# Patient Record
Sex: MALE | Race: BLACK OR AFRICAN AMERICAN | NOT HISPANIC OR LATINO | Employment: FULL TIME | URBAN - METROPOLITAN AREA
[De-identification: names, ages, dates, MRNs, and addresses within clinical notes are randomized per-mention and may not be internally consistent; named-entity substitution may affect disease eponyms.]

---

## 2017-01-06 DIAGNOSIS — B18.1 CHRONIC VIRAL HEPATITIS B WITHOUT DELTA AGENT AND WITHOUT COMA (H): Primary | ICD-10-CM

## 2017-01-12 ENCOUNTER — CARE COORDINATION (OUTPATIENT)
Dept: ONCOLOGY | Facility: CLINIC | Age: 52
End: 2017-01-12

## 2017-01-12 NOTE — PROGRESS NOTES
The patient called and requested that his care is transferred to a oncologst that is able to see him on Thursdays, his day off work.  The scheduling staff scheduled the patient to see Dr. Whittaker on 2/9/17.  The care team was notified.

## 2017-01-17 ENCOUNTER — TELEPHONE (OUTPATIENT)
Dept: NURSING | Facility: CLINIC | Age: 52
End: 2017-01-17

## 2017-01-17 ENCOUNTER — OFFICE VISIT (OUTPATIENT)
Dept: INTERNAL MEDICINE | Facility: CLINIC | Age: 52
End: 2017-01-17

## 2017-01-17 VITALS
WEIGHT: 145.6 LBS | SYSTOLIC BLOOD PRESSURE: 150 MMHG | HEART RATE: 71 BPM | RESPIRATION RATE: 16 BRPM | BODY MASS INDEX: 23.51 KG/M2 | DIASTOLIC BLOOD PRESSURE: 87 MMHG | TEMPERATURE: 97.8 F

## 2017-01-17 DIAGNOSIS — C22.0 HCC (HEPATOCELLULAR CARCINOMA) (H): ICD-10-CM

## 2017-01-17 DIAGNOSIS — R10.11 ABDOMINAL PAIN, RIGHT UPPER QUADRANT: Primary | ICD-10-CM

## 2017-01-17 LAB
ALBUMIN SERPL-MCNC: 3.1 G/DL (ref 3.4–5)
ALBUMIN UR-MCNC: NEGATIVE MG/DL
ALP SERPL-CCNC: 90 U/L (ref 40–150)
ALT SERPL W P-5'-P-CCNC: 24 U/L (ref 0–70)
ANION GAP SERPL CALCULATED.3IONS-SCNC: 7 MMOL/L (ref 3–14)
APPEARANCE UR: CLEAR
AST SERPL W P-5'-P-CCNC: 23 U/L (ref 0–45)
BASOPHILS # BLD AUTO: 0.1 10E9/L (ref 0–0.2)
BASOPHILS NFR BLD AUTO: 1.1 %
BILIRUB SERPL-MCNC: 0.5 MG/DL (ref 0.2–1.3)
BILIRUB UR QL STRIP: NEGATIVE
BUN SERPL-MCNC: 12 MG/DL (ref 7–30)
CALCIUM SERPL-MCNC: 8.6 MG/DL (ref 8.5–10.1)
CHLORIDE SERPL-SCNC: 106 MMOL/L (ref 94–109)
CO2 SERPL-SCNC: 25 MMOL/L (ref 20–32)
COLOR UR AUTO: YELLOW
CREAT SERPL-MCNC: 0.6 MG/DL (ref 0.66–1.25)
DIFFERENTIAL METHOD BLD: ABNORMAL
EOSINOPHIL # BLD AUTO: 0.4 10E9/L (ref 0–0.7)
EOSINOPHIL NFR BLD AUTO: 6.9 %
ERYTHROCYTE [DISTWIDTH] IN BLOOD BY AUTOMATED COUNT: 13.1 % (ref 10–15)
GFR SERPL CREATININE-BSD FRML MDRD: ABNORMAL ML/MIN/1.7M2
GLUCOSE SERPL-MCNC: 81 MG/DL (ref 70–99)
GLUCOSE UR STRIP-MCNC: NEGATIVE MG/DL
HCT VFR BLD AUTO: 39.1 % (ref 40–53)
HGB BLD-MCNC: 13.3 G/DL (ref 13.3–17.7)
HGB UR QL STRIP: NEGATIVE
IMM GRANULOCYTES # BLD: 0 10E9/L (ref 0–0.4)
IMM GRANULOCYTES NFR BLD: 0.4 %
KETONES UR STRIP-MCNC: NEGATIVE MG/DL
LEUKOCYTE ESTERASE UR QL STRIP: NEGATIVE
LIPASE SERPL-CCNC: 150 U/L (ref 73–393)
LYMPHOCYTES # BLD AUTO: 0.7 10E9/L (ref 0.8–5.3)
LYMPHOCYTES NFR BLD AUTO: 12.7 %
MCH RBC QN AUTO: 30.4 PG (ref 26.5–33)
MCHC RBC AUTO-ENTMCNC: 34 G/DL (ref 31.5–36.5)
MCV RBC AUTO: 90 FL (ref 78–100)
MONOCYTES # BLD AUTO: 0.6 10E9/L (ref 0–1.3)
MONOCYTES NFR BLD AUTO: 10.2 %
MUCOUS THREADS #/AREA URNS LPF: PRESENT /LPF
NEUTROPHILS # BLD AUTO: 3.9 10E9/L (ref 1.6–8.3)
NEUTROPHILS NFR BLD AUTO: 68.7 %
NITRATE UR QL: NEGATIVE
NRBC # BLD AUTO: 0 10*3/UL
NRBC BLD AUTO-RTO: 0 /100
PH UR STRIP: 5 PH (ref 5–7)
PLATELET # BLD AUTO: 253 10E9/L (ref 150–450)
POTASSIUM SERPL-SCNC: 4 MMOL/L (ref 3.4–5.3)
PROT SERPL-MCNC: 8.2 G/DL (ref 6.8–8.8)
RBC # BLD AUTO: 4.37 10E12/L (ref 4.4–5.9)
RBC #/AREA URNS AUTO: 1 /HPF (ref 0–2)
SODIUM SERPL-SCNC: 138 MMOL/L (ref 133–144)
SP GR UR STRIP: 1.02 (ref 1–1.03)
SQUAMOUS #/AREA URNS AUTO: <1 /HPF (ref 0–1)
URN SPEC COLLECT METH UR: ABNORMAL
UROBILINOGEN UR STRIP-MCNC: 0 MG/DL (ref 0–2)
WBC # BLD AUTO: 5.7 10E9/L (ref 4–11)
WBC #/AREA URNS AUTO: 3 /HPF (ref 0–2)

## 2017-01-17 RX ORDER — NAPROXEN 500 MG/1
500 TABLET ORAL 2 TIMES DAILY PRN
Qty: 30 TABLET | Refills: 1 | Status: SHIPPED | OUTPATIENT
Start: 2017-01-17 | End: 2017-05-12

## 2017-01-17 RX ORDER — IBUPROFEN 600 MG/1
TABLET, FILM COATED ORAL EVERY 6 HOURS PRN
COMMUNITY
End: 2017-02-02

## 2017-01-17 ASSESSMENT — PAIN SCALES - GENERAL: PAINLEVEL: EXTREME PAIN (8)

## 2017-01-17 NOTE — Clinical Note
2017        Preeti Pascual  [address]   [facility] [address]  [phone] (home)     :     1965          To Whom it May Concern:    Mr. Preeti Pascual missed work on 2017 and 2017  due to illness or injury.  He as seen in my clinic on  and he should be excused from work on the above days.  Please call if you have any questions or concerns.      Please contact me for questions or concerns.    Sincerely,      Js Irving MD    [facility]  [facility]  [address] [facility]  4th Floor  [facility] [address] [phone]

## 2017-01-17 NOTE — TELEPHONE ENCOUNTER
"Call Type: Triage Call    Presenting Problem: Preeti is having \"severe pain in abdomen.\"  Preeti is not able to sleep.  [facility] Triage/Abdominal  Pain/disposition is to See ED Immediately and Preeti agreed.  Triage Note:  Guideline Title: Abdominal or Pelvic Pain  Recommended Disposition: See ED Immediately  Original Inclination: Wanted to speak with a nurse  Override Disposition:  Intended Action: Go to Hospital / ED  Physician Contacted: No  Unbearable abdominal/pelvic pain ?  YES  Pain described as deep, boring, or  tearing ? NO  Swallowed alkali or button battery ? NO  Swallowed sharp object (pin, needle, piece of glass) ? NO  Following a therapeutic OR elective  ? NO  New or worsening signs and symptoms that may indicate shock ? NO  Pregnant AND injury to abdomen ? NO  Pregnant AND abdominal pain/cramping OR back pain ? NO  Pregnant, less than 20 weeks gestation AND vaginal bleeding ? NO  Pregnant, more than 20 weeks gestation AND vaginal bleeding ? NO  Pregnant, gestation less than 20 weeks AND signs of labor ? NO  Pregnant, 20-37 weeks gestation AND signs of labor ? NO  Pregnant, gestation more than 37 weeks AND signs of labor ? NO  Pregnant AND heartburn ? NO  Following ingestion of toxic or caustic substance ? NO  Over 50 years of age AND new onset (first episode) unbearable back or abdominal  pain ? NO  Known abdominal aneurysm (swollen or ballooning aorta) AND sudden onset of  unbearable abdominal pain ? NO  Food or foreign body feels stuck in esophagus. ? NO  GI bleeding, more than streaks or scant amount of blood or passing black tarry  stool(s) ? NO  Chest discomfort associated with shortness of breath, sweating, odd heartbeats or  different heart rate, nausea, vomiting, lightheadedness, or fainting lasting 5 or  more minutes now or within the last hour ? NO  Chest pain spreading to the shoulders, neck, jaw, in one or both arms, stomach or  back lasting 5 or more minutes now or within the last " hour. Pain is NOT  associated with taking a deep breath or a productive cough, movement, or touch to  a localized area. ? NO  Pressure, fullness, squeezing sensation or pain anywhere in the chest lasting 5 or  more minutes now or within the last hour. Pain is NOT associated with taking a  deep breath or a productive cough, movement, or touch to a localized area on the  chest. ? NO  Swallowed an object longer than 2 inches (5 cm) or wider than 3/4 inch (2 cm) wide  ? NO  Generalized or localized pain that becomes severe with movement, standing up  straight or coughing ? NO  Injury to abdomen or pelvis ? NO  New or worsening breathing problems that have not been evaluated OR without a  treatment plan ? NO  Recent childbirth or miscarriage ? NO  Physician Instructions:  Care Advice: Allow the patient to be in a position of comfort.  Another adult should drive.  Pain medication or laxatives should not be taken until symptoms are  evaluated.  Do not eat or drink anything until evaluated by provider.  Call  if signs and symptoms of shock develop (such as unable to  stand due to faintness, dizziness, or lightheadedness  new onset of confusion  slow to respond or difficult to awaken  skin is pale, gray, cool, or moist to touch  severe weakness  loss of consciousness).  IMMEDIATE ACTION  Write down provider's name. List or place the following in a bag for  transport with the patient: current prescription and/or nonprescription  medications  alternative treatments, therapies and medications  and street drugs.

## 2017-01-17 NOTE — MR AVS SNAPSHOT
After Visit Summary   1/17/2017    Preeti Pascual    MRN: [id number]           Patient Information     Date Of Birth          1965        Visit Information        Provider Department      1/17/2017 10:45 AM Js Irving MD [facility]        Today's Diagnoses     Abdominal pain, right upper quadrant    -  1       Care Instructions    Primary Care Center Medication Refill Request Information:  * Please contact your pharmacy regarding ANY request for medication refills.  ** [facility] Prescription Fax = [phone]  * Please allow 3 business days for routine medication refills.  * Please allow 5 business days for controlled substance medication refills.     Primary Care Center Test Result notification information:  *You will be notified with in 7-10 days of your appointment day regarding the results of your test.  If you are on MyChart you will be notified as soon as the provider has reviewed the results and signed off on them.          Follow-ups after your visit        Your next 10 appointments already scheduled     Feb 02, 2017  9:30 AM   (Arrive by 9:15 AM)   New General Liver with Trevor Trujillo MD   [facility] Hepatology ([facility])    [address] [place] [address]  3rd [facility] [id number] [phone]            Feb 09, 2017  4:00 PM   (Arrive by 3:45 PM)   Return Visit with Neymar Whittaker MD   [facility] ([facility])    [address] [place] [id number] [phone]            Feb 17, 2017  7:00 AM   (Arrive by 6:45 AM)   MR ABDOMEN W/O & W CONTRAST with [address] [facility] MRI ([facility])    [address] [place] [id number] [phone]           Take your medicines as usual, unless your doctor tells you not to. Bring a list of your current medicines to your exam (including vitamins, minerals and over-the-counter  drugs). Also bring the results of similar scans you may have had.    The day before your exam, drink extra fluids at least six 8-ounce glasses (unless your doctor tells you to restrict your fluids).   Have a blood test (creatinine test) within 30 days of your exam. Go to your clinic or Diagnostic Imaging Department for this test.   Do not eat or drink for 6 hours prior to exam.  The MRI machine uses a strong magnet. Please wear clothes without metal (snaps, zippers). A sweatsuit works well, or we may give you a hospital gown.  Please remove any body piercings and hair extensions before you arrive. You will also remove watches, jewelry, hairpins, wallets, dentures, partial dental plates and hearing aids. You may wear contact lenses, and you may be able to wear your rings. We have a safe place to keep your personal items, but it is safer to leave them at home.   **IMPORTANT** THE INSTRUCTIONS BELOW ARE ONLY FOR THOSE PATIENTS WHO HAVE BEEN TOLD THEY WILL RECEIVE SEDATION OR GENERAL ANESTHESIA DURING THEIR MRI PROCEDURE:  IF YOU WILL RECEIVE SEDATION (take medicine to help you relax during your exam):   You must get the medicine from your doctor before you arrive. Bring the medicine to the exam. Do not take it at home.   Arrive one hour early. Bring someone who can take you home after the test. Your medicine will make you sleepy. After the exam, you may not drive, take a bus or take a taxi by yourself.   No eating 8 hours before your exam. You may have clear liquids up until 4 hours before your exam. (Clear liquids include water, clear tea, black coffee and fruit juice without pulp.)  IF YOU WILL RECEIVE ANESTHESIA (be asleep for your exam):   Arrive 1 1/2 hours early. Bring someone who can take you home after the test. You may not drive, take a bus or take a taxi by yourself.   No eating 8 hours before your exam. You may have clear liquids up until 4 hours before your exam. (Clear liquids include water, clear tea,  black coffee and fruit juice without pulp.)  If you have any questions, please contact your Imaging Department exam site.            Feb 17, 2017  8:00 AM   LAB with  LAB   [facility] ([facility])    [address] [place] [id number] [phone]           Patient must bring picture ID.  Patient should be prepared to give a urine specimen  Please do not eat 10-12 hours before your appointment if you are coming in fasting for labs on lipids, cholesterol, or glucose (sugar).  Pregnant women should follow their Care Team instructions. Water with medications is okay. Do not drink coffee or other fluids.   If you have concerns about taking  your medications, please ask at office or if scheduling via [employer], send a message by clicking on Secure Messaging, Message Your Care Team.            Feb 17, 2017  8:30 AM   (Arrive by 8:15 AM)   Return Visit with Yenni Larsen MD   [facility] ([facility])    [address] [place] [address] [phone]              Who to contact     Please call your clinic at [phone] to:    Ask questions about your health    Make or cancel appointments    Discuss your medicines    Learn about your test results    Speak to your doctor   If you have compliments or concerns about an experience at your clinic, or if you wish to file a complaint, please contact [facility] Patient Relations at [phone] or email us at [email or web address]         Additional Information About Your Visit        [employer] Information     [employer] gives you secure access to your electronic health record. If you see a primary care provider, you can also send messages to your care team and make appointments. If you have questions, please call your primary care clinic.  If you do not have a primary care provider, please call [phone] and they will  assist you.      [employer] is an electronic gateway that provides easy, online access to your medical records. With [employer], you can request a clinic appointment, read your test results, renew a prescription or communicate with your care team.     To access your existing account, please contact your [facility] or call [phone] for assistance.        Care EveryWhere ID     This is your Care EveryWhere ID. This could be used by other organizations to access your [facility] medical records  [id number]        Your Vitals Were     Pulse Temperature Respirations             71 97.8  F (36.6  C) 16          Blood Pressure from Last 3 Encounters:   01/17/17 150/87   12/30/16 143/81   12/01/16 125/85    Weight from Last 3 Encounters:   01/17/17 66.044 kg (145 lb 9.6 oz)   12/30/16 66.724 kg (147 lb 1.6 oz)   12/01/16 67.541 kg (148 lb 14.4 oz)              We Performed the Following     CBC with platelets differential     Comprehensive metabolic panel     Lipase     Routine UA with micro reflex to culture     US Abdomen Complete w Doppler Complete          Today's Medication Changes          These changes are accurate as of: 1/17/17 11:04 AM.  If you have any questions, ask your nurse or doctor.               Start taking these medicines.        Dose/Directions    naproxen 500 MG tablet   Commonly known as:  NAPROSYN   Used for:  Abdominal pain, right upper quadrant   Started by:  Js Irving MD        Dose:  500 mg   Take 1 tablet (500 mg) by mouth 2 times daily as needed for moderate pain   Quantity:  30 tablet   Refills:  1         Stop taking these medicines if you haven't already. Please contact your care team if you have questions.     methylPREDNISolone 4 MG tablet   Commonly known as:  MEDROL DOSEPAK   Stopped by:  Js Irving MD           ondansetron 4 MG tablet   Commonly known as:  ZOFRAN   Stopped by:  Js Irving MD           oxyCODONE 5 MG IR tablet   Commonly known  as:  ROXICODONE   Stopped by:  Js Irving MD           pantoprazole 40 MG EC tablet   Commonly known as:  PROTONIX   Stopped by:  Js Irving MD                Where to get your medicines      These medications were sent to [id number] - [place], [place] - [address] AT [facility] [address]  [address], [place] [address]     Phone:  [phone]    - naproxen 500 MG tablet             Primary Care Provider Office Phone # Fax #    Sudhir Layne [phone]       [address]  [facility] [address]        Thank you!     Thank you for choosing [facility]  for your care. Our goal is always to provide you with excellent care. Hearing back from our patients is one way we can continue to improve our services. Please take a few minutes to complete the written survey that you may receive in the mail after your visit with us. Thank you!             Your Updated Medication List - Protect others around you: Learn how to safely use, store and throw away your medicines at www.disposemymeds.org.          This list is accurate as of: 1/17/17 11:04 AM.  Always use your most recent med list.                   Brand Name Dispense Instructions for use    BARACLUDE 0.5 MG tablet   Generic drug:  entecavir      Take 0.5 mg by mouth daily       ibuprofen 600 MG tablet    ADVIL/MOTRIN     Take by mouth every 6 hours as needed for moderate pain       naproxen 500 MG tablet    NAPROSYN    30 tablet    Take 1 tablet (500 mg) by mouth 2 times daily as needed for moderate pain

## 2017-01-17 NOTE — PROGRESS NOTES
Health Maintenance Due   Topic Date Due     TETANUS IMMUNIZATION (SYSTEM ASSIGNED)  12/25/1981     LIPID SCREEN Q5 YR MALE (SYSTEM ASSIGNED)  12/25/1998     COLON CANCER SCREEN (SYSTEM ASSIGNED)  12/25/2013     INFLUENZA VACCINE (SYSTEM ASSIGNED)  09/01/2016       Patient declined to discuss HM.

## 2017-01-17 NOTE — PROGRESS NOTES
[facility]     Resident Clinic Note        Visit Date: January 17, 2017    Preeti Pascual  MRN: [id number]  YOB: 1965    Chief complaint: right upper quadrant abdominal pain.      HPI:  Preeti Pascual is a 51 year old male with past medical history significant for hepatitis B, Hepatocellular carcinoma, who is s/p radioembolization on 01/25/2016 to the right hepatic lobe, followed by a second procedure on 11/23/2016, followed by GI and intervention radiology.  Now presents for evaluation of right upper abdominal pain - onset 24 hours ago.  Worsening with movement or taking a deep breath.  No associated shortness of breath, chest pain, fever, chills, nausea, vomiting, change in bowel habits.  Tolerating diet well.  He otherwise was feeling well in his usual state of health on Sunday.  Recalls straining himself to lift heavy boxes on Sunday.  Did not notice symptoms right away but subsequently had onset when he woke up yesterday.  Increased pain with lying on the right side.      Has been taking Ibuprofen with minimal relief in his symptoms.        ROS:  10 point ROS was reviewed and negative other than stated in HPI    Past medical history reviewed.      No Known Allergies      Current Outpatient Prescriptions   Medication     ibuprofen (ADVIL/MOTRIN) 600 MG tablet     naproxen (NAPROSYN) 500 MG tablet     entecavir (BARACLUDE) 0.5 MG tablet     No current facility-administered medications for this visit.     Facility-Administered Medications Ordered in Other Visits   Medication     gadoxetate disodium (EOVIST) injection 1,296 mL       Vitals:  /87 mmHg  Pulse 71  Temp(Src) 97.8  F (36.6  C)  Resp 16  Wt 66.044 kg (145 lb 9.6 oz)    Filed Vitals:    01/17/17 1017   Weight: 66.044 kg (145 lb 9.6 oz)       Physical Exam:  General: NAD, mild painful discomfort with movement.    HEENT: Oral mucosa moist and non-erythematous, PERRLA, EOM intact  CV: RRR, normal S1S2, no m/c/r  Resp: Clear to  auscultation bilaterally, no wheezes or crackles  Abd: Soft, Mild right upper quadrant tenderness to palpation.  No rebound or guarding. Negative hoffmann's signs.    Extremities: WWP, no pedal edema  Neuro: AAOx3, no lateralizing symptoms or focal neurologic deficits  Psych: Normal mood and affect.      MRI and prior US reviewed on EPIC      Assessment:  Preeti Pascual is a 51 year old male with past medical history significant for hepatitis B, Hepatocellular carcinoma, who is s/p radioembolization on 01/25/2016 to the right hepatic lobe, followed by a second procedure on 11/23/2016, followed by GI and intervention radiology.  Now presents for evaluation of right upper abdominal pain - onset 24 hours ago.    Plan:    Preeti was seen today for abdominal pain.    Diagnoses and all orders for this visit:    Abdominal pain, right upper quadrant - Given history of HCC and known lesions.  DDx includes tumor pain, gall bladder pathology, pancreatitis, vs. Portal vein thrombus. Will further evaluation with right upper quadrant US with dopplers today.  Lab work as below for further eval.  Currently hemodynamically stable.  Afebrile. Non tachycardic.  Counseled on pain relief and instructed that I will call him with the results and instructions for further management.    -     Comprehensive metabolic panel  -     CBC with platelets differential  -     Lipase  -     US Abdomen Complete w Doppler Complete  -     Routine UA with micro reflex to culture  -     naproxen (NAPROSYN) 500 MG tablet; Take 1 tablet (500 mg) by mouth 2 times daily as needed for moderate pain      Follow-up:  PRN or if symptoms worsen including fever, chills, worsening pain - instructed to seek emergency medical care.  The patient verbalized understanding of above work up and management.      Patient seen and discussed with Dr. Villegas.      Js Irving MD,   Internal Medicine PGY-2  [phone]

## 2017-01-17 NOTE — NURSING NOTE
Chief Complaint   Patient presents with     Abdominal Pain     right sided abdominal pain.  Patient unable to drive or sleep     OTTO OLSEN at 10:16 AM on 1/17/2017.

## 2017-01-17 NOTE — PATIENT INSTRUCTIONS
Primary Care Center Medication Refill Request Information:  * Please contact your pharmacy regarding ANY request for medication refills.  ** [facility] Prescription Fax = [phone]  * Please allow 3 business days for routine medication refills.  * Please allow 5 business days for controlled substance medication refills.     Primary Care Center Test Result notification information:  *You will be notified with in 7-10 days of your appointment day regarding the results of your test.  If you are on MyChart you will be notified as soon as the provider has reviewed the results and signed off on them.

## 2017-01-17 NOTE — PROGRESS NOTES
Pt was seen and examined with Dr. Irving.  I agree with his documentation as noted above.    My additional comments: None    Samaria Villegas MD

## 2017-01-31 ENCOUNTER — TELEPHONE (OUTPATIENT)
Dept: GASTROENTEROLOGY | Facility: CLINIC | Age: 52
End: 2017-01-31

## 2017-01-31 NOTE — TELEPHONE ENCOUNTER
Left message for pt reminding them of upcoming appointment.  Instructed pt to bring updated medications list.  Instructed pt to arrive an hour and a half to two hours prior to appt time for labs.  Reymundo Rae, CMA

## 2017-02-02 ENCOUNTER — OFFICE VISIT (OUTPATIENT)
Dept: GASTROENTEROLOGY | Facility: CLINIC | Age: 52
End: 2017-02-02
Attending: INTERNAL MEDICINE
Payer: COMMERCIAL

## 2017-02-02 VITALS
TEMPERATURE: 97.9 F | WEIGHT: 147 LBS | DIASTOLIC BLOOD PRESSURE: 86 MMHG | BODY MASS INDEX: 23.63 KG/M2 | HEIGHT: 66 IN | HEART RATE: 71 BPM | SYSTOLIC BLOOD PRESSURE: 138 MMHG | OXYGEN SATURATION: 99 %

## 2017-02-02 DIAGNOSIS — B18.1 CHRONIC VIRAL HEPATITIS B WITHOUT DELTA AGENT AND WITHOUT COMA (H): Primary | ICD-10-CM

## 2017-02-02 PROCEDURE — 99212 OFFICE O/P EST SF 10 MIN: CPT | Mod: ZF

## 2017-02-02 ASSESSMENT — PAIN SCALES - GENERAL: PAINLEVEL: MILD PAIN (3)

## 2017-02-02 NOTE — PROGRESS NOTES
I had the pleasure of seeing Preeti Pascual for followup in the Liver Clinic at the [facility] on 02/02/2017.  Mr. Pascual returns for followup of chronic hepatitis B complicated by hepatocellular carcinoma.        He generally sees Dr. Negrete but only has Thursdays off; and thus, he was put in with me today.  He has been known to have 2 hepatocellular carcinomas in the liver that been treated twice now by radioembolization.  The largest of which was about 9 cm in span, and it has decreased in size with the radioembolization treatment.  His last treatment was in November, and his alpha-fetoprotein decreased to 10.      He at this point in time denies any abdominal pain.  He was seen for some abdominal and back pain about 2 weeks ago.  He was seen by his primary physician and had an ultrasound that did not show any major changes, and he was put on ibuprofen which has managed his pain well.  He takes it on a p.r.n. basis.  He denies any itching, skin rash or fatigue.  He denies any increased abdominal girth or lower extremity edema.  He has not had any gastrointestinal bleeding or any overt signs of encephalopathy.  He denies any fevers or chills, cough or shortness of breath.  He denies any nausea, vomiting, diarrhea or constipation.  His appetite has been good, and his weight has been stable.     Current Outpatient Prescriptions   Medication     naproxen (NAPROSYN) 500 MG tablet     entecavir (BARACLUDE) 0.5 MG tablet     No current facility-administered medications for this visit.     Facility-Administered Medications Ordered in Other Visits   Medication     gadoxetate disodium (EOVIST) injection 1,296 mL     B/P: 138/86, T: 97.9, P: 71, R: Data Unavailable    HEENT exam shows no scleral icterus and no temporal muscle wasting.  His chest is clear.  His abdominal exam shows no increase in girth.  No masses or tenderness to palpation are present.  His liver is 11-12 cm in span, palpable 1-2  fingers below the right costal margin.  No spleen tip is palpable, and extremity exam shows no edema.  Skin exam shows no stigmata of chronic liver disease.     His most recent laboratory tests show his white count is 5.7, hemoglobin 13.3, platelets are 253,000.  Sodium 138, potassium 4.0, BUN is 12, creatinine 0.6.  AST is 23, ALT is 24, alkaline phosphatase is 90, albumin is 3.1 with total protein of 8.2.  Total bilirubin is 0.5.  As I mentioned, his last alpha-fetoprotein was 10.4 at the end of December.      My impression is that Mr. Pascual has chronic hepatitis B complicated by hepatocellular carcinoma.  Based on his AFP level, he appears to have an excellent response to radioembolization.  Unfortunately, there is evidence of portal vein invasion; and thus, he is not a candidate for liver transplantation.  We will keep him on his entecavir.  He is scheduled to follow up imaging in the middle of February, which we will then review at our liver tumor conference and get back to him about our plans from there.  Otherwise, all other complications are well addressed.      Thank you very much for allowing me to participate in the care of this patient.  If you have any questions regarding my recommendations, please do not hesitate to contact me.       Trevor Trujillo MD    Professor of Medicine  [employer]    Executive Medical Director, Solid Organ Transplant Program  [facility]

## 2017-02-02 NOTE — NURSING NOTE
Chief Complaint   Patient presents with     Consult     HCC   Pt roomed, vitals, meds, and allergies reviewed with pt. Pt ready for provider.  Reymundo Rae, CMA

## 2017-02-02 NOTE — MR AVS SNAPSHOT
After Visit Summary   2/2/2017    Preeti Pascual    MRN: [id number]           Patient Information     Date Of Birth          1965        Visit Information        Provider Department      2/2/2017 9:30 AM Trevor Trujillo MD [facility] Hepatology         Follow-ups after your visit        Follow-up notes from your care team     Return in about 3 months (around 5/2/2017).      Your next 10 appointments already scheduled     Feb 17, 2017  7:00 AM   (Arrive by 6:45 AM)   MR ABDOMEN W/O & W CONTRAST with [address] [facility] [place] [facility] ([facility] [place] [facility])    [address] [address] [phone]           Take your medicines as usual, unless your doctor tells you not to. Bring a list of your current medicines to your exam (including vitamins, minerals and over-the-counter drugs). Also bring the results of similar scans you may have had.    The day before your exam, drink extra fluids at least six 8-ounce glasses (unless your doctor tells you to restrict your fluids).   Have a blood test (creatinine test) within 30 days of your exam. Go to your clinic or Diagnostic Imaging Department for this test.   Do not eat or drink for 6 hours prior to exam.  The MRI machine uses a strong magnet. Please wear clothes without metal (snaps, zippers). A sweatsuit works well, or we may give you a hospital gown.  Please remove any body piercings and hair extensions before you arrive. You will also remove watches, jewelry, hairpins, wallets, dentures, partial dental plates and hearing aids. You may wear contact lenses, and you may be able to wear your rings. We have a safe place to keep your personal items, but it is safer to leave them at home.   **IMPORTANT** THE INSTRUCTIONS BELOW ARE ONLY FOR THOSE PATIENTS WHO HAVE BEEN TOLD THEY WILL RECEIVE SEDATION OR GENERAL ANESTHESIA DURING THEIR MRI PROCEDURE:  IF YOU WILL RECEIVE SEDATION (take medicine to help you relax  during your exam):   You must get the medicine from your doctor before you arrive. Bring the medicine to the exam. Do not take it at home.   Arrive one hour early. Bring someone who can take you home after the test. Your medicine will make you sleepy. After the exam, you may not drive, take a bus or take a taxi by yourself.   No eating 8 hours before your exam. You may have clear liquids up until 4 hours before your exam. (Clear liquids include water, clear tea, black coffee and fruit juice without pulp.)  IF YOU WILL RECEIVE ANESTHESIA (be asleep for your exam):   Arrive 1 1/2 hours early. Bring someone who can take you home after the test. You may not drive, take a bus or take a taxi by yourself.   No eating 8 hours before your exam. You may have clear liquids up until 4 hours before your exam. (Clear liquids include water, clear tea, black coffee and fruit juice without pulp.)  If you have any questions, please contact your Imaging Department exam site.            Feb 17, 2017  8:00 AM   LAB with  LAB   [facility])    [address] [place] [address] [phone]           Patient must bring picture ID.  Patient should be prepared to give a urine specimen  Please do not eat 10-12 hours before your appointment if you are coming in fasting for labs on lipids, cholesterol, or glucose (sugar).  Pregnant women should follow their Care Team instructions. Water with medications is okay. Do not drink coffee or other fluids.   If you have concerns about taking  your medications, please ask at office or if scheduling via Inofile, send a message by clicking on Secure Messaging, Message Your Care Team.            Feb 17, 2017  8:30 AM   (Arrive by 8:15 AM)   Return Visit with Yenni Larsen MD   [facility] ([facility])    [address] [place] [address] [phone]            Feb  23, 2017 12:00 PM   (Arrive by 11:45 AM)   Return Visit with Neymar Whittaker MD   [facility] ([facility])    [address]  2nd Floor  [facility] [address] [phone]            May 04, 2017  8:30 AM   Lab with  LAB   [facility] ([facility])    [address]  1st Floor  [facility] [address] [phone]            May 04, 2017  9:30 AM   (Arrive by 9:15 AM)   Return General Liver with Trevor Trujillo MD   [facility] ([facility])    [address]  3rd Floor  [facility] [address] [phone]              Who to contact     If you have questions or need follow up information about today's clinic visit or your schedule please contact [facility] HEPATOLOGY directly at [phone].  Normal or non-critical lab and imaging results will be communicated to you by Razorsighthart, letter or phone within 4 business days after the clinic has received the results. If you do not hear from us within 7 days, please contact the clinic through Vintt or phone. If you have a critical or abnormal lab result, we will notify you by phone as soon as possible.  Submit refill requests through JobHive or call your pharmacy and they will forward the refill request to us. Please allow 3 business days for your refill to be completed.          Additional Information About Your Visit        Razorsighthart Information     JobHive gives you secure access to your electronic health record. If you see a primary care provider, you can also send messages to your care team and make appointments. If you have questions, please call your primary care clinic.  If you do not have a primary care provider, please call [phone] and they will assist you.        Care EveryWhere ID     This is your Care EveryWhere ID. This could be used by other organizations to access your [facility] medical records  [id number]        Your Vitals  "Were     Pulse Temperature Height BMI (Body Mass Index) Pulse Oximetry       71 97.9  F (36.6  C) (Oral) 1.676 m (5' 6\") 23.74 kg/m2 99%        Blood Pressure from Last 3 Encounters:   02/02/17 138/86   01/17/17 150/87   12/30/16 143/81    Weight from Last 3 Encounters:   02/02/17 66.679 kg (147 lb)   01/17/17 66.044 kg (145 lb 9.6 oz)   12/30/16 66.724 kg (147 lb 1.6 oz)              Today, you had the following     No orders found for display       Primary Care Provider Office Phone # Fax #    Sudhir Layne [phone]       [address]  [facility] [address]        Thank you!     Thank you for choosing [facility] HEPATOLOG[facility]  for your care. Our goal is always to provide you with excellent care. Hearing back from our patients is one way we can continue to improve our services. Please take a few minutes to complete the written survey that you may receive in the mail after your visit with us. Thank you!             Your Updated Medication List - Protect others around you: Learn how to safely use, store and throw away your medicines at www.disposemymeds.org.          This list is accurate as of: 2/2/17 10:03 AM.  Always use your most recent med list.                   Brand Name Dispense Instructions for use    BARACLUDE 0.5 MG tablet   Generic drug:  entecavir      Take 0.5 mg by mouth daily       naproxen 500 MG tablet    NAPROSYN    30 tablet    Take 1 tablet (500 mg) by mouth 2 times daily as needed for moderate pain         "

## 2017-02-02 NOTE — Clinical Note
2/2/2017       RE: Preeti Pascual  [address]   [place] [address]     Dear Colleague,    Thank you for referring your patient, Preeti Pascual, to the [facility] HEPATOLOGY at [facility]. Please see a copy of my visit note below.    I had the pleasure of seeing Preeti Pascual for followup in the Liver Clinic at the [facility] on 02/02/2017.  Mr. Pascual returns for followup of chronic hepatitis B complicated by hepatocellular carcinoma.        He generally sees Dr. Negrete but only has Thursdays off; and thus, he was put in with me today.  He has been known to have 2 hepatocellular carcinomas in the liver that been treated twice now by radioembolization.  The largest of which was about 9 cm in span, and it has decreased in size with the radioembolization treatment.  His last treatment was in November, and his alpha-fetoprotein decreased to 10.      He at this point in time denies any abdominal pain.  He was seen for some abdominal and back pain about 2 weeks ago.  He was seen by his primary physician and had an ultrasound that did not show any major changes, and he was put on ibuprofen which has managed his pain well.  He takes it on a p.r.n. basis.  He denies any itching, skin rash or fatigue.  He denies any increased abdominal girth or lower extremity edema.  He has not had any gastrointestinal bleeding or any overt signs of encephalopathy.  He denies any fevers or chills, cough or shortness of breath.  He denies any nausea, vomiting, diarrhea or constipation.  His appetite has been good, and his weight has been stable.     Current Outpatient Prescriptions   Medication     naproxen (NAPROSYN) 500 MG tablet     entecavir (BARACLUDE) 0.5 MG tablet     No current facility-administered medications for this visit.     Facility-Administered Medications Ordered in Other Visits   Medication     gadoxetate disodium (EOVIST) injection 1,296 mL     B/P:  138/86, T: 97.9, P: 71, R: Data Unavailable    HEENT exam shows no scleral icterus and no temporal muscle wasting.  His chest is clear.  His abdominal exam shows no increase in girth.  No masses or tenderness to palpation are present.  His liver is 11-12 cm in span, palpable 1-2 fingers below the right costal margin.  No spleen tip is palpable, and extremity exam shows no edema.  Skin exam shows no stigmata of chronic liver disease.     His most recent laboratory tests show his white count is 5.7, hemoglobin 13.3, platelets are 253,000.  Sodium 138, potassium 4.0, BUN is 12, creatinine 0.6.  AST is 23, ALT is 24, alkaline phosphatase is 90, albumin is 3.1 with total protein of 8.2.  Total bilirubin is 0.5.  As I mentioned, his last alpha-fetoprotein was 10.4 at the end of December.      My impression is that Mr. Pascual has chronic hepatitis B complicated by hepatocellular carcinoma.  Based on his AFP level, he appears to have an excellent response to radioembolization.  Unfortunately, there is evidence of portal vein invasion; and thus, he is not a candidate for liver transplantation.  We will keep him on his entecavir.  He is scheduled to follow up imaging in the middle of February, which we will then review at our liver tumor conference and get back to him about our plans from there.  Otherwise, all other complications are well addressed.      Thank you very much for allowing me to participate in the care of this patient.  If you have any questions regarding my recommendations, please do not hesitate to contact me.       Trevor Trujillo MD    Professor of Medicine  [employer]    Executive Medical Director, Solid Organ Transplant Program  [facility]     Again, thank you for allowing me to participate in the care of your patient.      Sincerely,    Trevor Trujillo MD

## 2017-02-02 NOTE — Clinical Note
2/2/2017       RE: Preeti Pascual  [address]   [facility] [facility] [address]       I had the pleasure of seeing Preeti Pascual for followup in the Liver Clinic at the [facility] on 02/02/2017.  Mr. Pascual returns for followup of chronic hepatitis B complicated by hepatocellular carcinoma.        He generally sees Dr. Negrete but only has Thursdays off; and thus, he was put in with me today.  He has been known to have 2 hepatocellular carcinomas in the liver that been treated twice now by radioembolization.  The largest of which was about 9 cm in span, and it has decreased in size with the radioembolization treatment.  His last treatment was in November, and his alpha-fetoprotein decreased to 10.      He at this point in time denies any abdominal pain.  He was seen for some abdominal and back pain about 2 weeks ago.  He was seen by his primary physician and had an ultrasound that did not show any major changes, and he was put on ibuprofen which has managed his pain well.  He takes it on a p.r.n. basis.  He denies any itching, skin rash or fatigue.  He denies any increased abdominal girth or lower extremity edema.  He has not had any gastrointestinal bleeding or any overt signs of encephalopathy.  He denies any fevers or chills, cough or shortness of breath.  He denies any nausea, vomiting, diarrhea or constipation.  His appetite has been good, and his weight has been stable.     Current Outpatient Prescriptions   Medication     naproxen (NAPROSYN) 500 MG tablet     entecavir (BARACLUDE) 0.5 MG tablet     No current facility-administered medications for this visit.     Facility-Administered Medications Ordered in Other Visits   Medication     gadoxetate disodium (EOVIST) injection 1,296 mL     B/P: 138/86, T: 97.9, P: 71, R: Data Unavailable    HEENT exam shows no scleral icterus and no temporal muscle wasting.  His chest is clear.  His abdominal exam shows no increase in girth.  No masses  or tenderness to palpation are present.  His liver is 11-12 cm in span, palpable 1-2 fingers below the right costal margin.  No spleen tip is palpable, and extremity exam shows no edema.  Skin exam shows no stigmata of chronic liver disease.     His most recent laboratory tests show his white count is 5.7, hemoglobin 13.3, platelets are 253,000.  Sodium 138, potassium 4.0, BUN is 12, creatinine 0.6.  AST is 23, ALT is 24, alkaline phosphatase is 90, albumin is 3.1 with total protein of 8.2.  Total bilirubin is 0.5.  As I mentioned, his last alpha-fetoprotein was 10.4 at the end of December.      My impression is that Mr. Pascual has chronic hepatitis B complicated by hepatocellular carcinoma.  Based on his AFP level, he appears to have an excellent response to radioembolization.  Unfortunately, there is evidence of portal vein invasion; and thus, he is not a candidate for liver transplantation.  We will keep him on his entecavir.  He is scheduled to follow up imaging in the middle of February, which we will then review at our liver tumor conference and get back to him about our plans from there.  Otherwise, all other complications are well addressed.      Thank you very much for allowing me to participate in the care of this patient.  If you have any questions regarding my recommendations, please do not hesitate to contact me.       Trevor Trujillo MD    Professor of Medicine  [employer]    Executive Medical Director, Solid Organ Transplant Program  [facility]

## 2017-02-02 NOTE — Clinical Note
2/2/2017       RE: Preeti Pascual  [address]   [place] [address]     Dear Colleague,    Thank you for referring your patient, Preeti Pascual, to the [facility] HEPATOLOGY at [facility]. Please see a copy of my visit note below.    I had the pleasure of seeing Preeti Pascual for followup in the Liver Clinic at the [facility] on 02/02/2017.  Mr. Pascual returns for followup of chronic hepatitis B complicated by hepatocellular carcinoma.        He generally sees Dr. Negrete but only has Thursdays off; and thus, he was put in with me today.  He has been known to have 2 hepatocellular carcinomas in the liver that been treated twice now by radioembolization.  The largest of which was about 9 cm in span, and it has decreased in size with the radioembolization treatment.  His last treatment was in November, and his alpha-fetoprotein decreased to 10.      He at this point in time denies any abdominal pain.  He was seen for some abdominal and back pain about 2 weeks ago.  He was seen by his primary physician and had an ultrasound that did not show any major changes, and he was put on ibuprofen which has managed his pain well.  He takes it on a p.r.n. basis.  He denies any itching, skin rash or fatigue.  He denies any increased abdominal girth or lower extremity edema.  He has not had any gastrointestinal bleeding or any overt signs of encephalopathy.  He denies any fevers or chills, cough or shortness of breath.  He denies any nausea, vomiting, diarrhea or constipation.  His appetite has been good, and his weight has been stable.     Current Outpatient Prescriptions   Medication     naproxen (NAPROSYN) 500 MG tablet     entecavir (BARACLUDE) 0.5 MG tablet     No current facility-administered medications for this visit.     Facility-Administered Medications Ordered in Other Visits   Medication     gadoxetate disodium (EOVIST) injection 1,296 mL     B/P:  138/86, T: 97.9, P: 71, R: Data Unavailable    HEENT exam shows no scleral icterus and no temporal muscle wasting.  His chest is clear.  His abdominal exam shows no increase in girth.  No masses or tenderness to palpation are present.  His liver is 11-12 cm in span, palpable 1-2 fingers below the right costal margin.  No spleen tip is palpable, and extremity exam shows no edema.  Skin exam shows no stigmata of chronic liver disease.     His most recent laboratory tests show his white count is 5.7, hemoglobin 13.3, platelets are 253,000.  Sodium 138, potassium 4.0, BUN is 12, creatinine 0.6.  AST is 23, ALT is 24, alkaline phosphatase is 90, albumin is 3.1 with total protein of 8.2.  Total bilirubin is 0.5.  As I mentioned, his last alpha-fetoprotein was 10.4 at the end of December.      My impression is that Mr. Pascual has chronic hepatitis B complicated by hepatocellular carcinoma.  Based on his AFP level, he appears to have an excellent response to radioembolization.  Unfortunately, there is evidence of portal vein invasion; and thus, he is not a candidate for liver transplantation.  We will keep him on his entecavir.  He is scheduled to follow up imaging in the middle of February, which we will then review at our liver tumor conference and get back to him about our plans from there.  Otherwise, all other complications are well addressed.      Thank you very much for allowing me to participate in the care of this patient.  If you have any questions regarding my recommendations, please do not hesitate to contact me.       Trevor Trujillo MD    Professor of Medicine  [employer]    Executive Medical Director, Solid Organ Transplant Program  [facility]     Again, thank you for allowing me to participate in the care of your patient.      Sincerely,    Trevor Trujillo MD

## 2017-02-17 ENCOUNTER — OFFICE VISIT (OUTPATIENT)
Dept: RADIOLOGY | Facility: CLINIC | Age: 52
End: 2017-02-17
Attending: RADIOLOGY
Payer: COMMERCIAL

## 2017-02-17 VITALS
TEMPERATURE: 99 F | DIASTOLIC BLOOD PRESSURE: 84 MMHG | OXYGEN SATURATION: 97 % | HEIGHT: 66 IN | RESPIRATION RATE: 18 BRPM | SYSTOLIC BLOOD PRESSURE: 128 MMHG | BODY MASS INDEX: 23.96 KG/M2 | WEIGHT: 149.1 LBS | HEART RATE: 61 BPM

## 2017-02-17 DIAGNOSIS — C22.0 HCC (HEPATOCELLULAR CARCINOMA) (H): ICD-10-CM

## 2017-02-17 DIAGNOSIS — B18.1 CHRONIC VIRAL HEPATITIS B WITHOUT DELTA AGENT AND WITHOUT COMA (H): ICD-10-CM

## 2017-02-17 DIAGNOSIS — C22.0 HCC (HEPATOCELLULAR CARCINOMA) (H): Primary | ICD-10-CM

## 2017-02-17 LAB
AFP SERPL-MCNC: 6 UG/L (ref 0–8)
ALBUMIN SERPL-MCNC: 3.1 G/DL (ref 3.4–5)
ALP SERPL-CCNC: 105 U/L (ref 40–150)
ALT SERPL W P-5'-P-CCNC: 24 U/L (ref 0–70)
ANION GAP SERPL CALCULATED.3IONS-SCNC: 7 MMOL/L (ref 3–14)
AST SERPL W P-5'-P-CCNC: 22 U/L (ref 0–45)
BILIRUB DIRECT SERPL-MCNC: <0.1 MG/DL (ref 0–0.2)
BILIRUB SERPL-MCNC: 0.2 MG/DL (ref 0.2–1.3)
BUN SERPL-MCNC: 16 MG/DL (ref 7–30)
CALCIUM SERPL-MCNC: 8.5 MG/DL (ref 8.5–10.1)
CHLORIDE SERPL-SCNC: 110 MMOL/L (ref 94–109)
CO2 SERPL-SCNC: 26 MMOL/L (ref 20–32)
CREAT SERPL-MCNC: 0.81 MG/DL (ref 0.66–1.25)
ERYTHROCYTE [DISTWIDTH] IN BLOOD BY AUTOMATED COUNT: 13.2 % (ref 10–15)
GFR SERPL CREATININE-BSD FRML MDRD: ABNORMAL ML/MIN/1.7M2
GLUCOSE SERPL-MCNC: 95 MG/DL (ref 70–99)
HCT VFR BLD AUTO: 38.9 % (ref 40–53)
HGB BLD-MCNC: 13.2 G/DL (ref 13.3–17.7)
INR PPP: 1.14 (ref 0.86–1.14)
MCH RBC QN AUTO: 29.5 PG (ref 26.5–33)
MCHC RBC AUTO-ENTMCNC: 33.9 G/DL (ref 31.5–36.5)
MCV RBC AUTO: 87 FL (ref 78–100)
PLATELET # BLD AUTO: 210 10E9/L (ref 150–450)
POTASSIUM SERPL-SCNC: 4.4 MMOL/L (ref 3.4–5.3)
PROT SERPL-MCNC: 7.8 G/DL (ref 6.8–8.8)
RBC # BLD AUTO: 4.48 10E12/L (ref 4.4–5.9)
SODIUM SERPL-SCNC: 143 MMOL/L (ref 133–144)
WBC # BLD AUTO: 5.1 10E9/L (ref 4–11)

## 2017-02-17 PROCEDURE — 36415 COLL VENOUS BLD VENIPUNCTURE: CPT | Performed by: RADIOLOGY

## 2017-02-17 PROCEDURE — 85027 COMPLETE CBC AUTOMATED: CPT | Performed by: RADIOLOGY

## 2017-02-17 PROCEDURE — 82105 ALPHA-FETOPROTEIN SERUM: CPT | Performed by: RADIOLOGY

## 2017-02-17 PROCEDURE — 80048 BASIC METABOLIC PNL TOTAL CA: CPT | Performed by: RADIOLOGY

## 2017-02-17 PROCEDURE — 99212 OFFICE O/P EST SF 10 MIN: CPT | Mod: ZF

## 2017-02-17 PROCEDURE — 80076 HEPATIC FUNCTION PANEL: CPT | Performed by: RADIOLOGY

## 2017-02-17 PROCEDURE — 85610 PROTHROMBIN TIME: CPT | Performed by: RADIOLOGY

## 2017-02-17 PROCEDURE — 87517 HEPATITIS B DNA QUANT: CPT | Performed by: RADIOLOGY

## 2017-02-17 NOTE — NURSING NOTE
"Preeti Pascual is a 51 year old male who presents for:  Chief Complaint   Patient presents with     Oncology Clinic Visit     FU appt        Initial Vitals:  /84 (BP Location: Left arm, Patient Position: Chair, Cuff Size: Adult Regular)  Pulse 61  Temp 99  F (37.2  C) (Oral)  Resp 18  Ht 1.676 m (5' 6\")  Wt 67.6 kg (149 lb 1.6 oz)  SpO2 97%  BMI 24.07 kg/m2 Estimated body mass index is 24.07 kg/(m^2) as calculated from the following:    Height as of this encounter: 1.676 m (5' 6\").    Weight as of this encounter: 67.6 kg (149 lb 1.6 oz).. Body surface area is 1.77 meters squared. BP completed using cuff size: regular  Data Unavailable No LMP for male patient. Allergies and medications reviewed.     Medications: Medication refills not needed today.  Pharmacy name entered into Heidi Shaulis: [id number] - [place], [place] - [address] [facility] [address] AT [facility] [address]    Comments:     6 minutes for nursing intake (face to face time)   Stefanie Hagen CMA        "

## 2017-02-17 NOTE — MR AVS SNAPSHOT
After Visit Summary   2/17/2017    Preeti Pascual    MRN: [id number]           Patient Information     Date Of Birth          1965        Visit Information        Provider Department      2/17/2017 8:30 AM Yenni Larsen MD [facility]        Today's Diagnoses     HCC (hepatocellular carcinoma) (H)    -  1      Care Instructions    It was a pleasure to see you today.     Our plan is that we will have you return in 3 mos time with another MRI/Lab and appt with Dr Mead.     Please call us with any other questions.     Kori Young RN, BSN  Interventional Radiology Nurse Coordinator   Phone: [phone]          Follow-ups after your visit        Your next 10 appointments already scheduled     Feb 23, 2017 12:00 PM CST   (Arrive by 11:45 AM)   Return Visit with Neymar Whittaker MD   [facility] ([facility])    [address] [place] [address]  2nd [facility] [id number] [phone]            May 04, 2017  8:30 AM CDT   Lab with  LAB   [facility] ([facility])    [address] [place] [id number] [phone]            May 04, 2017  9:30 AM CDT   (Arrive by 9:15 AM)   Return General Liver with Trevor Trujillo MD   [facility] ([facility])    [address] [place] [id number] [phone]            May 12, 2017  7:45 AM CDT   (Arrive by 7:30 AM)   MR ABDOMEN W/O & W CONTRAST with [id number]   [facility] MRI ([facility])    [address] [place] [id number] [phone]           Take your medicines as usual, unless your doctor tells you not to. Bring a list of your current medicines to your exam (including vitamins, minerals and over-the-counter drugs). Also bring the results of similar scans you may have had.    The day before your exam, drink extra fluids at  least six 8-ounce glasses (unless your doctor tells you to restrict your fluids).   Have a blood test (creatinine test) within 30 days of your exam. Go to your clinic or Diagnostic Imaging Department for this test.   Do not eat or drink for 6 hours prior to exam.  The MRI machine uses a strong magnet. Please wear clothes without metal (snaps, zippers). A sweatsuit works well, or we may give you a hospital gown.  Please remove any body piercings and hair extensions before you arrive. You will also remove watches, jewelry, hairpins, wallets, dentures, partial dental plates and hearing aids. You may wear contact lenses, and you may be able to wear your rings. We have a safe place to keep your personal items, but it is safer to leave them at home.   **IMPORTANT** THE INSTRUCTIONS BELOW ARE ONLY FOR THOSE PATIENTS WHO HAVE BEEN TOLD THEY WILL RECEIVE SEDATION OR GENERAL ANESTHESIA DURING THEIR MRI PROCEDURE:  IF YOU WILL RECEIVE SEDATION (take medicine to help you relax during your exam):   You must get the medicine from your doctor before you arrive. Bring the medicine to the exam. Do not take it at home.   Arrive one hour early. Bring someone who can take you home after the test. Your medicine will make you sleepy. After the exam, you may not drive, take a bus or take a taxi by yourself.   No eating 8 hours before your exam. You may have clear liquids up until 4 hours before your exam. (Clear liquids include water, clear tea, black coffee and fruit juice without pulp.)  IF YOU WILL RECEIVE ANESTHESIA (be asleep for your exam):   Arrive 1 1/2 hours early. Bring someone who can take you home after the test. You may not drive, take a bus or take a taxi by yourself.   No eating 8 hours before your exam. You may have clear liquids up until 4 hours before your exam. (Clear liquids include water, clear tea, black coffee and fruit juice without pulp.)  If you have any questions, please contact your Imaging Department exam  site.            May 12, 2017  9:00 AM CDT   [facility] Draw with  [facility] DRAW   [facility] Draw ([facility])    [address] [id number] [phone]            May 12, 2017  9:30 AM CDT   (Arrive by 9:15 AM)   Return Visit with Yenni Larsen MD   [facility] ([facility])    [address] [place] [id number] [phone]              Future tests that were ordered for you today     Open Future Orders        Priority Expected Expires Ordered    MR Abdomen w/o & w Contrast Routine  2/17/2018 2/17/2017    CBC with platelets Routine  2/17/2018 2/17/2017    Basic metabolic panel Routine  2/17/2018 2/17/2017    INR Routine  2/17/2018 2/17/2017    Hepatic panel Routine  2/19/2018 2/17/2017    AFP tumor marker Routine  2/18/2018 2/17/2017            Who to contact     If you have questions or need follow up information about today's clinic visit or your schedule please contact [facility] directly at [phone].  Normal or non-critical lab and imaging results will be communicated to you by MyChart, letter or phone within 4 business days after the clinic has received the results. If you do not hear from us within 7 days, please contact the clinic through [employer] or phone. If you have a critical or abnormal lab result, we will notify you by phone as soon as possible.  Submit refill requests through [employer]ent[employer] or call your pharmacy and they will forward the refill request to us. Please allow 3 business days for your refill to be completed.          Additional Information About Your Visit        NewsBreakharMedaxion Information     App[employer]entive gives you secure access to your electronic health record. If you see a primary care provider, you can also send messages to your care team and make appointments. If you have questions, please call your primary care clinic.  If you do not  "have a primary care provider, please call [phone] and they will assist you.        Care EveryWhere ID     This is your Care EveryWhere ID. This could be used by other organizations to access your [place] medical records  [id number]        Your Vitals Were     Pulse Temperature Respirations Height Pulse Oximetry BMI (Body Mass Index)    61 99  F (37.2  C) (Oral) 18 1.676 m (5' 6\") 97% 24.07 kg/m2       Blood Pressure from Last 3 Encounters:   02/17/17 128/84   02/02/17 138/86   01/17/17 150/87    Weight from Last 3 Encounters:   02/17/17 67.6 kg (149 lb 1.6 oz)   02/02/17 66.7 kg (147 lb)   01/17/17 66 kg (145 lb 9.6 oz)               Primary Care Provider Office Phone # Fax #    Sudhir Layne [phone]       [facility] [address] [facility] [address]  [facility] [address]        Thank you!     Thank you for choosing [facility]  for your care. Our goal is always to provide you with excellent care. Hearing back from our patients is one way we can continue to improve our services. Please take a few minutes to complete the written survey that you may receive in the mail after your visit with us. Thank you!             Your Updated Medication List - Protect others around you: Learn how to safely use, store and throw away your medicines at www.disposemymeds.org.          This list is accurate as of: 2/17/17  9:13 AM.  Always use your most recent med list.                   Brand Name Dispense Instructions for use    BARACLUDE 0.5 MG tablet   Generic drug:  entecavir      Take 0.5 mg by mouth daily       naproxen 500 MG tablet    NAPROSYN    30 tablet    Take 1 tablet (500 mg) by mouth 2 times daily as needed for moderate pain         "

## 2017-02-17 NOTE — LETTER
"2/17/2017       RE: Preeti Pascual  [address]   [facility] [address]     Dear Colleague,    Thank you for referring your patient, Preeti Pascual, to the [facility]. Please see a copy of my visit note below.        Interventional Radiology Clinic Visit    Date of this visit: 2/17/2017    Preeti Pascual returns for follow up post Theraspheres radioembolization of hepatocellular carcinoma.    Primary Physician: Sudhir Layne        History Of Present Illness & ROS:    Preeti Pascual is a 51 year old male with chronic hepatitis B and hepatocellular carcinoma, and underwent Theraspheres radioembolization on 1/25/2016 to the right hepatic lobe for a large hepatocellular carcinoma mass centered on segment 8 and a smaller lesion in segment 7.   Serial MRIs showed positive response to treatment, but it became evident that there was residual tumor in the treatment bed. Preeti was reluctant to undergo further treatment despite my recommendation, and we therefore elected to monitor with serial MRIs. However there was ultimately some evidence of growth of the residual enhancing tumor in segment 8 and he agreed to undergo repeat Theraspheres radioembolization which we did on 11/23/2016.   He presents today for 3 month follow up after this second treatment. He says he feels well and has no specific complaints. He denies fever, abdominal pain, nausea, vomiting or weight loss. He says his energy levels are good and he feels \"normal\". He is putting on weight.    Past Medical/Surgical History:    Past Medical History   Diagnosis Date     Hepatitis B      Hepatocellular carcinoma (H)      Past Surgical History   Procedure Laterality Date     Y-90 delivery         Current Medications:    Current Outpatient Prescriptions   Medication Sig Dispense Refill     naproxen (NAPROSYN) 500 MG tablet Take 1 tablet (500 mg) by mouth 2 times daily as needed for moderate pain 30 tablet 1     entecavir (BARACLUDE) 0.5 MG " "tablet Take 0.5 mg by mouth daily         Allergies:    Review of patient's allergies indicates no known allergies.    Family History:    Family History   Problem Relation Age of Onset     Other Cancer No family hx of        Social History:    Social History     Social History     Marital status:      Spouse name: N/A     Number of children: N/A     Years of education: N/A     Social History Main Topics     Smoking status: Never Smoker     Smokeless tobacco: None     Alcohol use No     Drug use: No     Sexual activity: Not Asked     Other Topics Concern     Parent/Sibling W/ Cabg, Mi Or Angioplasty Before 65f 55m? No     Social History Narrative    Immigrated from [place] around 2010.  In 2013, he returned to [place], and then returned to the  1 month later.  He is now back in [facility].  Works at [employer].  He is , no children.         Physical Exam:    /84 (BP Location: Left arm, Patient Position: Chair, Cuff Size: Adult Regular)  Pulse 61  Temp 99  F (37.2  C) (Oral)  Resp 18  Ht 1.676 m (5' 6\")  Wt 67.6 kg (149 lb 1.6 oz)  SpO2 97%  BMI 24.07 kg/m2     GENERAL APPEARANCE: healthy, alert and no distress  PSYCHIATRIC: mentation appears normal and affect normal.  EYES: No jaundice.  SKIN: No jaundice.     Laboratory Studies:    Lab Results   Component Value Date    HGB 13.2 02/17/2017     Lab Results   Component Value Date     02/17/2017     Lab Results   Component Value Date    WBC 5.1 02/17/2017       Lab Results   Component Value Date    INR 1.14 02/17/2017    INR 1.0 11/16/2016       Lab Results   Component Value Date    PROTTOTAL 7.8 02/17/2017      Lab Results   Component Value Date    ALBUMIN 3.1 02/17/2017     Lab Results   Component Value Date    BILITOTAL 0.2 02/17/2017     No results found for: BILICONJ   Lab Results   Component Value Date    ALKPHOS 105 02/17/2017     Lab Results   Component Value Date    AST 22 02/17/2017     Lab Results   Component Value Date "    ALT 24 02/17/2017       Lab Results   Component Value Date    CR 0.81 02/17/2017     No components found for: GFRE    Alpha Fetoprotein   Date Value Ref Range Status   02/17/2017 6.0 0 - 8 ug/L Final     Comment:     Assay Method:  Chemiluminescence using Siemens Centaur XP       Imaging:     I reviewed the MRI from today (2/17/17). It shows no enhancement of the tumor mass in segment 8 and no enhancement of the portion of the tumor invading the adjacent anterior division of the portal vein. The overall remnant mass size is mildly decreased. There are mild post treatment changes in the immediate surrounding parenchyma.There are no new liver lesions.    ASSESSMENT/PLAN:      Preeti Pascual is a 51 year old male with chronic hepatitis B who is status post second Theraspheres radioembolization to a large segment 8 HCC mass with portal vein invasion and has made a full recovery. Imaging shows no definite residual tumor. His AFP is now normal at 6.0, previously elevated at 189.3.    I showed him the serial MRIs and we discussed the findings. We also discussed his lab results. I would like to see him in another 3 months at the time of his next MRI, which will be 6 months post treatment. He agreed with this plan.    He asked about another letter to his employer to restrict his duties, and we spent some time discussing this. I explained that since he has made a full recovery from his HCC treatment, I can no longer write such a letter. If there is some other reason he can't work at full capacity, he should discuss it with his primary doctor or the hepatology team. We will notify the hepatology team of his request. He agreed with this plan.      A total of 20 minutes was spent in care for the patient.      It was a pleasure seeing the patient.     Signed,    Yenni Birch M.D.  Department of Interventional Radiology  [facility]    CC  Patient Care Team:  Sudhir Layne MD as PCP - General (Student in organized  health care education/training program)  Misa Negrete MD as MD (Gastroenterology)  Isabel Messina MD as MD (Hematology & Oncology)  Letitia Patel MD as MD (Internal Medicine)  Kristel Ledesma MD as MD (Internal Medicine)  BRANDON OHPE MD

## 2017-02-17 NOTE — PROGRESS NOTES
"    Interventional Radiology Clinic Visit    Date of this visit: 2/17/2017    Preeti Pascual returns for follow up post Theraspheres radioembolization of hepatocellular carcinoma.    Primary Physician: Sudhir Layne        History Of Present Illness & ROS:    Preeti Pascual is a 51 year old male with chronic hepatitis B and hepatocellular carcinoma, and underwent Theraspheres radioembolization on 1/25/2016 to the right hepatic lobe for a large hepatocellular carcinoma mass centered on segment 8 and a smaller lesion in segment 7.   Serial MRIs showed positive response to treatment, but it became evident that there was residual tumor in the treatment bed. Preeti was reluctant to undergo further treatment despite my recommendation, and we therefore elected to monitor with serial MRIs. However there was ultimately some evidence of growth of the residual enhancing tumor in segment 8 and he agreed to undergo repeat Theraspheres radioembolization which we did on 11/23/2016.   He presents today for 3 month follow up after this second treatment. He says he feels well and has no specific complaints. He denies fever, abdominal pain, nausea, vomiting or weight loss. He says his energy levels are good and he feels \"normal\". He is putting on weight.    Past Medical/Surgical History:    Past Medical History   Diagnosis Date     Hepatitis B      Hepatocellular carcinoma (H)      Past Surgical History   Procedure Laterality Date     Y-90 delivery         Current Medications:    Current Outpatient Prescriptions   Medication Sig Dispense Refill     naproxen (NAPROSYN) 500 MG tablet Take 1 tablet (500 mg) by mouth 2 times daily as needed for moderate pain 30 tablet 1     entecavir (BARACLUDE) 0.5 MG tablet Take 0.5 mg by mouth daily         Allergies:    Review of patient's allergies indicates no known allergies.    Family History:    Family History   Problem Relation Age of Onset     Other Cancer No family hx of        Social " "History:    Social History     Social History     Marital status:      Spouse name: N/A     Number of children: N/A     Years of education: N/A     Social History Main Topics     Smoking status: Never Smoker     Smokeless tobacco: None     Alcohol use No     Drug use: No     Sexual activity: Not Asked     Other Topics Concern     Parent/Sibling W/ Cabg, Mi Or Angioplasty Before 65f 55m? No     Social History Narrative    Immigrated from [place] around 2010.  In 2013, he returned to [place], and then returned to the  1 month later.  He is now back in [place] permanently.  Works at [employer].  He is , no children.         Physical Exam:    /84 (BP Location: Left arm, Patient Position: Chair, Cuff Size: Adult Regular)  Pulse 61  Temp 99  F (37.2  C) (Oral)  Resp 18  Ht 1.676 m (5' 6\")  Wt 67.6 kg (149 lb 1.6 oz)  SpO2 97%  BMI 24.07 kg/m2     GENERAL APPEARANCE: healthy, alert and no distress  PSYCHIATRIC: mentation appears normal and affect normal.  EYES: No jaundice.  SKIN: No jaundice.     Laboratory Studies:    Lab Results   Component Value Date    HGB 13.2 02/17/2017     Lab Results   Component Value Date     02/17/2017     Lab Results   Component Value Date    WBC 5.1 02/17/2017       Lab Results   Component Value Date    INR 1.14 02/17/2017    INR 1.0 11/16/2016       Lab Results   Component Value Date    PROTTOTAL 7.8 02/17/2017      Lab Results   Component Value Date    ALBUMIN 3.1 02/17/2017     Lab Results   Component Value Date    BILITOTAL 0.2 02/17/2017     No results found for: BILICONJ   Lab Results   Component Value Date    ALKPHOS 105 02/17/2017     Lab Results   Component Value Date    AST 22 02/17/2017     Lab Results   Component Value Date    ALT 24 02/17/2017       Lab Results   Component Value Date    CR 0.81 02/17/2017     No components found for: GFRE    Alpha Fetoprotein   Date Value Ref Range Status   02/17/2017 6.0 0 - 8 ug/L Final     Comment:     Assay " Method:  Chemiluminescence using Siemens Centaur XP       Imaging:     I reviewed the MRI from today (2/17/17). It shows no enhancement of the tumor mass in segment 8 and no enhancement of the portion of the tumor invading the adjacent anterior division of the portal vein. The overall remnant mass size is mildly decreased. There are mild post treatment changes in the immediate surrounding parenchyma.There are no new liver lesions.    ASSESSMENT/PLAN:      Preeti Pascual is a 51 year old male with chronic hepatitis B who is status post second Theraspheres radioembolization to a large segment 8 HCC mass with portal vein invasion and has made a full recovery. Imaging shows no definite residual tumor. His AFP is now normal at 6.0, previously elevated at 189.3.    I showed him the serial MRIs and we discussed the findings. We also discussed his lab results. I would like to see him in another 3 months at the time of his next MRI, which will be 6 months post treatment. He agreed with this plan.    He asked about another letter to his employer to restrict his duties, and we spent some time discussing this. I explained that since he has made a full recovery from his HCC treatment, I can no longer write such a letter. If there is some other reason he can't work at full capacity, he should discuss it with his primary doctor or the hepatology team. We will notify the hepatology team of his request. He agreed with this plan.      A total of 20 minutes was spent in care for the patient.      It was a pleasure seeing the patient.     Yenni Long M.D.  Department of Interventional Radiology  [facility]      CC  Patient Care Team:  Sudhir Layne MD as PCP - General (Student in organized health care education/training program)  Misa Negrete MD as MD (Gastroenterology)  Isabel Messina MD as MD (Hematology & Oncology)  Yenni Larsen MD as MD (Vascular and Interventional  Radiology)  Letitia Patel MD as MD (Internal Medicine)  Kristel Ledesma MD as MD (Internal Medicine)  BRANDON HOPE MD

## 2017-02-17 NOTE — PATIENT INSTRUCTIONS
It was a pleasure to see you today.     Our plan is that we will have you return in 3 mos time with another MRI/Lab and appt with Dr Mead.     Please call us with any other questions.     Kori Young RN, BSN  Interventional Radiology Nurse Coordinator   Phone: [phone]

## 2017-02-18 LAB
HBV DNA SERPL NAA+PROBE-ACNC: NORMAL [IU]/ML
HBV DNA SERPL NAA+PROBE-LOG IU: NORMAL {LOG_IU}/ML

## 2017-03-16 ENCOUNTER — ONCOLOGY VISIT (OUTPATIENT)
Dept: ONCOLOGY | Facility: CLINIC | Age: 52
End: 2017-03-16
Attending: INTERNAL MEDICINE
Payer: COMMERCIAL

## 2017-03-16 VITALS
SYSTOLIC BLOOD PRESSURE: 119 MMHG | DIASTOLIC BLOOD PRESSURE: 71 MMHG | WEIGHT: 151.9 LBS | RESPIRATION RATE: 18 BRPM | HEART RATE: 65 BPM | OXYGEN SATURATION: 97 % | TEMPERATURE: 97 F | HEIGHT: 66 IN | BODY MASS INDEX: 24.41 KG/M2

## 2017-03-16 DIAGNOSIS — C22.0 HCC (HEPATOCELLULAR CARCINOMA) (H): Primary | ICD-10-CM

## 2017-03-16 PROCEDURE — 99212 OFFICE O/P EST SF 10 MIN: CPT

## 2017-03-16 PROCEDURE — 99215 OFFICE O/P EST HI 40 MIN: CPT | Mod: ZP | Performed by: INTERNAL MEDICINE

## 2017-03-16 ASSESSMENT — PAIN SCALES - GENERAL: PAINLEVEL: NO PAIN (0)

## 2017-03-16 NOTE — NURSING NOTE
"Preeti Pascual is a 51 year old male who presents for:  Chief Complaint   Patient presents with     Oncology Clinic Visit     HCC (hepatocellular carcinoma)        Initial Vitals:  /71 (BP Location: Left arm, Patient Position: Chair, Cuff Size: Adult Regular)  Pulse 65  Temp 97  F (36.1  C) (Oral)  Resp 18  Ht 1.676 m (5' 5.98\")  Wt 68.9 kg (151 lb 14.4 oz)  SpO2 97%  BMI 24.53 kg/m2 Estimated body mass index is 24.53 kg/(m^2) as calculated from the following:    Height as of this encounter: 1.676 m (5' 5.98\").    Weight as of this encounter: 68.9 kg (151 lb 14.4 oz).. Body surface area is 1.79 meters squared. BP completed using cuff size: regular  No Pain (0) No LMP for male patient. Allergies and medications reviewed.     Medications: Medication refills not needed today.  Pharmacy name entered into [facility]: [id number] - [place], [place] - [address] AT [facility] [address]    Comments:     7 minutes for nursing intake (face to face time)   Alyce Tomlin MA        "

## 2017-03-16 NOTE — PROGRESS NOTES
Oncology Initial visit:  Date on this visit: 3/16/2017    Preeti Pascual  is referred by Dr.Mohamed Manuel Negrete for an oncology consultation. He requires evaluation for  HCC    Primary Physician: Sudhir Layne     History Of Present Illness:    Preeti Pascual is a 51-year-old gentleman from [facility] who was being followed by Dr. Messina for hepatocellular carcinoma.  He was initially diagnosed in 09/2015.  Before that, he was found to have chronic hepatitis B and he was started on entecavir.  Then an MRI done in 09/2015 showed a    9.1 x 7.6-cm lesion involving segments 8 and 4A.  This was OPTN class 5X.  There was also a LI-RADS class M lesion in hepatic segment 7 measuring up to 2 cm.  He then underwent a TACE procedure on 01/25/2016 to the lesion in segment 8 and the smaller lesion in segment 7.  Post-procedure there was evidence of partial treatment response, but with serial scans there was evidence of further growth of the residual tumor in segment 8.  His AFP also remained elevated.  He was offered a repeat procedure with radioembolization, but initially he was delaying it but eventually got the repeat procedure on 11/23/2016.  After that, he had a significant decline in AFB with normalization of the AFP, and his most recent MRI which was done on 02/17/2017 showed only post-treatment changes and no evidence of residual cancer.  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  He was seeing Dr. Messina, but he is transferring his care to me because he wants to see a provider on Thursdays, as with his current job it is difficult for him to come to the clinic on Mondays when Dr. Messina sees patients here at the [facility].  He tells me he has been doing really well.  He feels actually normal.  He denies any pain.  He denies any new lumps, bumps or swellings.  His energy is good.  He has been able to  "eat and drink well and actually he has gained a bit of weight.  He denies any new skin problems, neurological problems or interval infections.  He has no bowel problems, urinary problems or breathing problems.          ROS:  A comprehensive ROS was otherwise neg      Past Medical/Surgical History:  Past Medical History   Diagnosis Date     Hepatitis B      Hepatocellular carcinoma (H)    Perianal abscess s/p drainage in fall of 2016    Past Surgical History   Procedure Laterality Date     Y-90 delivery       Cancer History:   As above    Allergies:  Allergies as of 03/16/2017     (No Known Allergies)     Current Medications:  Current Outpatient Prescriptions   Medication Sig Dispense Refill     naproxen (NAPROSYN) 500 MG tablet Take 1 tablet (500 mg) by mouth 2 times daily as needed for moderate pain 30 tablet 1     entecavir (BARACLUDE) 0.5 MG tablet Take 0.5 mg by mouth daily        Family History:  Family History   Problem Relation Age of Onset     Other Cancer No family hx of      Social History:  Social History     Social History     Marital status:      Spouse name: N/A     Number of children: N/A     Years of education: N/A     Occupational History     Not on file.     Social History Main Topics     Smoking status: Never Smoker     Smokeless tobacco: Not on file     Alcohol use No     Drug use: No     Sexual activity: Not on file     Other Topics Concern     Parent/Sibling W/ Cabg, Mi Or Angioplasty Before 65f 55m? No     Social History Narrative    [place]  In 2013, he returned to [place], and then returned to the  1 month later.  He is now back in [place] permanently.  Works at [employer].  He is , no children.     He does not smoke.  He does not drink.  He lives with his wife.       Physical Exam:  /71 (BP Location: Left arm, Patient Position: Chair, Cuff Size: Adult Regular)  Pulse 65  Temp 97  F (36.1  C) (Oral)  Resp 18  Ht 1.676 m (5' 5.98\")  Wt 68.9 " kg (151 lb 14.4 oz)  SpO2 97%  BMI 24.53 kg/m2  CONSTITUTIONAL: no acute distress  EYES: PERRLA, no palor or icterus.   ENT/MOUTH: no mouth lesions. Oropharynx normal  CVS: s1s2 no m r g .   RESPIRATORY: clear to auscultation b/l  GI: soft non tender no hepatosplenomegaly  NEURO: AAOX3  Grossly non focal neuro exam  INTEGUMENT: no obvious rashes  LYMPHATIC: no palpable cervical, supraclavicular, axillary or inguinal LAD  MUSCULOSKELETAL: Unremarkable. No bony tenderness.   EXTREMITIES: no edema  PSYCH: Mentation, mood and affect are normal. Decision making capacity is intact    Laboratory/Imaging Studies  Results for orders placed or performed in visit on 02/17/17   CBC with platelets   Result Value Ref Range    WBC 5.1 4.0 - 11.0 10e9/L    RBC Count 4.48 4.4 - 5.9 10e12/L    Hemoglobin 13.2 (L) 13.3 - 17.7 g/dL    Hematocrit 38.9 (L) 40.0 - 53.0 %    MCV 87 78 - 100 fl    MCH 29.5 26.5 - 33.0 pg    MCHC 33.9 31.5 - 36.5 g/dL    RDW 13.2 10.0 - 15.0 %    Platelet Count 210 150 - 450 10e9/L   Basic metabolic panel   Result Value Ref Range    Sodium 143 133 - 144 mmol/L    Potassium 4.4 3.4 - 5.3 mmol/L    Chloride 110 (H) 94 - 109 mmol/L    Carbon Dioxide 26 20 - 32 mmol/L    Anion Gap 7 3 - 14 mmol/L    Glucose 95 70 - 99 mg/dL    Urea Nitrogen 16 7 - 30 mg/dL    Creatinine 0.81 0.66 - 1.25 mg/dL    GFR Estimate >90  Non  GFR Calc   >60 mL/min/1.7m2    GFR Estimate If Black >90   GFR Calc   >60 mL/min/1.7m2    Calcium 8.5 8.5 - 10.1 mg/dL   INR   Result Value Ref Range    INR 1.14 0.86 - 1.14   Hepatic panel   Result Value Ref Range    Bilirubin Direct <0.1 0.0 - 0.2 mg/dL    Bilirubin Total 0.2 0.2 - 1.3 mg/dL    Albumin 3.1 (L) 3.4 - 5.0 g/dL    Protein Total 7.8 6.8 - 8.8 g/dL    Alkaline Phosphatase 105 40 - 150 U/L    ALT 24 0 - 70 U/L    AST 22 0 - 45 U/L   AFP tumor marker   Result Value Ref Range    Alpha Fetoprotein 6.0 0 - 8 ug/L   Hep B Virus DNA Quant Real Time PCR    Result Value Ref Range    HEP B Virus DNA Quant  HBVND [IU]/mL     HBV DNA Not Detected   The ARTURO AmpliPrep/ARTURO TaqMan HBV Test is an FDA-approved in vitro nucleic   acid amplification test for the quantitation of HBV DNA in human plasma (EDTA   plasma) or serum using the ARTURO AmpliPrep Instrument for automated viral   nucleic acid extraction and the ARTURO TaqMan Analyzer or ARTURO TaqMan for the   automated Real Time PCR amplification and detection of viral nucleic acid   target.   Titer results are reported in International Units/mL (IU/mL) using the 1st WHO   International standard for HBV for Nucleic Acid Amplification based assays.      HEP B Virus DNA Quant Log Not Calculated <1.3 [Log_IU]/mL     Previous imaging also reviewed and mentioned in HPI  MRI 2/17/17  IMPRESSION:   1. Cirrhosis.  2. Post Y90 treatment changes in segments 5 and 8 with nonenhancement  of the majority of the largest treated lesion superiorly. A few small  nodular areas of enhancement with this lesion are unchanged. Decreased  expansion of the anterior division of the right portal vein by bland  tumor thrombus.  3. Stable post treatment changes of the lesion in hepatic segment 7  without evidence of residual or recurrent tumor.  4. No significant change in size of a 5 mm lesion in hepatic segment  4B which appears to demonstrate subtle late arterial enhancement with  associated increased signal on T2 and diffusion-weighted images and  hypoenhancement on hepatobiliary phase images. LI-RADS 4.  5. Stable enlarged tiffanie hepatis and upper abdominal lymph nodes,  likely reactive    ASSESSMENT/PLAN:  Today we discussed about his hepatocellular carcinoma in detail.  He did present with a very large tumor in hepatic segment 8/4A and also a smaller one in segment 7.  He initially underwent a TACE procedure in 01/2016 which showed partial response, but there was evidence of residual tumor and his AFP also remained elevated.  He eventually  had a repeat procedure done with Y-90 radioembolization in 11/2016 and had a great response after that with a repeat MRI showing only post-treatment changes with no residual tumor, and his AFP has also returned to normal.  He does have evidence of portal vein invasion so he is not a transplant candidate.  Previously as per discussion with Dr. Messina, he was never interested in transplant.  We discussed that typically liver cancer is cured if we can do surgery and transplant.  In his case, most likely his liver cancer is not curable, but it is very treatable and controllable with liver-directed therapies as he has received up until now.  I would recommend going forward that we repeat his imaging and blood work every 3 months and have very close followup on him.  Fortunately, right now he is completely asymptomatic.  I encouraged him to keep eating healthy and exercise regularly and keep himself active to help preserve his general well-being.       He does have a history of chronic hepatitis B and he follows with Dr. Trujillo.  He continues to be on entecavir.  His most recent hepatitis B virus RNA on 02/17/2017 was not detectable.       All of his questions were answered to his satisfaction.  I would like to see him back around the end of May.  He has repeat imaging scheduled for the middle of May.  All of his questions were answered to his satisfaction.  He is agreeable and comfortable with this plan.     Neymar Whittaker          Answers for HPI/ROS submitted by the patient on 3/16/2017   General Symptoms: No  Skin Symptoms: No  HENT Symptoms: No  EYE SYMPTOMS: No  HEART SYMPTOMS: No  LUNG SYMPTOMS: No  INTESTINAL SYMPTOMS: No  URINARY SYMPTOMS: No  REPRODUCTIVE SYMPTOMS: No  SKELETAL SYMPTOMS: No  BLOOD SYMPTOMS: No  NERVOUS SYSTEM SYMPTOMS: No  MENTAL HEALTH SYMPTOMS: No

## 2017-03-16 NOTE — MR AVS SNAPSHOT
After Visit Summary   3/16/2017    Preeti Pascual    MRN: [id number]           Patient Information     Date Of Birth          1965        Visit Information        Provider Department      3/16/2017 12:00 PM Neymar Whittaker MD [facility]        Care Instructions    See me back around end of May            Follow-ups after your visit        Your next 10 appointments already scheduled     May 04, 2017  8:30 AM CDT   Lab with  LAB   [facility] ([facility])    [address] [place] [id number]   [phone]            May 04, 2017  9:30 AM CDT   (Arrive by 9:15 AM)   Return General Liver with Trevor Trujillo MD   [facility]at[facility] ([facility])    [address] [place] [id number] [phone]            May 12, 2017  7:45 AM CDT   (Arrive by 7:30 AM)   MR ABDOMEN W/O & W CONTRAST with [id number]   [facility] MRI ([facility])    [address] [place] [id number] [phone]           Take your medicines as usual, unless your doctor tells you not to. Bring a list of your current medicines to your exam (including vitamins, minerals and over-the-counter drugs). Also bring the results of similar scans you may have had.    The day before your exam, drink extra fluids at least six 8-ounce glasses (unless your doctor tells you to restrict your fluids).   Have a blood test (creatinine test) within 30 days of your exam. Go to your clinic or Diagnostic Imaging Department for this test.   Do not eat or drink for 6 hours prior to exam.  The MRI machine uses a strong magnet. Please wear clothes without metal (snaps, zippers). A sweatsuit works well, or we may give you a hospital gown.  Please remove any body piercings and hair extensions before you arrive. You will also remove watches, jewelry, hairpins, wallets, dentures, partial  dental plates and hearing aids. You may wear contact lenses, and you may be able to wear your rings. We have a safe place to keep your personal items, but it is safer to leave them at home.   **IMPORTANT** THE INSTRUCTIONS BELOW ARE ONLY FOR THOSE PATIENTS WHO HAVE BEEN TOLD THEY WILL RECEIVE SEDATION OR GENERAL ANESTHESIA DURING THEIR MRI PROCEDURE:  IF YOU WILL RECEIVE SEDATION (take medicine to help you relax during your exam):   You must get the medicine from your doctor before you arrive. Bring the medicine to the exam. Do not take it at home.   Arrive one hour early. Bring someone who can take you home after the test. Your medicine will make you sleepy. After the exam, you may not drive, take a bus or take a taxi by yourself.   No eating 8 hours before your exam. You may have clear liquids up until 4 hours before your exam. (Clear liquids include water, clear tea, black coffee and fruit juice without pulp.)  IF YOU WILL RECEIVE ANESTHESIA (be asleep for your exam):   Arrive 1 1/2 hours early. Bring someone who can take you home after the test. You may not drive, take a bus or take a taxi by yourself.   No eating 8 hours before your exam. You may have clear liquids up until 4 hours before your exam. (Clear liquids include water, clear tea, black coffee and fruit juice without pulp.)  If you have any questions, please contact your Imaging Department exam site.            May 12, 2017  9:00 AM CDT   [facility] [employer] Lab Draw with  [employer] LAB DRAW   [facility] Lab Draw ([facility])    [address] [place] [id number]   [phone]            May 12, 2017  9:30 AM CDT   (Arrive by 9:15 AM)   Return Visit with Yenni Larsen MD   [facility] ([facility])    [address] [address]   [phone]            May 25, 2017 12:00 PM CDT   (Arrive by 11:45 AM)   Return Visit with Neymar LAL  "MD Panfilo   [facility] ([facility])    [address] [facility]  2nd Floor  [facility] [address] [phone]              Who to contact     If you have questions or need follow up information about today's clinic visit or your schedule please contact [facility] directly at [phone].  Normal or non-critical lab and imaging results will be communicated to you by MyChart, letter or phone within 4 business days after the clinic has received the results. If you do not hear from us within 7 days, please contact the clinic through [employer] or phone. If you have a critical or abnormal lab result, we will notify you by phone as soon as possible.  Submit refill requests through [employer] or call your pharmacy and they will forward the refill request to us. Please allow 3 business days for your refill to be completed.          Additional Information About Your Visit        [employer] Information     [employer] gives you secure access to your electronic health record. If you see a primary care provider, you can also send messages to your care team and make appointments. If you have questions, please call your primary care clinic.  If you do not have a primary care provider, please call [phone] and they will assist you.        Care EveryWhere ID     This is your Care EveryWhere ID. This could be used by other organizations to access your [facility] medical records  [id number]        Your Vitals Were     Pulse Temperature Respirations Height Pulse Oximetry BMI (Body Mass Index)    65 97  F (36.1  C) (Oral) 18 1.676 m (5' 5.98\") 97% 24.53 kg/m2       Blood Pressure from Last 3 Encounters:   03/16/17 119/71   02/17/17 128/84   02/02/17 138/86    Weight from Last 3 Encounters:   03/16/17 68.9 kg (151 lb 14.4 oz)   02/17/17 67.6 kg (149 lb 1.6 oz)   02/02/17 66.7 kg (147 lb)              Today, you had the following     No orders found for display       Primary Care " Provider Office Phone # Fax #    Sudhir Layne [phone]       [facility] [address]  [facility] [address]        Thank you!     Thank you for choosing [facility]  for your care. Our goal is always to provide you with excellent care. Hearing back from our patients is one way we can continue to improve our services. Please take a few minutes to complete the written survey that you may receive in the mail after your visit with us. Thank you!             Your Updated Medication List - Protect others around you: Learn how to safely use, store and throw away your medicines at www.disposemymeds.org.          This list is accurate as of: 3/16/17 12:14 PM.  Always use your most recent med list.                   Brand Name Dispense Instructions for use    BARACLUDE 0.5 MG tablet   Generic drug:  entecavir      Take 0.5 mg by mouth daily       naproxen 500 MG tablet    NAPROSYN    30 tablet    Take 1 tablet (500 mg) by mouth 2 times daily as needed for moderate pain

## 2017-03-16 NOTE — LETTER
3/16/2017       RE: Preeti Pascual  [address]   [facility] [address]     Dear Colleague,    Thank you for referring your patient, Preeti Pascual, to the [facility]. Please see a copy of my visit note below.    Oncology Initial visit:  Date on this visit: 3/16/2017    Preeti Pascual  is referred by Dr.Mohamed Manuel Negrete for an oncology consultation. He requires evaluation for  HCC    Primary Physician: Sudhir Layne     History Of Present Illness:    Preeti Pascual is a 51-year-old gentleman from [facility] who was being followed by Dr. Messina for hepatocellular carcinoma.  He was initially diagnosed in 09/2015.  Before that, he was found to have chronic hepatitis B and he was started on entecavir.  Then an MRI done in 09/2015 showed a    9.1 x 7.6-cm lesion involving segments 8 and 4A.  This was OPTN class 5X.  There was also a LI-RADS class M lesion in hepatic segment 7 measuring up to 2 cm.  He then underwent a TACE procedure on 01/25/2016 to the lesion in segment 8 and the smaller lesion in segment 7.  Post-procedure there was evidence of partial treatment response, but with serial scans there was evidence of further growth of the residual tumor in segment 8.  His AFP also remained elevated.  He was offered a repeat procedure with radioembolization, but initially he was delaying it but eventually got the repeat procedure on 11/23/2016.  After that, he had a significant decline in AFB with normalization of the AFP, and his most recent MRI which was done on 02/17/2017 showed only post-treatment changes and no evidence of residual cancer.  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  He was seeing Dr. Messina, but he is transferring his care to me because he wants to see a provider on Thursdays, as with his current job it is difficult for him to come to the clinic on Mondays when  Dr. Messina sees patients here at the [facility].  He tells me he has been doing really well.  He feels actually normal.  He denies any pain.  He denies any new lumps, bumps or swellings.  His energy is good.  He has been able to eat and drink well and actually he has gained a bit of weight.  He denies any new skin problems, neurological problems or interval infections.  He has no bowel problems, urinary problems or breathing problems.          ROS:  A comprehensive ROS was otherwise neg      Past Medical/Surgical History:  Past Medical History   Diagnosis Date     Hepatitis B      Hepatocellular carcinoma (H)    Perianal abscess s/p drainage in fall of 2016    Past Surgical History   Procedure Laterality Date     Y-90 delivery       Cancer History:   As above    Allergies:  Allergies as of 03/16/2017     (No Known Allergies)     Current Medications:  Current Outpatient Prescriptions   Medication Sig Dispense Refill     naproxen (NAPROSYN) 500 MG tablet Take 1 tablet (500 mg) by mouth 2 times daily as needed for moderate pain 30 tablet 1     entecavir (BARACLUDE) 0.5 MG tablet Take 0.5 mg by mouth daily        Family History:  Family History   Problem Relation Age of Onset     Other Cancer No family hx of      Social History:  Social History     Social History     Marital status:      Spouse name: N/A     Number of children: N/A     Years of education: N/A     Occupational History     Not on file.     Social History Main Topics     Smoking status: Never Smoker     Smokeless tobacco: Not on file     Alcohol use No     Drug use: No     Sexual activity: Not on file     Other Topics Concern     Parent/Sibling W/ Cabg, Mi Or Angioplasty Before 65f 55m? No     Social History Narrative    Immigrated from [facility] around 2010.  In 2013, he returned to [facility], and then returned to the  1 month later.  He is now back in [place] permanently.  Works at [employer].  He is , no children.     He does  "not smoke.  He does not drink.  He lives with his wife.       Physical Exam:  /71 (BP Location: Left arm, Patient Position: Chair, Cuff Size: Adult Regular)  Pulse 65  Temp 97  F (36.1  C) (Oral)  Resp 18  Ht 1.676 m (5' 5.98\")  Wt 68.9 kg (151 lb 14.4 oz)  SpO2 97%  BMI 24.53 kg/m2  CONSTITUTIONAL: no acute distress  EYES: PERRLA, no palor or icterus.   ENT/MOUTH: no mouth lesions. Oropharynx normal  CVS: s1s2 no m r g .   RESPIRATORY: clear to auscultation b/l  GI: soft non tender no hepatosplenomegaly  NEURO: AAOX3  Grossly non focal neuro exam  INTEGUMENT: no obvious rashes  LYMPHATIC: no palpable cervical, supraclavicular, axillary or inguinal LAD  MUSCULOSKELETAL: Unremarkable. No bony tenderness.   EXTREMITIES: no edema  PSYCH: Mentation, mood and affect are normal. Decision making capacity is intact    Laboratory/Imaging Studies  Results for orders placed or performed in visit on 02/17/17   CBC with platelets   Result Value Ref Range    WBC 5.1 4.0 - 11.0 10e9/L    RBC Count 4.48 4.4 - 5.9 10e12/L    Hemoglobin 13.2 (L) 13.3 - 17.7 g/dL    Hematocrit 38.9 (L) 40.0 - 53.0 %    MCV 87 78 - 100 fl    MCH 29.5 26.5 - 33.0 pg    MCHC 33.9 31.5 - 36.5 g/dL    RDW 13.2 10.0 - 15.0 %    Platelet Count 210 150 - 450 10e9/L   Basic metabolic panel   Result Value Ref Range    Sodium 143 133 - 144 mmol/L    Potassium 4.4 3.4 - 5.3 mmol/L    Chloride 110 (H) 94 - 109 mmol/L    Carbon Dioxide 26 20 - 32 mmol/L    Anion Gap 7 3 - 14 mmol/L    Glucose 95 70 - 99 mg/dL    Urea Nitrogen 16 7 - 30 mg/dL    Creatinine 0.81 0.66 - 1.25 mg/dL    GFR Estimate >90  Non  GFR Calc   >60 mL/min/1.7m2    GFR Estimate If Black >90   GFR Calc   >60 mL/min/1.7m2    Calcium 8.5 8.5 - 10.1 mg/dL   INR   Result Value Ref Range    INR 1.14 0.86 - 1.14   Hepatic panel   Result Value Ref Range    Bilirubin Direct <0.1 0.0 - 0.2 mg/dL    Bilirubin Total 0.2 0.2 - 1.3 mg/dL    Albumin 3.1 (L) 3.4 - 5.0 g/dL "    Protein Total 7.8 6.8 - 8.8 g/dL    Alkaline Phosphatase 105 40 - 150 U/L    ALT 24 0 - 70 U/L    AST 22 0 - 45 U/L   AFP tumor marker   Result Value Ref Range    Alpha Fetoprotein 6.0 0 - 8 ug/L   Hep B Virus DNA Quant Real Time PCR   Result Value Ref Range    HEP B Virus DNA Quant  HBVND [IU]/mL     HBV DNA Not Detected   The ARTURO AmpliPrep/ARTURO TaqMan HBV Test is an FDA-approved in vitro nucleic   acid amplification test for the quantitation of HBV DNA in human plasma (EDTA   plasma) or serum using the ARTURO AmpliPrep Instrument for automated viral   nucleic acid extraction and the ARTURO TaqMan Analyzer or ARTURO TaqMan for the   automated Real Time PCR amplification and detection of viral nucleic acid   target.   Titer results are reported in International Units/mL (IU/mL) using the 1st WHO   International standard for HBV for Nucleic Acid Amplification based assays.      HEP B Virus DNA Quant Log Not Calculated <1.3 [Log_IU]/mL     Previous imaging also reviewed and mentioned in HPI  MRI 2/17/17  IMPRESSION:   1. Cirrhosis.  2. Post Y90 treatment changes in segments 5 and 8 with nonenhancement  of the majority of the largest treated lesion superiorly. A few small  nodular areas of enhancement with this lesion are unchanged. Decreased  expansion of the anterior division of the right portal vein by bland  tumor thrombus.  3. Stable post treatment changes of the lesion in hepatic segment 7  without evidence of residual or recurrent tumor.  4. No significant change in size of a 5 mm lesion in hepatic segment  4B which appears to demonstrate subtle late arterial enhancement with  associated increased signal on T2 and diffusion-weighted images and  hypoenhancement on hepatobiliary phase images. LI-RADS 4.  5. Stable enlarged tiffanie hepatis and upper abdominal lymph nodes,  likely reactive    ASSESSMENT/PLAN:  Today we discussed about his hepatocellular carcinoma in detail.  He did present with a very large tumor  in hepatic segment 8/4A and also a smaller one in segment 7.  He initially underwent a TACE procedure in 01/2016 which showed partial response, but there was evidence of residual tumor and his AFP also remained elevated.  He eventually had a repeat procedure done with Y-90 radioembolization in 11/2016 and had a great response after that with a repeat MRI showing only post-treatment changes with no residual tumor, and his AFP has also returned to normal.  He does have evidence of portal vein invasion so he is not a transplant candidate.  Previously as per discussion with Dr. Messina, he was never interested in transplant.  We discussed that typically liver cancer is cured if we can do surgery and transplant.  In his case, most likely his liver cancer is not curable, but it is very treatable and controllable with liver-directed therapies as he has received up until now.  I would recommend going forward that we repeat his imaging and blood work every 3 months and have very close followup on him.  Fortunately, right now he is completely asymptomatic.  I encouraged him to keep eating healthy and exercise regularly and keep himself active to help preserve his general well-being.       He does have a history of chronic hepatitis B and he follows with Dr. Trujillo.  He continues to be on entecavir.  His most recent hepatitis B virus RNA on 02/17/2017 was not detectable.       All of his questions were answered to his satisfaction.  I would like to see him back around the end of May.  He has repeat imaging scheduled for the middle of May.  All of his questions were answered to his satisfaction.  He is agreeable and comfortable with this plan.     Again, thank you for allowing me to participate in the care of your patient.      Sincerely,    Neymar Whittaker MD

## 2017-03-22 DIAGNOSIS — B18.1 CHRONIC VIRAL HEPATITIS B WITHOUT DELTA AGENT AND WITHOUT COMA (H): ICD-10-CM

## 2017-03-22 DIAGNOSIS — C22.0 HCC (HEPATOCELLULAR CARCINOMA) (H): Primary | ICD-10-CM

## 2017-05-04 ENCOUNTER — OFFICE VISIT (OUTPATIENT)
Dept: GASTROENTEROLOGY | Facility: CLINIC | Age: 52
End: 2017-05-04
Attending: INTERNAL MEDICINE
Payer: COMMERCIAL

## 2017-05-04 VITALS
SYSTOLIC BLOOD PRESSURE: 131 MMHG | HEART RATE: 67 BPM | DIASTOLIC BLOOD PRESSURE: 68 MMHG | TEMPERATURE: 99.1 F | BODY MASS INDEX: 24.85 KG/M2 | HEIGHT: 66 IN | WEIGHT: 154.6 LBS | OXYGEN SATURATION: 98 %

## 2017-05-04 DIAGNOSIS — C22.0 HCC (HEPATOCELLULAR CARCINOMA) (H): ICD-10-CM

## 2017-05-04 DIAGNOSIS — B18.1 CHRONIC VIRAL HEPATITIS B WITHOUT DELTA AGENT AND WITHOUT COMA (H): Primary | ICD-10-CM

## 2017-05-04 DIAGNOSIS — B18.1 CHRONIC VIRAL HEPATITIS B WITHOUT DELTA AGENT AND WITHOUT COMA (H): ICD-10-CM

## 2017-05-04 LAB
AFP SERPL-MCNC: 7.3 UG/L (ref 0–8)
ALBUMIN SERPL-MCNC: 3.3 G/DL (ref 3.4–5)
ALP SERPL-CCNC: 119 U/L (ref 40–150)
ALT SERPL W P-5'-P-CCNC: 36 U/L (ref 0–70)
ANION GAP SERPL CALCULATED.3IONS-SCNC: 8 MMOL/L (ref 3–14)
AST SERPL W P-5'-P-CCNC: 37 U/L (ref 0–45)
BILIRUB DIRECT SERPL-MCNC: <0.1 MG/DL (ref 0–0.2)
BILIRUB SERPL-MCNC: 0.5 MG/DL (ref 0.2–1.3)
BUN SERPL-MCNC: 14 MG/DL (ref 7–30)
CALCIUM SERPL-MCNC: 8.4 MG/DL (ref 8.5–10.1)
CHLORIDE SERPL-SCNC: 106 MMOL/L (ref 94–109)
CO2 SERPL-SCNC: 25 MMOL/L (ref 20–32)
CREAT SERPL-MCNC: 0.69 MG/DL (ref 0.66–1.25)
ERYTHROCYTE [DISTWIDTH] IN BLOOD BY AUTOMATED COUNT: 13.8 % (ref 10–15)
GFR SERPL CREATININE-BSD FRML MDRD: ABNORMAL ML/MIN/1.7M2
GLUCOSE SERPL-MCNC: 99 MG/DL (ref 70–99)
HCT VFR BLD AUTO: 40.8 % (ref 40–53)
HGB BLD-MCNC: 13.8 G/DL (ref 13.3–17.7)
INR PPP: 1.19 (ref 0.86–1.14)
MCH RBC QN AUTO: 30.1 PG (ref 26.5–33)
MCHC RBC AUTO-ENTMCNC: 33.8 G/DL (ref 31.5–36.5)
MCV RBC AUTO: 89 FL (ref 78–100)
PLATELET # BLD AUTO: 169 10E9/L (ref 150–450)
POTASSIUM SERPL-SCNC: 4.4 MMOL/L (ref 3.4–5.3)
PROT SERPL-MCNC: 7.8 G/DL (ref 6.8–8.8)
RBC # BLD AUTO: 4.59 10E12/L (ref 4.4–5.9)
SODIUM SERPL-SCNC: 138 MMOL/L (ref 133–144)
WBC # BLD AUTO: 4.1 10E9/L (ref 4–11)

## 2017-05-04 PROCEDURE — 99212 OFFICE O/P EST SF 10 MIN: CPT | Mod: ZF

## 2017-05-04 PROCEDURE — 36415 COLL VENOUS BLD VENIPUNCTURE: CPT | Performed by: RADIOLOGY

## 2017-05-04 ASSESSMENT — PAIN SCALES - GENERAL: PAINLEVEL: NO PAIN (0)

## 2017-05-04 NOTE — NURSING NOTE
Chief Complaint   Patient presents with     RECHECK     HCC/Hep B   Pt roomed, vitals, meds, and allergies reviewed with pt. Pt ready for provider.  Reymundo Rae, CMA

## 2017-05-04 NOTE — PROGRESS NOTES
HISTORY OF PRESENT ILLNESS:  I had the pleasure of seeing Preeti Pascual for followup in the Liver Clinic at the [facility] on 05/4/2017.  Mr. 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  He has been treated with radio-embolization with an excellent response based on alpha-fetoprotein.  Unfortunately, he has evidence of tumor thrombus, which on the most recent MRI, appears to be nonenhancing.      He denies any abdominal pain, itching or skin rash or fatigue.  He denies any increased abdominal girth or lower extremity edema.  He has not had any gastrointestinal bleeding or any overt signs of encephalopathy.  He denies any fevers or chills, cough or shortness of breath.  He denies any nausea, vomiting, diarrhea or constipation.  His appetite has been good, and his weight has remained relatively stable.  There have been no other new events since he was last seen.       Current Outpatient Prescriptions   Medication     entecavir (BARACLUDE) 0.5 MG tablet     naproxen (NAPROSYN) 500 MG tablet     No current facility-administered medications for this visit.      B/P: 131/68, T: 99.1[coffee[, P: 67, R: Data Unavailable    HEENT exam shows no scleral icterus and no temporal muscle wasting.  Chest is clear.  Abdominal exam shows no increase in girth.  No masses or tenderness to palpation are present.  His liver is 10 cm in span without left lobe enlargement.  No spleen tip is palpable.  Extremity exam shows no edema.  Skin exam shows no stigmata of chronic liver disease.  Neurologic exam shows no asterixis.       Recent Results (from the past 168 hour(s))   CBC with platelets    Collection Time: 05/04/17  8:44 AM   Result Value Ref Range    WBC 4.1 4.0 - 11.0 10e9/L    RBC Count 4.59 4.4 - 5.9 10e12/L    Hemoglobin 13.8 13.3 - 17.7 g/dL    Hematocrit 40.8 40.0 - 53.0 %    MCV 89 78 - 100 fl    MCH 30.1 26.5 - 33.0 pg    MCHC 33.8  31.5 - 36.5 g/dL    RDW 13.8 10.0 - 15.0 %    Platelet Count 169 150 - 450 10e9/L   Basic metabolic panel    Collection Time: 05/04/17  8:44 AM   Result Value Ref Range    Sodium 138 133 - 144 mmol/L    Potassium 4.4 3.4 - 5.3 mmol/L    Chloride 106 94 - 109 mmol/L    Carbon Dioxide 25 20 - 32 mmol/L    Anion Gap 8 3 - 14 mmol/L    Glucose 99 70 - 99 mg/dL    Urea Nitrogen 14 7 - 30 mg/dL    Creatinine 0.69 0.66 - 1.25 mg/dL    GFR Estimate >90  Non  GFR Calc   >60 mL/min/1.7m2    GFR Estimate If Black >90   GFR Calc   >60 mL/min/1.7m2    Calcium 8.4 (L) 8.5 - 10.1 mg/dL   INR    Collection Time: 05/04/17  8:44 AM   Result Value Ref Range    INR 1.19 (H) 0.86 - 1.14   Hepatic panel    Collection Time: 05/04/17  8:44 AM   Result Value Ref Range    Bilirubin Direct <0.1 0.0 - 0.2 mg/dL    Bilirubin Total 0.5 0.2 - 1.3 mg/dL    Albumin 3.3 (L) 3.4 - 5.0 g/dL    Protein Total 7.8 6.8 - 8.8 g/dL    Alkaline Phosphatase 119 40 - 150 U/L    ALT 36 0 - 70 U/L    AST 37 0 - 45 U/L      My impression is that Mr. Pascual is doing very well status post radio-embolization of a large hepatocellular carcinoma with portal vein invasion.  His last alphafetoprotein was down to 6, which is excellent.  It is pending from today.  His liver tests are normal at this point in time.  He is tolerating the entecavir quite well although it is quite costly for him.  I, otherwise, will not be making any change to his medical regimen.  I will see him back in the clinic again in 6 months.  He will continue to get MRIs every 3 months.  Will check his alphafetoprotein in 3 months as well.      Thank you very much for allowing me to participate in the care of this patient.  If you have any questions regarding my recommendations, please do not hesitate to contact me.       Trevor Trujillo MD      Professor of Medicine  [facility] [employer]      Executive Medical Director, Solid Organ Transplant  Program  [facility]

## 2017-05-04 NOTE — LETTER
5/4/2017      RE: Preeti Pascual  [address]   [facility] [address]       HISTORY OF PRESENT ILLNESS:  I had the pleasure of seeing Preeti Pascual for followup in the Liver Clinic at the [facility] on 05/4/2017.  Mr. 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  He has been treated with radio-embolization with an excellent response based on alpha-fetoprotein.  Unfortunately, he has evidence of tumor thrombus, which on the most recent MRI, appears to be nonenhancing.      He denies any abdominal pain, itching or skin rash or fatigue.  He denies any increased abdominal girth or lower extremity edema.  He has not had any gastrointestinal bleeding or any overt signs of encephalopathy.  He denies any fevers or chills, cough or shortness of breath.  He denies any nausea, vomiting, diarrhea or constipation.  His appetite has been good, and his weight has remained relatively stable.  There have been no other new events since he was last seen.       Current Outpatient Prescriptions   Medication     entecavir (BARACLUDE) 0.5 MG tablet     naproxen (NAPROSYN) 500 MG tablet     No current facility-administered medications for this visit.      B/P: 131/68, T: 99.1[coffee[, P: 67, R: Data Unavailable    HEENT exam shows no scleral icterus and no temporal muscle wasting.  Chest is clear.  Abdominal exam shows no increase in girth.  No masses or tenderness to palpation are present.  His liver is 10 cm in span without left lobe enlargement.  No spleen tip is palpable.  Extremity exam shows no edema.  Skin exam shows no stigmata of chronic liver disease.  Neurologic exam shows no asterixis.       Recent Results (from the past 168 hour(s))   CBC with platelets    Collection Time: 05/04/17  8:44 AM   Result Value Ref Range    WBC 4.1 4.0 - 11.0 10e9/L    RBC Count 4.59 4.4 - 5.9 10e12/L    Hemoglobin 13.8 13.3 - 17.7 g/dL     Hematocrit 40.8 40.0 - 53.0 %    MCV 89 78 - 100 fl    MCH 30.1 26.5 - 33.0 pg    MCHC 33.8 31.5 - 36.5 g/dL    RDW 13.8 10.0 - 15.0 %    Platelet Count 169 150 - 450 10e9/L   Basic metabolic panel    Collection Time: 05/04/17  8:44 AM   Result Value Ref Range    Sodium 138 133 - 144 mmol/L    Potassium 4.4 3.4 - 5.3 mmol/L    Chloride 106 94 - 109 mmol/L    Carbon Dioxide 25 20 - 32 mmol/L    Anion Gap 8 3 - 14 mmol/L    Glucose 99 70 - 99 mg/dL    Urea Nitrogen 14 7 - 30 mg/dL    Creatinine 0.69 0.66 - 1.25 mg/dL    GFR Estimate >90  Non  GFR Calc   >60 mL/min/1.7m2    GFR Estimate If Black >90   GFR Calc   >60 mL/min/1.7m2    Calcium 8.4 (L) 8.5 - 10.1 mg/dL   INR    Collection Time: 05/04/17  8:44 AM   Result Value Ref Range    INR 1.19 (H) 0.86 - 1.14   Hepatic panel    Collection Time: 05/04/17  8:44 AM   Result Value Ref Range    Bilirubin Direct <0.1 0.0 - 0.2 mg/dL    Bilirubin Total 0.5 0.2 - 1.3 mg/dL    Albumin 3.3 (L) 3.4 - 5.0 g/dL    Protein Total 7.8 6.8 - 8.8 g/dL    Alkaline Phosphatase 119 40 - 150 U/L    ALT 36 0 - 70 U/L    AST 37 0 - 45 U/L      My impression is that Mr. Pascual is doing very well status post radio-embolization of a large hepatocellular carcinoma with portal vein invasion.  His last alphafetoprotein was down to 6, which is excellent.  It is pending from today.  His liver tests are normal at this point in time.  He is tolerating the entecavir quite well although it is quite costly for him.  I, otherwise, will not be making any change to his medical regimen.  I will see him back in the clinic again in 6 months.  He will continue to get MRIs every 3 months.  Will check his alphafetoprotein in 3 months as well.      Thank you very much for allowing me to participate in the care of this patient.  If you have any questions regarding my recommendations, please do not hesitate to contact me.       Trevor Trujillo MD      Professor of Medicine  [facility]  [employer]      Executive Medical Director, Solid Organ Transplant Program  [facility]

## 2017-05-04 NOTE — MR AVS SNAPSHOT
After Visit Summary   5/4/2017    Preeti Pascual    MRN: [id number]           Patient Information     Date Of Birth          1965        Visit Information        Provider Department      5/4/2017 9:30 AM Trevor Trujillo MD [facility] Hepatology        Today's Diagnoses     Chronic viral hepatitis B without delta agent and without coma (H)    -  1       Follow-ups after your visit        Follow-up notes from your care team     Return in about 6 months (around 11/4/2017).      Your next 10 appointments already scheduled     May 12, 2017  7:45 AM CDT   (Arrive by 7:30 AM)   MR ABDOMEN W/O & W CONTRAST with [id number]   [facility] MRI ([facility])    [address] [address] [phone]           Take your medicines as usual, unless your doctor tells you not to. Bring a list of your current medicines to your exam (including vitamins, minerals and over-the-counter drugs). Also bring the results of similar scans you may have had.    The day before your exam, drink extra fluids at least six 8-ounce glasses (unless your doctor tells you to restrict your fluids).   Have a blood test (creatinine test) within 30 days of your exam. Go to your clinic or Diagnostic Imaging Department for this test.   Do not eat or drink for 6 hours prior to exam.  The MRI machine uses a strong magnet. Please wear clothes without metal (snaps, zippers). A sweatsuit works well, or we may give you a hospital gown.  Please remove any body piercings and hair extensions before you arrive. You will also remove watches, jewelry, hairpins, wallets, dentures, partial dental plates and hearing aids. You may wear contact lenses, and you may be able to wear your rings. We have a safe place to keep your personal items, but it is safer to leave them at home.   **IMPORTANT** THE INSTRUCTIONS BELOW ARE ONLY FOR THOSE PATIENTS WHO HAVE BEEN TOLD THEY WILL RECEIVE SEDATION OR  GENERAL ANESTHESIA DURING THEIR MRI PROCEDURE:  IF YOU WILL RECEIVE SEDATION (take medicine to help you relax during your exam):   You must get the medicine from your doctor before you arrive. Bring the medicine to the exam. Do not take it at home.   Arrive one hour early. Bring someone who can take you home after the test. Your medicine will make you sleepy. After the exam, you may not drive, take a bus or take a taxi by yourself.   No eating 8 hours before your exam. You may have clear liquids up until 4 hours before your exam. (Clear liquids include water, clear tea, black coffee and fruit juice without pulp.)  IF YOU WILL RECEIVE ANESTHESIA (be asleep for your exam):   Arrive 1 1/2 hours early. Bring someone who can take you home after the test. You may not drive, take a bus or take a taxi by yourself.   No eating 8 hours before your exam. You may have clear liquids up until 4 hours before your exam. (Clear liquids include water, clear tea, black coffee and fruit juice without pulp.)  If you have any questions, please contact your Imaging Department exam site.            May 12, 2017  9:00 AM CDT   [employer] Lab Draw with  [employer] LAB DRAW   [facility] Lab Draw ([facility])    [address] [place] [address]   [phone]            May 12, 2017  9:30 AM CDT   (Arrive by 9:15 AM)   Return Visit with Yenni Larsen MD   [facility] ([facility])    [address] [place] [address]   [phone]            May 25, 2017 12:00 PM CDT   (Arrive by 11:45 AM)   Return Visit with Neymar Whittaker MD   [facility] ([facility])    [address] [place] [address]   [phone]            Nov 02, 2017  8:00 AM CDT   Lab with  LAB   [facility])    [address] [place] [address]  Floor  [facility] [address] [phone]            Nov 02, 2017  9:00 AM CDT   (Arrive by 8:45 AM)   Return General Liver with Trevor Trujillo MD   [facility] Hepatology ([facility])    [address] [facility]  3rd [facility] [address]   [phone]              Future tests that were ordered for you today     Open Standing Orders        Priority Remaining Interval Expires Ordered    Hepatic panel [LAB20] Routine 2/2 Every 6 Months 6/3/2018 5/4/2017    Basic metabolic panel [LAB15] Routine 2/2 Every 6 Months 6/3/2018 5/4/2017    Hep B Virus DNA Quant Real Time PCR [XTW1801] Routine 2/2 Every 6 Months 6/3/2018 5/4/2017    AFP tumor marker [MYX711] Routine 2/2 Every 6 Months 6/3/2018 5/4/2017    CBC with platelets [RPU774] Routine 2/2 Every 6 Months 6/3/2018 5/4/2017    INR [JOE6204] Routine 2/2 Every 6 Months 6/3/2018 5/4/2017            Who to contact     If you have questions or need follow up information about today's clinic visit or your schedule please contact [facility] HEPATOLOGY directly at [phone].  Normal or non-critical lab and imaging results will be communicated to you by Medicalishart, letter or phone within 4 business days after the clinic has received the results. If you do not hear from us within 7 days, please contact the clinic through Medicalishart or phone. If you have a critical or abnormal lab result, we will notify you by phone as soon as possible.  Submit refill requests through Inkive or call your pharmacy and they will forward the refill request to us. Please allow 3 business days for your refill to be completed.          Additional Information About Your Visit        Inkive Information     Inkive gives you secure access to your electronic health record. If you see a primary care provider, you can also send messages to your care team and make appointments. If you have questions, please call your primary care clinic.  If you do not have a primary care  "provider, please call [phone] and they will assist you.        Care EveryWhere ID     This is your Care EveryWhere ID. This could be used by other organizations to access your [place] medical records  [id number]        Your Vitals Were     Pulse Temperature Height Pulse Oximetry BMI (Body Mass Index)       67 99.1  F (37.3  C) 1.676 m (5' 6\") 98% 24.95 kg/m2        Blood Pressure from Last 3 Encounters:   05/04/17 131/68   03/16/17 119/71   02/17/17 128/84    Weight from Last 3 Encounters:   05/04/17 70.1 kg (154 lb 9.6 oz)   03/16/17 68.9 kg (151 lb 14.4 oz)   02/17/17 67.6 kg (149 lb 1.6 oz)              We Performed the Following     Schedule follow up appointments        Primary Care Provider Office Phone # Fax #    Sudhir Layne [phone]       [address] [address]        Thank you!     Thank you for choosing [facility] HEPATOLOGY  for your care. Our goal is always to provide you with excellent care. Hearing back from our patients is one way we can continue to improve our services. Please take a few minutes to complete the written survey that you may receive in the mail after your visit with us. Thank you!             Your Updated Medication List - Protect others around you: Learn how to safely use, store and throw away your medicines at www.disposemymeds.org.          This list is accurate as of: 5/4/17  9:37 AM.  Always use your most recent med list.                   Brand Name Dispense Instructions for use    BARACLUDE 0.5 MG tablet   Generic drug:  entecavir      Take 0.5 mg by mouth daily       naproxen 500 MG tablet    NAPROSYN    30 tablet    Take 1 tablet (500 mg) by mouth 2 times daily as needed for moderate pain         "

## 2017-05-12 ENCOUNTER — OFFICE VISIT (OUTPATIENT)
Dept: RADIOLOGY | Facility: CLINIC | Age: 52
End: 2017-05-12
Attending: RADIOLOGY
Payer: COMMERCIAL

## 2017-05-12 VITALS
TEMPERATURE: 97.8 F | BODY MASS INDEX: 24.77 KG/M2 | DIASTOLIC BLOOD PRESSURE: 93 MMHG | HEIGHT: 66 IN | SYSTOLIC BLOOD PRESSURE: 157 MMHG | RESPIRATION RATE: 16 BRPM | WEIGHT: 154.1 LBS | HEART RATE: 60 BPM | OXYGEN SATURATION: 97 %

## 2017-05-12 DIAGNOSIS — C22.0 HCC (HEPATOCELLULAR CARCINOMA) (H): Primary | ICD-10-CM

## 2017-05-12 PROCEDURE — 99212 OFFICE O/P EST SF 10 MIN: CPT | Mod: ZF

## 2017-05-12 ASSESSMENT — PAIN SCALES - GENERAL: PAINLEVEL: NO PAIN (0)

## 2017-05-12 NOTE — NURSING NOTE
"Oncology Rooming Note    May 12, 2017 9:41 AM   Preeti Pascual is a 50 year old male who presents for: Oncology Clinic Visit    Initial Vitals: BP (!) 157/93 (BP Location: Left arm, Patient Position: Chair, Cuff Size: Adult Regular)  Pulse 60  Temp 97.8  F (36.6  C) (Oral)  Resp 16  Ht 1.676 m (5' 6\")  Wt 69.9 kg (154 lb 1.6 oz)  SpO2 97%  BMI 24.87 kg/m2 Estimated body mass index is 24.87 kg/(m^2) as calculated from the following:    Height as of this encounter: 1.676 m (5' 6\").    Weight as of this encounter: 69.9 kg (154 lb 1.6 oz). Body surface area is 1.8 meters squared.  No Pain (0) Comment: Data Unavailable   No LMP for male patient.  Allergies reviewed: Yes  Medications reviewed: Yes    Medications: Medication refills not needed today.  Pharmacy name entered into Zentila: [id number] - [place], [place] - [address] AT [facility]    Clinical concerns:  6 minutes for nursing intake (face to face time)     Marie Ibarra CMA                              "

## 2017-05-12 NOTE — PROGRESS NOTES
Interventional Radiology Clinic Visit    Date of this visit: 5/12/2017    Preeti Pascual returns for follow up post radioembolization on 11/23/16.     Primary Physician: Sudhir Layne        History Of Present Illness & ROS:    Preeti Pascual is a 51 year old male with chronic hepatitis B and hepatocellular carcinoma, and underwent Theraspheres radioembolization on 1/25/2016 to the right hepatic lobe for a large hepatocellular carcinoma mass centered on segment 8 and a smaller lesion in segment 7.   Serial MRIs showed positive response to treatment, but it became evident that there was residual tumor in the treatment bed. Preeti was initially reluctant to undergo further treatment despite my recommendation, and we therefore elected to monitor with serial MRIs. However there was ultimately some evidence of growth of the residual enhancing tumor in segment 8 and he agreed to undergo repeat Theraspheres radioembolization which we did on 11/23/2016.   He is now 6 months post second treatment and says he is doing well with no specific complaints. He is gaining weight and has good energy levels. He has no abdominal pain.      Past Medical/Surgical History:    Past Medical History:   Diagnosis Date     Hepatitis B      Hepatocellular carcinoma (H)      Past Surgical History:   Procedure Laterality Date     Y-90 Delivery         Current Medications:    Current Outpatient Prescriptions   Medication Sig Dispense Refill     entecavir (BARACLUDE) 0.5 MG tablet Take 0.5 mg by mouth daily         Allergies:    Review of patient's allergies indicates no known allergies.    Family History:    Family History   Problem Relation Age of Onset     Other Cancer No family hx of        Social History:    Social History     Social History     Marital status:      Spouse name: N/A     Number of children: N/A     Years of education: N/A     Social History Main Topics     Smoking status: Never Smoker     Smokeless tobacco: None      "Alcohol use No     Drug use: No     Sexual activity: Not Asked     Other Topics Concern     Parent/Sibling W/ Cabg, Mi Or Angioplasty Before 65f 55m? No     Social History Narrative    Immigrated from [place] around 2010.  In 2013, he returned to [place], and then returned to the [place] 1 [place] later.  He is now back in [place] permanently.  Works at [employer].  He is , no children.         Physical Exam:    BP (!) 157/93 (BP Location: Left arm, Patient Position: Chair, Cuff Size: Adult Regular)  Pulse 60  Temp 97.8  F (36.6  C) (Oral)  Resp 16  Ht 1.676 m (5' 6\")  Wt 69.9 kg (154 lb 1.6 oz)  SpO2 97%  BMI 24.87 kg/m2     GENERAL APPEARANCE: healthy, alert and no distress  PSYCHIATRIC: mentation appears normal and affect normal.  EYES: No jaundice.  SKIN: No jaundice.       Laboratory Studies:    Lab Results   Component Value Date    HGB 13.8 05/04/2017     Lab Results   Component Value Date     05/04/2017     Lab Results   Component Value Date    WBC 4.1 05/04/2017       Lab Results   Component Value Date    INR 1.19 05/04/2017       Lab Results   Component Value Date    PROTTOTAL 7.8 05/04/2017      Lab Results   Component Value Date    ALBUMIN 3.3 05/04/2017     Lab Results   Component Value Date    BILITOTAL 0.5 05/04/2017     No results found for: BILICONJ   Lab Results   Component Value Date    ALKPHOS 119 05/04/2017     Lab Results   Component Value Date    AST 37 05/04/2017     Lab Results   Component Value Date    ALT 36 05/04/2017       Lab Results   Component Value Date    CR 0.69 05/04/2017     Lab Results   Component Value Date    BUN 14 05/04/2017       Alpha Fetoprotein   Date Value Ref Range Status   05/04/2017 7.3 0 - 8 ug/L Final     Comment:     Assay Method:  Chemiluminescence using Siemens Centaur XP       Imaging:     I reviewed the MRI from this morning. 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 There are no new lesions in the liver.    ASSESSMENT/PLAN:      Preeti Pascual is a 51 year old male with a large hepatocellular carcinoma who is status post two Theraspheres radioembolization treatments and made a good recovery. Follow up imaging shows no evidence of residual tumor. His AFP returned to normal a few months ago and remains normal today. I showed Preeti the imaging and labs and discussed the findings.     I explained that I do not need to see Preeti again unless he develops evidence of tumor recurrence or a new lesion that may be amenable to further treatment such as ablation or embolization, at which time he will be referred back to me for further discussion. His ongoing follow up will be with hepatology and oncology. He agreed with this plan.    A total of 15 minutes was spent in care for the patient.    It was a pleasure seeing the patient.     Yenni Long M.D.  Department of Interventional Radiology  [facility]      CC  Patient Care Team:  Sudhir Layne MD as PCP - General (Student in organized health care education/training program)  Misa Negrete MD as MD (Gastroenterology)  Isabel Messina MD as MD (Hematology & Oncology)  Yenni Larsen MD as MD (Vascular and Interventional Radiology)  Letitia Patel MD as MD (Internal Medicine)  BRANDON HOPE MD

## 2017-05-12 NOTE — MR AVS SNAPSHOT
After Visit Summary   5/12/2017    Preeti Pascual    MRN: [id number]           Patient Information     Date Of Birth          1965        Visit Information        Provider Department      5/12/2017 9:30 AM Yenni Larsen MD [facility]        Today's Diagnoses     HCC (hepatocellular carcinoma) (H)    -  1       Follow-ups after your visit        Your next 10 appointments already scheduled     May 25, 2017 12:00 PM CDT   (Arrive by 11:45 AM)   Return Visit with Neymar Whittaker MD   [facility] ([facility])    [address] [place] [address]  2nd Floor  [facility] [id number] [phone]            Nov 02, 2017  8:00 AM CDT   Lab with  LAB   [facility])    [address] [place] [address]  1st Floor  [facility] [address] [phone]            Nov 02, 2017  9:00 AM CDT   (Arrive by 8:45 AM)   Return General Liver with Trevor Trujillo MD   [facility] Hepatology ([facility])    [address] [place] [address]  3rd Floor  [facility] [id number] [phone]              Who to contact     If you have questions or need follow up information about today's clinic visit or your schedule please contact [facility] directly at [phone].  Normal or non-critical lab and imaging results will be communicated to you by MyChart, letter or phone within 4 business days after the clinic has received the results. If you do not hear from us within 7 days, please contact the clinic through MyChart or phone. If you have a critical or abnormal lab result, we will notify you by phone as soon as possible.  Submit refill requests through Bunndle or call your pharmacy and they will forward the refill request to us. Please allow 3 business days for your refill to be completed.          Additional Information About Your Visit        Command Informationhart Information     Bunndle gives you secure access to your  "electronic health record. If you see a primary care provider, you can also send messages to your care team and make appointments. If you have questions, please call your primary care clinic.  If you do not have a primary care provider, please call [phone] and they will assist you.        Care EveryWhere ID     This is your Care EveryWhere ID. This could be used by other organizations to access your [place] medical records  [id number]        Your Vitals Were     Pulse Temperature Respirations Height Pulse Oximetry BMI (Body Mass Index)    60 97.8  F (36.6  C) (Oral) 16 1.676 m (5' 6\") 97% 24.87 kg/m2       Blood Pressure from Last 3 Encounters:   05/12/17 (!) 157/93   05/04/17 131/68   03/16/17 119/71    Weight from Last 3 Encounters:   05/12/17 69.9 kg (154 lb 1.6 oz)   05/04/17 70.1 kg (154 lb 9.6 oz)   03/16/17 68.9 kg (151 lb 14.4 oz)              Today, you had the following     No orders found for display         Today's Medication Changes          These changes are accurate as of: 5/12/17 11:12 AM.  If you have any questions, ask your nurse or doctor.               Stop taking these medicines if you haven't already. Please contact your care team if you have questions.     naproxen 500 MG tablet   Commonly known as:  NAPROSYN   Stopped by:  Yenni Larsen MD                    Primary Care Provider Office Phone # Fax #    Sudhir Layne [phone]       [address] [address]        Thank you!     Thank you for choosing [facility] CANCER CLINIC  for your care. Our goal is always to provide you with excellent care. Hearing back from our patients is one way we can continue to improve our services. Please take a few minutes to complete the written survey that you may receive in the mail after your visit with us. Thank you!             Your Updated Medication List - Protect others around you: Learn how to safely use, store and throw away your " medicines at www.disposemymeds.org.          This list is accurate as of: 5/12/17 11:12 AM.  Always use your most recent med list.                   Brand Name Dispense Instructions for use    BARACLUDE 0.5 MG tablet   Generic drug:  entecavir      Take 0.5 mg by mouth daily

## 2017-05-12 NOTE — LETTER
5/12/2017       RE: Preeti Pascual  [address]   [place] [address]     Dear Colleague,    Thank you for referring your patient, Preeti Pascual, to the [facility] CANCER [facility]IC. Please see a copy of my visit note below.        Interventional Radiology Clinic Visit    Date of this visit: 5/12/2017    Preeti Pascual returns for follow up post radioembolization on 11/23/16.     Primary Physician: Sudhir Layne        History Of Present Illness & ROS:    Preeti Pascual is a 51 year old male with chronic hepatitis B and hepatocellular carcinoma, and underwent Theraspheres radioembolization on 1/25/2016 to the right hepatic lobe for a large hepatocellular carcinoma mass centered on segment 8 and a smaller lesion in segment 7.   Serial MRIs showed positive response to treatment, but it became evident that there was residual tumor in the treatment bed. Preeti was initially reluctant to undergo further treatment despite my recommendation, and we therefore elected to monitor with serial MRIs. However there was ultimately some evidence of growth of the residual enhancing tumor in segment 8 and he agreed to undergo repeat Theraspheres radioembolization which we did on 11/23/2016.   He is now 6 months post second treatment and says he is doing well with no specific complaints. He is gaining weight and has good energy levels. He has no abdominal pain.      Past Medical/Surgical History:    Past Medical History:   Diagnosis Date     Hepatitis B      Hepatocellular carcinoma (H)      Past Surgical History:   Procedure Laterality Date     Y-90 Delivery         Current Medications:    Current Outpatient Prescriptions   Medication Sig Dispense Refill     entecavir (BARACLUDE) 0.5 MG tablet Take 0.5 mg by mouth daily         Allergies:    Review of patient's allergies indicates no known allergies.    Family History:    Family History   Problem Relation Age of Onset     Other Cancer No family hx of        Social  "History:    Social History     Social History     Marital status:      Spouse name: N/A     Number of children: N/A     Years of education: N/A     Social History Main Topics     Smoking status: Never Smoker     Smokeless tobacco: None     Alcohol use No     Drug use: No     Sexual activity: Not Asked     Other Topics Concern     Parent/Sibling W/ Cabg, Mi Or Angioplasty Before 65f 55m? No     Social History Narrative    Immigrated from [facility] around 2010.  In 2013, he returned to [facility], and then returned to the  1 month later.  He is now back in [place] permanently.  Works at [employer].  He is , no children.         Physical Exam:    BP (!) 157/93 (BP Location: Left arm, Patient Position: Chair, Cuff Size: Adult Regular)  Pulse 60  Temp 97.8  F (36.6  C) (Oral)  Resp 16  Ht 1.676 m (5' 6\")  Wt 69.9 kg (154 lb 1.6 oz)  SpO2 97%  BMI 24.87 kg/m2     GENERAL APPEARANCE: healthy, alert and no distress  PSYCHIATRIC: mentation appears normal and affect normal.  EYES: No jaundice.  SKIN: No jaundice.       Laboratory Studies:    Lab Results   Component Value Date    HGB 13.8 05/04/2017     Lab Results   Component Value Date     05/04/2017     Lab Results   Component Value Date    WBC 4.1 05/04/2017       Lab Results   Component Value Date    INR 1.19 05/04/2017       Lab Results   Component Value Date    PROTTOTAL 7.8 05/04/2017      Lab Results   Component Value Date    ALBUMIN 3.3 05/04/2017     Lab Results   Component Value Date    BILITOTAL 0.5 05/04/2017     No results found for: BILICONJ   Lab Results   Component Value Date    ALKPHOS 119 05/04/2017     Lab Results   Component Value Date    AST 37 05/04/2017     Lab Results   Component Value Date    ALT 36 05/04/2017       Lab Results   Component Value Date    CR 0.69 05/04/2017     Lab Results   Component Value Date    BUN 14 05/04/2017       Alpha Fetoprotein   Date Value Ref Range Status   05/04/2017 7.3 0 - 8 ug/L Final     " Comment:     Assay Method:  Chemiluminescence using Siemens Centaur XP       Imaging:     I reviewed the MRI from this morning. 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 There are no new lesions in the liver.    ASSESSMENT/PLAN:      Preeti Pascual is a 51 year old male with a large hepatocellular carcinoma who is status post two Theraspheres radioembolization treatments and made a good recovery. Follow up imaging shows no evidence of residual tumor. His AFP returned to normal a few months ago and remains normal today. I showed Preeti the imaging and labs and discussed the findings.     I explained that I do not need to see Preeti again unless he develops evidence of tumor recurrence or a new lesion that may be amenable to further treatment such as ablation or embolization, at which time he will be referred back to me for further discussion. His ongoing follow up will be with hepatology and oncology. He agreed with this plan.    A total of 15 minutes was spent in care for the patient.    It was a pleasure seeing the patient.     Signed,    Yenni Birch M.D.  Department of Interventional Radiology  [facility]    CC  Patient Care Team:  Sudhir Layne MD as PCP - General (Student in organized health care education/training program)  Misa Negrete MD as MD (Gastroenterology)  Isabel Messina MD as MD (Hematology & Oncology)  Letitia Patel MD as MD (Internal Medicine)  BRANDON HOPE MD

## 2017-05-15 ENCOUNTER — TELEPHONE (OUTPATIENT)
Dept: SPIRITUAL SERVICES | Facility: CLINIC | Age: 52
End: 2017-05-15

## 2017-05-15 ENCOUNTER — TELEPHONE (OUTPATIENT)
Dept: ONCOLOGY | Facility: CLINIC | Age: 52
End: 2017-05-15

## 2017-05-15 NOTE — TELEPHONE ENCOUNTER
SPIRITUAL HEALTH SERVICES  Telephone Encounter Progress Note    Received referral via oncology distress screen. Reviewed documentation. Left voicemail. I will follow-up with Preeti with another phone call attempt in a few days.    Jp Borden MDiv, [facility]  Staff   Pager [phone]

## 2017-11-01 ENCOUNTER — TELEPHONE (OUTPATIENT)
Dept: GASTROENTEROLOGY | Facility: CLINIC | Age: 52
End: 2017-11-01

## 2017-11-02 ENCOUNTER — OFFICE VISIT (OUTPATIENT)
Dept: GASTROENTEROLOGY | Facility: CLINIC | Age: 52
End: 2017-11-02
Attending: INTERNAL MEDICINE
Payer: COMMERCIAL

## 2017-11-02 VITALS
SYSTOLIC BLOOD PRESSURE: 127 MMHG | OXYGEN SATURATION: 97 % | WEIGHT: 160 LBS | TEMPERATURE: 97.7 F | BODY MASS INDEX: 25.71 KG/M2 | DIASTOLIC BLOOD PRESSURE: 80 MMHG | HEART RATE: 51 BPM | HEIGHT: 66 IN

## 2017-11-02 DIAGNOSIS — C22.0 HCC (HEPATOCELLULAR CARCINOMA) (H): ICD-10-CM

## 2017-11-02 DIAGNOSIS — B18.1 CHRONIC VIRAL HEPATITIS B WITHOUT DELTA AGENT AND WITHOUT COMA (H): Primary | ICD-10-CM

## 2017-11-02 DIAGNOSIS — B18.1 CHRONIC VIRAL HEPATITIS B WITHOUT DELTA AGENT AND WITHOUT COMA (H): ICD-10-CM

## 2017-11-02 LAB
AFP SERPL-MCNC: 10 UG/L (ref 0–8)
ALBUMIN SERPL-MCNC: 3.3 G/DL (ref 3.4–5)
ALP SERPL-CCNC: 96 U/L (ref 40–150)
ALT SERPL W P-5'-P-CCNC: 28 U/L (ref 0–70)
ANION GAP SERPL CALCULATED.3IONS-SCNC: 7 MMOL/L (ref 3–14)
AST SERPL W P-5'-P-CCNC: 27 U/L (ref 0–45)
BILIRUB DIRECT SERPL-MCNC: 0.1 MG/DL (ref 0–0.2)
BILIRUB SERPL-MCNC: 0.6 MG/DL (ref 0.2–1.3)
BUN SERPL-MCNC: 13 MG/DL (ref 7–30)
CALCIUM SERPL-MCNC: 8.3 MG/DL (ref 8.5–10.1)
CHLORIDE SERPL-SCNC: 106 MMOL/L (ref 94–109)
CO2 SERPL-SCNC: 24 MMOL/L (ref 20–32)
CREAT SERPL-MCNC: 0.65 MG/DL (ref 0.66–1.25)
ERYTHROCYTE [DISTWIDTH] IN BLOOD BY AUTOMATED COUNT: 12.8 % (ref 10–15)
GFR SERPL CREATININE-BSD FRML MDRD: >90 ML/MIN/1.7M2
GLUCOSE SERPL-MCNC: 88 MG/DL (ref 70–99)
HCT VFR BLD AUTO: 40.1 % (ref 40–53)
HGB BLD-MCNC: 14 G/DL (ref 13.3–17.7)
INR PPP: 1.21 (ref 0.86–1.14)
MCH RBC QN AUTO: 30.6 PG (ref 26.5–33)
MCHC RBC AUTO-ENTMCNC: 34.9 G/DL (ref 31.5–36.5)
MCV RBC AUTO: 88 FL (ref 78–100)
PLATELET # BLD AUTO: 177 10E9/L (ref 150–450)
POTASSIUM SERPL-SCNC: 4.1 MMOL/L (ref 3.4–5.3)
PROT SERPL-MCNC: 7.7 G/DL (ref 6.8–8.8)
RBC # BLD AUTO: 4.57 10E12/L (ref 4.4–5.9)
SODIUM SERPL-SCNC: 137 MMOL/L (ref 133–144)
WBC # BLD AUTO: 4.7 10E9/L (ref 4–11)

## 2017-11-02 PROCEDURE — 87517 HEPATITIS B DNA QUANT: CPT | Performed by: INTERNAL MEDICINE

## 2017-11-02 PROCEDURE — 80048 BASIC METABOLIC PNL TOTAL CA: CPT | Performed by: INTERNAL MEDICINE

## 2017-11-02 PROCEDURE — 80076 HEPATIC FUNCTION PANEL: CPT | Performed by: INTERNAL MEDICINE

## 2017-11-02 PROCEDURE — 85610 PROTHROMBIN TIME: CPT | Performed by: INTERNAL MEDICINE

## 2017-11-02 PROCEDURE — 99212 OFFICE O/P EST SF 10 MIN: CPT | Mod: ZF

## 2017-11-02 PROCEDURE — 36415 COLL VENOUS BLD VENIPUNCTURE: CPT | Performed by: INTERNAL MEDICINE

## 2017-11-02 PROCEDURE — 82105 ALPHA-FETOPROTEIN SERUM: CPT | Performed by: INTERNAL MEDICINE

## 2017-11-02 PROCEDURE — 85027 COMPLETE CBC AUTOMATED: CPT | Performed by: INTERNAL MEDICINE

## 2017-11-02 ASSESSMENT — PAIN SCALES - GENERAL: PAINLEVEL: NO PAIN (0)

## 2017-11-02 NOTE — NURSING NOTE
"Chief Complaint   Patient presents with     RECHECK     Follow up Chronic viral hepatitis B without delta agent and without coma.       Initial /80  Pulse 51  Temp 97.7  F (36.5  C) (Oral)  Ht 1.676 m (5' 6\")  Wt 72.6 kg (160 lb)  SpO2 97%  BMI 25.82 kg/m2 Estimated body mass index is 25.82 kg/(m^2) as calculated from the following:    Height as of this encounter: 1.676 m (5' 6\").    Weight as of this encounter: 72.6 kg (160 lb).  Medication Reconciliation: complete   Aleksandra Gonzalez.,CMA    "

## 2017-11-02 NOTE — PROGRESS NOTES
I had the pleasure of seeing Preeti Pascual for followup in the Liver Clinic at the [facility] on 11/o2/2017.  Mr. Pascual returns for followup of chronic hepatitis B, complicated by hepatocellular carcinoma., unfortunately, he demonstrates involvement of the portal vein.  He is status post radioembolization with a good response with normalization of his alpha-fetoprotein levels.      He denies any abdominal pain, itching or skin rash or fatigue.  He denies any increased abdominal girth or lower extremity edema.  He denies any fevers or chills, cough or shortness of breath.  He denies any nausea, vomiting, diarrhea or constipation.  His appetite has been good, and his weight has been stable.  There have been no new events since he was last seen.        He has asked me about if he does need to continue the entecavir.  It evidently is quite expensive for him at this time.     Current Outpatient Prescriptions   Medication     entecavir (BARACLUDE) 0.5 MG tablet     No current facility-administered medications for this visit.      B/P: 127/80, T: 97.7, P: 51, R: Data Unavailable    HEENT exam shows no scleral icterus and no temporal muscle wasting.  His chest is clear.  His abdominal exam shows no increase in girth.  No masses or tenderness to palpation are present.  His liver is 10 cm in span without left lobe enlargement.  No spleen tip is palpable, and extremity exam shows no edema.  Skin exam shows no stigmata of chronic liver disease.  Neurologic exam shows no asterixis.     Recent Results (from the past 168 hour(s))   Hepatic panel [LAB20]    Collection Time: 11/02/17  7:58 AM   Result Value Ref Range    Bilirubin Direct 0.1 0.0 - 0.2 mg/dL    Bilirubin Total 0.6 0.2 - 1.3 mg/dL    Albumin 3.3 (L) 3.4 - 5.0 g/dL    Protein Total 7.7 6.8 - 8.8 g/dL    Alkaline Phosphatase 96 40 - 150 U/L    ALT 28 0 - 70 U/L    AST 27 0 - 45 U/L   Basic metabolic panel [LAB15]    Collection Time: 11/02/17   7:58 AM   Result Value Ref Range    Sodium 137 133 - 144 mmol/L    Potassium 4.1 3.4 - 5.3 mmol/L    Chloride 106 94 - 109 mmol/L    Carbon Dioxide 24 20 - 32 mmol/L    Anion Gap 7 3 - 14 mmol/L    Glucose 88 70 - 99 mg/dL    Urea Nitrogen 13 7 - 30 mg/dL    Creatinine 0.65 (L) 0.66 - 1.25 mg/dL    GFR Estimate >90 >60 mL/min/1.7m2    GFR Estimate If Black >90 >60 mL/min/1.7m2    Calcium 8.3 (L) 8.5 - 10.1 mg/dL   AFP tumor marker [JAS803]    Collection Time: 11/02/17  7:58 AM   Result Value Ref Range    Alpha Fetoprotein 10.0 (H) 0 - 8 ug/L   CBC with platelets [[id number]]    Collection Time: 11/02/17  7:58 AM   Result Value Ref Range    WBC 4.7 4.0 - 11.0 10e9/L    RBC Count 4.57 4.4 - 5.9 10e12/L    Hemoglobin 14.0 13.3 - 17.7 g/dL    Hematocrit 40.1 40.0 - 53.0 %    MCV 88 78 - 100 fl    MCH 30.6 26.5 - 33.0 pg    MCHC 34.9 31.5 - 36.5 g/dL    RDW 12.8 10.0 - 15.0 %    Platelet Count 177 150 - 450 10e9/L   INR [[id number]]    Collection Time: 11/02/17  7:58 AM   Result Value Ref Range    INR 1.21 (H) 0.86 - 1.14      My impression is that Mr. Pascual has chronic hepatitis B complicated by hepatocellular carcinoma.  He was to have had an MRI before today's visit, but somehow that did not happen.  I did order that and will follow up with him  once that occurs.  His alpha-fetoprotein has crept up a bit.  It is still only 10, but it has gone from 6.3 to 7.6 to 10 now.  His other liver tests are all completely normal.  I have given him permission to try stopping the entecavir.  I will see him back in 6 months, and we will see what his liver tests are and the HBV DNA.  I, otherwise, will not be making any change to his medical regimen.  I did encourage him to get a CT scan 1 week before he sees me in addition to the one he is going to get more recently here.        Thank you very much for allowing me to participate in the care of this patient.  If you have any questions regarding my recommendations, please do not hesitate to  contact me.       Trevor Trujillo MD      Professor of Medicine  [facility] [employer] [facility] [employer]      Executive Medical Director, Solid Organ Transplant Program  [facility]

## 2017-11-02 NOTE — MR AVS SNAPSHOT
After Visit Summary   11/2/2017    Preeti Pascual    MRN: [id number]           Patient Information     Date Of Birth          1965        Visit Information        Provider Department      11/2/2017 9:00 AM Trevor Trujillo MD [facility] Hepatology        Today's Diagnoses     Chronic viral hepatitis B without delta agent and without coma (H)    -  1    HCC (hepatocellular carcinoma) (H)           Follow-ups after your visit        Your next 10 appointments already scheduled     May 17, 2018  8:00 AM CDT   Lab with  LAB   [facility] ([facility])    [address] [place] [id number] [phone]            May 17, 2018  8:30 AM CDT   (Arrive by 8:15 AM)   MR ABDOMEN W/O CONTRAST with [address] MRI ([facility])    [address] [place] [address] [id number] [phone]           Take your medicines as usual, unless your doctor tells you not to. Bring a list of your current medicines to your exam (including vitamins, minerals and over-the-counter drugs). Also bring the results of similar scans you may have had.  Please remove any body piercings and hair extensions before you arrive.  Follow your doctor s orders. If you do not, we may have to postpone your exam.  For liver, gallbladder, or pancreas exams: No food or drink for 6 hours before the exam. Otherwise, no food or drink restrictions.  The MRI machine uses a strong magnet. Please wear clothes without metal (snaps, zippers). A sweatsuit works well, or we may give you a hospital gown.   **IMPORTANT** THE INSTRUCTIONS BELOW ARE ONLY FOR THOSE PATIENTS WHO HAVE BEEN TOLD THEY WILL RECEIVE SEDATION OR GENERAL ANESTHESIA DURING THEIR MRI PROCEDURE:  IF YOU WILL RECEIVE SEDATION (take medicine to help you relax during your exam):   You must get the medicine from your doctor before you arrive. Bring the medicine to the exam. Do not take it  at home.   Arrive one hour early. Bring someone who can take you home after the test. Your medicine will make you sleepy. After the exam, you may not drive, take a bus or take a taxi by yourself.   No eating 8 hours before your exam. You may have clear liquids up until 4 hours before your exam. (Clear liquids include water, clear tea, black coffee and fruit juice without pulp.)  IF YOU WILL RECEIVE ANESTHESIA (be asleep for your exam):   Arrive 1 1/2 hours early. Bring someone who can take you home after the test. You may not drive, take a bus or take a taxi by yourself.   No eating 8 hours before your exam. You may have clear liquids up until 4 hours before your exam. (Clear liquids include water, clear tea, black coffee and fruit juice without pulp.)   You will spend four to five hours in the recovery room.  Please call the Imaging Department at your exam site with any questions.            May 17, 2018  9:45 AM CDT   (Arrive by 9:30 AM)   Return General Liver with Trevor Trujillo MD   [facility] Hepatology ([facility])    [address] [place] [id number] [phone]              Future tests that were ordered for you today     Open Standing Orders        Priority Remaining Interval Expires Ordered    Hepatic panel [LAB20] Routine 2/2 Every 6 Months 11/2/2018 11/2/2017    Basic metabolic panel [LAB15] Routine 2/2 Every 6 Months 11/2/2018 11/2/2017    Hep B Virus DNA Quant Real Time PCR [PLV7503] Routine 2/2 Every 6 Months 11/2/2018 11/2/2017    AFP tumor marker [FKV256] Routine 2/2 Every 6 Months 11/2/2018 11/2/2017    CBC with platelets [BRP082] Routine 2/2 Every 6 Months 11/2/2018 11/2/2017    INR [[id number]] Routine 2/2 Every 6 Months 11/2/2018 11/2/2017          Open Future Orders        Priority Expected Expires Ordered    MRI Abdomen w/o contrast Routine 5/2/2018 6/2/2018 11/2/2017    MRI Abdomen w/o contrast Routine  12/2/2017 11/2/2017            Who to  "contact     If you have questions or need follow up information about today's clinic visit or your schedule please contact [facility] HEPATOLOG[facility] directly at [phone].  Normal or non-critical lab and imaging results will be communicated to you by MyChart, letter or phone within 4 business days after the clinic has received the results. If you do not hear from us within 7 days, please contact the clinic through Ringpayhart or phone. If you have a critical or abnormal lab result, we will notify you by phone as soon as possible.  Submit refill requests through Zerista or call your pharmacy and they will forward the refill request to us. Please allow 3 business days for your refill to be completed.          Additional Information About Your Visit        RingpayharKimeltu Information     Zerista gives you secure access to your electronic health record. If you see a primary care provider, you can also send messages to your care team and make appointments. If you have questions, please call your primary care clinic.  If you do not have a primary care provider, please call [phone] and they will assist you.        Care EveryWhere ID     This is your Care EveryWhere ID. This could be used by other organizations to access your [facility] medical records  [id number]        Your Vitals Were     Pulse Temperature Height Pulse Oximetry BMI (Body Mass Index)       51 97.7  F (36.5  C) (Oral) 1.676 m (5' 6\") 97% 25.82 kg/m2        Blood Pressure from Last 3 Encounters:   11/02/17 127/80   05/12/17 (!) 157/93   05/04/17 131/68    Weight from Last 3 Encounters:   11/02/17 72.6 kg (160 lb)   05/12/17 69.9 kg (154 lb 1.6 oz)   05/04/17 70.1 kg (154 lb 9.6 oz)              We Performed the Following     Schedule follow up appointments        Primary Care Provider Office Phone # Fax #    Sudhir Layne [phone]       [address]  [facility] [address]        Equal Access to Services     NEFTALY MANJARREZ AH: " Hadii aad ku hadjauno Soshanelleali, waphilippda luqadaha, qaybta kaaljazmín colon, meño anthonyleonardo herrera. So [facility] [phone].    ATENCIÓN: Si habla español, tiene a jean disposición servicios gratuitos de asistencia lingüística. Llame al [phone].    We comply with applicable federal civil rights laws and [place] laws. We do not discriminate on the basis of race, color, national origin, age, disability, sex, sexual orientation, or gender identity.            Thank you!     Thank you for choosing [facility] HEPATOLOG[facility]  for your care. Our goal is always to provide you with excellent care. Hearing back from our patients is one way we can continue to improve our services. Please take a few minutes to complete the written survey that you may receive in the mail after your visit with us. Thank you!             Your Updated Medication List - Protect others around you: Learn how to safely use, store and throw away your medicines at www.disposemymeds.org.          This list is accurate as of: 11/2/17  9:14 AM.  Always use your most recent med list.                   Brand Name Dispense Instructions for use Diagnosis    BARACLUDE 0.5 MG tablet   Generic drug:  entecavir      Take 0.5 mg by mouth daily

## 2017-11-02 NOTE — LETTER
11/2/2017      RE: Preeti Pascual  [address]   [place] [address]       I had the pleasure of seeing Preeti Pascual for followup in the Liver Clinic at the [facility] on 11/o2/2017.  Mr. Pascual returns for followup of chronic hepatitis B, complicated by hepatocellular carcinoma., unfortunately, he demonstrates involvement of the portal vein.  He is status post radioembolization with a good response with normalization of his alpha-fetoprotein levels.      He denies any abdominal pain, itching or skin rash or fatigue.  He denies any increased abdominal girth or lower extremity edema.  He denies any fevers or chills, cough or shortness of breath.  He denies any nausea, vomiting, diarrhea or constipation.  His appetite has been good, and his weight has been stable.  There have been no new events since he was last seen.        He has asked me about if he does need to continue the entecavir.  It evidently is quite expensive for him at this time.     Current Outpatient Prescriptions   Medication     entecavir (BARACLUDE) 0.5 MG tablet     No current facility-administered medications for this visit.      B/P: 127/80, T: 97.7, P: 51, R: Data Unavailable    HEENT exam shows no scleral icterus and no temporal muscle wasting.  His chest is clear.  His abdominal exam shows no increase in girth.  No masses or tenderness to palpation are present.  His liver is 10 cm in span without left lobe enlargement.  No spleen tip is palpable, and extremity exam shows no edema.  Skin exam shows no stigmata of chronic liver disease.  Neurologic exam shows no asterixis.     Recent Results (from the past 168 hour(s))   Hepatic panel [LAB20]    Collection Time: 11/02/17  7:58 AM   Result Value Ref Range    Bilirubin Direct 0.1 0.0 - 0.2 mg/dL    Bilirubin Total 0.6 0.2 - 1.3 mg/dL    Albumin 3.3 (L) 3.4 - 5.0 g/dL    Protein Total 7.7 6.8 - 8.8 g/dL    Alkaline Phosphatase 96 40 - 150 U/L    ALT 28 0 - 70  U/L    AST 27 0 - 45 U/L   Basic metabolic panel [LAB15]    Collection Time: 11/02/17  7:58 AM   Result Value Ref Range    Sodium 137 133 - 144 mmol/L    Potassium 4.1 3.4 - 5.3 mmol/L    Chloride 106 94 - 109 mmol/L    Carbon Dioxide 24 20 - 32 mmol/L    Anion Gap 7 3 - 14 mmol/L    Glucose 88 70 - 99 mg/dL    Urea Nitrogen 13 7 - 30 mg/dL    Creatinine 0.65 (L) 0.66 - 1.25 mg/dL    GFR Estimate >90 >60 mL/min/1.7m2    GFR Estimate If Black >90 >60 mL/min/1.7m2    Calcium 8.3 (L) 8.5 - 10.1 mg/dL   AFP tumor marker [FBY509]    Collection Time: 11/02/17  7:58 AM   Result Value Ref Range    Alpha Fetoprotein 10.0 (H) 0 - 8 ug/L   CBC with platelets [[id number]]    Collection Time: 11/02/17  7:58 AM   Result Value Ref Range    WBC 4.7 4.0 - 11.0 10e9/L    RBC Count 4.57 4.4 - 5.9 10e12/L    Hemoglobin 14.0 13.3 - 17.7 g/dL    Hematocrit 40.1 40.0 - 53.0 %    MCV 88 78 - 100 fl    MCH 30.6 26.5 - 33.0 pg    MCHC 34.9 31.5 - 36.5 g/dL    RDW 12.8 10.0 - 15.0 %    Platelet Count 177 150 - 450 10e9/L   INR [[id number]]    Collection Time: 11/02/17  7:58 AM   Result Value Ref Range    INR 1.21 (H) 0.86 - 1.14      My impression is that Mr. Pascual has chronic hepatitis B complicated by hepatocellular carcinoma.  He was to have had an MRI before today's visit, but somehow that did not happen.  I did order that and will follow up with him  once that occurs.  His alpha-fetoprotein has crept up a bit.  It is still only 10, but it has gone from 6.3 to 7.6 to 10 now.  His other liver tests are all completely normal.  I have given him permission to try stopping the entecavir.  I will see him back in 6 months, and we will see what his liver tests are and the HBV DNA.  I, otherwise, will not be making any change to his medical regimen.  I did encourage him to get a CT scan 1 week before he sees me in addition to the one he is going to get more recently here.        Thank you very much for allowing me to participate in the care of this  patient.  If you have any questions regarding my recommendations, please do not hesitate to contact me.       Trevor Trujillo MD      Professor of Medicine  [employer]      Executive Medical Director, Solid Organ Transplant Program  [facility]

## 2017-11-04 LAB
HBV DNA SERPL NAA+PROBE-ACNC: NORMAL [IU]/ML
HBV DNA SERPL NAA+PROBE-LOG IU: NORMAL {LOG_IU}/ML

## 2018-05-17 ENCOUNTER — RADIANT APPOINTMENT (OUTPATIENT)
Dept: MRI IMAGING | Facility: CLINIC | Age: 53
End: 2018-05-17
Attending: INTERNAL MEDICINE
Payer: COMMERCIAL

## 2018-05-17 ENCOUNTER — OFFICE VISIT (OUTPATIENT)
Dept: GASTROENTEROLOGY | Facility: CLINIC | Age: 53
End: 2018-05-17
Attending: INTERNAL MEDICINE

## 2018-05-17 ENCOUNTER — TELEPHONE (OUTPATIENT)
Dept: GASTROENTEROLOGY | Facility: CLINIC | Age: 53
End: 2018-05-17

## 2018-05-17 VITALS
BODY MASS INDEX: 26.39 KG/M2 | DIASTOLIC BLOOD PRESSURE: 87 MMHG | WEIGHT: 164.2 LBS | OXYGEN SATURATION: 97 % | TEMPERATURE: 97.7 F | SYSTOLIC BLOOD PRESSURE: 145 MMHG | HEART RATE: 66 BPM | HEIGHT: 66 IN

## 2018-05-17 DIAGNOSIS — B18.1 CHRONIC VIRAL HEPATITIS B WITHOUT DELTA AGENT AND WITHOUT COMA (H): Primary | ICD-10-CM

## 2018-05-17 DIAGNOSIS — B18.1 CHRONIC VIRAL HEPATITIS B WITHOUT DELTA AGENT AND WITHOUT COMA (H): ICD-10-CM

## 2018-05-17 DIAGNOSIS — C22.0 HCC (HEPATOCELLULAR CARCINOMA) (H): ICD-10-CM

## 2018-05-17 LAB
AFP SERPL-MCNC: 14.1 UG/L (ref 0–8)
ALBUMIN SERPL-MCNC: 3.3 G/DL (ref 3.4–5)
ALP SERPL-CCNC: 104 U/L (ref 40–150)
ALT SERPL W P-5'-P-CCNC: 29 U/L (ref 0–70)
ANION GAP SERPL CALCULATED.3IONS-SCNC: 5 MMOL/L (ref 3–14)
AST SERPL W P-5'-P-CCNC: 24 U/L (ref 0–45)
BILIRUB DIRECT SERPL-MCNC: 0.1 MG/DL (ref 0–0.2)
BILIRUB SERPL-MCNC: 0.4 MG/DL (ref 0.2–1.3)
BUN SERPL-MCNC: 12 MG/DL (ref 7–30)
CALCIUM SERPL-MCNC: 8.7 MG/DL (ref 8.5–10.1)
CHLORIDE SERPL-SCNC: 106 MMOL/L (ref 94–109)
CO2 SERPL-SCNC: 25 MMOL/L (ref 20–32)
CREAT SERPL-MCNC: 0.85 MG/DL (ref 0.66–1.25)
ERYTHROCYTE [DISTWIDTH] IN BLOOD BY AUTOMATED COUNT: 13.2 % (ref 10–15)
GFR SERPL CREATININE-BSD FRML MDRD: >90 ML/MIN/1.7M2
GLUCOSE SERPL-MCNC: 96 MG/DL (ref 70–99)
HCT VFR BLD AUTO: 41.6 % (ref 40–53)
HGB BLD-MCNC: 13.6 G/DL (ref 13.3–17.7)
INR PPP: 1.11 (ref 0.86–1.14)
MCH RBC QN AUTO: 28 PG (ref 26.5–33)
MCHC RBC AUTO-ENTMCNC: 32.7 G/DL (ref 31.5–36.5)
MCV RBC AUTO: 86 FL (ref 78–100)
PLATELET # BLD AUTO: 193 10E9/L (ref 150–450)
POTASSIUM SERPL-SCNC: 4.2 MMOL/L (ref 3.4–5.3)
PROT SERPL-MCNC: 7.6 G/DL (ref 6.8–8.8)
RBC # BLD AUTO: 4.86 10E12/L (ref 4.4–5.9)
SODIUM SERPL-SCNC: 135 MMOL/L (ref 133–144)
WBC # BLD AUTO: 5.1 10E9/L (ref 4–11)

## 2018-05-17 PROCEDURE — 82105 ALPHA-FETOPROTEIN SERUM: CPT | Performed by: INTERNAL MEDICINE

## 2018-05-17 PROCEDURE — 36415 COLL VENOUS BLD VENIPUNCTURE: CPT | Performed by: INTERNAL MEDICINE

## 2018-05-17 PROCEDURE — 85610 PROTHROMBIN TIME: CPT | Performed by: INTERNAL MEDICINE

## 2018-05-17 PROCEDURE — 85027 COMPLETE CBC AUTOMATED: CPT | Performed by: INTERNAL MEDICINE

## 2018-05-17 PROCEDURE — G0463 HOSPITAL OUTPT CLINIC VISIT: HCPCS | Mod: ZF

## 2018-05-17 PROCEDURE — 80076 HEPATIC FUNCTION PANEL: CPT | Performed by: INTERNAL MEDICINE

## 2018-05-17 PROCEDURE — 87517 HEPATITIS B DNA QUANT: CPT | Performed by: INTERNAL MEDICINE

## 2018-05-17 PROCEDURE — 80048 BASIC METABOLIC PNL TOTAL CA: CPT | Performed by: INTERNAL MEDICINE

## 2018-05-17 ASSESSMENT — PAIN SCALES - GENERAL: PAINLEVEL: NO PAIN (0)

## 2018-05-17 NOTE — LETTER
5/17/2018      RE: Preeti Pascual  [address]   [place] [address]       HISTORY OF PRESENT ILLNESS:  I had the pleasure of seeing Preeti Pascual for followup in Liver Clinic at the [facility] on 05/17/2018.  Mr. Pascual returns for followup of chronic hepatitis B complicated by hepatocellular carcinoma.  He is status post liver-directed therapy 1-1/2 years ago and appears to have had a very good response.      He feels well.  He denies any abdominal pain, itching or skin rash or fatigue.  He denies any increased abdominal girth or lower extremity edema.  He denies any fevers or chills, cough or shortness of breath.  He denies any nausea, vomiting, diarrhea or constipation.  His appetite has been good, and his weight is up about 10 pounds for the past year.      When I saw him last, he really wanted to come off entecavir, which we have done.  He has not noted a change in his symptoms with doing so.     No current outpatient prescriptions on file.     No current facility-administered medications for this visit.      B/P: 145/87, T: 97.7, P: 66, R: Data Unavailable    In general he appears quite healthy.  HEENT exam shows no scleral icterus and no temporal muscle wasting.  His chest is clear.  His abdominal exam shows no increase in girth.  No masses or tenderness to palpation are present.  His liver is 10 cm in span without left lobe enlargement.  No spleen tip is palpable, and extremity exam shows no edema.  Skin exam shows no stigmata of chronic liver disease.  Neurologic exam shows no asterixis.     Recent Results (from the past 168 hour(s))   Hepatic panel [LAB20]    Collection Time: 05/17/18  7:36 AM   Result Value Ref Range    Bilirubin Direct 0.1 0.0 - 0.2 mg/dL    Bilirubin Total 0.4 0.2 - 1.3 mg/dL    Albumin 3.3 (L) 3.4 - 5.0 g/dL    Protein Total 7.6 6.8 - 8.8 g/dL    Alkaline Phosphatase 104 40 - 150 U/L    ALT 29 0 - 70 U/L    AST 24 0 - 45 U/L   Basic metabolic panel  [LAB15]    Collection Time: 05/17/18  7:36 AM   Result Value Ref Range    Sodium 135 133 - 144 mmol/L    Potassium 4.2 3.4 - 5.3 mmol/L    Chloride 106 94 - 109 mmol/L    Carbon Dioxide 25 20 - 32 mmol/L    Anion Gap 5 3 - 14 mmol/L    Glucose 96 70 - 99 mg/dL    Urea Nitrogen 12 7 - 30 mg/dL    Creatinine 0.85 0.66 - 1.25 mg/dL    GFR Estimate >90 >60 mL/min/1.7m2    GFR Estimate If Black >90 >60 mL/min/1.7m2    Calcium 8.7 8.5 - 10.1 mg/dL   AFP tumor marker [KPK711]    Collection Time: 05/17/18  7:36 AM   Result Value Ref Range    Alpha Fetoprotein 14.1 (H) 0 - 8 ug/L   CBC with platelets [DGB372]    Collection Time: 05/17/18  7:36 AM   Result Value Ref Range    WBC 5.1 4.0 - 11.0 10e9/L    RBC Count 4.86 4.4 - 5.9 10e12/L    Hemoglobin 13.6 13.3 - 17.7 g/dL    Hematocrit 41.6 40.0 - 53.0 %    MCV 86 78 - 100 fl    MCH 28.0 26.5 - 33.0 pg    MCHC 32.7 31.5 - 36.5 g/dL    RDW 13.2 10.0 - 15.0 %    Platelet Count 193 150 - 450 10e9/L   INR [[id number]]    Collection Time: 05/17/18  7:36 AM   Result Value Ref Range    INR 1.11 0.86 - 1.14      MRI from today is pending.      IMPRESSION:  Mr. Pascual has hepatocellular carcinoma complicating chronic hepatitis B.  I did again discuss with him whether he wanted to be considered a transplant candidate.  He is quite adamant that he does not want to go forward with a liver transplantation under any circumstances.  I will have his MRI reviewed at our Tumor Conference tomorrow.  I looked at it and did not see any areas of abnormal enhancement, but will get an official opinion at that time.  He said he would be a candidate for any additional liver-directed therapy if that was necessary.      Thank you very much for allowing me to participate in the care of this patient.  If you have any questions regarding my recommendations, please do not hesitate to contact me.       Trevor Trujillo MD      Professor of Medicine  [employer]      Executive Medical  Director, Solid Organ Transplant Program  [facility] [place]

## 2018-05-17 NOTE — LETTER
5/17/2018       RE: Preeti Pascual  [address]   [facility] [address]     Dear Colleague,    Thank you for referring your patient, Preeti Pascual, to the [facility] HEPATOLOGY at [facility]. Please see a copy of my visit note below.    HISTORY OF PRESENT ILLNESS:  I had the pleasure of seeing Preeti Pascual for followup in Liver Clinic at the [facility] on 05/17/2018.  Mr. Pascual returns for followup of chronic hepatitis B complicated by hepatocellular carcinoma.  He is status post liver-directed therapy 1-1/2 years ago and appears to have had a very good response.      He feels well.  He denies any abdominal pain, itching or skin rash or fatigue.  He denies any increased abdominal girth or lower extremity edema.  He denies any fevers or chills, cough or shortness of breath.  He denies any nausea, vomiting, diarrhea or constipation.  His appetite has been good, and his weight is up about 10 pounds for the past year.      When I saw him last, he really wanted to come off entecavir, which we have done.  He has not noted a change in his symptoms with doing so.     No current outpatient prescriptions on file.     No current facility-administered medications for this visit.      B/P: 145/87, T: 97.7, P: 66, R: Data Unavailable    In general he appears quite healthy.  HEENT exam shows no scleral icterus and no temporal muscle wasting.  His chest is clear.  His abdominal exam shows no increase in girth.  No masses or tenderness to palpation are present.  His liver is 10 cm in span without left lobe enlargement.  No spleen tip is palpable, and extremity exam shows no edema.  Skin exam shows no stigmata of chronic liver disease.  Neurologic exam shows no asterixis.     Recent Results (from the past 168 hour(s))   Hepatic panel [LAB20]    Collection Time: 05/17/18  7:36 AM   Result Value Ref Range    Bilirubin Direct 0.1 0.0 - 0.2 mg/dL    Bilirubin Total  0.4 0.2 - 1.3 mg/dL    Albumin 3.3 (L) 3.4 - 5.0 g/dL    Protein Total 7.6 6.8 - 8.8 g/dL    Alkaline Phosphatase 104 40 - 150 U/L    ALT 29 0 - 70 U/L    AST 24 0 - 45 U/L   Basic metabolic panel [LAB15]    Collection Time: 05/17/18  7:36 AM   Result Value Ref Range    Sodium 135 133 - 144 mmol/L    Potassium 4.2 3.4 - 5.3 mmol/L    Chloride 106 94 - 109 mmol/L    Carbon Dioxide 25 20 - 32 mmol/L    Anion Gap 5 3 - 14 mmol/L    Glucose 96 70 - 99 mg/dL    Urea Nitrogen 12 7 - 30 mg/dL    Creatinine 0.85 0.66 - 1.25 mg/dL    GFR Estimate >90 >60 mL/min/1.7m2    GFR Estimate If Black >90 >60 mL/min/1.7m2    Calcium 8.7 8.5 - 10.1 mg/dL   AFP tumor marker [OLD141]    Collection Time: 05/17/18  7:36 AM   Result Value Ref Range    Alpha Fetoprotein 14.1 (H) 0 - 8 ug/L   CBC with platelets [NHF412]    Collection Time: 05/17/18  7:36 AM   Result Value Ref Range    WBC 5.1 4.0 - 11.0 10e9/L    RBC Count 4.86 4.4 - 5.9 10e12/L    Hemoglobin 13.6 13.3 - 17.7 g/dL    Hematocrit 41.6 40.0 - 53.0 %    MCV 86 78 - 100 fl    MCH 28.0 26.5 - 33.0 pg    MCHC 32.7 31.5 - 36.5 g/dL    RDW 13.2 10.0 - 15.0 %    Platelet Count 193 150 - 450 10e9/L   INR [ZNX3598]    Collection Time: 05/17/18  7:36 AM   Result Value Ref Range    INR 1.11 0.86 - 1.14      MRI from today is pending.      IMPRESSION:  Mr. Pascual has hepatocellular carcinoma complicating chronic hepatitis B.  I did again discuss with him whether he wanted to be considered a transplant candidate.  He is quite adamant that he does not want to go forward with a liver transplantation under any circumstances.  I will have his MRI reviewed at our Tumor Conference tomorrow.  I looked at it and did not see any areas of abnormal enhancement, but will get an official opinion at that time.  He said he would be a candidate for any additional liver-directed therapy if that was necessary.      Thank you very much for allowing me to participate in the care of this patient.  If you have any  questions regarding my recommendations, please do not hesitate to contact me.       Trevor Trujillo MD      Professor of Medicine  [employer] [facility] [employer] [facility] [employer]      Executive Medical Director, Solid Organ Transplant Program  [facility]

## 2018-05-17 NOTE — MR AVS SNAPSHOT
After Visit Summary   5/17/2018    Preeti Pascual    MRN: [id number]           Patient Information     Date Of Birth          1965        Visit Information        Provider Department      5/17/2018 9:45 AM Trevor Trujillo MD [facility] Hepatology        Today's Diagnoses     Chronic viral hepatitis B without delta agent and without coma (H)    -  1    HCC (hepatocellular carcinoma) (H)           Follow-ups after your visit        Follow-up notes from your care team     Return in about 6 months (around 11/17/2018).      Your next 10 appointments already scheduled     Nov 15, 2018  8:00 AM CST   Lab with  LAB   [facility] Lab ([facility])    [address] [place] [id number] [phone]            Nov 15, 2018  8:30 AM CST   MR ABDOMEN W/O & W CONTRAST with [address] MRI ([facility])    [address] [place] [id number] [phone]           Take your medicines as usual, unless your doctor tells you not to. Bring a list of your current medicines to your exam (including vitamins, minerals and over-the-counter drugs). Also bring the results of similar scans you may have had.    You may or may not receive IV contrast for this exam pending the discretion of the Radiologist.   Do not eat or drink for 6 hours prior to exam.  The MRI machine uses a strong magnet. Please wear clothes without metal (snaps, zippers). A sweatsuit works well, or we may give you a hospital gown.  Please remove any body piercings and hair extensions before you arrive. You will also remove watches, jewelry, hairpins, wallets, dentures, partial dental plates and hearing aids. You may wear contact lenses, and you may be able to wear your rings. We have a safe place to keep your personal items, but it is safer to leave them at home.  **IMPORTANT** THE INSTRUCTIONS BELOW ARE ONLY FOR THOSE PATIENTS WHO HAVE BEEN  PRESCRIBED SEDATION OR GENERAL ANESTHESIA DURING THEIR MRI PROCEDURE:  IF YOUR DOCTOR PRESCRIBED ORAL SEDATION (take medicine to help you relax during your exam):   You must get the medicine from your doctor (oral medication) before you arrive. Bring the medicine to the exam. Do not take it at home. You ll be told when to take it upon arriving for your exam.   Arrive one hour early. Bring someone who can take you home after the test. Your medicine will make you sleepy. After the exam, you may not drive, take a bus or take a taxi by yourself.  IF YOUR DOCTOR PRESCRIBED IV SEDATION:   Arrive one hour early. Bring someone who can take you home after the test. Your medicine will make you sleepy. After the exam, you may not drive, take a bus or take a taxi by yourself.   No eating 6 hours before your exam. You may have clear liquids up until 4 hours before your exam. (Clear liquids include water, clear tea, black coffee and fruit juice without pulp.)  IF YOUR DOCTOR PRESCRIBED ANESTHESIA (be asleep for your exam):   Arrive 1 1/2 hours early. Bring someone who can take you home after the test. You may not drive, take a bus or take a taxi by yourself.   No eating 8 hours before your exam. You may have clear liquids up until 4 hours before your exam. (Clear liquids include water, clear tea, black coffee and fruit juice without pulp.)   You will spend four to five hours in the recovery room.  If you have any questions, please contact your Imaging Department exam site.            Nov 15, 2018  9:30 AM CST   (Arrive by 9:15 AM)   Return General Liver with Trevor Trujillo MD   [facility] Hepatology ([facility] [place])    [address] [place] [address]  [address] [place] [id number] [phone]              Future tests that were ordered for you today     Open Standing Orders        Priority Remaining Interval Expires Ordered    Hepatic panel [LAB20] Routine 2/2 Every 6 Months 5/17/2019 5/17/2018    Basic metabolic  "panel [LAB15] Routine 2/2 Every 6 Months 5/17/2019 5/17/2018    Hep B Virus DNA Quant Real Time PCR [ECU8849] Routine 2/2 Every 6 Months 5/17/2019 5/17/2018    AFP tumor marker [XDP374] Routine 2/2 Every 6 Months 5/17/2019 5/17/2018    CBC with platelets [WTL253] Routine 2/2 Every 6 Months 5/17/2019 5/17/2018    INR [[id number]] Routine 2/2 Every 6 Months 5/17/2019 5/17/2018          Open Future Orders        Priority Expected Expires Ordered    MRI Abdomen w & w/o contrast* Routine 11/17/2018 12/16/2018 5/17/2018            Who to contact     If you have questions or need follow up information about today's clinic visit or your schedule please contact [facility] HEPATOLOGY directly at [phone].  Normal or non-critical lab and imaging results will be communicated to you by Recommindhart, letter or phone within 4 business days after the clinic has received the results. If you do not hear from us within 7 days, please contact the clinic through [employer] or phone. If you have a critical or abnormal lab result, we will notify you by phone as soon as possible.  Submit refill requests through Mazoom or call your pharmacy and they will forward the refill request to us. Please allow 3 business days for your refill to be completed.          Additional Information About Your Visit        RecommindharAche[employer] Information     Mazoom gives you secure access to your electronic health record. If you see a primary care provider, you can also send messages to your care team and make appointments. If you have questions, please call your primary care clinic.  If you do not have a primary care provider, please call [phone] and they will assist you.        Care EveryWhere ID     This is your Care EveryWhere ID. This could be used by other organizations to access your [facility] medical records  [id number]        Your Vitals Were     Pulse Temperature Height Pulse Oximetry BMI (Body Mass Index)       66 97.7  F (36.5  C) (Oral) 1.676 m (5' 6\") 97% " 26.5 kg/m2        Blood Pressure from Last 3 Encounters:   05/17/18 145/87   11/02/17 127/80   05/12/17 (!) 157/93    Weight from Last 3 Encounters:   05/17/18 74.5 kg (164 lb 3.2 oz)   11/02/17 72.6 kg (160 lb)   05/12/17 69.9 kg (154 lb 1.6 oz)              We Performed the Following     Schedule follow up appointments        Primary Care Provider Office Phone # Fax #    Sudhir Layne [phone]       [address]  [facility] [address]        Equal Access to Services     NEFTALY MANJARREZ : Hadii tonie xiong hadjauno Soallyson, waaxda luqadaha, qaybta kaalmada adeaustynyada, meño herrera. So [facility] [phone].    ATENCIÓN: Si habla español, tiene a jean disposición servicios gratuitos de asistencia lingüística. [facility] [phone].    We comply with applicable federal civil rights laws and [place] laws. We do not discriminate on the basis of race, color, national origin, age, disability, sex, sexual orientation, or gender identity.            Thank you!     Thank you for choosing [facility] HEPATOLOGY  for your care. Our goal is always to provide you with excellent care. Hearing back from our patients is one way we can continue to improve our services. Please take a few minutes to complete the written survey that you may receive in the mail after your visit with us. Thank you!             Your Updated Medication List - Protect others around you: Learn how to safely use, store and throw away your medicines at www.disposemymeds.org.          This list is accurate as of 5/17/18 10:07 AM.  Always use your most recent med list.                   Brand Name Dispense Instructions for use Diagnosis    BARACLUDE 0.5 MG tablet   Generic drug:  entecavir      Take 0.5 mg by mouth daily

## 2018-05-17 NOTE — PROGRESS NOTES
HISTORY OF PRESENT ILLNESS:  I had the pleasure of seeing Preeti Pascual for followup in Liver Clinic at the [facility] on 05/17/2018.  Mr. Pascual returns for followup of chronic hepatitis B complicated by hepatocellular carcinoma.  He is status post liver-directed therapy 1-1/2 years ago and appears to have had a very good response.      He feels well.  He denies any abdominal pain, itching or skin rash or fatigue.  He denies any increased abdominal girth or lower extremity edema.  He denies any fevers or chills, cough or shortness of breath.  He denies any nausea, vomiting, diarrhea or constipation.  His appetite has been good, and his weight is up about 10 pounds for the past year.      When I saw him last, he really wanted to come off entecavir, which we have done.  He has not noted a change in his symptoms with doing so.     No current outpatient prescriptions on file.     No current facility-administered medications for this visit.      B/P: 145/87, T: 97.7, P: 66, R: Data Unavailable    In general he appears quite healthy.  HEENT exam shows no scleral icterus and no temporal muscle wasting.  His chest is clear.  His abdominal exam shows no increase in girth.  No masses or tenderness to palpation are present.  His liver is 10 cm in span without left lobe enlargement.  No spleen tip is palpable, and extremity exam shows no edema.  Skin exam shows no stigmata of chronic liver disease.  Neurologic exam shows no asterixis.     Recent Results (from the past 168 hour(s))   Hepatic panel [LAB20]    Collection Time: 05/17/18  7:36 AM   Result Value Ref Range    Bilirubin Direct 0.1 0.0 - 0.2 mg/dL    Bilirubin Total 0.4 0.2 - 1.3 mg/dL    Albumin 3.3 (L) 3.4 - 5.0 g/dL    Protein Total 7.6 6.8 - 8.8 g/dL    Alkaline Phosphatase 104 40 - 150 U/L    ALT 29 0 - 70 U/L    AST 24 0 - 45 U/L   Basic metabolic panel [LAB15]    Collection Time: 05/17/18  7:36 AM   Result Value Ref Range    Sodium 135  133 - 144 mmol/L    Potassium 4.2 3.4 - 5.3 mmol/L    Chloride 106 94 - 109 mmol/L    Carbon Dioxide 25 20 - 32 mmol/L    Anion Gap 5 3 - 14 mmol/L    Glucose 96 70 - 99 mg/dL    Urea Nitrogen 12 7 - 30 mg/dL    Creatinine 0.85 0.66 - 1.25 mg/dL    GFR Estimate >90 >60 mL/min/1.7m2    GFR Estimate If Black >90 >60 mL/min/1.7m2    Calcium 8.7 8.5 - 10.1 mg/dL   AFP tumor marker [HGE928]    Collection Time: 05/17/18  7:36 AM   Result Value Ref Range    Alpha Fetoprotein 14.1 (H) 0 - 8 ug/L   CBC with platelets [GFQ770]    Collection Time: 05/17/18  7:36 AM   Result Value Ref Range    WBC 5.1 4.0 - 11.0 10e9/L    RBC Count 4.86 4.4 - 5.9 10e12/L    Hemoglobin 13.6 13.3 - 17.7 g/dL    Hematocrit 41.6 40.0 - 53.0 %    MCV 86 78 - 100 fl    MCH 28.0 26.5 - 33.0 pg    MCHC 32.7 31.5 - 36.5 g/dL    RDW 13.2 10.0 - 15.0 %    Platelet Count 193 150 - 450 10e9/L   INR [[id number]]    Collection Time: 05/17/18  7:36 AM   Result Value Ref Range    INR 1.11 0.86 - 1.14      MRI from today is pending.      IMPRESSION:  Mr. Pascual has hepatocellular carcinoma complicating chronic hepatitis B.  I did again discuss with him whether he wanted to be considered a transplant candidate.  He is quite adamant that he does not want to go forward with a liver transplantation under any circumstances.  I will have his MRI reviewed at our Tumor Conference tomorrow.  I looked at it and did not see any areas of abnormal enhancement, but will get an official opinion at that time.  He said he would be a candidate for any additional liver-directed therapy if that was necessary.      Thank you very much for allowing me to participate in the care of this patient.  If you have any questions regarding my recommendations, please do not hesitate to contact me.       Trevor Trujillo MD      Professor of Medicine  [employer]      Executive Medical Director, Solid Organ Transplant Program  [facility]

## 2018-05-17 NOTE — DISCHARGE INSTRUCTIONS
MRI Contrast Discharge Instructions    The IV contrast you received today will pass out of your body in your  urine. This will happen in the next 24 hours. You will not feel this process.  Your urine will not change color.    Drink at least 4 extra glasses of water or juice today (unless your doctor  has restricted your fluids). This reduces the stress on your kidneys.  You may take your regular medicines.    If you are on dialysis: It is best to have dialysis today.    If you have a reaction: Most reactions happen right away. If you have  any new symptoms after leaving the hospital (such as hives or swelling),  call your hospital at the correct number below. Or call your family doctor.  If you have breathing distress or wheezing, call 911.    Special instructions: ***    I have read and understand the above information.    Signature:______________________________________ Date:___________    Staff:__________________________________________ Date:___________     Time:__________    [facility] Departments:    ___[place]: [phone]  ___[facility]: [phone]  ___[place]: [phone] ___[facility]: [phone]  ___[facility]: [phone]  ___[facility]: [phone]  ___[phone]  ___[facility]: [phone]  ___[phone]

## 2018-05-17 NOTE — NURSING NOTE
Chief Complaint   Patient presents with     RECHECK     Hepatitis B   Pt roomed, vitals, meds, and allergies reviewed with pt. Pt ready for provider.  Reymundo Rae, CMA

## 2018-05-17 NOTE — TELEPHONE ENCOUNTER
----- Message from Trevor Trujillo MD sent at 5/17/2018 10:49 AM CDT -----  Regarding: RE: Medication  Yes  ----- Message -----     From: Albina Riley LPN     Sent: 5/17/2018  10:12 AM       To: Trevor Trujillo MD  Subject: FW: Medication                                   Ok to remove entecavir from med list?  ----- Message -----     From: Isabel Brown     Sent: 5/17/2018  10:09 AM       To: Albina Riley LPN  Subject: Medication                                       Hi Albina,     This patient saw Dr. Trujillo today 5/17 and stated that this medication (entecavir (BARACLUDE) ) is still in his chart but he is no longer taking it and Dr. Trujillo is in agreement.     Would you be able to update his chart please?     Thank you!   Isabel

## 2018-05-18 LAB
HBV DNA SERPL NAA+PROBE-ACNC: NORMAL [IU]/ML
HBV DNA SERPL NAA+PROBE-LOG IU: NORMAL {LOG_IU}/ML

## 2018-06-05 DIAGNOSIS — C22.0 HCC (HEPATOCELLULAR CARCINOMA) (H): Primary | ICD-10-CM

## 2018-06-07 ENCOUNTER — TELEPHONE (OUTPATIENT)
Dept: GASTROENTEROLOGY | Facility: CLINIC | Age: 53
End: 2018-06-07

## 2018-06-07 NOTE — TELEPHONE ENCOUNTER
[facility] Call Center    Phone Message    May a detailed message be left on voicemail: yes    Reason for Call: Other: Patient states he is wanting a call back this morning from Dr. Trujillo's nurse regarding patient's referral back to see Dr. Larsen in IR. Patient stated Dr. Trujillo referred patient back to see Dr. Larsen after patient's MRI in May. Patient stated he received a call from Dr. Larsen's nurse, Kori Young, asking why patient is being referred back and was asked to check with Dr. Trujillo. Patient would like Dr. Trujillo's nurse to contact Kori Young to discuss patient's referral and would like a call back as soon as possible this morning when that has taken place.     Action Taken: Message routed to:  [facility] Hepatology

## 2018-06-07 NOTE — TELEPHONE ENCOUNTER
Per Dr. Trujillo he will call patient directly to discuss MRI findings and need for IR appointment.

## 2018-06-11 ENCOUNTER — TELEPHONE (OUTPATIENT)
Dept: GASTROENTEROLOGY | Facility: CLINIC | Age: 53
End: 2018-06-11

## 2018-06-11 DIAGNOSIS — C22.0 HCC (HEPATOCELLULAR CARCINOMA) (H): Primary | ICD-10-CM

## 2018-06-11 NOTE — TELEPHONE ENCOUNTER
----- Message from Trevor Trujillo MD sent at 6/11/2018 10:29 AM CDT -----  Regarding: RE: IR appointment  Done.  Will go to Oncology.  No IR.    ----- Message -----     From: Albina Riley LPN     Sent: 6/6/2018  10:23 AM       To: Trevor Trujillo MD  Subject: IR appointment                                   Can you call patient to explaine new findings?  ----- Message -----     From: Kori Young RN     Sent: 6/6/2018  10:10 AM       To: SHARON Sal, I got a msg from this pt confused as to why he needs to see IR again.     Can you have someone reach out to him to further explain?    Thanks, Kori

## 2018-06-15 ENCOUNTER — TELEPHONE (OUTPATIENT)
Dept: ONCOLOGY | Facility: CLINIC | Age: 53
End: 2018-06-15

## 2018-06-15 NOTE — TELEPHONE ENCOUNTER
Patient requesting an appointment with Dr. Kyle. called patient to let him know we were working on an appointment for him, but that we did not have a set date or time yet. Patient states that he can make any time with Dr. Kyle work.    Antoinette Aguayo, MSN, RN, OCN  Patient Care Supervisor  [facility]

## 2018-06-22 ENCOUNTER — ONCOLOGY VISIT (OUTPATIENT)
Dept: ONCOLOGY | Facility: CLINIC | Age: 53
End: 2018-06-22

## 2018-06-22 ENCOUNTER — ONCOLOGY VISIT (OUTPATIENT)
Dept: ONCOLOGY | Facility: CLINIC | Age: 53
End: 2018-06-22
Attending: INTERNAL MEDICINE
Payer: COMMERCIAL

## 2018-06-22 ENCOUNTER — CARE COORDINATION (OUTPATIENT)
Dept: ONCOLOGY | Facility: CLINIC | Age: 53
End: 2018-06-22

## 2018-06-22 ENCOUNTER — NURSE TRIAGE (OUTPATIENT)
Dept: NURSING | Facility: CLINIC | Age: 53
End: 2018-06-22

## 2018-06-22 VITALS
SYSTOLIC BLOOD PRESSURE: 142 MMHG | BODY MASS INDEX: 26.71 KG/M2 | TEMPERATURE: 97.9 F | WEIGHT: 166.2 LBS | OXYGEN SATURATION: 96 % | DIASTOLIC BLOOD PRESSURE: 84 MMHG | RESPIRATION RATE: 16 BRPM | HEIGHT: 66 IN | HEART RATE: 67 BPM

## 2018-06-22 DIAGNOSIS — K74.69 OTHER CIRRHOSIS OF LIVER (H): ICD-10-CM

## 2018-06-22 DIAGNOSIS — C22.0 HCC (HEPATOCELLULAR CARCINOMA) (H): Primary | ICD-10-CM

## 2018-06-22 DIAGNOSIS — B18.1 CHRONIC VIRAL HEPATITIS B WITHOUT DELTA AGENT AND WITHOUT COMA (H): ICD-10-CM

## 2018-06-22 PROCEDURE — G0463 HOSPITAL OUTPT CLINIC VISIT: HCPCS | Mod: ZF

## 2018-06-22 PROCEDURE — 99215 OFFICE O/P EST HI 40 MIN: CPT | Mod: ZP | Performed by: INTERNAL MEDICINE

## 2018-06-22 RX ORDER — SORAFENIB 200 MG/1
400 TABLET, FILM COATED ORAL 2 TIMES DAILY
Qty: 120 TABLET | Refills: 0 | Status: SHIPPED | OUTPATIENT
Start: 2018-06-22 | End: 2019-01-03

## 2018-06-22 RX ORDER — PROCHLORPERAZINE MALEATE 10 MG
10 TABLET ORAL EVERY 6 HOURS PRN
Qty: 30 TABLET | Refills: 2 | Status: SHIPPED | OUTPATIENT
Start: 2018-06-22 | End: 2018-10-22

## 2018-06-22 ASSESSMENT — PAIN SCALES - GENERAL: PAINLEVEL: MILD PAIN (2)

## 2018-06-22 NOTE — PROGRESS NOTES
Preeti Pascual is a patient new to me previously seen by several of my colleagues. I've been asked to see him in consultation by Dr. Luis Miguel Trujillo for newly metastatic hepatocellular carcinoma.    Mr. Pascual is 52 years old and was recognized several years ago to have hepatitis B, with well compensated cirrhosis and liver confined hepatocellular carcinoma. He had treatment of his hepatitis B which has been cleared. He had 2 episodes of radio embolization in January and November 2016 with an excellent response. He declined to pursue liver transplant. On recent surveillance imaging a few weeks ago he was noted to have the growth of tumor at the periphery of his previously treated lesion plus a development of an adrenal metastasis. At present he tells me he has no new symptoms other than some anxiety around these new findings. He has never had significant symptoms from his liver disease, and denies problems with edema, bleeding, encephalopathy, or ascites. He rarely has headaches which have not changed as of late. The remainder of his 10 point complete review of systems is otherwise unremarkable.     Past medical history:  #1. Hepatitis B. He has been off his entectavir for more than a year with no recurrence of his hepatitis B.  #2. Well compensated cirrhosis.    Social history: He currently lives with his wife. They have no children. He is working full time at 2 different jobs doing clerical work. He is an immigrant from [place].    On physical exam he appears quite well. His vital signs as noted in the chart are unremarkable.  He has no scleral icterus and no visible lesions in the oropharynx.  He has no adenopathy palpable in the neck or supra pamela spaces.  His lungs are clear to auscultation without dullness to percussion.  His heart rate and rhythm are regular without audible murmur or gallop. His jugular venous pressure appears normal.  Abdomen is soft and nontender without palpable mass, organomegaly or ascites.  He  is no peripheral edema and no tenderness in his calves or thighs. He has no cyanosis or clubbing of his digits.  His speech is fluent. His cranial nerves are grossly intact. His gait and station are unremarkable.    I reviewed with him his MR scan from several weeks ago. He has two actively growing lesions of HCC on the periphery of his previously treated lesion and he has about 2-1/2 cm metastasis in his right adrenal gland. His alpha-fetoprotein is minimally elevated at about 14. The rest of his lab work including electrolytes renal function liver enzymes and blood counts are all normal except for a marginally depressed albumin.    Assessment/plan: Newly metastatic hepatocellular carcinoma in a patient with compensated liver disease and a normal performance status. We had a long talk about his current disease status. I think the imaging findings are clear-cut enough we don't need a biopsy to confirm his disease status. I would like further imaging with CT scan to confirm no other sites of metastatic disease. I reviewed with him in this setting that there is no curative intent therapy available but that we did have good treatments that could help control his disease and buy him additional time. I explained that standard treatment at this point would be systemic therapy with sorafenib which can provide a modest survival benefit and occasionally dramatic responses. We discussed the toxicities which are usually mild but may require some dose adjustments and for rare patients the drug is not tolerable. We touched briefly on the availability of several other systemic therapies that could be used if the sorafenib is not effective. I recommended to him enrollment in a clinical trial of sorafenib plus or minus Pexavec and reviewed with him some of the details of the trial. He however is not interested in pursuing any clinical trials.    At the end of our long discussion and after the patient had all his questions answered  he expressed a good understanding of his disease status, the palliative nature of treatment and the associated risks, and expressed a desire to get started as soon as possible on treatment. He'll be getting a fresh CT scan within the next week and repeat baseline laboratory work. He'll be meeting with staff for further chemotherapy teaching. He should start at half doses, 200 b.i.d. for the first 2 weeks and will see him at that point and if he is tolerating above up to the full dose of 400 b.i.d. We'll plan on her first response assessment at 2 months.

## 2018-06-22 NOTE — MR AVS SNAPSHOT
After Visit Summary   6/22/2018    Preeti Pascual    MRN: [id number]           Patient Information     Date Of Birth          1965        Visit Information        Provider Department      6/22/2018 10:11 AM Jeanine Rodriguez, [facility]        Today's Diagnoses     HCC (hepatocellular carcinoma) (H)    -  1       Follow-ups after your visit        Your next 10 appointments already scheduled     Jun 28, 2018  8:15 AM CDT   [facility] Lab Draw with [facility] DRAW   [facility] Lab Draw ([facility])    [address]  [address]  [facility] [id number] [phone]            Jun 28, 2018  9:00 AM CDT   CT CHEST ABDOMEN PELVIS W/O & W CONTRAST with UCCT1   [facility] CT ([facility])    [address]  1st Floor  [facility] [id number] [phone]           Please bring any scans or X-rays taken at other hospitals, if similar tests were done. Also bring a list of your medicines, including vitamins, minerals and over-the-counter drugs. It is safest to leave personal items at home.  Be sure to tell your doctor:   If you have any allergies.   If there s any chance you are pregnant.   If you are breastfeeding.  How to prepare:   Do not eat or drink for 2 hours before your exam. If you need to take medicine, you may take it with small sips of water. (We may ask you to take liquid medicine as well.)   Please wear loose clothing, such as a sweat suit or jogging clothes. Avoid snaps, zippers and other metal. We may ask you to undress and put on a hospital gown.  Please arrive 30 minutes early for your CT. Once in the department you might be asked to drink water 15-20 minutes prior to your exam.  If indicated you may be asked to drink an oral contrast in advance of your CT.  If this is the case, the imaging team will let you know or be in contact with you prior to your appointment  Patients over 70  or patients with diabetes or kidney problems:   If you haven t had a blood test (creatinine test) within the last 30 days, the Cardiologist/Radiologist may require you to get this test prior to your exam.  If you have diabetes:   Continue to take your metformin medication on the day of your exam  If you have any questions, please call the Imaging Department where you will have your exam.            Jul 12, 2018  8:40 AM CDT   (Arrive by 8:25 AM)   Return Visit with MITCH Whiting CNP   [facility] ([facility])    [address]  [address]  [facility] [address]   [phone]            Aug 16, 2018  8:00 AM CDT   [facility] Lab Draw with [facility] LAB DRAW   [facility] Lab Draw ([facility])    [address] [place] [address]  [address]  [facility] [address]   [phone]            Aug 16, 2018  9:00 AM CDT   CT CHEST ABDOMEN PELVIS W/O & W CONTRAST with UCCT2   [facility] CT ([facility])    [address]  1st Floor  [facility] [address] [phone]           Please bring any scans or X-rays taken at other hospitals, if similar tests were done. Also bring a list of your medicines, including vitamins, minerals and over-the-counter drugs. It is safest to leave personal items at home.  Be sure to tell your doctor:   If you have any allergies.   If there s any chance you are pregnant.   If you are breastfeeding.  How to prepare:   Do not eat or drink for 2 hours before your exam. If you need to take medicine, you may take it with small sips of water. (We may ask you to take liquid medicine as well.)   Please wear loose clothing, such as a sweat suit or jogging clothes. Avoid snaps, zippers and other metal. We may ask you to undress and put on a hospital gown.  Please arrive 30 minutes early for your CT. Once in the department you might be asked to drink water 15-20 minutes prior to  your exam.  If indicated you may be asked to drink an oral contrast in advance of your CT.  If this is the case, the imaging team will let you know or be in contact with you prior to your appointment  Patients over 70 or patients with diabetes or kidney problems:   If you haven t had a blood test (creatinine test) within the last 30 days, the Cardiologist/Radiologist may require you to get this test prior to your exam.  If you have diabetes:   Continue to take your metformin medication on the day of your exam  If you have any questions, please call the Imaging Department where you will have your exam.            Aug 21, 2018  8:00 AM CDT   (Arrive by 7:45 AM)   Return Visit with Agustin Kyle MD   [facility] ([facility])    [address] [place] [address]  [address]  [facility] [address]   [phone]            Nov 15, 2018  8:00 AM CST   Lab with [facility] Lab ([facility])    [address] [place] [address]  1st Floor  [facility] [address]   [phone]            Nov 15, 2018  8:30 AM CST   MR ABDOMEN W/O & W CONTRAST with [address] MRI ([facility])    [address]  1st Floor  [facility] [address] [phone]           Take your medicines as usual, unless your doctor tells you not to. Bring a list of your current medicines to your exam (including vitamins, minerals and over-the-counter drugs). Also bring the results of similar scans you may have had.    You may or may not receive IV contrast for this exam pending the discretion of the Radiologist.   Do not eat or drink for 6 hours prior to exam.  The MRI machine uses a strong magnet. Please wear clothes without metal (snaps, zippers). A sweatsuit works well, or we may give you a hospital gown.  Please remove any body piercings and hair extensions before you arrive. You will also remove watches, jewelry, hairpins, wallets,  dentures, partial dental plates and hearing aids. You may wear contact lenses, and you may be able to wear your rings. We have a safe place to keep your personal items, but it is safer to leave them at home.  **IMPORTANT** THE INSTRUCTIONS BELOW ARE ONLY FOR THOSE PATIENTS WHO HAVE BEEN PRESCRIBED SEDATION OR GENERAL ANESTHESIA DURING THEIR MRI PROCEDURE:  IF YOUR DOCTOR PRESCRIBED ORAL SEDATION (take medicine to help you relax during your exam):   You must get the medicine from your doctor (oral medication) before you arrive. Bring the medicine to the exam. Do not take it at home. You ll be told when to take it upon arriving for your exam.   Arrive one hour early. Bring someone who can take you home after the test. Your medicine will make you sleepy. After the exam, you may not drive, take a bus or take a taxi by yourself.  IF YOUR DOCTOR PRESCRIBED IV SEDATION:   Arrive one hour early. Bring someone who can take you home after the test. Your medicine will make you sleepy. After the exam, you may not drive, take a bus or take a taxi by yourself.   No eating 6 hours before your exam. You may have clear liquids up until 4 hours before your exam. (Clear liquids include water, clear tea, black coffee and fruit juice without pulp.)  IF YOUR DOCTOR PRESCRIBED ANESTHESIA (be asleep for your exam):   Arrive 1 1/2 hours early. Bring someone who can take you home after the test. You may not drive, take a bus or take a taxi by yourself.   No eating 8 hours before your exam. You may have clear liquids up until 4 hours before your exam. (Clear liquids include water, clear tea, black coffee and fruit juice without pulp.)   You will spend four to five hours in the recovery room.  If you have any questions, please contact your Imaging Department exam site.            Nov 15, 2018  9:30 AM CST   (Arrive by 9:15 AM)   Return General Liver with MD WILLY Argueta [facility] Hepatology ([facility])    [address]  [facility]  [address] [place] [address] [phone]              Future tests that were ordered for you today     Open Future Orders        Priority Expected Expires Ordered    CT Chest Abdomen Pelvis w/o & w Contrast Routine 8/21/2018 9/22/2018 6/22/2018    Comprehensive metabolic panel Routine 8/21/2018 9/22/2018 6/22/2018    CBC with platelets differential Routine 8/21/2018 9/22/2018 6/22/2018    AFP tumor marker Routine 8/21/2018 9/22/2018 6/22/2018    INR Routine 8/21/2018 9/22/2018 6/22/2018    CT Chest Abdomen Pelvis w/o & w Contrast Routine 6/23/2018 7/6/2018 6/22/2018    Comprehensive metabolic panel Routine 6/23/2018 7/6/2018 6/22/2018    CBC with platelets differential Routine 6/23/2018 7/6/2018 6/22/2018    AFP tumor marker Routine 6/23/2018 7/6/2018 6/22/2018    INR Routine 6/23/2018 7/6/2018 6/22/2018            Who to contact     If you have questions or need follow up information about today's clinic visit or your schedule please contact [facility] directly at [phone].  Normal or non-critical lab and imaging results will be communicated to you by [employer], letter or phone within 4 business days after the clinic has received the results. If you do not hear from us within 7 days, please contact the clinic through [employer] or phone. If you have a critical or abnormal lab result, we will notify you by phone as soon as possible.  Submit refill requests through [facility] or call your pharmacy and they will forward the refill request to us. Please allow 3 business days for your refill to be completed.          Additional Information About Your Visit        [facility] Information     [facility] gives you secure access to your electronic health record. If you see a primary care provider, you can also send messages to your care team and make appointments. If you have questions, please call your primary care clinic.  If you do not have a primary care provider, please call [phone]  and they will assist you.        Care EveryWhere ID     This is your Care EveryWhere ID. This could be used by other organizations to access your [facility] medical records  [id number]         Blood Pressure from Last 3 Encounters:   06/22/18 142/84   05/17/18 145/87   11/02/17 127/80    Weight from Last 3 Encounters:   06/22/18 75.4 kg (166 lb 3.2 oz)   05/17/18 74.5 kg (164 lb 3.2 oz)   11/02/17 72.6 kg (160 lb)              Today, you had the following     No orders found for display         Today's Medication Changes          These changes are accurate as of 6/22/18 10:49 AM.  If you have any questions, ask your nurse or doctor.               Start taking these medicines.        Dose/Directions    prochlorperazine 10 MG tablet   Commonly known as:  COMPAZINE   Used for:  HCC (hepatocellular carcinoma) (H)   Started by:  Jeanine Rodriguez RPH        Dose:  10 mg   Take 1 tablet (10 mg) by mouth every 6 hours as needed (Nausea/Vomiting)   Quantity:  30 tablet   Refills:  2       SORAfenib 200 MG tablet CHEMO   Commonly known as:  nexAVAR   Used for:  HCC (hepatocellular carcinoma) (H)   Started by:  Jeanine Rodriguez RPH        Dose:  400 mg   Take 2 tablets (400 mg) by mouth 2 times daily Take only 1 tab twice a day for first two weeks. Don't increase to full dose until instructed   Quantity:  120 tablet   Refills:  0            Where to get your medicines      These medications were sent to [facility] [address] [address]    Hours:  TRANSPLANT PHONE NUMBER [phone] Phone:  [phone]     prochlorperazine 10 MG tablet         Call your pharmacy to confirm that your medication is ready for pickup. It may take up to 24 hours for them to receive the prescription. If the prescription is not ready within 3 business days, please contact your clinic or your provider.     We will let you know when these medications are  ready. If you don't hear back within 3 business days, please contact us.     SORAfenib 200 MG tablet CHEMO                Primary Care Provider Office Phone # Fax #    Sudhir Layne [phone]       [address]  [facility] [address]        Equal Access to Services     NEFTALY MANJARREZ : Hadii aad ku hadasho Soomaali, waaxda luqadaha, qaybta kaalmada adeegyada, waxay anthonyin hayjohnnyn costa tourejuanarosa herrera. So [facility] [phone].    ATENCIÓN: Si habla español, tiene a jean disposición servicios gratuitos de asistencia lingüística. Emily al [phone].    We comply with applicable federal civil rights laws and [place] laws. We do not discriminate on the basis of race, color, national origin, age, disability, sex, sexual orientation, or gender identity.            Thank you!     Thank you for choosing [facility]  for your care. Our goal is always to provide you with excellent care. Hearing back from our patients is one way we can continue to improve our services. Please take a few minutes to complete the written survey that you may receive in the mail after your visit with us. Thank you!             Your Updated Medication List - Protect others around you: Learn how to safely use, store and throw away your medicines at www.disposemymeds.org.          This list is accurate as of 6/22/18 10:49 AM.  Always use your most recent med list.                   Brand Name Dispense Instructions for use Diagnosis    prochlorperazine 10 MG tablet    COMPAZINE    30 tablet    Take 1 tablet (10 mg) by mouth every 6 hours as needed (Nausea/Vomiting)    HCC (hepatocellular carcinoma) (H)       SORAfenib 200 MG tablet CHEMO    nexAVAR    120 tablet    Take 2 tablets (400 mg) by mouth 2 times daily Take only 1 tab twice a day for first two weeks. Don't increase to full dose until instructed    HCC (hepatocellular carcinoma) (H)

## 2018-06-22 NOTE — PROGRESS NOTES
"Oral Chemotherapy Monitoring Program    Primary Oncologist: Dr Agustin Kyle  Primary Oncology Clinic: [facility]  Cancer Diagnosis: Liver cancer    Drug: Nexavar 200mg BID x ~2 weeks, then if tolerated, increase to 400mg ([id number]) BID  Start Date: as soon as available  Dose is appropriate for patients: Hepatic Function   Expected duration of therapy: Until disease progression or unacceptable toxicity    Drug Interaction Assessment: There were no significant drug interactions identified upon review of medication list.      Lab Monitoring Plan  C1D1+   CMP, Phos, CBC, ECG/Mg in high risk C2D1+ Call, BP, CMP, Phos, CBC C3D1+ Call, BP, CMP, Phos, CBC C4D1+ Call, BP, CMP, Phos, CBC C5D1+ Call, BP, CMP, Phos, CBC C6D1+ Call, BP, CMP, Phos, CBC   C1D8+ Call, BP C2D8+ Call, BP C3D8+  C4D8+  C5D8+  C6D8+    C1D15+ Call, BP C2D15+ Call, BP C3D15+  C4D15+  C5D15+  C6D15+    C1D22+ Call, BP C2D22+  C3D22+  C4D22+  C5D22+  C6D22+      Subjective/Objective:  Preeti Pascual is a 52 year old male seen in clinic for an initial visit for oral chemotherapy education.      ORAL CHEMOTHERAPY 6/22/2018   Drug Name Nexavar (Sorafenib)   Current Dosage 200mg   Current Schedule BID   Cycle Details Continuous     Vitals:  BP:   BP Readings from Last 1 Encounters:   06/22/18 142/84     Wt Readings from Last 1 Encounters:   06/22/18 75.4 kg (166 lb 3.2 oz)     Estimated body surface area is 1.87 meters squared as calculated from the following:    Height as of an earlier encounter on 6/22/18: 1.676 m (5' 5.98\").    Weight as of an earlier encounter on 6/22/18: 75.4 kg (166 lb 3.2 oz).      LFTs done 5/17/18: WNL    Assessment:  Patient is appropriate to start therapy.    Plan:  Basic chemotherapy teaching was reviewed with the patient including indication, start date of therapy, dose, administration, adverse effects, missed doses, food and drug interactions, monitoring, side effect management, office contact information, and safe " handling. Written materials were provided and all questions answered.    Follow-Up:  Will call patient about 1 week after starting.    Jeanine Rodriguez, GinD, BCOP, BCPS  Clinical Pharmacy Specialist/  Oncology Medication Therapy Management Pharmacist  [facility]  Pager [phone]  Phone [phone]

## 2018-06-22 NOTE — PATIENT INSTRUCTIONS
Preventive Care:    Colorectal Cancer Screening: During our visit today, we discussed that it is recommended you receive colorectal cancer screening. Please call or make an appointment with your primary care provider to discuss this. You may also call the [facility] scheduling line ([phone]) to set up a colonoscopy appointment.

## 2018-06-22 NOTE — LETTER
6/22/2018      RE: Preeti Pascual  [address]  [facility] [address]       Preeti Pascual is a patient new to me previously seen by several of my colleagues. I've been asked to see him in consultation by Dr. Luis Miguel Trujillo for newly metastatic hepatocellular carcinoma.    Mr. Pascual is 52 years old and was recognized several years ago to have hepatitis B, with well compensated cirrhosis and liver confined hepatocellular carcinoma. He had treatment of his hepatitis B which has been cleared. He had 2 episodes of radio embolization in January and November 2016 with an excellent response. He declined to pursue liver transplant. On recent surveillance imaging a few weeks ago he was noted to have the growth of tumor at the periphery of his previously treated lesion plus a development of an adrenal metastasis. At present he tells me he has no new symptoms other than some anxiety around these new findings. He has never had significant symptoms from his liver disease, and denies problems with edema, bleeding, encephalopathy, or ascites. He rarely has headaches which have not changed as of late. The remainder of his 10 point complete review of systems is otherwise unremarkable.     Past medical history:  #1. Hepatitis B. He has been off his entectavir for more than a year with no recurrence of his hepatitis B.  #2. Well compensated cirrhosis.    Social history: He currently lives with his wife. They have no children. He is working full time at 2 different jobs doing clerical work. He is an immigrant from [place].    On physical exam he appears quite well. His vital signs as noted in the chart are unremarkable.  He has no scleral icterus and no visible lesions in the oropharynx.  He has no adenopathy palpable in the neck or supra pamela spaces.  His lungs are clear to auscultation without dullness to percussion.  His heart rate and rhythm are regular without audible murmur or gallop. His jugular venous pressure appears  normal.  Abdomen is soft and nontender without palpable mass, organomegaly or ascites.  He is no peripheral edema and no tenderness in his calves or thighs. He has no cyanosis or clubbing of his digits.  His speech is fluent. His cranial nerves are grossly intact. His gait and station are unremarkable.    I reviewed with him his MR scan from several weeks ago. He has two actively growing lesions of HCC on the periphery of his previously treated lesion and he has about 2-1/2 cm metastasis in his right adrenal gland. His alpha-fetoprotein is minimally elevated at about 14. The rest of his lab work including electrolytes renal function liver enzymes and blood counts are all normal except for a marginally depressed albumin.    Assessment/plan: Newly metastatic hepatocellular carcinoma in a patient with compensated liver disease and a normal performance status. We had a long talk about his current disease status. I think the imaging findings are clear-cut enough we don't need a biopsy to confirm his disease status. I would like further imaging with CT scan to confirm no other sites of metastatic disease. I reviewed with him in this setting that there is no curative intent therapy available but that we did have good treatments that could help control his disease and buy him additional time. I explained that standard treatment at this point would be systemic therapy with sorafenib which can provide a modest survival benefit and occasionally dramatic responses. We discussed the toxicities which are usually mild but may require some dose adjustments and for rare patients the drug is not tolerable. We touched briefly on the availability of several other systemic therapies that could be used if the sorafenib is not effective. I recommended to him enrollment in a clinical trial of sorafenib plus or minus Pexavec and reviewed with him some of the details of the trial. He however is not interested in pursuing any clinical  trials.    At the end of our long discussion and after the patient had all his questions answered he expressed a good understanding of his disease status, the palliative nature of treatment and the associated risks, and expressed a desire to get started as soon as possible on treatment. He'll be getting a fresh CT scan within the next week and repeat baseline laboratory work. He'll be meeting with staff for further chemotherapy teaching. He should start at half doses, 200 b.i.d. for the first 2 weeks and will see him at that point and if he is tolerating above up to the full dose of 400 b.i.d. We'll plan on her first response assessment at 2 months.    Agustin Kyle MD

## 2018-06-22 NOTE — NURSING NOTE
"Oncology Rooming Note    June 22, 2018 8:32 AM   Preeti Pascual is a 52 year old male who presents for:    Chief Complaint   Patient presents with     Oncology Clinic Visit     F/U HCC     Initial Vitals: /84  Pulse 67  Temp 97.9  F (36.6  C) (Oral)  Resp 16  Ht 1.676 m (5' 5.98\")  Wt 75.4 kg (166 lb 3.2 oz)  SpO2 96%  BMI 26.84 kg/m2 Estimated body mass index is 26.84 kg/(m^2) as calculated from the following:    Height as of this encounter: 1.676 m (5' 5.98\").    Weight as of this encounter: 75.4 kg (166 lb 3.2 oz). Body surface area is 1.87 meters squared.  Mild Pain (2) Comment: Head   No LMP for male patient.  Allergies reviewed: Yes  Medications reviewed: Yes    Medications: Medication refills not needed today.  Pharm[facility] name entered into [facility]: [id number] [place], [place] - [address] AT [facility] & [facility]    Clinical concerns: Yes, patient complains of a headache, and a cough in the morning. Dr Kyle was notified.    10 minutes for nursing intake (face to face time)     OVIDIO OAKLEY LPN            "

## 2018-06-22 NOTE — PROGRESS NOTES
Met with patient to discuss the resources available at the [facility] [place]. Provided phone numbers for triage and after hours care. Provided him with RNCC and MDs business cards. Provided patient with Authorization to Discuss paperwork and encouraged him to complete it. Patient is set up MyChart. RNCC reminded him that MyChart is available to assist in managing appts and asking future questions of health care team but is NOT to be used for symptom management. Reminded him that ALL symptom management should be done through nurse triage line.

## 2018-06-22 NOTE — Clinical Note
6/22/2018       RE: Preeti Pascual  [address]  [place] [address]     Dear Colleague,    Thank you for referring your patient, Preeti Pascual, to the [facility]. Please see a copy of my visit note below.    HCC new adrenal met  Refused PEXAVEC  Start sorafenib    Preeti Pascual is a patient new to me previously seen by several of my colleagues. I been asked to see him in consultation by Dr. Luis Miguel Trujillo for newly metastatic hepatocellular carcinoma.    Mr. Pascual is 52 years old and was recognized several years ago to have hepatitis B, with well compensated cirrhosis and liver confined hepatocellular carcinoma. He had treatment of his hepatitis B which has been cleared. He had 2 episodes of radio embolization in January and November 2016 with an excellent response. He declined to pursue liver transplant. On recent surveillance imaging a few weeks ago he was noted to have the growth of tumor at the periphery of his previously treated lesion plus a development of a adrenal metastasis. At present he tells me he has no new symptoms other than some anxiety around these new findings. He has never had significant symptoms from his liver disease, and denies problems with edema, bleeding, encephalopathy, or ascites. He rarely has headaches which have not changed as of late. The remainder of his 10 point complete review of systems is otherwise unremarkable.     Past medical history:  #1. Hepatitis B. He has been off his entectavir for more than a year with no recurrence of his hepatitis B.  #2. Well compensated cirrhosis.    Social history: He currently lives with his wife. They have no children. He is working full time at 2 different jobs doing clerical work. He is an immigrant from [place].    On physical exam he appears quite well. His vital signs as noted in the chart are unremarkable.  He has no scleral icterus and no visible lesions in the oropharynx.  He has no adenopathy palpable in the  neck or supra pamela spaces.  His lungs are clear to auscultation without dullness to percussion.  His heart rate and rhythm are regular without audible murmur or gallop. His jugular venous pressure appears normal.  Abdomen is soft and nontender without palpable mass, organomegaly or ascites.  He is no peripheral edema and no tenderness in his calves or thighs. He has no cyanosis or clubbing of his digits.  His speech is fluent. His cranial nerves are grossly intact. His gait and station are unremarkable.    I reviewed with him his MR scan from several weeks ago he has to actively growing lesions of HCC on the periphery of his previously treated lesion and he has about 2-1/2 cm metastasis in his right adrenal gland. His alpha-fetoprotein is minimally elevated at about 14. The rest of his lab work including electrolytes renal function liver enzymes and blood counts are all normal except for a marginally depressed albumin.    Assessment/plan: Newly metastatic hepatocellular carcinoma in a patient with compensated liver disease and a normal performance status. We had a long talk about his current disease status. I think the imaging findings are clear-cut enough we don't need a biopsy to confirm his disease status. I would like further imaging with CT scan to confirm no other sites of metastatic disease. I reviewed with him in this setting that there is no curative intent therapy available but that we did have good treatments could help control his disease and biopsy him additional time. I explained that standard treatment at this point would be systemic therapy with sorafenib which can provide a modest survival benefit and occasionally dramatic responses. We discussed the toxicities which are usually mild but may require some dose adjustments and for rare patients the drug is not tolerable. We touched briefly on the availability of several other systemic therapies that could be used if the sorafenib is not effective. I  recommended to him enrollment in a clinical trial of sorafenab plus or minus Pexavec and reviewed with him some of the details of the trial. He however is not interested in pursuing any clinical trials.    At the end of our long discussion and after the patient had all his questions answered he expressed a good understanding of his disease status, the palliative nature of treatment and the associated risks, and expressed a desire to get started as soon as possible on treatment. He'll be getting a fresh CT scan within the next week and repeat baseline laboratory work. He'll be meeting with staff for further chemotherapy teaching. He should start at half doses, 200 b.i.d. for the first 2 weeks and will see him at that point and if he is tolerating above up to the full dose of 400 b.i.d. We'll plan on her first response assessment at 2 months.    Again, thank you for allowing me to participate in the care of your patient.      Sincerely,    Agustin Kyle MD

## 2018-06-22 NOTE — MR AVS SNAPSHOT
After Visit Summary   6/22/2018    Preeti Pascual    MRN: [id number]           Patient Information     Date Of Birth          1965        Visit Information        Provider Department      6/22/2018 9:00 AM Agustin Kyle MD [facility]        Today's Diagnoses     HCC (hepatocellular carcinoma) (H)    -  1    Chronic viral hepatitis B without delta agent and without coma (H)        Other cirrhosis of liver (H)          Care Instructions    Preventive Care:    Colorectal Cancer Screening: During our visit today, we discussed that it is recommended you receive colorectal cancer screening. Please call or make an appointment with your primary care provider to discuss this. You may also call the [facility] scheduling line ([phone]) to set up a colonoscopy appointment.            Follow-ups after your visit        Follow-up notes from your care team     Return in about 2 months (around 8/21/2018) for MD visit with CT and labs.      Your next 10 appointments already scheduled     Jun 28, 2018  8:15 AM CDT   [facility] Lab Draw with  [facility] LAB DRAW   [facility] Draw ([facility])    [address] [place] [address]  [address]  [facility] [id number] [phone]            Jun 28, 2018  9:00 AM CDT   CT CHEST ABDOMEN PELVIS W/O & W CONTRAST with UCCT1   [facility] ([facility])    [address] [place] [address]  1st Floor  [facility] [id number] [phone]           Please bring any scans or X-rays taken at other hospitals, if similar tests were done. Also bring a list of your medicines, including vitamins, minerals and over-the-counter drugs. It is safest to leave personal items at home.  Be sure to tell your doctor:   If you have any allergies.   If there s any chance you are pregnant.   If you are breastfeeding.  How to prepare:   Do not eat or drink for 2 hours before your exam. If you need to take medicine,  you may take it with small sips of water. (We may ask you to take liquid medicine as well.)   Please wear loose clothing, such as a sweat suit or jogging clothes. Avoid snaps, zippers and other metal. We may ask you to undress and put on a hospital gown.  Please arrive 30 minutes early for your CT. Once in the department you might be asked to drink water 15-20 minutes prior to your exam.  If indicated you may be asked to drink an oral contrast in advance of your CT.  If this is the case, the imaging team will let you know or be in contact with you prior to your appointment  Patients over 70 or patients with diabetes or kidney problems:   If you haven t had a blood test (creatinine test) within the last 30 days, the Cardiologist/Radiologist may require you to get this test prior to your exam.  If you have diabetes:   Continue to take your metformin medication on the day of your exam  If you have any questions, please call the Imaging Department where you will have your exam.            Jul 05, 2018 12:30 PM CDT   (Arrive by 12:15 PM)   Return Visit with Neymar Whittaker MD   [facility] ([facility])    [address] [place] [address]  [address]  [facility] [address]   [phone]            Jul 12, 2018  8:40 AM CDT   (Arrive by 8:25 AM)   Return Visit with MITCH Whiting CNP   [facility] ([facility])    [address] [place] [address]  [address]  [facility] [address]   [phone]            Aug 16, 2018  8:00 AM CDT   [facility] Lab Draw with  [facility] LAB DRAW   [facility] Draw ([facility])    [address] [place] [address]  [address]  [facility] [address]   [phone]            Aug 21, 2018  8:00 AM CDT   (Arrive by 7:45 AM)   Return Visit with Agustin Kyle MD   [facility] ([facility])    [address] [place] [address]  [address]  [facility] [address]    [phone]            Nov 15, 2018  8:00 AM CST   Lab with  LAB   [facility] ([facility])    [address] [address] [phone]            Nov 15, 2018  8:30 AM CST   MR ABDOMEN W/O & W CONTRAST with UCMR1   [facility] MRI ([facility])    [address] [id number] [phone]           Take your medicines as usual, unless your doctor tells you not to. Bring a list of your current medicines to your exam (including vitamins, minerals and over-the-counter drugs). Also bring the results of similar scans you may have had.    You may or may not receive IV contrast for this exam pending the discretion of the Radiologist.   Do not eat or drink for 6 hours prior to exam.  The MRI machine uses a strong magnet. Please wear clothes without metal (snaps, zippers). A sweatsuit works well, or we may give you a hospital gown.  Please remove any body piercings and hair extensions before you arrive. You will also remove watches, jewelry, hairpins, wallets, dentures, partial dental plates and hearing aids. You may wear contact lenses, and you may be able to wear your rings. We have a safe place to keep your personal items, but it is safer to leave them at home.  **IMPORTANT** THE INSTRUCTIONS BELOW ARE ONLY FOR THOSE PATIENTS WHO HAVE BEEN PRESCRIBED SEDATION OR GENERAL ANESTHESIA DURING THEIR MRI PROCEDURE:  IF YOUR DOCTOR PRESCRIBED ORAL SEDATION (take medicine to help you relax during your exam):   You must get the medicine from your doctor (oral medication) before you arrive. Bring the medicine to the exam. Do not take it at home. You ll be told when to take it upon arriving for your exam.   Arrive one hour early. Bring someone who can take you home after the test. Your medicine will make you sleepy. After the exam, you may not drive, take a bus or take a taxi by yourself.  IF YOUR  DOCTOR PRESCRIBED IV SEDATION:   Arrive one hour early. Bring someone who can take you home after the test. Your medicine will make you sleepy. After the exam, you may not drive, take a bus or take a taxi by yourself.   No eating 6 hours before your exam. You may have clear liquids up until 4 hours before your exam. (Clear liquids include water, clear tea, black coffee and fruit juice without pulp.)  IF YOUR DOCTOR PRESCRIBED ANESTHESIA (be asleep for your exam):   Arrive 1 1/2 hours early. Bring someone who can take you home after the test. You may not drive, take a bus or take a taxi by yourself.   No eating 8 hours before your exam. You may have clear liquids up until 4 hours before your exam. (Clear liquids include water, clear tea, black coffee and fruit juice without pulp.)   You will spend four to five hours in the recovery room.  If you have any questions, please contact your Imaging Department exam site.            Nov 15, 2018  9:30 AM CST   (Arrive by 9:15 AM)   Return General Liver with Trevor Trujillo MD   [facility] ([facility] [place])    [address] [place] [address]  [address]  [facility] [address] [phone]              Future tests that were ordered for you today     Open Future Orders        Priority Expected Expires Ordered    CT Chest Abdomen Pelvis w/o & w Contrast Routine 8/21/2018 9/22/2018 6/22/2018    Comprehensive metabolic panel Routine 8/21/2018 9/22/2018 6/22/2018    CBC with platelets differential Routine 8/21/2018 9/22/2018 6/22/2018    AFP tumor marker Routine 8/21/2018 9/22/2018 6/22/2018    INR Routine 8/21/2018 9/22/2018 6/22/2018    CT Chest Abdomen Pelvis w/o & w Contrast Routine 6/23/2018 7/6/2018 6/22/2018    Comprehensive metabolic panel Routine 6/23/2018 7/6/2018 6/22/2018    CBC with platelets differential Routine 6/23/2018 7/6/2018 6/22/2018    AFP tumor marker Routine 6/23/2018 7/6/2018 6/22/2018    INR Routine 6/23/2018 7/6/2018 6/22/2018        "     Who to contact     If you have questions or need follow up information about today's clinic visit or your schedule please contact [facility] directly at [phone].  Normal or non-critical lab and imaging results will be communicated to you by MyChart, letter or phone within 4 business days after the clinic has received the results. If you do not hear from us within 7 days, please contact the clinic through [facility] or phone. If you have a critical or abnormal lab result, we will notify you by phone as soon as possible.  Submit refill requests through [facility] or call your pharmacy and they will forward the refill request to us. Please allow 3 business days for your refill to be completed.          Additional Information About Your Visit        [facility] Information     [facility] gives you secure access to your electronic health record. If you see a primary care provider, you can also send messages to your care team and make appointments. If you have questions, please call your primary care clinic.  If you do not have a primary care provider, please call [phone] and they will assist you.        Care EveryWhere ID     This is your Care EveryWhere ID. This could be used by other organizations to access your [facility] medical records  [id number]        Your Vitals Were     Pulse Temperature Respirations Height Pulse Oximetry BMI (Body Mass Index)    67 97.9  F (36.6  C) (Oral) 16 1.676 m (5' 5.98\") 96% 26.84 kg/m2       Blood Pressure from Last 3 Encounters:   06/22/18 142/84   05/17/18 145/87   11/02/17 127/80    Weight from Last 3 Encounters:   06/22/18 75.4 kg (166 lb 3.2 oz)   05/17/18 74.5 kg (164 lb 3.2 oz)   11/02/17 72.6 kg (160 lb)                 Today's Medication Changes          These changes are accurate as of 6/22/18 10:29 AM.  If you have any questions, ask your nurse or doctor.               Start taking these medicines.        Dose/Directions    prochlorperazine 10 MG tablet "   Commonly known as:  COMPAZINE   Used for:  HCC (hepatocellular carcinoma) (H)   Started by:  Jeanine Rodriguez RPNAZANIN        Dose:  10 mg   Take 1 tablet (10 mg) by mouth every 6 hours as needed (Nausea/Vomiting)   Quantity:  30 tablet   Refills:  2       SORAfenib 200 MG tablet CHEMO   Commonly known as:  nexAVAR   Used for:  HCC (hepatocellular carcinoma) (H)   Started by:  Jeanine Rodriguez RPNAZANIN        Dose:  400 mg   Take 2 tablets (400 mg) by mouth 2 times daily Take only 1 tab twice a day for first two weeks. Don't increase to full dose until instructed   Quantity:  120 tablet   Refills:  0            Where to get your medicines      These medications were sent to [address]  [address] [place] [address] [address] [place] [address]    Hours:  TRANSPLANT PHONE NUMBER [phone] Phone:  [phone]     prochlorperazine 10 MG tablet         Call your pharmacy to confirm that your medication is ready for pickup. It may take up to 24 hours for them to receive the prescription. If the prescription is not ready within 3 business days, please contact your clinic or your provider.     We will let you know when these medications are ready. If you don't hear back within 3 business days, please contact us.     SORAfenib 200 MG tablet CHEMO                Primary Care Provider Office Phone # Fax #    Sudhir Layne [phone]       [address]  [facility] [address]        Equal Access to Services     NEFTALY MANJARREZ AH: Kennedy finko Soshanelleali, waaxda luqadaha, qaybta kaalmada adeegyada, meño herrera. So [facility] [phone].    ATENCIÓN: Si habla español, tiene a jean disposición servicios gratuitos de asistencia lingüística. Llame al [phone].    We comply with applicable federal civil rights laws and [place] laws. We do not discriminate on the basis of race, color, national origin,  age, disability, sex, sexual orientation, or gender identity.            Thank you!     Thank you for choosing [facility]  for your care. Our goal is always to provide you with excellent care. Hearing back from our patients is one way we can continue to improve our services. Please take a few minutes to complete the written survey that you may receive in the mail after your visit with us. Thank you!             Your Updated Medication List - Protect others around you: Learn how to safely use, store and throw away your medicines at www.disposemymeds.org.          This list is accurate as of 6/22/18 10:29 AM.  Always use your most recent med list.                   Brand Name Dispense Instructions for use Diagnosis    prochlorperazine 10 MG tablet    COMPAZINE    30 tablet    Take 1 tablet (10 mg) by mouth every 6 hours as needed (Nausea/Vomiting)    HCC (hepatocellular carcinoma) (H)       SORAfenib 200 MG tablet CHEMO    nexAVAR    120 tablet    Take 2 tablets (400 mg) by mouth 2 times daily Take only 1 tab twice a day for first two weeks. Don't increase to full dose until instructed    HCC (hepatocellular carcinoma) (H)

## 2018-06-22 NOTE — LETTER
"6/22/2018       RE: Preeti Pascual  [address]  [facility] [address]     Dear Colleague,    Thank you for referring your patient, Preeti Pascual, to the [facility]. Please see a copy of my visit note below.    Oral Chemotherapy Monitoring Program    Primary Oncologist: Dr Agustin Kyle  Primary Oncology Clinic: [facility]  Cancer Diagnosis: Liver cancer    Drug: Nexavar 200mg BID x ~2 weeks, then if tolerated, increase to 400mg (7q225pk) BID  Start Date: as soon as available  Dose is appropriate for patients: Hepatic Function   Expected duration of therapy: Until disease progression or unacceptable toxicity    Drug Interaction Assessment: There were no significant drug interactions identified upon review of medication list.      Lab Monitoring Plan  C1D1+   CMP, Phos, CBC, ECG/Mg in high risk C2D1+ Call, BP, CMP, Phos, CBC C3D1+ Call, BP, CMP, Phos, CBC C4D1+ Call, BP, CMP, Phos, CBC C5D1+ Call, BP, CMP, Phos, CBC C6D1+ Call, BP, CMP, Phos, CBC   C1D8+ Call, BP C2D8+ Call, BP C3D8+  C4D8+  C5D8+  C6D8+    C1D15+ Call, BP C2D15+ Call, BP C3D15+  C4D15+  C5D15+  C6D15+    C1D22+ Call, BP C2D22+  C3D22+  C4D22+  C5D22+  C6D22+      Subjective/Objective:  Preeti Pascual is a 52 year old male seen in clinic for an initial visit for oral chemotherapy education.      ORAL CHEMOTHERAPY 6/22/2018   Drug Name Nexavar (Sorafenib)   Current Dosage 200mg   Current Schedule BID   Cycle Details Continuous     Vitals:  BP:   BP Readings from Last 1 Encounters:   06/22/18 142/84     Wt Readings from Last 1 Encounters:   06/22/18 75.4 kg (166 lb 3.2 oz)     Estimated body surface area is 1.87 meters squared as calculated from the following:    Height as of an earlier encounter on 6/22/18: 1.676 m (5' 5.98\").    Weight as of an earlier encounter on 6/22/18: 75.4 kg (166 lb 3.2 oz).      LFTs done 5/17/18: WNL    Assessment:  Patient is appropriate to start therapy.    Plan:  Basic chemotherapy " teaching was reviewed with the patient including indication, start date of therapy, dose, administration, adverse effects, missed doses, food and drug interactions, monitoring, side effect management, office contact information, and safe handling. Written materials were provided and all questions answered.    Follow-Up:  Will call patient about 1 week after starting.    Jeanine Rodriguez, GinD, BCOP, BCPS  Clinical Pharmacy Specialist/  Oncology Medication Therapy Management Pharmacist  [facility]  Pager [phone]  Phone [phone]

## 2018-06-23 NOTE — TELEPHONE ENCOUNTER
"Got Rx for Nexavar today from Dr Kyle. Pt states it says on Rx bottle to \"wear gloves when handling pills\".  He asks if this is really necessary and what kind of gloves should he wear? . It is a capsule. He would just be putting it in his mouth and swallowing it. Advised pt ask the pharmacist about this. Advised pt call pharmacist within 24 hrs. Pt voiced understanding and agreement. Christine Morejon RN/FNA      Reason for Disposition    Caller has medication question about med not prescribed by PCP and triager unable to answer question (e.g., compatibility with other med, storage)    Additional Information    Negative: Drug overdose and nurse unable to answer question    Negative: Caller requesting information not related to medicine    Negative: Caller requesting a prescription for Strep throat and has a positive culture result    Negative: Rash while taking a medication or within 3 days of stopping it    Negative: Immunization reaction suspected    Negative: [1] Asthma and [2] having symptoms of asthma (cough, wheezing, etc)    Negative: MORE THAN A DOUBLE DOSE of a prescription or over-the-counter (OTC) drug    Negative: [1] DOUBLE DOSE (an extra dose or lesser amount) of over-the-counter (OTC) drug AND [2] any symptoms (e.g., dizziness, nausea, pain, sleepiness)    Negative: [1] DOUBLE DOSE (an extra dose or lesser amount) of prescription drug AND [2] any symptoms (e.g., dizziness, nausea, pain, sleepiness)    Negative: Took another person's prescription drug    Negative: [1] DOUBLE DOSE (an extra dose or lesser amount) of prescription drug AND [2] NO symptoms (Exception: a double dose of antibiotics)    Negative: Diabetes drug error or overdose (e.g., insulin or extra dose)    Negative: [1] Request for URGENT new prescription or refill of \"essential\" medication (i.e., likelihood of harm to patient if not taken) AND [2] triager unable to fill per unit policy    Negative: [1] Prescription not at pharmacy AND [2] " was prescribed today by PCP    Negative: Pharmacy calling with prescription questions and triager unable to answer question    Negative: Caller has URGENT medication question about med that PCP prescribed and triager unable to answer question    Negative: Caller has NON-URGENT medication question about med that PCP prescribed and triager unable to answer question    Negative: Caller requesting a NON-URGENT new prescription or refill and triager unable to refill per unit policy    Protocols used: MEDICATION QUESTION CALL-ADULT-

## 2018-06-28 ENCOUNTER — RADIANT APPOINTMENT (OUTPATIENT)
Dept: CT IMAGING | Facility: CLINIC | Age: 53
End: 2018-06-28
Attending: INTERNAL MEDICINE
Payer: COMMERCIAL

## 2018-06-28 DIAGNOSIS — C22.0 HCC (HEPATOCELLULAR CARCINOMA) (H): ICD-10-CM

## 2018-06-28 DIAGNOSIS — B18.1 CHRONIC VIRAL HEPATITIS B WITHOUT DELTA AGENT AND WITHOUT COMA (H): ICD-10-CM

## 2018-06-28 LAB
ALBUMIN SERPL-MCNC: 3.5 G/DL (ref 3.4–5)
ALP SERPL-CCNC: 121 U/L (ref 40–150)
ALT SERPL W P-5'-P-CCNC: 33 U/L (ref 0–70)
ANION GAP SERPL CALCULATED.3IONS-SCNC: 7 MMOL/L (ref 3–14)
AST SERPL W P-5'-P-CCNC: 34 U/L (ref 0–45)
BASOPHILS # BLD AUTO: 0.1 10E9/L (ref 0–0.2)
BASOPHILS NFR BLD AUTO: 1.8 %
BILIRUB SERPL-MCNC: 0.6 MG/DL (ref 0.2–1.3)
BUN SERPL-MCNC: 11 MG/DL (ref 7–30)
CALCIUM SERPL-MCNC: 8.6 MG/DL (ref 8.5–10.1)
CHLORIDE SERPL-SCNC: 106 MMOL/L (ref 94–109)
CO2 SERPL-SCNC: 24 MMOL/L (ref 20–32)
CREAT SERPL-MCNC: 0.7 MG/DL (ref 0.66–1.25)
DIFFERENTIAL METHOD BLD: ABNORMAL
EOSINOPHIL # BLD AUTO: 1 10E9/L (ref 0–0.7)
EOSINOPHIL NFR BLD AUTO: 22.8 %
ERYTHROCYTE [DISTWIDTH] IN BLOOD BY AUTOMATED COUNT: 14.1 % (ref 10–15)
GFR SERPL CREATININE-BSD FRML MDRD: >90 ML/MIN/1.7M2
GLUCOSE SERPL-MCNC: 95 MG/DL (ref 70–99)
HCT VFR BLD AUTO: 41.8 % (ref 40–53)
HGB BLD-MCNC: 14.3 G/DL (ref 13.3–17.7)
IMM GRANULOCYTES # BLD: 0 10E9/L (ref 0–0.4)
IMM GRANULOCYTES NFR BLD: 0 %
LYMPHOCYTES # BLD AUTO: 1.1 10E9/L (ref 0.8–5.3)
LYMPHOCYTES NFR BLD AUTO: 25 %
MCH RBC QN AUTO: 27.9 PG (ref 26.5–33)
MCHC RBC AUTO-ENTMCNC: 34.2 G/DL (ref 31.5–36.5)
MCV RBC AUTO: 82 FL (ref 78–100)
MONOCYTES # BLD AUTO: 0.5 10E9/L (ref 0–1.3)
MONOCYTES NFR BLD AUTO: 11.4 %
NEUTROPHILS # BLD AUTO: 1.8 10E9/L (ref 1.6–8.3)
NEUTROPHILS NFR BLD AUTO: 39 %
NRBC # BLD AUTO: 0 10*3/UL
NRBC BLD AUTO-RTO: 0 /100
PLATELET # BLD AUTO: 164 10E9/L (ref 150–450)
POTASSIUM SERPL-SCNC: 4.2 MMOL/L (ref 3.4–5.3)
PROT SERPL-MCNC: 8.1 G/DL (ref 6.8–8.8)
RBC # BLD AUTO: 5.12 10E12/L (ref 4.4–5.9)
SODIUM SERPL-SCNC: 138 MMOL/L (ref 133–144)
WBC # BLD AUTO: 4.6 10E9/L (ref 4–11)

## 2018-06-28 PROCEDURE — 85025 COMPLETE CBC W/AUTO DIFF WBC: CPT | Performed by: INTERNAL MEDICINE

## 2018-06-28 PROCEDURE — 80053 COMPREHEN METABOLIC PANEL: CPT | Performed by: INTERNAL MEDICINE

## 2018-06-28 RX ORDER — IOPAMIDOL 755 MG/ML
101 INJECTION, SOLUTION INTRAVASCULAR ONCE
Status: COMPLETED | OUTPATIENT
Start: 2018-06-28 | End: 2018-06-28

## 2018-06-28 RX ADMIN — IOPAMIDOL 101 ML: 755 INJECTION, SOLUTION INTRAVASCULAR at 08:54

## 2018-06-28 NOTE — NURSING NOTE
Chief Complaint   Patient presents with     Blood Draw     IV placed for imaging     Labs drawn off IV, see flow sheet.  MARCUS GARCIA, CMA

## 2018-06-28 NOTE — DISCHARGE INSTRUCTIONS

## 2018-06-29 ENCOUNTER — TELEPHONE (OUTPATIENT)
Dept: ONCOLOGY | Facility: CLINIC | Age: 53
End: 2018-06-29

## 2018-06-29 NOTE — ORAL ONC MGMT
Oral Chemotherapy Monitoring Program     Patient currently on sorafenib.   Called and left message for first attempt of 1 week follow up call.    Asked patient to return call to [phone].     Follow-Up:  7/2/18 - 2nd attempt 1 week fu call      Tisha Styles, PharmD, MPH, BCOP  Hematology/Oncology Clinical Pharmacist  [facility]

## 2018-07-02 ENCOUNTER — TELEPHONE (OUTPATIENT)
Dept: ONCOLOGY | Facility: CLINIC | Age: 53
End: 2018-07-02

## 2018-07-02 NOTE — TELEPHONE ENCOUNTER
Oral Chemotherapy Monitoring Program    Placed call to patient in follow up of Nexavar therapy.    Left message to please call back in follow up of therapy. No patient or drug names were mentioned.    Saravanan Jarvis  Pharmacy Intern  Oral Chemotherapy Monitoring Program  [facility]  [phone]

## 2018-07-06 ENCOUNTER — TELEPHONE (OUTPATIENT)
Dept: ONCOLOGY | Facility: CLINIC | Age: 53
End: 2018-07-06

## 2018-07-06 NOTE — ORAL ONC MGMT
Oral Chemotherapy Monitoring Program    Primary Oncologist: Dr. Kyle  Primary Oncology Clinic: [facility]  Cancer Diagnosis: Liver Cancer    Therapy History:  06/ mg Nexavar BID   07/08-Begin taking 400 mg Nexavar BID      Drug Interaction Assessment: No new drug interactions at this time     Lab Monitoring Plan  Monitoring plan:   CMP, Phos, CBC, ECG/Mg in high risk C2D1+ Call, BP, CMP, Phos, CBC C3D1+ Call, BP, CMP, Phos, CBC C4D1+ Call, BP, CMP, Phos, CBC C5D1+ Call, BP, CMP, Phos, CBC C6D1+ Call, BP, CMP, Phos, CBC   Call, BP C2D8+ Call, BP C3D8+  C4D8+  C5D8+  C6D8+    Call, BP C2D15+ Call, BP C3D15+  C4D15+  C5D15+  C6D15+    Call, BP C2D22+  C3D22+  C4D22+  C5D22+  C6D22+      Subjective/Objective:  Preeti Pascual is a 52 year old male contacted by phone for a follow-up visit for oral chemotherapy.  Preeti seems to be tolerating his current dose of Nexavar 200 mg BID. On Sunday 07/08/18 he will increase his dose to 400 mg BID. He has not experienced any side effects from his medication such as rash, diarrhea, nausea, or vomiting. We discussed what to do if he experienced any side effects when he increases his dose. He knows to call the triage nurse or oral chemotherapy management program.     ORAL CHEMOTHERAPY 6/22/2018 7/6/2018   Drug Name Nexavar (Sorafenib) Nexavar (Sorafenib)   Current Dosage 200mg 200mg   Current Schedule BID BID   Cycle Details Continuous Continuous   Start Date of Last Cycle - 6/24/2018   Doses missed in last 2 weeks - 0   Adherence Assessment - Adherent   Adverse Effects - No AE identified during assessment   Any new drug interactions? - No   Is the dose as ordered appropriate for the patient? - Yes     Vitals:  BP:   BP Readings from Last 1 Encounters:   06/22/18 142/84     Wt Readings from Last 1 Encounters:   06/22/18 75.4 kg (166 lb 3.2 oz)     Estimated body surface area is 1.87 meters squared as calculated from the following:    Height as of 6/22/18: 1.676 m  "(5' 5.98\").    Weight as of 6/22/18: 75.4 kg (166 lb 3.2 oz).    Labs:   _  Result Component Current Result Ref Range   Sodium 138 (6/28/2018) 133 - 144 mmol/L     _  Result Component Current Result Ref Range   Potassium 4.2 (6/28/2018) 3.4 - 5.3 mmol/L     _  Result Component Current Result Ref Range   Calcium 8.6 (6/28/2018) 8.5 - 10.1 mg/dL     _  Result Component Current Result Ref Range   Albumin 3.5 (6/28/2018) 3.4 - 5.0 g/dL     _  Result Component Current Result Ref Range   Urea Nitrogen 11 (6/28/2018) 7 - 30 mg/dL     _  Result Component Current Result Ref Range   Creatinine 0.70 (6/28/2018) 0.66 - 1.25 mg/dL       _  Result Component Current Result Ref Range   AST 34 (6/28/2018) 0 - 45 U/L     _  Result Component Current Result Ref Range   ALT 33 (6/28/2018) 0 - 70 U/L     _  Result Component Current Result Ref Range   Bilirubin Total 0.6 (6/28/2018) 0.2 - 1.3 mg/dL       _  Result Component Current Result Ref Range   WBC 4.6 (6/28/2018) 4.0 - 11.0 10e9/L     _  Result Component Current Result Ref Range   Hemoglobin 14.3 (6/28/2018) 13.3 - 17.7 g/dL     _  Result Component Current Result Ref Range   Platelet Count 164 (6/28/2018) 150 - 450 10e9/L     _  Result Component Current Result Ref Range   Absolute Neutrophil 1.8 (6/28/2018) 1.6 - 8.3 10e9/L       Assessment:  Preeti is doing well on his Nexavar therapy at this time with no side effects.     Plan:  Increase to 400 mg Nexavar BID on Sunday 07/08/18 as planned and call with any questions or concerns.     Follow-Up:  Appointment with Nallely 07/12/18    Refill Due:  07/09/18, Patient will run out of current script on 07/16/18    Enedina Beck  Student Pharmacist  Oral Chemotherapy Monitoring Program  [facility]  [phone]        "

## 2018-07-08 ENCOUNTER — NURSE TRIAGE (OUTPATIENT)
Dept: NURSING | Facility: CLINIC | Age: 53
End: 2018-07-08

## 2018-07-08 ENCOUNTER — TELEPHONE (OUTPATIENT)
Dept: ONCOLOGY | Facility: CLINIC | Age: 53
End: 2018-07-08

## 2018-07-08 NOTE — TELEPHONE ENCOUNTER
"  Reason for Disposition    Caller has URGENT medication question about med that PCP prescribed and triager unable to answer question    Additional Information    Negative: Drug overdose and nurse unable to answer question    Negative: Caller requesting information not related to medicine    Negative: Caller requesting a prescription for Strep throat and has a positive culture result    Negative: Rash while taking a medication or within 3 days of stopping it    Negative: Immunization reaction suspected    Negative: [1] Asthma and [2] having symptoms of asthma (cough, wheezing, etc)    Negative: MORE THAN A DOUBLE DOSE of a prescription or over-the-counter (OTC) drug    Negative: [1] DOUBLE DOSE (an extra dose or lesser amount) of over-the-counter (OTC) drug AND [2] any symptoms (e.g., dizziness, nausea, pain, sleepiness)    Negative: [1] DOUBLE DOSE (an extra dose or lesser amount) of prescription drug AND [2] any symptoms (e.g., dizziness, nausea, pain, sleepiness)    Negative: Took another person's prescription drug    Negative: [1] DOUBLE DOSE (an extra dose or lesser amount) of prescription drug AND [2] NO symptoms (Exception: a double dose of antibiotics)    Negative: Diabetes drug error or overdose (e.g., insulin or extra dose)    Negative: [1] Request for URGENT new prescription or refill of \"essential\" medication (i.e., likelihood of harm to patient if not taken) AND [2] triager unable to fill per unit policy    Negative: [1] Prescription not at pharmacy AND [2] was prescribed today by PCP    Negative: Pharmacy calling with prescription questions and triager unable to answer question    Protocols used: MEDICATION QUESTION CALL-ADULT-  Caller states he is scheduled to take 2 chemo pills this am, but states when he takes the medication it causes his stomach to be painful. Caller states he has had 2 chemo treatments that cause pain in abdomen, and thinks taking two pills will cause even greater pain. Triage " guidelines recommend to call provider. Triager called out to answering service, left message for on call provider Dr. Piña to call patient at [phone]. Caller advised to call back if no return call within 20 minutes.

## 2018-07-08 NOTE — TELEPHONE ENCOUNTER
Hem/Onc fellow was paged, because patient developed abdominal pain, while taking Sorafenib and was curious, should he continue taking it.  He denies any diarrhea, constipation, melena, fever, nausea, vomiting, unusual bruising or bleeding, fatigue.  He c/o only of abdominal pain, started few days ago.   Advised to continue taking sorafenib, and follow up with Dr. Kyle on Monday.   Will email Dr. Kyle as well.

## 2018-07-12 ENCOUNTER — ONCOLOGY VISIT (OUTPATIENT)
Dept: ONCOLOGY | Facility: CLINIC | Age: 53
End: 2018-07-12
Attending: NURSE PRACTITIONER
Payer: COMMERCIAL

## 2018-07-12 VITALS
HEART RATE: 69 BPM | HEIGHT: 66 IN | BODY MASS INDEX: 26.41 KG/M2 | OXYGEN SATURATION: 96 % | WEIGHT: 164.3 LBS | DIASTOLIC BLOOD PRESSURE: 86 MMHG | SYSTOLIC BLOOD PRESSURE: 141 MMHG | TEMPERATURE: 98.1 F | RESPIRATION RATE: 18 BRPM

## 2018-07-12 DIAGNOSIS — C22.0 HCC (HEPATOCELLULAR CARCINOMA) (H): Primary | ICD-10-CM

## 2018-07-12 LAB
ALBUMIN SERPL-MCNC: 3.4 G/DL (ref 3.4–5)
ALP SERPL-CCNC: 116 U/L (ref 40–150)
ALT SERPL W P-5'-P-CCNC: 43 U/L (ref 0–70)
ANION GAP SERPL CALCULATED.3IONS-SCNC: 5 MMOL/L (ref 3–14)
AST SERPL W P-5'-P-CCNC: 41 U/L (ref 0–45)
BASOPHILS # BLD AUTO: 0.1 10E9/L (ref 0–0.2)
BASOPHILS NFR BLD AUTO: 1.6 %
BILIRUB SERPL-MCNC: 0.9 MG/DL (ref 0.2–1.3)
BUN SERPL-MCNC: 7 MG/DL (ref 7–30)
CALCIUM SERPL-MCNC: 8.4 MG/DL (ref 8.5–10.1)
CHLORIDE SERPL-SCNC: 104 MMOL/L (ref 94–109)
CO2 SERPL-SCNC: 25 MMOL/L (ref 20–32)
CREAT SERPL-MCNC: 0.64 MG/DL (ref 0.66–1.25)
DIFFERENTIAL METHOD BLD: ABNORMAL
EOSINOPHIL # BLD AUTO: 0.8 10E9/L (ref 0–0.7)
EOSINOPHIL NFR BLD AUTO: 16.4 %
ERYTHROCYTE [DISTWIDTH] IN BLOOD BY AUTOMATED COUNT: 14.4 % (ref 10–15)
GFR SERPL CREATININE-BSD FRML MDRD: >90 ML/MIN/1.7M2
GLUCOSE SERPL-MCNC: 94 MG/DL (ref 70–99)
HCT VFR BLD AUTO: 40.4 % (ref 40–53)
HGB BLD-MCNC: 14.1 G/DL (ref 13.3–17.7)
IMM GRANULOCYTES # BLD: 0 10E9/L (ref 0–0.4)
IMM GRANULOCYTES NFR BLD: 0.2 %
LYMPHOCYTES # BLD AUTO: 1.1 10E9/L (ref 0.8–5.3)
LYMPHOCYTES NFR BLD AUTO: 20.7 %
MCH RBC QN AUTO: 28.5 PG (ref 26.5–33)
MCHC RBC AUTO-ENTMCNC: 34.9 G/DL (ref 31.5–36.5)
MCV RBC AUTO: 82 FL (ref 78–100)
MONOCYTES # BLD AUTO: 0.4 10E9/L (ref 0–1.3)
MONOCYTES NFR BLD AUTO: 8 %
NEUTROPHILS # BLD AUTO: 2.7 10E9/L (ref 1.6–8.3)
NEUTROPHILS NFR BLD AUTO: 53.1 %
NRBC # BLD AUTO: 0 10*3/UL
NRBC BLD AUTO-RTO: 0 /100
PLATELET # BLD AUTO: 181 10E9/L (ref 150–450)
POTASSIUM SERPL-SCNC: 4.2 MMOL/L (ref 3.4–5.3)
PROT SERPL-MCNC: 8.4 G/DL (ref 6.8–8.8)
RBC # BLD AUTO: 4.94 10E12/L (ref 4.4–5.9)
SODIUM SERPL-SCNC: 133 MMOL/L (ref 133–144)
WBC # BLD AUTO: 5.1 10E9/L (ref 4–11)

## 2018-07-12 PROCEDURE — 99214 OFFICE O/P EST MOD 30 MIN: CPT | Mod: ZP | Performed by: NURSE PRACTITIONER

## 2018-07-12 PROCEDURE — 36415 COLL VENOUS BLD VENIPUNCTURE: CPT | Mod: ZF

## 2018-07-12 PROCEDURE — 85025 COMPLETE CBC W/AUTO DIFF WBC: CPT | Performed by: NURSE PRACTITIONER

## 2018-07-12 PROCEDURE — G0463 HOSPITAL OUTPT CLINIC VISIT: HCPCS | Mod: ZF

## 2018-07-12 PROCEDURE — 80053 COMPREHEN METABOLIC PANEL: CPT | Performed by: NURSE PRACTITIONER

## 2018-07-12 ASSESSMENT — PAIN SCALES - GENERAL: PAINLEVEL: NO PAIN (0)

## 2018-07-12 NOTE — LETTER
7/12/2018       RE: Preeti Pascual  [address]  [facility] [address]     Dear Colleague,    Thank you for referring your patient, Preeti Pascual, to the [facility]. Please see a copy of my visit note below.    Reason for Visit: follow-up visit    Oncology HPI:     Newly metastatic hepatocellular carcinoma in a patient with compensated liver disease and a normal performance status.    Mr. Pascual is 52 years old and was recognized several years ago to have hepatitis B, with well compensated cirrhosis and liver confined hepatocellular carcinoma. He had treatment of his hepatitis B which has been cleared. He had 2 episodes of radio embolization in January and November 2016 with an excellent response. He declined to pursue liver transplant. On recent surveillance imaging a few weeks ago he was noted to have the growth of tumor at the periphery of his previously treated lesion plus a development of an adrenal metastasis. At present he has no new symptoms.. He has never had significant symptoms from his liver disease: no problems with edema, bleeding, encephalopathy, or ascites. MR 5/17/18 showing two actively growing lesions of HCC on the periphery of his previously treated lesion and he has about 2-1/2 cm metastasis in his right adrenal gland. His alpha-fetoprotein is minimally elevated at about 14.  Patient is currently on standard treatment of Sorafenib (200 mb BID for 2 weeks, than increase to 400 mg BID). Dr. Kyle discussed with patient it can provide a modest survival benefit and occasionally dramatic responses.     Past medical history:  #1. Hepatitis B. He has been off his entectavir for more than a year with no recurrence of his hepatitis B.  #2. Well compensated cirrhosis.    Interval history:     Patient here today unaccompanied. He reported feeling general well. He has had minimal side effects from the Sorafenib. He is currently taking 400 mg BID (increased dose on 7/8). He  "reports he did have some mild  burning sensation in esophageal area (reflux?) for the first few days on the medication which then resolved. This occurred again with the dose increase and once again resolved with no management. He denies any other symptoms.   No bleeding, no rashes, no skin changes, no diarrhea and nausea.   No edema, bleeding, encephalopathy or ascites.     ROS:    Patient denies any of the following except if noted above: fevers, chills, difficulty with energy, vision or hearing changes, headaches,chest pain, dyspnea, abdominal pain, vomiting, urinary concerns, neuropathy,  issues with sleep or mood. ROS otherwise negative on a 12-system review.        Current Outpatient Prescriptions   Medication Sig Dispense Refill     prochlorperazine (COMPAZINE) 10 MG tablet Take 1 tablet (10 mg) by mouth every 6 hours as needed (Nausea/Vomiting) 30 tablet 2     SORAfenib (NEXAVAR) 200 MG tablet CHEMO Take 2 tablets (400 mg) by mouth 2 times daily Take only 1 tab twice a day for first two weeks. Don't increase to full dose until instructed 120 tablet 0          Allergies   Allergen Reactions     Isovue [Iopamidol] Other (See Comments)     6/28/18 Pt had sneezing and very slight scratchy throat after CT exam. No intervention required. [facility].         Exam:/86 (BP Location: Left arm, Patient Position: Sitting, Cuff Size: Adult Regular)  Pulse 69  Temp 98.1  F (36.7  C) (Tympanic)  Resp 18  Ht 1.676 m (5' 5.98\")  Wt 74.5 kg (164 lb 4.8 oz)  SpO2 96%  BMI 26.53 kg/m2 There were no vitals taken for this visit.  Wt Readings from Last 4 Encounters:   06/22/18 75.4 kg (166 lb 3.2 oz)   05/17/18 74.5 kg (164 lb 3.2 oz)   11/02/17 72.6 kg (160 lb)   05/12/17 69.9 kg (154 lb 1.6 oz)     General:  in no acute distress, alert and oriented x3.   HEENT: Normocephalic, atraumatic.  PERRLA, oropharynx clear with no mucositis or thrush.   Lymph Nodes: No palpable pre/post-auricular, cervical, axillary, or " inguinal lymphadenopathy appreciated.   CV: RRR, normal S1 S2.  No murmurs, gallops, or rubs.   Lungs: Clear to auscultation bilaterally.  No dullness to percussion.   Abdomen:  Soft and non tender. No palpable masses.    Extremities: no clubbing, cyanosis, or edema.       Labs:   Results for TOD RICKS (MRN [id number]) as of 7/12/2018 10:53   Ref. Range 7/12/2018 09:09   Sodium Latest Ref Range: 133 - 144 mmol/L 133   Potassium Latest Ref Range: 3.4 - 5.3 mmol/L 4.2   Chloride Latest Ref Range: 94 - 109 mmol/L 104   Carbon Dioxide Latest Ref Range: 20 - 32 mmol/L 25   Urea Nitrogen Latest Ref Range: 7 - 30 mg/dL 7   Creatinine Latest Ref Range: 0.66 - 1.25 mg/dL 0.64 (L)   GFR Estimate Latest Ref Range: >60 mL/min/1.7m2 >90   GFR Estimate If Black Latest Ref Range: >60 mL/min/1.7m2 >90   Calcium Latest Ref Range: 8.5 - 10.1 mg/dL 8.4 (L)   Anion Gap Latest Ref Range: 3 - 14 mmol/L 5   Albumin Latest Ref Range: 3.4 - 5.0 g/dL 3.4   Protein Total Latest Ref Range: 6.8 - 8.8 g/dL 8.4   Bilirubin Total Latest Ref Range: 0.2 - 1.3 mg/dL 0.9   Alkaline Phosphatase Latest Ref Range: 40 - 150 U/L 116   ALT Latest Ref Range: 0 - 70 U/L 43   AST Latest Ref Range: 0 - 45 U/L 41   Glucose Latest Ref Range: 70 - 99 mg/dL 94   WBC Latest Ref Range: 4.0 - 11.0 10e9/L 5.1   Hemoglobin Latest Ref Range: 13.3 - 17.7 g/dL 14.1   Hematocrit Latest Ref Range: 40.0 - 53.0 % 40.4   Platelet Count Latest Ref Range: 150 - 450 10e9/L 181   RBC Count Latest Ref Range: 4.4 - 5.9 10e12/L 4.94   MCV Latest Ref Range: 78 - 100 fl 82   MCH Latest Ref Range: 26.5 - 33.0 pg 28.5   MCHC Latest Ref Range: 31.5 - 36.5 g/dL 34.9   RDW Latest Ref Range: 10.0 - 15.0 % 14.4   Diff Method Unknown Automated Method   % Neutrophils Latest Units: % 53.1   % Lymphocytes Latest Units: % 20.7   % Monocytes Latest Units: % 8.0   % Eosinophils Latest Units: % 16.4   % Basophils Latest Units: % 1.6   % Immature Granulocytes Latest Units: % 0.2   Nucleated RBCs  Latest Ref Range: 0 /100 0   Absolute Neutrophil Latest Ref Range: 1.6 - 8.3 10e9/L 2.7   Absolute Lymphocytes Latest Ref Range: 0.8 - 5.3 10e9/L 1.1   Absolute Monocytes Latest Ref Range: 0.0 - 1.3 10e9/L 0.4   Absolute Eosinophils Latest Ref Range: 0.0 - 0.7 10e9/L 0.8 (H)   Absolute Basophils Latest Ref Range: 0.0 - 0.2 10e9/L 0.1   Abs Immature Granulocytes Latest Ref Range: 0 - 0.4 10e9/L 0.0   Absolute Nucleated RBC Unknown 0.0       Imaging:  EXAMINATION: CT CHEST ABDOMEN W/O & W THOR PEL W CONTRAST,  6/28/2018 9:12 AM     TECHNIQUE:  Helical CT images from the thoracic inlet through the  symphysis pubis were obtained  with contrast. Additional images at  multiple time points using a protocol designed to detect and evaluate  hepatocellular carcinoma were also obtained     CONTRAST DOSE: Isovue 370 101cc     COMPARISON: MRI 5/17/2018, 5/12/2017, 2/7/2017, 12/13/2016, CT  8/23/2016, 1/25/2016, angiogram 11/23/2016     HISTORY: ; HCC (hepatocellular carcinoma) (H); Chronic viral hepatitis  B without delta agent and without coma (H)     FINDINGS:     Chest:   Heart size is within normal limits. Severe calcifications of the LAD  coronary artery. No pericardial effusion. The pulmonary artery and  thoracic aorta are nondilated.      Calcified mediastinal lymph nodes. Unchanged 1.1 cm right perihilar  lymph node.     Central tracheobronchial tree is patent. Calcified granuloma in the  right base at the hemidiaphragm level. Unchanged left upper lobe tiny  1-2 mm nodule (image 10/38).     No pleural effusion or pneumothorax.     Abdomen and pelvis:     Liver:      Comparison was made to the MRI 5/17/2018. There is left lobe  hypertrophy and widening of the fissures without surface nodularity  suggestive of early cirrhotic change.     Lesion 1: Y-90 posttreatment changes in the hepatic segment 5/8  measuring approximately 6.1 x 3.9 cm (image 7/47). Unchanged  enhancement pattern in the treatment bed. LR-TR  nonviable     Lesion 2: No significant change in the 2.4 x 1.4 arterially enhancing  lesion in the hepatic segment 5/8 along the medial aspect of the above  treated lesion (image 7/52). Associated washout and pseudocapsule on  the delayed images. OPTN class 5b     Lesion 3: Less well-visualized previously described 1.6 cm hepatic  segment 5/8 lesion on the inferior aspect of the treatment bed. This  does not currently have clear arterial enhancement (though clearly  dilated on previous exam). There is continued washout of similar size  to comparison exam. OPTN class Va based on recent MRI.     Lesion 4: Treatment changes involving the hepatic segment 7. No  suspicious enhancement. LR-TR nonviable     No new suspicious hepatic lesion. Chronic bland thrombus of the  anterior right portal vein. Replaced right hepatic artery originates  from the SMA.     Remainder of abdomen and pelvis:   Splenules adjacent to the spleen. The spleen is within within normal  limits. Unchanged 2.7 cm right adrenal nodule. Seconds adrenal nodule  seen more medially measures up to 1.5 cm on series 9 image 291, also  unchanged. Unremarkable pancreas. Tiny renal cysts. No hydronephrosis  or renal calculus. Unremarkable urinary bladder.     Normal appendix. No bowel obstruction or abnormal bowel wall  thickening. Large prostate measures up to 5.1 cm.     No ascites. No pneumatosis, portal venous gas or free air. No  significant change in the mildly enlarged tiffanie hepatic lymph node  measuring up to 1.1 cm (image 9/299).     Bones and soft tissues: No suspicious lesions.         IMPRESSION:    1. Revisualization of hepatocellular carcinoma status post radio  embolization with 2 foci of viable intrahepatic malignancy, not  significantly changed from previous MRI and better characterized on  prior exam.  2. Unchanged metastatic right adrenal lesions.  3. No additional foci of metastatic disease identified in the chest,  abdomen, or pelvis. Tiny  unchanged pulmonary nodules.  4. Based on this exam only, the patient is outside Prasad criteria,  because of adrenal metastasis.      Impression/plan:   Metastatic hepatocellular carcinoma:  Newly metastatic hepatocellular carcinoma in a patient with compensated liver disease and a normal performance status. MR 5/17/18 showing two actively growing lesions of HCC on the periphery of his previously treated lesion and he has about 2-1/2 cm metastasis in his right adrenal gland. His alpha-fetoprotein is minimally elevated at about 14.  Patient is currently on standard treatment of Sorafenib (200 mb BID for 2 weeks, than if tolerated increase to 400 mg BID) . Currently taking 400 mg BID and tolerating well. Denies any adverse side effects.    Plan is for first response assessment at 2 months.  Follow-up with Dr. Kyle 8/21 with imaging 8/16 and labs.     Reflux?  Patient to report if any further episodes. Will continue to monitor.     Hypertension:   /86. Will continue to monitor.     Well compensated cirrhosis/hep B: will continue to monitor LFTs. Within normal limits today.     Patient seen in coordination with Nallely POOLE. Please see attestation.     Mariluz Lewis VIRGILIO    The patient was seen in conjunction with Mariluz Lewis CNP.  I have reviewed the note and agree with the above findings and plan.TW      Again, thank you for allowing me to participate in the care of your patient.      Sincerely,    MITCH Jacobs CNP

## 2018-07-12 NOTE — MR AVS SNAPSHOT
After Visit Summary   7/12/2018    Preeti Pascual    MRN: [id number]           Patient Information     Date Of Birth          1965        Visit Information        Provider Department      7/12/2018 8:40 AM Nallely Smiley APRN CNP [facility]        Today's Diagnoses     HCC (hepatocellular carcinoma) (H)    -  1       Follow-ups after your visit        Your next 10 appointments already scheduled     Aug 16, 2018  8:00 AM CDT   [facility] Draw with [facility] DRAW   [facility] Lab Draw ([facility])    [address]  [address]  [facility] [id number] [phone]            Aug 16, 2018  9:00 AM CDT   CT CHEST ABDOMEN PELVIS W/O & W CONTRAST with UC[facility]   [facility] CT ([facility])    [address]  1st [address]  [facility] [id number] [phone]           Please bring any scans or X-rays taken at other hospitals, if similar tests were done. Also bring a list of your medicines, including vitamins, minerals and over-the-counter drugs. It is safest to leave personal items at home.  Be sure to tell your doctor:   If you have any allergies.   If there s any chance you are pregnant.   If you are breastfeeding.  How to prepare:   Do not eat or drink for 2 hours before your exam. If you need to take medicine, you may take it with small sips of water. (We may ask you to take liquid medicine as well.)   Please wear loose clothing, such as a sweat suit or jogging clothes. Avoid snaps, zippers and other metal. We may ask you to undress and put on a hospital gown.  Please arrive 30 minutes early for your CT. Once in the department you might be asked to drink water 15-20 minutes prior to your exam.  If indicated you may be asked to drink an oral contrast in advance of your CT.  If this is the case, the imaging team will let you know or be in contact with you prior to your appointment  Patients over  70 or patients with diabetes or kidney problems:   If you haven t had a blood test (creatinine test) within the last 30 days, the Cardiologist/Radiologist may require you to get this test prior to your exam.  If you have diabetes:   Continue to take your metformin medication on the day of your exam  If you have any questions, please call the Imaging Department where you will have your exam.            Aug 21, 2018  8:00 AM CDT   (Arrive by 7:45 AM)   Return Visit with Agustin Kyle MD   [facility] ([facility])    [address] [place] [address]  [address]  [facility] [address]   [phone]            Nov 15, 2018  8:00 AM CST   [facility] ([facility])    [address] [place] [address]   [phone]            Nov 15, 2018  8:30 AM CST   MR ABDOMEN W/O & W CONTRAST with [address] MRI ([facility])    [address] [place] [address]  [address] [facility] [id number] [phone]           Take your medicines as usual, unless your doctor tells you not to. Bring a list of your current medicines to your exam (including vitamins, minerals and over-the-counter drugs). Also bring the results of similar scans you may have had.    You may or may not receive IV contrast for this exam pending the discretion of the Radiologist.   Do not eat or drink for 6 hours prior to exam.  The MRI machine uses a strong magnet. Please wear clothes without metal (snaps, zippers). A sweatsuit works well, or we may give you a hospital gown.  Please remove any body piercings and hair extensions before you arrive. You will also remove watches, jewelry, hairpins, wallets, dentures, partial dental plates and hearing aids. You may wear contact lenses, and you may be able to wear your rings. We have a safe place to keep your personal items, but it is safer to leave them at home.   **IMPORTANT** THE INSTRUCTIONS BELOW ARE ONLY FOR THOSE PATIENTS WHO HAVE BEEN PRESCRIBED SEDATION OR GENERAL ANESTHESIA DURING THEIR MRI PROCEDURE:  IF YOUR DOCTOR PRESCRIBED ORAL SEDATION (take medicine to help you relax during your exam):   You must get the medicine from your doctor (oral medication) before you arrive. Bring the medicine to the exam. Do not take it at home. You ll be told when to take it upon arriving for your exam.   Arrive one hour early. Bring someone who can take you home after the test. Your medicine will make you sleepy. After the exam, you may not drive, take a bus or take a taxi by yourself.  IF YOUR DOCTOR PRESCRIBED IV SEDATION:   Arrive one hour early. Bring someone who can take you home after the test. Your medicine will make you sleepy. After the exam, you may not drive, take a bus or take a taxi by yourself.   No eating 6 hours before your exam. You may have clear liquids up until 4 hours before your exam. (Clear liquids include water, clear tea, black coffee and fruit juice without pulp.)  IF YOUR DOCTOR PRESCRIBED ANESTHESIA (be asleep for your exam):   Arrive 1 1/2 hours early. Bring someone who can take you home after the test. You may not drive, take a bus or take a taxi by yourself.   No eating 8 hours before your exam. You may have clear liquids up until 4 hours before your exam. (Clear liquids include water, clear tea, black coffee and fruit juice without pulp.)   You will spend four to five hours in the recovery room.  If you have any questions, please contact your Imaging Department exam site.            Nov 15, 2018  9:30 AM CST   (Arrive by 9:15 AM)   Return General Liver with Trevor Trujillo MD   [facility] Hepatology ([facility])    [address]  [address]  [facility] [address] [phone]              Who to contact     If you have questions or need follow up information about today's clinic visit or your schedule please contact [facility] directly at [phone].  Normal or non-critical lab and imaging results will be communicated to you by MyChart, letter or phone within 4 business days after the clinic has received the results. If you do not hear from us within 7 days, please contact the clinic through MyChart or phone. If you have a critical or abnormal lab result, we will notify you by phone as soon as possible.  Submit refill requests through [employer] or call your pharmacy and they will forward the refill request to us. Please allow 3 business days for your refill to be completed.          Additional Information About Your Visit        [email or web address] Information     [employer] gives you secure access to your electronic health record. If you see a primary care provider, you can also send messages to your care team and make appointments. If you have questions, please call your primary care clinic.  If you do not have a primary care provider, please call [phone] and they will assist you.        Care EveryWhere ID     This is your Care EveryWhere ID. This could be used by other organizations to access your [facility] medical records  [id number]        Your Vitals Were     Pulse Temperature Respirations Height Pulse Oximetry BMI (Body Mass Index)    69 98.1  F (36.7  C) (Tympanic) 18 1.676 m (5' 5.98\") 96% 26.53 kg/m2       Blood Pressure from Last 3 Encounters:   07/12/18 141/86   06/22/18 142/84   05/17/18 145/87    Weight from Last 3 Encounters:   07/12/18 74.5 kg (164 lb 4.8 oz)   06/22/18 75.4 kg (166 lb 3.2 oz)   05/17/18 74.5 kg (164 lb 3.2 oz)              We Performed the Following     *CBC with platelets differential     Comprehensive metabolic panel        Primary Care Provider Office Phone # Fax #    Sudhir Layne [phone]       [employer] [address]  [facility] [address]        Equal Access to Services     NEFTALY MANJARREZ AH: yelitza Chandra qaybta " meño castanoaustyn herrera. So [facility] [phone].    ATENCIÓN: Si lurdes samson, tiene a jean disposición servicios gratuitos de asistencia lingüística. Emily al [phone].    We comply with applicable federal civil rights laws and [place] laws. We do not discriminate on the basis of race, color, national origin, age, disability, sex, sexual orientation, or gender identity.            Thank you!     Thank you for choosing [facility]  for your care. Our goal is always to provide you with excellent care. Hearing back from our patients is one way we can continue to improve our services. Please take a few minutes to complete the written survey that you may receive in the mail after your visit with us. Thank you!             Your Updated Medication List - Protect others around you: Learn how to safely use, store and throw away your medicines at www.disposemymeds.org.          This list is accurate as of 7/12/18 11:59 PM.  Always use your most recent med list.                   Brand Name Dispense Instructions for use Diagnosis    prochlorperazine 10 MG tablet    COMPAZINE    30 tablet    Take 1 tablet (10 mg) by mouth every 6 hours as needed (Nausea/Vomiting)    HCC (hepatocellular carcinoma) (H)       SORAfenib 200 MG tablet CHEMO    nexAVAR    120 tablet    Take 2 tablets (400 mg) by mouth 2 times daily Take only 1 tab twice a day for first two weeks. Don't increase to full dose until instructed    HCC (hepatocellular carcinoma) (H)

## 2018-07-12 NOTE — NURSING NOTE
"Oncology Rooming Note    July 12, 2018 8:08 AM   Preeti Pascual is a 52 year old male who presents for:    Chief Complaint   Patient presents with     Oncology Clinic Visit     Return visit related to HCC     Initial Vitals: /86 (BP Location: Left arm, Patient Position: Sitting, Cuff Size: Adult Regular)  Pulse 69  Temp 98.1  F (36.7  C) (Tympanic)  Resp 18  Ht 1.676 m (5' 5.98\")  Wt 74.5 kg (164 lb 4.8 oz)  SpO2 96%  BMI 26.53 kg/m2 Estimated body mass index is 26.53 kg/(m^2) as calculated from the following:    Height as of this encounter: 1.676 m (5' 5.98\").    Weight as of this encounter: 74.5 kg (164 lb 4.8 oz). Body surface area is 1.86 meters squared.  No Pain (0) Comment: Data Unavailable   No LMP for male patient.  Allergies reviewed: Yes  Medications reviewed: Yes    Medications: Medication refills not needed today.  Pharmacy name entered into [employer]: [id number] - [place], [place] - [address] AT [facility]    Clinical concerns: No new concerns. Provider was notified.    10 minutes for nursing intake (face to face time)     Barbara Prieto LPN            "

## 2018-07-12 NOTE — PROGRESS NOTES
Reason for Visit: follow-up visit    Oncology HPI:     Newly metastatic hepatocellular carcinoma in a patient with compensated liver disease and a normal performance status.    Mr. Pascual is 52 years old and was recognized several years ago to have hepatitis B, with well compensated cirrhosis and liver confined hepatocellular carcinoma. He had treatment of his hepatitis B which has been cleared. He had 2 episodes of radio embolization in January and November 2016 with an excellent response. He declined to pursue liver transplant. On recent surveillance imaging a few weeks ago he was noted to have the growth of tumor at the periphery of his previously treated lesion plus a development of an adrenal metastasis. At present he has no new symptoms.. He has never had significant symptoms from his liver disease: no problems with edema, bleeding, encephalopathy, or ascites. MR 5/17/18 showing two actively growing lesions of HCC on the periphery of his previously treated lesion and he has about 2-1/2 cm metastasis in his right adrenal gland. His alpha-fetoprotein is minimally elevated at about 14.  Patient is currently on standard treatment of Sorafenib (200 mb BID for 2 weeks, than increase to 400 mg BID). Dr. Kyle discussed with patient it can provide a modest survival benefit and occasionally dramatic responses.     Past medical history:  #1. Hepatitis B. He has been off his entectavir for more than a year with no recurrence of his hepatitis B.  #2. Well compensated cirrhosis.    Interval history:     Patient here today unaccompanied. He reported feeling general well. He has had minimal side effects from the Sorafenib. He is currently taking 400 mg BID (increased dose on 7/8). He reports he did have some mild  burning sensation in esophageal area (reflux?) for the first few days on the medication which then resolved. This occurred again with the dose increase and once again resolved with no management. He denies any other  "symptoms.   No bleeding, no rashes, no skin changes, no diarrhea and nausea.   No edema, bleeding, encephalopathy or ascites.     ROS:    Patient denies any of the following except if noted above: fevers, chills, difficulty with energy, vision or hearing changes, headaches,chest pain, dyspnea, abdominal pain, vomiting, urinary concerns, neuropathy,  issues with sleep or mood. ROS otherwise negative on a 12-system review.        Current Outpatient Prescriptions   Medication Sig Dispense Refill     prochlorperazine (COMPAZINE) 10 MG tablet Take 1 tablet (10 mg) by mouth every 6 hours as needed (Nausea/Vomiting) 30 tablet 2     SORAfenib (NEXAVAR) 200 MG tablet CHEMO Take 2 tablets (400 mg) by mouth 2 times daily Take only 1 tab twice a day for first two weeks. Don't increase to full dose until instructed 120 tablet 0          Allergies   Allergen Reactions     Isovue [Iopamidol] Other (See Comments)     6/28/18 Pt had sneezing and very slight scratchy throat after CT exam. No intervention required. [facility].         Exam:/86 (BP Location: Left arm, Patient Position: Sitting, Cuff Size: Adult Regular)  Pulse 69  Temp 98.1  F (36.7  C) (Tympanic)  Resp 18  Ht 1.676 m (5' 5.98\")  Wt 74.5 kg (164 lb 4.8 oz)  SpO2 96%  BMI 26.53 kg/m2 There were no vitals taken for this visit.  Wt Readings from Last 4 Encounters:   06/22/18 75.4 kg (166 lb 3.2 oz)   05/17/18 74.5 kg (164 lb 3.2 oz)   11/02/17 72.6 kg (160 lb)   05/12/17 69.9 kg (154 lb 1.6 oz)     General:  in no acute distress, alert and oriented x3.   HEENT: Normocephalic, atraumatic.  PERRLA, oropharynx clear with no mucositis or thrush.   Lymph Nodes: No palpable pre/post-auricular, cervical, axillary, or inguinal lymphadenopathy appreciated.   CV: RRR, normal S1 S2.  No murmurs, gallops, or rubs.   Lungs: Clear to auscultation bilaterally.  No dullness to percussion.   Abdomen:  Soft and non tender. No palpable masses.    Extremities: no clubbing, " cyanosis, or edema.       Labs:   Results for TOD RICKS (MRN [id number]) as of 7/12/2018 10:53   Ref. Range 7/12/2018 09:09   Sodium Latest Ref Range: 133 - 144 mmol/L 133   Potassium Latest Ref Range: 3.4 - 5.3 mmol/L 4.2   Chloride Latest Ref Range: 94 - 109 mmol/L 104   Carbon Dioxide Latest Ref Range: 20 - 32 mmol/L 25   Urea Nitrogen Latest Ref Range: 7 - 30 mg/dL 7   Creatinine Latest Ref Range: 0.66 - 1.25 mg/dL 0.64 (L)   GFR Estimate Latest Ref Range: >60 mL/min/1.7m2 >90   GFR Estimate If Black Latest Ref Range: >60 mL/min/1.7m2 >90   Calcium Latest Ref Range: 8.5 - 10.1 mg/dL 8.4 (L)   Anion Gap Latest Ref Range: 3 - 14 mmol/L 5   Albumin Latest Ref Range: 3.4 - 5.0 g/dL 3.4   Protein Total Latest Ref Range: 6.8 - 8.8 g/dL 8.4   Bilirubin Total Latest Ref Range: 0.2 - 1.3 mg/dL 0.9   Alkaline Phosphatase Latest Ref Range: 40 - 150 U/L 116   ALT Latest Ref Range: 0 - 70 U/L 43   AST Latest Ref Range: 0 - 45 U/L 41   Glucose Latest Ref Range: 70 - 99 mg/dL 94   WBC Latest Ref Range: 4.0 - 11.0 10e9/L 5.1   Hemoglobin Latest Ref Range: 13.3 - 17.7 g/dL 14.1   Hematocrit Latest Ref Range: 40.0 - 53.0 % 40.4   Platelet Count Latest Ref Range: 150 - 450 10e9/L 181   RBC Count Latest Ref Range: 4.4 - 5.9 10e12/L 4.94   MCV Latest Ref Range: 78 - 100 fl 82   MCH Latest Ref Range: 26.5 - 33.0 pg 28.5   MCHC Latest Ref Range: 31.5 - 36.5 g/dL 34.9   RDW Latest Ref Range: 10.0 - 15.0 % 14.4   Diff Method Unknown Automated Method   % Neutrophils Latest Units: % 53.1   % Lymphocytes Latest Units: % 20.7   % Monocytes Latest Units: % 8.0   % Eosinophils Latest Units: % 16.4   % Basophils Latest Units: % 1.6   % Immature Granulocytes Latest Units: % 0.2   Nucleated RBCs Latest Ref Range: 0 /100 0   Absolute Neutrophil Latest Ref Range: 1.6 - 8.3 10e9/L 2.7   Absolute Lymphocytes Latest Ref Range: 0.8 - 5.3 10e9/L 1.1   Absolute Monocytes Latest Ref Range: 0.0 - 1.3 10e9/L 0.4   Absolute Eosinophils Latest Ref Range:  0.0 - 0.7 10e9/L 0.8 (H)   Absolute Basophils Latest Ref Range: 0.0 - 0.2 10e9/L 0.1   Abs Immature Granulocytes Latest Ref Range: 0 - 0.4 10e9/L 0.0   Absolute Nucleated RBC Unknown 0.0       Imaging:  EXAMINATION: CT CHEST ABDOMEN W/O & W THOR PEL W CONTRAST,  6/28/2018 9:12 AM     TECHNIQUE:  Helical CT images from the thoracic inlet through the  symphysis pubis were obtained  with contrast. Additional images at  multiple time points using a protocol designed to detect and evaluate  hepatocellular carcinoma were also obtained     CONTRAST DOSE: Isovue 370 101cc     COMPARISON: MRI 5/17/2018, 5/12/2017, 2/7/2017, 12/13/2016, CT  8/23/2016, 1/25/2016, angiogram 11/23/2016     HISTORY: ; HCC (hepatocellular carcinoma) (H); Chronic viral hepatitis  B without delta agent and without coma (H)     FINDINGS:     Chest:   Heart size is within normal limits. Severe calcifications of the LAD  coronary artery. No pericardial effusion. The pulmonary artery and  thoracic aorta are nondilated.      Calcified mediastinal lymph nodes. Unchanged 1.1 cm right perihilar  lymph node.     Central tracheobronchial tree is patent. Calcified granuloma in the  right base at the hemidiaphragm level. Unchanged left upper lobe tiny  1-2 mm nodule (image 10/38).     No pleural effusion or pneumothorax.     Abdomen and pelvis:     Liver:      Comparison was made to the MRI 5/17/2018. There is left lobe  hypertrophy and widening of the fissures without surface nodularity  suggestive of early cirrhotic change.     Lesion 1: Y-90 posttreatment changes in the hepatic segment 5/8  measuring approximately 6.1 x 3.9 cm (image 7/47). Unchanged  enhancement pattern in the treatment bed. LR-TR nonviable     Lesion 2: No significant change in the 2.4 x 1.4 arterially enhancing  lesion in the hepatic segment 5/8 along the medial aspect of the above  treated lesion (image 7/52). Associated washout and pseudocapsule on  the delayed images. OPTN class  5b     Lesion 3: Less well-visualized previously described 1.6 cm hepatic  segment 5/8 lesion on the inferior aspect of the treatment bed. This  does not currently have clear arterial enhancement (though clearly  dilated on previous exam). There is continued washout of similar size  to comparison exam. OPTN class Va based on recent MRI.     Lesion 4: Treatment changes involving the hepatic segment 7. No  suspicious enhancement. LR-TR nonviable     No new suspicious hepatic lesion. Chronic bland thrombus of the  anterior right portal vein. Replaced right hepatic artery originates  from the SMA.     Remainder of abdomen and pelvis:   Splenules adjacent to the spleen. The spleen is within within normal  limits. Unchanged 2.7 cm right adrenal nodule. Seconds adrenal nodule  seen more medially measures up to 1.5 cm on series 9 image 291, also  unchanged. Unremarkable pancreas. Tiny renal cysts. No hydronephrosis  or renal calculus. Unremarkable urinary bladder.     Normal appendix. No bowel obstruction or abnormal bowel wall  thickening. Large prostate measures up to 5.1 cm.     No ascites. No pneumatosis, portal venous gas or free air. No  significant change in the mildly enlarged tiffanie hepatic lymph node  measuring up to 1.1 cm (image 9/299).     Bones and soft tissues: No suspicious lesions.         IMPRESSION:    1. Revisualization of hepatocellular carcinoma status post radio  embolization with 2 foci of viable intrahepatic malignancy, not  significantly changed from previous MRI and better characterized on  prior exam.  2. Unchanged metastatic right adrenal lesions.  3. No additional foci of metastatic disease identified in the chest,  abdomen, or pelvis. Tiny unchanged pulmonary nodules.  4. Based on this exam only, the patient is outside Prasad criteria,  because of adrenal metastasis.      Impression/plan:   Metastatic hepatocellular carcinoma:  Newly metastatic hepatocellular carcinoma in a patient with  compensated liver disease and a normal performance status. MR 5/17/18 showing two actively growing lesions of HCC on the periphery of his previously treated lesion and he has about 2-1/2 cm metastasis in his right adrenal gland. His alpha-fetoprotein is minimally elevated at about 14.  Patient is currently on standard treatment of Sorafenib (200 mb BID for 2 weeks, than if tolerated increase to 400 mg BID) . Currently taking 400 mg BID and tolerating well. Denies any adverse side effects.    Plan is for first response assessment at 2 months.  Follow-up with Dr. Kyle 8/21 with imaging 8/16 and labs.     Reflux?  Patient to report if any further episodes. Will continue to monitor.     Hypertension:   /86. Will continue to monitor.     Well compensated cirrhosis/hep B: will continue to monitor LFTs. Within normal limits today.     Patient seen in coordination with Nallely Smiley VIRGILIO. Please see attestation.     Mariluz Lewis VIRGILIO    The patient was seen in conjunction with Mariluz Lewis CNP.  I have reviewed the note and agree with the above findings and plan.TW

## 2018-07-17 ENCOUNTER — TELEPHONE (OUTPATIENT)
Dept: ONCOLOGY | Facility: CLINIC | Age: 53
End: 2018-07-17

## 2018-07-17 NOTE — ORAL ONC MGMT
Oral Chemotherapy Monitoring Program     Placed call to patient in follow up of Nexavar therapy.     Left message to please call back in follow-up of therapy. No patient or drug names were mentioned.     Leo Lopez PharmD  [facility]  [phone]  July 17, 2018

## 2018-07-17 NOTE — TELEPHONE ENCOUNTER
Oral Chemotherapy Monitoring Program    The Oral Chemo team received a voicemail from Preeti requesting a call back to discuss his Nexavar therapy. Returned call but no answer.  Left message requesting call back at [phone] to follow-up on therapy.    No patient or medication names were mentioned in this message.  Daya Johnson  Pharmacy Intern   [facility]  Oral Chemotherapy Monitoring Program  [phone]

## 2018-07-18 ENCOUNTER — TELEPHONE (OUTPATIENT)
Dept: ONCOLOGY | Facility: CLINIC | Age: 53
End: 2018-07-18

## 2018-07-18 NOTE — ORAL ONC MGMT
Oral Chemotherapy Monitoring Program    Placed call to patient in follow up of Nexavar therapy.    Left message to please call back in follow up of therapy. No patient or drug names were mentioned.    Unique Curry  Pharmacy Intern  [facility]  [phone]

## 2018-07-23 ENCOUNTER — TELEPHONE (OUTPATIENT)
Dept: ONCOLOGY | Facility: CLINIC | Age: 53
End: 2018-07-23

## 2018-07-23 NOTE — TELEPHONE ENCOUNTER
Oral Chemotherapy Monitoring Program    Placed call to patient in follow up of Nexavar therapy with regard to assessment for pain, blistering, diarrhea, and mouth sores per Nallely Smiley.    Left message to please call back in follow up of therapy. No patient or drug names were mentioned.    Christiano Quintanilla  Pharmacy Intern  Oral Chemotherapy Monitoring Program  [facility]  [phone]

## 2018-07-25 ENCOUNTER — TELEPHONE (OUTPATIENT)
Dept: ONCOLOGY | Facility: CLINIC | Age: 53
End: 2018-07-25

## 2018-07-25 DIAGNOSIS — C22.0 HCC (HEPATOCELLULAR CARCINOMA) (H): Primary | ICD-10-CM

## 2018-07-25 RX ORDER — SORAFENIB 200 MG/1
400 TABLET, FILM COATED ORAL 2 TIMES DAILY
Qty: 120 TABLET | Refills: 0 | Status: SHIPPED | OUTPATIENT
Start: 2018-07-25 | End: 2019-01-03

## 2018-07-31 ENCOUNTER — TELEPHONE (OUTPATIENT)
Dept: ONCOLOGY | Facility: CLINIC | Age: 53
End: 2018-07-31

## 2018-07-31 NOTE — TELEPHONE ENCOUNTER
Telephone exchange    I called patient on 7/30 to set up delivery via  (at pharmacy's expense) to patient's home on 7/31 between 8:00- 12:00. Patient was told that someone must be home to receive package. Patient asked that  call home upon arrival to alert wife to answer the door and receive package.  arrived at patient's home, knocked on door and made phone call but no one answered. At some later time, wife realized she missed phone call and called Preeti to tell about event. Preeti called  and was very angry and yelled at  to a point where he finally ended call.  notified [facility] pharmacy about event and said he had never experienced such anger over a delivery. Package was returned to pharmacy for . I called Preeti and left voicemail explaining that package had arrived to his home but could not be delivered because no one answered door. Preeti then called me and was extremely angry about event saying that  had never arrived with package and was lying about what had occurred. He yelled at writer for about 5 minutes or more and I tried to remain neutral saying that package is now available for  at pharmacy and that it could not be resent due to $16 cost covered by pharmacy as a extra service for patients. Patient ended call.

## 2018-08-16 ENCOUNTER — RADIANT APPOINTMENT (OUTPATIENT)
Dept: CT IMAGING | Facility: CLINIC | Age: 53
End: 2018-08-16
Attending: INTERNAL MEDICINE
Payer: COMMERCIAL

## 2018-08-16 DIAGNOSIS — B18.1 CHRONIC VIRAL HEPATITIS B WITHOUT DELTA AGENT AND WITHOUT COMA (H): ICD-10-CM

## 2018-08-16 DIAGNOSIS — C22.0 HCC (HEPATOCELLULAR CARCINOMA) (H): ICD-10-CM

## 2018-08-16 LAB
AFP SERPL-MCNC: 20.7 UG/L (ref 0–8)
ALBUMIN SERPL-MCNC: 3.5 G/DL (ref 3.4–5)
ALP SERPL-CCNC: 93 U/L (ref 40–150)
ALT SERPL W P-5'-P-CCNC: 46 U/L (ref 0–70)
ANION GAP SERPL CALCULATED.3IONS-SCNC: 6 MMOL/L (ref 3–14)
AST SERPL W P-5'-P-CCNC: 41 U/L (ref 0–45)
BASOPHILS # BLD AUTO: 0.1 10E9/L (ref 0–0.2)
BASOPHILS NFR BLD AUTO: 1.9 %
BILIRUB SERPL-MCNC: 0.7 MG/DL (ref 0.2–1.3)
BUN SERPL-MCNC: 8 MG/DL (ref 7–30)
CALCIUM SERPL-MCNC: 8.2 MG/DL (ref 8.5–10.1)
CHLORIDE SERPL-SCNC: 106 MMOL/L (ref 94–109)
CO2 SERPL-SCNC: 25 MMOL/L (ref 20–32)
CREAT SERPL-MCNC: 0.64 MG/DL (ref 0.66–1.25)
DIFFERENTIAL METHOD BLD: ABNORMAL
EOSINOPHIL # BLD AUTO: 0.8 10E9/L (ref 0–0.7)
EOSINOPHIL NFR BLD AUTO: 19.6 %
ERYTHROCYTE [DISTWIDTH] IN BLOOD BY AUTOMATED COUNT: 14.8 % (ref 10–15)
GFR SERPL CREATININE-BSD FRML MDRD: >90 ML/MIN/1.7M2
GLUCOSE SERPL-MCNC: 94 MG/DL (ref 70–99)
HCT VFR BLD AUTO: 42.6 % (ref 40–53)
HGB BLD-MCNC: 14.4 G/DL (ref 13.3–17.7)
IMM GRANULOCYTES # BLD: 0 10E9/L (ref 0–0.4)
IMM GRANULOCYTES NFR BLD: 0.2 %
INR PPP: 1.04 (ref 0.86–1.14)
LYMPHOCYTES # BLD AUTO: 1.1 10E9/L (ref 0.8–5.3)
LYMPHOCYTES NFR BLD AUTO: 25.6 %
MCH RBC QN AUTO: 28.3 PG (ref 26.5–33)
MCHC RBC AUTO-ENTMCNC: 33.8 G/DL (ref 31.5–36.5)
MCV RBC AUTO: 84 FL (ref 78–100)
MONOCYTES # BLD AUTO: 0.4 10E9/L (ref 0–1.3)
MONOCYTES NFR BLD AUTO: 9.7 %
NEUTROPHILS # BLD AUTO: 1.8 10E9/L (ref 1.6–8.3)
NEUTROPHILS NFR BLD AUTO: 43 %
NRBC # BLD AUTO: 0 10*3/UL
NRBC BLD AUTO-RTO: 0 /100
PLATELET # BLD AUTO: 171 10E9/L (ref 150–450)
POTASSIUM SERPL-SCNC: 4.1 MMOL/L (ref 3.4–5.3)
PROT SERPL-MCNC: 8.2 G/DL (ref 6.8–8.8)
RBC # BLD AUTO: 5.09 10E12/L (ref 4.4–5.9)
SODIUM SERPL-SCNC: 138 MMOL/L (ref 133–144)
WBC # BLD AUTO: 4.1 10E9/L (ref 4–11)

## 2018-08-16 PROCEDURE — 85610 PROTHROMBIN TIME: CPT | Performed by: INTERNAL MEDICINE

## 2018-08-16 PROCEDURE — 80053 COMPREHEN METABOLIC PANEL: CPT | Performed by: INTERNAL MEDICINE

## 2018-08-16 PROCEDURE — 85025 COMPLETE CBC W/AUTO DIFF WBC: CPT | Performed by: INTERNAL MEDICINE

## 2018-08-16 PROCEDURE — 82105 ALPHA-FETOPROTEIN SERUM: CPT | Performed by: INTERNAL MEDICINE

## 2018-08-16 RX ORDER — IOPAMIDOL 755 MG/ML
100 INJECTION, SOLUTION INTRAVASCULAR ONCE
Status: COMPLETED | OUTPATIENT
Start: 2018-08-16 | End: 2018-08-16

## 2018-08-16 RX ADMIN — IOPAMIDOL 100 ML: 755 INJECTION, SOLUTION INTRAVASCULAR at 09:17

## 2018-08-16 NOTE — DISCHARGE INSTRUCTIONS

## 2018-08-16 NOTE — NURSING NOTE
Chief Complaint   Patient presents with     Blood Draw     Labs drawn via /PIV placed by RN. VS taken.     Isabel Lopez RN

## 2018-08-21 ENCOUNTER — TELEPHONE (OUTPATIENT)
Dept: ONCOLOGY | Facility: CLINIC | Age: 53
End: 2018-08-21

## 2018-08-21 ENCOUNTER — ONCOLOGY VISIT (OUTPATIENT)
Dept: ONCOLOGY | Facility: CLINIC | Age: 53
End: 2018-08-21
Attending: INTERNAL MEDICINE
Payer: COMMERCIAL

## 2018-08-21 VITALS
DIASTOLIC BLOOD PRESSURE: 83 MMHG | OXYGEN SATURATION: 97 % | TEMPERATURE: 97 F | BODY MASS INDEX: 26.69 KG/M2 | HEIGHT: 66 IN | HEART RATE: 69 BPM | WEIGHT: 166.1 LBS | SYSTOLIC BLOOD PRESSURE: 140 MMHG | RESPIRATION RATE: 18 BRPM

## 2018-08-21 DIAGNOSIS — K74.69 OTHER CIRRHOSIS OF LIVER (H): ICD-10-CM

## 2018-08-21 DIAGNOSIS — C22.0 HCC (HEPATOCELLULAR CARCINOMA) (H): Primary | ICD-10-CM

## 2018-08-21 PROCEDURE — G0463 HOSPITAL OUTPT CLINIC VISIT: HCPCS

## 2018-08-21 PROCEDURE — 99214 OFFICE O/P EST MOD 30 MIN: CPT | Mod: ZP | Performed by: INTERNAL MEDICINE

## 2018-08-21 RX ORDER — SORAFENIB 200 MG/1
TABLET, FILM COATED ORAL
Qty: 90 TABLET | Refills: 0 | Status: SHIPPED | OUTPATIENT
Start: 2018-08-21 | End: 2018-10-22

## 2018-08-21 ASSESSMENT — PAIN SCALES - GENERAL: PAINLEVEL: MODERATE PAIN (5)

## 2018-08-21 NOTE — MR AVS SNAPSHOT
After Visit Summary   8/21/2018    Preeti Pascual    MRN: [id number]           Patient Information     Date Of Birth          1965        Visit Information        Provider Department      8/21/2018 8:00 AM Agustin Kyle MD [facility]        Today's Diagnoses     HCC (hepatocellular carcinoma) (H)    -  1    Other cirrhosis of liver (H)           Follow-ups after your visit        Follow-up notes from your care team     Return in about 2 months (around 10/21/2018) for MD visit with CT and labs.      Your next 10 appointments already scheduled     Sep 13, 2018  8:00 AM CDT   LAB with  LAB   [facility] ([facility])    [address] [id number] [phone]           Please do not eat 10-12 hours before your appointment if you are coming in fasting for labs on lipids, cholesterol, or glucose (sugar). This does not apply to pregnant women. Water, hot tea and black coffee (with nothing added) are okay. Do not drink other fluids, diet soda or chew gum.            Oct 18, 2018  8:15 AM CDT   LAB with  LAB   [facility] ([facility])    [address] [id number] [phone]           Please do not eat 10-12 hours before your appointment if you are coming in fasting for labs on lipids, cholesterol, or glucose (sugar). This does not apply to pregnant women. Water, hot tea and black coffee (with nothing added) are okay. Do not drink other fluids, diet soda or chew gum.            Oct 18, 2018  8:40 AM CDT   CT CHEST ABDOMEN PELVIS W/O & W CONTRAST with UCCT2   [facility] CT ([facility])    [address] [id number] [phone]           Please bring any scans or X-rays taken at other hospitals, if similar tests were done. Also bring a list of your medicines, including vitamins,  minerals and over-the-counter drugs. It is safest to leave personal items at home.  Be sure to tell your doctor:   If you have any allergies.   If there s any chance you are pregnant.   If you are breastfeeding.  How to prepare:   Do not eat or drink for 2 hours before your exam. If you need to take medicine, you may take it with small sips of water. (We may ask you to take liquid medicine as well.)   Please wear loose clothing, such as a sweat suit or jogging clothes. Avoid snaps, zippers and other metal. We may ask you to undress and put on a hospital gown.  Please arrive 30 minutes early for your CT. Once in the department you might be asked to drink water 15-20 minutes prior to your exam.  If indicated you may be asked to drink an oral contrast in advance of your CT.  If this is the case, the imaging team will let you know or be in contact with you prior to your appointment  Patients over 70 or patients with diabetes or kidney problems:   If you haven t had a blood test (creatinine test) within the last 30 days, the Cardiologist/Radiologist may require you to get this test prior to your exam.  If you have diabetes:   Continue to take your metformin medication on the day of your exam  If you have any questions, please call the Imaging Department where you will have your exam.            Oct 22, 2018  8:15 AM CDT   (Arrive by 8:00 AM)   Return Visit with Agustin Kyle MD   [facility] ([facility])    [address] [place] [address]  [address]  [facility] [address]   [phone]            Nov 15, 2018  8:00 AM CST   Lab with  LAB   [facility] ([facility])    [address] [place] [address]   [phone]            Nov 15, 2018  8:30 AM CST   MR ABDOMEN W/O & W CONTRAST with [address] [facility] [place] [facility] ([facility])    [address] [place] [id number]    [phone]           Take your medicines as usual, unless your doctor tells you not to. Bring a list of your current medicines to your exam (including vitamins, minerals and over-the-counter drugs). Also bring the results of similar scans you may have had.    You may or may not receive IV contrast for this exam pending the discretion of the Radiologist.   Do not eat or drink for 6 hours prior to exam.  The MRI machine uses a strong magnet. Please wear clothes without metal (snaps, zippers). A sweatsuit works well, or we may give you a hospital gown.  Please remove any body piercings and hair extensions before you arrive. You will also remove watches, jewelry, hairpins, wallets, dentures, partial dental plates and hearing aids. You may wear contact lenses, and you may be able to wear your rings. We have a safe place to keep your personal items, but it is safer to leave them at home.  **IMPORTANT** THE INSTRUCTIONS BELOW ARE ONLY FOR THOSE PATIENTS WHO HAVE BEEN PRESCRIBED SEDATION OR GENERAL ANESTHESIA DURING THEIR MRI PROCEDURE:  IF YOUR DOCTOR PRESCRIBED ORAL SEDATION (take medicine to help you relax during your exam):   You must get the medicine from your doctor (oral medication) before you arrive. Bring the medicine to the exam. Do not take it at home. You ll be told when to take it upon arriving for your exam.   Arrive one hour early. Bring someone who can take you home after the test. Your medicine will make you sleepy. After the exam, you may not drive, take a bus or take a taxi by yourself.  IF YOUR DOCTOR PRESCRIBED IV SEDATION:   Arrive one hour early. Bring someone who can take you home after the test. Your medicine will make you sleepy. After the exam, you may not drive, take a bus or take a taxi by yourself.   No eating 6 hours before your exam. You may have clear liquids up until 4 hours before your exam. (Clear liquids include water, clear tea, black coffee and fruit juice without pulp.)  IF YOUR  DOCTOR PRESCRIBED ANESTHESIA (be asleep for your exam):   Arrive 1 1/2 hours early. Bring someone who can take you home after the test. You may not drive, take a bus or take a taxi by yourself.   No eating 8 hours before your exam. You may have clear liquids up until 4 hours before your exam. (Clear liquids include water, clear tea, black coffee and fruit juice without pulp.)   You will spend four to five hours in the recovery room.  If you have any questions, please contact your Imaging Department exam site.            Nov 15, 2018  9:30 AM CST   (Arrive by 9:15 AM)   Return General Liver with Trevor Trujillo MD   [facility] ([facility])    [address] [place] [address]  [address]  [facility] [address] [phone]              Future tests that were ordered for you today     Open Future Orders        Priority Expected Expires Ordered    CT Chest Abdomen Pelvis w/o & w Contrast Routine 10/22/2018 11/21/2018 8/21/2018    Comprehensive metabolic panel Routine 10/22/2018 11/21/2018 8/21/2018    CBC with platelets differential Routine 10/22/2018 11/21/2018 8/21/2018    AFP tumor marker Routine 10/22/2018 11/21/2018 8/21/2018    INR Routine 10/22/2018 11/21/2018 8/21/2018            Who to contact     If you have questions or need follow up information about today's clinic visit or your schedule please contact [facility] directly at [phone].  Normal or non-critical lab and imaging results will be communicated to you by MyChart, letter or phone within 4 business days after the clinic has received the results. If you do not hear from us within 7 days, please contact the clinic through MyChart or phone. If you have a critical or abnormal lab result, we will notify you by phone as soon as possible.  Submit refill requests through [facility] or call your pharmacy and they will forward the refill request to us. Please allow 3 business days for your refill to be completed.     "      Additional Information About Your Visit        MyChart Information     [facility] gives you secure access to your electronic health record. If you see a primary care provider, you can also send messages to your care team and make appointments. If you have questions, please call your primary care clinic.  If you do not have a primary care provider, please call [phone] and they will assist you.        Care EveryWhere ID     This is your Care EveryWhere ID. This could be used by other organizations to access your [facility] medical records  [id number]        Your Vitals Were     Pulse Temperature Respirations Height Pulse Oximetry BMI (Body Mass Index)    69 97  F (36.1  C) (Oral) 18 1.676 m (5' 5.98\") 97% 26.82 kg/m2       Blood Pressure from Last 3 Encounters:   08/21/18 140/83   07/12/18 141/86   06/22/18 142/84    Weight from Last 3 Encounters:   08/21/18 75.3 kg (166 lb 1.6 oz)   07/12/18 74.5 kg (164 lb 4.8 oz)   06/22/18 75.4 kg (166 lb 3.2 oz)               Primary Care Provider Office Phone # Fax #    Sudhir Layne [phone]       [facility] [address] [facility] [address]  [place] [address]        Equal Access to Services     NEFTALY MANJARREZ AH: Hadii tonie ku hadasho Soomaali, waaxda luqadaha, qaybta kaalmada adeegyada, waxay idiin hayaan costa herrera. So [facility] [phone].    ATENCIÓN: Si habla español, tiene a jean disposición servicios gratuitos de asistencia lingüística. Llame al [phone].    We comply with applicable federal civil rights laws and [place] laws. We do not discriminate on the basis of race, color, national origin, age, disability, sex, sexual orientation, or gender identity.            Thank you!     Thank you for choosing [facility]  for your care. Our goal is always to provide you with excellent care. Hearing back from our patients is one way we can continue to improve our services. Please take a few minutes to complete the written " survey that you may receive in the mail after your visit with us. Thank you!             Your Updated Medication List - Protect others around you: Learn how to safely use, store and throw away your medicines at www.disposemymeds.org.          This list is accurate as of 8/21/18  8:55 AM.  Always use your most recent med list.                   Brand Name Dispense Instructions for use Diagnosis    prochlorperazine 10 MG tablet    COMPAZINE    30 tablet    Take 1 tablet (10 mg) by mouth every 6 hours as needed (Nausea/Vomiting)    HCC (hepatocellular carcinoma) (H)       SORAfenib 200 MG tablet CHEMO    nexAVAR    120 tablet    Take 2 tablets (400 mg) by mouth 2 times daily Take on an empty stomach 1 hour before or 2 hours after a meal.    HCC (hepatocellular carcinoma) (H)

## 2018-08-21 NOTE — LETTER
8/21/2018      RE: Preeti Pascual  [address]  [facility] [address]       Preeti Pascual is here today in follow-up of metastatic hepatocellular carcinoma.    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  He is here today for his first response assessment after starting sorafenib.  He was started on 200 mg twice per day which she tolerated quite well, and then went up to 400 twice per day within a few days had fairly severe palmar toxicity with deep fissuring in his fingers.  He went back down to just 200 twice per day and his toxicity has for the most part resolved.  He has not had problems with mouth sores, diarrhea or significant fatigue.  He has some vague upper abdominal discomfort sometimes after he takes his pills, and that seems to resolve if he has something to eat.  He has not had problems with edema or ascites.  He has been able to carry on with his desired activities without much limitation.  The remainder of a 10 point review of systems is otherwise negative.    On physical exam Mr. Pascual appears well.  His vital signs are unremarkable.  He has no scleral icterus and no visible lesion in the oropharynx.  No adenopathy is palpable in his neck or supraclavicular spaces.  His lungs are clear to auscultation without dullness to percussion.  His heart rate and rhythm are regular with a grade 1 out of 6 systolic ejection murmur.  His jugular venous pressure appears normal.  His abdomen is soft and nontender without palpable mass organomegaly or ascites.  He has no peripheral edema no cyanosis or clubbing of his digits.  He has some minimal resolving dry desquamation at the fissures of his fingers.  His speech is fluent, his cranial nerves are grossly intact and he has an unremarkable gait and station.    I reviewed with him his CT  scan which shows his liver lesions and adrenal metastasis to be stable.  His alpha-fetoprotein is only mildly elevated at 20.  He has normal electrolytes, renal function, liver enzymes and blood counts.    Assessment/plan: Metastatic hepatocellular carcinoma in the setting of well compensated cirrhosis.  He appears to have stable disease on his current dose.  I think we can go up a little bit on his dose and he will start taking 2 pills in the morning and 1 pill in the afternoon and will see how he tolerates that. We will plan on another response assessment in about 2 months.      Agustin Kyle MD

## 2018-08-21 NOTE — PROGRESS NOTES
Preeti Pascual is here today in follow-up of metastatic hepatocellular carcinoma.    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  He is here today for his first response assessment after starting sorafenib.  He was started on 200 mg twice per day which she tolerated quite well, and then went up to 400 twice per day within a few days had fairly severe palmar toxicity with deep fissuring in his fingers.  He went back down to just 200 twice per day and his toxicity has for the most part resolved.  He has not had problems with mouth sores, diarrhea or significant fatigue.  He has some vague upper abdominal discomfort sometimes after he takes his pills, and that seems to resolve if he has something to eat.  He has not had problems with edema or ascites.  He has been able to carry on with his desired activities without much limitation.  The remainder of a 10 point review of systems is otherwise negative.    On physical exam Mr. Pascual appears well.  His vital signs are unremarkable.  He has no scleral icterus and no visible lesion in the oropharynx.  No adenopathy is palpable in his neck or supraclavicular spaces.  His lungs are clear to auscultation without dullness to percussion.  His heart rate and rhythm are regular with a grade 1 out of 6 systolic ejection murmur.  His jugular venous pressure appears normal.  His abdomen is soft and nontender without palpable mass organomegaly or ascites.  He has no peripheral edema no cyanosis or clubbing of his digits.  He has some minimal resolving dry desquamation at the fissures of his fingers.  His speech is fluent, his cranial nerves are grossly intact and he has an unremarkable gait and station.    I reviewed with him his CT scan which shows his liver lesions and adrenal metastasis to be stable.  His  alpha-fetoprotein is only mildly elevated at 20.  He has normal electrolytes, renal function, liver enzymes and blood counts.    Assessment/plan: Metastatic hepatocellular carcinoma in the setting of well compensated cirrhosis.  He appears to have stable disease on his current dose.  I think we can go up a little bit on his dose and he will start taking 2 pills in the morning and 1 pill in the afternoon and will see how he tolerates that. We will plan on another response assessment in about 2 months.

## 2018-08-21 NOTE — ORAL ONC MGMT
Oral Chemotherapy Monitoring Program     Primary Oncologist: Dr. Kyle  Primary Oncology Clinic: [facility]  Cancer Diagnosis: Liver Cancer     Therapy History:  06/ mg Nexavar BID   07/08-Begin taking 400 mg Nexavar BID  8/21: patient has been taking 200mg BID due to adverse effects. Plan to start 400mg qAM and 200mg qPM starting today.        Drug Interaction Assessment: No new drug interactions at this time      Lab Monitoring Plan               Monitoring plan:   CMP, Phos, CBC, ECG/Mg in high risk C2D1+ Call, BP, CMP, Phos, CBC C3D1+ Call, BP, CMP, Phos, CBC C4D1+ Call, BP, CMP, Phos, CBC C5D1+ Call, BP, CMP, Phos, CBC C6D1+ Call, BP, CMP, Phos, CBC   Call, BP C2D8+ Call, BP C3D8+   C4D8+   C5D8+   C6D8+     Call, BP C2D15+ Call, BP C3D15+   C4D15+   C5D15+   C6D15+     Call, BP C2D22+   C3D22+   C4D22+   C5D22+   C6D22+        Subjective/Objective:  Preeti Pascual is a 52 year old male contacted by phone for a follow-up visit for oral chemotherapy. He developed hand foot syndrome with deep fissuring of his hands after increasing the dose to 400mg BID as prescribed. He subsequently decreased the dose to 200 mg BID on his own and has been on this since. Upon review of his clinic note from Dr. Kyle and confirmed with the patient, he is to begin nexavar 400mg qAM and 200mg qPM.     ORAL CHEMOTHERAPY 6/22/2018 7/6/2018   Drug Name Nexavar (Sorafenib) Nexavar (Sorafenib)   Current Dosage 200mg 200mg   Current Schedule BID BID   Cycle Details Continuous Continuous   Start Date of Last Cycle - 6/24/2018   Doses missed in last 2 weeks - 0   Adherence Assessment - Adherent   Adverse Effects - No AE identified during assessment   Any new drug interactions? - No   Is the dose as ordered appropriate for the patient? - Yes       Vitals:  BP:   BP Readings from Last 1 Encounters:   08/21/18 140/83     Wt Readings from Last 1 Encounters:   08/21/18 75.3 kg (166 lb 1.6 oz)     Estimated body surface area is  "1.87 meters squared as calculated from the following:    Height as of an earlier encounter on 8/21/18: 1.676 m (5' 5.98\").    Weight as of an earlier encounter on 8/21/18: 75.3 kg (166 lb 1.6 oz).    Labs:  _  Result Component Current Result Ref Range   Sodium 138 (8/16/2018) 133 - 144 mmol/L     _  Result Component Current Result Ref Range   Potassium 4.1 (8/16/2018) 3.4 - 5.3 mmol/L     _  Result Component Current Result Ref Range   Calcium 8.2 (L) (8/16/2018) 8.5 - 10.1 mg/dL     No results found for Mag within last 30 days.     No results found for Phos within last 30 days.     _  Result Component Current Result Ref Range   Albumin 3.5 (8/16/2018) 3.4 - 5.0 g/dL     _  Result Component Current Result Ref Range   Urea Nitrogen 8 (8/16/2018) 7 - 30 mg/dL     _  Result Component Current Result Ref Range   Creatinine 0.64 (L) (8/16/2018) 0.66 - 1.25 mg/dL       _  Result Component Current Result Ref Range   AST 41 (8/16/2018) 0 - 45 U/L     _  Result Component Current Result Ref Range   ALT 46 (8/16/2018) 0 - 70 U/L     _  Result Component Current Result Ref Range   Bilirubin Total 0.7 (8/16/2018) 0.2 - 1.3 mg/dL       _  Result Component Current Result Ref Range   WBC 4.1 (8/16/2018) 4.0 - 11.0 10e9/L     _  Result Component Current Result Ref Range   Hemoglobin 14.4 (8/16/2018) 13.3 - 17.7 g/dL     _  Result Component Current Result Ref Range   Platelet Count 171 (8/16/2018) 150 - 450 10e9/L     _  Result Component Current Result Ref Range   Absolute Neutrophil 1.8 (8/16/2018) 1.6 - 8.3 10e9/L       Assessment:  Patient taking the medication at a different dose than prescribed due to hand foot syndrome at the higher dose of nexavar 400 mg BID.     Plan:  He will begin taking 400mg qAM and 200 mg qPM starting today.     Follow-Up:  9/13/18: labs  10/18/18: labs + scan  10/22/18: appt with Dr. Kyle     Refill Due:  9/21/18: release to [facility]    Austen Rao, PharmD, MS  Hematology/Oncology Clinical " Pharmacist  [facility]  [facility]

## 2018-08-21 NOTE — NURSING NOTE
"Oncology Rooming Note    August 21, 2018 7:56 AM   Preeti Pascual is a 52 year old male who presents for:    Chief Complaint   Patient presents with     Oncology Clinic Visit     Patient with Hepatocellular Carcinoma here for provider visit      Initial Vitals: /83 (BP Location: Right arm, Patient Position: Chair, Cuff Size: Adult Regular)  Pulse 69  Temp 97  F (36.1  C) (Oral)  Resp 18  Ht 1.676 m (5' 5.98\")  Wt 75.3 kg (166 lb 1.6 oz)  SpO2 97%  BMI 26.82 kg/m2 Estimated body mass index is 26.82 kg/(m^2) as calculated from the following:    Height as of this encounter: 1.676 m (5' 5.98\").    Weight as of this encounter: 75.3 kg (166 lb 1.6 oz). Body surface area is 1.87 meters squared.  Moderate Pain (5) Comment: abdominal pain    No LMP for male patient.  Allergies reviewed: Yes  Medications reviewed: Yes    Medications: Medication refills not needed today.  Pharmacy name entered into [facility]: [id number] - [place], [place] - [address] [place] [address] [address] AT [facility] [address]    Clinical concerns:     5 minutes for nursing intake (face to face time)     Antonette Adler CMA              "

## 2018-09-04 ENCOUNTER — TELEPHONE (OUTPATIENT)
Dept: ONCOLOGY | Facility: CLINIC | Age: 53
End: 2018-09-04

## 2018-09-04 NOTE — TELEPHONE ENCOUNTER
Pt states he took nexavar 400 mg  in AM and 200 mg in PM for one week starting 8/21 but he developed pain under his feet with walking along with some swelling. On 8/28 he began taking 200 mg in AM and 200 mg in PM and he has had improvement in his symptoms.  He states he tried to call clinic last week but had some sort of a problem getting though.  Will update care team.

## 2018-09-04 NOTE — ORAL ONC MGMT
Oral Chemotherapy Monitoring Program    Placed call to patient in follow up of Nexavar therapy.    Left message to please call back in follow up of therapy. No patient or drug names were mentioned.    Unique Curry  Pharmacy Intern  [facility]  [phone]

## 2018-09-05 ENCOUNTER — TELEPHONE (OUTPATIENT)
Dept: ONCOLOGY | Facility: CLINIC | Age: 53
End: 2018-09-05

## 2018-09-05 NOTE — ORAL ONC MGMT
Oral Chemotherapy Monitoring Program    Placed call to patient in follow up of Nexavar therapy. Preeti reports that he has been taking a reduced dose of 200 mg in the AM and 200 mg in the PM since 8/28. He had tried taking 400 mg in the AM and 200 mg in the PM as instructed, but had to discontinue the higher dose after several days due to intolerable foot swelling. He reports that he needs to be on his feet and walk for work. Preeti expressed that the swelling has subsided and has been tolerable since taking the reduced dose. He said that he spoke to a nurse (Stephanie Woodard) yesterday about his dose changes and symptoms, who then forwarded this information in an inbasket to Dr. Kyle on 9/4. Patient was informed that he will be contacted when we hear back from Dr. Kyle. Preeti was given the Oral Chemo Team and Nurse Triage phone numbers again to call with questions or concerns.    Unique Curry  Pharmacy Intern  [facility]  [phone]

## 2018-09-13 DIAGNOSIS — C22.0 HCC (HEPATOCELLULAR CARCINOMA) (H): ICD-10-CM

## 2018-09-13 DIAGNOSIS — K74.69 OTHER CIRRHOSIS OF LIVER (H): ICD-10-CM

## 2018-09-13 LAB
AFP SERPL-MCNC: 18.5 UG/L (ref 0–8)
ALBUMIN SERPL-MCNC: 3.4 G/DL (ref 3.4–5)
ALP SERPL-CCNC: 91 U/L (ref 40–150)
ALT SERPL W P-5'-P-CCNC: 46 U/L (ref 0–70)
ANION GAP SERPL CALCULATED.3IONS-SCNC: 6 MMOL/L (ref 3–14)
AST SERPL W P-5'-P-CCNC: 40 U/L (ref 0–45)
BASOPHILS # BLD AUTO: 0.1 10E9/L (ref 0–0.2)
BASOPHILS NFR BLD AUTO: 1.7 %
BILIRUB SERPL-MCNC: 0.5 MG/DL (ref 0.2–1.3)
BUN SERPL-MCNC: 12 MG/DL (ref 7–30)
CALCIUM SERPL-MCNC: 8.4 MG/DL (ref 8.5–10.1)
CHLORIDE SERPL-SCNC: 108 MMOL/L (ref 94–109)
CO2 SERPL-SCNC: 24 MMOL/L (ref 20–32)
CREAT SERPL-MCNC: 0.69 MG/DL (ref 0.66–1.25)
DIFFERENTIAL METHOD BLD: ABNORMAL
EOSINOPHIL # BLD AUTO: 0.8 10E9/L (ref 0–0.7)
EOSINOPHIL NFR BLD AUTO: 18.3 %
ERYTHROCYTE [DISTWIDTH] IN BLOOD BY AUTOMATED COUNT: 15.1 % (ref 10–15)
GFR SERPL CREATININE-BSD FRML MDRD: >90 ML/MIN/1.7M2
GLUCOSE SERPL-MCNC: 86 MG/DL (ref 70–99)
HCT VFR BLD AUTO: 42.3 % (ref 40–53)
HGB BLD-MCNC: 14.6 G/DL (ref 13.3–17.7)
IMM GRANULOCYTES # BLD: 0 10E9/L (ref 0–0.4)
IMM GRANULOCYTES NFR BLD: 0.2 %
INR PPP: 1.1 (ref 0.86–1.14)
LYMPHOCYTES # BLD AUTO: 1.2 10E9/L (ref 0.8–5.3)
LYMPHOCYTES NFR BLD AUTO: 25.4 %
MCH RBC QN AUTO: 29.8 PG (ref 26.5–33)
MCHC RBC AUTO-ENTMCNC: 34.5 G/DL (ref 31.5–36.5)
MCV RBC AUTO: 86 FL (ref 78–100)
MONOCYTES # BLD AUTO: 0.5 10E9/L (ref 0–1.3)
MONOCYTES NFR BLD AUTO: 10.7 %
NEUTROPHILS # BLD AUTO: 2 10E9/L (ref 1.6–8.3)
NEUTROPHILS NFR BLD AUTO: 43.7 %
NRBC # BLD AUTO: 0 10*3/UL
NRBC BLD AUTO-RTO: 0 /100
PLATELET # BLD AUTO: 154 10E9/L (ref 150–450)
POTASSIUM SERPL-SCNC: 4 MMOL/L (ref 3.4–5.3)
PROT SERPL-MCNC: 8.1 G/DL (ref 6.8–8.8)
RBC # BLD AUTO: 4.9 10E12/L (ref 4.4–5.9)
SODIUM SERPL-SCNC: 138 MMOL/L (ref 133–144)
WBC # BLD AUTO: 4.6 10E9/L (ref 4–11)

## 2018-10-01 ENCOUNTER — TELEPHONE (OUTPATIENT)
Dept: ONCOLOGY | Facility: CLINIC | Age: 53
End: 2018-10-01

## 2018-10-01 NOTE — TELEPHONE ENCOUNTER
Oral Chemotherapy Monitoring Program (Left Voicemail)      Primary Oncologist: Dr. Kyle  Primary Oncology Clinic: [facility]  Cancer Diagnosis: Liver Cancer     Attempted to contact patient today for follow up regarding oral chemotherapy, Nexavar, for normal assessment. No answer. Left voicemail for patient to please call me back at [phone] when able. No patient or medication names were mentioned while leaving this message.    Ce Howell   Pharmacy Intern  [facility]   [phone]

## 2018-10-09 NOTE — PROGRESS NOTES
Oral Chemotherapy Monitoring Program     Placed call to patient in follow up of Nexavar therapy.     Left message to please call back in follow up of therapy. No patient or drug names were mentioned.    Bharat Quinteros, PharmD, BCOP  October 9, 2018

## 2018-10-18 ENCOUNTER — TELEPHONE (OUTPATIENT)
Dept: PHARMACY | Facility: CLINIC | Age: 53
End: 2018-10-18

## 2018-10-18 ENCOUNTER — RADIANT APPOINTMENT (OUTPATIENT)
Dept: CT IMAGING | Facility: CLINIC | Age: 53
End: 2018-10-18
Attending: INTERNAL MEDICINE
Payer: COMMERCIAL

## 2018-10-18 DIAGNOSIS — C22.0 HCC (HEPATOCELLULAR CARCINOMA) (H): ICD-10-CM

## 2018-10-18 DIAGNOSIS — K74.69 OTHER CIRRHOSIS OF LIVER (H): ICD-10-CM

## 2018-10-18 DIAGNOSIS — C22.0 HCC (HEPATOCELLULAR CARCINOMA) (H): Primary | ICD-10-CM

## 2018-10-18 LAB
AFP SERPL-MCNC: 21.7 UG/L (ref 0–8)
ALBUMIN SERPL-MCNC: 3.5 G/DL (ref 3.4–5)
ALP SERPL-CCNC: 82 U/L (ref 40–150)
ALT SERPL W P-5'-P-CCNC: 41 U/L (ref 0–70)
ANION GAP SERPL CALCULATED.3IONS-SCNC: 6 MMOL/L (ref 3–14)
AST SERPL W P-5'-P-CCNC: 38 U/L (ref 0–45)
BASOPHILS # BLD AUTO: 0.1 10E9/L (ref 0–0.2)
BASOPHILS NFR BLD AUTO: 1 %
BILIRUB SERPL-MCNC: 0.8 MG/DL (ref 0.2–1.3)
BUN SERPL-MCNC: 7 MG/DL (ref 7–30)
CALCIUM SERPL-MCNC: 8.5 MG/DL (ref 8.5–10.1)
CHLORIDE SERPL-SCNC: 105 MMOL/L (ref 94–109)
CO2 SERPL-SCNC: 24 MMOL/L (ref 20–32)
CREAT SERPL-MCNC: 0.62 MG/DL (ref 0.66–1.25)
DIFFERENTIAL METHOD BLD: NORMAL
EOSINOPHIL # BLD AUTO: 0.6 10E9/L (ref 0–0.7)
EOSINOPHIL NFR BLD AUTO: 10.9 %
ERYTHROCYTE [DISTWIDTH] IN BLOOD BY AUTOMATED COUNT: 14.3 % (ref 10–15)
GFR SERPL CREATININE-BSD FRML MDRD: >90 ML/MIN/1.7M2
GLUCOSE SERPL-MCNC: 93 MG/DL (ref 70–99)
HCT VFR BLD AUTO: 41.8 % (ref 40–53)
HGB BLD-MCNC: 14.4 G/DL (ref 13.3–17.7)
IMM GRANULOCYTES # BLD: 0 10E9/L (ref 0–0.4)
IMM GRANULOCYTES NFR BLD: 0.2 %
INR PPP: 1.12 (ref 0.86–1.14)
LYMPHOCYTES # BLD AUTO: 1 10E9/L (ref 0.8–5.3)
LYMPHOCYTES NFR BLD AUTO: 19.7 %
MCH RBC QN AUTO: 29.6 PG (ref 26.5–33)
MCHC RBC AUTO-ENTMCNC: 34.4 G/DL (ref 31.5–36.5)
MCV RBC AUTO: 86 FL (ref 78–100)
MONOCYTES # BLD AUTO: 0.5 10E9/L (ref 0–1.3)
MONOCYTES NFR BLD AUTO: 10.2 %
NEUTROPHILS # BLD AUTO: 3 10E9/L (ref 1.6–8.3)
NEUTROPHILS NFR BLD AUTO: 58 %
NRBC # BLD AUTO: 0 10*3/UL
NRBC BLD AUTO-RTO: 0 /100
PLATELET # BLD AUTO: 169 10E9/L (ref 150–450)
POTASSIUM SERPL-SCNC: 3.9 MMOL/L (ref 3.4–5.3)
PROT SERPL-MCNC: 8.3 G/DL (ref 6.8–8.8)
RBC # BLD AUTO: 4.86 10E12/L (ref 4.4–5.9)
SODIUM SERPL-SCNC: 135 MMOL/L (ref 133–144)
WBC # BLD AUTO: 5.2 10E9/L (ref 4–11)

## 2018-10-18 RX ORDER — IOPAMIDOL 755 MG/ML
101 INJECTION, SOLUTION INTRAVASCULAR ONCE
Status: COMPLETED | OUTPATIENT
Start: 2018-10-18 | End: 2018-10-18

## 2018-10-18 RX ADMIN — IOPAMIDOL 101 ML: 755 INJECTION, SOLUTION INTRAVASCULAR at 09:10

## 2018-10-18 NOTE — TELEPHONE ENCOUNTER
Patient called to discuss oral chemotherapy medication- Nexavar.  Did state he is currently taking only 1 tablet PO BID since he was not able to tolerate 2 tablets PO qam and 1 tablet PO qpm secondary to pain (says he tried for 1 week this cycle, but then had to decrease the dose).    He also says he has been having intermittent diarrhea, though self limiting (1 loose stool a day if any). I did recommend he could have some loperamide on hand just incase it does not stop on it's own and send him a mychart with the name of the medications since he could not write it down at the moment.    He was thankful for the advice and said he will bring it up with Dr. Kyle at his appointment on Monday.    Maximo Mcqueen, PharmD, [facility]  Clinical Oncology Pharmacist  Oral Chemotherapy Team  October 18, 2018

## 2018-10-18 NOTE — DISCHARGE INSTRUCTIONS

## 2018-10-22 ENCOUNTER — ONCOLOGY VISIT (OUTPATIENT)
Dept: ONCOLOGY | Facility: CLINIC | Age: 53
End: 2018-10-22
Attending: INTERNAL MEDICINE
Payer: COMMERCIAL

## 2018-10-22 VITALS
DIASTOLIC BLOOD PRESSURE: 89 MMHG | BODY MASS INDEX: 26.79 KG/M2 | SYSTOLIC BLOOD PRESSURE: 149 MMHG | WEIGHT: 166.7 LBS | HEIGHT: 66 IN | HEART RATE: 69 BPM | TEMPERATURE: 97.2 F | OXYGEN SATURATION: 95 % | RESPIRATION RATE: 18 BRPM

## 2018-10-22 DIAGNOSIS — C22.0 HCC (HEPATOCELLULAR CARCINOMA) (H): Primary | ICD-10-CM

## 2018-10-22 DIAGNOSIS — K74.69 OTHER CIRRHOSIS OF LIVER (H): ICD-10-CM

## 2018-10-22 PROCEDURE — 25000128 H RX IP 250 OP 636: Mod: ZF | Performed by: INTERNAL MEDICINE

## 2018-10-22 PROCEDURE — 96372 THER/PROPH/DIAG INJ SC/IM: CPT | Mod: ZF

## 2018-10-22 PROCEDURE — 90686 IIV4 VACC NO PRSV 0.5 ML IM: CPT | Mod: ZF | Performed by: INTERNAL MEDICINE

## 2018-10-22 PROCEDURE — G0463 HOSPITAL OUTPT CLINIC VISIT: HCPCS | Mod: ZF,25

## 2018-10-22 PROCEDURE — G0008 ADMIN INFLUENZA VIRUS VAC: HCPCS

## 2018-10-22 PROCEDURE — 99214 OFFICE O/P EST MOD 30 MIN: CPT | Mod: ZP | Performed by: INTERNAL MEDICINE

## 2018-10-22 RX ORDER — SODIUM CHLORIDE 9 MG/ML
1000 INJECTION, SOLUTION INTRAVENOUS CONTINUOUS PRN
Status: CANCELLED
Start: 2018-11-01

## 2018-10-22 RX ORDER — MEPERIDINE HYDROCHLORIDE 25 MG/ML
25 INJECTION INTRAMUSCULAR; INTRAVENOUS; SUBCUTANEOUS EVERY 30 MIN PRN
Status: CANCELLED | OUTPATIENT
Start: 2018-11-01

## 2018-10-22 RX ORDER — DIPHENHYDRAMINE HYDROCHLORIDE 50 MG/ML
50 INJECTION INTRAMUSCULAR; INTRAVENOUS
Status: CANCELLED
Start: 2018-11-01

## 2018-10-22 RX ORDER — ALBUTEROL SULFATE 90 UG/1
1-2 AEROSOL, METERED RESPIRATORY (INHALATION)
Status: CANCELLED
Start: 2018-11-01

## 2018-10-22 RX ORDER — ALBUTEROL SULFATE 0.83 MG/ML
2.5 SOLUTION RESPIRATORY (INHALATION)
Status: CANCELLED | OUTPATIENT
Start: 2018-11-01

## 2018-10-22 RX ORDER — METHYLPREDNISOLONE SODIUM SUCCINATE 125 MG/2ML
125 INJECTION, POWDER, LYOPHILIZED, FOR SOLUTION INTRAMUSCULAR; INTRAVENOUS
Status: CANCELLED
Start: 2018-11-01

## 2018-10-22 RX ORDER — LORAZEPAM 2 MG/ML
0.5 INJECTION INTRAMUSCULAR EVERY 4 HOURS PRN
Status: CANCELLED
Start: 2018-11-01

## 2018-10-22 RX ORDER — EPINEPHRINE 1 MG/ML
0.3 INJECTION, SOLUTION INTRAMUSCULAR; SUBCUTANEOUS EVERY 5 MIN PRN
Status: CANCELLED | OUTPATIENT
Start: 2018-11-01

## 2018-10-22 RX ORDER — EPINEPHRINE 0.3 MG/.3ML
0.3 INJECTION SUBCUTANEOUS EVERY 5 MIN PRN
Status: CANCELLED | OUTPATIENT
Start: 2018-11-01

## 2018-10-22 RX ADMIN — INFLUENZA A VIRUS A/MICHIGAN/45/2015 X-275 (H1N1) ANTIGEN (FORMALDEHYDE INACTIVATED), INFLUENZA A VIRUS A/SINGAPORE/INFIMH-16-0019/2016 IVR-186 (H3N2) ANTIGEN (FORMALDEHYDE INACTIVATED), INFLUENZA B VIRUS B/PHUKET/3073/2013 ANTIGEN (FORMALDEHYDE INACTIVATED), AND INFLUENZA B VIRUS B/MARYLAND/15/2016 BX-69A ANTIGEN (FORMALDEHYDE INACTIVATED) 0.5 ML: 15; 15; 15; 15 INJECTION, SUSPENSION INTRAMUSCULAR at 08:49

## 2018-10-22 ASSESSMENT — PAIN SCALES - GENERAL: PAINLEVEL: MILD PAIN (3)

## 2018-10-22 NOTE — LETTER
10/22/2018      RE: Preeti Pascual  [address]  [facility] [facility] [address]       Reason for visit: Follow-up of metastatic HCC to monitor tumor progression and therapy response.    Subjective: Mr. Pascual is a 52-year old man who presents to clinic today unaccompanied for routine f/u of his metastatic HCC. Overall, he is feeling well with good energy levels and appetite. He denies any pain or new symptoms besides hair thinning and very occasional diarrhea. Previously, he tried to increase his sorafenib dose to 3 pills a day but could not tolerate the dosage and went down to 2 pills a day, one in the morning and one in the evening.     ROS: Denies fever/chills, weight change, neurological symptoms like headache, confusion, or dizziness, and excessive bleeding or bruising. He does note a bruise from an injection site from a week ago that is improving. The remainder of a 10-point complete review of systems is otherwise negative.     Objective: Hypertensive but overall looks stated age with good energy level and responsiveness.    Skin: No bruising apart from injection site, no rashes  Lymph: No cervical or supraclavicular adenopathy  Lungs: clear to auscultation bilaterally  Heart: RRR, no murmurs or gallops  Musculoskeletal: no edema    Labs: Mildly elevated AFP from 1 month ago, electrolytes within normal limits. Blood count and INR unremarkable.    Imaging: Liver mass well controlled. Right adrenal metastasis increased in size ~10%.    Assessment/Plan: Mr. Pascual is on a subtherapeutic dose of sorafenib with progression of his metastatic HCC, but still has a performance status of ECOG1. He is unable to tolerate a higher dose. He is advised to stop this treatment and begin monthly checkpoint inhibitor therapy next week. Risks of autoimmunity explained and all of the patient's questions were answered. We discussed symptoms he needs to bring to our attention. He expressed understanding of the risks/rationale and  wished to proceed.   He will see an NP in a month to monitor new treatment tolerance and f/u with Dr. Kyle in 3 months to monitor disease progression and treatment response.    Scribe Disclosure:   Lance Jackson is serving as a scribe; to document services personally performed by Agustin Kyle- -based on data collection and the provider's statements to me.     Provider Disclosure:  I agree with above History, Review of Systems, Physical exam and Plan.  I have reviewed the content of the documentation and have edited it as needed. I have personally performed the services documented here and the documentation accurately represents those services and the decisions I have made.  Total visit time by me was approximately 45 minutes, most spent on the above discussion      Agustin Kyle MD

## 2018-10-22 NOTE — MR AVS SNAPSHOT
After Visit Summary   10/22/2018    Preeti Pascual    MRN: [id number]           Patient Information     Date Of Birth          1965        Visit Information        Provider Department      10/22/2018 8:15 AM Agustin Kyle MD [facility]        Today's Diagnoses     HCC (hepatocellular carcinoma) (H)    -  1    Other cirrhosis of liver (H)          Care Instructions          October 2018 Sunday Monday Tuesday Wednesday Thursday Friday Saturday        1     2     3     4     5     6       7     8     9     10     11     12     13       14     15     16     17     18     LAB    8:15 AM   (15 min.)    LAB   [facility] Lab     CT CHEST ABDOMEN PELVIS WWO    8:40 AM   (20 min.)   CT2   [facility] CT 19     20       21     22     UMP RETURN    8:00 AM   (30 min.)   Agustin Kyle MD   [facility] 23     24     25     26     27       28     29     30     31                               November 2018 Sunday Monday Tuesday Wednesday Thursday Friday Saturday                       1     [facility] LAB DRAW    8:00 AM   (15 min.)    MASON[facility] LAB DRAW   [facility] Lab Draw     UMP ONC INFUSION 60    8:30 AM   (60 min.)   UC ONCOLOGY INFUSION   [facility] 2     3       4     5     6     7     8     9     10       11     12     13     14     15     LAB WITH HB CLINIC    8:00 AM   (15 min.)    LAB   [facility] Lab     MR ABDOMEN WWO    8:30 AM   (45 min.)   UCMR1   [facility] MRI     UMP RETURN GENERAL LIVER    9:15 AM   (15 min.)   Trevor Trujillo MD   [facility] Hepatology 16     17       18     19     20     21     22     23     24       25     26     27     28     29     [facility] LAB DRAW    8:15 AM   (15 min.)    MASONIC LAB DRAW   [facility] Lab Draw     UMP RETURN    8:25 AM   (50 min.)   Nallely Smiley APRN CNP   [facility]     UMP ONC INFUSION 60    9:30 AM    (60 min.)   UC ONCOLOGY INFUSION   [facility] 30 December 2018 Sunday Monday Tuesday Wednesday Thursday Friday Saturday                                 1       2     3     4     5     6     7     8       9     10     11     12     13     14     15       16     17     18     19     20     21     22       23     24     25  Happy Birthday!     26     27     [facility] LAB DRAW    8:00 AM   (15 min.)   [facility] LAB DRAW   [facility] Mason[facility] Lab Draw     UMP ONC INFUSION 60    8:30 AM   (60 min.)   UC ONCOLOGY INFUSION   [facility] 28     29       30     31                                               Follow-ups after your visit        Follow-up notes from your care team     Return in about 3 months (around 1/22/2019) for MD visit with CT and labs.      Your next 10 appointments already scheduled     Nov 01, 2018  8:00 AM CDT   [facility] Lab Draw with [facility] LAB DRAW   [facility] ([facility])    [address] [place] [address]  [address] [place] [id number]   [phone]            Nov 01, 2018  8:30 AM CDT   Infusion 60 with UC ONCOLOGY INFUSION, UC 27 ATC   [facility] ([facility])    [address] [place] [address]  [address] [place] [address]   [phone]            Nov 15, 2018  8:00 AM CST   Lab with UC LAB   [facility] Lab [facility])    [address] [place] [address] Floor  [facility] [address]   [phone]            Nov 15, 2018  8:30 AM CST   MR ABDOMEN W/O & W CONTRAST with MR1   [facility] Imaging [place] MRI ([facility])    [address] [place] [id number]   [phone]           How do I prepare for my exam? (Food and drink instructions) Do not eat or drink for 6 hours prior to exam. **If you will be receiving sedation or general anesthesia, please see special notes below.**  How do I  prepare for my exam? (Other instructions) Take your medicines as usual, unless your doctor tells you not to. You may or may not receive IV contrast for this exam pending the discretion of the Radiologist.  **If you will be receiving sedation or general anesthesia, please see special notes below.**  What should I wear: The MRI machine uses a strong magnet. Please wear clothes without metal (snaps, zippers). A sweatsuit works well, or we may give you a hospital gown. Please remove any body piercings and hair extensions before you arrive. You will also remove watches, jewelry, hairpins, wallets, dentures, partial dental plates and hearing aids. You may wear contact lenses, and you may be able to wear your rings. We have a safe place to keep your personal items, but it is safer to leave them at home.  How long does the exam take: Most tests take 30 to 60 minutes.  HOWEVER, IF YOUR DOCTOR PRESCRIBES ANESTHESIA please plan on spending four to five hours in the recovery room.  What should I bring: Bring a list of your current medicines to your exam (including vitamins, minerals and over-the-counter drugs). Also bring the results of similar scans you may have had.  Do I need a : **If you will be receiving sedation or general anesthesia, please see special notes below.**  What should I do after the exam: No Restrictions, You may resume normal activities.  What is this test: MRI (magnetic resonance imaging) uses a strong magnet and radio waves to look inside the body. An MRA (magnetic resonance angiogram) does the same thing, but it lets us look at your blood vessels. A computer turns the radio waves into pictures showing cross sections of the body, much like slices of bread. This helps us see any problems more clearly. You may receive fluid (called  contrast ) before or during your scan. The fluid helps us see the pictures better. We give the fluid through an IV (small needle in your arm).  Who should I call with  questions: If you have any questions, please contact your Imaging Department exam site. Directions, parking instructions, and other information is available on our website, [email or web address].            Nov 15, 2018  9:30 AM CST   (Arrive by 9:15 AM)   Return General Liver with Trevor Trujillo MD   [facility] Hepatology ([facility])    [address]  [address]  [facility] [address]   [phone]            Nov 29, 2018  8:15 AM CST   M[facility] Lab Draw with UC MASONIC LAB DRAW   [facility] Lab Draw ([facility])    [address]  [address]  [facility] [id number] [phone]            Nov 29, 2018  8:40 AM CST   (Arrive by 8:25 AM)   Return Visit with MITCH Whiting CNP   [facility] Cancer [facility] ([facility])    [address]  [address]  [facility] [id number] [phone]            Nov 29, 2018  9:30 AM CST   Infusion 60 with  ONCOLOGY INFUSION, UC 21 ATC   [facility] ([facility])    [address]  [address]  [facility] [id number] [phone]              Future tests that were ordered for you today     Open Future Orders        Priority Expected Expires Ordered    CT Chest Abdomen Pelvis w/o & w Contrast Routine 1/21/2019 2/22/2019 10/22/2018    Comprehensive metabolic panel Routine 1/21/2019 2/22/2019 10/22/2018    CBC with platelets differential Routine 1/21/2019 2/22/2019 10/22/2018    AFP tumor marker Routine 1/21/2019 2/22/2019 10/22/2018    INR Routine 1/21/2019 2/22/2019 10/22/2018            Who to contact     If you have questions or need follow up information about today's clinic visit or your schedule please contact [facility] directly at [phone].  Normal or non-critical lab and imaging results will be communicated to you by MyChart, letter or phone within 4 business days after the clinic has  "received the results. If you do not hear from us within 7 days, please contact the clinic through [employer] or phone. If you have a critical or abnormal lab result, we will notify you by phone as soon as possible.  Submit refill requests through [employer] or call your pharmacy and they will forward the refill request to us. Please allow 3 business days for your refill to be completed.          Additional Information About Your Visit        [employer]ovo Information     [employer] gives you secure access to your electronic health record. If you see a primary care provider, you can also send messages to your care team and make appointments. If you have questions, please call your primary care clinic.  If you do not have a primary care provider, please call [phone] and they will assist you.        Care EveryWhere ID     This is your Care EveryWhere ID. This could be used by other organizations to access your [place] medical records  [id number]        Your Vitals Were     Pulse Temperature Respirations Height Pulse Oximetry BMI (Body Mass Index)    69 97.2  F (36.2  C) (Tympanic) 18 1.676 m (5' 5.98\") 95% 26.92 kg/m2       Blood Pressure from Last 3 Encounters:   10/22/18 149/89   08/21/18 140/83   07/12/18 141/86    Weight from Last 3 Encounters:   10/22/18 75.6 kg (166 lb 11.2 oz)   08/21/18 75.3 kg (166 lb 1.6 oz)   07/12/18 74.5 kg (164 lb 4.8 oz)                 Today's Medication Changes          These changes are accurate as of 10/22/18  9:23 AM.  If you have any questions, ask your nurse or doctor.               Stop taking these medicines if you haven't already. Please contact your care team if you have questions.     prochlorperazine 10 MG tablet   Commonly known as:  COMPAZINE   Stopped by:  Agustin Kyle MD           SORAfenib 200 MG tablet CHEMO   Commonly known as:  nexAVAR   Stopped by:  Agustin Kyle MD                    Primary Care Provider Office Phone # Fax #    Sudhir Layne MD " [phone]       [address]  [facility] [address]        Equal Access to Services     NEFTALY MANJARREZ : Kennedy Mays, yelitza yun, annalex lawrencemada ogmorenabryce, meño anthonyin hayaaburak foleyaustyn tejaljuan mehdi herrera. So [facility] [phone].    ATENCIÓN: Si habla español, tiene a jean disposición servicios gratuitos de asistencia lingüística. Llame al [phone].    We comply with applicable federal civil rights laws and [place] laws. We do not discriminate on the basis of race, color, national origin, age, disability, sex, sexual orientation, or gender identity.            Thank you!     Thank you for choosing [facility]  for your care. Our goal is always to provide you with excellent care. Hearing back from our patients is one way we can continue to improve our services. Please take a few minutes to complete the written survey that you may receive in the mail after your visit with us. Thank you!             Your Updated Medication List - Protect others around you: Learn how to safely use, store and throw away your medicines at www.disposemymeds.org.      Notice  As of 10/22/2018  9:23 AM    You have not been prescribed any medications.

## 2018-10-22 NOTE — PROGRESS NOTES
Reason for visit: Follow-up of metastatic HCC to monitor tumor progression and therapy response.    Subjective: Mr. Pascual is a 52-year old man who presents to clinic today unaccompanied for routine f/u of his metastatic HCC. Overall, he is feeling well with good energy levels and appetite. He denies any pain or new symptoms besides hair thinning and very occasional diarrhea. Previously, he tried to increase his sorafenib dose to 3 pills a day but could not tolerate the dosage and went down to 2 pills a day, one in the morning and one in the evening.     ROS: Denies fever/chills, weight change, neurological symptoms like headache, confusion, or dizziness, and excessive bleeding or bruising. He does note a bruise from an injection site from a week ago that is improving. The remainder of a 10-point complete review of systems is otherwise negative.     Objective: Hypertensive but overall looks stated age with good energy level and responsiveness.    Skin: No bruising apart from injection site, no rashes  Lymph: No cervical or supraclavicular adenopathy  Lungs: clear to auscultation bilaterally  Heart: RRR, no murmurs or gallops  Musculoskeletal: no edema    Labs: Mildly elevated AFP from 1 month ago, electrolytes within normal limits. Blood count and INR unremarkable.    Imaging: Liver mass well controlled. Right adrenal metastasis increased in size ~10%.    Assessment/Plan: Mr. Pascual is on a subtherapeutic dose of sorafenib with progression of his metastatic HCC, but still has a performance status of ECOG1. He is unable to tolerate a higher dose. He is advised to stop this treatment and begin monthly checkpoint inhibitor therapy next week. Risks of autoimmunity explained and all of the patient's questions were answered. We discussed symptoms he needs to bring to our attention. He expressed understanding of the risks/rationale and wished to proceed.   He will see an NP in a month to monitor new treatment tolerance and f/u  with Dr. Kyle in 3 months to monitor disease progression and treatment response.    Scribe Disclosure:   Lance Jackson is serving as a scribe; to document services personally performed by Agustin Kyle- -based on data collection and the provider's statements to me.     Provider Disclosure:  I agree with above History, Review of Systems, Physical exam and Plan.  I have reviewed the content of the documentation and have edited it as needed. I have personally performed the services documented here and the documentation accurately represents those services and the decisions I have made.  Total visit time by me was approximately 45 minutes, most spent on the above discussion

## 2018-10-22 NOTE — NURSING NOTE
"Oncology Rooming Note    October 22, 2018 8:06 AM   Preeti Pascual is a 52 year old male who presents for:    Chief Complaint   Patient presents with     Oncology Clinic Visit     Return visit related to HCC     Initial Vitals: /89 (BP Location: Left arm, Patient Position: Sitting)  Pulse 69  Temp 97.2  F (36.2  C) (Tympanic)  Resp 18  Ht 1.676 m (5' 5.98\")  Wt 75.6 kg (166 lb 11.2 oz)  SpO2 95%  BMI 26.92 kg/m2 Estimated body mass index is 26.92 kg/(m^2) as calculated from the following:    Height as of this encounter: 1.676 m (5' 5.98\").    Weight as of this encounter: 75.6 kg (166 lb 11.2 oz). Body surface area is 1.88 meters squared.  Mild Pain (3) Comment: teeth   No LMP for male patient.  Allergies reviewed: Yes  Medications reviewed: Yes    Medications: Medication refills not needed today.  Pharm[facility] into [id number] [place], [place] - [address] AT [facility]    Clinical concerns: No new concerns. Provide was notified.    10 minutes for nursing intake (face to face time)     Barbara Prieto LPN            "

## 2018-10-22 NOTE — PATIENT INSTRUCTIONS
October 2018 Sunday Monday Tuesday Wednesday Thursday Friday Saturday        1     2     3     4     5     6       7     8     9     10     11     12     13       14     15     16     17     18     LAB    8:15 AM   (15 min.)    LAB   [facility] Lab     CT CHEST ABDOMEN PELVIS WWO    8:40 AM   (20 min.)   UCCT2   [facility] Imaging Center CT 19     20       21     22     UMP RETURN    8:00 AM   (30 min.)   Agustin Kyle MD   [facility] 23     24     25     26     27       28     29     30     31 November 2018 Sunday Monday Tuesday Wednesday Thursday Friday Saturday                       1     [facility] LAB DRAW    8:00 AM   (15 min.)    [facility] LAB DRAW   [facility] Lab Draw     UMP ONC INFUSION 60    8:30 AM   (60 min.)    ONCOLOGY INFUSION   [facility] 2     3       4     5     6     7     8     9     10       11     12     13     14     15     LAB WITH HB CLINIC    8:00 AM   (15 min.)    LAB   [facility] Lab     MR ABDOMEN WWO    8:30 AM   (45 min.)   UCMR1   [facility] Imaging Center MRI     UMP RETURN GENERAL LIVER    9:15 AM   (15 min.)   Trevor Trujillo MD   [facility] Hepatology 16     17       18     19     20     21     22     23     24       25     26     27     28     29     [facility] LAB DRAW    8:15 AM   (15 min.)    [facility] LAB DRAW   [facility]     UMP RETURN    8:25 AM   (50 min.)   Nallely Smiley APRN CNP   [facility]     UMP ONC INFUSION 60    9:30 AM   (60 min.)   UC ONCOLOGY INFUSION   [facility] 30 December 2018 Sunday Monday Tuesday Wednesday Thursday Friday Saturday                                 1       2     3     4     5     6     7     8       9     10     11     12     13     14     15       16     17     18     19     20     21     22       23     24     25  Happy Birthday!     26     27     UMP [facility] LAB  DRAW    8:00 AM   (15 min.)   [facility] LAB DRAW   [facility] Lab Draw     [facility] ONC INFUSION 60    8:30 AM   (60 min.)    ONCOLOGY INFUSION   [facility] 28     29       30     31

## 2018-10-22 NOTE — NURSING NOTE
"Injectable Influenza Immunization Documentation    1.  Has the patient received the information for the injectable influenza vaccine? YES     2. Is the patient 6 months of age or older? YES     3. Does the patient have any of the following contraindications?         Severe allergy to eggs? No     Severe allergic reaction to previous influenza vaccines? No   Severe allergy to latex? No       History of Guillain-La Coste syndrome? No     Currently have a temperature greater than 100.4F? No        4.  Severely egg allergic patients should have flu vaccine eligibility assessed by an MD, RN, or pharmacist, and those who received flu vaccine should be observed for 15 min by an MD, RN, Pharmacist, Medical Technician, or member of clinic staff.\": YES    5. Latex-allergic patients should be given latex-free influenza vaccine Yes. Please reference the Vaccine latex table to determine if your clinic s product is latex-containing.       Vaccination given by Cordelia New     Influenza vaccine 0.5 ml given pt left deltoid, pt tolerated well.    Cordelia New MA             "

## 2018-11-01 ENCOUNTER — INFUSION THERAPY VISIT (OUTPATIENT)
Dept: ONCOLOGY | Facility: CLINIC | Age: 53
End: 2018-11-01
Attending: INTERNAL MEDICINE
Payer: COMMERCIAL

## 2018-11-01 ENCOUNTER — APPOINTMENT (OUTPATIENT)
Dept: LAB | Facility: CLINIC | Age: 53
End: 2018-11-01
Attending: INTERNAL MEDICINE
Payer: COMMERCIAL

## 2018-11-01 VITALS
WEIGHT: 165.7 LBS | BODY MASS INDEX: 26.76 KG/M2 | RESPIRATION RATE: 16 BRPM | TEMPERATURE: 97.6 F | SYSTOLIC BLOOD PRESSURE: 143 MMHG | OXYGEN SATURATION: 98 % | HEART RATE: 70 BPM | DIASTOLIC BLOOD PRESSURE: 88 MMHG

## 2018-11-01 DIAGNOSIS — C22.0 HCC (HEPATOCELLULAR CARCINOMA) (H): Primary | ICD-10-CM

## 2018-11-01 LAB
ALBUMIN SERPL-MCNC: 3.3 G/DL (ref 3.4–5)
ALP SERPL-CCNC: 82 U/L (ref 40–150)
ALT SERPL W P-5'-P-CCNC: 38 U/L (ref 0–70)
ANION GAP SERPL CALCULATED.3IONS-SCNC: 8 MMOL/L (ref 3–14)
AST SERPL W P-5'-P-CCNC: 33 U/L (ref 0–45)
BILIRUB SERPL-MCNC: 0.4 MG/DL (ref 0.2–1.3)
BUN SERPL-MCNC: 8 MG/DL (ref 7–30)
CALCIUM SERPL-MCNC: 8.3 MG/DL (ref 8.5–10.1)
CHLORIDE SERPL-SCNC: 108 MMOL/L (ref 94–109)
CO2 SERPL-SCNC: 24 MMOL/L (ref 20–32)
CREAT SERPL-MCNC: 0.69 MG/DL (ref 0.66–1.25)
GFR SERPL CREATININE-BSD FRML MDRD: >90 ML/MIN/1.7M2
GLUCOSE SERPL-MCNC: 102 MG/DL (ref 70–99)
POTASSIUM SERPL-SCNC: 3.8 MMOL/L (ref 3.4–5.3)
PROT SERPL-MCNC: 7.8 G/DL (ref 6.8–8.8)
SODIUM SERPL-SCNC: 140 MMOL/L (ref 133–144)
TSH SERPL DL<=0.005 MIU/L-ACNC: 1.28 MU/L (ref 0.4–4)

## 2018-11-01 PROCEDURE — 80053 COMPREHEN METABOLIC PANEL: CPT | Performed by: INTERNAL MEDICINE

## 2018-11-01 PROCEDURE — 84443 ASSAY THYROID STIM HORMONE: CPT | Performed by: INTERNAL MEDICINE

## 2018-11-01 PROCEDURE — 25000128 H RX IP 250 OP 636: Mod: ZF | Performed by: INTERNAL MEDICINE

## 2018-11-01 PROCEDURE — 96413 CHEMO IV INFUSION 1 HR: CPT

## 2018-11-01 RX ORDER — PROCHLORPERAZINE MALEATE 10 MG
10 TABLET ORAL EVERY 6 HOURS PRN
Qty: 30 TABLET | Refills: 2 | Status: SHIPPED | OUTPATIENT
Start: 2018-11-01 | End: 2019-02-28

## 2018-11-01 RX ORDER — LORAZEPAM 0.5 MG/1
0.5 TABLET ORAL EVERY 4 HOURS PRN
Qty: 30 TABLET | Refills: 2 | Status: CANCELLED | OUTPATIENT
Start: 2018-11-01

## 2018-11-01 RX ORDER — PROCHLORPERAZINE MALEATE 10 MG
10 TABLET ORAL EVERY 6 HOURS PRN
Qty: 30 TABLET | Refills: 2 | Status: CANCELLED | OUTPATIENT
Start: 2018-11-01

## 2018-11-01 RX ORDER — LORAZEPAM 0.5 MG/1
0.5 TABLET ORAL EVERY 4 HOURS PRN
Qty: 30 TABLET | Refills: 2 | Status: SHIPPED | OUTPATIENT
Start: 2018-11-01 | End: 2019-02-28

## 2018-11-01 RX ADMIN — SODIUM CHLORIDE 250 ML: 9 INJECTION, SOLUTION INTRAVENOUS at 09:18

## 2018-11-01 RX ADMIN — SODIUM CHLORIDE 480 MG: 9 INJECTION, SOLUTION INTRAVENOUS at 09:50

## 2018-11-01 ASSESSMENT — PAIN SCALES - GENERAL: PAINLEVEL: NO PAIN (0)

## 2018-11-01 NOTE — PROGRESS NOTES
Infusion Nursing Note:  Preeti Pascual presents today for Cycle 1 Day 1 of Nivolumab.    Patient seen by provider today: No   present during visit today: Not Applicable.    Note: Patient new to oncology infusion room and is receiving Nivolumab for the first time. Pt oriented to infusion room and call light. New patient teaching done previously. Pt received new pt folder prior to coming to infusion. Writer reinforced chemotherapy teaching/side effects and schedule. Patient instructed to call triage with questions/concerns or if he/she has chills and/or temperature greater than or equal to 100.5. Triage number: [phone]; After hours, weekends, or holidays, call [phone] and ask for oncology doctor on call. Patient reports feeling well today, but nervous to start treatment. Denies any chills, fevers, shortness of breath, or cough. No other issues or complaints.     Intravenous Access:  Peripheral IV placed.    Treatment Conditions:  Lab Results   Component Value Date     11/01/2018                   Lab Results   Component Value Date    POTASSIUM 3.8 11/01/2018           No results found for: MAG         Lab Results   Component Value Date    CR 0.69 11/01/2018                   Lab Results   Component Value Date    STACEY 8.3 11/01/2018                Lab Results   Component Value Date    BILITOTAL 0.4 11/01/2018           Lab Results   Component Value Date    ALBUMIN 3.3 11/01/2018                    Lab Results   Component Value Date    ALT 38 11/01/2018           Lab Results   Component Value Date    AST 33 11/01/2018       Results reviewed, labs MET treatment parameters, ok to proceed with treatment.      Post Infusion Assessment:  Patient tolerated infusion without incident.  Blood return noted pre and post infusion.  Site patent and intact, free from redness, edema or discomfort.  No evidence of extravasations.  Access discontinued per protocol.    Discharge Plan:   Prescription refills given  for Ativan and Compazine.  Discharge instructions reviewed with: Patient.  Patient and/or family verbalized understanding of discharge instructions and all questions answered.  Copy of AVS reviewed with patient and/or family.  Patient will return 11/29/18 for next appointment.  Patient discharged in stable condition accompanied by: self.  Departure Mode: Ambulatory.    Jeanine Moura RN

## 2018-11-01 NOTE — MR AVS SNAPSHOT
After Visit Summary   11/1/2018    Preeti Pascual    MRN: [id number]           Patient Information     Date Of Birth          1965        Visit Information        Provider Department      11/1/2018 8:30 AM  27 ATC;  ONCOLOGY INFUSION [facility]        Today's Diagnoses     HCC (hepatocellular carcinoma) (H)    -  1      Care Instructions    Contact Numbers  [facility]: [phone]    After Hours:  [phone]  Triage: [phone]    Please call the [facility] Triage line if you experience a temperature greater than or equal to 100.5, shaking chills, have uncontrolled nausea, vomiting and/or diarrhea, dizziness, shortness of breath, chest pain, bleeding, unexplained bruising, or if you have any other new/concerning symptoms, questions or concerns.     If it is after hours, weekends, or holidays, please call the main hospital  at  [phone] and ask to speak to the Oncology doctor on call.     If you are having any concerning symptoms or wish to speak to a provider before your next infusion visit, please call your care coordinator or triage to notify them so we can adequately serve you.     If you need a refill on a narcotic prescription or other medication, please call triage before your infusion appointment.           November 2018 Sunday Monday Tuesday Wednesday Thursday Friday Saturday                       1     [facility]   (15 min.)   [facility]   [facility] Lab Draw     [facility] ONC INFUSION 60    8:30 AM   (60 min.)    ONCOLOGY INFUSION   [facility] 2     3       4     5     6     7     8     9     10       11     12     13     14     15     LAB WITH HB CLINIC    8:00 AM   (15 min.)    LAB   [facility] Lab     MR ABDOMEN WWO    8:30 AM   (45 min.)   [address] [facility] MRI     P RETURN GENERAL LIVER    9:15 AM   (15 min.)   Trevor Trujillo MD   [facility] Hepatology 16     17        18     19     20     21     22     23     24       25     26     27     28     29     [facility] LAB DRAW    8:15 AM   (15 min.)    [facility] LAB DRAW   [facility] Lab Draw     [facility] RETURN    8:25 AM   (50 min.)   Nallely Smiley APRN CNP   [facility]     [facility] ONC INFUSION 60    9:30 AM   (60 min.)    ONCOLOGY INFUSION   [facility] 30 December 2018 Sunday Monday Tuesday Wednesday Thursday Friday Saturday                                 1       2     3     4     5     6     7     8       9     10     11     12     13     14     15       16     17     18     19     20     21     22       23     24     25  Happy Birthday!     26     27     [facility] LAB DRAW    8:00 AM   (15 min.)    [facility] LAB DRAW   [facility] Lab Draw     P ONC INFUSION 60    8:30 AM   (60 min.)    ONCOLOGY INFUSION   [facility] 28     29       30     31                                           Lab Results:  Recent Results (from the past 12 hour(s))   Comprehensive metabolic panel    Collection Time: 11/01/18  8:21 AM   Result Value Ref Range    Sodium 140 133 - 144 mmol/L    Potassium 3.8 3.4 - 5.3 mmol/L    Chloride 108 94 - 109 mmol/L    Carbon Dioxide 24 20 - 32 mmol/L    Anion Gap 8 3 - 14 mmol/L    Glucose 102 (H) 70 - 99 mg/dL    Urea Nitrogen 8 7 - 30 mg/dL    Creatinine 0.69 0.66 - 1.25 mg/dL    GFR Estimate >90 >60 mL/min/1.7m2    GFR Estimate If Black >90 >60 mL/min/1.7m2    Calcium 8.3 (L) 8.5 - 10.1 mg/dL    Bilirubin Total 0.4 0.2 - 1.3 mg/dL    Albumin 3.3 (L) 3.4 - 5.0 g/dL    Protein Total 7.8 6.8 - 8.8 g/dL    Alkaline Phosphatase 82 40 - 150 U/L    ALT 38 0 - 70 U/L    AST 33 0 - 45 U/L   TSH with free T4 reflex    Collection Time: 11/01/18  8:21 AM   Result Value Ref Range    TSH 1.28 0.40 - 4.00 mU/L               Follow-ups after your visit        Your next 10 appointments already scheduled     Nov 15, 2018  8:00 AM CST    Lab with [facility] ([facility])    [address] [place] [address]   [phone]            Nov 15, 2018  8:30 AM CST   MR ABDOMEN W/O & W CONTRAST with [address] [facility] MRI ([facility])    [address] [place] [address]   [phone]           How do I prepare for my exam? (Food and drink instructions) Do not eat or drink for 6 hours prior to exam. **If you will be receiving sedation or general anesthesia, please see special notes below.**  How do I prepare for my exam? (Other instructions) Take your medicines as usual, unless your doctor tells you not to. You may or may not receive IV contrast for this exam pending the discretion of the Radiologist.  **If you will be receiving sedation or general anesthesia, please see special notes below.**  What should I wear: The MRI machine uses a strong magnet. Please wear clothes without metal (snaps, zippers). A sweatsuit works well, or we may give you a hospital gown. Please remove any body piercings and hair extensions before you arrive. You will also remove watches, jewelry, hairpins, wallets, dentures, partial dental plates and hearing aids. You may wear contact lenses, and you may be able to wear your rings. We have a safe place to keep your personal items, but it is safer to leave them at home.  How long does the exam take: Most tests take 30 to 60 minutes.  HOWEVER, IF YOUR DOCTOR PRESCRIBES ANESTHESIA please plan on spending four to five hours in the recovery room.  What should I bring: Bring a list of your current medicines to your exam (including vitamins, minerals and over-the-counter drugs). Also bring the results of similar scans you may have had.  Do I need a : **If you will be receiving sedation or general anesthesia, please see special notes below.**  What should I do after the exam: No Restrictions, You may resume  normal activities.  What is this test: MRI (magnetic resonance imaging) uses a strong magnet and radio waves to look inside the body. An MRA (magnetic resonance angiogram) does the same thing, but it lets us look at your blood vessels. A computer turns the radio waves into pictures showing cross sections of the body, much like slices of bread. This helps us see any problems more clearly. You may receive fluid (called  contrast ) before or during your scan. The fluid helps us see the pictures better. We give the fluid through an IV (small needle in your arm).  Who should I call with questions: If you have any questions, please contact your Imaging Department exam site. Directions, parking instructions, and other information is available on our website, [email or web address].            Nov 15, 2018  9:30 AM CST   (Arrive by 9:15 AM)   Return General Liver with Trevor Trujillo MD   [facility] Hepatology ([facility])    [address] [place] [address]  [address]  [facility] [address]   [phone]            Nov 29, 2018  8:15 AM CST   [facility] Lab Draw with  [facility] LAB DRAW   [facility] Lab Draw ([facility])    [address] [place] [address]  [address]  [facility] [address]   [phone]            Nov 29, 2018  8:40 AM CST   (Arrive by 8:25 AM)   Return Visit with [facility])    [address] [place] [address]  [address]  [facility] [address]   [phone]            Nov 29, 2018  9:30 AM CST   Infusion 60 with UC ONCOLOGY INFUSION, UC 21 ATC   [facility] ([facility])    [address] [place] [address]  [address]  [facility] [address]   [phone]            Dec 27, 2018  8:00 AM CST   [facility] Draw with [facility] DRAW   [facility] Lab Draw ([facility])    [address] [place] [address]  [address]  [facility] [address]    [phone]            Dec 27, 2018  8:30 AM CST   Infusion 60 with UC ONCOLOGY INFUSION, UC 16 ATC   [facility] ([facility])    [address] [facility]  [address]  [facility] [address] [phone]              Who to contact     If you have questions or need follow up information about today's clinic visit or your schedule please contact [facility] CANCER [facility] directly at [phone].  Normal or non-critical lab and imaging results will be communicated to you by [employer], letter or phone within 4 business days after the clinic has received the results. If you do not hear from us within 7 days, please contact the clinic through [employer] or phone. If you have a critical or abnormal lab result, we will notify you by phone as soon as possible.  Submit refill requests through [employer] or call your pharmacy and they will forward the refill request to us. Please allow 3 business days for your refill to be completed.          Additional Information About Your Visit        [employer] Information     [employer] gives you secure access to your electronic health record. If you see a primary care provider, you can also send messages to your care team and make appointments. If you have questions, please call your primary care clinic.  If you do not have a primary care provider, please call [phone] and they will assist you.        Care EveryWhere ID     This is your Care EveryWhere ID. This could be used by other organizations to access your [place] medical records  [id number]        Your Vitals Were     Pulse Temperature Respirations Pulse Oximetry BMI (Body Mass Index)       70 97.6  F (36.4  C) (Oral) 16 98% 26.76 kg/m2        Blood Pressure from Last 3 Encounters:   11/01/18 143/88   10/22/18 149/89   08/21/18 140/83    Weight from Last 3 Encounters:   11/01/18 75.2 kg (165 lb 11.2 oz)   10/22/18 75.6 kg (166 lb 11.2 oz)   08/21/18 75.3 kg (166 lb 1.6 oz)               We Performed the Following     Comprehensive metabolic panel     TSH with free T4 reflex          Today's Medication Changes          These changes are accurate as of 11/1/18  9:05 AM.  If you have any questions, ask your nurse or doctor.               Start taking these medicines.        Dose/Directions    LORazepam 0.5 MG tablet   Commonly known as:  ATIVAN   Used for:  HCC (hepatocellular carcinoma) (H)        Dose:  0.5 mg   Take 1 tablet (0.5 mg) by mouth every 4 hours as needed (Anxiety, Nausea/Vomiting or Sleep)   Quantity:  30 tablet   Refills:  2       prochlorperazine 10 MG tablet   Commonly known as:  COMPAZINE   Used for:  HCC (hepatocellular carcinoma) (H)        Dose:  10 mg   Take 1 tablet (10 mg) by mouth every 6 hours as needed (Nausea/Vomiting)   Quantity:  30 tablet   Refills:  2            Where to get your medicines      These medications were sent to [place], [place] - [address] [facility] [address]  [address] [place] [address] [address], [facility] [address]    Hours:  TRANSPLANT PHONE NUMBER [phone] Phone:  [phone]     prochlorperazine 10 MG tablet         Some of these will need a paper prescription and others can be bought over the counter.  Ask your nurse if you have questions.     Bring a paper prescription for each of these medications     LORazepam 0.5 MG tablet                Primary Care Provider Office Phone # Fax #    Sudhir Layne [phone]       [address]  [facility] [address]        Equal Access to Services     NEFTALY MANJARREZ AH: Kennedy Mays, waaxda luqadaha, qaybta kaalmada adeaustynyada, meño herrera. So [facility] [phone].    ATENCIÓN: Si habla español, tiene a jean disposición servicios gratuitos de asistencia lingüística. Llame al [phone].    We comply with applicable federal civil rights laws and [place] laws. We do not discriminate on the basis of  race, color, national origin, age, disability, sex, sexual orientation, or gender identity.            Thank you!     Thank you for choosing [facility]  for your care. Our goal is always to provide you with excellent care. Hearing back from our patients is one way we can continue to improve our services. Please take a few minutes to complete the written survey that you may receive in the mail after your visit with us. Thank you!             Your Updated Medication List - Protect others around you: Learn how to safely use, store and throw away your medicines at www.disposemymeds.org.          This list is accurate as of 11/1/18  9:05 AM.  Always use your most recent med list.                   Brand Name Dispense Instructions for use Diagnosis    LORazepam 0.5 MG tablet    ATIVAN    30 tablet    Take 1 tablet (0.5 mg) by mouth every 4 hours as needed (Anxiety, Nausea/Vomiting or Sleep)    HCC (hepatocellular carcinoma) (H)       prochlorperazine 10 MG tablet    COMPAZINE    30 tablet    Take 1 tablet (10 mg) by mouth every 6 hours as needed (Nausea/Vomiting)    HCC (hepatocellular carcinoma) (H)

## 2018-11-01 NOTE — NURSING NOTE
Chief Complaint   Patient presents with     Blood Draw     Labs drawn from PIV placed by RN. Line saline locked. Vs taken and pt checked in for appt. CBC JIC drawn.     Labs drawn from PIV placed by RN. Line flushed with saline. Vitals taken. Pt checked in for appointment(s). CBC JIC drawn.    Traci Suh RN

## 2018-11-01 NOTE — PATIENT INSTRUCTIONS
Contact Numbers  [facility]: [phone]    After Hours:  [phone]  Triage: [phone]    Please call the [facility] Triage line if you experience a temperature greater than or equal to 100.5, shaking chills, have uncontrolled nausea, vomiting and/or diarrhea, dizziness, shortness of breath, chest pain, bleeding, unexplained bruising, or if you have any other new/concerning symptoms, questions or concerns.     If it is after hours, weekends, or holidays, please call the main hospital  at  [phone] and ask to speak to the Oncology doctor on call.     If you are having any concerning symptoms or wish to speak to a provider before your next infusion visit, please call your care coordinator or triage to notify them so we can adequately serve you.     If you need a refill on a narcotic prescription or other medication, please call triage before your infusion appointment.           November 2018 Sunday Monday Tuesday Wednesday Thursday Friday Saturday 1     [facility]    8[facility]00 AM   (15 min.)   [facility] LAB DRAW   [facility] Lab Draw     [facility] ONC INFUSION 60    8:30 AM   (60 min.)    ONCOLOGY INFUSION   [facility] 2     3       4     5     6     7     8     9     10       11     12     13     14     15     LAB WITH  CLINIC    8:00 AM   (15 min.)    LAB   [facility] Lab     MR ABDOMEN WWO    8:30 AM   (45 min.)   [address] [facility] MRI     [facility] RETURN GENERAL LIVER    9:15 AM   (15 min.)   Trevor Trujillo MD   [facility] Hepatology 16     17       18     19     20     21     22     23     24       25     26     27     28     29     [facility] LAB DRAW    8:15 AM   (15 min.)   [facility]   [facility] Lab Draw     [facility] RETURN    8:25 AM   (50 min.)   Nallely Smiley APRN CNP   [facility]     [facility] ONC INFUSION 60    9:30 AM   (60 min.)    ONCOLOGY INFUSION   [facility]  30 December 2018 Sunday Monday Tuesday Wednesday Thursday Friday Saturday                                 1       2     3     4     5     6     7     8       9     10     11     12     13     14     15       16     17     18     19     20     21     22       23     24     25  Happy Birthday!     26     27     [facility] LAB DRAW    8:00 AM   (15 min.)    [facility] LAB DRAW   [facility] Lab Draw     [facility] ONC INFUSION 60    8:30 AM   (60 min.)    ONCOLOGY INFUSION   [facility] 28     29       30     31                                           Lab Results:  Recent Results (from the past 12 hour(s))   Comprehensive metabolic panel    Collection Time: 11/01/18  8:21 AM   Result Value Ref Range    Sodium 140 133 - 144 mmol/L    Potassium 3.8 3.4 - 5.3 mmol/L    Chloride 108 94 - 109 mmol/L    Carbon Dioxide 24 20 - 32 mmol/L    Anion Gap 8 3 - 14 mmol/L    Glucose 102 (H) 70 - 99 mg/dL    Urea Nitrogen 8 7 - 30 mg/dL    Creatinine 0.69 0.66 - 1.25 mg/dL    GFR Estimate >90 >60 mL/min/1.7m2    GFR Estimate If Black >90 >60 mL/min/1.7m2    Calcium 8.3 (L) 8.5 - 10.1 mg/dL    Bilirubin Total 0.4 0.2 - 1.3 mg/dL    Albumin 3.3 (L) 3.4 - 5.0 g/dL    Protein Total 7.8 6.8 - 8.8 g/dL    Alkaline Phosphatase 82 40 - 150 U/L    ALT 38 0 - 70 U/L    AST 33 0 - 45 U/L   TSH with free T4 reflex    Collection Time: 11/01/18  8:21 AM   Result Value Ref Range    TSH 1.28 0.40 - 4.00 mU/L

## 2018-11-13 ENCOUNTER — PATIENT OUTREACH (OUTPATIENT)
Dept: CARE COORDINATION | Facility: CLINIC | Age: 53
End: 2018-11-13

## 2018-11-15 ENCOUNTER — RADIANT APPOINTMENT (OUTPATIENT)
Dept: MRI IMAGING | Facility: CLINIC | Age: 53
End: 2018-11-15
Attending: INTERNAL MEDICINE
Payer: COMMERCIAL

## 2018-11-15 ENCOUNTER — OFFICE VISIT (OUTPATIENT)
Dept: GASTROENTEROLOGY | Facility: CLINIC | Age: 53
End: 2018-11-15
Attending: INTERNAL MEDICINE
Payer: COMMERCIAL

## 2018-11-15 VITALS
DIASTOLIC BLOOD PRESSURE: 83 MMHG | WEIGHT: 168.6 LBS | OXYGEN SATURATION: 98 % | TEMPERATURE: 97.8 F | SYSTOLIC BLOOD PRESSURE: 130 MMHG | BODY MASS INDEX: 27.23 KG/M2 | HEART RATE: 72 BPM

## 2018-11-15 DIAGNOSIS — K74.60 CIRRHOSIS OF LIVER WITHOUT ASCITES, UNSPECIFIED HEPATIC CIRRHOSIS TYPE (H): Primary | ICD-10-CM

## 2018-11-15 DIAGNOSIS — B18.1 CHRONIC VIRAL HEPATITIS B WITHOUT DELTA AGENT AND WITHOUT COMA (H): ICD-10-CM

## 2018-11-15 DIAGNOSIS — C22.0 HCC (HEPATOCELLULAR CARCINOMA) (H): ICD-10-CM

## 2018-11-15 LAB
AFP SERPL-MCNC: 17.1 UG/L (ref 0–8)
ALBUMIN SERPL-MCNC: 3.2 G/DL (ref 3.4–5)
ALP SERPL-CCNC: 71 U/L (ref 40–150)
ALT SERPL W P-5'-P-CCNC: 34 U/L (ref 0–70)
ANION GAP SERPL CALCULATED.3IONS-SCNC: 6 MMOL/L (ref 3–14)
AST SERPL W P-5'-P-CCNC: 31 U/L (ref 0–45)
BILIRUB DIRECT SERPL-MCNC: 0.1 MG/DL (ref 0–0.2)
BILIRUB SERPL-MCNC: 0.4 MG/DL (ref 0.2–1.3)
BUN SERPL-MCNC: 10 MG/DL (ref 7–30)
CALCIUM SERPL-MCNC: 8.3 MG/DL (ref 8.5–10.1)
CHLORIDE SERPL-SCNC: 108 MMOL/L (ref 94–109)
CO2 SERPL-SCNC: 25 MMOL/L (ref 20–32)
CREAT SERPL-MCNC: 0.78 MG/DL (ref 0.66–1.25)
ERYTHROCYTE [DISTWIDTH] IN BLOOD BY AUTOMATED COUNT: 13.6 % (ref 10–15)
GFR SERPL CREATININE-BSD FRML MDRD: >90 ML/MIN/1.7M2
GLUCOSE SERPL-MCNC: 97 MG/DL (ref 70–99)
HCT VFR BLD AUTO: 39.3 % (ref 40–53)
HGB BLD-MCNC: 13 G/DL (ref 13.3–17.7)
INR PPP: 1.13 (ref 0.86–1.14)
MCH RBC QN AUTO: 30 PG (ref 26.5–33)
MCHC RBC AUTO-ENTMCNC: 33.1 G/DL (ref 31.5–36.5)
MCV RBC AUTO: 91 FL (ref 78–100)
PLATELET # BLD AUTO: 176 10E9/L (ref 150–450)
POTASSIUM SERPL-SCNC: 3.8 MMOL/L (ref 3.4–5.3)
PROT SERPL-MCNC: 7.5 G/DL (ref 6.8–8.8)
RBC # BLD AUTO: 4.33 10E12/L (ref 4.4–5.9)
SODIUM SERPL-SCNC: 139 MMOL/L (ref 133–144)
WBC # BLD AUTO: 4.8 10E9/L (ref 4–11)

## 2018-11-15 PROCEDURE — 80048 BASIC METABOLIC PNL TOTAL CA: CPT | Performed by: INTERNAL MEDICINE

## 2018-11-15 PROCEDURE — 85610 PROTHROMBIN TIME: CPT | Performed by: INTERNAL MEDICINE

## 2018-11-15 PROCEDURE — 85027 COMPLETE CBC AUTOMATED: CPT | Performed by: INTERNAL MEDICINE

## 2018-11-15 PROCEDURE — 80076 HEPATIC FUNCTION PANEL: CPT | Performed by: INTERNAL MEDICINE

## 2018-11-15 PROCEDURE — 82105 ALPHA-FETOPROTEIN SERUM: CPT | Performed by: INTERNAL MEDICINE

## 2018-11-15 PROCEDURE — G0463 HOSPITAL OUTPT CLINIC VISIT: HCPCS | Mod: ZF

## 2018-11-15 PROCEDURE — 87517 HEPATITIS B DNA QUANT: CPT | Performed by: INTERNAL MEDICINE

## 2018-11-15 RX ORDER — GADOBUTROL 604.72 MG/ML
7.5 INJECTION INTRAVENOUS ONCE
Status: COMPLETED | OUTPATIENT
Start: 2018-11-15 | End: 2018-11-15

## 2018-11-15 RX ADMIN — GADOBUTROL 7.5 ML: 604.72 INJECTION INTRAVENOUS at 08:32

## 2018-11-15 ASSESSMENT — PAIN SCALES - GENERAL: PAINLEVEL: NO PAIN (0)

## 2018-11-15 NOTE — LETTER
11/15/2018      RE: Preeti Pascual  [address]  [facility] [address]       I had the pleasure of seeing Preeti Pascual for followup in the Liver Clinic at the [facility] on 11/15/2018.  Mr. 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  He has been treated with liver-directed therapy; but unfortunately, he developed metastatic disease and was started on sorafenib which he was intolerant of due to hand-foot syndrome and now has been switched to nivolumab.  He had his first dose which he tolerated well.        Otherwise, he will feel really quite well.  He denies any abdominal pain, itching, skin rash or fatigue.  He denies any increased abdominal girth or lower extremity edema.  He denies any fevers or chills, cough or shortness of breath.  He denies any nausea or vomiting, diarrhea or constipation.  His appetite has been good, and his weight has been stable.  There, otherwise, have been no other new events since he was last seen.     Current Outpatient Prescriptions   Medication     LORazepam (ATIVAN) 0.5 MG tablet     prochlorperazine (COMPAZINE) 10 MG tablet     [DISCONTINUED] SORAfenib (NEXAVAR) 200 MG tablet CHEMO     No current facility-administered medications for this visit.      B/P: 130/83, T: 97.8, P: 72, R: Data Unavailable    In general, he looks quite well.  HEENT exam shows no scleral icterus or temporal muscle wasting.  His chest is clear.  His abdominal exam shows no increase in girth.  No masses or tenderness to palpation are present.  His liver is 10 cm in span without left lobe enlargement.  No spleen tip is palpable, and extremity exam shows no edema.  Skin exam shows no stigmata of chronic liver disease.  Neurologic exam shows no asterixis.     Recent Results (from the past 168 hour(s))   Hepatic panel [LAB20]    Collection Time: 11/15/18  8:41 AM   Result Value Ref Range    Bilirubin  Direct 0.1 0.0 - 0.2 mg/dL    Bilirubin Total 0.4 0.2 - 1.3 mg/dL    Albumin 3.2 (L) 3.4 - 5.0 g/dL    Protein Total 7.5 6.8 - 8.8 g/dL    Alkaline Phosphatase 71 40 - 150 U/L    ALT 34 0 - 70 U/L    AST 31 0 - 45 U/L   Basic metabolic panel [LAB15]    Collection Time: 11/15/18  8:41 AM   Result Value Ref Range    Sodium 139 133 - 144 mmol/L    Potassium 3.8 3.4 - 5.3 mmol/L    Chloride 108 94 - 109 mmol/L    Carbon Dioxide 25 20 - 32 mmol/L    Anion Gap 6 3 - 14 mmol/L    Glucose 97 70 - 99 mg/dL    Urea Nitrogen 10 7 - 30 mg/dL    Creatinine 0.78 0.66 - 1.25 mg/dL    GFR Estimate >90 >60 mL/min/1.7m2    GFR Estimate If Black >90 >60 mL/min/1.7m2    Calcium 8.3 (L) 8.5 - 10.1 mg/dL   INR [[id number]]    Collection Time: 11/15/18  8:41 AM   Result Value Ref Range    INR 1.13 0.86 - 1.14      I did review his MRI which seems as though it showed 1 large lesion between segments 5 and 8.  It is difficult to say whether it shows washout at this point in time.  He has a  couple of other LI-RADS 5 lesions.  I will have the imaging reviewed out our Friday tumor conference.      My impression is that Mr. Pascual has cirrhosis caused by chronic B and complicated by hepatocellular carcinoma.  At this point in time, he is on the nivolumab, and I will be interested to see how well he responds in 3-6 months.  He tolerated the first dose well.  His blood tests are unchanged.  He is going to be seeing Dr. Kyle in 3 months, and I will see him back in 6 months.  He did get a flu shot already this year.      Thank you very much for allowing me to participate in the care of the patient.  If you have any questions regarding my recommendations, please do not hesitate to contact me.       Trevor Trujillo MD      Professor of Medicine  [employer]      Executive Medical Director, Solid Organ Transplant Program  [employer]

## 2018-11-15 NOTE — NURSING NOTE
Chief Complaint   Patient presents with     RECHECK      6 month Follow Up Cirrhosis     /83 (BP Location: Left arm)  Pulse 72  Temp 97.8  F (36.6  C) (Oral)  Wt 76.5 kg (168 lb 9.6 oz)  SpO2 98%  BMI 27.23 kg/m2   Daya Marie

## 2018-11-15 NOTE — MR AVS SNAPSHOT
After Visit Summary   11/15/2018    Preeti Pascual    MRN: [id number]           Patient Information     Date Of Birth          1965        Visit Information        Provider Department      11/15/2018 9:30 AM Trevor Trujillo MD [facility] Hepatology        Today's Diagnoses     Cirrhosis of liver without ascites, unspecified hepatic cirrhosis type (H)    -  1       Follow-ups after your visit        Follow-up notes from your care team     Return in about 6 months (around 5/15/2019).      Your next 10 appointments already scheduled     Nov 29, 2018  8:15 AM CST   [facility] Lab Draw with [facility] LAB DRAW   [facility] Lab Draw ([facility])    [address] [place] [address]  [address]  [facility] [address]   [phone]            Nov 29, 2018  8:40 AM CST   (Arrive by 8:25 AM)   Return Visit with MITCH Whiting CNP   [facility] ([facility])    [address] [place] [address]  [address]  [facility] [address]   [phone]            Nov 29, 2018  9:30 AM CST   Infusion 60 with UC ONCOLOGY INFUSION, UC 21 ATC   [facility] ([facility])    [address] [place] [address]  [address]  [facility] [address]   [phone]            Dec 27, 2018  8:00 AM CST   [facility] Lab Draw with [facility]ON[facility] LAB DRAW   [facility] Lab Draw ([facility])    [address] [place] [address]  [address]  [facility] [address]   [phone]            Dec 27, 2018  8:30 AM CST   Infusion 60 with UC ONCOLOGY INFUSION, UC 16 ATC   [facility] ([facility])    [address] [place] [address]  [address]  [facility] [address]   [phone]            Jan 17, 2019  8:15 AM CST   LAB with UC LAB   [facility])    [address] [place] [address]  1st Floor  [facility] [address]   [phone]           Please do not eat 10-12 hours before  your appointment if you are coming in fasting for labs on lipids, cholesterol, or glucose (sugar). This does not apply to pregnant women. Water, hot tea and black coffee (with nothing added) are okay. Do not drink other fluids, diet soda or chew gum.            Jan 17, 2019  8:40 AM CST   (Arrive by 8:10 AM)   CT CHEST ABDOMEN PELVIS W/O & W CONTRAST with UCCT1   [facility] CT ([facility])    [address] [facility]  1st Floor  [facility] [address] [phone]           How do I prepare for my exam? (Food and drink instructions) To prepare: Do not eat or drink for 2 hours before your exam. If you need to take medicine, you may take it with small sips of water. (We may ask you to take liquid medicine as well.)  How do I prepare for my exam? (Other instructions) Please arrive 30 minutes early for your CT.  Once in the department you might be asked to drink water 15-20 minutes prior to your exam.  If indicated you may be asked to drink an oral contrast in advance of your CT.  If this is the case, the imaging team will let you know or be in contact with you prior to your appointment  Patients over 70 or patients with diabetes or kidney problems: If you haven t had a blood test (creatinine test) within the last 30 days, the Cardiologist/Radiologist may require you to get this test prior to your exam.  If you have diabetes:  Continue to take your metformin medication on the day of your exam  What should I wear: Please wear loose clothing, such as a sweat suit or jogging clothes. Avoid snaps, zippers and other metal. We may ask you to undress and put on a hospital gown.  How long does the exam take: Most scans take less than 20 minutes.  What should I bring: Please bring any scans or X-rays taken at other hospitals, if similar tests were done. Also bring a list of your medicines, including vitamins, minerals and over-the-counter drugs. It is safest to leave personal items at home.   Do I need a : No  is needed.  What do I need to tell my doctor? Be sure to tell your doctor: * If you have any allergies. * If there s any chance you are pregnant. * If you are breastfeeding.  What should I do after the exam: No restrictions, You may resume normal activities.  What is this test: A CT (computed tomography) scan is a series of pictures that allows us to look inside your body. The scanner creates images of the body in cross sections, much like slices of bread. This helps us see any problems more clearly. You may receive contrast (X-ray dye) before or during your scan. You will be asked to drink the contrast.  Who should I call with questions: If you have any questions, please call the Imaging Department where you will have your exam. Directions, parking instructions, and other information is available on our website, "[employer]/imaging.            Jan 21, 2019  8:15 AM CST   (Arrive by 8:00 AM)   Return Visit with Agustin Kyle MD   [facility] ([facility])    [address] [place], [place] [address] [phone]              Future tests that were ordered for you today     Open Future Orders        Priority Expected Expires Ordered    Hepatic Panel [LAB20] Routine 5/14/2019 11/15/2019 11/15/2018    Basic metabolic panel [LAB15] Routine 5/14/2019 11/15/2019 11/15/2018    CBC with platelets [OUM541] Routine 5/14/2019 11/15/2019 11/15/2018    INR [OSG2996] Routine 5/14/2019 11/15/2019 11/15/2018    AFP tumor marker [EVQ275] Routine 5/14/2019 11/15/2019 11/15/2018            Who to contact     If you have questions or need follow up information about today's clinic visit or your schedule please contact [facility] HEPATOLOGY directly at [phone].  Normal or non-critical lab and imaging results will be communicated to you by MyChart, letter or phone within 4 business days after the clinic has received the results. If  you do not hear from us within 7 days, please contact the clinic through [employer] or phone. If you have a critical or abnormal lab result, we will notify you by phone as soon as possible.  Submit refill requests through [employer] or call your pharmacy and they will forward the refill request to us. Please allow 3 business days for your refill to be completed.          Additional Information About Your Visit        K[facility] Information     [employer] gives you secure access to your electronic health record. If you see a primary care provider, you can also send messages to your care team and make appointments. If you have questions, please call your primary care clinic.  If you do not have a primary care provider, please call [phone] and they will assist you.        Care EveryWhere ID     This is your Care EveryWhere ID. This could be used by other organizations to access your [place] medical records  [id number]        Your Vitals Were     Pulse Temperature Pulse Oximetry BMI (Body Mass Index)          72 97.8  F (36.6  C) (Oral) 98% 27.23 kg/m2         Blood Pressure from Last 3 Encounters:   11/15/18 130/83   11/01/18 143/88   10/22/18 149/89    Weight from Last 3 Encounters:   11/15/18 76.5 kg (168 lb 9.6 oz)   11/01/18 75.2 kg (165 lb 11.2 oz)   10/22/18 75.6 kg (166 lb 11.2 oz)              We Performed the Following     Schedule follow up appointments        Primary Care Provider    None Specified       No primary provider on file.        Equal Access to Services     [facility]Y [facility] : Hadii tonie Mays, waaxda luhiteshadaha, qaybta kaalmada darlene, meño herrera. So [facility] [phone].    ATENCIÓN: Si habla español, tiene a jean disposición servicios gratuitos de asistencia lingüística. Llame al [phone].    We comply with applicable federal civil rights laws and [place] laws. We do not discriminate on the basis of race, color, national origin, age, disability, sex, sexual  orientation, or gender identity.            Thank you!     Thank you for choosing [facility] HEPATOLOGY  for your care. Our goal is always to provide you with excellent care. Hearing back from our patients is one way we can continue to improve our services. Please take a few minutes to complete the written survey that you may receive in the mail after your visit with us. Thank you!             Your Updated Medication List - Protect others around you: Learn how to safely use, store and throw away your medicines at www.disposemymeds.org.          This list is accurate as of 11/15/18  9:51 AM.  Always use your most recent med list.                   Brand Name Dispense Instructions for use Diagnosis    LORazepam 0.5 MG tablet    ATIVAN    30 tablet    Take 1 tablet (0.5 mg) by mouth every 4 hours as needed (Anxiety, Nausea/Vomiting or Sleep)    HCC (hepatocellular carcinoma) (H)       prochlorperazine 10 MG tablet    COMPAZINE    30 tablet    Take 1 tablet (10 mg) by mouth every 6 hours as needed (Nausea/Vomiting)    HCC (hepatocellular carcinoma) (H)

## 2018-11-15 NOTE — PROGRESS NOTES
I had the pleasure of seeing Preeti Pascual for followup in the Liver Clinic at the [facility] on 11/15/2018.  Mr. 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  He has been treated with liver-directed therapy; but unfortunately, he developed metastatic disease and was started on sorafenib which he was intolerant of due to hand-foot syndrome and now has been switched to nivolumab.  He had his first dose which he tolerated well.        Otherwise, he will feel really quite well.  He denies any abdominal pain, itching, skin rash or fatigue.  He denies any increased abdominal girth or lower extremity edema.  He denies any fevers or chills, cough or shortness of breath.  He denies any nausea or vomiting, diarrhea or constipation.  His appetite has been good, and his weight has been stable.  There, otherwise, have been no other new events since he was last seen.     Current Outpatient Prescriptions   Medication     LORazepam (ATIVAN) 0.5 MG tablet     prochlorperazine (COMPAZINE) 10 MG tablet     [DISCONTINUED] SORAfenib (NEXAVAR) 200 MG tablet CHEMO     No current facility-administered medications for this visit.      B/P: 130/83, T: 97.8, P: 72, R: Data Unavailable    In general, he looks quite well.  HEENT exam shows no scleral icterus or temporal muscle wasting.  His chest is clear.  His abdominal exam shows no increase in girth.  No masses or tenderness to palpation are present.  His liver is 10 cm in span without left lobe enlargement.  No spleen tip is palpable, and extremity exam shows no edema.  Skin exam shows no stigmata of chronic liver disease.  Neurologic exam shows no asterixis.     Recent Results (from the past 168 hour(s))   Hepatic panel [LAB20]    Collection Time: 11/15/18  8:41 AM   Result Value Ref Range    Bilirubin Direct 0.1 0.0 - 0.2 mg/dL    Bilirubin Total 0.4 0.2 - 1.3 mg/dL    Albumin 3.2 (L) 3.4 -  5.0 g/dL    Protein Total 7.5 6.8 - 8.8 g/dL    Alkaline Phosphatase 71 40 - 150 U/L    ALT 34 0 - 70 U/L    AST 31 0 - 45 U/L   Basic metabolic panel [LAB15]    Collection Time: 11/15/18  8:41 AM   Result Value Ref Range    Sodium 139 133 - 144 mmol/L    Potassium 3.8 3.4 - 5.3 mmol/L    Chloride 108 94 - 109 mmol/L    Carbon Dioxide 25 20 - 32 mmol/L    Anion Gap 6 3 - 14 mmol/L    Glucose 97 70 - 99 mg/dL    Urea Nitrogen 10 7 - 30 mg/dL    Creatinine 0.78 0.66 - 1.25 mg/dL    GFR Estimate >90 >60 mL/min/1.7m2    GFR Estimate If Black >90 >60 mL/min/1.7m2    Calcium 8.3 (L) 8.5 - 10.1 mg/dL   INR [URU9896]    Collection Time: 11/15/18  8:41 AM   Result Value Ref Range    INR 1.13 0.86 - 1.14      I did review his MRI which seems as though it showed 1 large lesion between segments 5 and 8.  It is difficult to say whether it shows washout at this point in time.  He has a  couple of other LI-RADS 5 lesions.  I will have the imaging reviewed out our Friday tumor conference.      My impression is that Mr. Pascual has cirrhosis caused by chronic B and complicated by hepatocellular carcinoma.  At this point in time, he is on the nivolumab, and I will be interested to see how well he responds in 3-6 months.  He tolerated the first dose well.  His blood tests are unchanged.  He is going to be seeing Dr. Kyle in 3 months, and I will see him back in 6 months.  He did get a flu shot already this year.      Thank you very much for allowing me to participate in the care of the patient.  If you have any questions regarding my recommendations, please do not hesitate to contact me.       Trevor Trujillo MD      Professor of Medicine  [employer]      Executive Medical Director, Solid Organ Transplant Program  [facility]

## 2018-11-15 NOTE — DISCHARGE INSTRUCTIONS
MRI Contrast Discharge Instructions    The IV contrast you received today will pass out of your body in your  urine. This will happen in the next 24 hours. You will not feel this process.  Your urine will not change color.    Drink at least 4 extra glasses of water or juice today (unless your doctor  has restricted your fluids). This reduces the stress on your kidneys.  You may take your regular medicines.    If you are on dialysis: It is best to have dialysis today.    If you have a reaction: Most reactions happen right away. If you have  any new symptoms after leaving the hospital (such as hives or swelling),  call your hospital at the correct number below. Or call your family doctor.  If you have breathing distress or wheezing, call 911.    Special instructions: ***    I have read and understand the above information.    Signature:______________________________________ Date:___________    Staff:__________________________________________ Date:___________     Time:__________    [facility] Departments:    ___[place]: [phone]  ___[facility]: [phone]  ___[place]: [phone] ___[facility]: [phone]  ___[facility]: [phone]  ___[facility]: [phone]  ___[phone]  ___[facility]: [phone]  ___[phone]

## 2018-11-16 LAB
HBV DNA SERPL NAA+PROBE-ACNC: 30 [IU]/ML
HBV DNA SERPL NAA+PROBE-LOG IU: 1.5 {LOG_IU}/ML

## 2018-11-24 ENCOUNTER — TELEPHONE (OUTPATIENT)
Dept: OTHER | Facility: CLINIC | Age: 53
End: 2018-11-24

## 2018-11-24 ENCOUNTER — NURSE TRIAGE (OUTPATIENT)
Dept: NURSING | Facility: CLINIC | Age: 53
End: 2018-11-24

## 2018-11-24 NOTE — TELEPHONE ENCOUNTER
Patient called to report body aches, sore throat, cough and congestion. No fever, sob, chest pain, able to do daily activities, tolerated diet and stay well hydrated.  I advised this could be flu like symptoms from immunotherapy or possibly a viral syndrome. Advised to take tylenol/ibuprofen/sudafed etc OTC for symptomatic control.   Advised to come to ED for fever, dyspnea, inability to eat/drink, or any other concerning symptoms.

## 2018-11-24 NOTE — TELEPHONE ENCOUNTER
"Patient calling reporting symptoms started on Wednesday afternoon 11/21/18 with congestion, body aches, cough. Reporting \"mucus cough.\" Temp 97 Oral during triage. \"Burning in chest\" with cough. Denies wheezing or shortness of breath.   Patient stating he attempted to go into Urgent Care near his home today and it was closed. Patient is requesting to speak with on call MD regarding possible prescription.  Stating he has not taken anything over the counter.   Paged on call through [facility] at 850 a.m. To call patient at Phone. [phone]. Advised patient if no return call with in 20 minutes to call back.     Caller verbalized understanding. Denies further questions.    Placed follow up call to patient. Patient stating MD did return his call and advised to try Tylenol or Ibuprofen and be seen if symptoms worsened.     Sendy Mcfadden RN  [facility]        Additional Information    Negative: Severe difficulty breathing (e.g., struggling for each breath, speaks in single words)    Negative: Sounds like a life-threatening emergency to the triager    Negative: Runny nose is caused by pollen or other allergies    Negative: Cough is main symptom    Negative: Severe sore throat    Negative: Fever > 104 F (40 C)    Negative: [1] Difficulty breathing AND [2] not severe AND [3] not from stuffy nose (e.g., not relieved by cleaning out the nose)    Negative: Patient sounds very sick or weak to the triager    Negative: [1] Fever > 101 F (38.3 C) AND [2] age > 60    Negative: [1] Fever > 101 F (38.3 C) AND [2] bedridden (e.g., nursing home patient, CVA, chronic illness, recovering from surgery)    Negative: [1] Fever > 100.5 F (38.1 C) AND [2] diabetes mellitus or weak immune system (e.g., HIV positive, cancer chemo, splenectomy, organ transplant, chronic steroids)    Negative: Fever present > 3 days (72 hours)    Negative: [1] Fever returns after gone for over 24 hours AND [2] symptoms worse or " not improved    Negative: [1] Sinus pain (not just congestion) AND [2] fever    Negative: Earache    Negative: [1] SEVERE sore throat AND [2] present > 24 hours    Negative: [1] Sinus congestion (pressure, fullness) AND [2] present > 10 days    Negative: [1] Nasal discharge AND [2] present > 10 days    Negative: [1] Using nasal washes and pain medicine > 24 hours AND [2] sinus pain (lower forehead, cheekbone, or eye) persists    Negative: Sores with yellow scabs around the nasal opening    Cold with no complications (all triage questions negative)    Protocols used: COMMON COLD-ADULT-

## 2018-11-29 ENCOUNTER — INFUSION THERAPY VISIT (OUTPATIENT)
Dept: ONCOLOGY | Facility: CLINIC | Age: 53
End: 2018-11-29
Attending: NURSE PRACTITIONER
Payer: COMMERCIAL

## 2018-11-29 ENCOUNTER — APPOINTMENT (OUTPATIENT)
Dept: LAB | Facility: CLINIC | Age: 53
End: 2018-11-29
Attending: NURSE PRACTITIONER
Payer: COMMERCIAL

## 2018-11-29 VITALS
HEART RATE: 67 BPM | WEIGHT: 169.19 LBS | DIASTOLIC BLOOD PRESSURE: 74 MMHG | RESPIRATION RATE: 16 BRPM | OXYGEN SATURATION: 97 % | BODY MASS INDEX: 27.19 KG/M2 | SYSTOLIC BLOOD PRESSURE: 123 MMHG | HEIGHT: 66 IN | TEMPERATURE: 97.6 F

## 2018-11-29 DIAGNOSIS — R05.9 COUGH: Primary | ICD-10-CM

## 2018-11-29 DIAGNOSIS — C22.0 HCC (HEPATOCELLULAR CARCINOMA) (H): ICD-10-CM

## 2018-11-29 DIAGNOSIS — J06.9 VIRAL UPPER RESPIRATORY ILLNESS: ICD-10-CM

## 2018-11-29 DIAGNOSIS — C22.0 HCC (HEPATOCELLULAR CARCINOMA) (H): Primary | ICD-10-CM

## 2018-11-29 LAB
ALBUMIN SERPL-MCNC: 3.2 G/DL (ref 3.4–5)
ALP SERPL-CCNC: 73 U/L (ref 40–150)
ALT SERPL W P-5'-P-CCNC: 30 U/L (ref 0–70)
ANION GAP SERPL CALCULATED.3IONS-SCNC: 7 MMOL/L (ref 3–14)
AST SERPL W P-5'-P-CCNC: 28 U/L (ref 0–45)
BILIRUB SERPL-MCNC: 0.4 MG/DL (ref 0.2–1.3)
BUN SERPL-MCNC: 11 MG/DL (ref 7–30)
CALCIUM SERPL-MCNC: 8.2 MG/DL (ref 8.5–10.1)
CHLORIDE SERPL-SCNC: 108 MMOL/L (ref 94–109)
CO2 SERPL-SCNC: 24 MMOL/L (ref 20–32)
CREAT SERPL-MCNC: 0.66 MG/DL (ref 0.66–1.25)
GFR SERPL CREATININE-BSD FRML MDRD: >90 ML/MIN/1.7M2
GLUCOSE SERPL-MCNC: 97 MG/DL (ref 70–99)
POTASSIUM SERPL-SCNC: 3.8 MMOL/L (ref 3.4–5.3)
PROT SERPL-MCNC: 7.7 G/DL (ref 6.8–8.8)
SODIUM SERPL-SCNC: 138 MMOL/L (ref 133–144)
TSH SERPL DL<=0.005 MIU/L-ACNC: 0.94 MU/L (ref 0.4–4)

## 2018-11-29 PROCEDURE — 84443 ASSAY THYROID STIM HORMONE: CPT | Performed by: NURSE PRACTITIONER

## 2018-11-29 PROCEDURE — 99214 OFFICE O/P EST MOD 30 MIN: CPT | Mod: ZP | Performed by: NURSE PRACTITIONER

## 2018-11-29 PROCEDURE — 96413 CHEMO IV INFUSION 1 HR: CPT

## 2018-11-29 PROCEDURE — G0463 HOSPITAL OUTPT CLINIC VISIT: HCPCS | Mod: ZF

## 2018-11-29 PROCEDURE — 80053 COMPREHEN METABOLIC PANEL: CPT | Performed by: NURSE PRACTITIONER

## 2018-11-29 PROCEDURE — 25000128 H RX IP 250 OP 636: Mod: ZF | Performed by: NURSE PRACTITIONER

## 2018-11-29 RX ORDER — METHYLPREDNISOLONE SODIUM SUCCINATE 125 MG/2ML
125 INJECTION, POWDER, LYOPHILIZED, FOR SOLUTION INTRAMUSCULAR; INTRAVENOUS
Status: CANCELLED
Start: 2018-11-29

## 2018-11-29 RX ORDER — EPINEPHRINE 0.3 MG/.3ML
0.3 INJECTION SUBCUTANEOUS EVERY 5 MIN PRN
Status: CANCELLED | OUTPATIENT
Start: 2018-11-29

## 2018-11-29 RX ORDER — CODEINE PHOSPHATE AND GUAIFENESIN 10; 100 MG/5ML; MG/5ML
1-2 SOLUTION ORAL EVERY 4 HOURS PRN
Qty: 120 ML | Refills: 0 | Status: SHIPPED | OUTPATIENT
Start: 2018-11-29 | End: 2019-01-21

## 2018-11-29 RX ORDER — EPINEPHRINE 1 MG/ML
0.3 INJECTION, SOLUTION INTRAMUSCULAR; SUBCUTANEOUS EVERY 5 MIN PRN
Status: CANCELLED | OUTPATIENT
Start: 2018-11-29

## 2018-11-29 RX ORDER — MEPERIDINE HYDROCHLORIDE 25 MG/ML
25 INJECTION INTRAMUSCULAR; INTRAVENOUS; SUBCUTANEOUS EVERY 30 MIN PRN
Status: CANCELLED | OUTPATIENT
Start: 2018-11-29

## 2018-11-29 RX ORDER — BENZONATATE 200 MG/1
CAPSULE ORAL
Refills: 0 | COMMUNITY
Start: 2018-11-25 | End: 2019-01-03

## 2018-11-29 RX ORDER — ALBUTEROL SULFATE 0.83 MG/ML
2.5 SOLUTION RESPIRATORY (INHALATION)
Status: CANCELLED | OUTPATIENT
Start: 2018-11-29

## 2018-11-29 RX ORDER — ALBUTEROL SULFATE 90 UG/1
AEROSOL, METERED RESPIRATORY (INHALATION)
Refills: 0 | COMMUNITY
Start: 2018-11-25 | End: 2022-01-01

## 2018-11-29 RX ORDER — SODIUM CHLORIDE 9 MG/ML
1000 INJECTION, SOLUTION INTRAVENOUS CONTINUOUS PRN
Status: CANCELLED
Start: 2018-11-29

## 2018-11-29 RX ORDER — LORAZEPAM 2 MG/ML
0.5 INJECTION INTRAMUSCULAR EVERY 4 HOURS PRN
Status: CANCELLED
Start: 2018-11-29

## 2018-11-29 RX ORDER — DIPHENHYDRAMINE HYDROCHLORIDE 50 MG/ML
50 INJECTION INTRAMUSCULAR; INTRAVENOUS
Status: CANCELLED
Start: 2018-11-29

## 2018-11-29 RX ORDER — ALBUTEROL SULFATE 90 UG/1
1-2 AEROSOL, METERED RESPIRATORY (INHALATION)
Status: CANCELLED
Start: 2018-11-29

## 2018-11-29 RX ADMIN — SODIUM CHLORIDE 250 ML: 9 INJECTION, SOLUTION INTRAVENOUS at 09:19

## 2018-11-29 RX ADMIN — SODIUM CHLORIDE 480 MG: 9 INJECTION, SOLUTION INTRAVENOUS at 09:46

## 2018-11-29 ASSESSMENT — PAIN SCALES - GENERAL: PAINLEVEL: NO PAIN (0)

## 2018-11-29 NOTE — NURSING NOTE
"Oncology Rooming Note    November 29, 2018 8:24 AM   Preeti Pascual is a 52 year old male who presents for:    Chief Complaint   Patient presents with     Blood Draw      labs drawn; PIV placed, vitals taken, checked in for provider appt.     Oncology Clinic Visit     HCC , Labs , Tx     Initial Vitals: /74  Pulse 67  Temp 97.6  F (36.4  C) (Oral)  Resp 16  Ht 1.676 m (5' 6\")  Wt 76.7 kg (169 lb 3 oz)  SpO2 97%  BMI 27.31 kg/m2 Estimated body mass index is 27.31 kg/(m^2) as calculated from the following:    Height as of this encounter: 1.676 m (5' 6\").    Weight as of this encounter: 76.7 kg (169 lb 3 oz). Body surface area is 1.89 meters squared.  No Pain (0) Comment: Data Unavailable   No LMP for male patient.  Allergies reviewed: Yes  Medications reviewed: Yes    Medications: Medication refills not needed today.  Pharmacy name entered into Zoom[facility]: [id number] - [place], [place] - [address] [place] [address] RD NE AT [facility]    Clinical concerns: Coughing  Smiley  was notified.    5 minutes for nursing intake (face to face time)     Crys Jones MA              "

## 2018-11-29 NOTE — MR AVS SNAPSHOT
After Visit Summary   11/29/2018    Preeti Pascual    MRN: [id number]           Patient Information     Date Of Birth          1965        Visit Information        Provider Department      11/29/2018 8:40 AM Nallely Smiley APRN CNP M [facility]        Today's Diagnoses     Cough    -  1    HCC (hepatocellular carcinoma) (H)        Viral upper respiratory illness           Follow-ups after your visit        Your next 10 appointments already scheduled     Nov 29, 2018  9:30 AM CST   Infusion 60 with UC ONCOLOGY INFUSION, UC 21 ATC   [facility] ([facility])    [address] [place] [address]  [address]  [facility] [address]   [phone]            Dec 27, 2018  7:30 AM CST   [facility] Draw with  [facility] DRAW   [facility] Lab Draw ([facility])    [address] [place] [address]  [address]  [facility] [address]   [phone]            Dec 27, 2018  7:50 AM CST   (Arrive by 7:35 AM)   Return Visit with MITCH Whiting CNP [facility] ([facility])    [address] [place] [address]  [address]  [facility] [address]   [phone]            Dec 27, 2018  8:30 AM CST   Infusion 60 with UC ONCOLOGY INFUSION, UC 16 ATC   [facility] ([facility])    [address] [place] [address]  [address]  [facility] [address]   [phone]            Jan 17, 2019  8:15 AM CST   LAB with  LAB   [facility])    [address] [place] [address]  1st Floor  [facility] [address]   [phone]           Please do not eat 10-12 hours before your appointment if you are coming in fasting for labs on lipids, cholesterol, or glucose (sugar). This does not apply to pregnant women. Water, hot tea and black coffee (with nothing added) are okay. Do not drink other fluids, diet soda or chew gum.            Jan 17, 2019  8:40 AM  CST   (Arrive by 8:10 AM)   CT CHEST ABDOMEN PELVIS W/O & W CONTRAST with UCCT1   [facility] ([facility])    [address]  1st Floor  [facility] [address] [phone]           How do I prepare for my exam? (Food and drink instructions) To prepare: Do not eat or drink for 2 hours before your exam. If you need to take medicine, you may take it with small sips of water. (We may ask you to take liquid medicine as well.)  How do I prepare for my exam? (Other instructions) Please arrive 30 minutes early for your CT.  Once in the department you might be asked to drink water 15-20 minutes prior to your exam.  If indicated you may be asked to drink an oral contrast in advance of your CT.  If this is the case, the imaging team will let you know or be in contact with you prior to your appointment  Patients over 70 or patients with diabetes or kidney problems: If you haven t had a blood test (creatinine test) within the last 30 days, the Cardiologist/Radiologist may require you to get this test prior to your exam.  If you have diabetes:  Continue to take your metformin medication on the day of your exam  What should I wear: Please wear loose clothing, such as a sweat suit or jogging clothes. Avoid snaps, zippers and other metal. We may ask you to undress and put on a hospital gown.  How long does the exam take: Most scans take less than 20 minutes.  What should I bring: Please bring any scans or X-rays taken at other hospitals, if similar tests were done. Also bring a list of your medicines, including vitamins, minerals and over-the-counter drugs. It is safest to leave personal items at home.  Do I need a : No  is needed.  What do I need to tell my doctor? Be sure to tell your doctor: * If you have any allergies. * If there s any chance you are pregnant. * If you are breastfeeding.  What should I do after the exam: No restrictions, You may resume normal  activities.  What is this test: A CT (computed tomography) scan is a series of pictures that allows us to look inside your body. The scanner creates images of the body in cross sections, much like slices of bread. This helps us see any problems more clearly. You may receive contrast (X-ray dye) before or during your scan. You will be asked to drink the contrast.  Who should I call with questions: If you have any questions, please call the Imaging Department where you will have your exam. Directions, parking instructions, and other information is available on our website, [email or web address].            Jan 21, 2019  8:15 AM CST   (Arrive by 8:00 AM)   Return Visit with Agustin Kyle MD   [facility] ([facility])    [address]  [address]  [facility] [address] [phone]            May 02, 2019  8:00 AM CDT   Lab with SURESH LAB   [facility] ([facility])    [address]  1st Floor  [facility] [address] [phone]              Who to contact     If you have questions or need follow up information about today's clinic visit or your schedule please contact [facility] directly at [phone].  Normal or non-critical lab and imaging results will be communicated to you by MyChart, letter or phone within 4 business days after the clinic has received the results. If you do not hear from us within 7 days, please contact the clinic through [employer] or phone. If you have a critical or abnormal lab result, we will notify you by phone as soon as possible.  Submit refill requests through [employer] or call your pharmacy and they will forward the refill request to us. Please allow 3 business days for your refill to be completed.          Additional Information About Your Visit        [employer] Information     [employer] gives you secure access to your electronic health record. If you see a primary care  "provider, you can also send messages to your care team and make appointments. If you have questions, please call your primary care clinic.  If you do not have a primary care provider, please call [phone] and they will assist you.        Care EveryWhere ID     This is your Care EveryWhere ID. This could be used by other organizations to access your [place] medical records  [id number]        Your Vitals Were     Pulse Temperature Respirations Height Pulse Oximetry BMI (Body Mass Index)    67 97.6  F (36.4  C) (Oral) 16 1.676 m (5' 6\") 97% 27.31 kg/m2       Blood Pressure from Last 3 Encounters:   11/29/18 123/74   11/15/18 130/83   11/01/18 143/88    Weight from Last 3 Encounters:   11/29/18 76.7 kg (169 lb 3 oz)   11/15/18 76.5 kg (168 lb 9.6 oz)   11/01/18 75.2 kg (165 lb 11.2 oz)              Today, you had the following     No orders found for display         Today's Medication Changes          These changes are accurate as of 11/29/18  9:10 AM.  If you have any questions, ask your nurse or doctor.               Start taking these medicines.        Dose/Directions    guaiFENesin-codeine 100-10 MG/5ML solution   Commonly known as:  ROBITUSSIN AC   Used for:  Cough, HCC (hepatocellular carcinoma) (H), Viral upper respiratory illness   Started by:  Nallely Smiley APRN CNP        Dose:  1-2 tsp.   Take 5-10 mLs by mouth every 4 hours as needed for cough   Quantity:  120 mL   Refills:  0            Where to get your medicines      Some of these will need a paper prescription and others can be bought over the counter.  Ask your nurse if you have questions.     Bring a paper prescription for each of these medications     guaiFENesin-codeine 100-10 MG/5ML solution                Primary Care Provider Fax #    Physician No Ref-Primary [phone]       No address on file        Equal Access to Services     NEFTALY MANJARREZ AH: yelitza Chandra, meño blcak " bj foleyaustyn tejaljuan laadoreburak ah. [facility] [phone].    ATENCIÓN: Si milviala chapin, tiene a jean disposición servicios gratuitos de asistencia lingüística. Emily perdomo [phone].    We comply with applicable federal civil rights laws and [place] laws. We do not discriminate on the basis of race, color, national origin, age, disability, sex, sexual orientation, or gender identity.            Thank you!     Thank you for choosing [facility]  for your care. Our goal is always to provide you with excellent care. Hearing back from our patients is one way we can continue to improve our services. Please take a few minutes to complete the written survey that you may receive in the mail after your visit with us. Thank you!             Your Updated Medication List - Protect others around you: Learn how to safely use, store and throw away your medicines at www.disposemymeds.org.          This list is accurate as of 11/29/18  9:10 AM.  Always use your most recent med list.                   Brand Name Dispense Instructions for use Diagnosis    benzonatate 200 MG capsule    TESSALON     TAKE 1 CAPSULE (200 MG TOTAL) BY MOUTH 3 (THREE) TIMES A DAY AS NEEDED FOR COUGH FOR UP TO 7 DAYS.    Cough, HCC (hepatocellular carcinoma) (H), Viral upper respiratory illness       guaiFENesin-codeine 100-10 MG/5ML solution    ROBITUSSIN AC    120 mL    Take 5-10 mLs by mouth every 4 hours as needed for cough    Cough, HCC (hepatocellular carcinoma) (H), Viral upper respiratory illness       LORazepam 0.5 MG tablet    ATIVAN    30 tablet    Take 1 tablet (0.5 mg) by mouth every 4 hours as needed (Anxiety, Nausea/Vomiting or Sleep)    HCC (hepatocellular carcinoma) (H)       prochlorperazine 10 MG tablet    COMPAZINE    30 tablet    Take 1 tablet (10 mg) by mouth every 6 hours as needed (Nausea/Vomiting)    HCC (hepatocellular carcinoma) (H)       VENTOLIN  (90 Base) MCG/ACT inhaler   Generic drug:  albuterol      1-2  INHALATIONS EVERY 4-6 HOURS AS NEEDED FOR WHEEZING. DISPENSE SPACER AS NEEDED.    Cough, HCC (hepatocellular carcinoma) (H), Viral upper respiratory illness

## 2018-11-29 NOTE — NURSING NOTE
Chief Complaint   Patient presents with     Blood Draw      labs drawn; PIV placed, vitals taken, checked in for provider appt.     Sakina Holley CMA ([facility])

## 2018-11-29 NOTE — LETTER
11/29/2018       RE: Preeti Pascual  [address]  [facility] [address]     Dear Colleague,    Thank you for referring your patient, Preeti Pascual, to the [facility]. Please see a copy of my visit note below.    Reason for Visit: seen in f/u of metastatic HCC    Oncology HPI: Preeti Pascual is a 52 year old man with a pmh of hep. B, cirrhosis. He was previously treated with radioembolization in 2016 with an excellent response. He declined liver transplantation. 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 He was initiated Sorafenib, but tolerated it poorly and was dosed at 200 mg bid. He was found to have progression and initiated on Nivolumab 480 mg monthly on 11/1/18.     Interval history: Preeti felt the infusions went well. He started to feel better than when he was on sorafenib. Hand/foot tenderness has resolved. Loose stools have stopped and his bowels are more formed. Developed a URI this past weekend. No fevers/chills. Had a sore throat and chest congestion. Was seen in a retail clinic and prescribed Tessalon. He has been using that without improvement in the cough. Sore throat is getting better. Cough is not bothersome during the day,but worsens at night. No chest pain, shortness of breath or wheezing. No myalgias/arthralgias. No new rashes, bleeding, bruising. Urination wnl.    Current Outpatient Prescriptions   Medication Sig Dispense Refill     benzonatate (TESSALON) 200 MG capsule TAKE 1 CAPSULE (200 MG TOTAL) BY MOUTH 3 (THREE) TIMES A DAY AS NEEDED FOR COUGH FOR UP TO 7 DAYS.  0     LORazepam (ATIVAN) 0.5 MG tablet Take 1 tablet (0.5 mg) by mouth every 4 hours as needed (Anxiety, Nausea/Vomiting or Sleep) (Patient not taking: Reported on 11/1/2018) 30 tablet 2     prochlorperazine (COMPAZINE) 10 MG tablet Take 1 tablet (10 mg) by mouth every 6 hours as needed (Nausea/Vomiting) (Patient not taking: Reported on 11/1/2018) 30 tablet 2     VENTOLIN HFA  "108 (90 Base) MCG/ACT inhaler 1-2 INHALATIONS EVERY 4-6 HOURS AS NEEDED FOR WHEEZING. DISPENSE SPACER AS NEEDED.  0     [DISCONTINUED] SORAfenib (NEXAVAR) 200 MG tablet CHEMO Take 400mg (2 pills) in the morning and 200mg (1 pill) in the evening. Take on an empty stomach 1 hour before or 2 hours after a meal. 90 tablet 0        No Known Allergies      Exam: alert, appears in NAD. Blood pressure 123/74, pulse 67, temperature 97.6  F (36.4  C), temperature source Oral, resp. rate 16, height 1.676 m (5' 6\"), weight 76.7 kg (169 lb 3 oz), SpO2 97 %.  Wt Readings from Last 4 Encounters:   11/29/18 76.7 kg (169 lb 3 oz)   11/15/18 76.5 kg (168 lb 9.6 oz)   11/01/18 75.2 kg (165 lb 11.2 oz)   10/22/18 75.6 kg (166 lb 11.2 oz)     Oropharynx is moist, no focal lesion. No icterus. Neck supple and without adenopathy. Lungs:CTA. Heart:RRR, no murmur or rub. Abdomen: soft, nontender, BS active. No masses or organomegaly. Extremities: warm, no edema. Speech is clear. Cn wnl.    Labs: Results for TOD RICKS (MRN [id number]) as of 11/29/2018 09:09   Ref. Range 11/29/2018 08:10   Sodium Latest Ref Range: 133 - 144 mmol/L 138   Potassium Latest Ref Range: 3.4 - 5.3 mmol/L 3.8   Chloride Latest Ref Range: 94 - 109 mmol/L 108   Carbon Dioxide Latest Ref Range: 20 - 32 mmol/L 24   Urea Nitrogen Latest Ref Range: 7 - 30 mg/dL 11   Creatinine Latest Ref Range: 0.66 - 1.25 mg/dL 0.66   GFR Estimate Latest Ref Range: >60 mL/min/1.7m2 >90   GFR Estimate If Black Latest Ref Range: >60 mL/min/1.7m2 >90   Calcium Latest Ref Range: 8.5 - 10.1 mg/dL 8.2 (L)   Anion Gap Latest Ref Range: 3 - 14 mmol/L 7   Albumin Latest Ref Range: 3.4 - 5.0 g/dL 3.2 (L)   Protein Total Latest Ref Range: 6.8 - 8.8 g/dL 7.7   Bilirubin Total Latest Ref Range: 0.2 - 1.3 mg/dL 0.4   Alkaline Phosphatase Latest Ref Range: 40 - 150 U/L 73   ALT Latest Ref Range: 0 - 70 U/L 30   AST Latest Ref Range: 0 - 45 U/L 28   TSH Latest Ref Range: 0.40 - 4.00 mU/L 0.94 "   Glucose Latest Ref Range: 70 - 99 mg/dL 97       Imaging: n/a    Impression/plan:   1. Metastatic HCC  -progressed on sorafenib and now on nivolumab monthly. Tolerated the treatment well. Prior hand/foot toxicities and diarrhea from sorafenib have resolved. He has been feeling better outside of the URI that he developed this weekend  -ok to proceed with nivolumab today. Will see him prior to the next infusion in 1 month. Restaging scheduled in January.    2. Cough/congestion consistent with viral URI  -low suspicion this relates to immunotoxicity at this time given presentation with sore throat and family/friends with similar symptoms  -reviewed s/s of pneumonitis. Call if short of breath, worsening cough, wheezing  -for now, Ok to continue symptomatic management. No improvement with tessalon, so will try robitussin AC instead    Sincerely,    MITCH Jacobs CNP

## 2018-11-29 NOTE — PATIENT INSTRUCTIONS
Contact Numbers  [facility]: [phone]    After Hours:  [phone]  Triage: [phone]    Please call the [facility] Triage line if you experience a temperature greater than or equal to 100.5, shaking chills, have uncontrolled nausea, vomiting and/or diarrhea, dizziness, shortness of breath, chest pain, bleeding, unexplained bruising, or if you have any other new/concerning symptoms, questions or concerns.     If it is after hours, weekends, or holidays, please call the main hospital  at  [phone] and ask to speak to the Oncology doctor on call.     If you are having any concerning symptoms or wish to speak to a provider before your next infusion visit, please call your care coordinator or triage to notify them so we can adequately serve you.     If you need a refill on a narcotic prescription or other medication, please call triage before your infusion appointment.           November 2018 Sunday Monday Tuesday Wednesday Thursday Friday Saturday 1     [facility]    8:00 AM   (15 min.)   [facility] LAB DRAW   [facility] Lab Draw     [facility] ONC INFUSION 60    8:30 AM   (60 min.)    ONCOLOGY INFUSION   [facility] 2     3       4     5     6     7     8     9     10       11     12     13     14     15     LAB WITH  CLINIC    8:00 AM   (15 min.)    LAB   [facility]     MR ABDOMEN WWO    8:30 AM   (45 min.)   [address] [facility] MRI     [facility] RETURN GENERAL LIVER    9:15 AM   (15 min.)   Trevor Trujillo MD   [facility] Hepatology 16     17       18     19     20     21     22     23     24       25     26     27     28     29     [facility] LAB DRAW    8:15 AM   (15 min.)   [facility] DRAW   [facility] Lab Draw     [facility] RETURN    8:25 AM   (50 min.)   Nallely Smiley APRN CNP   [facility]     [facility] ONC INFUSION 60    9:30 AM   (60 min.)    ONCOLOGY INFUSION   [facility]  30 December 2018 Sunday Monday Tuesday Wednesday Thursday Friday Saturday                                 1       2     3     4     5     6     7     8       9     10     11     12     13     14     15       16     17     18     19     20     21     22       23     24     25  Happy Birthday!     26     27     [facility] LAB DRAW    7:30 AM   (15 min.)    [facility]   [facility] Lab Draw     [facility] RETURN    7:35 AM   (50 min.)   Nallely Smiley, MITCH CNP   [facility] ONC INFUSION 60    8:30 AM   (60 min.)    ONCOLOGY INFUSION   [facility] 28 29       30     31                                           Lab Results:  Recent Results (from the past 12 hour(s))   Comprehensive metabolic panel    Collection Time: 11/29/18  8:10 AM   Result Value Ref Range    Sodium 138 133 - 144 mmol/L    Potassium 3.8 3.4 - 5.3 mmol/L    Chloride 108 94 - 109 mmol/L    Carbon Dioxide 24 20 - 32 mmol/L    Anion Gap 7 3 - 14 mmol/L    Glucose 97 70 - 99 mg/dL    Urea Nitrogen 11 7 - 30 mg/dL    Creatinine 0.66 0.66 - 1.25 mg/dL    GFR Estimate >90 >60 mL/min/1.7m2    GFR Estimate If Black >90 >60 mL/min/1.7m2    Calcium 8.2 (L) 8.5 - 10.1 mg/dL    Bilirubin Total 0.4 0.2 - 1.3 mg/dL    Albumin 3.2 (L) 3.4 - 5.0 g/dL    Protein Total 7.7 6.8 - 8.8 g/dL    Alkaline Phosphatase 73 40 - 150 U/L    ALT 30 0 - 70 U/L    AST 28 0 - 45 U/L   TSH with free T4 reflex    Collection Time: 11/29/18  8:10 AM   Result Value Ref Range    TSH 0.94 0.40 - 4.00 mU/L

## 2018-11-29 NOTE — PROGRESS NOTES
Reason for Visit: seen in f/u of metastatic HCC    Oncology HPI: Preeti Pascual is a 52 year old man with a pmh of hep. B, cirrhosis. He was previously treated with radioembolization in 2016 with an excellent response. He declined liver transplantation. 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 He was initiated Sorafenib, but tolerated it poorly and was dosed at 200 mg bid. He was found to have progression and initiated on Nivolumab 480 mg monthly on 11/1/18.     Interval history: Preeti felt the infusions went well. He started to feel better than when he was on sorafenib. Hand/foot tenderness has resolved. Loose stools have stopped and his bowels are more formed. Developed a URI this past weekend. No fevers/chills. Had a sore throat and chest congestion. Was seen in a retail clinic and prescribed Tessalon. He has been using that without improvement in the cough. Sore throat is getting better. Cough is not bothersome during the day,but worsens at night. No chest pain, shortness of breath or wheezing. No myalgias/arthralgias. No new rashes, bleeding, bruising. Urination wnl.    Current Outpatient Prescriptions   Medication Sig Dispense Refill     benzonatate (TESSALON) 200 MG capsule TAKE 1 CAPSULE (200 MG TOTAL) BY MOUTH 3 (THREE) TIMES A DAY AS NEEDED FOR COUGH FOR UP TO 7 DAYS.  0     LORazepam (ATIVAN) 0.5 MG tablet Take 1 tablet (0.5 mg) by mouth every 4 hours as needed (Anxiety, Nausea/Vomiting or Sleep) (Patient not taking: Reported on 11/1/2018) 30 tablet 2     prochlorperazine (COMPAZINE) 10 MG tablet Take 1 tablet (10 mg) by mouth every 6 hours as needed (Nausea/Vomiting) (Patient not taking: Reported on 11/1/2018) 30 tablet 2     VENTOLIN  (90 Base) MCG/ACT inhaler 1-2 INHALATIONS EVERY 4-6 HOURS AS NEEDED FOR WHEEZING. DISPENSE SPACER AS NEEDED.  0     [DISCONTINUED] SORAfenib (NEXAVAR) 200 MG tablet CHEMO Take 400mg (2 pills) in the morning and 200mg (1 pill) in the  "evening. Take on an empty stomach 1 hour before or 2 hours after a meal. 90 tablet 0        No Known Allergies      Exam: alert, appears in NAD. Blood pressure 123/74, pulse 67, temperature 97.6  F (36.4  C), temperature source Oral, resp. rate 16, height 1.676 m (5' 6\"), weight 76.7 kg (169 lb 3 oz), SpO2 97 %.  Wt Readings from Last 4 Encounters:   11/29/18 76.7 kg (169 lb 3 oz)   11/15/18 76.5 kg (168 lb 9.6 oz)   11/01/18 75.2 kg (165 lb 11.2 oz)   10/22/18 75.6 kg (166 lb 11.2 oz)     Oropharynx is moist, no focal lesion. No icterus. Neck supple and without adenopathy. Lungs:CTA. Heart:RRR, no murmur or rub. Abdomen: soft, nontender, BS active. No masses or organomegaly. Extremities: warm, no edema. Speech is clear. Cn wnl.    Labs: Results for TOD RICKS (MRN [id number]) as of 11/29/2018 09:09   Ref. Range 11/29/2018 08:10   Sodium Latest Ref Range: 133 - 144 mmol/L 138   Potassium Latest Ref Range: 3.4 - 5.3 mmol/L 3.8   Chloride Latest Ref Range: 94 - 109 mmol/L 108   Carbon Dioxide Latest Ref Range: 20 - 32 mmol/L 24   Urea Nitrogen Latest Ref Range: 7 - 30 mg/dL 11   Creatinine Latest Ref Range: 0.66 - 1.25 mg/dL 0.66   GFR Estimate Latest Ref Range: >60 mL/min/1.7m2 >90   GFR Estimate If Black Latest Ref Range: >60 mL/min/1.7m2 >90   Calcium Latest Ref Range: 8.5 - 10.1 mg/dL 8.2 (L)   Anion Gap Latest Ref Range: 3 - 14 mmol/L 7   Albumin Latest Ref Range: 3.4 - 5.0 g/dL 3.2 (L)   Protein Total Latest Ref Range: 6.8 - 8.8 g/dL 7.7   Bilirubin Total Latest Ref Range: 0.2 - 1.3 mg/dL 0.4   Alkaline Phosphatase Latest Ref Range: 40 - 150 U/L 73   ALT Latest Ref Range: 0 - 70 U/L 30   AST Latest Ref Range: 0 - 45 U/L 28   TSH Latest Ref Range: 0.40 - 4.00 mU/L 0.94   Glucose Latest Ref Range: 70 - 99 mg/dL 97       Imaging: n/a    Impression/plan:   1. Metastatic HCC  -progressed on sorafenib and now on nivolumab monthly. Tolerated the treatment well. Prior hand/foot toxicities and diarrhea from sorafenib " have resolved. He has been feeling better outside of the URI that he developed this weekend  -ok to proceed with nivolumab today. Will see him prior to the next infusion in 1 month. Restaging scheduled in January.    2. Cough/congestion consistent with viral URI  -low suspicion this relates to immunotoxicity at this time given presentation with sore throat and family/friends with similar symptoms  -reviewed s/s of pneumonitis. Call if short of breath, worsening cough, wheezing  -for now, Ok to continue symptomatic management. No improvement with tessalon, so will try robitussin AC instead

## 2018-11-29 NOTE — PROGRESS NOTES
Infusion Nursing Note:  Preeti Pascual presents today for C2D1 Opdivo.    Patient seen by provider today: Yes: Nallely CARNEY   present during visit today: Not Applicable.    Note: Patient feels well. No complaints made. Otherwise well.    Intravenous Access:  Peripheral IV placed.    Treatment Conditions:  Lab Results   Component Value Date    HGB 13.0 11/15/2018     Lab Results   Component Value Date    WBC 4.8 11/15/2018      Lab Results   Component Value Date    ANEU 3.0 10/18/2018     Lab Results   Component Value Date     11/15/2018      Lab Results   Component Value Date     11/29/2018                   Lab Results   Component Value Date    POTASSIUM 3.8 11/29/2018           No results found for: MAG         Lab Results   Component Value Date    CR 0.66 11/29/2018                   Lab Results   Component Value Date    STACEY 8.2 11/29/2018                Lab Results   Component Value Date    BILITOTAL 0.4 11/29/2018           Lab Results   Component Value Date    ALBUMIN 3.2 11/29/2018                    Lab Results   Component Value Date    ALT 30 11/29/2018           Lab Results   Component Value Date    AST 28 11/29/2018       Results reviewed, labs MET treatment parameters, ok to proceed with treatment.      Post Infusion Assessment:  Patient tolerated infusion without incident.  Blood return noted pre and post infusion.  Site patent and intact, free from redness, edema or discomfort.  No evidence of extravasations.  Access discontinued per protocol.    Discharge Plan:   Prescription refills given for Robitussin.  Discharge instructions reviewed with: Patient.  Patient and/or family verbalized understanding of discharge instructions and all questions answered.  Copy of AVS reviewed with patient and/or family.  Patient will return 12/27/18 for next appointment.  Patient discharged in stable condition accompanied by: self.  Departure Mode: Ambulatory.    LIDIA JUNE  RN

## 2018-11-29 NOTE — MR AVS SNAPSHOT
After Visit Summary   11/29/2018    Preeti Pascual    MRN: [id number]           Patient Information     Date Of Birth          1965        Visit Information        Provider Department      11/29/2018 9:30 AM  21 ATC;  ONCOLOGY INFUSION [facility]        Today's Diagnoses     HCC (hepatocellular carcinoma) (H)    -  1      Care Instructions    Contact Numbers  [facility]: [phone]    After Hours:  [phone]  Triage: [phone]    Please call the [facility] Triage line if you experience a temperature greater than or equal to 100.5, shaking chills, have uncontrolled nausea, vomiting and/or diarrhea, dizziness, shortness of breath, chest pain, bleeding, unexplained bruising, or if you have any other new/concerning symptoms, questions or concerns.     If it is after hours, weekends, or holidays, please call the main hospital  at  [phone] and ask to speak to the Oncology doctor on call.     If you are having any concerning symptoms or wish to speak to a provider before your next infusion visit, please call your care coordinator or triage to notify them so we can adequately serve you.     If you need a refill on a narcotic prescription or other medication, please call triage before your infusion appointment.           November 2018 Sunday Monday Tuesday Wednesday Thursday Friday Saturday                       1     [facility]    8:00 AM   (15 min.)   [facility] LAB DRAW   [facility] Lab Draw     [facility] ONC INFUSION 60    8:30 AM   (60 min.)    ONCOLOGY INFUSION   [facility] 2     3       4     5     6     7     8     9     10       11     12     13     14     15     LAB WITH HB CLINIC    8:00 AM   (15 min.)    LAB   [facility] Lab     MR ABDOMEN WWO    8:30 AM   (45 min.)   [facility] MRI     P RETURN GENERAL LIVER    9:15 AM   (15 min.)   Trevor Trujillo MD   [facility] Hepatology 16     17        18     19     20     21     22     23     24       25     26     27     28     29     [facility] LAB DRAW    8:15 AM   (15 min.)    [facility] LAB DRAW   [facility] Lab Draw     UMP RETURN    8:25 AM   (50 min.)   Nallely Smiley APRN CNP   [facility]     UMP ONC INFUSION 60    9:30 AM   (60 min.)   UC ONCOLOGY INFUSION   [facility] 30 December 2018 Sunday Monday Tuesday Wednesday Thursday Friday Saturday                                 1       2     3     4     5     6     7     8       9     10     11     12     13     14     15       16     17     18     19     20     21     22       23     24     25  Happy Birthday!     26     27     [facility] LAB DRAW    7:30 AM   (15 min.)    M[facility] LAB DRAW   [facility] Lab Draw     UMP RETURN    7:35 AM   (50 min.)   Nallely Smiley APRN CNP   [facility]     [facility] ONC INFUSION 60    8:30 AM   (60 min.)    ONCOLOGY INFUSION   [facility] 28 29 30 31                                           Lab Results:  Recent Results (from the past 12 hour(s))   Comprehensive metabolic panel    Collection Time: 11/29/18  8:10 AM   Result Value Ref Range    Sodium 138 133 - 144 mmol/L    Potassium 3.8 3.4 - 5.3 mmol/L    Chloride 108 94 - 109 mmol/L    Carbon Dioxide 24 20 - 32 mmol/L    Anion Gap 7 3 - 14 mmol/L    Glucose 97 70 - 99 mg/dL    Urea Nitrogen 11 7 - 30 mg/dL    Creatinine 0.66 0.66 - 1.25 mg/dL    GFR Estimate >90 >60 mL/min/1.7m2    GFR Estimate If Black >90 >60 mL/min/1.7m2    Calcium 8.2 (L) 8.5 - 10.1 mg/dL    Bilirubin Total 0.4 0.2 - 1.3 mg/dL    Albumin 3.2 (L) 3.4 - 5.0 g/dL    Protein Total 7.7 6.8 - 8.8 g/dL    Alkaline Phosphatase 73 40 - 150 U/L    ALT 30 0 - 70 U/L    AST 28 0 - 45 U/L   TSH with free T4 reflex    Collection Time: 11/29/18  8:10 AM   Result Value Ref Range    TSH 0.94 0.40 - 4.00 mU/L               Follow-ups  after your visit        Your next 10 appointments already scheduled     Dec 27, 2018  7:30 AM CST   [facility] Lab Draw with UC [facility] LAB DRAW   [facility] Lab Draw ([facility])    [address]  [address]  [facility] [address]   [phone]            Dec 27, 2018  7:50 AM CST   (Arrive by 7:35 AM)   Return Visit with MITCH Whiting CNP   [facility] Cancer [facility] ([facility])    [address] [place] [address]  [address]  [facility] [address]   [phone]            Dec 27, 2018  8:30 AM CST   Infusion 60 with  ONCOLOGY INFUSION, UC 16 ATC   [facility] Cancer [facility] ([facility])    [address] [place] [address]  [address]  [facility] [address]   [phone]            Jan 17, 2019  8:15 AM CST   LAB with  LAB   [facility] Lab ([facility])    [address] [place] [address]  1st Floor  [facility] [id number] [phone]           Please do not eat 10-12 hours before your appointment if you are coming in fasting for labs on lipids, cholesterol, or glucose (sugar). This does not apply to pregnant women. Water, hot tea and black coffee (with nothing added) are okay. Do not drink other fluids, diet soda or chew gum.            Jan 17, 2019  8:40 AM CST   (Arrive by 8:10 AM)   CT CHEST ABDOMEN PELVIS W/O & W CONTRAST with UCCT1   [facility] CT ([facility])    [address] [place] [address]  1st Floor  [facility] [id number] [phone]           How do I prepare for my exam? (Food and drink instructions) To prepare: Do not eat or drink for 2 hours before your exam. If you need to take medicine, you may take it with small sips of water. (We may ask you to take liquid medicine as well.)  How do I prepare for my exam? (Other instructions) Please arrive 30 minutes early for your CT.  Once in the department you might be asked to drink water 15-20 minutes prior to your  exam.  If indicated you may be asked to drink an oral contrast in advance of your CT.  If this is the case, the imaging team will let you know or be in contact with you prior to your appointment  Patients over 70 or patients with diabetes or kidney problems: If you haven t had a blood test (creatinine test) within the last 30 days, the Cardiologist/Radiologist may require you to get this test prior to your exam.  If you have diabetes:  Continue to take your metformin medication on the day of your exam  What should I wear: Please wear loose clothing, such as a sweat suit or jogging clothes. Avoid snaps, zippers and other metal. We may ask you to undress and put on a hospital gown.  How long does the exam take: Most scans take less than 20 minutes.  What should I bring: Please bring any scans or X-rays taken at other hospitals, if similar tests were done. Also bring a list of your medicines, including vitamins, minerals and over-the-counter drugs. It is safest to leave personal items at home.  Do I need a : No  is needed.  What do I need to tell my doctor? Be sure to tell your doctor: * If you have any allergies. * If there s any chance you are pregnant. * If you are breastfeeding.  What should I do after the exam: No restrictions, You may resume normal activities.  What is this test: A CT (computed tomography) scan is a series of pictures that allows us to look inside your body. The scanner creates images of the body in cross sections, much like slices of bread. This helps us see any problems more clearly. You may receive contrast (X-ray dye) before or during your scan. You will be asked to drink the contrast.  Who should I call with questions: If you have any questions, please call the Imaging Department where you will have your exam. Directions, parking instructions, and other information is available on our website, [email or web address].            Jan 21, 2019  8:15 AM CST   (Arrive by 8:00 AM)    Return Visit with Agustin Kyle MD   [facility] ([facility])    [address]  [address]  [facility] [id number] [phone]            May 02, 2019  8:00 AM CDT   Lab with UC LAB   [facility] Lab ([facility])    [address]  1st Floor  [facility] [id number] [phone]            May 02, 2019  9:00 AM CDT   (Arrive by 8:45 AM)   Return General Liver with Trevor Trujillo MD   [facility] Hepatology ([facility])    [address]  [address]  [facility] [id number] [phone]              Who to contact     If you have questions or need follow up information about today's clinic visit or your schedule please contact [facility] directly at [phone].  Normal or non-critical lab and imaging results will be communicated to you by MyChart, letter or phone within 4 business days after the clinic has received the results. If you do not hear from us within 7 days, please contact the clinic through [employer] or phone. If you have a critical or abnormal lab result, we will notify you by phone as soon as possible.  Submit refill requests through [facility] or call your pharmacy and they will forward the refill request to us. Please allow 3 business days for your refill to be completed.          Additional Information About Your Visit        [employer] Information     [facility] gives you secure access to your electronic health record. If you see a primary care provider, you can also send messages to your care team and make appointments. If you have questions, please call your primary care clinic.  If you do not have a primary care provider, please call [phone] and they will assist you.        Care EveryWhere ID     This is your Care EveryWhere ID. This could be used by other organizations to access your [place] medical records  [id number]         Blood Pressure from Last  3 Encounters:   11/29/18 123/74   11/15/18 130/83   11/01/18 143/88    Weight from Last 3 Encounters:   11/29/18 76.7 kg (169 lb 3 oz)   11/15/18 76.5 kg (168 lb 9.6 oz)   11/01/18 75.2 kg (165 lb 11.2 oz)              We Performed the Following     Comprehensive metabolic panel     TSH with free T4 reflex          Today's Medication Changes          These changes are accurate as of 11/29/18  9:40 AM.  If you have any questions, ask your nurse or doctor.               Start taking these medicines.        Dose/Directions    guaiFENesin-codeine 100-10 MG/5ML solution   Commonly known as:  ROBITUSSIN AC   Used for:  Cough, HCC (hepatocellular carcinoma) (H), Viral upper respiratory illness   Started by:  Nallely Smiley APRN CNP        Dose:  1-2 tsp.   Take 5-10 mLs by mouth every 4 hours as needed for cough   Quantity:  120 mL   Refills:  0            Where to get your medicines      Some of these will need a paper prescription and others can be bought over the counter.  Ask your nurse if you have questions.     Bring a paper prescription for each of these medications     guaiFENesin-codeine 100-10 MG/5ML solution                Primary Care Provider Fax #    Physician No Ref-Primary [phone]       No address on file        Equal Access to Services     NEFTALY MANJARREZ : Kennedy finko Soallyson, waaxda luqadaha, qaybta kaalmada adeegyada, meño herrera. So [facility] [phone].    ATENCIÓN: Si habla español, tiene a jean disposición servicios gratuitos de asistencia lingüística. Llame al [phone].    We comply with applicable federal civil rights laws and [place] laws. We do not discriminate on the basis of race, color, national origin, age, disability, sex, sexual orientation, or gender identity.            Thank you!     Thank you for choosing [facility]  for your care. Our goal is always to provide you with excellent care. Hearing back from our patients is  one way we can continue to improve our services. Please take a few minutes to complete the written survey that you may receive in the mail after your visit with us. Thank you!             Your Updated Medication List - Protect others around you: Learn how to safely use, store and throw away your medicines at www.disposemymeds.org.          This list is accurate as of 11/29/18  9:40 AM.  Always use your most recent med list.                   Brand Name Dispense Instructions for use Diagnosis    benzonatate 200 MG capsule    TESSALON     TAKE 1 CAPSULE (200 MG TOTAL) BY MOUTH 3 (THREE) TIMES A DAY AS NEEDED FOR COUGH FOR UP TO 7 DAYS.    Cough, HCC (hepatocellular carcinoma) (H), Viral upper respiratory illness       guaiFENesin-codeine 100-10 MG/5ML solution    ROBITUSSIN AC    120 mL    Take 5-10 mLs by mouth every 4 hours as needed for cough    Cough, HCC (hepatocellular carcinoma) (H), Viral upper respiratory illness       LORazepam 0.5 MG tablet    ATIVAN    30 tablet    Take 1 tablet (0.5 mg) by mouth every 4 hours as needed (Anxiety, Nausea/Vomiting or Sleep)    HCC (hepatocellular carcinoma) (H)       prochlorperazine 10 MG tablet    COMPAZINE    30 tablet    Take 1 tablet (10 mg) by mouth every 6 hours as needed (Nausea/Vomiting)    HCC (hepatocellular carcinoma) (H)       VENTOLIN  (90 Base) MCG/ACT inhaler   Generic drug:  albuterol      1-2 INHALATIONS EVERY 4-6 HOURS AS NEEDED FOR WHEEZING. DISPENSE SPACER AS NEEDED.    Cough, HCC (hepatocellular carcinoma) (H), Viral upper respiratory illness

## 2018-12-21 NOTE — PROGRESS NOTES
ORAL CHEMOTHERAPY DISCONTINUATION       Primary Oncologist:  Dr. Kyle  Primary Oncology Clinic: [facility]   Cancer Diagnosis:  Liver Cancer   Therapy History:  He was initiated Sorafenib, but tolerated it poorly and was dosed at 200 mg bid. He was found to have progression and initiated on Nivolumab 480 mg monthly on 11/1/18.  Therapy Ended On:  10/30/2018  Reason For Discontinuation: disease progression    Additional Notes:  Thank you for the opportunity to be a part in the care of this patient's oral chemotherapy. The oncology pharmacy will no longer be following this patient for oral chemotherapy. If there are any questions or the plan changes, feel free to contact us.    Ce Howell   Pharmacy Intern  [facility]   [phone]

## 2019-01-03 ENCOUNTER — ONCOLOGY VISIT (OUTPATIENT)
Dept: ONCOLOGY | Facility: CLINIC | Age: 54
End: 2019-01-03
Attending: NURSE PRACTITIONER
Payer: COMMERCIAL

## 2019-01-03 ENCOUNTER — ANCILLARY PROCEDURE (OUTPATIENT)
Dept: GENERAL RADIOLOGY | Facility: CLINIC | Age: 54
End: 2019-01-03
Payer: COMMERCIAL

## 2019-01-03 ENCOUNTER — APPOINTMENT (OUTPATIENT)
Dept: LAB | Facility: CLINIC | Age: 54
End: 2019-01-03
Attending: NURSE PRACTITIONER
Payer: COMMERCIAL

## 2019-01-03 VITALS
BODY MASS INDEX: 27.21 KG/M2 | WEIGHT: 168.6 LBS | OXYGEN SATURATION: 100 % | HEART RATE: 81 BPM | SYSTOLIC BLOOD PRESSURE: 129 MMHG | DIASTOLIC BLOOD PRESSURE: 79 MMHG | TEMPERATURE: 98.3 F | RESPIRATION RATE: 18 BRPM

## 2019-01-03 DIAGNOSIS — C22.0 HCC (HEPATOCELLULAR CARCINOMA) (H): ICD-10-CM

## 2019-01-03 DIAGNOSIS — R09.82 POST-NASAL DRAINAGE: ICD-10-CM

## 2019-01-03 DIAGNOSIS — C22.0 HCC (HEPATOCELLULAR CARCINOMA) (H): Primary | ICD-10-CM

## 2019-01-03 DIAGNOSIS — R05.9 COUGH: ICD-10-CM

## 2019-01-03 DIAGNOSIS — R05.9 COUGH: Primary | ICD-10-CM

## 2019-01-03 LAB
ALBUMIN SERPL-MCNC: 3.2 G/DL (ref 3.4–5)
ALP SERPL-CCNC: 86 U/L (ref 40–150)
ALT SERPL W P-5'-P-CCNC: 30 U/L (ref 0–70)
ANION GAP SERPL CALCULATED.3IONS-SCNC: 8 MMOL/L (ref 3–14)
AST SERPL W P-5'-P-CCNC: 25 U/L (ref 0–45)
BILIRUB SERPL-MCNC: 0.4 MG/DL (ref 0.2–1.3)
BUN SERPL-MCNC: 11 MG/DL (ref 7–30)
CALCIUM SERPL-MCNC: 7.9 MG/DL (ref 8.5–10.1)
CHLORIDE SERPL-SCNC: 108 MMOL/L (ref 94–109)
CO2 SERPL-SCNC: 23 MMOL/L (ref 20–32)
CREAT SERPL-MCNC: 0.66 MG/DL (ref 0.66–1.25)
GFR SERPL CREATININE-BSD FRML MDRD: >90 ML/MIN/{1.73_M2}
GLUCOSE SERPL-MCNC: 100 MG/DL (ref 70–99)
POTASSIUM SERPL-SCNC: 3.6 MMOL/L (ref 3.4–5.3)
PROT SERPL-MCNC: 7.5 G/DL (ref 6.8–8.8)
SODIUM SERPL-SCNC: 139 MMOL/L (ref 133–144)
TSH SERPL DL<=0.005 MIU/L-ACNC: 1.25 MU/L (ref 0.4–4)

## 2019-01-03 PROCEDURE — 84443 ASSAY THYROID STIM HORMONE: CPT | Performed by: NURSE PRACTITIONER

## 2019-01-03 PROCEDURE — 80053 COMPREHEN METABOLIC PANEL: CPT | Performed by: NURSE PRACTITIONER

## 2019-01-03 PROCEDURE — 99214 OFFICE O/P EST MOD 30 MIN: CPT | Mod: ZP | Performed by: NURSE PRACTITIONER

## 2019-01-03 PROCEDURE — 25000128 H RX IP 250 OP 636: Mod: ZF | Performed by: NURSE PRACTITIONER

## 2019-01-03 PROCEDURE — G0463 HOSPITAL OUTPT CLINIC VISIT: HCPCS | Mod: ZF

## 2019-01-03 PROCEDURE — 96413 CHEMO IV INFUSION 1 HR: CPT

## 2019-01-03 RX ORDER — LORAZEPAM 2 MG/ML
0.5 INJECTION INTRAMUSCULAR EVERY 4 HOURS PRN
Status: CANCELLED
Start: 2019-01-03

## 2019-01-03 RX ORDER — EPINEPHRINE 0.3 MG/.3ML
0.3 INJECTION SUBCUTANEOUS EVERY 5 MIN PRN
Status: CANCELLED | OUTPATIENT
Start: 2019-01-03

## 2019-01-03 RX ORDER — ALBUTEROL SULFATE 90 UG/1
1-2 AEROSOL, METERED RESPIRATORY (INHALATION)
Status: CANCELLED
Start: 2019-01-03

## 2019-01-03 RX ORDER — DIPHENHYDRAMINE HYDROCHLORIDE 50 MG/ML
50 INJECTION INTRAMUSCULAR; INTRAVENOUS
Status: CANCELLED
Start: 2019-01-03

## 2019-01-03 RX ORDER — SODIUM CHLORIDE 9 MG/ML
1000 INJECTION, SOLUTION INTRAVENOUS CONTINUOUS PRN
Status: CANCELLED
Start: 2019-01-03

## 2019-01-03 RX ORDER — MEPERIDINE HYDROCHLORIDE 25 MG/ML
25 INJECTION INTRAMUSCULAR; INTRAVENOUS; SUBCUTANEOUS EVERY 30 MIN PRN
Status: CANCELLED | OUTPATIENT
Start: 2019-01-03

## 2019-01-03 RX ORDER — FLUTICASONE PROPIONATE 50 MCG
2 SPRAY, SUSPENSION (ML) NASAL DAILY
Qty: 1 BOTTLE | Refills: 1 | Status: SHIPPED | OUTPATIENT
Start: 2019-01-03 | End: 2019-10-24

## 2019-01-03 RX ORDER — EPINEPHRINE 1 MG/ML
0.3 INJECTION, SOLUTION INTRAMUSCULAR; SUBCUTANEOUS EVERY 5 MIN PRN
Status: CANCELLED | OUTPATIENT
Start: 2019-01-03

## 2019-01-03 RX ORDER — ALBUTEROL SULFATE 0.83 MG/ML
2.5 SOLUTION RESPIRATORY (INHALATION)
Status: CANCELLED | OUTPATIENT
Start: 2019-01-03

## 2019-01-03 RX ORDER — METHYLPREDNISOLONE SODIUM SUCCINATE 125 MG/2ML
125 INJECTION, POWDER, LYOPHILIZED, FOR SOLUTION INTRAMUSCULAR; INTRAVENOUS
Status: CANCELLED
Start: 2019-01-03

## 2019-01-03 RX ADMIN — SODIUM CHLORIDE 480 MG: 9 INJECTION, SOLUTION INTRAVENOUS at 09:08

## 2019-01-03 ASSESSMENT — PAIN SCALES - GENERAL: PAINLEVEL: EXTREME PAIN (8)

## 2019-01-03 NOTE — PROGRESS NOTES
Reason for Visit: seen in f/u of metastatic HCC    Oncology HPI: Preeti Pascual is a 53 year old man with a PMH of hep. B and cirrhosis. He was previously treated with radioembolization in 2016 with an excellent response. He declined a liver transplantation. 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 He was initiated Sorafenib, but tolerated it poorly and was dosed at 200 mg bid. He was found to have progression and initiated on Nivolumab 480 mg monthly on 11/1/18. He missed his infusion last week and is rescheduled today.    Interval history: Preeti continues to have a non-productive cough, hacking. Wakes at night due to coughing. Has post-nasal drainage and a sore throat. No fevers/sweats. Again went to a retail clinic and was given a prescription for tessalon without much improvement. Notes some wheezing when laying on his left side. No shortness of breath or chest pain. Cough worsens when he drinks cold liquids. No reflux, vomiting. No rash. Bowel/bladder function wnl.     Current Outpatient Medications   Medication Sig Dispense Refill     benzonatate (TESSALON) 200 MG capsule TAKE 1 CAPSULE (200 MG TOTAL) BY MOUTH 3 (THREE) TIMES A DAY AS NEEDED FOR COUGH FOR UP TO 7 DAYS.  0     guaiFENesin-codeine (ROBITUSSIN AC) 100-10 MG/5ML solution Take 5-10 mLs by mouth every 4 hours as needed for cough (Patient not taking: Reported on 1/3/2019) 120 mL 0     LORazepam (ATIVAN) 0.5 MG tablet Take 1 tablet (0.5 mg) by mouth every 4 hours as needed (Anxiety, Nausea/Vomiting or Sleep) (Patient not taking: Reported on 11/1/2018) 30 tablet 2     prochlorperazine (COMPAZINE) 10 MG tablet Take 1 tablet (10 mg) by mouth every 6 hours as needed (Nausea/Vomiting) (Patient not taking: Reported on 11/1/2018) 30 tablet 2     VENTOLIN  (90 Base) MCG/ACT inhaler 1-2 INHALATIONS EVERY 4-6 HOURS AS NEEDED FOR WHEEZING. DISPENSE SPACER AS NEEDED.  0        No Known Allergies      Exam: alert, appears well.   Blood pressure 129/79, pulse 81, temperature 98.3  F (36.8  C), temperature source Oral, resp. rate 18, weight 76.5 kg (168 lb 9.6 oz), SpO2 100 %.  Wt Readings from Last 4 Encounters:   11/29/18 76.7 kg (169 lb 3 oz)   11/15/18 76.5 kg (168 lb 9.6 oz)   11/01/18 75.2 kg (165 lb 11.2 oz)   10/22/18 75.6 kg (166 lb 11.2 oz)     Oropharynx is moist and without lesion. No icterus or conjunctival injection. Neck supple and without adenopathy. Lungs:crackles in the right base. Cough noted.  Heart: RRR, no murmur or rub. Abdomen: soft, nontender, BS active, no masses or organomegaly.  Extremities: warm, no edema. Speech is clear. CN wnl. Gait/station wnl.       Labs: Results for TOD RICKS (MRN [id number]) as of 1/3/2019 08:11   Ref. Range 1/3/2019 07:20   Sodium Latest Ref Range: 133 - 144 mmol/L 139   Potassium Latest Ref Range: 3.4 - 5.3 mmol/L 3.6   Chloride Latest Ref Range: 94 - 109 mmol/L 108   Carbon Dioxide Latest Ref Range: 20 - 32 mmol/L 23   Urea Nitrogen Latest Ref Range: 7 - 30 mg/dL 11   Creatinine Latest Ref Range: 0.66 - 1.25 mg/dL 0.66   GFR Estimate Latest Ref Range: >60 mL/min/1.73_m2 >90   GFR Estimate If Black Latest Ref Range: >60 mL/min/1.73_m2 >90   Calcium Latest Ref Range: 8.5 - 10.1 mg/dL 7.9 (L)   Anion Gap Latest Ref Range: 3 - 14 mmol/L 8   Albumin Latest Ref Range: 3.4 - 5.0 g/dL 3.2 (L)   Protein Total Latest Ref Range: 6.8 - 8.8 g/dL 7.5   Bilirubin Total Latest Ref Range: 0.2 - 1.3 mg/dL 0.4   Alkaline Phosphatase Latest Ref Range: 40 - 150 U/L 86   ALT Latest Ref Range: 0 - 70 U/L 30   AST Latest Ref Range: 0 - 45 U/L 25   TSH Latest Ref Range: 0.40 - 4.00 mU/L 1.25   Glucose Latest Ref Range: 70 - 99 mg/dL 100 (H)       Imaging: n/a    Impression/plan:   1. Metastatic HCC  -progressed on sorafenib, now on nivolumab monthly.   -tolerating OK. No adverse toxicities  -OK to proceed with treatment today. Will return 1/21/19 for restaging and will see Dr. Kyle at that time.  -due for  next cycle of Nivolumab in 4 weeks from today,will schedule     2.  Cough--persistent x 6 weeks. Seems in part r/t post-nasal drainage, but does have some crackles. Not having shortness of breath or worsening cough, which I might expect more with immunotherapy related pneumonitis  -checked a Chest xray today. No evidence of pneumonia/pneumonitis  -will add flonase to address post-nasal drainage with hope that this will improve the cough.

## 2019-01-03 NOTE — NURSING NOTE
Chief Complaint   Patient presents with     Blood Draw     Labs drawn via /PIV placed by RN in lab. VS taken.     Isabel Lopez RN

## 2019-01-03 NOTE — PATIENT INSTRUCTIONS
Contact Numbers    [facility] [place], [place] Main Line: [phone]  [facility] [place] [place] Triage and after hours / weekends / holidays:  [phone]      Please call the triage or after hours line if you experience a temperature greater than or equal to 100.5, shaking chills, have uncontrolled nausea, vomiting and/or diarrhea, dizziness, shortness of breath, chest pain, bleeding, unexplained bruising, or if you have any other new/concerning symptoms, questions or concerns.      If you are having any concerning symptoms or wish to speak to a provider before your next infusion visit, please call your care coordinator or triage to notify them so we can adequately serve you.     If you need a refill on a narcotic prescription or other medication, please call before your infusion appointment.         January 2019 Sunday Monday Tuesday Wednesday Thursday Friday Saturday             1     2     3    [facility] DRAW   7:15 AM   (15 min.)    [facility] D[facility]   [facility] Lab Draw    [facility] RETURN   7:35 AM   (50 min.)   Nallely Smiley, APRN CNP   [facility]    XR CHEST 2 VIEWS   8:15 AM   (15 min.)   XR1   [facility] Xray    [facility] ONC INFUSION 60   9:00 AM   (60 min.)   [facility] INFUSION   [facility] 4     5       6     7     8     9     10     11     12       13     14     15     16     17    CT CHEST ABDOMEN PELVIS WWO   8:10 AM   (20 min.)   [address] [place] [address] CT    LAB   8:15 AM   (15 min.)    LAB   [facility] Lab 18     19       20     21    [facility] RETURN   8:00 AM   (30 min.)   Agustin Kyle MD   [facility] 22     23     24     25     26       27     28     29     30     31 February 2019 Sunday Monday Tuesday Wednesday Thursday Friday Saturday                            1     2       3     4     5     6     7     8     9       10     11     12     13     14     15     16       17     18     19     20     21      22     23       24     25     26     27     28                               Lab Results:  Recent Results (from the past 12 hour(s))   Comprehensive metabolic panel    Collection Time: 01/03/19  7:20 AM   Result Value Ref Range    Sodium 139 133 - 144 mmol/L    Potassium 3.6 3.4 - 5.3 mmol/L    Chloride 108 94 - 109 mmol/L    Carbon Dioxide 23 20 - 32 mmol/L    Anion Gap 8 3 - 14 mmol/L    Glucose 100 (H) 70 - 99 mg/dL    Urea Nitrogen 11 7 - 30 mg/dL    Creatinine 0.66 0.66 - 1.25 mg/dL    GFR Estimate >90 >60 mL/min/[1.73_m2]    GFR Estimate If Black >90 >60 mL/min/[1.73_m2]    Calcium 7.9 (L) 8.5 - 10.1 mg/dL    Bilirubin Total 0.4 0.2 - 1.3 mg/dL    Albumin 3.2 (L) 3.4 - 5.0 g/dL    Protein Total 7.5 6.8 - 8.8 g/dL    Alkaline Phosphatase 86 40 - 150 U/L    ALT 30 0 - 70 U/L    AST 25 0 - 45 U/L   TSH with free T4 reflex    Collection Time: 01/03/19  7:20 AM   Result Value Ref Range    TSH 1.25 0.40 - 4.00 mU/L

## 2019-01-03 NOTE — PROGRESS NOTES
Infusion Nursing Note:  Preeti Pascual presents today for Cycle 3 Day 1 Nivolumab.    Patient seen by provider today: Yes: ANGELIKA Sarah.   present during visit today: Not Applicable.    Note:   NAGELIKA Sarah notified writer that she is ordering a chest  this morning. Provider notified writer after the imaging was reviewed, patient is OK to proceed with treatment today per ANGELIKA Sarah. Provider entering a prescription order for Flonase for the patient. Writer discussed with patient who verbalized understanding.     Patient reports no pain at this time. He states that his chest hurts only when he is coughing and in cold temperatures. Denies any need for pain intervention at this time.     Intravenous Access:  Peripheral IV placed.    Treatment Conditions:  Lab Results   Component Value Date    HGB 13.0 11/15/2018     Lab Results   Component Value Date    WBC 4.8 11/15/2018      Lab Results   Component Value Date    ANEU 3.0 10/18/2018     Lab Results   Component Value Date     11/15/2018      Lab Results   Component Value Date     01/03/2019                   Lab Results   Component Value Date    POTASSIUM 3.6 01/03/2019           No results found for: MAG         Lab Results   Component Value Date    CR 0.66 01/03/2019                   Lab Results   Component Value Date    STACEY 7.9 01/03/2019                Lab Results   Component Value Date    BILITOTAL 0.4 01/03/2019           Lab Results   Component Value Date    ALBUMIN 3.2 01/03/2019                    Lab Results   Component Value Date    ALT 30 01/03/2019           Lab Results   Component Value Date    AST 25 01/03/2019       Results reviewed, labs MET treatment parameters, ok to proceed with treatment.      Post Infusion Assessment:  Patient tolerated infusion without incident.  Blood return noted pre and post infusion.  Site patent and intact, free from redness, edema or discomfort.  No evidence of extravasations.  Access  discontinued per protocol.    Discharge Plan:   Prescription refills given for Flonase.  Discharge instructions reviewed with: Patient.  Patient and/or family verbalized understanding of discharge instructions and all questions answered.  Copy of AVS reviewed with patient and/or family.  Patient will return 1/17/19 for next lab and imaging appointment and 1/21/19 for next provider appointment. [employer] message sent to scheduling pool to schedule patient's next infusion appointment.   Patient discharged in stable condition accompanied by: self.  Departure Mode: Ambulatory.    Kendra Rodrigues RN

## 2019-01-03 NOTE — NURSING NOTE
"Oncology Rooming Note    January 3, 2019 7:38 AM   Preeti Pascual is a 53 year old male who presents for:    Chief Complaint   Patient presents with     Blood Draw     Labs drawn via /PIV placed by RN in lab. VS taken.     Oncology Clinic Visit     [facility] RETURN- HCC     Initial Vitals: /79 (BP Location: Right arm, Patient Position: Sitting, Cuff Size: Adult Regular)   Pulse 81   Temp 98.3  F (36.8  C) (Oral)   Resp 18   Wt 76.5 kg (168 lb 9.6 oz)   SpO2 100%   BMI 27.21 kg/m   Estimated body mass index is 27.21 kg/m  as calculated from the following:    Height as of 11/29/18: 1.676 m (5' 6\").    Weight as of this encounter: 76.5 kg (168 lb 9.6 oz). Body surface area is 1.89 meters squared.  Extreme Pain (8) Comment: Data Unavailable   No LMP for male patient.  Allergies reviewed: Yes  Medications reviewed: Yes    Medications: Medication refills not needed today.  Pharmacy name entered into [facility]: [id number] - [place], [place] - [address] AT [facility] [address]    Clinical concerns: Concerned about coughing a lot. Is taking a cough medicine from [employer] but doesn't remember what the name is. Baljit was notified.    10 minutes for nursing intake (face to face time)     Efrain Calderon LPN            "

## 2019-01-03 NOTE — LETTER
1/3/2019       RE: Preeti Pascual  [address]  [facility] [address]     Dear Colleague,    Thank you for referring your patient, Preeti Pascual, to the [facility]. Please see a copy of my visit note below.    Reason for Visit: seen in f/u of metastatic HCC    Oncology HPI: Preeti Pascual is a 53 year old man with a PMH of hep. B and cirrhosis. He was previously treated with radioembolization in 2016 with an excellent response. He declined a liver transplantation. 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 He was initiated Sorafenib, but tolerated it poorly and was dosed at 200 mg bid. He was found to have progression and initiated on Nivolumab 480 mg monthly on 11/1/18. He missed his infusion last week and is rescheduled today.    Interval history: Preeti continues to have a non-productive cough, hacking. Wakes at night due to coughing. Has post-nasal drainage and a sore throat. No fevers/sweats. Again went to a retail clinic and was given a prescription for tessalon without much improvement. Notes some wheezing when laying on his left side. No shortness of breath or chest pain. Cough worsens when he drinks cold liquids. No reflux, vomiting. No rash. Bowel/bladder function wnl.     Current Outpatient Medications   Medication Sig Dispense Refill     benzonatate (TESSALON) 200 MG capsule TAKE 1 CAPSULE (200 MG TOTAL) BY MOUTH 3 (THREE) TIMES A DAY AS NEEDED FOR COUGH FOR UP TO 7 DAYS.  0     guaiFENesin-codeine (ROBITUSSIN AC) 100-10 MG/5ML solution Take 5-10 mLs by mouth every 4 hours as needed for cough (Patient not taking: Reported on 1/3/2019) 120 mL 0     LORazepam (ATIVAN) 0.5 MG tablet Take 1 tablet (0.5 mg) by mouth every 4 hours as needed (Anxiety, Nausea/Vomiting or Sleep) (Patient not taking: Reported on 11/1/2018) 30 tablet 2     prochlorperazine (COMPAZINE) 10 MG tablet Take 1 tablet (10 mg) by mouth every 6 hours as needed (Nausea/Vomiting)  (Patient not taking: Reported on 11/1/2018) 30 tablet 2     VENTOLIN  (90 Base) MCG/ACT inhaler 1-2 INHALATIONS EVERY 4-6 HOURS AS NEEDED FOR WHEEZING. DISPENSE SPACER AS NEEDED.  0        No Known Allergies      Exam: alert, appears well.  Blood pressure 129/79, pulse 81, temperature 98.3  F (36.8  C), temperature source Oral, resp. rate 18, weight 76.5 kg (168 lb 9.6 oz), SpO2 100 %.  Wt Readings from Last 4 Encounters:   11/29/18 76.7 kg (169 lb 3 oz)   11/15/18 76.5 kg (168 lb 9.6 oz)   11/01/18 75.2 kg (165 lb 11.2 oz)   10/22/18 75.6 kg (166 lb 11.2 oz)     Oropharynx is moist and without lesion. No icterus or conjunctival injection. Neck supple and without adenopathy. Lungs:crackles in the right base. Cough noted.  Heart: RRR, no murmur or rub. Abdomen: soft, nontender, BS active, no masses or organomegaly.  Extremities: warm, no edema. Speech is clear. CN wnl. Gait/station wnl.       Labs: Results for TOD RICKS (MRN [id number]) as of 1/3/2019 08:11   Ref. Range 1/3/2019 07:20   Sodium Latest Ref Range: 133 - 144 mmol/L 139   Potassium Latest Ref Range: 3.4 - 5.3 mmol/L 3.6   Chloride Latest Ref Range: 94 - 109 mmol/L 108   Carbon Dioxide Latest Ref Range: 20 - 32 mmol/L 23   Urea Nitrogen Latest Ref Range: 7 - 30 mg/dL 11   Creatinine Latest Ref Range: 0.66 - 1.25 mg/dL 0.66   GFR Estimate Latest Ref Range: >60 mL/min/1.73_m2 >90   GFR Estimate If Black Latest Ref Range: >60 mL/min/1.73_m2 >90   Calcium Latest Ref Range: 8.5 - 10.1 mg/dL 7.9 (L)   Anion Gap Latest Ref Range: 3 - 14 mmol/L 8   Albumin Latest Ref Range: 3.4 - 5.0 g/dL 3.2 (L)   Protein Total Latest Ref Range: 6.8 - 8.8 g/dL 7.5   Bilirubin Total Latest Ref Range: 0.2 - 1.3 mg/dL 0.4   Alkaline Phosphatase Latest Ref Range: 40 - 150 U/L 86   ALT Latest Ref Range: 0 - 70 U/L 30   AST Latest Ref Range: 0 - 45 U/L 25   TSH Latest Ref Range: 0.40 - 4.00 mU/L 1.25   Glucose Latest Ref Range: 70 - 99 mg/dL 100 (H)       Imaging:  n/a    Impression/plan:   1. Metastatic HCC  -progressed on sorafenib, now on nivolumab monthly.   -tolerating OK. No adverse toxicities  -OK to proceed with treatment today. Will return 1/21/19 for restaging and will see Dr. Kyle at that time.  -due for next cycle of Nivolumab in 4 weeks from today,will schedule     2.  Cough--persistent x 6 weeks. Seems in part r/t post-nasal drainage, but does have some crackles. Not having shortness of breath or worsening cough, which I might expect more with immunotherapy related pneumonitis  -checked a Chest xray today. No evidence of pneumonia/pneumonitis  -will add flonase to address post-nasal drainage with hope that this will improve the cough.       Again, thank you for allowing me to participate in the care of your patient.      Sincerely,    MITCH Jacobs CNP

## 2019-01-17 ENCOUNTER — ANCILLARY PROCEDURE (OUTPATIENT)
Dept: CT IMAGING | Facility: CLINIC | Age: 54
End: 2019-01-17
Attending: INTERNAL MEDICINE
Payer: COMMERCIAL

## 2019-01-17 DIAGNOSIS — K74.69 OTHER CIRRHOSIS OF LIVER (H): ICD-10-CM

## 2019-01-17 DIAGNOSIS — C22.0 HCC (HEPATOCELLULAR CARCINOMA) (H): ICD-10-CM

## 2019-01-17 LAB
AFP SERPL-MCNC: 15.2 UG/L (ref 0–8)
ALBUMIN SERPL-MCNC: 3.2 G/DL (ref 3.4–5)
ALP SERPL-CCNC: 77 U/L (ref 40–150)
ALT SERPL W P-5'-P-CCNC: 26 U/L (ref 0–70)
ANION GAP SERPL CALCULATED.3IONS-SCNC: 6 MMOL/L (ref 3–14)
AST SERPL W P-5'-P-CCNC: 29 U/L (ref 0–45)
BASOPHILS # BLD AUTO: 0.1 10E9/L (ref 0–0.2)
BASOPHILS NFR BLD AUTO: 1.8 %
BILIRUB SERPL-MCNC: 0.6 MG/DL (ref 0.2–1.3)
BUN SERPL-MCNC: 10 MG/DL (ref 7–30)
CALCIUM SERPL-MCNC: 8.2 MG/DL (ref 8.5–10.1)
CHLORIDE SERPL-SCNC: 106 MMOL/L (ref 94–109)
CO2 SERPL-SCNC: 24 MMOL/L (ref 20–32)
CREAT SERPL-MCNC: 0.66 MG/DL (ref 0.66–1.25)
DIFFERENTIAL METHOD BLD: ABNORMAL
EOSINOPHIL # BLD AUTO: 0.9 10E9/L (ref 0–0.7)
EOSINOPHIL NFR BLD AUTO: 23.4 %
ERYTHROCYTE [DISTWIDTH] IN BLOOD BY AUTOMATED COUNT: 12.4 % (ref 10–15)
GFR SERPL CREATININE-BSD FRML MDRD: >90 ML/MIN/{1.73_M2}
GLUCOSE SERPL-MCNC: 100 MG/DL (ref 70–99)
HCT VFR BLD AUTO: 41.1 % (ref 40–53)
HGB BLD-MCNC: 13.2 G/DL (ref 13.3–17.7)
IMM GRANULOCYTES # BLD: 0 10E9/L (ref 0–0.4)
IMM GRANULOCYTES NFR BLD: 0 %
INR PPP: 1.19 (ref 0.86–1.14)
LYMPHOCYTES # BLD AUTO: 1 10E9/L (ref 0.8–5.3)
LYMPHOCYTES NFR BLD AUTO: 26.3 %
MCH RBC QN AUTO: 27.8 PG (ref 26.5–33)
MCHC RBC AUTO-ENTMCNC: 32.1 G/DL (ref 31.5–36.5)
MCV RBC AUTO: 87 FL (ref 78–100)
MONOCYTES # BLD AUTO: 0.4 10E9/L (ref 0–1.3)
MONOCYTES NFR BLD AUTO: 9.7 %
NEUTROPHILS # BLD AUTO: 1.5 10E9/L (ref 1.6–8.3)
NEUTROPHILS NFR BLD AUTO: 38.8 %
NRBC # BLD AUTO: 0 10*3/UL
NRBC BLD AUTO-RTO: 0 /100
PLATELET # BLD AUTO: 176 10E9/L (ref 150–450)
POTASSIUM SERPL-SCNC: 3.8 MMOL/L (ref 3.4–5.3)
PROT SERPL-MCNC: 7.5 G/DL (ref 6.8–8.8)
RBC # BLD AUTO: 4.74 10E12/L (ref 4.4–5.9)
SODIUM SERPL-SCNC: 136 MMOL/L (ref 133–144)
WBC # BLD AUTO: 3.8 10E9/L (ref 4–11)

## 2019-01-17 RX ORDER — IOPAMIDOL 755 MG/ML
103 INJECTION, SOLUTION INTRAVASCULAR ONCE
Status: COMPLETED | OUTPATIENT
Start: 2019-01-17 | End: 2019-01-17

## 2019-01-17 RX ADMIN — IOPAMIDOL 103 ML: 755 INJECTION, SOLUTION INTRAVASCULAR at 08:46

## 2019-01-17 NOTE — DISCHARGE INSTRUCTIONS

## 2019-01-21 ENCOUNTER — ONCOLOGY VISIT (OUTPATIENT)
Dept: ONCOLOGY | Facility: CLINIC | Age: 54
End: 2019-01-21
Attending: INTERNAL MEDICINE
Payer: COMMERCIAL

## 2019-01-21 VITALS
HEIGHT: 66 IN | RESPIRATION RATE: 16 BRPM | WEIGHT: 165 LBS | BODY MASS INDEX: 26.52 KG/M2 | HEART RATE: 75 BPM | DIASTOLIC BLOOD PRESSURE: 77 MMHG | SYSTOLIC BLOOD PRESSURE: 127 MMHG | TEMPERATURE: 97.8 F | OXYGEN SATURATION: 97 %

## 2019-01-21 DIAGNOSIS — C22.0 HCC (HEPATOCELLULAR CARCINOMA) (H): Primary | ICD-10-CM

## 2019-01-21 DIAGNOSIS — K74.69 OTHER CIRRHOSIS OF LIVER (H): ICD-10-CM

## 2019-01-21 DIAGNOSIS — R05.9 COUGH: ICD-10-CM

## 2019-01-21 DIAGNOSIS — J06.9 VIRAL UPPER RESPIRATORY ILLNESS: ICD-10-CM

## 2019-01-21 DIAGNOSIS — B18.1 CHRONIC VIRAL HEPATITIS B WITHOUT DELTA AGENT AND WITHOUT COMA (H): ICD-10-CM

## 2019-01-21 PROCEDURE — G0463 HOSPITAL OUTPT CLINIC VISIT: HCPCS | Mod: ZF

## 2019-01-21 PROCEDURE — 99215 OFFICE O/P EST HI 40 MIN: CPT | Mod: ZP | Performed by: INTERNAL MEDICINE

## 2019-01-21 RX ORDER — CODEINE PHOSPHATE AND GUAIFENESIN 10; 100 MG/5ML; MG/5ML
1-2 SOLUTION ORAL EVERY 4 HOURS PRN
Qty: 120 ML | Refills: 0 | Status: SHIPPED | OUTPATIENT
Start: 2019-01-21 | End: 2019-02-28

## 2019-01-21 ASSESSMENT — PAIN SCALES - GENERAL: PAINLEVEL: NO PAIN (0)

## 2019-01-21 ASSESSMENT — MIFFLIN-ST. JEOR: SCORE: 1526.19

## 2019-01-21 NOTE — LETTER
1/21/2019      RE: Preeti Pascual  [address]  [facility] [address]       Preeti Pascual is here today in follow-up of hepatocellular carcinoma.    He has metastatic disease in the setting of well compensated cirrhosis related to hepatitis B.  He is previously progressed on sorafenib and is now had 3 months of treatment with nivolumab and is here for his first response assessment.  He reports that overall he is doing well except for a chronic cough.  This started around the time he started the nivolumab and was quite severe initially it actually seems to be getting progressively better.  He has a little bit of nonpurulent sputum production associated with this.  Seems associated with when he is laying down.  He has had no associated fevers chills or sweats.  He has not had any problems with dyspnea.  He reports no problems with mouth sores or diarrhea.  He has had no skin rashes.  He has no new sites of pain.  The remainder of a 10 point review of systems is otherwise negative.    On physical exam Mr. Pascual appears well.  His vital signs are unremarkable.  He has no icterus and no visible lesion in the oropharynx.  He has no adenopathy palpable in the neck or supra Geremias spaces.  His lungs are clear to auscultation without dullness to percussion.  His heart rate and rhythm are regular without murmur gallop.  His jugular venous pressure appears normal.  His abdomen is soft and nontender.  He has no demonstrable ascites.  He has no peripheral edema and no tenderness in his calves or thighs.  His speech is fluent and his cranial nerves are grossly intact.    I reviewed with him his CT scan and lab work.  He has normal electrolytes and renal function.  His albumin is marginally depressed at 3.2.  His bilirubin and liver enzymes are normal.  His blood counts are normal.His alpha-fetoprotein is stable at 15.  On his CT scan all of his sites of disease including the right adrenal gland.  Are modestly improved.   There are no new sites of disease apparent.    Assessment/plan: Metastatic hepatocellular carcinoma in the setting of well compensated liver disease and a near normal performance status.  He appears to be having a good response to the nivolumab.  His cough seems to be improving I do not believe is related to the nivolumab.  He certainly has no evidence of pneumonitis on his CT scan.  We will plan on continuing on with a nivolumab and reevaluate his disease status again in another 3 months.    Agustin Kyle MD

## 2019-01-21 NOTE — PROGRESS NOTES
Preeti Pascual is here today in follow-up of hepatocellular carcinoma.    He has metastatic disease in the setting of well compensated cirrhosis related to hepatitis B.  He is previously progressed on sorafenib and is now had 3 months of treatment with nivolumab and is here for his first response assessment.  He reports that overall he is doing well except for a chronic cough.  This started around the time he started the nivolumab and was quite severe initially it actually seems to be getting progressively better.  He has a little bit of nonpurulent sputum production associated with this.  Seems associated with when he is laying down.  He has had no associated fevers chills or sweats.  He has not had any problems with dyspnea.  He reports no problems with mouth sores or diarrhea.  He has had no skin rashes.  He has no new sites of pain.  The remainder of a 10 point review of systems is otherwise negative.    On physical exam Mr. Pascual appears well.  His vital signs are unremarkable.  He has no icterus and no visible lesion in the oropharynx.  He has no adenopathy palpable in the neck or supra Geremias spaces.  His lungs are clear to auscultation without dullness to percussion.  His heart rate and rhythm are regular without murmur gallop.  His jugular venous pressure appears normal.  His abdomen is soft and nontender.  He has no demonstrable ascites.  He has no peripheral edema and no tenderness in his calves or thighs.  His speech is fluent and his cranial nerves are grossly intact.    I reviewed with him his CT scan and lab work.  He has normal electrolytes and renal function.  His albumin is marginally depressed at 3.2.  His bilirubin and liver enzymes are normal.  His blood counts are normal.His alpha-fetoprotein is stable at 15.  On his CT scan all of his sites of disease including the right adrenal gland.  Are modestly improved.  There are no new sites of disease apparent.    Assessment/plan: Metastatic  hepatocellular carcinoma in the setting of well compensated liver disease and a near normal performance status.  He appears to be having a good response to the nivolumab.  His cough seems to be improving I do not believe is related to the nivolumab.  He certainly has no evidence of pneumonitis on his CT scan.  We will plan on continuing on with a nivolumab and reevaluate his disease status again in another 3 months.

## 2019-01-21 NOTE — NURSING NOTE
"Oncology Rooming Note    January 21, 2019 8:13 AM   Preeti Pascual is a 53 year old male who presents for:    Chief Complaint   Patient presents with     Oncology Clinic Visit     HCC     Initial Vitals: /77   Pulse 75   Temp 97.8  F (36.6  C) (Oral)   Resp 16   Ht 1.676 m (5' 6\")   Wt 74.8 kg (165 lb)   SpO2 97%   BMI 26.63 kg/m   Estimated body mass index is 26.63 kg/m  as calculated from the following:    Height as of this encounter: 1.676 m (5' 6\").    Weight as of this encounter: 74.8 kg (165 lb). Body surface area is 1.87 meters squared.  No Pain (0) Comment: Cough   No LMP for male patient.  Allergies reviewed: Yes  Medications reviewed: Yes    Medications: Medication refills not needed today.  Pharmacy name entered into [facility]: [id number] - [place], [place] - [address] AT [facility] & [facility]    Clinical concerns: No New Concerns    5 minutes for nursing intake (face to face time)     VITO Welch      "

## 2019-01-31 ENCOUNTER — APPOINTMENT (OUTPATIENT)
Dept: LAB | Facility: CLINIC | Age: 54
End: 2019-01-31
Attending: INTERNAL MEDICINE
Payer: COMMERCIAL

## 2019-01-31 ENCOUNTER — INFUSION THERAPY VISIT (OUTPATIENT)
Dept: ONCOLOGY | Facility: CLINIC | Age: 54
End: 2019-01-31
Attending: INTERNAL MEDICINE
Payer: COMMERCIAL

## 2019-01-31 VITALS
RESPIRATION RATE: 18 BRPM | TEMPERATURE: 97.6 F | OXYGEN SATURATION: 98 % | HEART RATE: 67 BPM | WEIGHT: 166.5 LBS | BODY MASS INDEX: 26.87 KG/M2 | DIASTOLIC BLOOD PRESSURE: 84 MMHG | SYSTOLIC BLOOD PRESSURE: 151 MMHG

## 2019-01-31 DIAGNOSIS — C22.0 HCC (HEPATOCELLULAR CARCINOMA) (H): Primary | ICD-10-CM

## 2019-01-31 LAB
ALBUMIN SERPL-MCNC: 3.4 G/DL (ref 3.4–5)
ALP SERPL-CCNC: 88 U/L (ref 40–150)
ALT SERPL W P-5'-P-CCNC: 29 U/L (ref 0–70)
ANION GAP SERPL CALCULATED.3IONS-SCNC: 8 MMOL/L (ref 3–14)
AST SERPL W P-5'-P-CCNC: 34 U/L (ref 0–45)
BILIRUB SERPL-MCNC: 0.4 MG/DL (ref 0.2–1.3)
BUN SERPL-MCNC: 12 MG/DL (ref 7–30)
CALCIUM SERPL-MCNC: 8.1 MG/DL (ref 8.5–10.1)
CHLORIDE SERPL-SCNC: 107 MMOL/L (ref 94–109)
CO2 SERPL-SCNC: 24 MMOL/L (ref 20–32)
CREAT SERPL-MCNC: 0.71 MG/DL (ref 0.66–1.25)
GFR SERPL CREATININE-BSD FRML MDRD: >90 ML/MIN/{1.73_M2}
GLUCOSE SERPL-MCNC: 94 MG/DL (ref 70–99)
POTASSIUM SERPL-SCNC: 3.9 MMOL/L (ref 3.4–5.3)
PROT SERPL-MCNC: 7.8 G/DL (ref 6.8–8.8)
SODIUM SERPL-SCNC: 139 MMOL/L (ref 133–144)
TSH SERPL DL<=0.005 MIU/L-ACNC: 1.44 MU/L (ref 0.4–4)

## 2019-01-31 PROCEDURE — 25000128 H RX IP 250 OP 636: Mod: ZF | Performed by: INTERNAL MEDICINE

## 2019-01-31 PROCEDURE — 80053 COMPREHEN METABOLIC PANEL: CPT | Performed by: INTERNAL MEDICINE

## 2019-01-31 PROCEDURE — 96413 CHEMO IV INFUSION 1 HR: CPT

## 2019-01-31 PROCEDURE — 84443 ASSAY THYROID STIM HORMONE: CPT | Performed by: INTERNAL MEDICINE

## 2019-01-31 RX ORDER — METHYLPREDNISOLONE SODIUM SUCCINATE 125 MG/2ML
125 INJECTION, POWDER, LYOPHILIZED, FOR SOLUTION INTRAMUSCULAR; INTRAVENOUS
Status: CANCELLED
Start: 2019-01-31

## 2019-01-31 RX ORDER — EPINEPHRINE 1 MG/ML
0.3 INJECTION, SOLUTION INTRAMUSCULAR; SUBCUTANEOUS EVERY 5 MIN PRN
Status: CANCELLED | OUTPATIENT
Start: 2019-01-31

## 2019-01-31 RX ORDER — LORAZEPAM 2 MG/ML
0.5 INJECTION INTRAMUSCULAR EVERY 4 HOURS PRN
Status: CANCELLED
Start: 2019-01-31

## 2019-01-31 RX ORDER — SODIUM CHLORIDE 9 MG/ML
1000 INJECTION, SOLUTION INTRAVENOUS CONTINUOUS PRN
Status: CANCELLED
Start: 2019-01-31

## 2019-01-31 RX ORDER — DIPHENHYDRAMINE HYDROCHLORIDE 50 MG/ML
50 INJECTION INTRAMUSCULAR; INTRAVENOUS
Status: CANCELLED
Start: 2019-01-31

## 2019-01-31 RX ORDER — EPINEPHRINE 0.3 MG/.3ML
0.3 INJECTION SUBCUTANEOUS EVERY 5 MIN PRN
Status: CANCELLED | OUTPATIENT
Start: 2019-01-31

## 2019-01-31 RX ORDER — ALBUTEROL SULFATE 90 UG/1
1-2 AEROSOL, METERED RESPIRATORY (INHALATION)
Status: CANCELLED
Start: 2019-01-31

## 2019-01-31 RX ORDER — ALBUTEROL SULFATE 0.83 MG/ML
2.5 SOLUTION RESPIRATORY (INHALATION)
Status: CANCELLED | OUTPATIENT
Start: 2019-01-31

## 2019-01-31 RX ORDER — MEPERIDINE HYDROCHLORIDE 25 MG/ML
25 INJECTION INTRAMUSCULAR; INTRAVENOUS; SUBCUTANEOUS EVERY 30 MIN PRN
Status: CANCELLED | OUTPATIENT
Start: 2019-01-31

## 2019-01-31 RX ADMIN — SODIUM CHLORIDE 480 MG: 9 INJECTION, SOLUTION INTRAVENOUS at 09:57

## 2019-01-31 ASSESSMENT — PAIN SCALES - GENERAL: PAINLEVEL: NO PAIN (0)

## 2019-01-31 NOTE — PROGRESS NOTES
Infusion Nursing Note:  Preeti Pascual presents today for cycle 4, day 1 nivolumab.    Patient seen by provider today: No   present during visit today: Not Applicable.    Note: Patient reports ongoing cough at night when he lays down to sleep. It is unchanged from previous visits. He otherwise denies any new complaints.    Intravenous Access:  Peripheral IV placed in lab    Treatment Conditions:  Lab Results   Component Value Date     01/31/2019                   Lab Results   Component Value Date    POTASSIUM 3.9 01/31/2019           No results found for: MAG         Lab Results   Component Value Date    CR 0.71 01/31/2019                   Lab Results   Component Value Date    STACEY 8.1 01/31/2019                Lab Results   Component Value Date    BILITOTAL 0.4 01/31/2019           Lab Results   Component Value Date    ALBUMIN 3.4 01/31/2019                    Lab Results   Component Value Date    ALT 29 01/31/2019           Lab Results   Component Value Date    AST 34 01/31/2019       Results reviewed, labs MET treatment parameters, ok to proceed with treatment.      Post Infusion Assessment:  Patient tolerated infusion without incident.  Blood return noted pre and post infusion.  Site patent and intact, free from redness, edema or discomfort.  No evidence of extravasations.  Access discontinued per protocol.    Discharge Plan:   Patient declined prescription refills.  Discharge instructions reviewed with: Patient.  Patient and/or family verbalized understanding of discharge instructions and all questions answered.  AVS to patient via [employer].  Patient will return 2/28 for next appointment.   Patient discharged in stable condition accompanied by: self.  Departure Mode: Ambulatory.    Franca Haas RN

## 2019-02-28 ENCOUNTER — INFUSION THERAPY VISIT (OUTPATIENT)
Dept: ONCOLOGY | Facility: CLINIC | Age: 54
End: 2019-02-28
Attending: INTERNAL MEDICINE
Payer: COMMERCIAL

## 2019-02-28 VITALS
SYSTOLIC BLOOD PRESSURE: 124 MMHG | OXYGEN SATURATION: 97 % | HEART RATE: 68 BPM | BODY MASS INDEX: 26.6 KG/M2 | WEIGHT: 164.8 LBS | TEMPERATURE: 97.4 F | RESPIRATION RATE: 16 BRPM | DIASTOLIC BLOOD PRESSURE: 76 MMHG

## 2019-02-28 DIAGNOSIS — R05.9 COUGH: ICD-10-CM

## 2019-02-28 DIAGNOSIS — J06.9 VIRAL UPPER RESPIRATORY ILLNESS: ICD-10-CM

## 2019-02-28 DIAGNOSIS — C22.0 HCC (HEPATOCELLULAR CARCINOMA) (H): Primary | ICD-10-CM

## 2019-02-28 LAB
ALBUMIN SERPL-MCNC: 3.4 G/DL (ref 3.4–5)
ALP SERPL-CCNC: 85 U/L (ref 40–150)
ALT SERPL W P-5'-P-CCNC: 28 U/L (ref 0–70)
ANION GAP SERPL CALCULATED.3IONS-SCNC: 8 MMOL/L (ref 3–14)
AST SERPL W P-5'-P-CCNC: 29 U/L (ref 0–45)
BILIRUB SERPL-MCNC: 0.6 MG/DL (ref 0.2–1.3)
BUN SERPL-MCNC: 12 MG/DL (ref 7–30)
CALCIUM SERPL-MCNC: 8 MG/DL (ref 8.5–10.1)
CHLORIDE SERPL-SCNC: 109 MMOL/L (ref 94–109)
CO2 SERPL-SCNC: 22 MMOL/L (ref 20–32)
CREAT SERPL-MCNC: 0.63 MG/DL (ref 0.66–1.25)
GFR SERPL CREATININE-BSD FRML MDRD: >90 ML/MIN/{1.73_M2}
GLUCOSE SERPL-MCNC: 97 MG/DL (ref 70–99)
POTASSIUM SERPL-SCNC: 3.7 MMOL/L (ref 3.4–5.3)
PROT SERPL-MCNC: 7.5 G/DL (ref 6.8–8.8)
SODIUM SERPL-SCNC: 139 MMOL/L (ref 133–144)
TSH SERPL DL<=0.005 MIU/L-ACNC: 0.53 MU/L (ref 0.4–4)

## 2019-02-28 PROCEDURE — 96413 CHEMO IV INFUSION 1 HR: CPT

## 2019-02-28 PROCEDURE — 25000128 H RX IP 250 OP 636: Mod: ZF | Performed by: INTERNAL MEDICINE

## 2019-02-28 PROCEDURE — 84443 ASSAY THYROID STIM HORMONE: CPT | Performed by: INTERNAL MEDICINE

## 2019-02-28 PROCEDURE — 25800030 ZZH RX IP 258 OP 636: Mod: ZF | Performed by: INTERNAL MEDICINE

## 2019-02-28 PROCEDURE — 80053 COMPREHEN METABOLIC PANEL: CPT | Performed by: INTERNAL MEDICINE

## 2019-02-28 RX ORDER — CODEINE PHOSPHATE AND GUAIFENESIN 10; 100 MG/5ML; MG/5ML
1-2 SOLUTION ORAL EVERY 4 HOURS PRN
Qty: 120 ML | Refills: 0 | Status: SHIPPED | OUTPATIENT
Start: 2019-02-28 | End: 2019-07-22

## 2019-02-28 RX ORDER — EPINEPHRINE 1 MG/ML
0.3 INJECTION, SOLUTION INTRAMUSCULAR; SUBCUTANEOUS EVERY 5 MIN PRN
Status: CANCELLED | OUTPATIENT
Start: 2019-02-28

## 2019-02-28 RX ORDER — MEPERIDINE HYDROCHLORIDE 25 MG/ML
25 INJECTION INTRAMUSCULAR; INTRAVENOUS; SUBCUTANEOUS EVERY 30 MIN PRN
Status: CANCELLED | OUTPATIENT
Start: 2019-02-28

## 2019-02-28 RX ORDER — SODIUM CHLORIDE 9 MG/ML
1000 INJECTION, SOLUTION INTRAVENOUS CONTINUOUS PRN
Status: CANCELLED
Start: 2019-02-28

## 2019-02-28 RX ORDER — DIPHENHYDRAMINE HYDROCHLORIDE 50 MG/ML
50 INJECTION INTRAMUSCULAR; INTRAVENOUS
Status: CANCELLED
Start: 2019-02-28

## 2019-02-28 RX ORDER — ALBUTEROL SULFATE 0.83 MG/ML
2.5 SOLUTION RESPIRATORY (INHALATION)
Status: CANCELLED | OUTPATIENT
Start: 2019-02-28

## 2019-02-28 RX ORDER — LORAZEPAM 2 MG/ML
0.5 INJECTION INTRAMUSCULAR EVERY 4 HOURS PRN
Status: CANCELLED
Start: 2019-02-28

## 2019-02-28 RX ORDER — METHYLPREDNISOLONE SODIUM SUCCINATE 125 MG/2ML
125 INJECTION, POWDER, LYOPHILIZED, FOR SOLUTION INTRAMUSCULAR; INTRAVENOUS
Status: CANCELLED
Start: 2019-02-28

## 2019-02-28 RX ORDER — EPINEPHRINE 0.3 MG/.3ML
0.3 INJECTION SUBCUTANEOUS EVERY 5 MIN PRN
Status: CANCELLED | OUTPATIENT
Start: 2019-02-28

## 2019-02-28 RX ORDER — ALBUTEROL SULFATE 90 UG/1
1-2 AEROSOL, METERED RESPIRATORY (INHALATION)
Status: CANCELLED
Start: 2019-02-28

## 2019-02-28 RX ADMIN — SODIUM CHLORIDE 480 MG: 9 INJECTION, SOLUTION INTRAVENOUS at 10:01

## 2019-02-28 ASSESSMENT — PAIN SCALES - GENERAL: PAINLEVEL: MODERATE PAIN (4)

## 2019-02-28 NOTE — NURSING NOTE
Chief Complaint   Patient presents with     Blood Draw     labs drawn via PIV start by rn. vs taken      Labs drawn via PIV start by rn in lab. Flushed with saline. Vitals taken. Pt checked in for next appointment.   Tiffanie Reyes RN

## 2019-02-28 NOTE — PROGRESS NOTES
Infusion Nursing Note:  Preeti Pascual presents today for C5 Nivolumab.    Patient seen by provider today: No    Treatment Conditions:  Lab Results   Component Value Date     02/28/2019                   Lab Results   Component Value Date    POTASSIUM 3.7 02/28/2019           No results found for: MAG         Lab Results   Component Value Date    CR 0.63 02/28/2019                   Lab Results   Component Value Date    STACEY 8.0 02/28/2019                Lab Results   Component Value Date    BILITOTAL 0.6 02/28/2019           Lab Results   Component Value Date    ALBUMIN 3.4 02/28/2019                    Lab Results   Component Value Date    ALT 28 02/28/2019           Lab Results   Component Value Date    AST 29 02/28/2019       Results reviewed, labs MET treatment parameters, ok to proceed with treatment.    Intravenous Access:  Peripheral IV placed in lab.  Access dc'd at time of discharge.      Note:   Results reviewed, copy given to patient.  Proceed with treatment.    Copy of AVS given to patient. + Blood return from PIV pre and post infusion.  Tolerated infusion without incident. Robitussin AC Prescription filled today.   D/C in care of self.  IB sent to scheduling for labs / infusion on 3/28, not scheduled prior to discharge.      Agustina Rivera, RN

## 2019-03-27 RX ORDER — SODIUM CHLORIDE 9 MG/ML
1000 INJECTION, SOLUTION INTRAVENOUS CONTINUOUS PRN
Status: CANCELLED
Start: 2019-03-28

## 2019-03-27 RX ORDER — DIPHENHYDRAMINE HYDROCHLORIDE 50 MG/ML
50 INJECTION INTRAMUSCULAR; INTRAVENOUS
Status: CANCELLED
Start: 2019-03-28

## 2019-03-27 RX ORDER — EPINEPHRINE 0.3 MG/.3ML
0.3 INJECTION SUBCUTANEOUS EVERY 5 MIN PRN
Status: CANCELLED | OUTPATIENT
Start: 2019-03-28

## 2019-03-27 RX ORDER — EPINEPHRINE 1 MG/ML
0.3 INJECTION, SOLUTION INTRAMUSCULAR; SUBCUTANEOUS EVERY 5 MIN PRN
Status: CANCELLED | OUTPATIENT
Start: 2019-03-28

## 2019-03-27 RX ORDER — METHYLPREDNISOLONE SODIUM SUCCINATE 125 MG/2ML
125 INJECTION, POWDER, LYOPHILIZED, FOR SOLUTION INTRAMUSCULAR; INTRAVENOUS
Status: CANCELLED
Start: 2019-03-28

## 2019-03-27 RX ORDER — MEPERIDINE HYDROCHLORIDE 25 MG/ML
25 INJECTION INTRAMUSCULAR; INTRAVENOUS; SUBCUTANEOUS EVERY 30 MIN PRN
Status: CANCELLED | OUTPATIENT
Start: 2019-03-28

## 2019-03-27 RX ORDER — LORAZEPAM 2 MG/ML
0.5 INJECTION INTRAMUSCULAR EVERY 4 HOURS PRN
Status: CANCELLED
Start: 2019-03-28

## 2019-03-27 RX ORDER — ALBUTEROL SULFATE 90 UG/1
1-2 AEROSOL, METERED RESPIRATORY (INHALATION)
Status: CANCELLED
Start: 2019-03-28

## 2019-03-27 RX ORDER — ALBUTEROL SULFATE 0.83 MG/ML
2.5 SOLUTION RESPIRATORY (INHALATION)
Status: CANCELLED | OUTPATIENT
Start: 2019-03-28

## 2019-03-28 ENCOUNTER — INFUSION THERAPY VISIT (OUTPATIENT)
Dept: ONCOLOGY | Facility: CLINIC | Age: 54
End: 2019-03-28
Attending: INTERNAL MEDICINE
Payer: COMMERCIAL

## 2019-03-28 VITALS
RESPIRATION RATE: 16 BRPM | WEIGHT: 161.6 LBS | DIASTOLIC BLOOD PRESSURE: 81 MMHG | SYSTOLIC BLOOD PRESSURE: 131 MMHG | OXYGEN SATURATION: 97 % | BODY MASS INDEX: 26.08 KG/M2 | HEART RATE: 62 BPM | TEMPERATURE: 97.7 F

## 2019-03-28 DIAGNOSIS — C22.0 HCC (HEPATOCELLULAR CARCINOMA) (H): Primary | ICD-10-CM

## 2019-03-28 LAB
ALBUMIN SERPL-MCNC: 3.4 G/DL (ref 3.4–5)
ALP SERPL-CCNC: 99 U/L (ref 40–150)
ALT SERPL W P-5'-P-CCNC: 27 U/L (ref 0–70)
ANION GAP SERPL CALCULATED.3IONS-SCNC: 7 MMOL/L (ref 3–14)
AST SERPL W P-5'-P-CCNC: 30 U/L (ref 0–45)
BILIRUB SERPL-MCNC: 0.4 MG/DL (ref 0.2–1.3)
BUN SERPL-MCNC: 9 MG/DL (ref 7–30)
CALCIUM SERPL-MCNC: 8.3 MG/DL (ref 8.5–10.1)
CHLORIDE SERPL-SCNC: 109 MMOL/L (ref 94–109)
CO2 SERPL-SCNC: 21 MMOL/L (ref 20–32)
CREAT SERPL-MCNC: 0.62 MG/DL (ref 0.66–1.25)
GFR SERPL CREATININE-BSD FRML MDRD: >90 ML/MIN/{1.73_M2}
GLUCOSE SERPL-MCNC: 101 MG/DL (ref 70–99)
POTASSIUM SERPL-SCNC: 3.9 MMOL/L (ref 3.4–5.3)
PROT SERPL-MCNC: 7.4 G/DL (ref 6.8–8.8)
SODIUM SERPL-SCNC: 137 MMOL/L (ref 133–144)
T4 FREE SERPL-MCNC: 1.19 NG/DL (ref 0.76–1.46)
TSH SERPL DL<=0.005 MIU/L-ACNC: 0.36 MU/L (ref 0.4–4)

## 2019-03-28 PROCEDURE — 84439 ASSAY OF FREE THYROXINE: CPT | Performed by: INTERNAL MEDICINE

## 2019-03-28 PROCEDURE — 25800030 ZZH RX IP 258 OP 636: Mod: ZF | Performed by: INTERNAL MEDICINE

## 2019-03-28 PROCEDURE — 25000128 H RX IP 250 OP 636: Mod: ZF | Performed by: INTERNAL MEDICINE

## 2019-03-28 PROCEDURE — 84443 ASSAY THYROID STIM HORMONE: CPT | Performed by: INTERNAL MEDICINE

## 2019-03-28 PROCEDURE — 96413 CHEMO IV INFUSION 1 HR: CPT

## 2019-03-28 PROCEDURE — 80053 COMPREHEN METABOLIC PANEL: CPT | Performed by: INTERNAL MEDICINE

## 2019-03-28 PROCEDURE — 40000556 ZZH STATISTIC PERIPHERAL IV START W US GUIDANCE: Mod: ZF

## 2019-03-28 RX ADMIN — SODIUM CHLORIDE 480 MG: 9 INJECTION, SOLUTION INTRAVENOUS at 09:54

## 2019-03-28 NOTE — PROGRESS NOTES
Infusion Nursing Note:  Preeti Pascual presents today for C6 Nivolumab.    Patient seen by provider today: No    Treatment Conditions:  Lab Results   Component Value Date     03/28/2019                   Lab Results   Component Value Date    POTASSIUM 3.9 03/28/2019           No results found for: MAG         Lab Results   Component Value Date    CR 0.62 03/28/2019                   Lab Results   Component Value Date    STACEY 8.3 03/28/2019                Lab Results   Component Value Date    BILITOTAL 0.4 03/28/2019           Lab Results   Component Value Date    ALBUMIN 3.4 03/28/2019                    Lab Results   Component Value Date    ALT 27 03/28/2019           Lab Results   Component Value Date    AST 30 03/28/2019         Intravenous Access:  Peripheral IV placed in lab.  Access dc'd at time of discharge.      Note:   Results reviewed, copy given to patient.  Proceed with treatment.    Copy of AVS given to patient. + Blood return from PIV pre and post infusion.  Tolerated infusion without incident. No Prescriptions filled today.   D/C in care of self.  Pt will return 4/22 for CT/[facility] appt.       Agustina Rivera RN

## 2019-03-28 NOTE — NURSING NOTE
Chief Complaint   Patient presents with     Blood Draw     Labs drawn via PIV by RN in lab. VS taken.      Labs drawn via peripheral IV. Vital signs taken. Checked into next appointment.     Laura Alonzo RN

## 2019-03-28 NOTE — PATIENT INSTRUCTIONS
Contact Numbers  [facility]: [phone]    After Hours:  [phone]  Triage: [phone]    Please call the [facility] Triage line if you experience a temperature greater than or equal to 100.5, shaking chills, have uncontrolled nausea, vomiting and/or diarrhea, dizziness, shortness of breath, chest pain, bleeding, unexplained bruising, or if you have any other new/concerning symptoms, questions or concerns.     If it is after hours, weekends, or holidays, please call the main hospital  at  [phone] and ask to speak to the Oncology doctor on call.     If you are having any concerning symptoms or wish to speak to a provider before your next infusion visit, please call your care coordinator or triage to notify them so we can adequately serve you.     If you need a refill on a narcotic prescription or other medication, please call triage before your infusion appointment.

## 2019-04-22 ENCOUNTER — ONCOLOGY VISIT (OUTPATIENT)
Dept: ONCOLOGY | Facility: CLINIC | Age: 54
End: 2019-04-22
Attending: INTERNAL MEDICINE
Payer: COMMERCIAL

## 2019-04-22 ENCOUNTER — ANCILLARY PROCEDURE (OUTPATIENT)
Dept: CT IMAGING | Facility: CLINIC | Age: 54
End: 2019-04-22
Payer: COMMERCIAL

## 2019-04-22 VITALS
DIASTOLIC BLOOD PRESSURE: 79 MMHG | TEMPERATURE: 98.4 F | BODY MASS INDEX: 25.55 KG/M2 | SYSTOLIC BLOOD PRESSURE: 134 MMHG | HEART RATE: 64 BPM | HEIGHT: 66 IN | OXYGEN SATURATION: 99 % | WEIGHT: 159 LBS | RESPIRATION RATE: 18 BRPM

## 2019-04-22 DIAGNOSIS — C22.0 HCC (HEPATOCELLULAR CARCINOMA) (H): ICD-10-CM

## 2019-04-22 DIAGNOSIS — K74.69 OTHER CIRRHOSIS OF LIVER (H): ICD-10-CM

## 2019-04-22 DIAGNOSIS — B18.1 CHRONIC VIRAL HEPATITIS B WITHOUT DELTA AGENT AND WITHOUT COMA (H): ICD-10-CM

## 2019-04-22 DIAGNOSIS — C22.0 HCC (HEPATOCELLULAR CARCINOMA) (H): Primary | ICD-10-CM

## 2019-04-22 LAB
AFP SERPL-MCNC: 13.7 UG/L (ref 0–8)
ALBUMIN SERPL-MCNC: 3.4 G/DL (ref 3.4–5)
ALP SERPL-CCNC: 104 U/L (ref 40–150)
ALT SERPL W P-5'-P-CCNC: 26 U/L (ref 0–70)
ANION GAP SERPL CALCULATED.3IONS-SCNC: 7 MMOL/L (ref 3–14)
AST SERPL W P-5'-P-CCNC: 27 U/L (ref 0–45)
BASOPHILS # BLD AUTO: 0.1 10E9/L (ref 0–0.2)
BASOPHILS NFR BLD AUTO: 1.8 %
BILIRUB SERPL-MCNC: 0.4 MG/DL (ref 0.2–1.3)
BUN SERPL-MCNC: 10 MG/DL (ref 7–30)
CALCIUM SERPL-MCNC: 8.5 MG/DL (ref 8.5–10.1)
CHLORIDE SERPL-SCNC: 110 MMOL/L (ref 94–109)
CO2 SERPL-SCNC: 24 MMOL/L (ref 20–32)
CREAT SERPL-MCNC: 0.68 MG/DL (ref 0.66–1.25)
DIFFERENTIAL METHOD BLD: ABNORMAL
EOSINOPHIL # BLD AUTO: 1.3 10E9/L (ref 0–0.7)
EOSINOPHIL NFR BLD AUTO: 25.8 %
ERYTHROCYTE [DISTWIDTH] IN BLOOD BY AUTOMATED COUNT: 13.4 % (ref 10–15)
GFR SERPL CREATININE-BSD FRML MDRD: >90 ML/MIN/{1.73_M2}
GLUCOSE SERPL-MCNC: 100 MG/DL (ref 70–99)
HCT VFR BLD AUTO: 41.6 % (ref 40–53)
HGB BLD-MCNC: 13.7 G/DL (ref 13.3–17.7)
IMM GRANULOCYTES # BLD: 0 10E9/L (ref 0–0.4)
IMM GRANULOCYTES NFR BLD: 0.2 %
INR PPP: 1.13 (ref 0.86–1.14)
LYMPHOCYTES # BLD AUTO: 1.3 10E9/L (ref 0.8–5.3)
LYMPHOCYTES NFR BLD AUTO: 26.2 %
MCH RBC QN AUTO: 28.5 PG (ref 26.5–33)
MCHC RBC AUTO-ENTMCNC: 32.9 G/DL (ref 31.5–36.5)
MCV RBC AUTO: 87 FL (ref 78–100)
MONOCYTES # BLD AUTO: 0.5 10E9/L (ref 0–1.3)
MONOCYTES NFR BLD AUTO: 9.6 %
NEUTROPHILS # BLD AUTO: 1.8 10E9/L (ref 1.6–8.3)
NEUTROPHILS NFR BLD AUTO: 36.4 %
NRBC # BLD AUTO: 0 10*3/UL
NRBC BLD AUTO-RTO: 0 /100
PLATELET # BLD AUTO: 194 10E9/L (ref 150–450)
POTASSIUM SERPL-SCNC: 3.8 MMOL/L (ref 3.4–5.3)
PROT SERPL-MCNC: 7.7 G/DL (ref 6.8–8.8)
RBC # BLD AUTO: 4.81 10E12/L (ref 4.4–5.9)
SODIUM SERPL-SCNC: 141 MMOL/L (ref 133–144)
WBC # BLD AUTO: 4.9 10E9/L (ref 4–11)

## 2019-04-22 PROCEDURE — 99214 OFFICE O/P EST MOD 30 MIN: CPT | Mod: ZP | Performed by: INTERNAL MEDICINE

## 2019-04-22 PROCEDURE — 82105 ALPHA-FETOPROTEIN SERUM: CPT | Performed by: INTERNAL MEDICINE

## 2019-04-22 PROCEDURE — 85610 PROTHROMBIN TIME: CPT | Performed by: INTERNAL MEDICINE

## 2019-04-22 PROCEDURE — 36415 COLL VENOUS BLD VENIPUNCTURE: CPT | Performed by: INTERNAL MEDICINE

## 2019-04-22 PROCEDURE — 80053 COMPREHEN METABOLIC PANEL: CPT | Performed by: INTERNAL MEDICINE

## 2019-04-22 PROCEDURE — 85025 COMPLETE CBC W/AUTO DIFF WBC: CPT | Performed by: INTERNAL MEDICINE

## 2019-04-22 PROCEDURE — G0463 HOSPITAL OUTPT CLINIC VISIT: HCPCS | Mod: ZF

## 2019-04-22 PROCEDURE — 40000556 ZZH STATISTIC PERIPHERAL IV START W US GUIDANCE

## 2019-04-22 RX ORDER — LORAZEPAM 2 MG/ML
0.5 INJECTION INTRAMUSCULAR EVERY 4 HOURS PRN
Status: CANCELLED
Start: 2019-05-02

## 2019-04-22 RX ORDER — EPINEPHRINE 0.3 MG/.3ML
0.3 INJECTION SUBCUTANEOUS EVERY 5 MIN PRN
Status: CANCELLED | OUTPATIENT
Start: 2019-05-02

## 2019-04-22 RX ORDER — DIPHENHYDRAMINE HYDROCHLORIDE 50 MG/ML
50 INJECTION INTRAMUSCULAR; INTRAVENOUS
Status: CANCELLED
Start: 2019-05-02

## 2019-04-22 RX ORDER — METHYLPREDNISOLONE SODIUM SUCCINATE 125 MG/2ML
125 INJECTION, POWDER, LYOPHILIZED, FOR SOLUTION INTRAMUSCULAR; INTRAVENOUS
Status: CANCELLED
Start: 2019-05-02

## 2019-04-22 RX ORDER — MEPERIDINE HYDROCHLORIDE 25 MG/ML
25 INJECTION INTRAMUSCULAR; INTRAVENOUS; SUBCUTANEOUS EVERY 30 MIN PRN
Status: CANCELLED | OUTPATIENT
Start: 2019-05-02

## 2019-04-22 RX ORDER — ALBUTEROL SULFATE 0.83 MG/ML
2.5 SOLUTION RESPIRATORY (INHALATION)
Status: CANCELLED | OUTPATIENT
Start: 2019-05-02

## 2019-04-22 RX ORDER — IOPAMIDOL 755 MG/ML
99 INJECTION, SOLUTION INTRAVASCULAR ONCE
Status: COMPLETED | OUTPATIENT
Start: 2019-04-22 | End: 2019-04-22

## 2019-04-22 RX ORDER — SODIUM CHLORIDE 9 MG/ML
1000 INJECTION, SOLUTION INTRAVENOUS CONTINUOUS PRN
Status: CANCELLED
Start: 2019-05-02

## 2019-04-22 RX ORDER — ALBUTEROL SULFATE 90 UG/1
1-2 AEROSOL, METERED RESPIRATORY (INHALATION)
Status: CANCELLED
Start: 2019-05-02

## 2019-04-22 RX ORDER — EPINEPHRINE 1 MG/ML
0.3 INJECTION, SOLUTION INTRAMUSCULAR; SUBCUTANEOUS EVERY 5 MIN PRN
Status: CANCELLED | OUTPATIENT
Start: 2019-05-02

## 2019-04-22 RX ADMIN — IOPAMIDOL 99 ML: 755 INJECTION, SOLUTION INTRAVASCULAR at 07:50

## 2019-04-22 ASSESSMENT — MIFFLIN-ST. JEOR: SCORE: 1498.72

## 2019-04-22 ASSESSMENT — PAIN SCALES - GENERAL: PAINLEVEL: NO PAIN (0)

## 2019-04-22 NOTE — DISCHARGE INSTRUCTIONS

## 2019-04-22 NOTE — LETTER
4/22/2019      RE: Preeti Pascual  [address]  [facility] [address]       Preeti Pascual is here today in follow-up of metastatic hepatocellular carcinoma.    He has well compensated liver disease related to hepatitis B and has had prior Y 90 therapy of his liver primary before developing metastasis to his adrenal glands.  He has now had about 6 months of nivolumab and is here for planned response assessment.  His only complaint has been of a minor cough which seems to be getting better now that the weather is moderated.  He's had no problems with dyspnea.  He has no problems with his appetite.  He has no problems with pain.  He has had no bleeding, edema and otherwise has a  Negative 10 point review of systems.    On physical exam Mr. Pascual appears quite well and his vital signs are unremarkable.  He has no scleral icterus and no visible lesion in the oropharynx.  He has no adenopathy palpable in neck or supraclavicular spaces.  His lungs are clear to auscultation without dullness to percussion.  His heart rate and rhythm are regular and his jugular venous pressure appears normal.  His abdomen is soft and nontender with palpable left lobe of his liver within the epigastrium.  He has no demonstrable ascites.  He has no peripheral edema and no tenderness in his calves or thighs.  Allowing for the language barrier his speech seems fluent and his cranial nerves are grossly intact.    I reviewed with him his lab work and CT scan. His alpha-fetoprotein is only mildly elevated.  His CT scan shows continued improvement in the size of his adrenal metastasis and no changes within his previously treated liver lesions.  He has normal electrolytes, renal function and liver enzymes.  His blood counts are unremarkable.    Assessment/plan: Metastatic hepatocellular carcinoma with an ongoing response to nivolumab with no evidence of significant toxicity.  We will continue on with the same dose and schedule and reevaluate  his disease status after another 2 months.    Agustin Kyle MD

## 2019-04-22 NOTE — PROGRESS NOTES
Preeti Pascual is here today in follow-up of metastatic hepatocellular carcinoma.    He has well compensated liver disease related to hepatitis B and has had prior Y 90 therapy of his liver primary before developing metastasis to his adrenal glands.  He has now had about 6 months of nivolumab and is here for planned response assessment.  His only complaint has been of a minor cough which seems to be getting better now that the weather is moderated.  He's had no problems with dyspnea.  He has no problems with his appetite.  He has no problems with pain.  He has had no bleeding, edema and otherwise has a  Negative 10 point review of systems.    On physical exam Mr. Pascual appears quite well and his vital signs are unremarkable.  He has no scleral icterus and no visible lesion in the oropharynx.  He has no adenopathy palpable in neck or supraclavicular spaces.  His lungs are clear to auscultation without dullness to percussion.  His heart rate and rhythm are regular and his jugular venous pressure appears normal.  His abdomen is soft and nontender with palpable left lobe of his liver within the epigastrium.  He has no demonstrable ascites.  He has no peripheral edema and no tenderness in his calves or thighs.  Allowing for the language barrier his speech seems fluent and his cranial nerves are grossly intact.    I reviewed with him his lab work and CT scan. His alpha-fetoprotein is only mildly elevated.  His CT scan shows continued improvement in the size of his adrenal metastasis and no changes within his previously treated liver lesions.  He has normal electrolytes, renal function and liver enzymes.  His blood counts are unremarkable.    Assessment/plan: Metastatic hepatocellular carcinoma with an ongoing response to nivolumab with no evidence of significant toxicity.  We will continue on with the same dose and schedule and reevaluate his disease status after another 2 months.

## 2019-04-22 NOTE — NURSING NOTE
"Oncology Rooming Note    April 22, 2019 10:48 AM   Preeti Pascual is a 53 year old male who presents for:    Chief Complaint   Patient presents with     Oncology Clinic Visit     Return visit related to HCC     Initial Vitals: /79 (BP Location: Left arm, Patient Position: Sitting, Cuff Size: Adult Regular)   Pulse 64   Temp 98.4  F (36.9  C) (Oral)   Resp 18   Ht 1.676 m (5' 5.98\")   Wt 72.1 kg (159 lb)   SpO2 99%   BMI 25.68 kg/m   Estimated body mass index is 25.68 kg/m  as calculated from the following:    Height as of this encounter: 1.676 m (5' 5.98\").    Weight as of this encounter: 72.1 kg (159 lb). Body surface area is 1.83 meters squared.  No Pain (0) Comment: Data Unavailable   No LMP for male patient.  Allergies reviewed: Yes  Medications reviewed: Yes    Medications: Medication refills not needed today.  Pharmacy name entered into [employer]: [id number] - [place], [place] - [address] AT [facility] [address]    Clinical concerns: No new concerns. Provider was notified.      Barbara Prieto LPN            "

## 2019-05-01 ENCOUNTER — TELEPHONE (OUTPATIENT)
Dept: GASTROENTEROLOGY | Facility: CLINIC | Age: 54
End: 2019-05-01

## 2019-05-01 NOTE — TELEPHONE ENCOUNTER
Left message for pt reminding them of upcoming appointment.  Instructed pt to bring updated medications list.  Sandy Kruger, CMA

## 2019-05-02 ENCOUNTER — OFFICE VISIT (OUTPATIENT)
Dept: GASTROENTEROLOGY | Facility: CLINIC | Age: 54
End: 2019-05-02
Attending: INTERNAL MEDICINE
Payer: COMMERCIAL

## 2019-05-02 ENCOUNTER — INFUSION THERAPY VISIT (OUTPATIENT)
Dept: ONCOLOGY | Facility: CLINIC | Age: 54
End: 2019-05-02
Attending: INTERNAL MEDICINE
Payer: COMMERCIAL

## 2019-05-02 VITALS
HEART RATE: 68 BPM | BODY MASS INDEX: 25.84 KG/M2 | WEIGHT: 160.8 LBS | DIASTOLIC BLOOD PRESSURE: 83 MMHG | HEIGHT: 66 IN | OXYGEN SATURATION: 97 % | SYSTOLIC BLOOD PRESSURE: 131 MMHG | TEMPERATURE: 97 F

## 2019-05-02 DIAGNOSIS — B18.1 CHRONIC VIRAL HEPATITIS B WITHOUT DELTA AGENT AND WITHOUT COMA (H): Primary | ICD-10-CM

## 2019-05-02 DIAGNOSIS — B18.1 CHRONIC VIRAL HEPATITIS B WITHOUT DELTA AGENT AND WITHOUT COMA (H): ICD-10-CM

## 2019-05-02 DIAGNOSIS — C22.0 HCC (HEPATOCELLULAR CARCINOMA) (H): Primary | ICD-10-CM

## 2019-05-02 LAB
AFP SERPL-MCNC: 12.7 UG/L (ref 0–8)
ALBUMIN SERPL-MCNC: 3.3 G/DL (ref 3.4–5)
ALP SERPL-CCNC: 102 U/L (ref 40–150)
ALT SERPL W P-5'-P-CCNC: 26 U/L (ref 0–70)
ANION GAP SERPL CALCULATED.3IONS-SCNC: 8 MMOL/L (ref 3–14)
AST SERPL W P-5'-P-CCNC: 27 U/L (ref 0–45)
BILIRUB DIRECT SERPL-MCNC: 0.1 MG/DL (ref 0–0.2)
BILIRUB SERPL-MCNC: 0.5 MG/DL (ref 0.2–1.3)
BUN SERPL-MCNC: 15 MG/DL (ref 7–30)
CALCIUM SERPL-MCNC: 8.6 MG/DL (ref 8.5–10.1)
CHLORIDE SERPL-SCNC: 107 MMOL/L (ref 94–109)
CO2 SERPL-SCNC: 23 MMOL/L (ref 20–32)
CREAT SERPL-MCNC: 0.61 MG/DL (ref 0.66–1.25)
ERYTHROCYTE [DISTWIDTH] IN BLOOD BY AUTOMATED COUNT: 13.6 % (ref 10–15)
GFR SERPL CREATININE-BSD FRML MDRD: >90 ML/MIN/{1.73_M2}
GLUCOSE SERPL-MCNC: 100 MG/DL (ref 70–99)
HCT VFR BLD AUTO: 39.3 % (ref 40–53)
HGB BLD-MCNC: 13.6 G/DL (ref 13.3–17.7)
INR PPP: 1.16 (ref 0.86–1.14)
MCH RBC QN AUTO: 29.2 PG (ref 26.5–33)
MCHC RBC AUTO-ENTMCNC: 34.6 G/DL (ref 31.5–36.5)
MCV RBC AUTO: 84 FL (ref 78–100)
PLATELET # BLD AUTO: 179 10E9/L (ref 150–450)
POTASSIUM SERPL-SCNC: 3.7 MMOL/L (ref 3.4–5.3)
PROT SERPL-MCNC: 7.6 G/DL (ref 6.8–8.8)
RBC # BLD AUTO: 4.66 10E12/L (ref 4.4–5.9)
SODIUM SERPL-SCNC: 137 MMOL/L (ref 133–144)
WBC # BLD AUTO: 4.7 10E9/L (ref 4–11)

## 2019-05-02 PROCEDURE — G0463 HOSPITAL OUTPT CLINIC VISIT: HCPCS | Mod: ZF

## 2019-05-02 PROCEDURE — 85027 COMPLETE CBC AUTOMATED: CPT | Performed by: INTERNAL MEDICINE

## 2019-05-02 PROCEDURE — 85610 PROTHROMBIN TIME: CPT | Performed by: INTERNAL MEDICINE

## 2019-05-02 PROCEDURE — 40000556 ZZH STATISTIC PERIPHERAL IV START W US GUIDANCE

## 2019-05-02 PROCEDURE — 36415 COLL VENOUS BLD VENIPUNCTURE: CPT | Performed by: INTERNAL MEDICINE

## 2019-05-02 PROCEDURE — 80048 BASIC METABOLIC PNL TOTAL CA: CPT | Performed by: INTERNAL MEDICINE

## 2019-05-02 PROCEDURE — 25800030 ZZH RX IP 258 OP 636: Mod: ZF | Performed by: INTERNAL MEDICINE

## 2019-05-02 PROCEDURE — 25000128 H RX IP 250 OP 636: Mod: ZF | Performed by: INTERNAL MEDICINE

## 2019-05-02 PROCEDURE — 87517 HEPATITIS B DNA QUANT: CPT | Performed by: INTERNAL MEDICINE

## 2019-05-02 PROCEDURE — 96413 CHEMO IV INFUSION 1 HR: CPT

## 2019-05-02 PROCEDURE — 82105 ALPHA-FETOPROTEIN SERUM: CPT | Performed by: INTERNAL MEDICINE

## 2019-05-02 PROCEDURE — 80076 HEPATIC FUNCTION PANEL: CPT | Performed by: INTERNAL MEDICINE

## 2019-05-02 PROCEDURE — 80053 COMPREHEN METABOLIC PANEL: CPT | Performed by: INTERNAL MEDICINE

## 2019-05-02 RX ADMIN — SODIUM CHLORIDE 480 MG: 9 INJECTION, SOLUTION INTRAVENOUS at 10:47

## 2019-05-02 ASSESSMENT — PAIN SCALES - GENERAL: PAINLEVEL: NO PAIN (0)

## 2019-05-02 ASSESSMENT — MIFFLIN-ST. JEOR: SCORE: 1506.81

## 2019-05-02 NOTE — PROGRESS NOTES
Infusion Nursing Note:  Preeti Pascual presents today for Cycle 2, day 1 Nivolumab.    Patient seen by provider today: No    Note: Patient presents to clinic today feeling well with no questions.  Pt states he continues to have cough and occasional wheezing that was discussed with his hepatologist this morning.  Pt has not been taking inhaler as prescribed, but states he will start.  Pt did not request or require any intervention for pain today.    Intravenous Access:  Peripheral IV placed.    Treatment Conditions:  Lab Results   Component Value Date    HGB 13.6 05/02/2019     Lab Results   Component Value Date    WBC 4.7 05/02/2019      Lab Results   Component Value Date    ANEU 1.8 04/22/2019     Lab Results   Component Value Date     05/02/2019      Lab Results   Component Value Date     05/02/2019                   Lab Results   Component Value Date    POTASSIUM 3.7 05/02/2019           No results found for: MAG         Lab Results   Component Value Date    CR 0.61 05/02/2019                   Lab Results   Component Value Date    STACEY 8.6 05/02/2019                Lab Results   Component Value Date    BILITOTAL 0.5 05/02/2019           Lab Results   Component Value Date    ALBUMIN 3.3 05/02/2019                    Lab Results   Component Value Date    ALT 26 05/02/2019           Lab Results   Component Value Date    AST 27 05/02/2019     Results reviewed, labs MET treatment parameters, ok to proceed with treatment.    Post Infusion Assessment:  Patient tolerated infusion without incident.  Blood return noted pre and post infusion.  Site patent and intact, free from redness, edema or discomfort.  No evidence of extravasations.  Access discontinued per protocol.    Discharge Plan:   Patient declined prescription refills.  Discharge instructions reviewed with: Patient.  Patient and/or family verbalized understanding of discharge instructions and all questions answered.  Copy of AVS reviewed with patient  and/or family.  Patient will return 5/30/2019 for next appointment.  Patient discharged in stable condition accompanied by: self.  Departure Mode: Ambulatory.    Isabel Womack RN

## 2019-05-02 NOTE — PROGRESS NOTES
"HISTORY OF PRESENT ILLNESS:  I had the pleasure of seeing Preeti Pascual for followup in the Liver Clinic at the [facility] on 05/02/2019.  Mr. Pascual returns for followup of hepatocellular carcinoma complicating chronic hepatitis B.  He is on nivolumab therapy and seems to be getting a good response.  Indeed he was first diagnosed with HCC now 3 years ago.      He feels well.  He denies any abdominal pain, itching or skin rash or fatigue.  He denies any increased abdominal girth or lower extremity edema.  He denies any fevers or chills, cough or shortness of breath.  He denies any nausea or vomiting, diarrhea or constipation.  His appetite has been good, and his weight has been stable.  There have been no other new events since he was last seen.      He has now been on nivolumab therapy for 6 months.     Current Outpatient Medications   Medication     fluticasone (FLONASE) 50 MCG/ACT nasal spray     guaiFENesin-codeine (ROBITUSSIN AC) 100-10 MG/5ML solution     VENTOLIN  (90 Base) MCG/ACT inhaler     No current facility-administered medications for this visit.      /83   Pulse 68   Temp 97  F (36.1  C) (Oral)   Ht 1.676 m (5' 5.98\")   Wt 72.9 kg (160 lb 12.8 oz)   SpO2 97%   BMI 25.97 kg/m      HEENT exam shows no scleral icterus or temporal muscle wasting.  His chest is clear.  His abdominal exam shows no increase in girth.  No masses or tenderness to palpation are present.  His liver is 10 cm in span without left lobe enlargement.  No spleen tip is palpable.  Extremity exam shows no edema.  Skin exam shows no stigmata of chronic liver disease.  Neurologic exam shows no asterixis.     Recent Results (from the past 168 hour(s))   INR [PEG5107]    Collection Time: 05/02/19  8:23 AM   Result Value Ref Range    INR 1.16 (H) 0.86 - 1.14   CBC with platelets [[id number]]    Collection Time: 05/02/19  8:23 AM   Result Value Ref Range    WBC 4.7 4.0 - 11.0 10e9/L    RBC Count 4.66 " 4.4 - 5.9 10e12/L    Hemoglobin 13.6 13.3 - 17.7 g/dL    Hematocrit 39.3 (L) 40.0 - 53.0 %    MCV 84 78 - 100 fl    MCH 29.2 26.5 - 33.0 pg    MCHC 34.6 31.5 - 36.5 g/dL    RDW 13.6 10.0 - 15.0 %    Platelet Count 179 150 - 450 10e9/L   Basic metabolic panel [LAB15]    Collection Time: 05/02/19  8:23 AM   Result Value Ref Range    Sodium 137 133 - 144 mmol/L    Potassium 3.7 3.4 - 5.3 mmol/L    Chloride 107 94 - 109 mmol/L    Carbon Dioxide 23 20 - 32 mmol/L    Anion Gap 8 3 - 14 mmol/L    Glucose 100 (H) 70 - 99 mg/dL    Urea Nitrogen 15 7 - 30 mg/dL    Creatinine 0.61 (L) 0.66 - 1.25 mg/dL    GFR Estimate >90 >60 mL/min/[1.73_m2]    GFR Estimate If Black >90 >60 mL/min/[1.73_m2]    Calcium 8.6 8.5 - 10.1 mg/dL   Hepatic panel [LAB20]    Collection Time: 05/02/19  8:23 AM   Result Value Ref Range    Bilirubin Direct 0.1 0.0 - 0.2 mg/dL    Bilirubin Total 0.5 0.2 - 1.3 mg/dL    Albumin 3.3 (L) 3.4 - 5.0 g/dL    Protein Total 7.6 6.8 - 8.8 g/dL    Alkaline Phosphatase 102 40 - 150 U/L    ALT 26 0 - 70 U/L    AST 27 0 - 45 U/L      His most recent alpha-fetoprotein was 13.7.  I did review his CT scan of his chest, abdomen and pelvis.  I will have it reviewed at our Multidisciplinary Liver Tumor Conference to see whether he might benefit from some liver-directed therapy.  He does have an adrenal nodule that is getting smaller in size on the nivolumab and he just saw Dr. Kyle who is planning on continuing that for the time being.      Thank you very much for allowing me to participate in the care of this patient.  If you have any questions regarding my recommendations, please do not hesitate to contact me.       Trevor Trujillo MD      Professor of Medicine  [employer]      Executive Medical Director, Solid Organ Transplant Program  [facility]

## 2019-05-02 NOTE — PATIENT INSTRUCTIONS
Contact Numbers    [facility] Main Line: [phone]  [facility] Triage:  [phone]    Call triage with chills and/or temperature greater than or equal to 100.5, uncontrolled nausea/vomiting, diarrhea, constipation, dizziness, shortness of breath, chest pain, bleeding, unexplained bruising, or any new/concerning symptoms, questions/concerns.     If you are having any concerning symptoms or wish to speak to a provider before your next infusion visit, please call your care coordinator or triage to notify them so we can adequately serve you.       After Hours: [phone]    If after hours, weekends, or holidays, call main hospital  and ask for Oncology doctor on call.       May 2019      [place] Monday Tuesday Wednesday Thursday Friday Saturday                  1     2    LAB WITH  CLINIC   8:00 AM   (15 min.)    LAB   [facility]    [facility] RETURN GENERAL LIVER   8:45 AM   (15 min.)   Trevor Trujillo MD   [facility] Hepatology    [facility] ONC INFUSION 180  10:00 AM   (180 min.)    ONCOLOGY INFUSION   [facility] 3     4       5     6     7     8     9     10     11       12     13     14     15     16     17     18       19     20     21     22     23     24     25       26     27     28     29     30    [facility] LAB DRAW   8:45 AM   (15 min.)   [facility] LAB DRAW   [facility] Lab Draw    [facility] ONC INFUSION 60   9:30 AM   (60 min.)    ONCOLOGY INFUSION   [facility] 31 June 2019 Sunday Monday Tuesday Wednesday Thursday Friday Saturday                                 1       2     3     4     5     6     7     8       9     10     11     12     13     14     15       16     17     18     19     20     21     22       23     24    LAB WITH  CLINIC   8:00 AM   (15 min.)    LAB   [facility]    CT CHEST ABDOMEN PELVIS WWO   8:20 AM   (20 min.)   UCCT2   [facility] CT    [facility] RETURN   9:30 AM   (30 min.)   Agustin Kyle MD     [facility] 25     26     27     28     29       30                                                   Lab Results:  Recent Results (from the past 12 hour(s))   INR [[id number]]    Collection Time: 05/02/19  8:23 AM   Result Value Ref Range    INR 1.16 (H) 0.86 - 1.14   CBC with platelets [[id number]]    Collection Time: 05/02/19  8:23 AM   Result Value Ref Range    WBC 4.7 4.0 - 11.0 10e9/L    RBC Count 4.66 4.4 - 5.9 10e12/L    Hemoglobin 13.6 13.3 - 17.7 g/dL    Hematocrit 39.3 (L) 40.0 - 53.0 %    MCV 84 78 - 100 fl    MCH 29.2 26.5 - 33.0 pg    MCHC 34.6 31.5 - 36.5 g/dL    RDW 13.6 10.0 - 15.0 %    Platelet Count 179 150 - 450 10e9/L   Basic metabolic panel [LAB15]    Collection Time: 05/02/19  8:23 AM   Result Value Ref Range    Sodium 137 133 - 144 mmol/L    Potassium 3.7 3.4 - 5.3 mmol/L    Chloride 107 94 - 109 mmol/L    Carbon Dioxide 23 20 - 32 mmol/L    Anion Gap 8 3 - 14 mmol/L    Glucose 100 (H) 70 - 99 mg/dL    Urea Nitrogen 15 7 - 30 mg/dL    Creatinine 0.61 (L) 0.66 - 1.25 mg/dL    GFR Estimate >90 >60 mL/min/[1.73_m2]    GFR Estimate If Black >90 >60 mL/min/[1.73_m2]    Calcium 8.6 8.5 - 10.1 mg/dL   Hepatic panel [LAB20]    Collection Time: 05/02/19  8:23 AM   Result Value Ref Range    Bilirubin Direct 0.1 0.0 - 0.2 mg/dL    Bilirubin Total 0.5 0.2 - 1.3 mg/dL    Albumin 3.3 (L) 3.4 - 5.0 g/dL    Protein Total 7.6 6.8 - 8.8 g/dL    Alkaline Phosphatase 102 40 - 150 U/L    ALT 26 0 - 70 U/L    AST 27 0 - 45 U/L

## 2019-05-02 NOTE — LETTER
"5/2/2019      RE: Preeti Pascual  [address]  [facility] [address]       HISTORY OF PRESENT ILLNESS:  I had the pleasure of seeing Preeti Pascual for followup in the Liver Clinic at the [facility] on 05/02/2019.  Mr. Pascual returns for followup of hepatocellular carcinoma complicating chronic hepatitis B.  He is on nivolumab therapy and seems to be getting a good response.  Indeed he was first diagnosed with HCC now 3 years ago.      He feels well.  He denies any abdominal pain, itching or skin rash or fatigue.  He denies any increased abdominal girth or lower extremity edema.  He denies any fevers or chills, cough or shortness of breath.  He denies any nausea or vomiting, diarrhea or constipation.  His appetite has been good, and his weight has been stable.  There have been no other new events since he was last seen.      He has now been on nivolumab therapy for 6 months.     Current Outpatient Medications   Medication     fluticasone (FLONASE) 50 MCG/ACT nasal spray     guaiFENesin-codeine (ROBITUSSIN AC) 100-10 MG/5ML solution     VENTOLIN  (90 Base) MCG/ACT inhaler     No current facility-administered medications for this visit.      /83   Pulse 68   Temp 97  F (36.1  C) (Oral)   Ht 1.676 m (5' 5.98\")   Wt 72.9 kg (160 lb 12.8 oz)   SpO2 97%   BMI 25.97 kg/m       HEENT exam shows no scleral icterus or temporal muscle wasting.  His chest is clear.  His abdominal exam shows no increase in girth.  No masses or tenderness to palpation are present.  His liver is 10 cm in span without left lobe enlargement.  No spleen tip is palpable.  Extremity exam shows no edema.  Skin exam shows no stigmata of chronic liver disease.  Neurologic exam shows no asterixis.     Recent Results (from the past 168 hour(s))   INR [[id number]]    Collection Time: 05/02/19  8:23 AM   Result Value Ref Range    INR 1.16 (H) 0.86 - 1.14   CBC with platelets [[id number]]    Collection Time: " 05/02/19  8:23 AM   Result Value Ref Range    WBC 4.7 4.0 - 11.0 10e9/L    RBC Count 4.66 4.4 - 5.9 10e12/L    Hemoglobin 13.6 13.3 - 17.7 g/dL    Hematocrit 39.3 (L) 40.0 - 53.0 %    MCV 84 78 - 100 fl    MCH 29.2 26.5 - 33.0 pg    MCHC 34.6 31.5 - 36.5 g/dL    RDW 13.6 10.0 - 15.0 %    Platelet Count 179 150 - 450 10e9/L   Basic metabolic panel [LAB15]    Collection Time: 05/02/19  8:23 AM   Result Value Ref Range    Sodium 137 133 - 144 mmol/L    Potassium 3.7 3.4 - 5.3 mmol/L    Chloride 107 94 - 109 mmol/L    Carbon Dioxide 23 20 - 32 mmol/L    Anion Gap 8 3 - 14 mmol/L    Glucose 100 (H) 70 - 99 mg/dL    Urea Nitrogen 15 7 - 30 mg/dL    Creatinine 0.61 (L) 0.66 - 1.25 mg/dL    GFR Estimate >90 >60 mL/min/[1.73_m2]    GFR Estimate If Black >90 >60 mL/min/[1.73_m2]    Calcium 8.6 8.5 - 10.1 mg/dL   Hepatic panel [LAB20]    Collection Time: 05/02/19  8:23 AM   Result Value Ref Range    Bilirubin Direct 0.1 0.0 - 0.2 mg/dL    Bilirubin Total 0.5 0.2 - 1.3 mg/dL    Albumin 3.3 (L) 3.4 - 5.0 g/dL    Protein Total 7.6 6.8 - 8.8 g/dL    Alkaline Phosphatase 102 40 - 150 U/L    ALT 26 0 - 70 U/L    AST 27 0 - 45 U/L      His most recent alpha-fetoprotein was 13.7.  I did review his CT scan of his chest, abdomen and pelvis.  I will have it reviewed at our Multidisciplinary Liver Tumor Conference to see whether he might benefit from some liver-directed therapy.  He does have an adrenal nodule that is getting smaller in size on the nivolumab and he just saw Dr. Kyle who is planning on continuing that for the time being.      Thank you very much for allowing me to participate in the care of this patient.  If you have any questions regarding my recommendations, please do not hesitate to contact me.       Trevor Trujillo MD      Professor of Medicine  [employer]      Executive Medical Director, Solid Organ Transplant Program  [facility]    Trevor Trujillo MD

## 2019-05-02 NOTE — LETTER
"5/2/2019       RE: Preeti Pascual  [address]  [place] [address]     Dear Colleague,    Thank you for referring your patient, Preeti Pascual, to the [facility] HEPATOLOGY at [facility]. Please see a copy of my visit note below.    HISTORY OF PRESENT ILLNESS:  I had the pleasure of seeing Preeti Pascual for followup in the Liver Clinic at the [facility] on 05/02/2019.  Mr. Pascual returns for followup of hepatocellular carcinoma complicating chronic hepatitis B.  He is on nivolumab therapy and seems to be getting a good response.  Indeed he was first diagnosed with HCC now 3 years ago.      He feels well.  He denies any abdominal pain, itching or skin rash or fatigue.  He denies any increased abdominal girth or lower extremity edema.  He denies any fevers or chills, cough or shortness of breath.  He denies any nausea or vomiting, diarrhea or constipation.  His appetite has been good, and his weight has been stable.  There have been no other new events since he was last seen.      He has now been on nivolumab therapy for 6 months.     Current Outpatient Medications   Medication     fluticasone (FLONASE) 50 MCG/ACT nasal spray     guaiFENesin-codeine (ROBITUSSIN AC) 100-10 MG/5ML solution     VENTOLIN  (90 Base) MCG/ACT inhaler     No current facility-administered medications for this visit.      /83   Pulse 68   Temp 97  F (36.1  C) (Oral)   Ht 1.676 m (5' 5.98\")   Wt 72.9 kg (160 lb 12.8 oz)   SpO2 97%   BMI 25.97 kg/m       HEENT exam shows no scleral icterus or temporal muscle wasting.  His chest is clear.  His abdominal exam shows no increase in girth.  No masses or tenderness to palpation are present.  His liver is 10 cm in span without left lobe enlargement.  No spleen tip is palpable.  Extremity exam shows no edema.  Skin exam shows no stigmata of chronic liver disease.  Neurologic exam shows no asterixis.     Recent " Results (from the past 168 hour(s))   INR [[id number]]    Collection Time: 05/02/19  8:23 AM   Result Value Ref Range    INR 1.16 (H) 0.86 - 1.14   CBC with platelets [[id number]]    Collection Time: 05/02/19  8:23 AM   Result Value Ref Range    WBC 4.7 4.0 - 11.0 10e9/L    RBC Count 4.66 4.4 - 5.9 10e12/L    Hemoglobin 13.6 13.3 - 17.7 g/dL    Hematocrit 39.3 (L) 40.0 - 53.0 %    MCV 84 78 - 100 fl    MCH 29.2 26.5 - 33.0 pg    MCHC 34.6 31.5 - 36.5 g/dL    RDW 13.6 10.0 - 15.0 %    Platelet Count 179 150 - 450 10e9/L   Basic metabolic panel [LAB15]    Collection Time: 05/02/19  8:23 AM   Result Value Ref Range    Sodium 137 133 - 144 mmol/L    Potassium 3.7 3.4 - 5.3 mmol/L    Chloride 107 94 - 109 mmol/L    Carbon Dioxide 23 20 - 32 mmol/L    Anion Gap 8 3 - 14 mmol/L    Glucose 100 (H) 70 - 99 mg/dL    Urea Nitrogen 15 7 - 30 mg/dL    Creatinine 0.61 (L) 0.66 - 1.25 mg/dL    GFR Estimate >90 >60 mL/min/[1.73_m2]    GFR Estimate If Black >90 >60 mL/min/[1.73_m2]    Calcium 8.6 8.5 - 10.1 mg/dL   Hepatic panel [LAB20]    Collection Time: 05/02/19  8:23 AM   Result Value Ref Range    Bilirubin Direct 0.1 0.0 - 0.2 mg/dL    Bilirubin Total 0.5 0.2 - 1.3 mg/dL    Albumin 3.3 (L) 3.4 - 5.0 g/dL    Protein Total 7.6 6.8 - 8.8 g/dL    Alkaline Phosphatase 102 40 - 150 U/L    ALT 26 0 - 70 U/L    AST 27 0 - 45 U/L      His most recent alpha-fetoprotein was 13.7.  I did review his CT scan of his chest, abdomen and pelvis.  I will have it reviewed at our Multidisciplinary Liver Tumor Conference to see whether he might benefit from some liver-directed therapy.  He does have an adrenal nodule that is getting smaller in size on the nivolumab and he just saw Dr. Kyle who is planning on continuing that for the time being.      Thank you very much for allowing me to participate in the care of this patient.  If you have any questions regarding my recommendations, please do not hesitate to contact me.       Trevor Trujillo,  MD      Professor of Medicine  [employer]      Executive Medical Director, Solid Organ Transplant Program  [facility]

## 2019-05-04 LAB
HBV DNA SERPL NAA+PROBE-ACNC: NORMAL [IU]/ML
HBV DNA SERPL NAA+PROBE-LOG IU: NORMAL {LOG_IU}/ML

## 2019-05-07 ENCOUNTER — TELEPHONE (OUTPATIENT)
Dept: ONCOLOGY | Facility: CLINIC | Age: 54
End: 2019-05-07

## 2019-05-07 NOTE — TELEPHONE ENCOUNTER
Called pt and left a VM On his cell phone.   Informed him that we will have to r/s his consult appt to Fri 5/24 @ 8am     Apologized for this appt rescheduling. Asked that he return my call to let me know that he did get my VM.     Left direct number for him.     Kori MOHAMUD RN, BSN  Interventional Radiology Nurse Coordinator   Phone: [phone]

## 2019-05-29 RX ORDER — DIPHENHYDRAMINE HYDROCHLORIDE 50 MG/ML
50 INJECTION INTRAMUSCULAR; INTRAVENOUS
Status: CANCELLED
Start: 2019-05-30

## 2019-05-29 RX ORDER — ALBUTEROL SULFATE 0.83 MG/ML
2.5 SOLUTION RESPIRATORY (INHALATION)
Status: CANCELLED | OUTPATIENT
Start: 2019-05-30

## 2019-05-29 RX ORDER — SODIUM CHLORIDE 9 MG/ML
1000 INJECTION, SOLUTION INTRAVENOUS CONTINUOUS PRN
Status: CANCELLED
Start: 2019-05-30

## 2019-05-29 RX ORDER — EPINEPHRINE 1 MG/ML
0.3 INJECTION, SOLUTION INTRAMUSCULAR; SUBCUTANEOUS EVERY 5 MIN PRN
Status: CANCELLED | OUTPATIENT
Start: 2019-05-30

## 2019-05-29 RX ORDER — METHYLPREDNISOLONE SODIUM SUCCINATE 125 MG/2ML
125 INJECTION, POWDER, LYOPHILIZED, FOR SOLUTION INTRAMUSCULAR; INTRAVENOUS
Status: CANCELLED
Start: 2019-05-30

## 2019-05-29 RX ORDER — ALBUTEROL SULFATE 90 UG/1
1-2 AEROSOL, METERED RESPIRATORY (INHALATION)
Status: CANCELLED
Start: 2019-05-30

## 2019-05-29 RX ORDER — MEPERIDINE HYDROCHLORIDE 25 MG/ML
25 INJECTION INTRAMUSCULAR; INTRAVENOUS; SUBCUTANEOUS EVERY 30 MIN PRN
Status: CANCELLED | OUTPATIENT
Start: 2019-05-30

## 2019-05-29 RX ORDER — EPINEPHRINE 0.3 MG/.3ML
0.3 INJECTION SUBCUTANEOUS EVERY 5 MIN PRN
Status: CANCELLED | OUTPATIENT
Start: 2019-05-30

## 2019-05-29 RX ORDER — LORAZEPAM 2 MG/ML
0.5 INJECTION INTRAMUSCULAR EVERY 4 HOURS PRN
Status: CANCELLED
Start: 2019-05-30

## 2019-05-30 ENCOUNTER — INFUSION THERAPY VISIT (OUTPATIENT)
Dept: ONCOLOGY | Facility: CLINIC | Age: 54
End: 2019-05-30
Attending: INTERNAL MEDICINE
Payer: COMMERCIAL

## 2019-05-30 VITALS
RESPIRATION RATE: 16 BRPM | HEART RATE: 60 BPM | SYSTOLIC BLOOD PRESSURE: 145 MMHG | BODY MASS INDEX: 26.57 KG/M2 | OXYGEN SATURATION: 97 % | TEMPERATURE: 98.7 F | WEIGHT: 164.5 LBS | DIASTOLIC BLOOD PRESSURE: 80 MMHG

## 2019-05-30 DIAGNOSIS — C22.0 HCC (HEPATOCELLULAR CARCINOMA) (H): Primary | ICD-10-CM

## 2019-05-30 DIAGNOSIS — K74.60 CIRRHOSIS OF LIVER WITHOUT ASCITES, UNSPECIFIED HEPATIC CIRRHOSIS TYPE (H): ICD-10-CM

## 2019-05-30 LAB
ALBUMIN SERPL-MCNC: 3.1 G/DL (ref 3.4–5)
ALP SERPL-CCNC: 102 U/L (ref 40–150)
ALT SERPL W P-5'-P-CCNC: 25 U/L (ref 0–70)
ANION GAP SERPL CALCULATED.3IONS-SCNC: 6 MMOL/L (ref 3–14)
AST SERPL W P-5'-P-CCNC: 24 U/L (ref 0–45)
BILIRUB SERPL-MCNC: 0.4 MG/DL (ref 0.2–1.3)
BUN SERPL-MCNC: 15 MG/DL (ref 7–30)
CALCIUM SERPL-MCNC: 8.6 MG/DL (ref 8.5–10.1)
CHLORIDE SERPL-SCNC: 109 MMOL/L (ref 94–109)
CO2 SERPL-SCNC: 23 MMOL/L (ref 20–32)
CREAT SERPL-MCNC: 0.62 MG/DL (ref 0.66–1.25)
ERYTHROCYTE [DISTWIDTH] IN BLOOD BY AUTOMATED COUNT: 13.3 % (ref 10–15)
GFR SERPL CREATININE-BSD FRML MDRD: >90 ML/MIN/{1.73_M2}
GLUCOSE SERPL-MCNC: 97 MG/DL (ref 70–99)
HCT VFR BLD AUTO: 39.3 % (ref 40–53)
HGB BLD-MCNC: 13 G/DL (ref 13.3–17.7)
INR PPP: 1.18 (ref 0.86–1.14)
MCH RBC QN AUTO: 28.7 PG (ref 26.5–33)
MCHC RBC AUTO-ENTMCNC: 33.1 G/DL (ref 31.5–36.5)
MCV RBC AUTO: 87 FL (ref 78–100)
PLATELET # BLD AUTO: 165 10E9/L (ref 150–450)
POTASSIUM SERPL-SCNC: 3.9 MMOL/L (ref 3.4–5.3)
PROT SERPL-MCNC: 7.5 G/DL (ref 6.8–8.8)
RBC # BLD AUTO: 4.53 10E12/L (ref 4.4–5.9)
SODIUM SERPL-SCNC: 139 MMOL/L (ref 133–144)
TSH SERPL DL<=0.005 MIU/L-ACNC: 0.9 MU/L (ref 0.4–4)
WBC # BLD AUTO: 5.5 10E9/L (ref 4–11)

## 2019-05-30 PROCEDURE — 25800030 ZZH RX IP 258 OP 636: Mod: ZF | Performed by: INTERNAL MEDICINE

## 2019-05-30 PROCEDURE — 96413 CHEMO IV INFUSION 1 HR: CPT

## 2019-05-30 PROCEDURE — 25000128 H RX IP 250 OP 636: Mod: ZF | Performed by: INTERNAL MEDICINE

## 2019-05-30 PROCEDURE — 85610 PROTHROMBIN TIME: CPT | Performed by: INTERNAL MEDICINE

## 2019-05-30 PROCEDURE — 40000556 ZZH STATISTIC PERIPHERAL IV START W US GUIDANCE: Mod: ZF

## 2019-05-30 PROCEDURE — 85027 COMPLETE CBC AUTOMATED: CPT | Performed by: INTERNAL MEDICINE

## 2019-05-30 PROCEDURE — 84443 ASSAY THYROID STIM HORMONE: CPT | Performed by: INTERNAL MEDICINE

## 2019-05-30 PROCEDURE — 80053 COMPREHEN METABOLIC PANEL: CPT | Performed by: INTERNAL MEDICINE

## 2019-05-30 RX ADMIN — SODIUM CHLORIDE 480 MG: 9 INJECTION, SOLUTION INTRAVENOUS at 09:50

## 2019-05-30 ASSESSMENT — PAIN SCALES - GENERAL: PAINLEVEL: NO PAIN (0)

## 2019-05-30 NOTE — NURSING NOTE
Chief Complaint   Patient presents with     Blood Draw     Labs drawn via PIV by RN in lab. VS taken. Pt checked in for next appt     Labs drawn from PIV placed by RN. Line flushed with saline. Vitals taken. Pt checked in for appointment(s).    Racquel LORENZO RN PHN BSN  BMT/Oncology Lab

## 2019-05-30 NOTE — PATIENT INSTRUCTIONS
Contact numbers:  Triage Main/After hours Line: [phone]    Main (scheduling) line: [phone]    Call with chills and/or temperature greater than or equal to 100.5 and questions or concerns.    If after hours, weekends, or holidays, call main hospital  at  [phone] and ask for Oncology doctor on call.         May 2019      [place] Monday Tuesday Wednesday Thursday Friday Saturday                  1     2    LAB WITH  CLINIC   8:00 AM   (15 min.)    LAB   [facility] Lab    [facility] RETURN GENERAL LIVER   8:45 AM   (15 min.)   Trevor Trujillo MD   [facility] Hepatology    [facility] ONC INFUSION 180  10:00 AM   (180 min.)    ONCOLOGY INFUSION   [facility] 3     4       5     6     7     8     9     10     11       12     13     14     15     16     17     18       19     20     21     22     23     24     25       26     27     28     29     30    [facility]   8:45 AM   (15 min.)   [facility]   [facility]    [facility] ONC INFUSION 60   9:30 AM   (60 min.)    ONCOLOGY INFUSION   [facility] 31 June 2019 Sunday Monday Tuesday Wednesday Thursday Friday Saturday                                 1       2     3     4     5     6     7     8       9     10     11     12     13     14     15       16     17     18     19     20     21     22       23     24     25     26     27    [facility]   9:00 AM   (15 min.)   [facility]   [facility]    [facility] ONC INFUSION 60   9:30 AM   (60 min.)    ONCOLOGY INFUSION   [facility] 28     29       30                                                   Lab Results:  Recent Results (from the past 12 hour(s))   Comprehensive metabolic panel    Collection Time: 05/30/19  8:52 AM   Result Value Ref Range    Sodium 139 133 - 144 mmol/L    Potassium 3.9 3.4 - 5.3 mmol/L    Chloride 109 94 - 109 mmol/L    Carbon Dioxide 23 20 - 32 mmol/L    Anion  Gap 6 3 - 14 mmol/L    Glucose 97 70 - 99 mg/dL    Urea Nitrogen 15 7 - 30 mg/dL    Creatinine 0.62 (L) 0.66 - 1.25 mg/dL    GFR Estimate >90 >60 mL/min/[1.73_m2]    GFR Estimate If Black >90 >60 mL/min/[1.73_m2]    Calcium 8.6 8.5 - 10.1 mg/dL    Bilirubin Total 0.4 0.2 - 1.3 mg/dL    Albumin 3.1 (L) 3.4 - 5.0 g/dL    Protein Total 7.5 6.8 - 8.8 g/dL    Alkaline Phosphatase 102 40 - 150 U/L    ALT 25 0 - 70 U/L    AST 24 0 - 45 U/L   TSH with free T4 reflex    Collection Time: 05/30/19  8:52 AM   Result Value Ref Range    TSH 0.90 0.40 - 4.00 mU/L   CBC with platelets [YSI164]    Collection Time: 05/30/19  8:52 AM   Result Value Ref Range    WBC 5.5 4.0 - 11.0 10e9/L    RBC Count 4.53 4.4 - 5.9 10e12/L    Hemoglobin 13.0 (L) 13.3 - 17.7 g/dL    Hematocrit 39.3 (L) 40.0 - 53.0 %    MCV 87 78 - 100 fl    MCH 28.7 26.5 - 33.0 pg    MCHC 33.1 31.5 - 36.5 g/dL    RDW 13.3 10.0 - 15.0 %    Platelet Count 165 150 - 450 10e9/L   INR [PKH2373]    Collection Time: 05/30/19  8:52 AM   Result Value Ref Range    INR 1.18 (H) 0.86 - 1.14

## 2019-05-30 NOTE — PROGRESS NOTES
Infusion Nursing Note:  Preeti Pascual presents for C8 Nivolumab    Note: pt feeling well today, no new issues or concerns to report.    Pain: denies    Treatment Conditions:  Lab Results   Component Value Date    HGB 13.0 05/30/2019     Lab Results   Component Value Date    WBC 5.5 05/30/2019      Lab Results   Component Value Date    ANEU 1.8 04/22/2019     Lab Results   Component Value Date     05/30/2019      Lab Results   Component Value Date     05/30/2019                   Lab Results   Component Value Date    POTASSIUM 3.9 05/30/2019           No results found for: MAG         Lab Results   Component Value Date    CR 0.62 05/30/2019                   Lab Results   Component Value Date    STACEY 8.6 05/30/2019                Lab Results   Component Value Date    BILITOTAL 0.4 05/30/2019           Lab Results   Component Value Date    ALBUMIN 3.1 05/30/2019                    Lab Results   Component Value Date    ALT 25 05/30/2019           Lab Results   Component Value Date    AST 24 05/30/2019       Results reviewed, labs MET treatment parameters, ok to proceed with treatment.    Intravenous Access:  Peripheral IV placed.    Post Infusion Assessment:  Patient tolerated infusion without incident.  Blood return noted pre and post infusion.  No evidence of extravasations.  Access discontinued per protocol.    Discharge Plan:   Patient declined prescription refills.  Discharge instructions reviewed with: Patient.  Patient and/or family verbalized understanding of discharge instructions and all questions answered.  Copy of AVS reviewed with patient and/or family.  Patient will return 6/27 for next appointment.  Patient discharged in stable condition accompanied by: self.    Skylar Armijo RN

## 2019-06-27 ENCOUNTER — APPOINTMENT (OUTPATIENT)
Dept: LAB | Facility: CLINIC | Age: 54
End: 2019-06-27
Attending: INTERNAL MEDICINE
Payer: COMMERCIAL

## 2019-06-27 ENCOUNTER — INFUSION THERAPY VISIT (OUTPATIENT)
Dept: ONCOLOGY | Facility: CLINIC | Age: 54
End: 2019-06-27
Attending: INTERNAL MEDICINE
Payer: COMMERCIAL

## 2019-06-27 VITALS
DIASTOLIC BLOOD PRESSURE: 76 MMHG | HEART RATE: 68 BPM | OXYGEN SATURATION: 98 % | TEMPERATURE: 98.6 F | WEIGHT: 164 LBS | RESPIRATION RATE: 18 BRPM | BODY MASS INDEX: 26.49 KG/M2 | SYSTOLIC BLOOD PRESSURE: 131 MMHG

## 2019-06-27 DIAGNOSIS — C22.0 HCC (HEPATOCELLULAR CARCINOMA) (H): Primary | ICD-10-CM

## 2019-06-27 LAB
AFP SERPL-MCNC: 17.5 UG/L (ref 0–8)
ALBUMIN SERPL-MCNC: 3.2 G/DL (ref 3.4–5)
ALP SERPL-CCNC: 102 U/L (ref 40–150)
ALT SERPL W P-5'-P-CCNC: 29 U/L (ref 0–70)
ANION GAP SERPL CALCULATED.3IONS-SCNC: 6 MMOL/L (ref 3–14)
AST SERPL W P-5'-P-CCNC: 26 U/L (ref 0–45)
BASOPHILS # BLD AUTO: 0.1 10E9/L (ref 0–0.2)
BASOPHILS NFR BLD AUTO: 1.8 %
BILIRUB SERPL-MCNC: 0.5 MG/DL (ref 0.2–1.3)
BUN SERPL-MCNC: 10 MG/DL (ref 7–30)
CALCIUM SERPL-MCNC: 8.3 MG/DL (ref 8.5–10.1)
CHLORIDE SERPL-SCNC: 108 MMOL/L (ref 94–109)
CO2 SERPL-SCNC: 23 MMOL/L (ref 20–32)
CREAT SERPL-MCNC: 0.68 MG/DL (ref 0.66–1.25)
DIFFERENTIAL METHOD BLD: ABNORMAL
EOSINOPHIL # BLD AUTO: 1.6 10E9/L (ref 0–0.7)
EOSINOPHIL NFR BLD AUTO: 31.6 %
ERYTHROCYTE [DISTWIDTH] IN BLOOD BY AUTOMATED COUNT: 13.2 % (ref 10–15)
GFR SERPL CREATININE-BSD FRML MDRD: >90 ML/MIN/{1.73_M2}
GLUCOSE SERPL-MCNC: 119 MG/DL (ref 70–99)
HCT VFR BLD AUTO: 40 % (ref 40–53)
HGB BLD-MCNC: 13.3 G/DL (ref 13.3–17.7)
IMM GRANULOCYTES # BLD: 0 10E9/L (ref 0–0.4)
IMM GRANULOCYTES NFR BLD: 0 %
LYMPHOCYTES # BLD AUTO: 1.2 10E9/L (ref 0.8–5.3)
LYMPHOCYTES NFR BLD AUTO: 24 %
MCH RBC QN AUTO: 29.1 PG (ref 26.5–33)
MCHC RBC AUTO-ENTMCNC: 33.3 G/DL (ref 31.5–36.5)
MCV RBC AUTO: 88 FL (ref 78–100)
MONOCYTES # BLD AUTO: 0.5 10E9/L (ref 0–1.3)
MONOCYTES NFR BLD AUTO: 9.6 %
NEUTROPHILS # BLD AUTO: 1.6 10E9/L (ref 1.6–8.3)
NEUTROPHILS NFR BLD AUTO: 33 %
NRBC # BLD AUTO: 0 10*3/UL
NRBC BLD AUTO-RTO: 0 /100
PLATELET # BLD AUTO: 168 10E9/L (ref 150–450)
POTASSIUM SERPL-SCNC: 3.9 MMOL/L (ref 3.4–5.3)
PROT SERPL-MCNC: 7.3 G/DL (ref 6.8–8.8)
RBC # BLD AUTO: 4.57 10E12/L (ref 4.4–5.9)
SODIUM SERPL-SCNC: 137 MMOL/L (ref 133–144)
TSH SERPL DL<=0.005 MIU/L-ACNC: 0.69 MU/L (ref 0.4–4)
WBC # BLD AUTO: 4.9 10E9/L (ref 4–11)

## 2019-06-27 PROCEDURE — 25000128 H RX IP 250 OP 636: Mod: ZF | Performed by: NURSE PRACTITIONER

## 2019-06-27 PROCEDURE — 82105 ALPHA-FETOPROTEIN SERUM: CPT | Performed by: INTERNAL MEDICINE

## 2019-06-27 PROCEDURE — 96413 CHEMO IV INFUSION 1 HR: CPT

## 2019-06-27 PROCEDURE — 25800030 ZZH RX IP 258 OP 636: Mod: ZF | Performed by: NURSE PRACTITIONER

## 2019-06-27 PROCEDURE — 40000556 ZZH STATISTIC PERIPHERAL IV START W US GUIDANCE: Mod: ZF

## 2019-06-27 PROCEDURE — 85025 COMPLETE CBC W/AUTO DIFF WBC: CPT | Performed by: INTERNAL MEDICINE

## 2019-06-27 PROCEDURE — 84443 ASSAY THYROID STIM HORMONE: CPT | Performed by: INTERNAL MEDICINE

## 2019-06-27 PROCEDURE — 80053 COMPREHEN METABOLIC PANEL: CPT | Performed by: INTERNAL MEDICINE

## 2019-06-27 RX ORDER — EPINEPHRINE 0.3 MG/.3ML
0.3 INJECTION SUBCUTANEOUS EVERY 5 MIN PRN
Status: CANCELLED | OUTPATIENT
Start: 2019-06-27

## 2019-06-27 RX ORDER — LORAZEPAM 2 MG/ML
0.5 INJECTION INTRAMUSCULAR EVERY 4 HOURS PRN
Status: CANCELLED
Start: 2019-06-27

## 2019-06-27 RX ORDER — DIPHENHYDRAMINE HYDROCHLORIDE 50 MG/ML
50 INJECTION INTRAMUSCULAR; INTRAVENOUS
Status: CANCELLED
Start: 2019-06-27

## 2019-06-27 RX ORDER — SODIUM CHLORIDE 9 MG/ML
1000 INJECTION, SOLUTION INTRAVENOUS CONTINUOUS PRN
Status: CANCELLED
Start: 2019-06-27

## 2019-06-27 RX ORDER — MEPERIDINE HYDROCHLORIDE 25 MG/ML
25 INJECTION INTRAMUSCULAR; INTRAVENOUS; SUBCUTANEOUS EVERY 30 MIN PRN
Status: CANCELLED | OUTPATIENT
Start: 2019-06-27

## 2019-06-27 RX ORDER — ALBUTEROL SULFATE 90 UG/1
1-2 AEROSOL, METERED RESPIRATORY (INHALATION)
Status: CANCELLED
Start: 2019-06-27

## 2019-06-27 RX ORDER — METHYLPREDNISOLONE SODIUM SUCCINATE 125 MG/2ML
125 INJECTION, POWDER, LYOPHILIZED, FOR SOLUTION INTRAMUSCULAR; INTRAVENOUS
Status: CANCELLED
Start: 2019-06-27

## 2019-06-27 RX ORDER — ALBUTEROL SULFATE 0.83 MG/ML
2.5 SOLUTION RESPIRATORY (INHALATION)
Status: CANCELLED | OUTPATIENT
Start: 2019-06-27

## 2019-06-27 RX ORDER — EPINEPHRINE 1 MG/ML
0.3 INJECTION, SOLUTION INTRAMUSCULAR; SUBCUTANEOUS EVERY 5 MIN PRN
Status: CANCELLED | OUTPATIENT
Start: 2019-06-27

## 2019-06-27 RX ADMIN — SODIUM CHLORIDE 480 MG: 9 INJECTION, SOLUTION INTRAVENOUS at 11:13

## 2019-06-27 RX ADMIN — SODIUM CHLORIDE 250 ML: 9 INJECTION, SOLUTION INTRAVENOUS at 11:04

## 2019-06-27 ASSESSMENT — PAIN SCALES - GENERAL: PAINLEVEL: NO PAIN (0)

## 2019-06-27 NOTE — NURSING NOTE
Chief Complaint   Patient presents with     Blood Draw     Labs drawn via CORWIN bass RN in lab. Line flushed with saline. VS taken.      Howie Henderson RN

## 2019-06-27 NOTE — PATIENT INSTRUCTIONS
[facility] Main Line: [phone]    Call triage nurse with chills and/or temperature greater than or equal to 100.4, uncontrolled nausea/vomiting, diarrhea, constipation, dizziness, shortness of breath, chest pain, bleeding, unexplained bruising, or any new/concerning symptoms, questions/concerns.   If you are having any concerning symptoms or wish to speak to a provider before your next infusion visit, please call your care coordinator or triage to notify them so we can adequately serve you.   Triage Nurse Line: [phone]    If after hours, weekends, or holidays [phone]             June 2019 Sunday Monday Tuesday Wednesday Thursday Friday Saturday                                 1       2     3     4     5     6     7     8       9     10     11     12     13     14     15       16     17     18     19     20     21     22       23     24     25     26     27    [facility]   9:00 AM   (15 min.)    [facility]   [facility] Draw    [facility] ONC INFUSION 60   9:30 AM   (60 min.)    ONCOLOGY INFUSION   [facility] 28 29 30 July 2019 Sunday Monday Tuesday Wednesday Thursday Friday Saturday        1     2     3     4     5     6       7     8     9     10     11     12     13       14     15     16     17     18     19     20       21     22    [facility] D[facility]   7:30 AM   (15 min.)    [facility] DRAW   [facility] Draw    CT CHEST ABDOMEN PELVIS WWO   8:20 AM   (20 min.)   UCCT1   [facility] CT    [facility] RETURN  10:00 AM   (30 min.)   Agustin Kyle MD   [facility]    [facility] ONC INFUSION 60  11:00 AM   (60 min.)    ONCOLOGY INFUSION   [facility] 23     24     25     26     27       28     29     30     31                                    Lab Results:  Recent Results (from the past 12 hour(s))   Comprehensive  metabolic panel    Collection Time: 06/27/19  9:32 AM   Result Value Ref Range    Sodium 137 133 - 144 mmol/L    Potassium 3.9 3.4 - 5.3 mmol/L    Chloride 108 94 - 109 mmol/L    Carbon Dioxide 23 20 - 32 mmol/L    Anion Gap 6 3 - 14 mmol/L    Glucose 119 (H) 70 - 99 mg/dL    Urea Nitrogen 10 7 - 30 mg/dL    Creatinine 0.68 0.66 - 1.25 mg/dL    GFR Estimate >90 >60 mL/min/[1.73_m2]    GFR Estimate If Black >90 >60 mL/min/[1.73_m2]    Calcium 8.3 (L) 8.5 - 10.1 mg/dL    Bilirubin Total 0.5 0.2 - 1.3 mg/dL    Albumin 3.2 (L) 3.4 - 5.0 g/dL    Protein Total 7.3 6.8 - 8.8 g/dL    Alkaline Phosphatase 102 40 - 150 U/L    ALT 29 0 - 70 U/L    AST 26 0 - 45 U/L   CBC with platelets differential    Collection Time: 06/27/19  9:32 AM   Result Value Ref Range    WBC 4.9 4.0 - 11.0 10e9/L    RBC Count 4.57 4.4 - 5.9 10e12/L    Hemoglobin 13.3 13.3 - 17.7 g/dL    Hematocrit 40.0 40.0 - 53.0 %    MCV 88 78 - 100 fl    MCH 29.1 26.5 - 33.0 pg    MCHC 33.3 31.5 - 36.5 g/dL    RDW 13.2 10.0 - 15.0 %    Platelet Count 168 150 - 450 10e9/L    Diff Method Automated Method     % Neutrophils 33.0 %    % Lymphocytes 24.0 %    % Monocytes 9.6 %    % Eosinophils 31.6 %    % Basophils 1.8 %    % Immature Granulocytes 0.0 %    Nucleated RBCs 0 0 /100    Absolute Neutrophil 1.6 1.6 - 8.3 10e9/L    Absolute Lymphocytes 1.2 0.8 - 5.3 10e9/L    Absolute Monocytes 0.5 0.0 - 1.3 10e9/L    Absolute Eosinophils 1.6 (H) 0.0 - 0.7 10e9/L    Absolute Basophils 0.1 0.0 - 0.2 10e9/L    Abs Immature Granulocytes 0.0 0 - 0.4 10e9/L    Absolute Nucleated RBC 0.0    TSH with free T4 reflex    Collection Time: 06/27/19  9:32 AM   Result Value Ref Range    TSH 0.69 0.40 - 4.00 mU/L

## 2019-06-27 NOTE — PROGRESS NOTES
Infusion Nursing Note:  Preeti Pascual presents today for C9D1 Nivolumab.    Patient seen by provider today: No   present during visit today: Not Applicable.    Note: Patient reported to clinic today with no new complaints.    Intravenous Access:  Peripheral IV placed.    Treatment Conditions:  Lab Results   Component Value Date    HGB 13.3 06/27/2019     Lab Results   Component Value Date    WBC 4.9 06/27/2019      Lab Results   Component Value Date    ANEU 1.6 06/27/2019     Lab Results   Component Value Date     06/27/2019      Lab Results   Component Value Date     06/27/2019                   Lab Results   Component Value Date    POTASSIUM 3.9 06/27/2019           No results found for: MAG         Lab Results   Component Value Date    CR 0.68 06/27/2019                   Lab Results   Component Value Date    STACEY 8.3 06/27/2019                Lab Results   Component Value Date    BILITOTAL 0.5 06/27/2019           Lab Results   Component Value Date    ALBUMIN 3.2 06/27/2019                    Lab Results   Component Value Date    ALT 29 06/27/2019           Lab Results   Component Value Date    AST 26 06/27/2019       Results reviewed, labs MET treatment parameters, ok to proceed with treatment.      Post Infusion Assessment:  Patient tolerated infusion without incident.  Blood return noted pre and post infusion.  Site patent and intact, free from redness, edema or discomfort.  No evidence of extravasations.  Access discontinued per protocol.       Discharge Plan:   Prescription refills given for Nasal [place], FLonase.  Discharge instructions reviewed with: Patient.  Patient and/or family verbalized understanding of discharge instructions and all questions answered.  Copy of AVS reviewed with patient and/or family.  Patient will return 7/22/19 for next appointment.  Patient discharged in stable condition accompanied by: self.  Departure Mode: Ambulatory.  Face to Face time: 0  minutes.    Holger Donnelly RN

## 2019-07-09 ENCOUNTER — TELEPHONE (OUTPATIENT)
Dept: VASCULAR SURGERY | Facility: CLINIC | Age: 54
End: 2019-07-09

## 2019-07-09 NOTE — TELEPHONE ENCOUNTER
"Called pt and left a msg.     Asked that he return my call regarding his canceled appt back in May for cancer treat with Dr. Birch.     Informed him that I noticed that he had called to cancel his May appt due to \"work reasons\".     Asked that he return my call so that we can talk further in detail.     Left my direct line for him to return my call.     Kori MOHAMUD RN, BSN  Interventional Radiology Nurse Coordinator   Phone: [phone]      "

## 2019-07-11 ENCOUNTER — TELEPHONE (OUTPATIENT)
Dept: VASCULAR SURGERY | Facility: CLINIC | Age: 54
End: 2019-07-11

## 2019-07-11 NOTE — TELEPHONE ENCOUNTER
Pt did return my call.     States that he wasn't aware that he had to come back and see Dr Birch for any more cancer treatment He states that he's doing well on medication.     He would like to wait until he sees Dr. Kyle first on Monday 7/22 prior to seeing Dr Birch as he is doing well and doesn't have any issues at this time.    He will contact us after he sees Dr. Kyle.     Kori MOHAMUD RN, BSN  Interventional Radiology Nurse Coordinator   Phone: [phone]

## 2019-07-22 ENCOUNTER — ANCILLARY PROCEDURE (OUTPATIENT)
Dept: CT IMAGING | Facility: CLINIC | Age: 54
End: 2019-07-22
Attending: INTERNAL MEDICINE
Payer: COMMERCIAL

## 2019-07-22 ENCOUNTER — ONCOLOGY VISIT (OUTPATIENT)
Dept: ONCOLOGY | Facility: CLINIC | Age: 54
End: 2019-07-22
Attending: INTERNAL MEDICINE
Payer: COMMERCIAL

## 2019-07-22 VITALS
WEIGHT: 163.6 LBS | OXYGEN SATURATION: 99 % | SYSTOLIC BLOOD PRESSURE: 125 MMHG | HEART RATE: 63 BPM | TEMPERATURE: 98 F | DIASTOLIC BLOOD PRESSURE: 78 MMHG | RESPIRATION RATE: 18 BRPM | BODY MASS INDEX: 26.42 KG/M2

## 2019-07-22 DIAGNOSIS — C22.0 HCC (HEPATOCELLULAR CARCINOMA) (H): Primary | ICD-10-CM

## 2019-07-22 DIAGNOSIS — C22.0 HCC (HEPATOCELLULAR CARCINOMA) (H): ICD-10-CM

## 2019-07-22 DIAGNOSIS — K74.69 OTHER CIRRHOSIS OF LIVER (H): ICD-10-CM

## 2019-07-22 LAB
ALBUMIN SERPL-MCNC: 3.3 G/DL (ref 3.4–5)
ALP SERPL-CCNC: 97 U/L (ref 40–150)
ALT SERPL W P-5'-P-CCNC: 28 U/L (ref 0–70)
ANION GAP SERPL CALCULATED.3IONS-SCNC: 5 MMOL/L (ref 3–14)
AST SERPL W P-5'-P-CCNC: 23 U/L (ref 0–45)
BILIRUB SERPL-MCNC: 0.5 MG/DL (ref 0.2–1.3)
BUN SERPL-MCNC: 15 MG/DL (ref 7–30)
CALCIUM SERPL-MCNC: 8.4 MG/DL (ref 8.5–10.1)
CHLORIDE SERPL-SCNC: 106 MMOL/L (ref 94–109)
CO2 SERPL-SCNC: 26 MMOL/L (ref 20–32)
CREAT SERPL-MCNC: 0.74 MG/DL (ref 0.66–1.25)
GFR SERPL CREATININE-BSD FRML MDRD: >90 ML/MIN/{1.73_M2}
GLUCOSE SERPL-MCNC: 98 MG/DL (ref 70–99)
POTASSIUM SERPL-SCNC: 4 MMOL/L (ref 3.4–5.3)
PROT SERPL-MCNC: 7.6 G/DL (ref 6.8–8.8)
SODIUM SERPL-SCNC: 137 MMOL/L (ref 133–144)
TSH SERPL DL<=0.005 MIU/L-ACNC: 1.1 MU/L (ref 0.4–4)

## 2019-07-22 PROCEDURE — 80053 COMPREHEN METABOLIC PANEL: CPT | Performed by: INTERNAL MEDICINE

## 2019-07-22 PROCEDURE — 96413 CHEMO IV INFUSION 1 HR: CPT

## 2019-07-22 PROCEDURE — 99214 OFFICE O/P EST MOD 30 MIN: CPT | Mod: GC | Performed by: INTERNAL MEDICINE

## 2019-07-22 PROCEDURE — G0463 HOSPITAL OUTPT CLINIC VISIT: HCPCS | Mod: 25

## 2019-07-22 PROCEDURE — 84443 ASSAY THYROID STIM HORMONE: CPT | Performed by: INTERNAL MEDICINE

## 2019-07-22 PROCEDURE — 25000128 H RX IP 250 OP 636: Mod: ZF | Performed by: INTERNAL MEDICINE

## 2019-07-22 PROCEDURE — 25800030 ZZH RX IP 258 OP 636: Mod: ZF | Performed by: INTERNAL MEDICINE

## 2019-07-22 RX ORDER — METHYLPREDNISOLONE SODIUM SUCCINATE 125 MG/2ML
125 INJECTION, POWDER, LYOPHILIZED, FOR SOLUTION INTRAMUSCULAR; INTRAVENOUS
Status: CANCELLED
Start: 2019-07-22

## 2019-07-22 RX ORDER — ALBUTEROL SULFATE 0.83 MG/ML
2.5 SOLUTION RESPIRATORY (INHALATION)
Status: CANCELLED | OUTPATIENT
Start: 2019-07-22

## 2019-07-22 RX ORDER — EPINEPHRINE 0.3 MG/.3ML
0.3 INJECTION SUBCUTANEOUS EVERY 5 MIN PRN
Status: CANCELLED | OUTPATIENT
Start: 2019-07-22

## 2019-07-22 RX ORDER — IOPAMIDOL 755 MG/ML
200 INJECTION, SOLUTION INTRAVASCULAR ONCE
Status: COMPLETED | OUTPATIENT
Start: 2019-07-22 | End: 2019-07-22

## 2019-07-22 RX ORDER — MEPERIDINE HYDROCHLORIDE 25 MG/ML
25 INJECTION INTRAMUSCULAR; INTRAVENOUS; SUBCUTANEOUS EVERY 30 MIN PRN
Status: CANCELLED | OUTPATIENT
Start: 2019-07-22

## 2019-07-22 RX ORDER — ALBUTEROL SULFATE 90 UG/1
1-2 AEROSOL, METERED RESPIRATORY (INHALATION)
Status: CANCELLED
Start: 2019-07-22

## 2019-07-22 RX ORDER — EPINEPHRINE 1 MG/ML
0.3 INJECTION, SOLUTION INTRAMUSCULAR; SUBCUTANEOUS EVERY 5 MIN PRN
Status: CANCELLED | OUTPATIENT
Start: 2019-07-22

## 2019-07-22 RX ORDER — DIPHENHYDRAMINE HYDROCHLORIDE 50 MG/ML
50 INJECTION INTRAMUSCULAR; INTRAVENOUS
Status: CANCELLED
Start: 2019-07-22

## 2019-07-22 RX ORDER — LORAZEPAM 2 MG/ML
0.5 INJECTION INTRAMUSCULAR EVERY 4 HOURS PRN
Status: CANCELLED
Start: 2019-07-22

## 2019-07-22 RX ORDER — SODIUM CHLORIDE 9 MG/ML
1000 INJECTION, SOLUTION INTRAVENOUS CONTINUOUS PRN
Status: CANCELLED
Start: 2019-07-22

## 2019-07-22 RX ADMIN — SODIUM CHLORIDE 480 MG: 9 INJECTION, SOLUTION INTRAVENOUS at 11:26

## 2019-07-22 RX ADMIN — IOPAMIDOL 100 ML: 755 INJECTION, SOLUTION INTRAVASCULAR at 08:37

## 2019-07-22 ASSESSMENT — PAIN SCALES - GENERAL: PAINLEVEL: NO PAIN (0)

## 2019-07-22 NOTE — PATIENT INSTRUCTIONS
Contact Numbers    [facility] Main Line: [phone]  [facility] Triage and after hours / weekends / holidays:  [phone]      Please call the triage or after hours line if you experience a temperature greater than or equal to 100.5, shaking chills, have uncontrolled nausea, vomiting and/or diarrhea, dizziness, shortness of breath, chest pain, bleeding, unexplained bruising, or if you have any other new/concerning symptoms, questions or concerns.      If you are having any concerning symptoms or wish to speak to a provider before your next infusion visit, please call your care coordinator or triage to notify them so we can adequately serve you.     If you need a refill on a narcotic prescription or other medication, please call before your infusion appointment.         July 2019 Sunday Monday Tuesday Wednesday Thursday Friday Saturday        1     2     3     4     5     6       7     8     9     10     11     12     13       14     15     16     17     18     19     20       21     22    [facility] DRAW   7:30 AM   (15 min.)   [facility]   [facility] Lab Draw    CT CHEST ABDOMEN PELVIS WWO   8:20 AM   (20 min.)   UCCT1   [facility] CT    [facility] RETURN  10:00 AM   (30 min.)   Agustin Kyle MD   [facility] ONC INFUSION 60  11:00 AM   (60 min.)    ONCOLOGY INFUSION   [facility] 23     24     25     26     27       28     29     30     31 August 2019 Sunday Monday Tuesday Wednesday Thursday Friday Saturday                       1     2     3       4     5     6     7     8     9     10       11     12     13     14     15     16     17       18     19     20     21     22    [facility] DRAW   8:00 AM   (15 min.)    [facility] DRAW   [facility] Lab Draw    P ONC INFUSION 60   8:30 AM   (60 min.)    ONCOLOGY INFUSION   [facility] 23     24       25     26     27      28     29     30     31                     Lab Results:  Recent Results (from the past 12 hour(s))   Comprehensive metabolic panel    Collection Time: 07/22/19  8:00 AM   Result Value Ref Range    Sodium 137 133 - 144 mmol/L    Potassium 4.0 3.4 - 5.3 mmol/L    Chloride 106 94 - 109 mmol/L    Carbon Dioxide 26 20 - 32 mmol/L    Anion Gap 5 3 - 14 mmol/L    Glucose 98 70 - 99 mg/dL    Urea Nitrogen 15 7 - 30 mg/dL    Creatinine 0.74 0.66 - 1.25 mg/dL    GFR Estimate >90 >60 mL/min/[1.73_m2]    GFR Estimate If Black >90 >60 mL/min/[1.73_m2]    Calcium 8.4 (L) 8.5 - 10.1 mg/dL    Bilirubin Total 0.5 0.2 - 1.3 mg/dL    Albumin 3.3 (L) 3.4 - 5.0 g/dL    Protein Total 7.6 6.8 - 8.8 g/dL    Alkaline Phosphatase 97 40 - 150 U/L    ALT 28 0 - 70 U/L    AST 23 0 - 45 U/L   TSH with free T4 reflex    Collection Time: 07/22/19  8:00 AM   Result Value Ref Range    TSH 1.10 0.40 - 4.00 mU/L

## 2019-07-22 NOTE — PROGRESS NOTES
Infusion Nursing Note:  Preeti Pascual presents today for Cycle 10 Day 1 Opdivo.    Patient seen by provider today: Yes: Dr. Kyle   present during visit today: Not Applicable.    Note: Patient presents to infusion today following his provider appointment. Patient denies any questions or concerns at this time.    Intravenous Access:  Peripheral IV placed by lab.    Treatment Conditions:  Lab Results   Component Value Date     07/22/2019                   Lab Results   Component Value Date    POTASSIUM 4.0 07/22/2019           No results found for: MAG         Lab Results   Component Value Date    CR 0.74 07/22/2019                   Lab Results   Component Value Date    STACEY 8.4 07/22/2019                Lab Results   Component Value Date    BILITOTAL 0.5 07/22/2019           Lab Results   Component Value Date    ALBUMIN 3.3 07/22/2019                    Lab Results   Component Value Date    ALT 28 07/22/2019           Lab Results   Component Value Date    AST 23 07/22/2019     Results reviewed, labs MET treatment parameters, ok to proceed with treatment.      Post Infusion Assessment:  Patient tolerated infusion without incident.  Blood return noted pre and post infusion.  Site patent and intact, free from redness, edema or discomfort.  No evidence of extravasations.  Access discontinued per protocol.       Discharge Plan:   Patient declined prescription refills.  Discharge instructions reviewed with: Patient.  Patient and/or family verbalized understanding of discharge instructions and all questions answered.  Copy of AVS reviewed with patient and/or family.  Patient will return 8/22/19 for next appointment.  Patient discharged in stable condition accompanied by: self.  Departure Mode: Ambulatory.    Kendra Rodrigues RN

## 2019-07-22 NOTE — DISCHARGE INSTRUCTIONS

## 2019-07-22 NOTE — PROGRESS NOTES
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  He has now on nivolumab     Interim History:  Mr. 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    He reports he is busy at work with his job at [employer] and is enjoying life.     Review Of Systems:  Full 12-point ROS reviewed. Pertinent symptoms reviewed above per HPI.  General: Denies fevers, night sweats or chills. Reports good energy.  HEENT: Denies vision or hearing changes or mouth sores or nose and gum bleeding  Lymph: Denies any new lumps or bumps.  CV: Denies chest pain.  Pulm: Denies shortness of breath orcough.  GI: Denies abdominal pain, nausea, vomiting, diarrhea, or constipation, or blood in stool, black tarry appearance to stool.  : Denies pain with urination or blood in urine.  Neuro: Denies headaches, numbness, or tingling, light headedness.  Skin: Denies rashes or other skin lesions or changes in skin color.  Musculoskeletal:  Denies any pain.  Heme: Denies bruising.  Psych: Reports sleeping well and good mood.       Past medical, social, surgical, and family histories reviewed.     Allergies: reviewed with patient in EPIC  No Known Allergies    Current Medications: reviewed with patient in [facility]  Current Outpatient Medications   Medication Sig     fluticasone (FLONASE) 50 MCG/ACT nasal spray Spray 2 sprays into both nostrils daily     VENTOLIN  (90 Base) MCG/ACT inhaler 1-2 INHALATIONS EVERY 4-6 HOURS AS NEEDED FOR WHEEZING. DISPENSE SPACER AS NEEDED.     No current facility-administered medications for this visit.      Facility-Administered Medications Ordered in Other Visits   Medication     0.9%  sodium chloride BOLUS       Physical Exam:  /78 (BP Location: Right arm, Patient Position: Sitting, Cuff Size: Adult Regular)   Pulse 63   Temp 98  F (36.7  C) (Oral)   Resp 18   Wt 74.2 kg (163 lb 9.6 oz)   SpO2 99%   BMI 26.42 kg/m    ECO  GEN: comfortable, in no distress  HEENT: atraumatic, sclera anicteric, no oral thursh  Neck: No LAD  CV: RRR, S1/2 present no mrg, no LE peripheral edema  LUNG: comfortable on room air, CTAB, no wheezing rales or rhonchi  Abdomen: no distension, no tenderness to palpation, no hepatomegaly  MSK: no gross deformities  SKIN: warm, dry, no rash  NEURO: no gross focal abnormalities  PSYCH: appropriate affect and mood.  Lymph: no LAD at neck, axilla and groin       Laboratory/Imaging Studies    CMP, TSH wnl except albumin 3.3    CT CAP 19  FINDINGS:  Chest: Normal thyroid. Normal configuration of the great vessels.  Normal size heart, aorta and pulmonary artery. The central  tracheobronchial tree is patent. No central pulmonary embolism.  Coronary artery calcification along the course of LAD. Stable  paraesophageal lymph node measuring 9 mm in the short axis (series 7  image 223). Stable right cardiophrenic lymph nodes. Stable right hilar  1.3 cm lymph node. No suspicious lung nodule. Stable 1 cm calcified  nodule in the right lower lung, posterior basal segment. No  consolidation No pneumothorax. No pleural effusion. No focal lung  findings. No axillary lymphadenopathy.     Abdomen: Unchanged cirrhotic configuration of the liver configuration  of the liver.     Redemonstration of posttreatment changes in hepatic dome involving  segments 4A, 8 and 5. Mostly centrally necrotic 5 x 3.5 cm focus.  Decreased conspicuity of a 1.4 cm area in the medial aspect of the  treatment site (series 5 image 46) which demonstrates early arterial  enhancement with some washout in the portal delayed phase. Similarly,  decreased conspicuity of a 2.5 cm focus in the inferior medial  aspect  of the treatment site at segment 5, demonstrating early arterial  enhancement with some washout in the portal phase (series 6 image 52).  No new liver lesion identified. Patent portal and arterial  vasculature. Normal spleen. No splenomegaly at least 3 small splenule  largest measuring 1.7 cm inferior to spleen.     Unchanged 2.5 x 1.5 cm right adnexal gland metastatic lesion.  Thickened left adrenal gland. No new lesion without internal glands.     Gallbladder is unremarkable. Pancreas is normal. Clear ducts are not  dilated. Normal kidneys. No dilation of the urinary collecting system.  Minimally distended bladder without evidence of bladder wall  abnormality. Prostatomegaly. Multiple phleboliths. Nonobstructive  bowel gas pattern. Normal appendix. Diverticulosis in the hepatic  flexure.     Bones and soft tissues: Degenerative changes of the thoracolumbar  spine.                                                                      IMPRESSION:    1. Stable cirrhosis and stable posttreatment changes of Y90  radioembolization therapy. Decreased conspicuity but persistence of  regions of ill-defined enhancement and washout involving segment 5 and  8 treatment zone. Recommend continued attention on follow-up.  2. Stable right metastatic adrenal gland lesion.  3. No new suspicious hepatic lesion or metastatic focus.  4. Based on this exam only, the patient remains outside Prasad  criteria.     RADIOLOGY:  I personally reviewed the images from the 4/17/17 CT chest, abdomen and pelvis in advance of the visit.        ASSESSMENT/PLAN:    Mr. Pascual is a 53M with PMHx of well compensated liver disease related to hepatitis B now with metastatic HCC to the right adrenal stable on nivolumab by CT, with ECOG PS 0 and no concerning findings on exam today.  He will continue with nivolumab and follow up with Dr. Kyle on 9/23/19 with imaging prior.    Case and plan discussed with Dr. Agustin Cruz,  MD  Hematology Oncology Fellow    Attending note: I saw the patient in conjunction with the fellow and agree with the above.

## 2019-07-22 NOTE — NURSING NOTE
"Oncology Rooming Note    July 22, 2019 10:10 AM   Preeti Pascual is a 53 year old male who presents for:    Chief Complaint   Patient presents with     Blood Draw     Labs drawn via /PIV placed by RN in lab. VS taken.     Oncology Clinic Visit     HCC , Lab , Tx, CT     Initial Vitals: /78 (BP Location: Right arm, Patient Position: Sitting, Cuff Size: Adult Regular)   Pulse 63   Temp 98  F (36.7  C) (Oral)   Resp 18   Wt 74.2 kg (163 lb 9.6 oz)   SpO2 99%   BMI 26.42 kg/m   Estimated body mass index is 26.42 kg/m  as calculated from the following:    Height as of 5/2/19: 1.676 m (5' 5.98\").    Weight as of this encounter: 74.2 kg (163 lb 9.6 oz). Body surface area is 1.86 meters squared.  No Pain (0) Comment: Data Unavailable   No LMP for male patient.  Allergies reviewed: Yes  Medications reviewed: Yes    Medications: Medication refills not needed today.  Pharmacy name entered into [employer]: [id number] - [place], [place] - [address] AT [facility] [address]    Clinical concerns: CT results  Anabella  was notified.      Crys Jones MA              "

## 2019-07-22 NOTE — LETTER
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  He has now on nivolumab     Interim History:  Mr. 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    He reports he is busy at work with his job at [employer] and is enjoying life.     Review Of Systems:  Full 12-point ROS reviewed. Pertinent symptoms reviewed above per HPI.  General: Denies fevers, night sweats or chills. Reports good energy.  HEENT: Denies vision or hearing changes or mouth sores or nose and gum bleeding  Lymph: Denies any new lumps or bumps.  CV: Denies chest pain.  Pulm: Denies shortness of breath orcough.  GI: Denies abdominal pain, nausea, vomiting, diarrhea, or constipation, or blood in stool, black tarry appearance to stool.  : Denies pain with urination or blood in urine.  Neuro: Denies headaches, numbness, or tingling, light headedness.  Skin: Denies rashes or other skin lesions or changes in skin color.  Musculoskeletal:  Denies any pain.  Heme: Denies bruising.  Psych: Reports sleeping well and good mood.       Past medical, social, surgical, and family histories reviewed.     Allergies: reviewed with patient in EPIC  No Known Allergies    Current Medications: reviewed with patient in [facility]  Current Outpatient Medications   Medication Sig     fluticasone (FLONASE) 50 MCG/ACT nasal spray Spray 2 sprays into both nostrils daily     VENTOLIN  (90 Base) MCG/ACT inhaler 1-2 INHALATIONS EVERY 4-6 HOURS AS NEEDED FOR WHEEZING. DISPENSE SPACER AS NEEDED.     No current facility-administered medications for this visit.       Facility-Administered Medications Ordered in Other Visits   Medication     0.9% sodium chloride BOLUS       Physical Exam:  /78 (BP Location: Right arm, Patient Position: Sitting, Cuff Size: Adult Regular)   Pulse 63   Temp 98  F (36.7  C) (Oral)   Resp 18   Wt 74.2 kg (163 lb 9.6 oz)   SpO2 99%   BMI 26.42 kg/m     ECO  GEN: comfortable, in no distress  HEENT: atraumatic, sclera anicteric, no oral thursh  Neck: No LAD  CV: RRR, S1/2 present no mrg, no LE peripheral edema  LUNG: comfortable on room air, CTAB, no wheezing rales or rhonchi  Abdomen: no distension, no tenderness to palpation, no hepatomegaly  MSK: no gross deformities  SKIN: warm, dry, no rash  NEURO: no gross focal abnormalities  PSYCH: appropriate affect and mood.  Lymph: no LAD at neck, axilla and groin       Laboratory/Imaging Studies    CMP, TSH wnl except albumin 3.3    CT CAP 19  FINDINGS:  Chest: Normal thyroid. Normal configuration of the great vessels.  Normal size heart, aorta and pulmonary artery. The central  tracheobronchial tree is patent. No central pulmonary embolism.  Coronary artery calcification along the course of LAD. Stable  paraesophageal lymph node measuring 9 mm in the short axis (series 7  image 223). Stable right cardiophrenic lymph nodes. Stable right hilar  1.3 cm lymph node. No suspicious lung nodule. Stable 1 cm calcified  nodule in the right lower lung, posterior basal segment. No  consolidation No pneumothorax. No pleural effusion. No focal lung  findings. No axillary lymphadenopathy.     Abdomen: Unchanged cirrhotic configuration of the liver configuration  of the liver.     Redemonstration of posttreatment changes in hepatic dome involving  segments 4A, 8 and 5. Mostly centrally necrotic 5 x 3.5 cm focus.  Decreased conspicuity of a 1.4 cm area in the medial aspect of the  treatment site (series 5 image 46) which demonstrates early arterial  enhancement with some washout in the portal  delayed phase. Similarly,  decreased conspicuity of a 2.5 cm focus in the inferior medial aspect  of the treatment site at segment 5, demonstrating early arterial  enhancement with some washout in the portal phase (series 6 image 52).  No new liver lesion identified. Patent portal and arterial  vasculature. Normal spleen. No splenomegaly at least 3 small splenule  largest measuring 1.7 cm inferior to spleen.     Unchanged 2.5 x 1.5 cm right adnexal gland metastatic lesion.  Thickened left adrenal gland. No new lesion without internal glands.     Gallbladder is unremarkable. Pancreas is normal. Clear ducts are not  dilated. Normal kidneys. No dilation of the urinary collecting system.  Minimally distended bladder without evidence of bladder wall  abnormality. Prostatomegaly. Multiple phleboliths. Nonobstructive  bowel gas pattern. Normal appendix. Diverticulosis in the hepatic  flexure.     Bones and soft tissues: Degenerative changes of the thoracolumbar  spine.                                                                      IMPRESSION:    1. Stable cirrhosis and stable posttreatment changes of Y90  radioembolization therapy. Decreased conspicuity but persistence of  regions of ill-defined enhancement and washout involving segment 5 and  8 treatment zone. Recommend continued attention on follow-up.  2. Stable right metastatic adrenal gland lesion.  3. No new suspicious hepatic lesion or metastatic focus.  4. Based on this exam only, the patient remains outside Prasad  criteria.     RADIOLOGY:  I personally reviewed the images from the 4/17/17 CT chest, abdomen and pelvis in advance of the visit.        ASSESSMENT/PLAN:    Mr. Pascual is a 53M with PMHx of well compensated liver disease related to hepatitis B now with metastatic HCC to the right adrenal stable on nivolumab by CT, with ECOG PS 0 and no concerning findings on exam today.  He will continue with nivolumab and follow up with Dr. Kyle on 9/23/19 with  imaging prior.    Case and plan discussed with Dr. Agustin Cruz MD  Hematology Oncology Fellow    Attending note: I saw the patient in conjunction with the fellow and agree with the above.      Agustin Kyle MD

## 2019-07-22 NOTE — Clinical Note
7/22/2019       RE: Preeti Pascual  [address]  [facility] [address]     Dear Colleague,    Thank you for referring your patient, Preeti Pascual, to the [facility]. Please see a copy of my visit note below.    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  He has now on nivolumab     Interim History:  Mr. 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    He reports he is busy at work with his job at [employer] and is enjoying life.           Review Of Systems:  Full 12-point ROS reviewed. Pertinent symptoms reviewed above per HPI.  General: Denies fevers, night sweats or chills. Reports good energy.  HEENT: Denies vision or hearing changes or mouth sores or nose and gum bleeding  Lymph: Denies any new lumps or bumps.  CV: Denies chest pain.  Pulm: Denies shortness of breath orcough.  GI: Denies abdominal pain, nausea, vomiting, diarrhea, or constipation, or blood in stool, black tarry appearance to stool.  : Denies pain with urination or blood in urine.  Neuro: Denies headaches, numbness, or tingling, light headedness.  Skin: Denies rashes or other skin lesions or changes in skin color.  Musculoskeletal:  Denies any pain.  Heme: Denies bruising.  Psych: Reports sleeping well and good mood.       Past medical, social, surgical, and family histories reviewed.     Allergies: reviewed with patient in EPIC  No Known Allergies    Current Medications: reviewed with patient in [facility]  Current Outpatient Medications   Medication Sig     fluticasone (FLONASE) 50 MCG/ACT nasal spray Spray 2 sprays into both nostrils daily     VENTOLIN  (90 Base)  MCG/ACT inhaler 1-2 INHALATIONS EVERY 4-6 HOURS AS NEEDED FOR WHEEZING. DISPENSE SPACER AS NEEDED.     No current facility-administered medications for this visit.      Facility-Administered Medications Ordered in Other Visits   Medication     0.9% sodium chloride BOLUS       Physical Exam:  /78 (BP Location: Right arm, Patient Position: Sitting, Cuff Size: Adult Regular)   Pulse 63   Temp 98  F (36.7  C) (Oral)   Resp 18   Wt 74.2 kg (163 lb 9.6 oz)   SpO2 99%   BMI 26.42 kg/m     ECO  GEN: comfortable, in no distress  HEENT: atraumatic, sclera anicteric, no oral thursh  Neck: No LAD  CV: RRR, S1/2 present no mrg, no LE peripheral edema  LUNG: comfortable on room air, CTAB, no wheezing rales or rhonchi  Abdomen: no distension, no tenderness to palpation, no hepatomegaly  MSK: no gross deformities  SKIN: warm, dry, no rash  NEURO: no gross focal abnormalities  PSYCH: appropriate affect and mood.  Lymph: no LAD at neck, axilla and groin       Laboratory/Imaging Studies    CMP, TSH wnl except albumin 3.3    CT CAP 19  FINDINGS:  Chest: Normal thyroid. Normal configuration of the great vessels.  Normal size heart, aorta and pulmonary artery. The central  tracheobronchial tree is patent. No central pulmonary embolism.  Coronary artery calcification along the course of LAD. Stable  paraesophageal lymph node measuring 9 mm in the short axis (series 7  image 223). Stable right cardiophrenic lymph nodes. Stable right hilar  1.3 cm lymph node. No suspicious lung nodule. Stable 1 cm calcified  nodule in the right lower lung, posterior basal segment. No  consolidation No pneumothorax. No pleural effusion. No focal lung  findings. No axillary lymphadenopathy.     Abdomen: Unchanged cirrhotic configuration of the liver configuration  of the liver.     Redemonstration of posttreatment changes in hepatic dome involving  segments 4A, 8 and 5. Mostly centrally necrotic 5 x 3.5 cm focus.  Decreased conspicuity of  a 1.4 cm area in the medial aspect of the  treatment site (series 5 image 46) which demonstrates early arterial  enhancement with some washout in the portal delayed phase. Similarly,  decreased conspicuity of a 2.5 cm focus in the inferior medial aspect  of the treatment site at segment 5, demonstrating early arterial  enhancement with some washout in the portal phase (series 6 image 52).  No new liver lesion identified. Patent portal and arterial  vasculature. Normal spleen. No splenomegaly at least 3 small splenule  largest measuring 1.7 cm inferior to spleen.     Unchanged 2.5 x 1.5 cm right adnexal gland metastatic lesion.  Thickened left adrenal gland. No new lesion without internal glands.     Gallbladder is unremarkable. Pancreas is normal. Clear ducts are not  dilated. Normal kidneys. No dilation of the urinary collecting system.  Minimally distended bladder without evidence of bladder wall  abnormality. Prostatomegaly. Multiple phleboliths. Nonobstructive  bowel gas pattern. Normal appendix. Diverticulosis in the hepatic  flexure.     Bones and soft tissues: Degenerative changes of the thoracolumbar  spine.                                                                      IMPRESSION:    1. Stable cirrhosis and stable posttreatment changes of Y90  radioembolization therapy. Decreased conspicuity but persistence of  regions of ill-defined enhancement and washout involving segment 5 and  8 treatment zone. Recommend continued attention on follow-up.  2. Stable right metastatic adrenal gland lesion.  3. No new suspicious hepatic lesion or metastatic focus.  4. Based on this exam only, the patient remains outside Prasad  criteria.     RADIOLOGY:  I personally reviewed the images from the 4/17/17 CT chest, abdomen and pelvis in advance of the visit.        ASSESSMENT/PLAN:    Mr. Pascual is a 53M with PMHx of well compensated liver disease related to hepatitis B now with metastatic HCC to the right adrenal stable  on nivolumab by CT, with ECOG PS 0 and no concerning findings on exam today.  He will continue with nivolumab and follow up with Dr. Kyle on 9/23/19 with imaging prior.    Case and plan discussed with Dr. Agustin Cruz MD  Hematology Oncology Fellow        Again, thank you for allowing me to participate in the care of your patient.      Sincerely,    Agustin Kyle MD

## 2019-08-20 RX ORDER — SODIUM CHLORIDE 9 MG/ML
1000 INJECTION, SOLUTION INTRAVENOUS CONTINUOUS PRN
Status: CANCELLED
Start: 2019-08-22

## 2019-08-20 RX ORDER — MEPERIDINE HYDROCHLORIDE 25 MG/ML
25 INJECTION INTRAMUSCULAR; INTRAVENOUS; SUBCUTANEOUS EVERY 30 MIN PRN
Status: CANCELLED | OUTPATIENT
Start: 2019-08-22

## 2019-08-20 RX ORDER — LORAZEPAM 2 MG/ML
0.5 INJECTION INTRAMUSCULAR EVERY 4 HOURS PRN
Status: CANCELLED
Start: 2019-08-22

## 2019-08-20 RX ORDER — DIPHENHYDRAMINE HYDROCHLORIDE 50 MG/ML
50 INJECTION INTRAMUSCULAR; INTRAVENOUS
Status: CANCELLED
Start: 2019-08-22

## 2019-08-20 RX ORDER — EPINEPHRINE 0.3 MG/.3ML
0.3 INJECTION SUBCUTANEOUS EVERY 5 MIN PRN
Status: CANCELLED | OUTPATIENT
Start: 2019-08-22

## 2019-08-20 RX ORDER — ALBUTEROL SULFATE 90 UG/1
1-2 AEROSOL, METERED RESPIRATORY (INHALATION)
Status: CANCELLED
Start: 2019-08-22

## 2019-08-20 RX ORDER — EPINEPHRINE 1 MG/ML
0.3 INJECTION, SOLUTION, CONCENTRATE INTRAVENOUS EVERY 5 MIN PRN
Status: CANCELLED | OUTPATIENT
Start: 2019-08-22

## 2019-08-20 RX ORDER — ALBUTEROL SULFATE 0.83 MG/ML
2.5 SOLUTION RESPIRATORY (INHALATION)
Status: CANCELLED | OUTPATIENT
Start: 2019-08-22

## 2019-08-22 ENCOUNTER — INFUSION THERAPY VISIT (OUTPATIENT)
Dept: ONCOLOGY | Facility: CLINIC | Age: 54
End: 2019-08-22
Attending: INTERNAL MEDICINE
Payer: COMMERCIAL

## 2019-08-22 ENCOUNTER — APPOINTMENT (OUTPATIENT)
Dept: LAB | Facility: CLINIC | Age: 54
End: 2019-08-22
Attending: INTERNAL MEDICINE
Payer: COMMERCIAL

## 2019-08-22 VITALS
BODY MASS INDEX: 26.11 KG/M2 | HEART RATE: 61 BPM | RESPIRATION RATE: 16 BRPM | WEIGHT: 161.7 LBS | DIASTOLIC BLOOD PRESSURE: 81 MMHG | OXYGEN SATURATION: 100 % | TEMPERATURE: 98.1 F | SYSTOLIC BLOOD PRESSURE: 131 MMHG

## 2019-08-22 DIAGNOSIS — C22.0 HCC (HEPATOCELLULAR CARCINOMA) (H): Primary | ICD-10-CM

## 2019-08-22 LAB
ALBUMIN SERPL-MCNC: 3.5 G/DL (ref 3.4–5)
ALP SERPL-CCNC: 105 U/L (ref 40–150)
ALT SERPL W P-5'-P-CCNC: 31 U/L (ref 0–70)
ANION GAP SERPL CALCULATED.3IONS-SCNC: 6 MMOL/L (ref 3–14)
AST SERPL W P-5'-P-CCNC: 25 U/L (ref 0–45)
BILIRUB SERPL-MCNC: 0.4 MG/DL (ref 0.2–1.3)
BUN SERPL-MCNC: 9 MG/DL (ref 7–30)
CALCIUM SERPL-MCNC: 8.2 MG/DL (ref 8.5–10.1)
CHLORIDE SERPL-SCNC: 108 MMOL/L (ref 94–109)
CO2 SERPL-SCNC: 24 MMOL/L (ref 20–32)
CREAT SERPL-MCNC: 0.72 MG/DL (ref 0.66–1.25)
GFR SERPL CREATININE-BSD FRML MDRD: >90 ML/MIN/{1.73_M2}
GLUCOSE SERPL-MCNC: 103 MG/DL (ref 70–99)
POTASSIUM SERPL-SCNC: 3.9 MMOL/L (ref 3.4–5.3)
PROT SERPL-MCNC: 7.6 G/DL (ref 6.8–8.8)
SODIUM SERPL-SCNC: 138 MMOL/L (ref 133–144)
TSH SERPL DL<=0.005 MIU/L-ACNC: 1.03 MU/L (ref 0.4–4)

## 2019-08-22 PROCEDURE — 25000128 H RX IP 250 OP 636: Mod: ZF | Performed by: INTERNAL MEDICINE

## 2019-08-22 PROCEDURE — 80053 COMPREHEN METABOLIC PANEL: CPT | Performed by: INTERNAL MEDICINE

## 2019-08-22 PROCEDURE — 96413 CHEMO IV INFUSION 1 HR: CPT

## 2019-08-22 PROCEDURE — 84443 ASSAY THYROID STIM HORMONE: CPT | Performed by: INTERNAL MEDICINE

## 2019-08-22 PROCEDURE — 25800030 ZZH RX IP 258 OP 636: Mod: ZF | Performed by: INTERNAL MEDICINE

## 2019-08-22 RX ADMIN — SODIUM CHLORIDE 250 ML: 9 INJECTION, SOLUTION INTRAVENOUS at 09:59

## 2019-08-22 RX ADMIN — SODIUM CHLORIDE 480 MG: 9 INJECTION, SOLUTION INTRAVENOUS at 09:59

## 2019-08-22 ASSESSMENT — PAIN SCALES - GENERAL: PAINLEVEL: NO PAIN (0)

## 2019-08-22 NOTE — PROGRESS NOTES
Infusion Nursing Note:  Preeti Pascual presents today for C11D1 Nivolumab.    Patient seen by provider today: No   present during visit today: Not Applicable.    Note: Patient reported to clinic today with no new complaints.    Intravenous Access:  Peripheral IV placed.    Treatment Conditions:  Lab Results   Component Value Date    HGB 13.3 06/27/2019     Lab Results   Component Value Date    WBC 4.9 06/27/2019      Lab Results   Component Value Date    ANEU 1.6 06/27/2019     Lab Results   Component Value Date     06/27/2019      Lab Results   Component Value Date     08/22/2019                   Lab Results   Component Value Date    POTASSIUM 3.9 08/22/2019           No results found for: MAG         Lab Results   Component Value Date    CR 0.72 08/22/2019                   Lab Results   Component Value Date    STACEY 8.2 08/22/2019                Lab Results   Component Value Date    BILITOTAL 0.4 08/22/2019           Lab Results   Component Value Date    ALBUMIN 3.5 08/22/2019                    Lab Results   Component Value Date    ALT 31 08/22/2019           Lab Results   Component Value Date    AST 25 08/22/2019       Results reviewed, labs MET treatment parameters, ok to proceed with treatment.      Post Infusion Assessment:  Patient tolerated infusion without incident.  Blood return noted pre and post infusion.  Site patent and intact, free from redness, edema or discomfort.  No evidence of extravasations.  Access discontinued per protocol.       Discharge Plan:   Patient declined prescription refills.  Discharge instructions reviewed with: Patient.  Patient and/or family verbalized understanding of discharge instructions and all questions answered.  Copy of AVS reviewed with patient and/or family.  Patient will return 9/23/19 for next appointment.  Patient discharged in stable condition accompanied by: self.  Departure Mode: Ambulatory.  Face to Face time: 0 minutes.    Holger Donnelly  RN, RN

## 2019-08-22 NOTE — NURSING NOTE
Chief Complaint   Patient presents with     Blood Draw     Labs drawn via PIV placed by vascular access. VS taken. Patient checked in for next appt.     Labs drawn from PIV placed by vascular access RN. Line flushed with saline. Vitals taken. Pt checked in for appointment.    Lela Coats RN

## 2019-08-22 NOTE — PATIENT INSTRUCTIONS
Contact Numbers  [facility]: [phone]    After Hours:  [phone]  Triage: [phone]    Please call the [facility] Triage line if you experience a temperature greater than or equal to 100.5, shaking chills, have uncontrolled nausea, vomiting and/or diarrhea, dizziness, shortness of breath, chest pain, bleeding, unexplained bruising, or if you have any other new/concerning symptoms, questions or concerns.     If it is after hours, weekends, or holidays, please call the main hospital  at  [phone] and ask to speak to the Oncology doctor on call.     If you are having any concerning symptoms or wish to speak to a provider before your next infusion visit, please call your care coordinator or triage to notify them so we can adequately serve you.     If you need a refill on a narcotic prescription or other medication, please call triage before your infusion appointment.         August 2019 Sunday Monday Tuesday Wednesday Thursday Friday Saturday                       1     2     3       4     5     6     7     8     9     10       11     12     13     14     15     16     17       18     19     20     21     22    [facility] DRAW   8:00 AM   (15 min.)   [facility] LAB DRAW   [facility] Lab Draw    P ONC INFUSION 60   8:30 AM   (60 min.)    ONCOLOGY INFUSION   [facility] 23     24       25     26     27     28     29     30     31 September 2019 Sunday Monday Tuesday Wednesday Thursday Friday Saturday   1     2     3     4     5     6     7       8     9     10     11     12     13     14       15     16     17     18     19    CT CHEST ABDOMEN PELVIS [facility]   8:30 AM   (20 min.)   [facility]   [facility] CT 20     21       22     23    [facility]   2:[facility]   (15 min.)   [facility]   [facility] Lab Draw    [facility] RETURN   2:15 PM   (30 min.)   Agustin Kyle MD   [facility]    [facility] ONC INFUSION 60   3:00 PM   (60 min.)    ONCOLOGY INFUSION   [facility] 24     25     26     27     28       29     30                                              Lab Results:  Recent Results (from the past 12 hour(s))   Comprehensive metabolic panel    Collection Time: 08/22/19  8:47 AM   Result Value Ref Range    Sodium 138 133 - 144 mmol/L    Potassium 3.9 3.4 - 5.3 mmol/L    Chloride 108 94 - 109 mmol/L    Carbon Dioxide 24 20 - 32 mmol/L    Anion Gap 6 3 - 14 mmol/L    Glucose 103 (H) 70 - 99 mg/dL    Urea Nitrogen 9 7 - 30 mg/dL    Creatinine 0.72 0.66 - 1.25 mg/dL    GFR Estimate >90 >60 mL/min/[1.73_m2]    GFR Estimate If Black >90 >60 mL/min/[1.73_m2]    Calcium 8.2 (L) 8.5 - 10.1 mg/dL    Bilirubin Total 0.4 0.2 - 1.3 mg/dL    Albumin 3.5 3.4 - 5.0 g/dL    Protein Total 7.6 6.8 - 8.8 g/dL    Alkaline Phosphatase 105 40 - 150 U/L    ALT 31 0 - 70 U/L    AST 25 0 - 45 U/L   TSH with free T4 reflex    Collection Time: 08/22/19  8:47 AM   Result Value Ref Range    TSH 1.03 0.40 - 4.00 mU/L

## 2019-09-23 ENCOUNTER — ANCILLARY PROCEDURE (OUTPATIENT)
Dept: CT IMAGING | Facility: CLINIC | Age: 54
End: 2019-09-23
Attending: INTERNAL MEDICINE
Payer: COMMERCIAL

## 2019-09-23 ENCOUNTER — ONCOLOGY VISIT (OUTPATIENT)
Dept: ONCOLOGY | Facility: CLINIC | Age: 54
End: 2019-09-23
Attending: INTERNAL MEDICINE
Payer: COMMERCIAL

## 2019-09-23 VITALS
HEART RATE: 61 BPM | WEIGHT: 163.7 LBS | DIASTOLIC BLOOD PRESSURE: 77 MMHG | RESPIRATION RATE: 18 BRPM | SYSTOLIC BLOOD PRESSURE: 120 MMHG | OXYGEN SATURATION: 96 % | TEMPERATURE: 98.1 F | BODY MASS INDEX: 26.44 KG/M2

## 2019-09-23 DIAGNOSIS — C22.0 HCC (HEPATOCELLULAR CARCINOMA) (H): Primary | ICD-10-CM

## 2019-09-23 DIAGNOSIS — C22.0 HCC (HEPATOCELLULAR CARCINOMA) (H): ICD-10-CM

## 2019-09-23 LAB
AFP SERPL-MCNC: 25.5 UG/L (ref 0–8)
ALBUMIN SERPL-MCNC: 3.4 G/DL (ref 3.4–5)
ALP SERPL-CCNC: 92 U/L (ref 40–150)
ALT SERPL W P-5'-P-CCNC: 28 U/L (ref 0–70)
ANION GAP SERPL CALCULATED.3IONS-SCNC: 6 MMOL/L (ref 3–14)
AST SERPL W P-5'-P-CCNC: 24 U/L (ref 0–45)
BASOPHILS # BLD AUTO: 0.1 10E9/L (ref 0–0.2)
BASOPHILS NFR BLD AUTO: 1.3 %
BILIRUB SERPL-MCNC: 0.4 MG/DL (ref 0.2–1.3)
BUN SERPL-MCNC: 13 MG/DL (ref 7–30)
CALCIUM SERPL-MCNC: 8.4 MG/DL (ref 8.5–10.1)
CHLORIDE SERPL-SCNC: 106 MMOL/L (ref 94–109)
CO2 SERPL-SCNC: 24 MMOL/L (ref 20–32)
CREAT SERPL-MCNC: 0.61 MG/DL (ref 0.66–1.25)
DIFFERENTIAL METHOD BLD: ABNORMAL
EOSINOPHIL # BLD AUTO: 1.3 10E9/L (ref 0–0.7)
EOSINOPHIL NFR BLD AUTO: 27.5 %
ERYTHROCYTE [DISTWIDTH] IN BLOOD BY AUTOMATED COUNT: 12.9 % (ref 10–15)
GFR SERPL CREATININE-BSD FRML MDRD: >90 ML/MIN/{1.73_M2}
GLUCOSE SERPL-MCNC: 96 MG/DL (ref 70–99)
HCT VFR BLD AUTO: 37.8 % (ref 40–53)
HGB BLD-MCNC: 12.9 G/DL (ref 13.3–17.7)
IMM GRANULOCYTES # BLD: 0 10E9/L (ref 0–0.4)
IMM GRANULOCYTES NFR BLD: 0 %
INR PPP: 1.21 (ref 0.86–1.14)
LYMPHOCYTES # BLD AUTO: 1 10E9/L (ref 0.8–5.3)
LYMPHOCYTES NFR BLD AUTO: 21.8 %
MCH RBC QN AUTO: 29.3 PG (ref 26.5–33)
MCHC RBC AUTO-ENTMCNC: 34.1 G/DL (ref 31.5–36.5)
MCV RBC AUTO: 86 FL (ref 78–100)
MONOCYTES # BLD AUTO: 0.4 10E9/L (ref 0–1.3)
MONOCYTES NFR BLD AUTO: 8.5 %
NEUTROPHILS # BLD AUTO: 1.9 10E9/L (ref 1.6–8.3)
NEUTROPHILS NFR BLD AUTO: 40.9 %
NRBC # BLD AUTO: 0 10*3/UL
NRBC BLD AUTO-RTO: 0 /100
PLATELET # BLD AUTO: 171 10E9/L (ref 150–450)
POTASSIUM SERPL-SCNC: 4 MMOL/L (ref 3.4–5.3)
PROT SERPL-MCNC: 7.5 G/DL (ref 6.8–8.8)
RBC # BLD AUTO: 4.41 10E12/L (ref 4.4–5.9)
SODIUM SERPL-SCNC: 135 MMOL/L (ref 133–144)
TSH SERPL DL<=0.005 MIU/L-ACNC: 0.58 MU/L (ref 0.4–4)
WBC # BLD AUTO: 4.6 10E9/L (ref 4–11)

## 2019-09-23 PROCEDURE — 80053 COMPREHEN METABOLIC PANEL: CPT | Performed by: INTERNAL MEDICINE

## 2019-09-23 PROCEDURE — 84443 ASSAY THYROID STIM HORMONE: CPT | Performed by: INTERNAL MEDICINE

## 2019-09-23 PROCEDURE — 85025 COMPLETE CBC W/AUTO DIFF WBC: CPT | Performed by: INTERNAL MEDICINE

## 2019-09-23 PROCEDURE — 82105 ALPHA-FETOPROTEIN SERUM: CPT | Performed by: INTERNAL MEDICINE

## 2019-09-23 PROCEDURE — 96413 CHEMO IV INFUSION 1 HR: CPT

## 2019-09-23 PROCEDURE — 99214 OFFICE O/P EST MOD 30 MIN: CPT | Mod: ZP | Performed by: INTERNAL MEDICINE

## 2019-09-23 PROCEDURE — 85610 PROTHROMBIN TIME: CPT | Performed by: INTERNAL MEDICINE

## 2019-09-23 PROCEDURE — 25800030 ZZH RX IP 258 OP 636: Mod: ZF | Performed by: INTERNAL MEDICINE

## 2019-09-23 PROCEDURE — 25000128 H RX IP 250 OP 636: Mod: ZF | Performed by: INTERNAL MEDICINE

## 2019-09-23 PROCEDURE — 40000556 ZZH STATISTIC PERIPHERAL IV START W US GUIDANCE: Mod: ZF

## 2019-09-23 PROCEDURE — G0463 HOSPITAL OUTPT CLINIC VISIT: HCPCS | Mod: ZF

## 2019-09-23 RX ORDER — EPINEPHRINE 0.3 MG/.3ML
0.3 INJECTION SUBCUTANEOUS EVERY 5 MIN PRN
Status: CANCELLED | OUTPATIENT
Start: 2019-09-23

## 2019-09-23 RX ORDER — DIPHENHYDRAMINE HYDROCHLORIDE 50 MG/ML
50 INJECTION INTRAMUSCULAR; INTRAVENOUS
Status: CANCELLED
Start: 2019-09-23

## 2019-09-23 RX ORDER — LORAZEPAM 2 MG/ML
0.5 INJECTION INTRAMUSCULAR EVERY 4 HOURS PRN
Status: CANCELLED
Start: 2019-09-23

## 2019-09-23 RX ORDER — MEPERIDINE HYDROCHLORIDE 25 MG/ML
25 INJECTION INTRAMUSCULAR; INTRAVENOUS; SUBCUTANEOUS EVERY 30 MIN PRN
Status: CANCELLED | OUTPATIENT
Start: 2019-09-23

## 2019-09-23 RX ORDER — ALBUTEROL SULFATE 90 UG/1
1-2 AEROSOL, METERED RESPIRATORY (INHALATION)
Status: CANCELLED
Start: 2019-09-23

## 2019-09-23 RX ORDER — METHYLPREDNISOLONE SODIUM SUCCINATE 125 MG/2ML
125 INJECTION, POWDER, LYOPHILIZED, FOR SOLUTION INTRAMUSCULAR; INTRAVENOUS
Status: CANCELLED
Start: 2019-09-23

## 2019-09-23 RX ORDER — IOPAMIDOL 755 MG/ML
99 INJECTION, SOLUTION INTRAVASCULAR ONCE
Status: COMPLETED | OUTPATIENT
Start: 2019-09-23 | End: 2019-09-23

## 2019-09-23 RX ORDER — EPINEPHRINE 1 MG/ML
0.3 INJECTION, SOLUTION INTRAMUSCULAR; SUBCUTANEOUS EVERY 5 MIN PRN
Status: CANCELLED | OUTPATIENT
Start: 2019-09-23

## 2019-09-23 RX ORDER — ALBUTEROL SULFATE 0.83 MG/ML
2.5 SOLUTION RESPIRATORY (INHALATION)
Status: CANCELLED | OUTPATIENT
Start: 2019-09-23

## 2019-09-23 RX ORDER — SODIUM CHLORIDE 9 MG/ML
1000 INJECTION, SOLUTION INTRAVENOUS CONTINUOUS PRN
Status: CANCELLED
Start: 2019-09-23

## 2019-09-23 RX ADMIN — SODIUM CHLORIDE 480 MG: 9 INJECTION, SOLUTION INTRAVENOUS at 15:34

## 2019-09-23 RX ADMIN — IOPAMIDOL 99 ML: 755 INJECTION, SOLUTION INTRAVASCULAR at 10:12

## 2019-09-23 ASSESSMENT — PAIN SCALES - GENERAL: PAINLEVEL: NO PAIN (0)

## 2019-09-23 NOTE — PROGRESS NOTES
Infusion Nursing Note:  Preeti Pascual presents today for C12D1 Nivolumab.    Patient seen by provider today: YES  Seen by Dr. Kyle today prior to infusion    Note: Patient reports to tolerating treatments well.  No new concerns reported.    Intravenous Access:  Peripheral IV placed by vascular access      Treatment Conditions:  Lab Results   Component Value Date    HGB 12.9 09/23/2019     Lab Results   Component Value Date    WBC 4.6 09/23/2019      Lab Results   Component Value Date    ANEU 1.9 09/23/2019     Lab Results   Component Value Date     09/23/2019      Lab Results   Component Value Date     09/23/2019                   Lab Results   Component Value Date    POTASSIUM 4.0 09/23/2019           No results found for: MAG         Lab Results   Component Value Date    CR 0.61 09/23/2019                   Lab Results   Component Value Date    STACEY 8.4 09/23/2019                Lab Results   Component Value Date    BILITOTAL 0.4 09/23/2019           Lab Results   Component Value Date    ALBUMIN 3.4 09/23/2019                    Lab Results   Component Value Date    ALT 28 09/23/2019           Lab Results   Component Value Date    AST 24 09/23/2019       Results reviewed, labs MET treatment parameters, ok to proceed with treatment.      Post Infusion Assessment:  Patient tolerated infusion without incident.  Blood return noted pre and post infusion.  Site patent and intact, free from redness, edema or discomfort.  No evidence of extravasations.  Access discontinued per protocol.    Discharge Plan:   Patient declined prescription refills.  Discharge instructions reviewed with: Patient.  Patient and/or family verbalized understanding of discharge instructions and all questions answered.  Copy of AVS reviewed with patient and/or family.  Patient will return 10/24/19 for next appointment.  Patient discharged in stable condition accompanied by: self.  Departure Mode: Ambulatory.    Darcie Bear RN,  RN                    '

## 2019-09-23 NOTE — NURSING NOTE
Chief Complaint   Patient presents with     Blood Draw     Blood drawn from IV and vitals     Blood drawn via IV that was placed earlier today by imaging - saline flushed and pulled from arm as patient requested.

## 2019-09-23 NOTE — NURSING NOTE
"Oncology Rooming Note    September 23, 2019 2:14 PM   Preeti Pascual is a 53 year old male who presents for:    Chief Complaint   Patient presents with     Blood Draw     Blood drawn from IV and vitals     Oncology Clinic Visit     Return; HCC 3 Month Follow Up     Initial Vitals: /77   Pulse 61   Temp 98.1  F (36.7  C) (Oral)   Resp 18   Wt 74.3 kg (163 lb 11.2 oz)   SpO2 96%   BMI 26.44 kg/m   Estimated body mass index is 26.44 kg/m  as calculated from the following:    Height as of 5/2/19: 1.676 m (5' 5.98\").    Weight as of this encounter: 74.3 kg (163 lb 11.2 oz). Body surface area is 1.86 meters squared.  No Pain (0) Comment: Data Unavailable   No LMP for male patient.  Allergies reviewed: Yes  Medications reviewed: Yes    Medications: Medication refills not needed today.  Pharmacy name entered into [employer]: [id number] - [place], [place] - [address] AT [facility]    Clinical concerns: No new concerns.       Malka Bunch CMA              "

## 2019-09-23 NOTE — PROGRESS NOTES
Preeti Pascual is here today in follow-up of metastatic hepatocellular carcinoma.    Mr. Pascual has disease metastatic to the right adrenal gland.  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  When he developed metastatic disease he was tried on sorafenib but was intolerant of that and now is been on nivolumab since November 2018.  He has had a good response to the nivolumab.  He said no clear autoimmune symptoms other than a minor cough that developed initially but resolved without specific intervention.  At our previous evaluation his liver metastases were nearly gone and his adrenal gland showed stability after having had a good response upfront.  He tells me since her last visit he is feeling just fine without any pain or trouble with his appetite.  His energy level is normal.  He has had no bleeding or problems with edema.  The remainder of a 10 point review of systems otherwise is unremarkable.    On physical exam Mr. Pascual appears well with unremarkable vital signs.  He has no icterus and no visible lesion in the oropharynx.  He has no palpable adenopathy in his neck or supraclavicular spaces and his thyroid gland is unremarkable.  His lungs are clear to auscultation without dullness to percussion.  His heart rate and rhythm are regular without audible murmur gallop.  His abdomen is soft and nontender without palpable mass, organomegaly or ascites.  He has no peripheral edema and no tenderness in the calves or thighs.  There is no cyanosis or clubbing of his digits.  His speech is fluent and his cranial nerves are grossly intact.  His gait and station are unremarkable.    I reviewed with him his lab work and CT scan.  Is a fetoprotein is crept up just a tiny bit at 25.  His electrolytes, renal function, liver enzymes and blood counts are all unremarkable.  On his scan for which I do not yet have the radiologist interpretation his right adrenal metastasis is  stable and I am not sure I can clearly identify any active tumor in his liver anymore.    Assessment/plan: Metastatic hepatocellular carcinoma in a patient with compensated liver disease and a normal performance status.  His disease is stable with no evidence of immune toxicity on his current regimen of nivolumab.  We will continue on with the same dose and schedule and reevaluate his disease status again in another 3 months.

## 2019-09-23 NOTE — LETTER
9/23/2019      RE: Preeti Pascual  [address]  [facility] [address]       Preeti Pascual is here today in follow-up of metastatic hepatocellular carcinoma.    Mr. Pascual has disease metastatic to the right adrenal gland.  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  When he developed metastatic disease he was tried on sorafenib but was intolerant of that and now is been on nivolumab since November 2018.  He has had a good response to the nivolumab.  He said no clear autoimmune symptoms other than a minor cough that developed initially but resolved without specific intervention.  At our previous evaluation his liver metastases were nearly gone and his adrenal gland showed stability after having had a good response upfront.  He tells me since her last visit he is feeling just fine without any pain or trouble with his appetite.  His energy level is normal.  He has had no bleeding or problems with edema.  The remainder of a 10 point review of systems otherwise is unremarkable.    On physical exam Mr. Pascual appears well with unremarkable vital signs.  He has no icterus and no visible lesion in the oropharynx.  He has no palpable adenopathy in his neck or supraclavicular spaces and his thyroid gland is unremarkable.  His lungs are clear to auscultation without dullness to percussion.  His heart rate and rhythm are regular without audible murmur gallop.  His abdomen is soft and nontender without palpable mass, organomegaly or ascites.  He has no peripheral edema and no tenderness in the calves or thighs.  There is no cyanosis or clubbing of his digits.  His speech is fluent and his cranial nerves are grossly intact.  His gait and station are unremarkable.    I reviewed with him his lab work and CT scan.  Is a fetoprotein is crept up just a tiny bit at 25.  His electrolytes, renal function, liver enzymes and blood counts are all unremarkable.  On his scan for  which I do not yet have the radiologist interpretation his right adrenal metastasis is stable and I am not sure I can clearly identify any active tumor in his liver anymore.    Assessment/plan: Metastatic hepatocellular carcinoma in a patient with compensated liver disease and a normal performance status.  His disease is stable with no evidence of immune toxicity on his current regimen of nivolumab.  We will continue on with the same dose and schedule and reevaluate his disease status again in another 3 months.    Agustin Kyle MD

## 2019-10-21 RX ORDER — EPINEPHRINE 0.3 MG/.3ML
0.3 INJECTION SUBCUTANEOUS EVERY 5 MIN PRN
Status: CANCELLED | OUTPATIENT
Start: 2019-10-21

## 2019-10-21 RX ORDER — EPINEPHRINE 1 MG/ML
0.3 INJECTION, SOLUTION INTRAMUSCULAR; SUBCUTANEOUS EVERY 5 MIN PRN
Status: CANCELLED | OUTPATIENT
Start: 2019-10-21

## 2019-10-21 RX ORDER — SODIUM CHLORIDE 9 MG/ML
1000 INJECTION, SOLUTION INTRAVENOUS CONTINUOUS PRN
Status: CANCELLED
Start: 2019-10-21

## 2019-10-21 RX ORDER — DIPHENHYDRAMINE HYDROCHLORIDE 50 MG/ML
50 INJECTION INTRAMUSCULAR; INTRAVENOUS
Status: CANCELLED
Start: 2019-10-21

## 2019-10-21 RX ORDER — ALBUTEROL SULFATE 90 UG/1
1-2 AEROSOL, METERED RESPIRATORY (INHALATION)
Status: CANCELLED
Start: 2019-10-21

## 2019-10-21 RX ORDER — METHYLPREDNISOLONE SODIUM SUCCINATE 125 MG/2ML
125 INJECTION, POWDER, LYOPHILIZED, FOR SOLUTION INTRAMUSCULAR; INTRAVENOUS
Status: CANCELLED
Start: 2019-10-21

## 2019-10-21 RX ORDER — ALBUTEROL SULFATE 0.83 MG/ML
2.5 SOLUTION RESPIRATORY (INHALATION)
Status: CANCELLED | OUTPATIENT
Start: 2019-10-21

## 2019-10-21 RX ORDER — LORAZEPAM 2 MG/ML
0.5 INJECTION INTRAMUSCULAR EVERY 4 HOURS PRN
Status: CANCELLED
Start: 2019-10-21

## 2019-10-21 RX ORDER — MEPERIDINE HYDROCHLORIDE 25 MG/ML
25 INJECTION INTRAMUSCULAR; INTRAVENOUS; SUBCUTANEOUS EVERY 30 MIN PRN
Status: CANCELLED | OUTPATIENT
Start: 2019-10-21

## 2019-10-24 ENCOUNTER — INFUSION THERAPY VISIT (OUTPATIENT)
Dept: ONCOLOGY | Facility: CLINIC | Age: 54
End: 2019-10-24
Attending: INTERNAL MEDICINE
Payer: COMMERCIAL

## 2019-10-24 ENCOUNTER — APPOINTMENT (OUTPATIENT)
Dept: LAB | Facility: CLINIC | Age: 54
End: 2019-10-24
Attending: INTERNAL MEDICINE
Payer: COMMERCIAL

## 2019-10-24 VITALS
TEMPERATURE: 97.6 F | BODY MASS INDEX: 26.58 KG/M2 | WEIGHT: 164.6 LBS | RESPIRATION RATE: 16 BRPM | OXYGEN SATURATION: 99 % | HEART RATE: 58 BPM | SYSTOLIC BLOOD PRESSURE: 125 MMHG | DIASTOLIC BLOOD PRESSURE: 82 MMHG

## 2019-10-24 DIAGNOSIS — R05.9 COUGH: ICD-10-CM

## 2019-10-24 DIAGNOSIS — R09.82 POST-NASAL DRAINAGE: ICD-10-CM

## 2019-10-24 DIAGNOSIS — C22.0 HCC (HEPATOCELLULAR CARCINOMA) (H): Primary | ICD-10-CM

## 2019-10-24 LAB
ALBUMIN SERPL-MCNC: 3.4 G/DL (ref 3.4–5)
ALP SERPL-CCNC: 90 U/L (ref 40–150)
ALT SERPL W P-5'-P-CCNC: 30 U/L (ref 0–70)
ANION GAP SERPL CALCULATED.3IONS-SCNC: 8 MMOL/L (ref 3–14)
AST SERPL W P-5'-P-CCNC: 28 U/L (ref 0–45)
BILIRUB SERPL-MCNC: 0.4 MG/DL (ref 0.2–1.3)
BUN SERPL-MCNC: 15 MG/DL (ref 7–30)
CALCIUM SERPL-MCNC: 8.3 MG/DL (ref 8.5–10.1)
CHLORIDE SERPL-SCNC: 107 MMOL/L (ref 94–109)
CO2 SERPL-SCNC: 24 MMOL/L (ref 20–32)
CREAT SERPL-MCNC: 0.68 MG/DL (ref 0.66–1.25)
GFR SERPL CREATININE-BSD FRML MDRD: >90 ML/MIN/{1.73_M2}
GLUCOSE SERPL-MCNC: 105 MG/DL (ref 70–99)
POTASSIUM SERPL-SCNC: 3.8 MMOL/L (ref 3.4–5.3)
PROT SERPL-MCNC: 7.4 G/DL (ref 6.8–8.8)
SODIUM SERPL-SCNC: 139 MMOL/L (ref 133–144)
TSH SERPL DL<=0.005 MIU/L-ACNC: 0.95 MU/L (ref 0.4–4)

## 2019-10-24 PROCEDURE — 25800030 ZZH RX IP 258 OP 636: Mod: ZF | Performed by: INTERNAL MEDICINE

## 2019-10-24 PROCEDURE — 25000128 H RX IP 250 OP 636: Mod: ZF | Performed by: INTERNAL MEDICINE

## 2019-10-24 PROCEDURE — 84443 ASSAY THYROID STIM HORMONE: CPT | Performed by: INTERNAL MEDICINE

## 2019-10-24 PROCEDURE — 80053 COMPREHEN METABOLIC PANEL: CPT | Performed by: INTERNAL MEDICINE

## 2019-10-24 PROCEDURE — 96413 CHEMO IV INFUSION 1 HR: CPT

## 2019-10-24 RX ORDER — FLUTICASONE PROPIONATE 50 MCG
2 SPRAY, SUSPENSION (ML) NASAL DAILY
Qty: 16 ML | Refills: 1 | Status: SHIPPED | OUTPATIENT
Start: 2019-10-24 | End: 2020-03-23

## 2019-10-24 RX ADMIN — SODIUM CHLORIDE 480 MG: 9 INJECTION, SOLUTION INTRAVENOUS at 08:53

## 2019-10-24 ASSESSMENT — PAIN SCALES - GENERAL: PAINLEVEL: NO PAIN (0)

## 2019-10-24 NOTE — PATIENT INSTRUCTIONS
Contact Numbers    [facility] Main Line (for Scheduling/Triage/After Hours Nurse Line): [phone]    Please call the [facility] nurse triage or the after hours nurse line if you experience a temperature greater than or equal to 100.5, shaking chills, have uncontrolled nausea, vomiting and/or diarrhea, dizziness, lightheadedness, shortness of breath, chest pain, bleeding, unexplained bruising, or if you have any other new/concerning symptoms, questions or concerns.     If you are having any concerning symptoms or wish to speak to a provider before your next infusion visit, please call your care coordinator or triage to notify them so we can adequately serve you.     If you need a refill on a narcotic prescription or other medication, please call triage before your infusion appointment.       October 2019 Sunday Monday Tuesday Wednesday Thursday Friday Saturday             1     2     3     4     5       6     7     8     9     10     11     12       13     14     15     16     17     18     19       20     21     22     23     24    [facility]   (15 min.)   [facility] LAB DRAW   [facility] Lab Draw    [facility] ONC INFUSION 60   7:30 AM   (60 min.)    ONCOLOGY INFUSION   [facility] 25     26       27     28     29     30     31                           November 2019 Sunday Monday Tuesday Wednesday Thursday Friday Saturday                            1     2       3     4     5     6     7     8     9       10     11     12     13     14     15     16       17     18     19     20     21    LAB WITH HB CLINIC   8:30 AM   (15 min.)    LAB   [facility] Lab    [facility] RETURN GENERAL LIVER   9:15 AM   (15 min.)   Trevor Trujillo MD   [facility] Hepatology    [facility] ONC INFUSION 60  10:00 AM   (60 min.)    ONCOLOGY INFUSION   [facility] 22     23       24     25     26     27     28     29     30                     Lab Results:  Recent Results (from the past 12  hour(s))   Comprehensive metabolic panel    Collection Time: 10/24/19  7:25 AM   Result Value Ref Range    Sodium 139 133 - 144 mmol/L    Potassium 3.8 3.4 - 5.3 mmol/L    Chloride 107 94 - 109 mmol/L    Carbon Dioxide 24 20 - 32 mmol/L    Anion Gap 8 3 - 14 mmol/L    Glucose 105 (H) 70 - 99 mg/dL    Urea Nitrogen 15 7 - 30 mg/dL    Creatinine 0.68 0.66 - 1.25 mg/dL    GFR Estimate >90 >60 mL/min/[1.73_m2]    GFR Estimate If Black >90 >60 mL/min/[1.73_m2]    Calcium 8.3 (L) 8.5 - 10.1 mg/dL    Bilirubin Total 0.4 0.2 - 1.3 mg/dL    Albumin 3.4 3.4 - 5.0 g/dL    Protein Total 7.4 6.8 - 8.8 g/dL    Alkaline Phosphatase 90 40 - 150 U/L    ALT 30 0 - 70 U/L    AST 28 0 - 45 U/L   TSH with free T4 reflex    Collection Time: 10/24/19  7:25 AM   Result Value Ref Range    TSH 0.95 0.40 - 4.00 mU/L

## 2019-10-24 NOTE — PROGRESS NOTES
Infusion Nursing Note:  Preeti Pascual presents today for Cycle 12 Nivolumab.    Patient seen by provider today: No    Note: Pt reports he is feeling well and denies any concerns, other than seasonal allergies. He is request a refill of his Flonase today.    Intravenous Access:  Peripheral IV placed.    Treatment Conditions:  Lab Results   Component Value Date     10/24/2019                   Lab Results   Component Value Date    POTASSIUM 3.8 10/24/2019              Lab Results   Component Value Date    CR 0.68 10/24/2019                   Lab Results   Component Value Date    STACEY 8.3 10/24/2019                Lab Results   Component Value Date    BILITOTAL 0.4 10/24/2019           Lab Results   Component Value Date    ALBUMIN 3.4 10/24/2019                    Lab Results   Component Value Date    ALT 30 10/24/2019           Lab Results   Component Value Date    AST 28 10/24/2019     Results reviewed, labs MET treatment parameters, ok to proceed with treatment.    Post Infusion Assessment:  Patient tolerated infusion without incident.  Blood return noted pre and post infusion.  Site patent and intact, free from redness, edema or discomfort.  No evidence of extravasations. Access discontinued per protocol.     Discharge Plan:   Prescription refills given for Flonase.  Copy of AVS reviewed with patient and/or family.  Patient will return 11/21 for next appointment.  Patient discharged in stable condition accompanied by: self.  Departure Mode: Ambulatory.    Franca Soliz RN

## 2019-10-24 NOTE — NURSING NOTE
Chief Complaint   Patient presents with     Blood Draw     labs drawn via PIV placed by RN in lab. VS taken. Pt checked in for next appt     Labs drawn from PIV placed by RN. Line flushed with saline. Vitals taken. Pt checked in for appointment(s).    Racquel LORENZO RN PHN BSN  BMT/Oncology Lab

## 2019-11-20 RX ORDER — METHYLPREDNISOLONE SODIUM SUCCINATE 125 MG/2ML
125 INJECTION, POWDER, LYOPHILIZED, FOR SOLUTION INTRAMUSCULAR; INTRAVENOUS
Status: CANCELLED
Start: 2019-11-21

## 2019-11-20 RX ORDER — EPINEPHRINE 0.3 MG/.3ML
0.3 INJECTION SUBCUTANEOUS EVERY 5 MIN PRN
Status: CANCELLED | OUTPATIENT
Start: 2019-11-21

## 2019-11-20 RX ORDER — DIPHENHYDRAMINE HYDROCHLORIDE 50 MG/ML
50 INJECTION INTRAMUSCULAR; INTRAVENOUS
Status: CANCELLED
Start: 2019-11-21

## 2019-11-20 RX ORDER — ALBUTEROL SULFATE 0.83 MG/ML
2.5 SOLUTION RESPIRATORY (INHALATION)
Status: CANCELLED | OUTPATIENT
Start: 2019-11-21

## 2019-11-20 RX ORDER — SODIUM CHLORIDE 9 MG/ML
1000 INJECTION, SOLUTION INTRAVENOUS CONTINUOUS PRN
Status: CANCELLED
Start: 2019-11-21

## 2019-11-20 RX ORDER — LORAZEPAM 2 MG/ML
0.5 INJECTION INTRAMUSCULAR EVERY 4 HOURS PRN
Status: CANCELLED
Start: 2019-11-21

## 2019-11-20 RX ORDER — ALBUTEROL SULFATE 90 UG/1
1-2 AEROSOL, METERED RESPIRATORY (INHALATION)
Status: CANCELLED
Start: 2019-11-21

## 2019-11-20 RX ORDER — EPINEPHRINE 1 MG/ML
0.3 INJECTION, SOLUTION INTRAMUSCULAR; SUBCUTANEOUS EVERY 5 MIN PRN
Status: CANCELLED | OUTPATIENT
Start: 2019-11-21

## 2019-11-20 RX ORDER — MEPERIDINE HYDROCHLORIDE 25 MG/ML
25 INJECTION INTRAMUSCULAR; INTRAVENOUS; SUBCUTANEOUS EVERY 30 MIN PRN
Status: CANCELLED | OUTPATIENT
Start: 2019-11-21

## 2019-11-21 ENCOUNTER — INFUSION THERAPY VISIT (OUTPATIENT)
Dept: ONCOLOGY | Facility: CLINIC | Age: 54
End: 2019-11-21
Attending: INTERNAL MEDICINE
Payer: COMMERCIAL

## 2019-11-21 ENCOUNTER — APPOINTMENT (OUTPATIENT)
Dept: LAB | Facility: CLINIC | Age: 54
End: 2019-11-21
Attending: INTERNAL MEDICINE
Payer: COMMERCIAL

## 2019-11-21 VITALS
SYSTOLIC BLOOD PRESSURE: 151 MMHG | OXYGEN SATURATION: 98 % | RESPIRATION RATE: 20 BRPM | HEART RATE: 68 BPM | TEMPERATURE: 97.8 F | DIASTOLIC BLOOD PRESSURE: 77 MMHG | BODY MASS INDEX: 25.97 KG/M2 | WEIGHT: 160.8 LBS

## 2019-11-21 DIAGNOSIS — C22.0 HCC (HEPATOCELLULAR CARCINOMA) (H): Primary | ICD-10-CM

## 2019-11-21 LAB
ALBUMIN SERPL-MCNC: 3.5 G/DL (ref 3.4–5)
ALP SERPL-CCNC: 89 U/L (ref 40–150)
ALT SERPL W P-5'-P-CCNC: 33 U/L (ref 0–70)
ANION GAP SERPL CALCULATED.3IONS-SCNC: 5 MMOL/L (ref 3–14)
AST SERPL W P-5'-P-CCNC: 26 U/L (ref 0–45)
BILIRUB SERPL-MCNC: 0.4 MG/DL (ref 0.2–1.3)
BUN SERPL-MCNC: 10 MG/DL (ref 7–30)
CALCIUM SERPL-MCNC: 8.6 MG/DL (ref 8.5–10.1)
CHLORIDE SERPL-SCNC: 107 MMOL/L (ref 94–109)
CO2 SERPL-SCNC: 24 MMOL/L (ref 20–32)
CREAT SERPL-MCNC: 0.68 MG/DL (ref 0.66–1.25)
GFR SERPL CREATININE-BSD FRML MDRD: >90 ML/MIN/{1.73_M2}
GLUCOSE SERPL-MCNC: 97 MG/DL (ref 70–99)
POTASSIUM SERPL-SCNC: 4 MMOL/L (ref 3.4–5.3)
PROT SERPL-MCNC: 7.5 G/DL (ref 6.8–8.8)
SODIUM SERPL-SCNC: 136 MMOL/L (ref 133–144)
TSH SERPL DL<=0.005 MIU/L-ACNC: 0.77 MU/L (ref 0.4–4)

## 2019-11-21 PROCEDURE — 25000128 H RX IP 250 OP 636: Mod: ZF | Performed by: INTERNAL MEDICINE

## 2019-11-21 PROCEDURE — 25800030 ZZH RX IP 258 OP 636: Mod: ZF | Performed by: INTERNAL MEDICINE

## 2019-11-21 PROCEDURE — 84443 ASSAY THYROID STIM HORMONE: CPT | Performed by: INTERNAL MEDICINE

## 2019-11-21 PROCEDURE — 80053 COMPREHEN METABOLIC PANEL: CPT | Performed by: INTERNAL MEDICINE

## 2019-11-21 PROCEDURE — 96413 CHEMO IV INFUSION 1 HR: CPT

## 2019-11-21 RX ADMIN — SODIUM CHLORIDE 480 MG: 9 INJECTION, SOLUTION INTRAVENOUS at 11:22

## 2019-11-21 ASSESSMENT — PAIN SCALES - GENERAL: PAINLEVEL: NO PAIN (0)

## 2019-11-21 NOTE — PROGRESS NOTES
Infusion Nursing Note:  Preeti Pascual presents today for Cycle 14 Nivolumab.    Patient seen by provider today: No    Note: Pt reports he is feeling well today and offers no concerns.    Intravenous Access:  Peripheral IV placed.    Treatment Conditions:  Lab Results   Component Value Date     11/21/2019                   Lab Results   Component Value Date    POTASSIUM 4.0 11/21/2019           No results found for: MAG         Lab Results   Component Value Date    CR 0.68 11/21/2019                   Lab Results   Component Value Date    STACEY 8.6 11/21/2019                Lab Results   Component Value Date    BILITOTAL 0.4 11/21/2019           Lab Results   Component Value Date    ALBUMIN 3.5 11/21/2019                    Lab Results   Component Value Date    ALT 33 11/21/2019           Lab Results   Component Value Date    AST 26 11/21/2019       Results reviewed, labs MET treatment parameters, ok to proceed with treatment.    Post Infusion Assessment:  Patient tolerated infusion without incident.  Blood return noted pre and post infusion.  Site patent and intact, free from redness, edema or discomfort.  No evidence of extravasations. Access discontinued per protocol.     Discharge Plan:   Patient declined prescription refills.  AVS to patient via [employer].  Patient will return 12/19 for next appointment.   Patient discharged in stable condition accompanied by: self.  Departure Mode: Ambulatory.    Franca Soliz RN

## 2019-11-21 NOTE — PATIENT INSTRUCTIONS
Contact Numbers    [facility] [place] (for Scheduling/Triage/After Hours Nurse Line): [phone]    Please call the [facility] nurse triage or the after hours nurse line if you experience a temperature greater than or equal to 100.5, shaking chills, have uncontrolled nausea, vomiting and/or diarrhea, dizziness, lightheadedness, shortness of breath, chest pain, bleeding, unexplained bruising, or if you have any other new/concerning symptoms, questions or concerns.     If you are having any concerning symptoms or wish to speak to a provider before your next infusion visit, please call your care coordinator or triage to notify them so we can adequately serve you.     If you need a refill on a narcotic prescription or other medication, please call triage before your infusion appointment.       November 2019 Sunday Monday Tuesday Wednesday Thursday Friday Saturday                            1     2       3     4     5     6     7     8     9       10     11     12     13     14     15     16       17     18     19     20     21    [facility] LAB DRAW   9:30 AM   (15 min.)   [facility]   [facility] Lab Draw    P ONC INFUSION 60  10:00 AM   (60 min.)    ONCOLOGY INFUSION   [facility] 22     23       24     25     26     27     28     29     30                 December 2019 Sunday Monday Tuesday Wednesday Thursday Friday Saturday   1     2     3     4     5     6     7       8     9     10     11     12    CT CHEST ABDOMEN PELVIS WWO   9:40 AM   (20 min.)   UCCT1   [facility] CT 13     14       15     16     17     18     19    [facility] DRAW   7:15 AM   (15 min.)   [facility] DRAW   [facility] Lab Draw    [facility] RETURN   7:35 AM   (50 min.)   Nallely Smiley APRN CNP   [facility]    [facility] ONC INFUSION 60   8:30 AM   (60 min.)    ONCOLOGY INFUSION   [facility]    [facility] RETURN GENERAL LIVER  10:00 AM   (15 min.)    Trevor Trujillo MD   [facility] Hepatology 20     21       22     23     24     25  Happy Birthday!     26     27     28       29     30     31                                         Lab Results:  Recent Results (from the past 12 hour(s))   Comprehensive metabolic panel    Collection Time: 11/21/19  9:52 AM   Result Value Ref Range    Sodium 136 133 - 144 mmol/L    Potassium 4.0 3.4 - 5.3 mmol/L    Chloride 107 94 - 109 mmol/L    Carbon Dioxide 24 20 - 32 mmol/L    Anion Gap 5 3 - 14 mmol/L    Glucose 97 70 - 99 mg/dL    Urea Nitrogen 10 7 - 30 mg/dL    Creatinine 0.68 0.66 - 1.25 mg/dL    GFR Estimate >90 >60 mL/min/[1.73_m2]    GFR Estimate If Black >90 >60 mL/min/[1.73_m2]    Calcium 8.6 8.5 - 10.1 mg/dL    Bilirubin Total 0.4 0.2 - 1.3 mg/dL    Albumin 3.5 3.4 - 5.0 g/dL    Protein Total 7.5 6.8 - 8.8 g/dL    Alkaline Phosphatase 89 40 - 150 U/L    ALT 33 0 - 70 U/L    AST 26 0 - 45 U/L   TSH with free T4 reflex    Collection Time: 11/21/19  9:52 AM   Result Value Ref Range    TSH 0.77 0.40 - 4.00 mU/L

## 2019-11-21 NOTE — NURSING NOTE
Chief Complaint   Patient presents with     Blood Draw     PIV placed by [facility] acc. Labs collected by RN.

## 2019-12-12 ENCOUNTER — ANCILLARY PROCEDURE (OUTPATIENT)
Dept: CT IMAGING | Facility: CLINIC | Age: 54
End: 2019-12-12
Attending: INTERNAL MEDICINE
Payer: COMMERCIAL

## 2019-12-12 DIAGNOSIS — C22.0 HCC (HEPATOCELLULAR CARCINOMA) (H): ICD-10-CM

## 2019-12-12 RX ORDER — IOPAMIDOL 755 MG/ML
99 INJECTION, SOLUTION INTRAVASCULAR ONCE
Status: COMPLETED | OUTPATIENT
Start: 2019-12-12 | End: 2019-12-12

## 2019-12-12 RX ADMIN — IOPAMIDOL 99 ML: 755 INJECTION, SOLUTION INTRAVASCULAR at 10:00

## 2019-12-17 ENCOUNTER — TELEPHONE (OUTPATIENT)
Dept: GASTROENTEROLOGY | Facility: CLINIC | Age: 54
End: 2019-12-17

## 2019-12-17 NOTE — TELEPHONE ENCOUNTER
Left message for pt reminding them of upcoming appointment.  Instructed pt to bring updated medications list.

## 2019-12-19 ENCOUNTER — INFUSION THERAPY VISIT (OUTPATIENT)
Dept: ONCOLOGY | Facility: CLINIC | Age: 54
End: 2019-12-19
Attending: INTERNAL MEDICINE
Payer: COMMERCIAL

## 2019-12-19 ENCOUNTER — OFFICE VISIT (OUTPATIENT)
Dept: GASTROENTEROLOGY | Facility: CLINIC | Age: 54
End: 2019-12-19
Attending: INTERNAL MEDICINE
Payer: COMMERCIAL

## 2019-12-19 ENCOUNTER — APPOINTMENT (OUTPATIENT)
Dept: LAB | Facility: CLINIC | Age: 54
End: 2019-12-19
Attending: INTERNAL MEDICINE
Payer: COMMERCIAL

## 2019-12-19 VITALS
RESPIRATION RATE: 16 BRPM | SYSTOLIC BLOOD PRESSURE: 131 MMHG | HEART RATE: 71 BPM | DIASTOLIC BLOOD PRESSURE: 61 MMHG | WEIGHT: 162 LBS | HEIGHT: 68 IN | TEMPERATURE: 97.9 F | BODY MASS INDEX: 24.55 KG/M2 | OXYGEN SATURATION: 100 %

## 2019-12-19 VITALS
TEMPERATURE: 97.6 F | BODY MASS INDEX: 24.74 KG/M2 | DIASTOLIC BLOOD PRESSURE: 78 MMHG | SYSTOLIC BLOOD PRESSURE: 120 MMHG | OXYGEN SATURATION: 98 % | WEIGHT: 162.7 LBS | HEART RATE: 64 BPM

## 2019-12-19 VITALS
TEMPERATURE: 97.9 F | OXYGEN SATURATION: 100 % | RESPIRATION RATE: 16 BRPM | WEIGHT: 162 LBS | BODY MASS INDEX: 26.16 KG/M2 | SYSTOLIC BLOOD PRESSURE: 131 MMHG | DIASTOLIC BLOOD PRESSURE: 61 MMHG | HEART RATE: 71 BPM

## 2019-12-19 DIAGNOSIS — C22.0 HCC (HEPATOCELLULAR CARCINOMA) (H): Primary | ICD-10-CM

## 2019-12-19 DIAGNOSIS — B18.1 CHRONIC VIRAL HEPATITIS B WITHOUT DELTA AGENT AND WITHOUT COMA (H): Primary | ICD-10-CM

## 2019-12-19 LAB
AFP SERPL-MCNC: 27.4 UG/L (ref 0–8)
ALBUMIN SERPL-MCNC: 3.3 G/DL (ref 3.4–5)
ALP SERPL-CCNC: 105 U/L (ref 40–150)
ALT SERPL W P-5'-P-CCNC: 32 U/L (ref 0–70)
ANION GAP SERPL CALCULATED.3IONS-SCNC: 6 MMOL/L (ref 3–14)
AST SERPL W P-5'-P-CCNC: 26 U/L (ref 0–45)
BILIRUB SERPL-MCNC: 0.3 MG/DL (ref 0.2–1.3)
BUN SERPL-MCNC: 13 MG/DL (ref 7–30)
CALCIUM SERPL-MCNC: 8.3 MG/DL (ref 8.5–10.1)
CHLORIDE SERPL-SCNC: 110 MMOL/L (ref 94–109)
CO2 SERPL-SCNC: 25 MMOL/L (ref 20–32)
CREAT SERPL-MCNC: 0.63 MG/DL (ref 0.66–1.25)
GFR SERPL CREATININE-BSD FRML MDRD: >90 ML/MIN/{1.73_M2}
GLUCOSE SERPL-MCNC: 96 MG/DL (ref 70–99)
POTASSIUM SERPL-SCNC: 3.9 MMOL/L (ref 3.4–5.3)
PROT SERPL-MCNC: 7.2 G/DL (ref 6.8–8.8)
SODIUM SERPL-SCNC: 140 MMOL/L (ref 133–144)
TSH SERPL DL<=0.005 MIU/L-ACNC: 1.24 MU/L (ref 0.4–4)

## 2019-12-19 PROCEDURE — G0463 HOSPITAL OUTPT CLINIC VISIT: HCPCS | Mod: ZF

## 2019-12-19 PROCEDURE — 80053 COMPREHEN METABOLIC PANEL: CPT | Performed by: NURSE PRACTITIONER

## 2019-12-19 PROCEDURE — 25000128 H RX IP 250 OP 636: Mod: ZF | Performed by: NURSE PRACTITIONER

## 2019-12-19 PROCEDURE — 84443 ASSAY THYROID STIM HORMONE: CPT | Performed by: NURSE PRACTITIONER

## 2019-12-19 PROCEDURE — 99215 OFFICE O/P EST HI 40 MIN: CPT | Mod: ZP | Performed by: NURSE PRACTITIONER

## 2019-12-19 PROCEDURE — 25800030 ZZH RX IP 258 OP 636: Mod: ZF | Performed by: NURSE PRACTITIONER

## 2019-12-19 PROCEDURE — 96413 CHEMO IV INFUSION 1 HR: CPT

## 2019-12-19 PROCEDURE — 82105 ALPHA-FETOPROTEIN SERUM: CPT | Performed by: NURSE PRACTITIONER

## 2019-12-19 RX ORDER — EPINEPHRINE 1 MG/ML
0.3 INJECTION, SOLUTION INTRAMUSCULAR; SUBCUTANEOUS EVERY 5 MIN PRN
Status: CANCELLED | OUTPATIENT
Start: 2019-12-19

## 2019-12-19 RX ORDER — EPINEPHRINE 0.3 MG/.3ML
0.3 INJECTION SUBCUTANEOUS EVERY 5 MIN PRN
Status: CANCELLED | OUTPATIENT
Start: 2019-12-19

## 2019-12-19 RX ORDER — ALBUTEROL SULFATE 90 UG/1
1-2 AEROSOL, METERED RESPIRATORY (INHALATION)
Status: CANCELLED
Start: 2019-12-19

## 2019-12-19 RX ORDER — SODIUM CHLORIDE 9 MG/ML
1000 INJECTION, SOLUTION INTRAVENOUS CONTINUOUS PRN
Status: CANCELLED
Start: 2019-12-19

## 2019-12-19 RX ORDER — METHYLPREDNISOLONE SODIUM SUCCINATE 125 MG/2ML
125 INJECTION, POWDER, LYOPHILIZED, FOR SOLUTION INTRAMUSCULAR; INTRAVENOUS
Status: CANCELLED
Start: 2019-12-19

## 2019-12-19 RX ORDER — MEPERIDINE HYDROCHLORIDE 25 MG/ML
25 INJECTION INTRAMUSCULAR; INTRAVENOUS; SUBCUTANEOUS EVERY 30 MIN PRN
Status: CANCELLED | OUTPATIENT
Start: 2019-12-19

## 2019-12-19 RX ORDER — DIPHENHYDRAMINE HYDROCHLORIDE 50 MG/ML
50 INJECTION INTRAMUSCULAR; INTRAVENOUS
Status: CANCELLED
Start: 2019-12-19

## 2019-12-19 RX ORDER — LORAZEPAM 2 MG/ML
0.5 INJECTION INTRAMUSCULAR EVERY 4 HOURS PRN
Status: CANCELLED
Start: 2019-12-19

## 2019-12-19 RX ORDER — ALBUTEROL SULFATE 0.83 MG/ML
2.5 SOLUTION RESPIRATORY (INHALATION)
Status: CANCELLED | OUTPATIENT
Start: 2019-12-19

## 2019-12-19 RX ADMIN — SODIUM CHLORIDE 480 MG: 9 INJECTION, SOLUTION INTRAVENOUS at 09:19

## 2019-12-19 ASSESSMENT — PAIN SCALES - GENERAL
PAINLEVEL: NO PAIN (0)

## 2019-12-19 ASSESSMENT — MIFFLIN-ST. JEOR: SCORE: 1544.33

## 2019-12-19 NOTE — NURSING NOTE
Chief Complaint   Patient presents with     Blood Draw     Labs drawn via PIV by Vascular nurse. VS taken. Pt. checked in for infusion.        Evelyn Gas, CMA

## 2019-12-19 NOTE — PATIENT INSTRUCTIONS
Contact Numbers    [facility] [place] (for Scheduling/Triage/After Hours Nurse Line): [phone]    Please call the [facility] nurse triage or the after hours nurse line if you experience a temperature greater than or equal to 100.5, shaking chills, have uncontrolled nausea, vomiting and/or diarrhea, dizziness, lightheadedness, shortness of breath, chest pain, bleeding, unexplained bruising, or if you have any other new/concerning symptoms, questions or concerns.     If you are having any concerning symptoms or wish to speak to a provider before your next infusion visit, please call your care coordinator or triage to notify them so we can adequately serve you.     If you need a refill on a narcotic prescription or other medication, please call triage before your infusion appointment.       December 2019 Sunday Monday Tuesday Wednesday Thursday Friday Saturday   1     2     3     4     5     6     7       8     9     10     11     12    CT CHEST ABDOMEN PELVIS WWO   9:40 AM   (20 min.)   UCCT1   [facility] CT 13     14       15     16     17     18     19    [facility] LAB DRAW   7:15 AM   (15 min.)    [facility] LAB DRAW   [facility] Lab Draw    [facility] RETURN   7:35 AM   (50 min.)   Nallely Smiley, MITCH CNP   [facility] ONC INFUSION 60   8:30 AM   (60 min.)    ONCOLOGY INFUSION   [facility] RETURN GENERAL LIVER  10:00 AM   (15 min.)   Trevor Trujillo MD   [facility] Hepatology 20     21       22     23     24     25  Happy Birthday!     26     27     28       29     30     31 January 2020 Sunday Monday Tuesday Wednesday Thursday Friday Saturday                  1     2     3     4       5     6     7     8     9     10     11       12     13     14     15     16    [facility] LAB DRAW   7:30 AM   (15 min.)    [facility] LAB DRAW   [facility] Lab Draw    [facility] ONC INFUSION 60   8:00 AM   (60 min.)     ONCOLOGY [facility] 17     18       19     20     21     22     23     24     25       26     27     28     29     30     31                          Lab Results:  Recent Results (from the past 12 hour(s))   Comprehensive metabolic panel    Collection Time: 12/19/19  8:11 AM   Result Value Ref Range    Sodium 140 133 - 144 mmol/L    Potassium 3.9 3.4 - 5.3 mmol/L    Chloride 110 (H) 94 - 109 mmol/L    Carbon Dioxide 25 20 - 32 mmol/L    Anion Gap 6 3 - 14 mmol/L    Glucose 96 70 - 99 mg/dL    Urea Nitrogen 13 7 - 30 mg/dL    Creatinine 0.63 (L) 0.66 - 1.25 mg/dL    GFR Estimate >90 >60 mL/min/[1.73_m2]    GFR Estimate If Black >90 >60 mL/min/[1.73_m2]    Calcium 8.3 (L) 8.5 - 10.1 mg/dL    Bilirubin Total 0.3 0.2 - 1.3 mg/dL    Albumin 3.3 (L) 3.4 - 5.0 g/dL    Protein Total 7.2 6.8 - 8.8 g/dL    Alkaline Phosphatase 105 40 - 150 U/L    ALT 32 0 - 70 U/L    AST 26 0 - 45 U/L   TSH with free T4 reflex    Collection Time: 12/19/19  8:11 AM   Result Value Ref Range    TSH 1.24 0.40 - 4.00 mU/L

## 2019-12-19 NOTE — NURSING NOTE
"Oncology Rooming Note    December 19, 2019 8:01 AM   Preeti Pascual is a 53 year old male who presents for:    Chief Complaint   Patient presents with     Oncology Clinic Visit     Return - hepatocellular carcinoma     Initial Vitals: /61   Pulse 71   Temp 97.9  F (36.6  C) (Oral)   Resp 16   Ht 1.727 m (5' 8\")   Wt 73.5 kg (162 lb)   SpO2 100%   BMI 24.63 kg/m   Estimated body mass index is 24.63 kg/m  as calculated from the following:    Height as of this encounter: 1.727 m (5' 8\").    Weight as of this encounter: 73.5 kg (162 lb). Body surface area is 1.88 meters squared.  No Pain (0) Comment: Data Unavailable   No LMP for male patient.  Allergies reviewed: Yes  Medications reviewed: Yes    Medications: Medication refills not needed today.  Pharmacy name entered into [facility]: [id number] - [place], [place] - [address] AT [facility] & [facility]    Clinical concerns: Lab Results and concerns about newly developed oral dryness, which started 3 weeks ago.      Mook Lujan, EMT              "

## 2019-12-19 NOTE — LETTER
"12/19/2019       RE: Preeti Pascual  [address]  [facility] [address]     Dear Colleague,    Thank you for referring your patient, Preeti Pascual, to the [facility]. Please see a copy of my visit note below.    Reason for Visit: seen in f/u of hepatocellular carcinoma    Oncology HPI: Preeti Pascual is a 53 year old man with a PMH of hep. B and cirrhosis. He was previously treated with radioembolization in 2016 with an excellent response. He declined a liver transplantation. 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 He was initiated Sorafenib, but tolerated it poorly and was dosed at 200 mg bid. He was found to have progression and initiated on Nivolumab 480 mg monthly on 11/1/18. He has had stable disease on this.    Interval history: Preeti is doing well. Energy is good. Appetite is good. Bowels are regular. Urination wnl. No cough, sob, cp, palpitation. No dizziness, headache, vision changes. No skin rash, but notes his skin is very dry. Using more lotion than he used to need. Mouth is also dry at night. No sores in the mouth or throat. No reflux. Drinks coffee during the day.     Current Outpatient Medications   Medication Sig Dispense Refill     fluticasone (FLONASE) 50 MCG/ACT nasal spray Spray 2 sprays into both nostrils daily 16 mL 1     VENTOLIN  (90 Base) MCG/ACT inhaler 1-2 INHALATIONS EVERY 4-6 HOURS AS NEEDED FOR WHEEZING. DISPENSE SPACER AS NEEDED.  0        No Known Allergies      Exam: alert, appears well. Blood pressure 131/61, pulse 71, temperature 97.9  F (36.6  C), temperature source Oral, resp. rate 16, height 1.727 m (5' 8\"), weight 73.5 kg (162 lb), SpO2 100 %.  Wt Readings from Last 4 Encounters:   12/19/19 73.5 kg (162 lb)   11/21/19 72.9 kg (160 lb 12.8 oz)   10/24/19 74.7 kg (164 lb 9.6 oz)   09/23/19 74.3 kg (163 lb 11.2 oz)     Oropharynx is moist and without lesion. Neck supple and without adenopathy. Lungs:CTA. Heart: " RRR, no murmur or rub. Abdomen: soft, nontender, BS active, no masses or organomegaly.  Extremities: warm, no edema. Speech is clear. CN wnl. Gait/station wnl. Skin: skin on the hands is dry, no cracking or peeling.       Labs: Results for TOD RICKS (MRN [id number]) as of 12/19/2019 08:41   Ref. Range 12/19/2019 08:11   Sodium Latest Ref Range: 133 - 144 mmol/L 140   Potassium Latest Ref Range: 3.4 - 5.3 mmol/L 3.9   Chloride Latest Ref Range: 94 - 109 mmol/L 110 (H)   Carbon Dioxide Latest Ref Range: 20 - 32 mmol/L 25   Urea Nitrogen Latest Ref Range: 7 - 30 mg/dL 13   Creatinine Latest Ref Range: 0.66 - 1.25 mg/dL 0.63 (L)   GFR Estimate Latest Ref Range: >60 mL/min/1.73_m2 >90   GFR Estimate If Black Latest Ref Range: >60 mL/min/1.73_m2 >90   Calcium Latest Ref Range: 8.5 - 10.1 mg/dL 8.3 (L)   Anion Gap Latest Ref Range: 3 - 14 mmol/L 6   Albumin Latest Ref Range: 3.4 - 5.0 g/dL 3.3 (L)   Protein Total Latest Ref Range: 6.8 - 8.8 g/dL 7.2   Bilirubin Total Latest Ref Range: 0.2 - 1.3 mg/dL 0.3   Alkaline Phosphatase Latest Ref Range: 40 - 150 U/L 105   ALT Latest Ref Range: 0 - 70 U/L 32   AST Latest Ref Range: 0 - 45 U/L 26   TSH Latest Ref Range: 0.40 - 4.00 mU/L 1.24   Glucose Latest Ref Range: 70 - 99 mg/dL 96       Imaging: EXAMINATION: CT CHEST/ABDOMEN/PELVIS W CONTRAST, 12/12/2019 10:23 AM     TECHNIQUE:  Helical CT images from the lung apices through the  symphysis pubis were obtained with contrast.  Coronal and sagittal  reformatted images were generated at a workstation for further  assessment.     CONTRAST:  99 ml Isovue 370.     COMPARISON: CT 9/23/2019.     HISTORY: assess response; HCC (hepatocellular carcinoma) (H)     ADDITIONAL HISTORY FROM THE EMR:Metastatic hepatocellular carcinoma in  a patient with compensated liver disease and a normal performance  status is currently under treatment with minimal amount. Prior history  of Y90 mapping ablation     FINDINGS:  Chest: Thyroid gland is  enlarged without discrete nodule. No  supraclavicular or axillary lymphadenopathy. No mediastinal  lymphadenopathy. Calcified subcarinal lymph node. Unchanged 1.7 x 1.3  cm right hilar lymph node. No axillary lymphadenopathy. Cardiac size  is normal. Dense calcification of the LAD coronary artery. No  pericardial effusion. Aortic and pulmonary artery caliber within  normal limits. Prominent azygos vein. Visualized portions of the  aortic arch branching vessels are unremarkable. Esophagus is  unremarkable.     Trachea and bronchi are patent and clear of debris. No evidence of  airspace disease. Calcified granuloma in the right middle lobe. No  worrisome lung nodule. No pneumothorax. No pleural effusion.     Abdomen and pelvis: Lobulated liver contours consistent with  cirrhosis. Redemonstration of segment 4A/8 3 dictated lesion. The  treatment site measures about 5.2 x 3.8 centimeter in the arterial  phase (series 5 image 35). Progressively increasing marginal  enhancement in the periphery of the treated lesion, similar to prior  and most compatible with postradiation fibrosis. No arterial enhancing  lesion with washout in the treatment site. No suspicious enhancing  lesion elsewhere in the liver. Patent portal vein and hepatic artery  are patent. Replaced right hepatic artery arising from SMA.      Gallbladder is unremarkable. No intra- or extrahepatic biliary  dilation. The spleen measures 10.3 cm craniocaudally, parenchyma  appears unremarkable. Multiple adjacent splenules, stable. Normal  appearance of the pancreas. Enhancing right adrenal nodule measuring  approximately 2.4 x 1.6 cm in the lateral lesa (series 7 image 267),  unchanged. In the medial lesa, there is 1.5 x 1 cm nodular enhancing  focus, unchanged compared to prior open series 7 image 179) left  adrenal gland is unremarkable. Kidneys demonstrate symmetric  enhancement without solid lesions, hydronephrosis, or nephrolithiasis.  The ureters are  unremarkable. Bladder is unremarkable. Prostatomegaly.     Stomach and small intestine are unremarkable. Colonic diverticula  without diverticulitis. Unchanged prominent ovoid portacaval lymph  node measuring 1.2 cm in short axis (series 7 image 291). Several  prominent retroperitoneal lymph nodes, unchanged compared to prior. No  enlarging lymph nodes overall. No worrisome nodularity in the  peritoneum. No free fluid.     No abdominal aortic aneurysm. No occlusion or stenosis in the vascular  structures.     Bones and soft tissues: Mild degenerative changes of the thoracic  spine. No suspicious osseous lesions. Soft tissue is unremarkable.                                                                      IMPRESSION:   In this patient with history of metastatic hepatocellular carcinoma,  status post Y-90 therapy and currently on nivolumab therapy:  1. Stable cirrhotic liver configuration. Post treatment changes in the  right hepatic lobe in the segment 4A/8 lesion. No evidence of residual  tumor. No new lesion in the liver.   2. Unchanged right adrenal gland metastatic lesion.  3. Prominent right hilar lymph node, unchanged.  4. Stable portacaval and retroperitoneal ovoid-shaped prominent lymph  nodes.     I have personally reviewed the examination and initial interpretation  and I agree with the findings.     SILVIO FELIX MD    Impression/plan:   Metastatic HCC to the R adrenal gland  -I reviewed his CT imaging. He has stable disease on treatment with Nivolumab. He is tolerating it well.  -I recommended we continue for another 3 months and then repeat CT evaluation. He was in agreement. Will request f/u Dr. Kyle at that time    Again, thank you for allowing me to participate in the care of your patient.      Sincerely,    MITCH Jacobs CNP

## 2019-12-19 NOTE — PROGRESS NOTES
Infusion Nursing Note:  Preeti Pascual presents today for Nivolumab.    Patient seen by provider today: Yes: Nallely Smiley DNP    Intravenous Access:  Peripheral IV placed.    Treatment Conditions:  Lab Results   Component Value Date    HGB 12.9 09/23/2019     Lab Results   Component Value Date    WBC 4.6 09/23/2019      Lab Results   Component Value Date    ANEU 1.9 09/23/2019     Lab Results   Component Value Date     09/23/2019      Lab Results   Component Value Date     12/19/2019                   Lab Results   Component Value Date    POTASSIUM 3.9 12/19/2019           No results found for: MAG         Lab Results   Component Value Date    CR 0.63 12/19/2019                   Lab Results   Component Value Date    STACEY 8.3 12/19/2019                Lab Results   Component Value Date    BILITOTAL 0.3 12/19/2019           Lab Results   Component Value Date    ALBUMIN 3.3 12/19/2019                    Lab Results   Component Value Date    ALT 32 12/19/2019           Lab Results   Component Value Date    AST 26 12/19/2019     Results reviewed, labs MET treatment parameters, ok to proceed with treatment.    Post Infusion Assessment:  Patient tolerated infusion without incident.  Blood return noted pre and post infusion.  Site patent and intact, free from redness, edema or discomfort.  No evidence of extravasations. Access discontinued per protocol.     Discharge Plan:   Patient declined prescription refills.  Copy of AVS reviewed with patient and/or family.  Patient will return 1/16 for next appointment.  Patient discharged in stable condition accompanied by: self.  Departure Mode: Ambulatory.    Franca Soliz RN

## 2019-12-19 NOTE — NURSING NOTE
"Chief Complaint   Patient presents with     RECHECK     Follow up Cirrhosis     Vital signs:  Temp: 97.6  F (36.4  C)   BP: 120/78 Pulse: 64     SpO2: 98 %       Weight: 73.8 kg (162 lb 11.2 oz)  Estimated body mass index is 24.74 kg/m  as calculated from the following:    Height as of an earlier encounter on 12/19/19: 1.727 m (5' 8\").    Weight as of this encounter: 73.8 kg (162 lb 11.2 oz).        Daya Marie, [facility]    "

## 2019-12-19 NOTE — PROGRESS NOTES
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     He was also seen by oncology today and he is stable from oncology perspective. They recommended continue nivolumab for another 3 months and then repeat CT evaluation.     The patient is feeling generally well after last visit. The patient denies any recent confusional episode, changes in BMs/urination, fever, abdominal pain, increased in abdominal girth, chest pain or shortness of breath. The patient is also compliant with all medications.     Particularly he is on nivolumab since 11/2018.  Denies any diarrhea, abdominal pain, fever, signs/symptoms of hypothyroidism and stable liver tests during his last follow-up.    ROS: 10pt ROS performed and otherwise negative.      PERTINENT PAST MEDICAL HISTORY:  As noted above.    PERTINENT MEDICATIONS:  Current Outpatient Medications   Medication     fluticasone (FLONASE) 50 MCG/ACT nasal spray     VENTOLIN  (90 Base) MCG/ACT inhaler     No current facility-administered medications for this visit.        PHYSICAL EXAMINATION:  Vitals reviewed, AFVSS  Gen: aaox3, cooperative, pleasant, not diaphoretic, nad  HEENT: ncat, neck supple, no clad/sclad, normal op w/o ulcer/exudate, anicteric  Resp/CV without acute findings, not dyspneic/tachycardic  Abd: not distended, normoactive bowel sounds, not tender, no rebound tenderness or gouarding  Ext: no pitting edema bilaterally   Skin: warm, perfused, no jaundice  Neuro: grossly intact, no asterixis noted    PERTINENT STUDIES:  Recent Results (from the past 168 hour(s))   Comprehensive metabolic panel   Result Value Ref Range Status    Sodium 140 133 - 144 mmol/L Final    Potassium 3.9 3.4 - 5.3  mmol/L Final    Chloride 110 (H) 94 - 109 mmol/L Final    Carbon Dioxide 25 20 - 32 mmol/L Final    Anion Gap 6 3 - 14 mmol/L Final    Glucose 96 70 - 99 mg/dL Final    Urea Nitrogen 13 7 - 30 mg/dL Final    Creatinine 0.63 (L) 0.66 - 1.25 mg/dL Final    GFR Estimate >90 >60 mL/min/[1.73_m2] Final    GFR Estimate If Black >90 >60 mL/min/[1.73_m2] Final    Calcium 8.3 (L) 8.5 - 10.1 mg/dL Final    Bilirubin Total 0.3 0.2 - 1.3 mg/dL Final    Albumin 3.3 (L) 3.4 - 5.0 g/dL Final    Protein Total 7.2 6.8 - 8.8 g/dL Final    Alkaline Phosphatase 105 40 - 150 U/L Final    ALT 32 0 - 70 U/L Final    AST 26 0 - 45 U/L Final     *Note: Due to a large number of results and/or encounters for the requested time period, some results have not been displayed. A complete set of results can be found in Results Review.         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 Stable on nivolumab without any signs and symptoms of side effects. Normal TSH and LFTs.   - Cirrhosis evaluation as below:   Etiology: HBV infection   MELD: 9   Ascites: none   HCC: as discussed above, on nivolumab monthly   TIPS: none   EV/GV: declined, deferred given the ongoing treatment for metastatic HCC   Coagulopathy: INR 1.21, platelets 171   Hepatic encephalopathy: none   Transplant: declined liver transplant  - We extensively discussed natural course of liver cirrhosis and HCC today, as well as complications, surveillance and how to prevent these complications  - No changes in medical regimen during this visit   - Repeat CT AP in 3 months as schedule per oncology to re-evaluate HCC and R adrenal gland mass  - Ensure sodium  "restriction to 2000 mg/day    # Cancer screening  - Deferred given the ongoing treatment for metastatic HCC    # Vaccination  - Influenza: UTD  - Pneumococcal: declined    RTC 6 months, sooner if symptomatic.      Thank you for this consultation. It was a pleasure to participate in the care of this patient; please contact us with any further questions.    Patient and plan discussed with Dr. Ronnie Gutierrez \"\" MD Jose Ramon    Internal Medicine, PGY-2  [facility]  Pager: [phone]    The patient was seen and examined.  The above assessment and plan was developed jointly with the fellow.      Trevor Trujillo MD      Professor of Medicine  [facility] [employer]      Executive Medical Director, Solid Organ Transplant Program  [facility]         "

## 2019-12-19 NOTE — LETTER
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     He was also seen by oncology today and he is stable from oncology perspective. They recommended continue nivolumab for another 3 months and then repeat CT evaluation.     The patient is feeling generally well after last visit. The patient denies any recent confusional episode, changes in BMs/urination, fever, abdominal pain, increased in abdominal girth, chest pain or shortness of breath. The patient is also compliant with all medications.     Particularly he is on nivolumab since 11/2018.  Denies any diarrhea, abdominal pain, fever, signs/symptoms of hypothyroidism and stable liver tests during his last follow-up.    ROS: 10pt ROS performed and otherwise negative.      PERTINENT PAST MEDICAL HISTORY:  As noted above.    PERTINENT MEDICATIONS:  Current Outpatient Medications   Medication     fluticasone (FLONASE) 50 MCG/ACT nasal spray     VENTOLIN  (90 Base) MCG/ACT inhaler     No current facility-administered medications for this visit.        PHYSICAL EXAMINATION:  Vitals reviewed, AFVSS  Gen: aaox3, cooperative, pleasant, not diaphoretic, nad  HEENT: ncat, neck supple, no clad/sclad, normal op w/o ulcer/exudate, anicteric  Resp/CV without acute findings, not dyspneic/tachycardic  Abd: not distended, normoactive bowel sounds, not tender, no rebound tenderness or gouarding  Ext: no pitting edema bilaterally   Skin: warm, perfused, no jaundice  Neuro: grossly intact, no asterixis noted    PERTINENT STUDIES:  Recent Results (from the past 168 hour(s))   Comprehensive metabolic panel   Result  Value Ref Range Status    Sodium 140 133 - 144 mmol/L Final    Potassium 3.9 3.4 - 5.3 mmol/L Final    Chloride 110 (H) 94 - 109 mmol/L Final    Carbon Dioxide 25 20 - 32 mmol/L Final    Anion Gap 6 3 - 14 mmol/L Final    Glucose 96 70 - 99 mg/dL Final    Urea Nitrogen 13 7 - 30 mg/dL Final    Creatinine 0.63 (L) 0.66 - 1.25 mg/dL Final    GFR Estimate >90 >60 mL/min/[1.73_m2] Final    GFR Estimate If Black >90 >60 mL/min/[1.73_m2] Final    Calcium 8.3 (L) 8.5 - 10.1 mg/dL Final    Bilirubin Total 0.3 0.2 - 1.3 mg/dL Final    Albumin 3.3 (L) 3.4 - 5.0 g/dL Final    Protein Total 7.2 6.8 - 8.8 g/dL Final    Alkaline Phosphatase 105 40 - 150 U/L Final    ALT 32 0 - 70 U/L Final    AST 26 0 - 45 U/L Final     *Note: Due to a large number of results and/or encounters for the requested time period, some results have not been displayed. A complete set of results can be found in Results Review.         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 Stable on nivolumab without any signs and symptoms of side effects. Normal TSH and LFTs.   - Cirrhosis evaluation as below:   Etiology: HBV infection   MELD: 9   Ascites: none   HCC: as discussed above, on nivolumab monthly   TIPS: none   EV/GV: declined, deferred given the ongoing treatment for metastatic HCC   Coagulopathy: INR 1.21, platelets 171   Hepatic encephalopathy: none   Transplant: declined liver transplant  - We extensively discussed natural course of liver cirrhosis and HCC today, as well as complications, surveillance and how to prevent these complications  - No changes in medical regimen during this visit   - Repeat CT AP in 3 months as  "schedule per oncology to re-evaluate HCC and R adrenal gland mass  - Ensure sodium restriction to 2000 mg/day    # Cancer screening  - Deferred given the ongoing treatment for metastatic HCC    # Vaccination  - Influenza: UTD  - Pneumococcal: declined    RTC 6 months, sooner if symptomatic.      Thank you for this consultation. It was a pleasure to participate in the care of this patient; please contact us with any further questions.    Patient and plan discussed with Dr. Ronnie Gutierrez \"\" MD Jose Ramon    Internal Medicine, PGY-2  [facility]  Pager: [phone]    The patient was seen and examined.  The above assessment and plan was developed jointly with the fellow.      Trevor Trujillo MD      Professor of Medicine  [employer] [facility] [employer]      Executive Medical Director, Solid Organ Transplant Program  [facility]             "

## 2020-01-13 RX ORDER — ALBUTEROL SULFATE 0.83 MG/ML
2.5 SOLUTION RESPIRATORY (INHALATION)
Status: CANCELLED | OUTPATIENT
Start: 2020-01-16

## 2020-01-13 RX ORDER — ALBUTEROL SULFATE 90 UG/1
1-2 AEROSOL, METERED RESPIRATORY (INHALATION)
Status: CANCELLED
Start: 2020-01-16

## 2020-01-13 RX ORDER — DIPHENHYDRAMINE HYDROCHLORIDE 50 MG/ML
50 INJECTION INTRAMUSCULAR; INTRAVENOUS
Status: CANCELLED
Start: 2020-01-16

## 2020-01-13 RX ORDER — EPINEPHRINE 1 MG/ML
0.3 INJECTION, SOLUTION, CONCENTRATE INTRAVENOUS EVERY 5 MIN PRN
Status: CANCELLED | OUTPATIENT
Start: 2020-01-16

## 2020-01-13 RX ORDER — SODIUM CHLORIDE 9 MG/ML
1000 INJECTION, SOLUTION INTRAVENOUS CONTINUOUS PRN
Status: CANCELLED
Start: 2020-01-16

## 2020-01-13 RX ORDER — MEPERIDINE HYDROCHLORIDE 25 MG/ML
25 INJECTION INTRAMUSCULAR; INTRAVENOUS; SUBCUTANEOUS EVERY 30 MIN PRN
Status: CANCELLED | OUTPATIENT
Start: 2020-01-16

## 2020-01-13 RX ORDER — LORAZEPAM 2 MG/ML
0.5 INJECTION INTRAMUSCULAR EVERY 4 HOURS PRN
Status: CANCELLED
Start: 2020-01-16

## 2020-01-13 RX ORDER — EPINEPHRINE 0.3 MG/.3ML
0.3 INJECTION SUBCUTANEOUS EVERY 5 MIN PRN
Status: CANCELLED | OUTPATIENT
Start: 2020-01-16

## 2020-01-16 ENCOUNTER — INFUSION THERAPY VISIT (OUTPATIENT)
Dept: ONCOLOGY | Facility: CLINIC | Age: 55
End: 2020-01-16
Attending: INTERNAL MEDICINE
Payer: COMMERCIAL

## 2020-01-16 VITALS
TEMPERATURE: 98.5 F | RESPIRATION RATE: 12 BRPM | BODY MASS INDEX: 24.4 KG/M2 | DIASTOLIC BLOOD PRESSURE: 83 MMHG | SYSTOLIC BLOOD PRESSURE: 148 MMHG | WEIGHT: 160.5 LBS | HEART RATE: 62 BPM | OXYGEN SATURATION: 99 %

## 2020-01-16 DIAGNOSIS — C22.0 HCC (HEPATOCELLULAR CARCINOMA) (H): Primary | ICD-10-CM

## 2020-01-16 LAB
ALBUMIN SERPL-MCNC: 3.5 G/DL (ref 3.4–5)
ALP SERPL-CCNC: 90 U/L (ref 40–150)
ALT SERPL W P-5'-P-CCNC: 36 U/L (ref 0–70)
ANION GAP SERPL CALCULATED.3IONS-SCNC: 4 MMOL/L (ref 3–14)
AST SERPL W P-5'-P-CCNC: 32 U/L (ref 0–45)
BILIRUB SERPL-MCNC: 0.4 MG/DL (ref 0.2–1.3)
BUN SERPL-MCNC: 12 MG/DL (ref 7–30)
CALCIUM SERPL-MCNC: 8.4 MG/DL (ref 8.5–10.1)
CHLORIDE SERPL-SCNC: 110 MMOL/L (ref 94–109)
CO2 SERPL-SCNC: 25 MMOL/L (ref 20–32)
CREAT SERPL-MCNC: 0.69 MG/DL (ref 0.66–1.25)
GFR SERPL CREATININE-BSD FRML MDRD: >90 ML/MIN/{1.73_M2}
GLUCOSE SERPL-MCNC: 98 MG/DL (ref 70–99)
POTASSIUM SERPL-SCNC: 4.1 MMOL/L (ref 3.4–5.3)
PROT SERPL-MCNC: 7.6 G/DL (ref 6.8–8.8)
SODIUM SERPL-SCNC: 139 MMOL/L (ref 133–144)
TSH SERPL DL<=0.005 MIU/L-ACNC: 1.16 MU/L (ref 0.4–4)

## 2020-01-16 PROCEDURE — 84443 ASSAY THYROID STIM HORMONE: CPT | Performed by: INTERNAL MEDICINE

## 2020-01-16 PROCEDURE — 96413 CHEMO IV INFUSION 1 HR: CPT

## 2020-01-16 PROCEDURE — 25800030 ZZH RX IP 258 OP 636: Mod: ZF | Performed by: INTERNAL MEDICINE

## 2020-01-16 PROCEDURE — 25000128 H RX IP 250 OP 636: Mod: ZF | Performed by: INTERNAL MEDICINE

## 2020-01-16 PROCEDURE — 80053 COMPREHEN METABOLIC PANEL: CPT | Performed by: INTERNAL MEDICINE

## 2020-01-16 RX ADMIN — SODIUM CHLORIDE 480 MG: 9 INJECTION, SOLUTION INTRAVENOUS at 09:16

## 2020-01-16 ASSESSMENT — PAIN SCALES - GENERAL: PAINLEVEL: NO PAIN (0)

## 2020-01-16 NOTE — PROGRESS NOTES
Infusion Nursing Note:  Preeti Pascual presents today for C16 Nivolumab.    Patient seen by provider today: No   present during visit today: Not Applicable.    Note: Pt feeling well, only complaint is that his back hurts when it below zero.  Pt has not been ill and feels ok for treatment today.    Intravenous Access:  Peripheral IV placed.    Treatment Conditions:  Lab Results   Component Value Date     01/16/2020                   Lab Results   Component Value Date    POTASSIUM 4.1 01/16/2020           No results found for: MAG         Lab Results   Component Value Date    CR 0.69 01/16/2020                   Lab Results   Component Value Date    STACEY 8.4 01/16/2020                Lab Results   Component Value Date    BILITOTAL 0.4 01/16/2020           Lab Results   Component Value Date    ALBUMIN 3.5 01/16/2020                    Lab Results   Component Value Date    ALT 36 01/16/2020           Lab Results   Component Value Date    AST 32 01/16/2020       Results reviewed, labs MET treatment parameters, ok to proceed with treatment.      Post Infusion Assessment:  Patient tolerated infusion without incident.  Blood return noted pre and post infusion.  Site patent and intact, free from redness, edema or discomfort.  No evidence of extravasations.  Access discontinued per protocol.       Discharge Plan:   Patient declined prescription refills.  Discharge instructions reviewed with: Patient.  Patient and/or family verbalized understanding of discharge instructions and all questions answered.  Copy of AVS reviewed with patient and/or family.  Patient will return 2/13 for next appointment.  Patient discharged in stable condition accompanied by: self.  Departure Mode: Ambulatory.    Agustina Rivera RN

## 2020-01-16 NOTE — PATIENT INSTRUCTIONS
Contact Numbers  [facility]: [phone]    After Hours:  [phone]  Triage: [phone]    Please call the [facility] Triage line if you experience a temperature greater than or equal to 100.5, shaking chills, have uncontrolled nausea, vomiting and/or diarrhea, dizziness, shortness of breath, chest pain, bleeding, unexplained bruising, or if you have any other new/concerning symptoms, questions or concerns.     If it is after hours, weekends, or holidays, please call the main hospital  at  [phone] and ask to speak to the Oncology doctor on call.     If you are having any concerning symptoms or wish to speak to a provider before your next infusion visit, please call your care coordinator or triage to notify them so we can adequately serve you.     If you need a refill on a narcotic prescription or other medication, please call triage before your infusion appointment.         January 2020 Sunday Monday Tuesday Wednesday Thursday Friday Saturday                  1     2     3     4       5     6     7     8     9     10     11       12     13     14     15     16    [facility]   7:30 AM   (15 min.)   [facility]   [facility] Lab Draw    [facility] ONC INFUSION 60   8:00 AM   (60 min.)    ONCOLOGY INFUSION   [facility] 17     18       19     20     21     22     23     24     25       26     27     28     29     30     31                      February 2020 Sunday Monday Tuesday Wednesday Thursday Friday Saturday                                 1       2     3     4     5     6     7     8       9     10     11     12     13    [facility]   9:00 AM   (15 min.)   [facility]   [facility]    [facility] INFUSION 60   9:30 AM   (60 min.)    ONCOLOGY INFUSION   [facility] 14     15       16     17     18     19     20     21     22       23     24     25     26     27     28     29                     Recent Results (from the past 24 hour(s))   Comprehensive metabolic panel    Collection Time: 01/16/20  7:58 AM   Result Value Ref Range    Sodium 139 133 - 144 mmol/L    Potassium 4.1 3.4 - 5.3 mmol/L    Chloride 110 (H) 94 - 109 mmol/L    Carbon Dioxide 25 20 - 32 mmol/L    Anion Gap 4 3 - 14 mmol/L    Glucose 98 70 - 99 mg/dL    Urea Nitrogen 12 7 - 30 mg/dL    Creatinine 0.69 0.66 - 1.25 mg/dL    GFR Estimate >90 >60 mL/min/[1.73_m2]    GFR Estimate If Black >90 >60 mL/min/[1.73_m2]    Calcium 8.4 (L) 8.5 - 10.1 mg/dL    Bilirubin Total 0.4 0.2 - 1.3 mg/dL    Albumin 3.5 3.4 - 5.0 g/dL    Protein Total 7.6 6.8 - 8.8 g/dL    Alkaline Phosphatase 90 40 - 150 U/L    ALT 36 0 - 70 U/L    AST 32 0 - 45 U/L   TSH with free T4 reflex    Collection Time: 01/16/20  7:58 AM   Result Value Ref Range    TSH 1.16 0.40 - 4.00 mU/L

## 2020-02-12 RX ORDER — EPINEPHRINE 1 MG/ML
0.3 INJECTION, SOLUTION INTRAMUSCULAR; SUBCUTANEOUS EVERY 5 MIN PRN
Status: CANCELLED | OUTPATIENT
Start: 2020-02-13

## 2020-02-12 RX ORDER — ALBUTEROL SULFATE 0.83 MG/ML
2.5 SOLUTION RESPIRATORY (INHALATION)
Status: CANCELLED | OUTPATIENT
Start: 2020-02-13

## 2020-02-12 RX ORDER — SODIUM CHLORIDE 9 MG/ML
1000 INJECTION, SOLUTION INTRAVENOUS CONTINUOUS PRN
Status: CANCELLED
Start: 2020-02-13

## 2020-02-12 RX ORDER — ALBUTEROL SULFATE 90 UG/1
1-2 AEROSOL, METERED RESPIRATORY (INHALATION)
Status: CANCELLED
Start: 2020-02-13

## 2020-02-12 RX ORDER — DIPHENHYDRAMINE HYDROCHLORIDE 50 MG/ML
50 INJECTION INTRAMUSCULAR; INTRAVENOUS
Status: CANCELLED
Start: 2020-02-13

## 2020-02-12 RX ORDER — EPINEPHRINE 0.3 MG/.3ML
0.3 INJECTION SUBCUTANEOUS EVERY 5 MIN PRN
Status: CANCELLED | OUTPATIENT
Start: 2020-02-13

## 2020-02-12 RX ORDER — LORAZEPAM 2 MG/ML
0.5 INJECTION INTRAMUSCULAR EVERY 4 HOURS PRN
Status: CANCELLED
Start: 2020-02-13

## 2020-02-12 RX ORDER — MEPERIDINE HYDROCHLORIDE 25 MG/ML
25 INJECTION INTRAMUSCULAR; INTRAVENOUS; SUBCUTANEOUS EVERY 30 MIN PRN
Status: CANCELLED | OUTPATIENT
Start: 2020-02-13

## 2020-02-12 RX ORDER — METHYLPREDNISOLONE SODIUM SUCCINATE 125 MG/2ML
125 INJECTION, POWDER, LYOPHILIZED, FOR SOLUTION INTRAMUSCULAR; INTRAVENOUS
Status: CANCELLED
Start: 2020-02-13

## 2020-02-13 ENCOUNTER — INFUSION THERAPY VISIT (OUTPATIENT)
Dept: ONCOLOGY | Facility: CLINIC | Age: 55
End: 2020-02-13
Attending: INTERNAL MEDICINE
Payer: COMMERCIAL

## 2020-02-13 ENCOUNTER — APPOINTMENT (OUTPATIENT)
Dept: LAB | Facility: CLINIC | Age: 55
End: 2020-02-13
Attending: INTERNAL MEDICINE
Payer: COMMERCIAL

## 2020-02-13 VITALS
DIASTOLIC BLOOD PRESSURE: 76 MMHG | WEIGHT: 163.6 LBS | BODY MASS INDEX: 24.88 KG/M2 | TEMPERATURE: 97.8 F | HEART RATE: 64 BPM | SYSTOLIC BLOOD PRESSURE: 130 MMHG | OXYGEN SATURATION: 98 % | RESPIRATION RATE: 18 BRPM

## 2020-02-13 DIAGNOSIS — C22.0 HCC (HEPATOCELLULAR CARCINOMA) (H): Primary | ICD-10-CM

## 2020-02-13 LAB
ALBUMIN SERPL-MCNC: 3.5 G/DL (ref 3.4–5)
ALP SERPL-CCNC: 98 U/L (ref 40–150)
ALT SERPL W P-5'-P-CCNC: 32 U/L (ref 0–70)
ANION GAP SERPL CALCULATED.3IONS-SCNC: 3 MMOL/L (ref 3–14)
AST SERPL W P-5'-P-CCNC: 30 U/L (ref 0–45)
BILIRUB SERPL-MCNC: 0.4 MG/DL (ref 0.2–1.3)
BUN SERPL-MCNC: 10 MG/DL (ref 7–30)
CALCIUM SERPL-MCNC: 8.6 MG/DL (ref 8.5–10.1)
CHLORIDE SERPL-SCNC: 111 MMOL/L (ref 94–109)
CO2 SERPL-SCNC: 26 MMOL/L (ref 20–32)
CREAT SERPL-MCNC: 0.7 MG/DL (ref 0.66–1.25)
GFR SERPL CREATININE-BSD FRML MDRD: >90 ML/MIN/{1.73_M2}
GLUCOSE SERPL-MCNC: 98 MG/DL (ref 70–99)
POTASSIUM SERPL-SCNC: 4.1 MMOL/L (ref 3.4–5.3)
PROT SERPL-MCNC: 7.5 G/DL (ref 6.8–8.8)
SODIUM SERPL-SCNC: 139 MMOL/L (ref 133–144)
TSH SERPL DL<=0.005 MIU/L-ACNC: 0.75 MU/L (ref 0.4–4)

## 2020-02-13 PROCEDURE — 25800030 ZZH RX IP 258 OP 636: Mod: ZF | Performed by: INTERNAL MEDICINE

## 2020-02-13 PROCEDURE — 84443 ASSAY THYROID STIM HORMONE: CPT | Performed by: INTERNAL MEDICINE

## 2020-02-13 PROCEDURE — 40000556 ZZH STATISTIC PERIPHERAL IV START W US GUIDANCE: Mod: ZF

## 2020-02-13 PROCEDURE — 80053 COMPREHEN METABOLIC PANEL: CPT | Performed by: INTERNAL MEDICINE

## 2020-02-13 PROCEDURE — 96413 CHEMO IV INFUSION 1 HR: CPT

## 2020-02-13 PROCEDURE — 25000128 H RX IP 250 OP 636: Mod: ZF | Performed by: INTERNAL MEDICINE

## 2020-02-13 RX ADMIN — SODIUM CHLORIDE 480 MG: 9 INJECTION, SOLUTION INTRAVENOUS at 10:15

## 2020-02-13 ASSESSMENT — PAIN SCALES - GENERAL: PAINLEVEL: NO PAIN (0)

## 2020-02-13 NOTE — NURSING NOTE
Chief Complaint   Patient presents with     Blood Draw     IV placement with blood draw and vitals     Labs drawn via IV placed by Mar Byrd in left arm

## 2020-02-13 NOTE — PROGRESS NOTES
Infusion Nursing Note:  Preeti Pascual presents today for C17 D1 Nivolumab.    Patient seen by provider today: No   present during visit today: Not Applicable.    Note: Patient reports nasal congestion/sneezing which he says is related to cold weather. Denies pain or flu-like symptoms.    Intravenous Access:  Peripheral IV placed.    Treatment Conditions:  Lab Results   Component Value Date    HGB 12.9 09/23/2019     Lab Results   Component Value Date    WBC 4.6 09/23/2019      Lab Results   Component Value Date    ANEU 1.9 09/23/2019     Lab Results   Component Value Date     09/23/2019      Lab Results   Component Value Date     02/13/2020                   Lab Results   Component Value Date    POTASSIUM 4.1 02/13/2020           No results found for: MAG         Lab Results   Component Value Date    CR 0.70 02/13/2020                   Lab Results   Component Value Date    STACEY 8.6 02/13/2020                Lab Results   Component Value Date    BILITOTAL 0.4 02/13/2020           Lab Results   Component Value Date    ALBUMIN 3.5 02/13/2020                    Lab Results   Component Value Date    ALT 32 02/13/2020           Lab Results   Component Value Date    AST 30 02/13/2020       Results reviewed, labs MET treatment parameters, ok to proceed with treatment.      Post Infusion Assessment:  Patient tolerated infusion without incident.  Blood return noted pre and post infusion.  Site patent and intact, free from redness, edema or discomfort.  No evidence of extravasations.  Access discontinued per protocol.       Discharge Plan:   Prescription refills given for Flonase.  Discharge instructions reviewed with: Patient.  Patient and/or family verbalized understanding of discharge instructions and all questions answered.  AVS to printed and given to patient. Patient will return 3/12 for next appointment.   Patient discharged in stable condition accompanied by: self.  Departure Mode:  Ambulatory.    Traci Suh RN

## 2020-02-13 NOTE — PATIENT INSTRUCTIONS
Contact Numbers    [facility] Main Line (for Scheduling/Triage/After Hours Nurse Line): [phone]    Please call the [facility] nurse triage or the after hours nurse line if you experience a temperature greater than or equal to 100.5, shaking chills, have uncontrolled nausea, vomiting and/or diarrhea, dizziness, lightheadedness, shortness of breath, chest pain, bleeding, unexplained bruising, or if you have any other new/concerning symptoms, questions or concerns.     If you are having any concerning symptoms or wish to speak to a provider before your next infusion visit, please call your care coordinator or triage to notify them so we can adequately serve you.     If you need a refill on a narcotic prescription or other medication, please call triage before your infusion appointment.       February 2020 Sunday Monday Tuesday Wednesday Thursday Friday Saturday                                 1       2     3     4     5     6     7     8       9     10     11     12     13    [facility]   9:00 AM   (15 min.)    [facility]   [facility] Lab Draw    [facility] ONC INFUSION 60   9:30 AM   (60 min.)    ONCOLOGY INFUSION   [facility] 14     15       16     17     18     19     20     21     22       23     24     25     26     27     28     29                 March 2020 Sunday Monday Tuesday Wednesday Thursday Friday Saturday   1     2     3     4     5     6     7       8     9     10     11     12    CT CHEST/ABDOMEN/PELVIS W   7:10 AM   (20 min.)   UCCT1   [facility] CT    [facility]   8:30 AM   (15 min.)    [facility]   [facility] Lab Draw    [facility] ON[facility] INFUSION 60   9:00 AM   (60 min.)    ONCOLOGY INFUSION   [facility] 13     14       15     16    [facility] RETURN   8:00 AM   (30 min.)   Agustin Kyle MD   [facility] 17     18     19     20     21       22     23     24     25     26     27      28       29     30     31                                         Lab Results:  Recent Results (from the past 12 hour(s))   Comprehensive metabolic panel    Collection Time: 02/13/20  9:22 AM   Result Value Ref Range    Sodium 139 133 - 144 mmol/L    Potassium 4.1 3.4 - 5.3 mmol/L    Chloride 111 (H) 94 - 109 mmol/L    Carbon Dioxide 26 20 - 32 mmol/L    Anion Gap 3 3 - 14 mmol/L    Glucose 98 70 - 99 mg/dL    Urea Nitrogen 10 7 - 30 mg/dL    Creatinine 0.70 0.66 - 1.25 mg/dL    GFR Estimate >90 >60 mL/min/[1.73_m2]    GFR Estimate If Black >90 >60 mL/min/[1.73_m2]    Calcium 8.6 8.5 - 10.1 mg/dL    Bilirubin Total 0.4 0.2 - 1.3 mg/dL    Albumin 3.5 3.4 - 5.0 g/dL    Protein Total 7.5 6.8 - 8.8 g/dL    Alkaline Phosphatase 98 40 - 150 U/L    ALT 32 0 - 70 U/L    AST 30 0 - 45 U/L   TSH with free T4 reflex    Collection Time: 02/13/20  9:22 AM   Result Value Ref Range    TSH 0.75 0.40 - 4.00 mU/L

## 2020-03-10 ENCOUNTER — HEALTH MAINTENANCE LETTER (OUTPATIENT)
Age: 55
End: 2020-03-10

## 2020-03-11 RX ORDER — EPINEPHRINE 1 MG/ML
0.3 INJECTION, SOLUTION INTRAMUSCULAR; SUBCUTANEOUS EVERY 5 MIN PRN
Status: CANCELLED | OUTPATIENT
Start: 2020-03-12

## 2020-03-11 RX ORDER — ALBUTEROL SULFATE 0.83 MG/ML
2.5 SOLUTION RESPIRATORY (INHALATION)
Status: CANCELLED | OUTPATIENT
Start: 2020-03-12

## 2020-03-11 RX ORDER — DIPHENHYDRAMINE HYDROCHLORIDE 50 MG/ML
50 INJECTION INTRAMUSCULAR; INTRAVENOUS
Status: CANCELLED
Start: 2020-03-12

## 2020-03-11 RX ORDER — LORAZEPAM 2 MG/ML
0.5 INJECTION INTRAMUSCULAR EVERY 4 HOURS PRN
Status: CANCELLED
Start: 2020-03-12

## 2020-03-11 RX ORDER — ALBUTEROL SULFATE 90 UG/1
1-2 AEROSOL, METERED RESPIRATORY (INHALATION)
Status: CANCELLED
Start: 2020-03-12

## 2020-03-11 RX ORDER — EPINEPHRINE 0.3 MG/.3ML
0.3 INJECTION SUBCUTANEOUS EVERY 5 MIN PRN
Status: CANCELLED | OUTPATIENT
Start: 2020-03-12

## 2020-03-11 RX ORDER — SODIUM CHLORIDE 9 MG/ML
1000 INJECTION, SOLUTION INTRAVENOUS CONTINUOUS PRN
Status: CANCELLED
Start: 2020-03-12

## 2020-03-11 RX ORDER — MEPERIDINE HYDROCHLORIDE 25 MG/ML
25 INJECTION INTRAMUSCULAR; INTRAVENOUS; SUBCUTANEOUS EVERY 30 MIN PRN
Status: CANCELLED | OUTPATIENT
Start: 2020-03-12

## 2020-03-11 RX ORDER — METHYLPREDNISOLONE SODIUM SUCCINATE 125 MG/2ML
125 INJECTION, POWDER, LYOPHILIZED, FOR SOLUTION INTRAMUSCULAR; INTRAVENOUS
Status: CANCELLED
Start: 2020-03-12

## 2020-03-12 ENCOUNTER — ANCILLARY PROCEDURE (OUTPATIENT)
Dept: CT IMAGING | Facility: CLINIC | Age: 55
End: 2020-03-12
Attending: NURSE PRACTITIONER
Payer: COMMERCIAL

## 2020-03-12 ENCOUNTER — INFUSION THERAPY VISIT (OUTPATIENT)
Dept: ONCOLOGY | Facility: CLINIC | Age: 55
End: 2020-03-12
Attending: INTERNAL MEDICINE
Payer: COMMERCIAL

## 2020-03-12 ENCOUNTER — APPOINTMENT (OUTPATIENT)
Dept: LAB | Facility: CLINIC | Age: 55
End: 2020-03-12
Attending: INTERNAL MEDICINE
Payer: COMMERCIAL

## 2020-03-12 VITALS
OXYGEN SATURATION: 97 % | BODY MASS INDEX: 24.94 KG/M2 | TEMPERATURE: 97.8 F | HEART RATE: 62 BPM | SYSTOLIC BLOOD PRESSURE: 129 MMHG | DIASTOLIC BLOOD PRESSURE: 76 MMHG | WEIGHT: 164 LBS

## 2020-03-12 DIAGNOSIS — C22.0 HCC (HEPATOCELLULAR CARCINOMA) (H): Primary | ICD-10-CM

## 2020-03-12 DIAGNOSIS — C22.0 HCC (HEPATOCELLULAR CARCINOMA) (H): ICD-10-CM

## 2020-03-12 LAB
ALBUMIN SERPL-MCNC: 3.2 G/DL (ref 3.4–5)
ALP SERPL-CCNC: 92 U/L (ref 40–150)
ALT SERPL W P-5'-P-CCNC: 27 U/L (ref 0–70)
ANION GAP SERPL CALCULATED.3IONS-SCNC: 6 MMOL/L (ref 3–14)
AST SERPL W P-5'-P-CCNC: 25 U/L (ref 0–45)
BILIRUB SERPL-MCNC: 0.4 MG/DL (ref 0.2–1.3)
BUN SERPL-MCNC: 11 MG/DL (ref 7–30)
CALCIUM SERPL-MCNC: 8 MG/DL (ref 8.5–10.1)
CHLORIDE SERPL-SCNC: 109 MMOL/L (ref 94–109)
CO2 SERPL-SCNC: 24 MMOL/L (ref 20–32)
CREAT SERPL-MCNC: 0.66 MG/DL (ref 0.66–1.25)
GFR SERPL CREATININE-BSD FRML MDRD: >90 ML/MIN/{1.73_M2}
GLUCOSE SERPL-MCNC: 93 MG/DL (ref 70–99)
POTASSIUM SERPL-SCNC: 4.2 MMOL/L (ref 3.4–5.3)
PROT SERPL-MCNC: 7 G/DL (ref 6.8–8.8)
SODIUM SERPL-SCNC: 138 MMOL/L (ref 133–144)
TSH SERPL DL<=0.005 MIU/L-ACNC: 0.94 MU/L (ref 0.4–4)

## 2020-03-12 PROCEDURE — 80053 COMPREHEN METABOLIC PANEL: CPT | Performed by: NURSE PRACTITIONER

## 2020-03-12 PROCEDURE — 96413 CHEMO IV INFUSION 1 HR: CPT

## 2020-03-12 PROCEDURE — 84443 ASSAY THYROID STIM HORMONE: CPT | Performed by: NURSE PRACTITIONER

## 2020-03-12 PROCEDURE — 25800030 ZZH RX IP 258 OP 636: Mod: ZF | Performed by: NURSE PRACTITIONER

## 2020-03-12 PROCEDURE — 25000128 H RX IP 250 OP 636: Mod: ZF | Performed by: NURSE PRACTITIONER

## 2020-03-12 RX ORDER — DIPHENHYDRAMINE HYDROCHLORIDE 50 MG/ML
50 INJECTION INTRAMUSCULAR; INTRAVENOUS
Status: CANCELLED
Start: 2020-04-23

## 2020-03-12 RX ORDER — EPINEPHRINE 0.3 MG/.3ML
0.3 INJECTION SUBCUTANEOUS EVERY 5 MIN PRN
Status: CANCELLED | OUTPATIENT
Start: 2020-04-23

## 2020-03-12 RX ORDER — METHYLPREDNISOLONE SODIUM SUCCINATE 125 MG/2ML
125 INJECTION, POWDER, LYOPHILIZED, FOR SOLUTION INTRAMUSCULAR; INTRAVENOUS
Status: CANCELLED
Start: 2020-04-23

## 2020-03-12 RX ORDER — EPINEPHRINE 1 MG/ML
0.3 INJECTION, SOLUTION INTRAMUSCULAR; SUBCUTANEOUS EVERY 5 MIN PRN
Status: CANCELLED | OUTPATIENT
Start: 2020-04-23

## 2020-03-12 RX ORDER — LORAZEPAM 2 MG/ML
0.5 INJECTION INTRAMUSCULAR EVERY 4 HOURS PRN
Status: CANCELLED
Start: 2020-04-23

## 2020-03-12 RX ORDER — IOPAMIDOL 755 MG/ML
100 INJECTION, SOLUTION INTRAVASCULAR ONCE
Status: COMPLETED | OUTPATIENT
Start: 2020-03-12 | End: 2020-03-12

## 2020-03-12 RX ORDER — SODIUM CHLORIDE 9 MG/ML
1000 INJECTION, SOLUTION INTRAVENOUS CONTINUOUS PRN
Status: CANCELLED
Start: 2020-04-23

## 2020-03-12 RX ORDER — ALBUTEROL SULFATE 0.83 MG/ML
2.5 SOLUTION RESPIRATORY (INHALATION)
Status: CANCELLED | OUTPATIENT
Start: 2020-04-23

## 2020-03-12 RX ORDER — ALBUTEROL SULFATE 90 UG/1
1-2 AEROSOL, METERED RESPIRATORY (INHALATION)
Status: CANCELLED
Start: 2020-04-23

## 2020-03-12 RX ORDER — MEPERIDINE HYDROCHLORIDE 25 MG/ML
25 INJECTION INTRAMUSCULAR; INTRAVENOUS; SUBCUTANEOUS EVERY 30 MIN PRN
Status: CANCELLED | OUTPATIENT
Start: 2020-04-23

## 2020-03-12 RX ADMIN — SODIUM CHLORIDE 480 MG: 9 INJECTION, SOLUTION INTRAVENOUS at 09:19

## 2020-03-12 RX ADMIN — IOPAMIDOL 100 ML: 755 INJECTION, SOLUTION INTRAVASCULAR at 07:42

## 2020-03-12 ASSESSMENT — PAIN SCALES - GENERAL: PAINLEVEL: NO PAIN (0)

## 2020-03-12 NOTE — PATIENT INSTRUCTIONS
[facility] Triage and after hours / weekends / holidays:  [phone]    Please call the triage or after hours line if you experience a temperature greater than or equal to 100.5, shaking chills, have uncontrolled nausea, vomiting and/or diarrhea, dizziness, shortness of breath, chest pain, bleeding, unexplained bruising, or if you have any other new/concerning symptoms, questions or concerns.      If you are having any concerning symptoms or wish to speak to a provider before your next infusion visit, please call your care coordinator or triage to notify them so we can adequately serve you.     If you need a refill on a narcotic prescription or other medication, please call before your infusion appointment.           March 2020 Sunday Monday Tuesday Wednesday Thursday Friday Saturday   1     2     3     4     5     6     7       8     9     10     11     12    CT CHEST/ABDOMEN/PELVIS W   7:10 AM   (20 min.)   CT1   [facility] CT    [facility]   8:30 AM   (15 min.)   [facility]   [facility] Lab Draw    [facility] ONC INFUSION 60   9:00 AM   (60 min.)    ONCOLOGY INFUSION   [facility] 13     14       15     16     17     18     19     20     21       22     23    [facility] D[facility]   9:45 AM   (15 min.)   [facility]   [facility] Lab Draw    P RETURN  10:00 AM   (30 min.)   Agustin Kyle MD   [facility] 24     25     26     27     28       29     30     31 April 2020 Sunday Monday Tuesday Wednesday Thursday Friday Saturday                  1     2     3     4       5     6     7     8     9     10     11       12     13     14     15     16     17     18       19     20     21     22     23     24     25       26     27     28     29     30                              Recent Results (from the past 24 hour(s))   Comprehensive metabolic panel    Collection Time: 03/12/20   8:30 AM   Result Value Ref Range    Sodium 138 133 - 144 mmol/L    Potassium 4.2 3.4 - 5.3 mmol/L    Chloride 109 94 - 109 mmol/L    Carbon Dioxide 24 20 - 32 mmol/L    Anion Gap 6 3 - 14 mmol/L    Glucose 93 70 - 99 mg/dL    Urea Nitrogen 11 7 - 30 mg/dL    Creatinine 0.66 0.66 - 1.25 mg/dL    GFR Estimate >90 >60 mL/min/[1.73_m2]    GFR Estimate If Black >90 >60 mL/min/[1.73_m2]    Calcium 8.0 (L) 8.5 - 10.1 mg/dL    Bilirubin Total 0.4 0.2 - 1.3 mg/dL    Albumin 3.2 (L) 3.4 - 5.0 g/dL    Protein Total 7.0 6.8 - 8.8 g/dL    Alkaline Phosphatase 92 40 - 150 U/L    ALT 27 0 - 70 U/L    AST 25 0 - 45 U/L   TSH with free T4 reflex    Collection Time: 03/12/20  8:30 AM   Result Value Ref Range    TSH 0.94 0.40 - 4.00 mU/L

## 2020-03-12 NOTE — PROGRESS NOTES
Infusion Nursing Note:  Preeti Pascual presents today for Cycle 18 Day 1 Nivolumab.    Patient seen by provider today: No    Treatment Conditions:  Lab Results   Component Value Date    HGB 12.9 09/23/2019     Lab Results   Component Value Date    WBC 4.6 09/23/2019      Lab Results   Component Value Date    ANEU 1.9 09/23/2019     Lab Results   Component Value Date     09/23/2019      Lab Results   Component Value Date     03/12/2020                   Lab Results   Component Value Date    POTASSIUM 4.2 03/12/2020           No results found for: MAG         Lab Results   Component Value Date    CR 0.66 03/12/2020                   Lab Results   Component Value Date    STACEY 8.0 03/12/2020                Lab Results   Component Value Date    BILITOTAL 0.4 03/12/2020           Lab Results   Component Value Date    ALBUMIN 3.2 03/12/2020                    Lab Results   Component Value Date    ALT 27 03/12/2020           Lab Results   Component Value Date    AST 25 03/12/2020       Results reviewed, labs MET treatment parameters, ok to proceed with treatment.      Note: Pt with no concerns today.  CT done this morning and pt will meet with Dr. Kyle on 3/23/20 to discuss results.     Intravenous Access:  Peripheral IV placed.    Post Infusion Assessment:  Patient tolerated infusion without incident.  Blood return noted pre and post infusion.  Access discontinued per protocol.    Discharge Plan:   Patient declined prescription refills.  Discharge instructions reviewed with: Patient.  Patient and/or family verbalized understanding of discharge instructions and all questions answered.  Copy of AVS reviewed with patient and/or family.  Patient will return 3/23/20 for follow-up appointment with Dr. Kyle. Pt instructed to make sure next infusion appt gets scheduled for around 4/9/20.  Patient discharged in stable condition accompanied by: self.  Departure Mode: Ambulatory.  Face to Face time: Patricia JURADO  JAKCIE Oconnor

## 2020-03-12 NOTE — NURSING NOTE
Vitals done, labs drawn by venipuncture.  See doc flow sheets for details.  Doris Alicea, SHIRA March 12, 2020

## 2020-03-23 ENCOUNTER — VIRTUAL VISIT (OUTPATIENT)
Dept: ONCOLOGY | Facility: CLINIC | Age: 55
End: 2020-03-23
Attending: INTERNAL MEDICINE
Payer: COMMERCIAL

## 2020-03-23 DIAGNOSIS — R09.82 POST-NASAL DRAINAGE: ICD-10-CM

## 2020-03-23 DIAGNOSIS — C22.0 HCC (HEPATOCELLULAR CARCINOMA) (H): Primary | ICD-10-CM

## 2020-03-23 DIAGNOSIS — R05.9 COUGH: ICD-10-CM

## 2020-03-23 PROCEDURE — 99214 OFFICE O/P EST MOD 30 MIN: CPT | Mod: TEL | Performed by: INTERNAL MEDICINE

## 2020-03-23 PROCEDURE — 40000114 ZZH STATISTIC NO CHARGE CLINIC VISIT

## 2020-03-23 RX ORDER — FLUTICASONE PROPIONATE 50 MCG
2 SPRAY, SUSPENSION (ML) NASAL DAILY
Qty: 16 ML | Refills: 1 | Status: SHIPPED | OUTPATIENT
Start: 2020-03-23 | End: 2020-04-23

## 2020-03-23 NOTE — PROGRESS NOTES
"Preeti Pascual is a 54 year old male who is being evaluated via a billable telephone visit.      The patient has been notified of following:     \"This telephone visit will be conducted via a call between you and your physician/provider. We have found that certain health care needs can be provided without the need for a physical exam.  This service lets us provide the care you need with a short phone conversation.  If a prescription is necessary we can send it directly to your pharmacy.  If lab work is needed we can place an order for that and you can then stop by our lab to have the test done at a later time.    If during the course of the call the physician/provider feels a telephone visit is not appropriate, you will not be charged for this service.\"     Preeti Pascual complains of    Chief Complaint   Patient presents with     Telephone     HCC (hepatocellular carcinoma) (H)       I have reviewed and updated the patient's Past Medical History, Social History, Family History and Medication List.    ALLERGIES  Patient has no known allergies.     No clinical or medication refill needs today. No symptoms of COVID19. Has not come in contact with anyone with a confirmed or suspected case of COVID19. Has not traveled out of the country    Nighat Cortez CMA on 3/23/2020 at 9:38 AM    Additional provider notes:     Preeti Pascual is being evaluated today for metastatic hepatocellular carcinoma.  Today's visit has been converted to a virtual visit.    Mr. Pascual has disease related to hepatitis B and originally was treated with radioembolization in 2016 but developed metastatic disease to the adrenal gland in 2018 and progressed on sorafenib.  He has been on nivolumab since November 2018 with good control of his disease.  Today's evaluation is to assess his ongoing response.  He tells me he is feeling quite well.  His appetite and energy are good.  He is not having any bleeding, edema or problems with confusion.  He has had no " fevers or chills.  He has had no respiratory symptoms.  He has had no rash.  He has had no change in his bowel habits.  The remainder of a 10 point review of systems is otherwise negative.  He denies any other new medical problems since our last visit.    I reviewed with him his current imaging which shows the disease in his liver and adrenal glands to be stable.  He has some minor adenopathy which is also stable.  There is nothing new to suggest progression of his disease.    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  The patient is maintaining an normal performance status and has no apparent toxicity from the nivolumab.  We will continue on his current dose and schedule every month and reevaluate the status of his disease again in another 3 to 4 months.    Visit time >30 min

## 2020-03-23 NOTE — LETTER
"3/23/2020      RE: Preeti Pascual  [address]  [place] [facility] [address]       Preeti Pascual is a 54 year old male who is being evaluated via a billable telephone visit.      The patient has been notified of following:     \"This telephone visit will be conducted via a call between you and your physician/provider. We have found that certain health care needs can be provided without the need for a physical exam.  This service lets us provide the care you need with a short phone conversation.  If a prescription is necessary we can send it directly to your pharmacy.  If lab work is needed we can place an order for that and you can then stop by our lab to have the test done at a later time.    If during the course of the call the physician/provider feels a telephone visit is not appropriate, you will not be charged for this service.\"     Preeti Pascual complains of    Chief Complaint   Patient presents with     Telephone     HCC (hepatocellular carcinoma) (H)       I have reviewed and updated the patient's Past Medical History, Social History, Family History and Medication List.    ALLERGIES  Patient has no known allergies.     No clinical or medication refill needs today. No symptoms of COVID19. Has not come in contact with anyone with a confirmed or suspected case of COVID19. Has not traveled out of the country    Nighat Cortez CMA on 3/23/2020 at 9:38 AM    Additional provider notes:     Preeti Pascual is being evaluated today for metastatic hepatocellular carcinoma.  Today's visit has been converted to a virtual visit.    Mr. Pascual has disease related to hepatitis B and originally was treated with radioembolization in 2016 but developed metastatic disease to the adrenal gland in 2018 and progressed on sorafenib.  He has been on nivolumab since November 2018 with good control of his disease.  Today's evaluation is to assess his ongoing response.  He tells me he is feeling quite well.  His appetite and energy are " good.  He is not having any bleeding, edema or problems with confusion.  He has had no fevers or chills.  He has had no respiratory symptoms.  He has had no rash.  He has had no change in his bowel habits.  The remainder of a 10 point review of systems is otherwise negative.  He denies any other new medical problems since our last visit.    I reviewed with him his current imaging which shows the disease in his liver and adrenal glands to be stable.  He has some minor adenopathy which is also stable.  There is nothing new to suggest progression of his disease.    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  The patient is maintaining an normal performance status and has no apparent toxicity from the nivolumab.  We will continue on his current dose and schedule every month and reevaluate the status of his disease again in another 3 to 4 months.    Visit time >30 min      Agustin Kyle MD

## 2020-04-23 ENCOUNTER — APPOINTMENT (OUTPATIENT)
Dept: LAB | Facility: CLINIC | Age: 55
End: 2020-04-23
Attending: INTERNAL MEDICINE
Payer: COMMERCIAL

## 2020-04-23 ENCOUNTER — INFUSION THERAPY VISIT (OUTPATIENT)
Dept: ONCOLOGY | Facility: CLINIC | Age: 55
End: 2020-04-23
Attending: INTERNAL MEDICINE
Payer: COMMERCIAL

## 2020-04-23 VITALS
DIASTOLIC BLOOD PRESSURE: 79 MMHG | SYSTOLIC BLOOD PRESSURE: 136 MMHG | TEMPERATURE: 97.8 F | OXYGEN SATURATION: 98 % | WEIGHT: 168 LBS | BODY MASS INDEX: 25.54 KG/M2 | HEART RATE: 68 BPM | RESPIRATION RATE: 18 BRPM

## 2020-04-23 DIAGNOSIS — R09.82 POST-NASAL DRAINAGE: ICD-10-CM

## 2020-04-23 DIAGNOSIS — C22.0 HCC (HEPATOCELLULAR CARCINOMA) (H): Primary | ICD-10-CM

## 2020-04-23 DIAGNOSIS — R05.9 COUGH: ICD-10-CM

## 2020-04-23 LAB
AFP SERPL-MCNC: 28.7 UG/L (ref 0–8)
ALBUMIN SERPL-MCNC: 3.4 G/DL (ref 3.4–5)
ALP SERPL-CCNC: 92 U/L (ref 40–150)
ALT SERPL W P-5'-P-CCNC: 29 U/L (ref 0–70)
ANION GAP SERPL CALCULATED.3IONS-SCNC: 5 MMOL/L (ref 3–14)
AST SERPL W P-5'-P-CCNC: 26 U/L (ref 0–45)
BASOPHILS # BLD AUTO: 0.1 10E9/L (ref 0–0.2)
BASOPHILS NFR BLD AUTO: 1.5 %
BILIRUB SERPL-MCNC: 0.4 MG/DL (ref 0.2–1.3)
BUN SERPL-MCNC: 10 MG/DL (ref 7–30)
CALCIUM SERPL-MCNC: 8.6 MG/DL (ref 8.5–10.1)
CHLORIDE SERPL-SCNC: 111 MMOL/L (ref 94–109)
CO2 SERPL-SCNC: 24 MMOL/L (ref 20–32)
CREAT SERPL-MCNC: 0.68 MG/DL (ref 0.66–1.25)
DIFFERENTIAL METHOD BLD: ABNORMAL
EOSINOPHIL # BLD AUTO: 1.5 10E9/L (ref 0–0.7)
EOSINOPHIL NFR BLD AUTO: 27.1 %
ERYTHROCYTE [DISTWIDTH] IN BLOOD BY AUTOMATED COUNT: 13.2 % (ref 10–15)
GFR SERPL CREATININE-BSD FRML MDRD: >90 ML/MIN/{1.73_M2}
GLUCOSE SERPL-MCNC: 93 MG/DL (ref 70–99)
HCT VFR BLD AUTO: 40.4 % (ref 40–53)
HGB BLD-MCNC: 13.2 G/DL (ref 13.3–17.7)
IMM GRANULOCYTES # BLD: 0 10E9/L (ref 0–0.4)
IMM GRANULOCYTES NFR BLD: 0.2 %
LYMPHOCYTES # BLD AUTO: 1.2 10E9/L (ref 0.8–5.3)
LYMPHOCYTES NFR BLD AUTO: 21.9 %
MCH RBC QN AUTO: 27.4 PG (ref 26.5–33)
MCHC RBC AUTO-ENTMCNC: 32.7 G/DL (ref 31.5–36.5)
MCV RBC AUTO: 84 FL (ref 78–100)
MONOCYTES # BLD AUTO: 0.5 10E9/L (ref 0–1.3)
MONOCYTES NFR BLD AUTO: 9.8 %
NEUTROPHILS # BLD AUTO: 2.2 10E9/L (ref 1.6–8.3)
NEUTROPHILS NFR BLD AUTO: 39.5 %
NRBC # BLD AUTO: 0 10*3/UL
NRBC BLD AUTO-RTO: 0 /100
PLATELET # BLD AUTO: 176 10E9/L (ref 150–450)
POTASSIUM SERPL-SCNC: 3.8 MMOL/L (ref 3.4–5.3)
PROT SERPL-MCNC: 7.7 G/DL (ref 6.8–8.8)
RBC # BLD AUTO: 4.81 10E12/L (ref 4.4–5.9)
SODIUM SERPL-SCNC: 140 MMOL/L (ref 133–144)
TSH SERPL DL<=0.005 MIU/L-ACNC: 0.66 MU/L (ref 0.4–4)
WBC # BLD AUTO: 5.4 10E9/L (ref 4–11)

## 2020-04-23 PROCEDURE — 25000128 H RX IP 250 OP 636: Mod: ZF | Performed by: NURSE PRACTITIONER

## 2020-04-23 PROCEDURE — 25800030 ZZH RX IP 258 OP 636: Mod: ZF | Performed by: NURSE PRACTITIONER

## 2020-04-23 PROCEDURE — 40000556 ZZH STATISTIC PERIPHERAL IV START W US GUIDANCE: Mod: ZF

## 2020-04-23 PROCEDURE — 82105 ALPHA-FETOPROTEIN SERUM: CPT | Performed by: NURSE PRACTITIONER

## 2020-04-23 PROCEDURE — 84443 ASSAY THYROID STIM HORMONE: CPT | Performed by: NURSE PRACTITIONER

## 2020-04-23 PROCEDURE — 85025 COMPLETE CBC W/AUTO DIFF WBC: CPT | Performed by: NURSE PRACTITIONER

## 2020-04-23 PROCEDURE — 80053 COMPREHEN METABOLIC PANEL: CPT | Performed by: NURSE PRACTITIONER

## 2020-04-23 PROCEDURE — 96413 CHEMO IV INFUSION 1 HR: CPT

## 2020-04-23 RX ORDER — FLUTICASONE PROPIONATE 50 MCG
2 SPRAY, SUSPENSION (ML) NASAL DAILY
Qty: 16 ML | Refills: 1 | Status: SHIPPED | OUTPATIENT
Start: 2020-04-23 | End: 2021-02-25

## 2020-04-23 RX ADMIN — SODIUM CHLORIDE 480 MG: 9 INJECTION, SOLUTION INTRAVENOUS at 11:38

## 2020-04-23 ASSESSMENT — PAIN SCALES - GENERAL: PAINLEVEL: NO PAIN (0)

## 2020-04-23 NOTE — PROGRESS NOTES
Infusion Nursing Note:  Preeti Pascual presents today for Cycle 19 Nivolumab.    Patient seen by provider today: No    Note: Pt reports feeling well and offers no concerns.    Intravenous Access:  Peripheral IV placed.    Treatment Conditions:  Lab Results   Component Value Date    HGB 13.2 04/23/2020     Lab Results   Component Value Date    WBC 5.4 04/23/2020      Lab Results   Component Value Date    ANEU 2.2 04/23/2020     Lab Results   Component Value Date     04/23/2020      Lab Results   Component Value Date     04/23/2020                   Lab Results   Component Value Date    POTASSIUM 3.8 04/23/2020           No results found for: MAG         Lab Results   Component Value Date    CR 0.68 04/23/2020                   Lab Results   Component Value Date    STACEY 8.6 04/23/2020                Lab Results   Component Value Date    BILITOTAL 0.4 04/23/2020           Lab Results   Component Value Date    ALBUMIN 3.4 04/23/2020                    Lab Results   Component Value Date    ALT 29 04/23/2020           Lab Results   Component Value Date    AST 26 04/23/2020     Results reviewed, labs MET treatment parameters, ok to proceed with treatment.    Post Infusion Assessment:  Patient tolerated infusion without incident.  Blood return noted pre and post infusion.  Site patent and intact, free from redness, edema or discomfort.  No evidence of extravasations. Access discontinued per protocol.       Discharge Plan:   Prescription refills given for Flonase-sent to [facility].  Copy of AVS reviewed with patient and/or family.  Patient will return 5/21 for next appointment.  Patient discharged in stable condition accompanied by: self.  Departure Mode: Ambulatory.    Franca Soliz RN

## 2020-04-23 NOTE — NURSING NOTE
Chief Complaint   Patient presents with     Blood Draw     Labs drawn via PIV placed by RN in lab. VS Taken.       Howie Henderson RN,

## 2020-05-21 ENCOUNTER — INFUSION THERAPY VISIT (OUTPATIENT)
Dept: ONCOLOGY | Facility: CLINIC | Age: 55
End: 2020-05-21
Attending: INTERNAL MEDICINE
Payer: COMMERCIAL

## 2020-05-21 ENCOUNTER — APPOINTMENT (OUTPATIENT)
Dept: LAB | Facility: CLINIC | Age: 55
End: 2020-05-21
Attending: INTERNAL MEDICINE
Payer: COMMERCIAL

## 2020-05-21 VITALS
OXYGEN SATURATION: 98 % | WEIGHT: 170.8 LBS | TEMPERATURE: 99.4 F | DIASTOLIC BLOOD PRESSURE: 79 MMHG | RESPIRATION RATE: 16 BRPM | SYSTOLIC BLOOD PRESSURE: 133 MMHG | HEART RATE: 74 BPM | BODY MASS INDEX: 25.97 KG/M2

## 2020-05-21 DIAGNOSIS — C22.0 HCC (HEPATOCELLULAR CARCINOMA) (H): Primary | ICD-10-CM

## 2020-05-21 LAB
ALBUMIN SERPL-MCNC: 3.6 G/DL (ref 3.4–5)
ALP SERPL-CCNC: 107 U/L (ref 40–150)
ALT SERPL W P-5'-P-CCNC: 31 U/L (ref 0–70)
ANION GAP SERPL CALCULATED.3IONS-SCNC: 6 MMOL/L (ref 3–14)
AST SERPL W P-5'-P-CCNC: 23 U/L (ref 0–45)
BILIRUB SERPL-MCNC: 0.2 MG/DL (ref 0.2–1.3)
BUN SERPL-MCNC: 14 MG/DL (ref 7–30)
CALCIUM SERPL-MCNC: 9 MG/DL (ref 8.5–10.1)
CHLORIDE SERPL-SCNC: 108 MMOL/L (ref 94–109)
CO2 SERPL-SCNC: 26 MMOL/L (ref 20–32)
CREAT SERPL-MCNC: 0.84 MG/DL (ref 0.66–1.25)
GFR SERPL CREATININE-BSD FRML MDRD: >90 ML/MIN/{1.73_M2}
GLUCOSE SERPL-MCNC: 94 MG/DL (ref 70–99)
POTASSIUM SERPL-SCNC: 4 MMOL/L (ref 3.4–5.3)
PROT SERPL-MCNC: 8.1 G/DL (ref 6.8–8.8)
SODIUM SERPL-SCNC: 139 MMOL/L (ref 133–144)
TSH SERPL DL<=0.005 MIU/L-ACNC: 1.43 MU/L (ref 0.4–4)

## 2020-05-21 PROCEDURE — 84443 ASSAY THYROID STIM HORMONE: CPT | Performed by: INTERNAL MEDICINE

## 2020-05-21 PROCEDURE — 25800030 ZZH RX IP 258 OP 636: Mod: ZF | Performed by: INTERNAL MEDICINE

## 2020-05-21 PROCEDURE — 96413 CHEMO IV INFUSION 1 HR: CPT

## 2020-05-21 PROCEDURE — 80053 COMPREHEN METABOLIC PANEL: CPT | Performed by: INTERNAL MEDICINE

## 2020-05-21 PROCEDURE — 25000128 H RX IP 250 OP 636: Mod: ZF | Performed by: INTERNAL MEDICINE

## 2020-05-21 RX ORDER — LORAZEPAM 2 MG/ML
0.5 INJECTION INTRAMUSCULAR EVERY 4 HOURS PRN
Status: CANCELLED
Start: 2020-05-21

## 2020-05-21 RX ORDER — MEPERIDINE HYDROCHLORIDE 25 MG/ML
25 INJECTION INTRAMUSCULAR; INTRAVENOUS; SUBCUTANEOUS EVERY 30 MIN PRN
Status: CANCELLED | OUTPATIENT
Start: 2020-05-21

## 2020-05-21 RX ORDER — ALBUTEROL SULFATE 90 UG/1
1-2 AEROSOL, METERED RESPIRATORY (INHALATION)
Status: CANCELLED
Start: 2020-05-21

## 2020-05-21 RX ORDER — EPINEPHRINE 0.3 MG/.3ML
0.3 INJECTION SUBCUTANEOUS EVERY 5 MIN PRN
Status: CANCELLED | OUTPATIENT
Start: 2020-05-21

## 2020-05-21 RX ORDER — DIPHENHYDRAMINE HYDROCHLORIDE 50 MG/ML
50 INJECTION INTRAMUSCULAR; INTRAVENOUS
Status: CANCELLED
Start: 2020-05-21

## 2020-05-21 RX ORDER — ALBUTEROL SULFATE 0.83 MG/ML
2.5 SOLUTION RESPIRATORY (INHALATION)
Status: CANCELLED | OUTPATIENT
Start: 2020-05-21

## 2020-05-21 RX ORDER — EPINEPHRINE 1 MG/ML
0.3 INJECTION, SOLUTION INTRAMUSCULAR; SUBCUTANEOUS EVERY 5 MIN PRN
Status: CANCELLED | OUTPATIENT
Start: 2020-05-21

## 2020-05-21 RX ORDER — SODIUM CHLORIDE 9 MG/ML
1000 INJECTION, SOLUTION INTRAVENOUS CONTINUOUS PRN
Status: CANCELLED
Start: 2020-05-21

## 2020-05-21 RX ORDER — METHYLPREDNISOLONE SODIUM SUCCINATE 125 MG/2ML
125 INJECTION, POWDER, LYOPHILIZED, FOR SOLUTION INTRAMUSCULAR; INTRAVENOUS
Status: CANCELLED
Start: 2020-05-21

## 2020-05-21 RX ADMIN — SODIUM CHLORIDE 480 MG: 9 INJECTION, SOLUTION INTRAVENOUS at 10:03

## 2020-05-21 ASSESSMENT — PAIN SCALES - GENERAL: PAINLEVEL: NO PAIN (0)

## 2020-05-21 NOTE — PATIENT INSTRUCTIONS
Contact Numbers    [facility] Main Line (for Scheduling/Triage/After Hours Nurse Line): [phone]    Please call the [facility] nurse triage or the after hours nurse line if you experience a temperature greater than or equal to 100.5, shaking chills, have uncontrolled nausea, vomiting and/or diarrhea, dizziness, lightheadedness, shortness of breath, chest pain, bleeding, unexplained bruising, or if you have any other new/concerning symptoms, questions or concerns.     If you are having any concerning symptoms or wish to speak to a provider before your next infusion visit, please call your care coordinator or triage to notify them so we can adequately serve you.     If you need a refill on a narcotic prescription or other medication, please call triage before your infusion appointment.       Lab Results:  Recent Results (from the past 12 hour(s))   Comprehensive metabolic panel    Collection Time: 05/21/20  9:02 AM   Result Value Ref Range    Sodium 139 133 - 144 mmol/L    Potassium 4.0 3.4 - 5.3 mmol/L    Chloride 108 94 - 109 mmol/L    Carbon Dioxide 26 20 - 32 mmol/L    Anion Gap 6 3 - 14 mmol/L    Glucose 94 70 - 99 mg/dL    Urea Nitrogen 14 7 - 30 mg/dL    Creatinine 0.84 0.66 - 1.25 mg/dL    GFR Estimate >90 >60 mL/min/[1.73_m2]    GFR Estimate If Black >90 >60 mL/min/[1.73_m2]    Calcium 9.0 8.5 - 10.1 mg/dL    Bilirubin Total 0.2 0.2 - 1.3 mg/dL    Albumin 3.6 3.4 - 5.0 g/dL    Protein Total 8.1 6.8 - 8.8 g/dL    Alkaline Phosphatase 107 40 - 150 U/L    ALT 31 0 - 70 U/L    AST 23 0 - 45 U/L   TSH with free T4 reflex    Collection Time: 05/21/20  9:02 AM   Result Value Ref Range    TSH 1.43 0.40 - 4.00 mU/L

## 2020-05-21 NOTE — PROGRESS NOTES
Infusion Nursing Note:  Preeti Pascual presents today for C20 D1 Nivolumab.    Patient seen by provider today: No   present during visit today: Not Applicable.    Note: Patient reports feeling well with no new or acute concerns.     Intravenous Access:  Peripheral IV placed.    Treatment Conditions:  Lab Results   Component Value Date    HGB 13.2 04/23/2020     Lab Results   Component Value Date    WBC 5.4 04/23/2020      Lab Results   Component Value Date    ANEU 2.2 04/23/2020     Lab Results   Component Value Date     04/23/2020      Lab Results   Component Value Date     05/21/2020                   Lab Results   Component Value Date    POTASSIUM 4.0 05/21/2020           No results found for: MAG         Lab Results   Component Value Date    CR 0.84 05/21/2020                   Lab Results   Component Value Date    STACEY 9.0 05/21/2020                Lab Results   Component Value Date    BILITOTAL 0.2 05/21/2020           Lab Results   Component Value Date    ALBUMIN 3.6 05/21/2020                    Lab Results   Component Value Date    ALT 31 05/21/2020           Lab Results   Component Value Date    AST 23 05/21/2020       Results reviewed, labs MET treatment parameters, ok to proceed with treatment.      Post Infusion Assessment:  Patient tolerated infusion without incident.  Blood return noted pre and post infusion.  Site patent and intact, free from redness, edema or discomfort.  No evidence of extravasations.  Access discontinued per protocol.       Discharge Plan:   Patient declined prescription refills.  Discharge instructions reviewed with: Patient.  Patient and/or family verbalized understanding of discharge instructions and all questions answered.  AVS printed and given to patient.  Patient will return 6/18 for next appointment.   Patient discharged in stable condition accompanied by: self.  Departure Mode: Ambulatory.    Traci Suh RN

## 2020-06-05 ENCOUNTER — PATIENT OUTREACH (OUTPATIENT)
Dept: ONCOLOGY | Facility: CLINIC | Age: 55
End: 2020-06-05

## 2020-06-05 NOTE — PROGRESS NOTES
RN Care Coordination Note  Incoming Call: Received voicemail from patient requesting call to discuss format of upcoming appt with Dr Kyle.      Outgoing Call: Returned call to patient. Reviewed his next appt would be a virtual appointment. Patient voiced understanding and had no further questions at this time.         Sharlene Mendez RN, BSN, OCN   RN Care Coordinator   [facility]

## 2020-06-11 ENCOUNTER — ANCILLARY PROCEDURE (OUTPATIENT)
Dept: CT IMAGING | Facility: CLINIC | Age: 55
End: 2020-06-11
Attending: INTERNAL MEDICINE
Payer: COMMERCIAL

## 2020-06-11 DIAGNOSIS — B18.1 CHRONIC VIRAL HEPATITIS B WITHOUT DELTA AGENT AND WITHOUT COMA (H): ICD-10-CM

## 2020-06-11 DIAGNOSIS — C22.0 HCC (HEPATOCELLULAR CARCINOMA) (H): ICD-10-CM

## 2020-06-11 LAB
AFP SERPL-MCNC: 23.7 UG/L (ref 0–8)
ALBUMIN SERPL-MCNC: 3.5 G/DL (ref 3.4–5)
ALP SERPL-CCNC: 93 U/L (ref 40–150)
ALT SERPL W P-5'-P-CCNC: 36 U/L (ref 0–70)
ANION GAP SERPL CALCULATED.3IONS-SCNC: 5 MMOL/L (ref 3–14)
AST SERPL W P-5'-P-CCNC: 34 U/L (ref 0–45)
BILIRUB DIRECT SERPL-MCNC: <0.1 MG/DL (ref 0–0.2)
BILIRUB SERPL-MCNC: 0.4 MG/DL (ref 0.2–1.3)
BUN SERPL-MCNC: 9 MG/DL (ref 7–30)
CALCIUM SERPL-MCNC: 8.6 MG/DL (ref 8.5–10.1)
CHLORIDE SERPL-SCNC: 107 MMOL/L (ref 94–109)
CO2 SERPL-SCNC: 26 MMOL/L (ref 20–32)
CREAT SERPL-MCNC: 0.7 MG/DL (ref 0.66–1.25)
ERYTHROCYTE [DISTWIDTH] IN BLOOD BY AUTOMATED COUNT: 13.1 % (ref 10–15)
GFR SERPL CREATININE-BSD FRML MDRD: >90 ML/MIN/{1.73_M2}
GLUCOSE SERPL-MCNC: 103 MG/DL (ref 70–99)
HCT VFR BLD AUTO: 38.1 % (ref 40–53)
HGB BLD-MCNC: 12.5 G/DL (ref 13.3–17.7)
INR PPP: 1.24 (ref 0.86–1.14)
MCH RBC QN AUTO: 27.5 PG (ref 26.5–33)
MCHC RBC AUTO-ENTMCNC: 32.8 G/DL (ref 31.5–36.5)
MCV RBC AUTO: 84 FL (ref 78–100)
PLATELET # BLD AUTO: 173 10E9/L (ref 150–450)
POTASSIUM SERPL-SCNC: 3.9 MMOL/L (ref 3.4–5.3)
PROT SERPL-MCNC: 7.6 G/DL (ref 6.8–8.8)
RBC # BLD AUTO: 4.55 10E12/L (ref 4.4–5.9)
SODIUM SERPL-SCNC: 138 MMOL/L (ref 133–144)
WBC # BLD AUTO: 4.1 10E9/L (ref 4–11)

## 2020-06-11 PROCEDURE — 85610 PROTHROMBIN TIME: CPT | Performed by: INTERNAL MEDICINE

## 2020-06-11 PROCEDURE — 85027 COMPLETE CBC AUTOMATED: CPT | Performed by: INTERNAL MEDICINE

## 2020-06-11 PROCEDURE — 82105 ALPHA-FETOPROTEIN SERUM: CPT | Performed by: INTERNAL MEDICINE

## 2020-06-11 PROCEDURE — 80076 HEPATIC FUNCTION PANEL: CPT | Performed by: INTERNAL MEDICINE

## 2020-06-11 PROCEDURE — 40000556 ZZH STATISTIC PERIPHERAL IV START W US GUIDANCE

## 2020-06-11 PROCEDURE — 80048 BASIC METABOLIC PNL TOTAL CA: CPT | Performed by: INTERNAL MEDICINE

## 2020-06-11 PROCEDURE — 87517 HEPATITIS B DNA QUANT: CPT | Performed by: INTERNAL MEDICINE

## 2020-06-11 RX ORDER — IOPAMIDOL 755 MG/ML
104 INJECTION, SOLUTION INTRAVASCULAR ONCE
Status: COMPLETED | OUTPATIENT
Start: 2020-06-11 | End: 2020-06-11

## 2020-06-11 RX ADMIN — IOPAMIDOL 104 ML: 755 INJECTION, SOLUTION INTRAVASCULAR at 09:13

## 2020-06-11 NOTE — PROGRESS NOTES
Chief Complaint   Patient presents with     Blood Draw     Blood drawn and PIV placed by Yenni MEJIA vascular access.      EULOGIO Brown LPN

## 2020-06-12 LAB
HBV DNA SERPL NAA+PROBE-ACNC: NORMAL [IU]/ML
HBV DNA SERPL NAA+PROBE-LOG IU: NORMAL {LOG_IU}/ML

## 2020-06-15 ENCOUNTER — VIRTUAL VISIT (OUTPATIENT)
Dept: ONCOLOGY | Facility: CLINIC | Age: 55
End: 2020-06-15
Attending: INTERNAL MEDICINE
Payer: COMMERCIAL

## 2020-06-15 DIAGNOSIS — C22.0 HCC (HEPATOCELLULAR CARCINOMA) (H): ICD-10-CM

## 2020-06-15 PROCEDURE — 40000114 ZZH STATISTIC NO CHARGE CLINIC VISIT

## 2020-06-15 PROCEDURE — 99214 OFFICE O/P EST MOD 30 MIN: CPT | Mod: TEL | Performed by: INTERNAL MEDICINE

## 2020-06-15 NOTE — LETTER
6/15/2020      RE: Preeti Pascual  [address]  [facility] [address]       I saw Mr. Pascual today in clinic.  He is doing well but has underlying health conditions that put him at moderate risk for complications where he to acquire COVID-19 and as such may require accommodations as he returns to work.    Agustin Kyle MD

## 2020-06-15 NOTE — Clinical Note
"    6/15/2020         RE: Preeti Pascual  [address]  [place] [address]        Dear Colleague,    Thank you for referring your patient, Preeti Pascual, to the [facility]. Please see a copy of my visit note below.    Preeit Pascual is a 54 year old male who is being evaluated via a billable telephone visit.      The patient has been notified of following:     \"This telephone visit will be conducted via a call between you and your physician/provider. We have found that certain health care needs can be provided without the need for a physical exam.  This service lets us provide the care you need with a short phone conversation.  If a prescription is necessary we can send it directly to your pharmacy.  If lab work is needed we can place an order for that and you can then stop by our lab to have the test done at a later time.    Telephone visits are billed at different rates depending on your insurance coverage. During this emergency period, for some insurers they may be billed the same as an in-person visit.  Please reach out to your insurance provider with any questions.    If during the course of the call the physician/provider feels a telephone visit is not appropriate, you will not be charged for this service.\"    Patient has given verbal consent for Telephone visit?  Yes    What phone number would you like to be contacted at?[phone]     How would you like to obtain your AVS? [employer]    Phone call duration: *** minutes    {signature options:[id number]}              Preeti Pascual is a 54 year old male who is being evaluated via a billable telephone visit.      The patient has been notified of following:     \"This telephone visit will be conducted via a call between you and your physician/provider. We have found that certain health care needs can be provided without the need for a physical exam.  This service lets us provide the care you need with a short phone conversation.  If a " "prescription is necessary we can send it directly to your pharmacy.  If lab work is needed we can place an order for that and you can then stop by our lab to have the test done at a later time.    Telephone visits are billed at different rates depending on your insurance coverage. During this emergency period, for some insurers they may be billed the same as an in-person visit.  Please reach out to your insurance provider with any questions.    If during the course of the call the physician/provider feels a telephone visit is not appropriate, you will not be charged for this service.\"    Patient has given verbal consent for Telephone visit?  Yes    What phone number would you like to be contacted at?[phone]     How would you like to obtain your AVS? [employer]     Additional provider notes:      Preeti Pascual is being evaluated today for metastatic hepatocellular carcinoma.      Mr. Pascual has disease related to hepatitis B and originally was treated with radioembolization in 2016 but developed metastatic disease to the adrenal gland in 2018 and progressed on sorafenib.  He has been on nivolumab since November 2018 with good control of his disease.  Today's evaluation is to assess his ongoing response.  He tells me he is feeling quite well.  His appetite and energy are good.  He is not having any bleeding, edema or problems with confusion.  He has had no fevers or chills.  He has had no respiratory symptoms.  He has had no rash.  He has had no change in his bowel habits.  The remainder of a 10 point review of systems is otherwise negative.  He denies any other new medical problems since our last visit.     I reviewed his current imaging and reviewed the results with him which shows the disease in his liver and adrenal glands to be stable.  He has some minor adenopathy which is also stable.  There is nothing new to suggest progression of his disease. 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  The patient is maintaining an normal performance status and has no apparent toxicity from the nivolumab.  We will continue on his current dose and schedule every month and reevaluate the status of his disease again in another 3 to 4 months.    Phone call duration: *** minutes    {signature options:[id number]}              Again, thank you for allowing me to participate in the care of your patient.        Sincerely,        Agustin Kyle MD

## 2020-06-15 NOTE — PROGRESS NOTES
"Preeti Pascual is a 54 year old male who is being evaluated via a billable telephone visit.      The patient has been notified of following:     \"This telephone visit will be conducted via a call between you and your physician/provider. We have found that certain health care needs can be provided without the need for a physical exam.  This service lets us provide the care you need with a short phone conversation.  If a prescription is necessary we can send it directly to your pharmacy.  If lab work is needed we can place an order for that and you can then stop by our lab to have the test done at a later time.    Telephone visits are billed at different rates depending on your insurance coverage. During this emergency period, for some insurers they may be billed the same as an in-person visit.  Please reach out to your insurance provider with any questions.    If during the course of the call the physician/provider feels a telephone visit is not appropriate, you will not be charged for this service.\"    Patient has given verbal consent for Telephone visit?  Yes    What phone number would you like to be contacted at?[phone]     How would you like to obtain your AVS? [employer]     Additional provider notes:      Preeti Pascual is being evaluated today for metastatic hepatocellular carcinoma.      Mr. Pascual has disease related to hepatitis B and originally was treated with radioembolization in 2016 but developed metastatic disease to the adrenal gland in 2018 and progressed on sorafenib.  He has been on nivolumab since November 2018 with good control of his disease.  Today's evaluation is to assess his ongoing response.  He tells me he is feeling quite well.  His appetite and energy are good.  He is not having any bleeding, edema or problems with confusion.  He has had no fevers or chills.  He has had no respiratory symptoms.  He has had no rash.  He has had no change in his bowel habits.  The remainder of a 10 point " review of systems is otherwise negative.  He denies any other new medical problems since our last visit.     I reviewed his current imaging and reviewed the results with him which shows the disease in his liver and adrenal glands to be stable.  He has some minor right hilar adenopathy which is also stable. Radiology felt a part of the of the treatment defect in the liver was a little larger, but to my review there is no real change and certainly no areas of increase with arterial enhancement and those changes look like typical evolution after Y90 therapy.  There is nothing new to suggest progression of his disease. His lab shows his HBV to be undetectable. His other labs are unremarkable with mild anemia and normal liver function tests. His AFP is stable at 24.     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  The patient is maintaining an normal performance status and has no apparent immune toxicity from the nivolumab.  We will continue on his current dose and schedule every month and reevaluate the status of his disease again in another 3 to 4 months.    Phone call duration: 30 minutes    Rohit Kyle MD

## 2020-06-15 NOTE — LETTER
"    6/15/2020         RE: Preeti Pascual  [address]  [facility] [address]      Preeti Pascual is a 54 year old male who is being evaluated via a billable telephone visit.      The patient has been notified of following:     \"This telephone visit will be conducted via a call between you and your physician/provider. We have found that certain health care needs can be provided without the need for a physical exam.  This service lets us provide the care you need with a short phone conversation.  If a prescription is necessary we can send it directly to your pharmacy.  If lab work is needed we can place an order for that and you can then stop by our lab to have the test done at a later time.    Telephone visits are billed at different rates depending on your insurance coverage. During this emergency period, for some insurers they may be billed the same as an in-person visit.  Please reach out to your insurance provider with any questions.    If during the course of the call the physician/provider feels a telephone visit is not appropriate, you will not be charged for this service.\"    Patient has given verbal consent for Telephone visit?  Yes    What phone number would you like to be contacted at?[phone]     How would you like to obtain your AVS? [employer]     Additional provider notes:      Preeti Pascual is being evaluated today for metastatic hepatocellular carcinoma.      Mr. Pascual has disease related to hepatitis B and originally was treated with radioembolization in 2016 but developed metastatic disease to the adrenal gland in 2018 and progressed on sorafenib.  He has been on nivolumab since November 2018 with good control of his disease.  Today's evaluation is to assess his ongoing response.  He tells me he is feeling quite well.  His appetite and energy are good.  He is not having any bleeding, edema or problems with confusion.  He has had no fevers or chills.  He has had no respiratory symptoms.  He " has had no rash.  He has had no change in his bowel habits.  The remainder of a 10 point review of systems is otherwise negative.  He denies any other new medical problems since our last visit.     I reviewed his current imaging and reviewed the results with him which shows the disease in his liver and adrenal glands to be stable.  He has some minor right hilar adenopathy which is also stable. Radiology felt a part of the of the treatment defect in the liver was a little larger, but to my review there is no real change and certainly no areas of increase with arterial enhancement and those changes look like typical evolution after Y90 therapy.  There is nothing new to suggest progression of his disease. His lab shows his HBV to be undetectable. His other labs are unremarkable with mild anemia and normal liver function tests. His AFP is stable at 24.     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  The patient is maintaining an normal performance status and has no apparent immune toxicity from the nivolumab.  We will continue on his current dose and schedule every month and reevaluate the status of his disease again in another 3 to 4 months.    Phone call duration: 30 minutes    Rohit Kyle MD

## 2020-06-15 NOTE — LETTER
"6/15/2020      RE: Preeti Pascual  [address]  [facility] [address]       Preeti Pascual is a 54 year old male who is being evaluated via a billable telephone visit.      The patient has been notified of following:     \"This telephone visit will be conducted via a call between you and your physician/provider. We have found that certain health care needs can be provided without the need for a physical exam.  This service lets us provide the care you need with a short phone conversation.  If a prescription is necessary we can send it directly to your pharmacy.  If lab work is needed we can place an order for that and you can then stop by our lab to have the test done at a later time.    Telephone visits are billed at different rates depending on your insurance coverage. During this emergency period, for some insurers they may be billed the same as an in-person visit.  Please reach out to your insurance provider with any questions.    If during the course of the call the physician/provider feels a telephone visit is not appropriate, you will not be charged for this service.\"    Patient has given verbal consent for Telephone visit?  Yes    What phone number would you like to be contacted at?[phone]     How would you like to obtain your AVS? [employer]     Additional provider notes:      Preeti Pascual is being evaluated today for metastatic hepatocellular carcinoma.      Mr. Pascual has disease related to hepatitis B and originally was treated with radioembolization in 2016 but developed metastatic disease to the adrenal gland in 2018 and progressed on sorafenib.  He has been on nivolumab since November 2018 with good control of his disease.  Today's evaluation is to assess his ongoing response.  He tells me he is feeling quite well.  His appetite and energy are good.  He is not having any bleeding, edema or problems with confusion.  He has had no fevers or chills.  He has had no respiratory symptoms.  He has " had no rash.  He has had no change in his bowel habits.  The remainder of a 10 point review of systems is otherwise negative.  He denies any other new medical problems since our last visit.     I reviewed his current imaging and reviewed the results with him which shows the disease in his liver and adrenal glands to be stable.  He has some minor right hilar adenopathy which is also stable. Radiology felt a part of the of the treatment defect in the liver was a little larger, but to my review there is no real change and certainly no areas of increase with arterial enhancement and those changes look like typical evolution after Y90 therapy.  There is nothing new to suggest progression of his disease. His lab shows his HBV to be undetectable. His other labs are unremarkable with mild anemia and normal liver function tests. His AFP is stable at 24.     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  The patient is maintaining an normal performance status and has no apparent immune toxicity from the nivolumab.  We will continue on his current dose and schedule every month and reevaluate the status of his disease again in another 3 to 4 months.    Phone call duration: 30 minutes    Rohit Kyle MD

## 2020-06-16 ENCOUNTER — TELEPHONE (OUTPATIENT)
Dept: TRANSPLANT | Facility: CLINIC | Age: 55
End: 2020-06-16

## 2020-06-16 RX ORDER — SODIUM CHLORIDE 9 MG/ML
1000 INJECTION, SOLUTION INTRAVENOUS CONTINUOUS PRN
Status: CANCELLED
Start: 2020-06-18

## 2020-06-16 RX ORDER — EPINEPHRINE 1 MG/ML
0.3 INJECTION, SOLUTION INTRAMUSCULAR; SUBCUTANEOUS EVERY 5 MIN PRN
Status: CANCELLED | OUTPATIENT
Start: 2020-06-18

## 2020-06-16 RX ORDER — MEPERIDINE HYDROCHLORIDE 25 MG/ML
25 INJECTION INTRAMUSCULAR; INTRAVENOUS; SUBCUTANEOUS EVERY 30 MIN PRN
Status: CANCELLED | OUTPATIENT
Start: 2020-06-18

## 2020-06-16 RX ORDER — LORAZEPAM 2 MG/ML
0.5 INJECTION INTRAMUSCULAR EVERY 4 HOURS PRN
Status: CANCELLED
Start: 2020-06-18

## 2020-06-16 RX ORDER — EPINEPHRINE 0.3 MG/.3ML
0.3 INJECTION SUBCUTANEOUS EVERY 5 MIN PRN
Status: CANCELLED | OUTPATIENT
Start: 2020-06-18

## 2020-06-16 RX ORDER — METHYLPREDNISOLONE SODIUM SUCCINATE 125 MG/2ML
125 INJECTION, POWDER, LYOPHILIZED, FOR SOLUTION INTRAMUSCULAR; INTRAVENOUS
Status: CANCELLED
Start: 2020-06-18

## 2020-06-16 RX ORDER — ALBUTEROL SULFATE 90 UG/1
1-2 AEROSOL, METERED RESPIRATORY (INHALATION)
Status: CANCELLED
Start: 2020-06-18

## 2020-06-16 RX ORDER — DIPHENHYDRAMINE HYDROCHLORIDE 50 MG/ML
50 INJECTION INTRAMUSCULAR; INTRAVENOUS
Status: CANCELLED
Start: 2020-06-18

## 2020-06-16 RX ORDER — ALBUTEROL SULFATE 0.83 MG/ML
2.5 SOLUTION RESPIRATORY (INHALATION)
Status: CANCELLED | OUTPATIENT
Start: 2020-06-18

## 2020-06-16 NOTE — TELEPHONE ENCOUNTER
Left a voicemail for patient to convert their in person visit with Dr. Trujillo on 6/18/20 to a video visit.

## 2020-06-17 NOTE — TELEPHONE ENCOUNTER
Left a voicemail, on both home and cell phone, for patient to convert their in person visit with Dr Trujillo on 6/18/20 to a video visit.

## 2020-06-18 ENCOUNTER — INFUSION THERAPY VISIT (OUTPATIENT)
Dept: ONCOLOGY | Facility: CLINIC | Age: 55
End: 2020-06-18
Attending: INTERNAL MEDICINE
Payer: COMMERCIAL

## 2020-06-18 VITALS
DIASTOLIC BLOOD PRESSURE: 78 MMHG | RESPIRATION RATE: 16 BRPM | TEMPERATURE: 98.7 F | HEART RATE: 69 BPM | BODY MASS INDEX: 25.19 KG/M2 | OXYGEN SATURATION: 97 % | SYSTOLIC BLOOD PRESSURE: 134 MMHG | WEIGHT: 165.7 LBS

## 2020-06-18 DIAGNOSIS — C22.0 HCC (HEPATOCELLULAR CARCINOMA) (H): Primary | ICD-10-CM

## 2020-06-18 LAB
AFP SERPL-MCNC: 24.4 UG/L (ref 0–8)
ALBUMIN SERPL-MCNC: 3.6 G/DL (ref 3.4–5)
ALP SERPL-CCNC: 87 U/L (ref 40–150)
ALT SERPL W P-5'-P-CCNC: 29 U/L (ref 0–70)
ANION GAP SERPL CALCULATED.3IONS-SCNC: 5 MMOL/L (ref 3–14)
AST SERPL W P-5'-P-CCNC: 25 U/L (ref 0–45)
BASOPHILS # BLD AUTO: 0.1 10E9/L (ref 0–0.2)
BASOPHILS NFR BLD AUTO: 1.6 %
BILIRUB SERPL-MCNC: 0.5 MG/DL (ref 0.2–1.3)
BUN SERPL-MCNC: 10 MG/DL (ref 7–30)
CALCIUM SERPL-MCNC: 8.9 MG/DL (ref 8.5–10.1)
CHLORIDE SERPL-SCNC: 108 MMOL/L (ref 94–109)
CO2 SERPL-SCNC: 25 MMOL/L (ref 20–32)
CREAT SERPL-MCNC: 0.73 MG/DL (ref 0.66–1.25)
DIFFERENTIAL METHOD BLD: ABNORMAL
EOSINOPHIL # BLD AUTO: 1 10E9/L (ref 0–0.7)
EOSINOPHIL NFR BLD AUTO: 22.9 %
ERYTHROCYTE [DISTWIDTH] IN BLOOD BY AUTOMATED COUNT: 13.2 % (ref 10–15)
GFR SERPL CREATININE-BSD FRML MDRD: >90 ML/MIN/{1.73_M2}
GLUCOSE SERPL-MCNC: 98 MG/DL (ref 70–99)
HCT VFR BLD AUTO: 39.5 % (ref 40–53)
HGB BLD-MCNC: 12.8 G/DL (ref 13.3–17.7)
IMM GRANULOCYTES # BLD: 0 10E9/L (ref 0–0.4)
IMM GRANULOCYTES NFR BLD: 0.2 %
LYMPHOCYTES # BLD AUTO: 1.2 10E9/L (ref 0.8–5.3)
LYMPHOCYTES NFR BLD AUTO: 26.5 %
MCH RBC QN AUTO: 27.4 PG (ref 26.5–33)
MCHC RBC AUTO-ENTMCNC: 32.4 G/DL (ref 31.5–36.5)
MCV RBC AUTO: 85 FL (ref 78–100)
MONOCYTES # BLD AUTO: 0.4 10E9/L (ref 0–1.3)
MONOCYTES NFR BLD AUTO: 8.5 %
NEUTROPHILS # BLD AUTO: 1.8 10E9/L (ref 1.6–8.3)
NEUTROPHILS NFR BLD AUTO: 40.3 %
NRBC # BLD AUTO: 0 10*3/UL
NRBC BLD AUTO-RTO: 0 /100
PLATELET # BLD AUTO: 184 10E9/L (ref 150–450)
POTASSIUM SERPL-SCNC: 4 MMOL/L (ref 3.4–5.3)
PROT SERPL-MCNC: 7.9 G/DL (ref 6.8–8.8)
RBC # BLD AUTO: 4.67 10E12/L (ref 4.4–5.9)
SODIUM SERPL-SCNC: 138 MMOL/L (ref 133–144)
TSH SERPL DL<=0.005 MIU/L-ACNC: 0.51 MU/L (ref 0.4–4)
WBC # BLD AUTO: 4.5 10E9/L (ref 4–11)

## 2020-06-18 PROCEDURE — 25800030 ZZH RX IP 258 OP 636: Mod: ZF | Performed by: INTERNAL MEDICINE

## 2020-06-18 PROCEDURE — 80053 COMPREHEN METABOLIC PANEL: CPT | Performed by: INTERNAL MEDICINE

## 2020-06-18 PROCEDURE — 25000128 H RX IP 250 OP 636: Mod: ZF | Performed by: INTERNAL MEDICINE

## 2020-06-18 PROCEDURE — 85025 COMPLETE CBC W/AUTO DIFF WBC: CPT | Performed by: INTERNAL MEDICINE

## 2020-06-18 PROCEDURE — 96413 CHEMO IV INFUSION 1 HR: CPT

## 2020-06-18 PROCEDURE — 82105 ALPHA-FETOPROTEIN SERUM: CPT | Performed by: INTERNAL MEDICINE

## 2020-06-18 PROCEDURE — 84443 ASSAY THYROID STIM HORMONE: CPT | Performed by: INTERNAL MEDICINE

## 2020-06-18 RX ADMIN — SODIUM CHLORIDE 480 MG: 9 INJECTION, SOLUTION INTRAVENOUS at 10:25

## 2020-06-18 ASSESSMENT — PAIN SCALES - GENERAL: PAINLEVEL: NO PAIN (0)

## 2020-06-18 NOTE — PROGRESS NOTES
Infusion Nursing Note:  Preeti Pascual presents today for Cycle 21 Nivolumab.    Patient seen by provider today: No   present during visit today: Not Applicable.    Note: Pt arrives to infusion today feeling well. Denies fevers, chills. No changes since telephone visit with Dr. Kyle on 6/15.     Intravenous Access:  Peripheral IV placed.    Treatment Conditions:  Lab Results   Component Value Date    HGB 12.8 06/18/2020     Lab Results   Component Value Date    WBC 4.5 06/18/2020      Lab Results   Component Value Date    ANEU 1.8 06/18/2020     Lab Results   Component Value Date     06/18/2020      Lab Results   Component Value Date     06/18/2020                   Lab Results   Component Value Date    POTASSIUM 4.0 06/18/2020                 Lab Results   Component Value Date    CR 0.73 06/18/2020                   Lab Results   Component Value Date    STACEY 8.9 06/18/2020                Lab Results   Component Value Date    BILITOTAL 0.5 06/18/2020           Lab Results   Component Value Date    ALBUMIN 3.6 06/18/2020                    Lab Results   Component Value Date    ALT 29 06/18/2020           Lab Results   Component Value Date    AST 25 06/18/2020     Results reviewed, labs MET treatment parameters, ok to proceed with treatment.    Post Infusion Assessment:  Patient tolerated infusion without incident.  Blood return noted pre and post infusion.  Site patent and intact, free from redness, edema or discomfort.  No evidence of extravasations.  Access discontinued per protocol.     Discharge Plan:   Patient declined prescription refills.  Copy of AVS reviewed with patient and/or family.  Patient will return 7/16 for next appointment.  Patient discharged in stable condition accompanied by: self.  Departure Mode: Ambulatory.  Face to Face time: 0.    Mere Gutierres RN

## 2020-06-18 NOTE — NURSING NOTE
Chief Complaint   Patient presents with     Blood Draw     Labs drawn via PIV placed by RN in lab. VS taken. Patient checked in for next appt.     Labs drawn from PIV placed by RN. Line flushed with saline. Vitals taken. Pt checked in for appointment.    Lela Coats RN

## 2020-06-18 NOTE — PATIENT INSTRUCTIONS
[facility] Triage and after hours / weekends / holidays:  [phone]    Please call the triage or after hours line if you experience a temperature greater than or equal to 100.5, shaking chills, have uncontrolled nausea, vomiting and/or diarrhea, dizziness, shortness of breath, chest pain, bleeding, unexplained bruising, or if you have any other new/concerning symptoms, questions or concerns.      If you are having any concerning symptoms or wish to speak to a provider before your next infusion visit, please call your care coordinator or triage to notify them so we can adequately serve you.     If you need a refill on a narcotic prescription or other medication, please call before your infusion appointment.           June 2020 Sunday Monday Tuesday Wednesday Thursday Friday Saturday        1     2     3     4     5     6       7     8     9     10     11    [facility]   8:30 AM   (15 min.)    [facility]   [facility]    CT CHEST ABDOMEN PELVIS WWO   9:00 AM   (20 min.)   [facility]   [facility] CT 12     13       14     15    TELEPHONE VISIT RETURN   7:45 AM   (30 min.)   Agustin Kyle MD   [facility] 16     17     18    [facility] D[facility]   9:15 AM   (15 min.)    [facility]   [facility] Draw    [facility] ONC INFUSION 60  10:00 AM   (60 min.)    ONCOLOGY INFUSION   [facility] 19     20       21     22     23     24     25     26     27       28     29     30                                     July 2020 Sunday Monday Tuesday Wednesday Thursday Friday Saturday                  1     2     3     4       5     6     7     8     9     10     11       12     13     14     15     16    [facility]   9:00 AM   (15 min.)    [facility]   [facility]    [facility] ONC INFUSION 60   9:30 AM   (60 min.)    ONCOLOGY INFUSION   [facility] 17     18       19     20      21     22     23     24     25       26     27     28     29     30     31                         Recent Results (from the past 24 hour(s))   Comprehensive metabolic panel    Collection Time: 06/18/20  9:22 AM   Result Value Ref Range    Sodium 138 133 - 144 mmol/L    Potassium 4.0 3.4 - 5.3 mmol/L    Chloride 108 94 - 109 mmol/L    Carbon Dioxide 25 20 - 32 mmol/L    Anion Gap 5 3 - 14 mmol/L    Glucose 98 70 - 99 mg/dL    Urea Nitrogen 10 7 - 30 mg/dL    Creatinine 0.73 0.66 - 1.25 mg/dL    GFR Estimate >90 >60 mL/min/[1.73_m2]    GFR Estimate If Black >90 >60 mL/min/[1.73_m2]    Calcium 8.9 8.5 - 10.1 mg/dL    Bilirubin Total 0.5 0.2 - 1.3 mg/dL    Albumin 3.6 3.4 - 5.0 g/dL    Protein Total 7.9 6.8 - 8.8 g/dL    Alkaline Phosphatase 87 40 - 150 U/L    ALT 29 0 - 70 U/L    AST 25 0 - 45 U/L   TSH with free T4 reflex    Collection Time: 06/18/20  9:22 AM   Result Value Ref Range    TSH 0.51 0.40 - 4.00 mU/L   CBC with platelets differential    Collection Time: 06/18/20  9:22 AM   Result Value Ref Range    WBC 4.5 4.0 - 11.0 10e9/L    RBC Count 4.67 4.4 - 5.9 10e12/L    Hemoglobin 12.8 (L) 13.3 - 17.7 g/dL    Hematocrit 39.5 (L) 40.0 - 53.0 %    MCV 85 78 - 100 fl    MCH 27.4 26.5 - 33.0 pg    MCHC 32.4 31.5 - 36.5 g/dL    RDW 13.2 10.0 - 15.0 %    Platelet Count 184 150 - 450 10e9/L    Diff Method Automated Method     % Neutrophils 40.3 %    % Lymphocytes 26.5 %    % Monocytes 8.5 %    % Eosinophils 22.9 %    % Basophils 1.6 %    % Immature Granulocytes 0.2 %    Nucleated RBCs 0 0 /100    Absolute Neutrophil 1.8 1.6 - 8.3 10e9/L    Absolute Lymphocytes 1.2 0.8 - 5.3 10e9/L    Absolute Monocytes 0.4 0.0 - 1.3 10e9/L    Absolute Eosinophils 1.0 (H) 0.0 - 0.7 10e9/L    Absolute Basophils 0.1 0.0 - 0.2 10e9/L    Abs Immature Granulocytes 0.0 0 - 0.4 10e9/L    Absolute Nucleated RBC 0.0

## 2020-07-15 RX ORDER — EPINEPHRINE 0.3 MG/.3ML
0.3 INJECTION SUBCUTANEOUS EVERY 5 MIN PRN
Status: CANCELLED | OUTPATIENT
Start: 2020-07-16

## 2020-07-15 RX ORDER — MEPERIDINE HYDROCHLORIDE 25 MG/ML
25 INJECTION INTRAMUSCULAR; INTRAVENOUS; SUBCUTANEOUS EVERY 30 MIN PRN
Status: CANCELLED | OUTPATIENT
Start: 2020-07-16

## 2020-07-15 RX ORDER — SODIUM CHLORIDE 9 MG/ML
1000 INJECTION, SOLUTION INTRAVENOUS CONTINUOUS PRN
Status: CANCELLED
Start: 2020-07-16

## 2020-07-15 RX ORDER — DIPHENHYDRAMINE HYDROCHLORIDE 50 MG/ML
50 INJECTION INTRAMUSCULAR; INTRAVENOUS
Status: CANCELLED
Start: 2020-07-16

## 2020-07-15 RX ORDER — EPINEPHRINE 1 MG/ML
0.3 INJECTION, SOLUTION INTRAMUSCULAR; SUBCUTANEOUS EVERY 5 MIN PRN
Status: CANCELLED | OUTPATIENT
Start: 2020-07-16

## 2020-07-15 RX ORDER — ALBUTEROL SULFATE 90 UG/1
1-2 AEROSOL, METERED RESPIRATORY (INHALATION)
Status: CANCELLED
Start: 2020-07-16

## 2020-07-15 RX ORDER — LORAZEPAM 2 MG/ML
0.5 INJECTION INTRAMUSCULAR EVERY 4 HOURS PRN
Status: CANCELLED
Start: 2020-07-16

## 2020-07-15 RX ORDER — METHYLPREDNISOLONE SODIUM SUCCINATE 125 MG/2ML
125 INJECTION, POWDER, LYOPHILIZED, FOR SOLUTION INTRAMUSCULAR; INTRAVENOUS
Status: CANCELLED
Start: 2020-07-16

## 2020-07-15 RX ORDER — ALBUTEROL SULFATE 0.83 MG/ML
2.5 SOLUTION RESPIRATORY (INHALATION)
Status: CANCELLED | OUTPATIENT
Start: 2020-07-16

## 2020-07-16 ENCOUNTER — INFUSION THERAPY VISIT (OUTPATIENT)
Dept: ONCOLOGY | Facility: CLINIC | Age: 55
End: 2020-07-16
Attending: INTERNAL MEDICINE
Payer: COMMERCIAL

## 2020-07-16 ENCOUNTER — APPOINTMENT (OUTPATIENT)
Dept: LAB | Facility: CLINIC | Age: 55
End: 2020-07-16
Attending: INTERNAL MEDICINE
Payer: COMMERCIAL

## 2020-07-16 VITALS
OXYGEN SATURATION: 98 % | TEMPERATURE: 98 F | RESPIRATION RATE: 16 BRPM | BODY MASS INDEX: 24.78 KG/M2 | DIASTOLIC BLOOD PRESSURE: 78 MMHG | HEART RATE: 68 BPM | SYSTOLIC BLOOD PRESSURE: 125 MMHG | WEIGHT: 163 LBS

## 2020-07-16 DIAGNOSIS — C22.0 HCC (HEPATOCELLULAR CARCINOMA) (H): Primary | ICD-10-CM

## 2020-07-16 LAB
ALBUMIN SERPL-MCNC: 3.5 G/DL (ref 3.4–5)
ALP SERPL-CCNC: 99 U/L (ref 40–150)
ALT SERPL W P-5'-P-CCNC: 36 U/L (ref 0–70)
ANION GAP SERPL CALCULATED.3IONS-SCNC: 6 MMOL/L (ref 3–14)
AST SERPL W P-5'-P-CCNC: 31 U/L (ref 0–45)
BILIRUB SERPL-MCNC: 0.5 MG/DL (ref 0.2–1.3)
BUN SERPL-MCNC: 12 MG/DL (ref 7–30)
CALCIUM SERPL-MCNC: 8.4 MG/DL (ref 8.5–10.1)
CHLORIDE SERPL-SCNC: 106 MMOL/L (ref 94–109)
CO2 SERPL-SCNC: 25 MMOL/L (ref 20–32)
CREAT SERPL-MCNC: 0.66 MG/DL (ref 0.66–1.25)
GFR SERPL CREATININE-BSD FRML MDRD: >90 ML/MIN/{1.73_M2}
GLUCOSE SERPL-MCNC: 92 MG/DL (ref 70–99)
POTASSIUM SERPL-SCNC: 3.9 MMOL/L (ref 3.4–5.3)
PROT SERPL-MCNC: 7.8 G/DL (ref 6.8–8.8)
SODIUM SERPL-SCNC: 137 MMOL/L (ref 133–144)
TSH SERPL DL<=0.005 MIU/L-ACNC: 0.72 MU/L (ref 0.4–4)

## 2020-07-16 PROCEDURE — 25000128 H RX IP 250 OP 636: Mod: ZF | Performed by: INTERNAL MEDICINE

## 2020-07-16 PROCEDURE — 84443 ASSAY THYROID STIM HORMONE: CPT | Performed by: INTERNAL MEDICINE

## 2020-07-16 PROCEDURE — 96413 CHEMO IV INFUSION 1 HR: CPT

## 2020-07-16 PROCEDURE — 80053 COMPREHEN METABOLIC PANEL: CPT | Performed by: INTERNAL MEDICINE

## 2020-07-16 PROCEDURE — 25800030 ZZH RX IP 258 OP 636: Mod: ZF | Performed by: INTERNAL MEDICINE

## 2020-07-16 RX ADMIN — SODIUM CHLORIDE 480 MG: 9 INJECTION, SOLUTION INTRAVENOUS at 10:26

## 2020-07-16 ASSESSMENT — PAIN SCALES - GENERAL: PAINLEVEL: NO PAIN (0)

## 2020-07-16 NOTE — PATIENT INSTRUCTIONS
[facility] Triage and after hours / weekends / holidays:  [phone]    Please call the triage or after hours line if you experience a temperature greater than or equal to 100.5, shaking chills, have uncontrolled nausea, vomiting and/or diarrhea, dizziness, shortness of breath, chest pain, bleeding, unexplained bruising, or if you have any other new/concerning symptoms, questions or concerns.      If you are having any concerning symptoms or wish to speak to a provider before your next infusion visit, please call your care coordinator or triage to notify them so we can adequately serve you.     If you need a refill on a narcotic prescription or other medication, please call before your infusion appointment.

## 2020-07-16 NOTE — NURSING NOTE
Chief Complaint   Patient presents with     Blood Draw     Labs drawn via PIV placed by RN in lab. VS taken.      Howie Henderson RN

## 2020-07-16 NOTE — PROGRESS NOTES
Infusion Nursing Note:  Preeti Pascual presents today for Cycle 22 Nivolumab.      Note: Preeti feels well today and has no concerns.      Intravenous Access:  Peripheral IV placed in lab    Treatment Conditions:  Lab Results   Component Value Date     07/16/2020                   Lab Results   Component Value Date    POTASSIUM 3.9 07/16/2020           No results found for: MAG         Lab Results   Component Value Date    CR 0.66 07/16/2020                   Lab Results   Component Value Date    STACEY 8.4 07/16/2020                Lab Results   Component Value Date    BILITOTAL 0.5 07/16/2020           Lab Results   Component Value Date    ALBUMIN 3.5 07/16/2020                    Lab Results   Component Value Date    ALT 36 07/16/2020           Lab Results   Component Value Date    AST 31 07/16/2020       Results reviewed, labs MET treatment parameters, ok to proceed with treatment.      Post Infusion Assessment:  Patient tolerated infusion without incident.       Discharge Plan:   Patient declined prescription refills.  Copy of AVS reviewed with patient and/or family.  Patient will return 8/13/20 for cycle 23    Face to Face time: 0.    Nahomi Allen RN

## 2020-07-24 ENCOUNTER — DOCUMENTATION ONLY (OUTPATIENT)
Dept: CARE COORDINATION | Facility: CLINIC | Age: 55
End: 2020-07-24

## 2020-08-12 RX ORDER — SODIUM CHLORIDE 9 MG/ML
1000 INJECTION, SOLUTION INTRAVENOUS CONTINUOUS PRN
Status: CANCELLED
Start: 2020-08-13

## 2020-08-12 RX ORDER — LORAZEPAM 2 MG/ML
0.5 INJECTION INTRAMUSCULAR EVERY 4 HOURS PRN
Status: CANCELLED
Start: 2020-08-13

## 2020-08-12 RX ORDER — METHYLPREDNISOLONE SODIUM SUCCINATE 125 MG/2ML
125 INJECTION, POWDER, LYOPHILIZED, FOR SOLUTION INTRAMUSCULAR; INTRAVENOUS
Status: CANCELLED
Start: 2020-08-13

## 2020-08-12 RX ORDER — EPINEPHRINE 1 MG/ML
0.3 INJECTION, SOLUTION INTRAMUSCULAR; SUBCUTANEOUS EVERY 5 MIN PRN
Status: CANCELLED | OUTPATIENT
Start: 2020-08-13

## 2020-08-12 RX ORDER — EPINEPHRINE 0.3 MG/.3ML
0.3 INJECTION SUBCUTANEOUS EVERY 5 MIN PRN
Status: CANCELLED | OUTPATIENT
Start: 2020-08-13

## 2020-08-12 RX ORDER — ALBUTEROL SULFATE 90 UG/1
1-2 AEROSOL, METERED RESPIRATORY (INHALATION)
Status: CANCELLED
Start: 2020-08-13

## 2020-08-12 RX ORDER — DIPHENHYDRAMINE HYDROCHLORIDE 50 MG/ML
50 INJECTION INTRAMUSCULAR; INTRAVENOUS
Status: CANCELLED
Start: 2020-08-13

## 2020-08-12 RX ORDER — ALBUTEROL SULFATE 0.83 MG/ML
2.5 SOLUTION RESPIRATORY (INHALATION)
Status: CANCELLED | OUTPATIENT
Start: 2020-08-13

## 2020-08-12 RX ORDER — MEPERIDINE HYDROCHLORIDE 25 MG/ML
25 INJECTION INTRAMUSCULAR; INTRAVENOUS; SUBCUTANEOUS EVERY 30 MIN PRN
Status: CANCELLED | OUTPATIENT
Start: 2020-08-13

## 2020-08-13 ENCOUNTER — INFUSION THERAPY VISIT (OUTPATIENT)
Dept: ONCOLOGY | Facility: CLINIC | Age: 55
End: 2020-08-13
Attending: INTERNAL MEDICINE
Payer: COMMERCIAL

## 2020-08-13 VITALS
TEMPERATURE: 98.1 F | HEART RATE: 64 BPM | RESPIRATION RATE: 16 BRPM | OXYGEN SATURATION: 98 % | BODY MASS INDEX: 24.81 KG/M2 | DIASTOLIC BLOOD PRESSURE: 79 MMHG | WEIGHT: 163.2 LBS | SYSTOLIC BLOOD PRESSURE: 143 MMHG

## 2020-08-13 DIAGNOSIS — C22.0 HCC (HEPATOCELLULAR CARCINOMA) (H): Primary | ICD-10-CM

## 2020-08-13 LAB
ALBUMIN SERPL-MCNC: 3.4 G/DL (ref 3.4–5)
ALP SERPL-CCNC: 102 U/L (ref 40–150)
ALT SERPL W P-5'-P-CCNC: 43 U/L (ref 0–70)
ANION GAP SERPL CALCULATED.3IONS-SCNC: 4 MMOL/L (ref 3–14)
AST SERPL W P-5'-P-CCNC: 30 U/L (ref 0–45)
BILIRUB SERPL-MCNC: 0.4 MG/DL (ref 0.2–1.3)
BUN SERPL-MCNC: 9 MG/DL (ref 7–30)
CALCIUM SERPL-MCNC: 8.4 MG/DL (ref 8.5–10.1)
CHLORIDE SERPL-SCNC: 109 MMOL/L (ref 94–109)
CO2 SERPL-SCNC: 25 MMOL/L (ref 20–32)
CREAT SERPL-MCNC: 0.77 MG/DL (ref 0.66–1.25)
GFR SERPL CREATININE-BSD FRML MDRD: >90 ML/MIN/{1.73_M2}
GLUCOSE SERPL-MCNC: 96 MG/DL (ref 70–99)
POTASSIUM SERPL-SCNC: 4 MMOL/L (ref 3.4–5.3)
PROT SERPL-MCNC: 7.7 G/DL (ref 6.8–8.8)
SODIUM SERPL-SCNC: 138 MMOL/L (ref 133–144)
TSH SERPL DL<=0.005 MIU/L-ACNC: 0.64 MU/L (ref 0.4–4)

## 2020-08-13 PROCEDURE — 84443 ASSAY THYROID STIM HORMONE: CPT | Performed by: INTERNAL MEDICINE

## 2020-08-13 PROCEDURE — 80053 COMPREHEN METABOLIC PANEL: CPT | Performed by: INTERNAL MEDICINE

## 2020-08-13 PROCEDURE — 25800030 ZZH RX IP 258 OP 636: Mod: ZF | Performed by: INTERNAL MEDICINE

## 2020-08-13 PROCEDURE — 25000128 H RX IP 250 OP 636: Mod: ZF | Performed by: INTERNAL MEDICINE

## 2020-08-13 PROCEDURE — 96413 CHEMO IV INFUSION 1 HR: CPT

## 2020-08-13 RX ADMIN — SODIUM CHLORIDE 480 MG: 9 INJECTION, SOLUTION INTRAVENOUS at 10:24

## 2020-08-13 RX ADMIN — SODIUM CHLORIDE 250 ML: 9 INJECTION, SOLUTION INTRAVENOUS at 10:24

## 2020-08-13 ASSESSMENT — PAIN SCALES - GENERAL: PAINLEVEL: NO PAIN (0)

## 2020-08-13 NOTE — PROGRESS NOTES
Infusion Nursing Note:  Preeti Pascual presents today for C23 Nivolumab.    Patient seen by provider today: No   present during visit today: Not Applicable.    Note: Pt arrived to infusion with no new issues.  No s/s of infection, ok for treatment.    Intravenous Access:  Peripheral IV placed in lab.    Treatment Conditions:  Lab Results   Component Value Date     08/13/2020                   Lab Results   Component Value Date    POTASSIUM 4.0 08/13/2020           No results found for: MAG         Lab Results   Component Value Date    CR 0.77 08/13/2020                   Lab Results   Component Value Date    STACEY 8.4 08/13/2020                Lab Results   Component Value Date    BILITOTAL 0.4 08/13/2020           Lab Results   Component Value Date    ALBUMIN 3.4 08/13/2020                    Lab Results   Component Value Date    ALT 43 08/13/2020           Lab Results   Component Value Date    AST 30 08/13/2020       Results reviewed, labs MET treatment parameters, ok to proceed with treatment.      Post Infusion Assessment:  Patient tolerated infusion without incident.  Blood return noted pre and post infusion.  Site patent and intact, free from redness, edema or discomfort.  No evidence of extravasations.  Access discontinued per protocol.       Discharge Plan:   Patient declined prescription refills.  Discharge instructions reviewed with: Patient.  Patient and/or family verbalized understanding of discharge instructions and all questions answered.  AVS to patient via [employer].  Patient will return 9/10 for next appointment.   Patient discharged in stable condition accompanied by: self.  Departure Mode: Ambulatory.    Agustina Rivera RN

## 2020-08-13 NOTE — PATIENT INSTRUCTIONS
Contact Numbers  [facility]: [phone]    After Hours:  [phone]  Triage: [phone]    Please call the [facility] Triage line if you experience a temperature greater than or equal to 100.5, shaking chills, have uncontrolled nausea, vomiting and/or diarrhea, dizziness, shortness of breath, chest pain, bleeding, unexplained bruising, or if you have any other new/concerning symptoms, questions or concerns.     If it is after hours, weekends, or holidays, please call the main hospital  at  [phone] and ask to speak to the Oncology doctor on call.     If you are having any concerning symptoms or wish to speak to a provider before your next infusion visit, please call your care coordinator or triage to notify them so we can adequately serve you.     If you need a refill on a narcotic prescription or other medication, please call triage before your infusion appointment.

## 2020-08-14 ENCOUNTER — TELEPHONE (OUTPATIENT)
Dept: GASTROENTEROLOGY | Facility: CLINIC | Age: 55
End: 2020-08-14

## 2020-08-20 ENCOUNTER — VIRTUAL VISIT (OUTPATIENT)
Dept: GASTROENTEROLOGY | Facility: CLINIC | Age: 55
End: 2020-08-20
Attending: INTERNAL MEDICINE
Payer: COMMERCIAL

## 2020-08-20 DIAGNOSIS — B18.1 CHRONIC VIRAL HEPATITIS B WITHOUT DELTA AGENT AND WITHOUT COMA (H): Primary | ICD-10-CM

## 2020-08-20 DIAGNOSIS — K74.60 CIRRHOSIS OF LIVER WITHOUT ASCITES, UNSPECIFIED HEPATIC CIRRHOSIS TYPE (H): ICD-10-CM

## 2020-08-20 ASSESSMENT — PAIN SCALES - GENERAL: PAINLEVEL: NO PAIN (0)

## 2020-08-20 NOTE — LETTER
"    8/20/2020         RE: Preeti Pascual  [address]  [place] [address]        Dear Colleague,    Thank you for referring your patient, Preeti Pascual, to the [facility]PAT[facility]. Please see a copy of my visit note below.      PLEASE TEXT [phone] AND USE DOXIMITY      Preeti Pascual is a 54 year old male who is being evaluated via a billable video visit.      The patient has been notified of following:     \"This video visit will be conducted via a call between you and your physician/provider. We have found that certain health care needs can be provided without the need for an in-person physical exam.  This service lets us provide the care you need with a video conversation.  If a prescription is necessary we can send it directly to your pharmacy.  If lab work is needed we can place an order for that and you can then stop by our lab to have the test done at a later time.    Video visits are billed at different rates depending on your insurance coverage.  Please reach out to your insurance provider with any questions.    If during the course of the call the physician/provider feels a video visit is not appropriate, you will not be charged for this service.\"    Patient has given verbal consent for Video visit? Yes  How would you like to obtain your AVS? MyChart    Will anyone else be joining your video visit? No        Video-Visit Details    HISTORY OF PRESENT ILLNESS:  I had the pleasure of seeing Preeti Pascual for followup in the Liver Clinic at the [facility] on August 20, 2020.  Mr. Pascual returns for followup of hepatocellular carcinoma complicating chronic hepatitis B.  He is on nivolumab therapy and seems to be getting a good response.  Indeed he was first diagnosed with HCC now 4 years ago.      He feels well.  He denies any abdominal pain, itching or skin rash or fatigue.  He denies any increased abdominal girth or lower extremity edema.      He denies any fevers or " chills, cough or shortness of breath.  He denies any nausea or vomiting, diarrhea or constipation.  His appetite has been good, and his weight has been stable.      There have been no other new events since he was last seen.      He has now been on nivolumab therapy for almost 2 years.      Current Outpatient Medications   Medication     fluticasone (FLONASE) 50 MCG/ACT nasal spray     VENTOLIN  (90 Base) MCG/ACT inhaler     No current facility-administered medications for this visit.      On physical examination, he looks well.  HEENT exam shows no scleral icterus or temporal muscle wasting.  Neurologic exam shows no asterixis.    Recent Results (from the past 168 hour(s))   Comprehensive metabolic panel    Collection Time: 08/13/20  9:20 AM   Result Value Ref Range    Sodium 138 133 - 144 mmol/L    Potassium 4.0 3.4 - 5.3 mmol/L    Chloride 109 94 - 109 mmol/L    Carbon Dioxide 25 20 - 32 mmol/L    Anion Gap 4 3 - 14 mmol/L    Glucose 96 70 - 99 mg/dL    Urea Nitrogen 9 7 - 30 mg/dL    Creatinine 0.77 0.66 - 1.25 mg/dL    GFR Estimate >90 >60 mL/min/[1.73_m2]    GFR Estimate If Black >90 >60 mL/min/[1.73_m2]    Calcium 8.4 (L) 8.5 - 10.1 mg/dL    Bilirubin Total 0.4 0.2 - 1.3 mg/dL    Albumin 3.4 3.4 - 5.0 g/dL    Protein Total 7.7 6.8 - 8.8 g/dL    Alkaline Phosphatase 102 40 - 150 U/L    ALT 43 0 - 70 U/L    AST 30 0 - 45 U/L   TSH with free T4 reflex    Collection Time: 08/13/20  9:20 AM   Result Value Ref Range    TSH 0.64 0.40 - 4.00 mU/L      My impression is that Mr. Pascual is doing well.  His most recent alpha-fetoprotein was 24.4.  I did review his CT scan of his chest, abdomen and pelvis.  His disease looks stable.  I know Dr. Kyle is planning on continuing the nivolumab given his excellent response to date.  My plan will be to see him back in the clinic in 6 months for repeat blood work.  Currently all complications are well addressed.     Thank you very much for allowing me to participate in the  care of this patient.  If you have any questions regarding my recommendations, please do not hesitate to contact me.         Trevor Trujillo MD      Professor of Medicine  [employer]      Executive Medical Director, Solid Organ Transplant Program  [facility]    Type of service:  Video Visit    Video Start Time: 8:42 AM  Video End Time: 8:55 AM    Originating Location (pt. Location): Home    Distant Location (provider location):  [facility]PAT[facility]     Platform used for Video Visit: Doximity          Again, thank you for allowing me to participate in the care of your patient.        Sincerely,        Trevor Trujillo MD

## 2020-09-10 ENCOUNTER — APPOINTMENT (OUTPATIENT)
Dept: LAB | Facility: CLINIC | Age: 55
End: 2020-09-10
Attending: INTERNAL MEDICINE
Payer: COMMERCIAL

## 2020-09-10 ENCOUNTER — INFUSION THERAPY VISIT (OUTPATIENT)
Dept: ONCOLOGY | Facility: CLINIC | Age: 55
End: 2020-09-10
Attending: INTERNAL MEDICINE
Payer: COMMERCIAL

## 2020-09-10 VITALS
TEMPERATURE: 98 F | RESPIRATION RATE: 18 BRPM | SYSTOLIC BLOOD PRESSURE: 135 MMHG | OXYGEN SATURATION: 97 % | WEIGHT: 164.9 LBS | HEART RATE: 60 BPM | BODY MASS INDEX: 25.07 KG/M2 | DIASTOLIC BLOOD PRESSURE: 69 MMHG

## 2020-09-10 DIAGNOSIS — C22.0 HCC (HEPATOCELLULAR CARCINOMA) (H): Primary | ICD-10-CM

## 2020-09-10 LAB
AFP SERPL-MCNC: 44.5 UG/L (ref 0–8)
ALBUMIN SERPL-MCNC: 3.4 G/DL (ref 3.4–5)
ALP SERPL-CCNC: 111 U/L (ref 40–150)
ALT SERPL W P-5'-P-CCNC: 39 U/L (ref 0–70)
ANION GAP SERPL CALCULATED.3IONS-SCNC: 6 MMOL/L (ref 3–14)
AST SERPL W P-5'-P-CCNC: 28 U/L (ref 0–45)
BASOPHILS # BLD AUTO: 0.1 10E9/L (ref 0–0.2)
BASOPHILS NFR BLD AUTO: 1.3 %
BILIRUB SERPL-MCNC: 0.4 MG/DL (ref 0.2–1.3)
BUN SERPL-MCNC: 13 MG/DL (ref 7–30)
CALCIUM SERPL-MCNC: 8.6 MG/DL (ref 8.5–10.1)
CHLORIDE SERPL-SCNC: 108 MMOL/L (ref 94–109)
CO2 SERPL-SCNC: 24 MMOL/L (ref 20–32)
CREAT SERPL-MCNC: 0.7 MG/DL (ref 0.66–1.25)
DIFFERENTIAL METHOD BLD: ABNORMAL
EOSINOPHIL # BLD AUTO: 1 10E9/L (ref 0–0.7)
EOSINOPHIL NFR BLD AUTO: 26.5 %
ERYTHROCYTE [DISTWIDTH] IN BLOOD BY AUTOMATED COUNT: 13.2 % (ref 10–15)
GFR SERPL CREATININE-BSD FRML MDRD: >90 ML/MIN/{1.73_M2}
GLUCOSE SERPL-MCNC: 107 MG/DL (ref 70–99)
HCT VFR BLD AUTO: 39.1 % (ref 40–53)
HGB BLD-MCNC: 12.6 G/DL (ref 13.3–17.7)
IMM GRANULOCYTES # BLD: 0 10E9/L (ref 0–0.4)
IMM GRANULOCYTES NFR BLD: 0.3 %
LYMPHOCYTES # BLD AUTO: 0.8 10E9/L (ref 0.8–5.3)
LYMPHOCYTES NFR BLD AUTO: 22 %
MCH RBC QN AUTO: 26.7 PG (ref 26.5–33)
MCHC RBC AUTO-ENTMCNC: 32.2 G/DL (ref 31.5–36.5)
MCV RBC AUTO: 83 FL (ref 78–100)
MONOCYTES # BLD AUTO: 0.4 10E9/L (ref 0–1.3)
MONOCYTES NFR BLD AUTO: 11.3 %
NEUTROPHILS # BLD AUTO: 1.4 10E9/L (ref 1.6–8.3)
NEUTROPHILS NFR BLD AUTO: 38.6 %
NRBC # BLD AUTO: 0 10*3/UL
NRBC BLD AUTO-RTO: 0 /100
PLATELET # BLD AUTO: 173 10E9/L (ref 150–450)
POTASSIUM SERPL-SCNC: 4 MMOL/L (ref 3.4–5.3)
PROT SERPL-MCNC: 7.7 G/DL (ref 6.8–8.8)
RBC # BLD AUTO: 4.72 10E12/L (ref 4.4–5.9)
SODIUM SERPL-SCNC: 138 MMOL/L (ref 133–144)
WBC # BLD AUTO: 3.7 10E9/L (ref 4–11)

## 2020-09-10 PROCEDURE — 96413 CHEMO IV INFUSION 1 HR: CPT

## 2020-09-10 PROCEDURE — 85025 COMPLETE CBC W/AUTO DIFF WBC: CPT | Performed by: INTERNAL MEDICINE

## 2020-09-10 PROCEDURE — 25000128 H RX IP 250 OP 636: Mod: ZF | Performed by: INTERNAL MEDICINE

## 2020-09-10 PROCEDURE — 36415 COLL VENOUS BLD VENIPUNCTURE: CPT

## 2020-09-10 PROCEDURE — 25800030 ZZH RX IP 258 OP 636: Mod: ZF | Performed by: INTERNAL MEDICINE

## 2020-09-10 PROCEDURE — 80053 COMPREHEN METABOLIC PANEL: CPT | Performed by: INTERNAL MEDICINE

## 2020-09-10 PROCEDURE — 82105 ALPHA-FETOPROTEIN SERUM: CPT | Performed by: INTERNAL MEDICINE

## 2020-09-10 RX ORDER — ALBUTEROL SULFATE 90 UG/1
1-2 AEROSOL, METERED RESPIRATORY (INHALATION)
Status: CANCELLED
Start: 2020-09-10

## 2020-09-10 RX ORDER — ALBUTEROL SULFATE 0.83 MG/ML
2.5 SOLUTION RESPIRATORY (INHALATION)
Status: CANCELLED | OUTPATIENT
Start: 2020-09-10

## 2020-09-10 RX ORDER — METHYLPREDNISOLONE SODIUM SUCCINATE 125 MG/2ML
125 INJECTION, POWDER, LYOPHILIZED, FOR SOLUTION INTRAMUSCULAR; INTRAVENOUS
Status: CANCELLED
Start: 2020-09-10

## 2020-09-10 RX ORDER — EPINEPHRINE 0.3 MG/.3ML
0.3 INJECTION SUBCUTANEOUS EVERY 5 MIN PRN
Status: CANCELLED | OUTPATIENT
Start: 2020-09-10

## 2020-09-10 RX ORDER — MEPERIDINE HYDROCHLORIDE 25 MG/ML
25 INJECTION INTRAMUSCULAR; INTRAVENOUS; SUBCUTANEOUS EVERY 30 MIN PRN
Status: CANCELLED | OUTPATIENT
Start: 2020-09-10

## 2020-09-10 RX ORDER — EPINEPHRINE 1 MG/ML
0.3 INJECTION, SOLUTION INTRAMUSCULAR; SUBCUTANEOUS EVERY 5 MIN PRN
Status: CANCELLED | OUTPATIENT
Start: 2020-09-10

## 2020-09-10 RX ORDER — SODIUM CHLORIDE 9 MG/ML
1000 INJECTION, SOLUTION INTRAVENOUS CONTINUOUS PRN
Status: CANCELLED
Start: 2020-09-10

## 2020-09-10 RX ORDER — DIPHENHYDRAMINE HYDROCHLORIDE 50 MG/ML
50 INJECTION INTRAMUSCULAR; INTRAVENOUS
Status: CANCELLED
Start: 2020-09-10

## 2020-09-10 RX ORDER — LORAZEPAM 2 MG/ML
0.5 INJECTION INTRAMUSCULAR EVERY 4 HOURS PRN
Status: CANCELLED
Start: 2020-09-10

## 2020-09-10 RX ADMIN — SODIUM CHLORIDE 250 ML: 9 INJECTION, SOLUTION INTRAVENOUS at 10:09

## 2020-09-10 RX ADMIN — SODIUM CHLORIDE 480 MG: 9 INJECTION, SOLUTION INTRAVENOUS at 10:09

## 2020-09-10 ASSESSMENT — PAIN SCALES - GENERAL: PAINLEVEL: NO PAIN (0)

## 2020-09-10 NOTE — NURSING NOTE
Chief Complaint   Patient presents with     Lab Only     venipuncture, vitals checked     PIV placed  Alize Linder RN on 9/10/2020 at 8:58 AM

## 2020-09-10 NOTE — PATIENT INSTRUCTIONS
Contact Numbers    [facility]: [phone]  [facility] Triage and after hours / weekends / holidays: [phone]      Please call the triage or after hours line if you experience a temperature greater than or equal to 100.4, shaking chills, have uncontrolled nausea, vomiting and/or diarrhea, dizziness, shortness of breath, chest pain, bleeding, unexplained bruising, or if you have any other new/concerning symptoms, questions or concerns.      If you are having any concerning symptoms or wish to speak to a provider before your next infusion visit, please call your care coordinator or triage to notify them so we can adequately serve you.     If you need a refill on a narcotic prescription or other medication, please call before your infusion appointment.       September 2020 Sunday Monday Tuesday Wednesday Thursday Friday Saturday             1     2     3     4     5       6     7     8     9     10    [facility] D[facility]   8:30 AM   (15 min.)    [facility]   [facility]    [facility] INFUS[facility] 60   9:00 AM   (60 min.)    ONCOLOGY INFUSION   [facility] 11     12       13     14     15     16     17     18     19       20     21     22     23     24     25     26       27     28     29     30                                October 2020 Sunday Monday Tuesday Wednesday Thursday Friday Saturday                       1    [facility]   8:45 AM   (15 min.)    [facility] DRAW   [facility] Draw    CT CHEST ABDOMEN PELVIS WWO   9:20 AM   (20 min.)   UCCT2   [facility] CT 2     3       4     5    TELEPHONE VISIT RETURN   7:45 AM   (30 min.)   Agustin Kyle MD   [facility] 6     7     8    [facility] D[facility]   8:30 AM   (15 min.)    [facility]   [facility]    [facility] INFUSION 60   9:00 AM   (60 min.)    ONCOLOGY INFUSION   [facility] 9     10       11     12     13      14     15     16     17       18     19     20     21     22     23     24       25     26     27     28     29     30     31                    Recent Results (from the past 24 hour(s))   CBC with platelets differential    Collection Time: 09/10/20  8:49 AM   Result Value Ref Range    WBC 3.7 (L) 4.0 - 11.0 10e9/L    RBC Count 4.72 4.4 - 5.9 10e12/L    Hemoglobin 12.6 (L) 13.3 - 17.7 g/dL    Hematocrit 39.1 (L) 40.0 - 53.0 %    MCV 83 78 - 100 fl    MCH 26.7 26.5 - 33.0 pg    MCHC 32.2 31.5 - 36.5 g/dL    RDW 13.2 10.0 - 15.0 %    Platelet Count 173 150 - 450 10e9/L    Diff Method Automated Method     % Neutrophils 38.6 %    % Lymphocytes 22.0 %    % Monocytes 11.3 %    % Eosinophils 26.5 %    % Basophils 1.3 %    % Immature Granulocytes 0.3 %    Nucleated RBCs 0 0 /100    Absolute Neutrophil 1.4 (L) 1.6 - 8.3 10e9/L    Absolute Lymphocytes 0.8 0.8 - 5.3 10e9/L    Absolute Monocytes 0.4 0.0 - 1.3 10e9/L    Absolute Eosinophils 1.0 (H) 0.0 - 0.7 10e9/L    Absolute Basophils 0.1 0.0 - 0.2 10e9/L    Abs Immature Granulocytes 0.0 0 - 0.4 10e9/L    Absolute Nucleated RBC 0.0    Comprehensive metabolic panel    Collection Time: 09/10/20  8:49 AM   Result Value Ref Range    Sodium 138 133 - 144 mmol/L    Potassium 4.0 3.4 - 5.3 mmol/L    Chloride 108 94 - 109 mmol/L    Carbon Dioxide 24 20 - 32 mmol/L    Anion Gap 6 3 - 14 mmol/L    Glucose 107 (H) 70 - 99 mg/dL    Urea Nitrogen 13 7 - 30 mg/dL    Creatinine 0.70 0.66 - 1.25 mg/dL    GFR Estimate >90 >60 mL/min/[1.73_m2]    GFR Estimate If Black >90 >60 mL/min/[1.73_m2]    Calcium 8.6 8.5 - 10.1 mg/dL    Bilirubin Total 0.4 0.2 - 1.3 mg/dL    Albumin 3.4 3.4 - 5.0 g/dL    Protein Total 7.7 6.8 - 8.8 g/dL    Alkaline Phosphatase 111 40 - 150 U/L    ALT 39 0 - 70 U/L    AST 28 0 - 45 U/L

## 2020-09-10 NOTE — PROGRESS NOTES
Infusion Nursing Note:  Preeti Pascual presents today for Cycle 24 Day 1 nivolumab.    Patient seen by provider today: No   present during visit today: Not Applicable.    Note: pt presents to infusion room today feeling well with no new complaints.    Pain: denies    Intravenous Access:  Peripheral IV placed.    Treatment Conditions:  Lab Results   Component Value Date    HGB 12.6 09/10/2020     Lab Results   Component Value Date    WBC 3.7 09/10/2020      Lab Results   Component Value Date    ANEU 1.4 09/10/2020     Lab Results   Component Value Date     09/10/2020      Lab Results   Component Value Date     09/10/2020                   Lab Results   Component Value Date    POTASSIUM 4.0 09/10/2020           No results found for: MAG         Lab Results   Component Value Date    CR 0.70 09/10/2020                   Lab Results   Component Value Date    STACEY 8.6 09/10/2020                Lab Results   Component Value Date    BILITOTAL 0.4 09/10/2020           Lab Results   Component Value Date    ALBUMIN 3.4 09/10/2020                    Lab Results   Component Value Date    ALT 39 09/10/2020           Lab Results   Component Value Date    AST 28 09/10/2020       Results reviewed, labs MET treatment parameters, ok to proceed with treatment.      Post Infusion Assessment:  Patient tolerated infusion without incident.  Blood return noted pre and post infusion.  Site patent and intact, free from redness, edema or discomfort.  No evidence of extravasations.  Access discontinued per protocol.       Discharge Plan:   Patient declined prescription refills.  Copy of AVS reviewed with patient and/or family.  Patient will return 10/5 for follow up with Dr. Kyle and 10/8 for next infusion appointment.  Patient discharged in stable condition accompanied by: self.  Departure Mode: Ambulatory.    DEWAYNE RAGLAND RN

## 2020-10-01 ENCOUNTER — ANCILLARY PROCEDURE (OUTPATIENT)
Dept: CT IMAGING | Facility: CLINIC | Age: 55
End: 2020-10-01
Attending: INTERNAL MEDICINE
Payer: COMMERCIAL

## 2020-10-01 VITALS
OXYGEN SATURATION: 100 % | RESPIRATION RATE: 18 BRPM | TEMPERATURE: 97.7 F | BODY MASS INDEX: 24.91 KG/M2 | SYSTOLIC BLOOD PRESSURE: 129 MMHG | HEART RATE: 59 BPM | DIASTOLIC BLOOD PRESSURE: 80 MMHG | WEIGHT: 163.8 LBS

## 2020-10-01 DIAGNOSIS — C22.0 HCC (HEPATOCELLULAR CARCINOMA) (H): ICD-10-CM

## 2020-10-01 PROCEDURE — 999N000127 HC STATISTIC PERIPHERAL IV START W US GUIDANCE

## 2020-10-01 PROCEDURE — 71260 CT THORAX DX C+: CPT | Mod: GC | Performed by: RADIOLOGY

## 2020-10-01 PROCEDURE — 74177 CT ABD & PELVIS W/CONTRAST: CPT | Mod: GC | Performed by: RADIOLOGY

## 2020-10-01 RX ORDER — IOPAMIDOL 755 MG/ML
100 INJECTION, SOLUTION INTRAVASCULAR ONCE
Status: COMPLETED | OUTPATIENT
Start: 2020-10-01 | End: 2020-10-01

## 2020-10-01 RX ADMIN — IOPAMIDOL 100 ML: 755 INJECTION, SOLUTION INTRAVASCULAR at 09:43

## 2020-10-01 ASSESSMENT — PAIN SCALES - GENERAL: PAINLEVEL: NO PAIN (0)

## 2020-10-01 NOTE — PROGRESS NOTES
Chief Complaint   Patient presents with     Blood Draw     IV placement with blood draw and vitals     Labs drawn via IV placed by CC in left arm

## 2020-10-05 ENCOUNTER — VIRTUAL VISIT (OUTPATIENT)
Dept: ONCOLOGY | Facility: CLINIC | Age: 55
End: 2020-10-05
Attending: INTERNAL MEDICINE
Payer: COMMERCIAL

## 2020-10-05 DIAGNOSIS — C22.0 HCC (HEPATOCELLULAR CARCINOMA) (H): Primary | ICD-10-CM

## 2020-10-05 PROCEDURE — 99215 OFFICE O/P EST HI 40 MIN: CPT | Mod: TEL | Performed by: INTERNAL MEDICINE

## 2020-10-05 PROCEDURE — 999N001193 HC VIDEO/TELEPHONE VISIT; NO CHARGE

## 2020-10-05 NOTE — Clinical Note
"    10/5/2020         RE: Preeti Pascual  [address]  [place] [facility] [address]        Dear Colleague,    Thank you for referring your patient, Preeti Pascual, to the [facility]. Please see a copy of my visit note below.    Preeti Pascual is a 54 year old male who is being evaluated via a billable telephone visit.      The patient has been notified of following:     \"This telephone visit will be conducted via a call between you and your physician/provider. We have found that certain health care needs can be provided without the need for a physical exam.  This service lets us provide the care you need with a short phone conversation.  If a prescription is necessary we can send it directly to your pharmacy.  If lab work is needed we can place an order for that and you can then stop by our lab to have the test done at a later time.    Telephone visits are billed at different rates depending on your insurance coverage. During this emergency period, for some insurers they may be billed the same as an in-person visit.  Please reach out to your insurance provider with any questions.    If during the course of the call the physician/provider feels a telephone visit is not appropriate, you will not be charged for this service.\"    Patient has given verbal consent for Telephone visit?  Yes    What phone number would you like to be contacted at? [phone]    How would you like to obtain your AVS? Mail a copy    Oralia Bayron PADRON    Phone call duration: *** minutes    {signature options:[id number]}      Oncology Note - Phone Visit     Preeti Pascual MRN# [id number]   Age: 54 year old YOB: 1965       Diease:   Metastatic hepatocellular carcinoma.   Treatment:   Interval History:   Preeti Pascual  who is being evaluated via a telephone/video visit due to COVID-19 pandemic.      Today's evaluation is to assess his ongoing response.He reported feeling fine without any complains.No N/V/D. " No chest pain or SOB. No urine issues or legs swelling. No pain.     Remainder of her 10 point review of systems is otherwise negative.      Oncology History:   Mr. Pacsual has metastatic hepatocellular carcinoma related to hepatitis B and originally was treated with radioembolization in 2016 but developed metastatic disease to the adrenal gland in 2018 and progressed on sorafenib.  He has been on nivolumab since November 2018 with good control of his disease.      Oncology Medications yttrium Y-90 brachytherapy, non-stranded (THERASPHERE) IA   1/25/2016 63.4 mCi   11/23/2016 76.4 mCi     Oncology Medications Day, Cycle nivolumab (OPDIVO) IV   11/1/2018 Day 1, Cycle 1 480 mg   11/29/2018 Day 1, Cycle 2 480 mg   1/3/2019 Day 1, Cycle 3 480 mg   1/31/2019 Day 1, Cycle 4 480 mg   2/28/2019 Day 1, Cycle 5 480 mg   3/28/2019 Day 1, Cycle 6 480 mg   5/2/2019 Day 1, Cycle 7 480 mg   5/30/2019 Day 1, Cycle 8 480 mg   6/27/2019 Day 1, Cycle 9 480 mg   7/22/2019 Day 1, Cycle 10 480 mg   8/22/2019 Day 1, Cycle 11 480 mg   9/23/2019 Day 1, Cycle 12 480 mg   10/24/2019 Day 1, Cycle 13 480 mg   11/21/2019 Day 1, Cycle 14 480 mg   12/19/2019 Day 1, Cycle 15 480 mg   1/16/2020 Day 1, Cycle 16 480 mg   2/13/2020 Day 1, Cycle 17 480 mg   3/12/2020 Day 1, Cycle 18 480 mg   4/23/2020 Day 1, Cycle 19 480 mg   5/21/2020 Day 1, Cycle 20 480 mg   6/18/2020 Day 1, Cycle 21 480 mg   7/16/2020 Day 1, Cycle 22 480 mg   8/13/2020 Day 1, Cycle 23 480 mg   9/10/2020 Day 1, Cycle 24 480 mg     Medications: reviewed.  Current Outpatient Medications   Medication     fluticasone (FLONASE) 50 MCG/ACT nasal spray     VENTOLIN  (90 Base) MCG/ACT inhaler     No current facility-administered medications for this visit.             Physical Exam:   No physical exam as this is a telephone visit.         Data:   I have personally reviewed the following labs/imaging:  Recent Labs   Lab Test 09/10/20  0849 06/18/20  0922 06/11/20  0856 04/23/20  1033   WBC  3.7* 4.5 4.1 5.4   RBC 4.72 4.67 4.55 4.81   HGB 12.6* 12.8* 12.5* 13.2*   HCT 39.1* 39.5* 38.1* 40.4   MCV 83 85 84 84   MCH 26.7 27.4 27.5 27.4   MCHC 32.2 32.4 32.8 32.7   RDW 13.2 13.2 13.1 13.2    184 173 176   NEUTROPHIL 38.6 40.3  --  39.5     Recent Labs   Lab Test 09/10/20  0849 08/13/20  0920 07/16/20  0854    138 137   POTASSIUM 4.0 4.0 3.9   CHLORIDE 108 109 106   CO2 24 25 25   ANIONGAP 6 4 6   * 96 92   BUN 13 9 12   CR 0.70 0.77 0.66   STACEY 8.6 8.4* 8.4*     Recent Labs   Lab Test 09/10/20  0849 08/13/20  0920 07/16/20  0854   PROTTOTAL 7.7 7.7 7.8   ALBUMIN 3.4 3.4 3.5   BILITOTAL 0.4 0.4 0.5   ALKPHOS 111 102 99   AST 28 30 31   ALT 39 43 36       Cancer antigen   Results for PREETI PASCUAL (MRN [id number]) as of 10/4/2020 19:53   Ref. Range 1/17/2019 08:43 4/22/2019 07:32 5/2/2019 08:23 6/27/2019 09:32 9/23/2019 11:15 12/19/2019 08:11 4/23/2020 10:33 6/11/2020 08:56 6/18/2020 09:22 9/10/2020 08:49   Alpha Fetoprotein Latest Ref Range: 0 - 8 ug/L 15.2 (H) 13.7 (H) 12.7 (H) 17.5 (H) 25.5 (H) 27.4 (H) 28.7 (H) 23.7 (H) 24.4 (H) 44.5 (H)       Images:   CT CAP 10/01/2020  IMPRESSION: In this patient with history of hepatocellular carcinoma  status post wide 90 radioembolization, Nivolumab, & rising  alpha-fetoprotein:     1. Stable Y90 radioembolization changes with unchanged areas of adjacent subtle indeterminate hypoenhancement. This may be changes  from radioembolization or potentially viable tumor. MRI may be helpful to further characterize.  2. Unchanged right adrenal metastatic lesions.  3. Stable mildly enlarged right hilar lymph node, precaval lymph node, and prominent right pericardiophrenic node.         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   The patient is maintaining an normal  performance status and has no apparent immune toxicity from the nivolumab.    We reviewed his current imaging and reviewed the results with him which shows the disease in his liver and adrenal glands to be stable. There is nothing new to suggest progression of his disease. His lab shows his HBV to be undetectable. His other labs are unremarkable with mild anemia and normal liver function tests. His AFP is doubled from last visit. Now 44.5 from 24.4.  We discussed that alpha-fetoprotein elevation might be nonspecific variation however also may indicate to early disease progression.  In case of progression we mentioned briefly NK cell trial for HCC patient.  In the meanwhile we will continue nivolumab as scheduled and see him in 3 months with  MRI liver.    Plan:  - Continue nivolumab with the current dose and schedule every 4 weeks   - Reevaluate the status of his disease again in another 3  Months with MRI liver and CEA.       My attending (Dr. Kyle) and I have participated in reviewing patient's history, labs/images and treatment options.       Ronald Najera M.D.   Heme/Onc Fellow  Pager: [phone]    10/05/2020          Again, thank you for allowing me to participate in the care of your patient.        Sincerely,        Agustin Kyle MD

## 2020-10-05 NOTE — PROGRESS NOTES
"Preeti Pascual is a 54 year old male who is being evaluated via a billable telephone visit.      The patient has been notified of following:     \"This telephone visit will be conducted via a call between you and your physician/provider. We have found that certain health care needs can be provided without the need for a physical exam.  This service lets us provide the care you need with a short phone conversation.  If a prescription is necessary we can send it directly to your pharmacy.  If lab work is needed we can place an order for that and you can then stop by our lab to have the test done at a later time.    Telephone visits are billed at different rates depending on your insurance coverage. During this emergency period, for some insurers they may be billed the same as an in-person visit.  Please reach out to your insurance provider with any questions.    If during the course of the call the physician/provider feels a telephone visit is not appropriate, you will not be charged for this service.\"    Patient has given verbal consent for Telephone visit?  Yes    What phone number would you like to be contacted at? [phone]    How would you like to obtain your AVS? Mail a copy    Oralia ECHEVERRIACAROL[facility]    Phone call duration: 30 minutes        Oncology Note - Phone Visit     Preeti Pascual MRN# [id number]   Age: 54 year old YOB: 1965       Diease:   Metastatic hepatocellular carcinoma.   Treatment:   Interval History:   Preeti Pascual  who is being evaluated via a telephone visit due to COVID-19 pandemic.      Today's evaluation is to assess his ongoing response.He reported feeling fine without any complains.No N/V/D. No chest pain or SOB. No urine issues or legs swelling. No pain.     Remainder of her 10 point review of systems is otherwise negative.      Oncology History:   Mr. Pascual has metastatic hepatocellular carcinoma related to hepatitis B and originally was treated with radioembolization in 2016 but " developed metastatic disease to the adrenal gland in 2018 and progressed on sorafenib.  He has been on nivolumab since November 2018 with good control of his disease.      Oncology Medications yttrium Y-90 brachytherapy, non-stranded (THERASPHERE) IA   1/25/2016 63.4 mCi   11/23/2016 76.4 mCi     Oncology Medications Day, Cycle nivolumab (OPDIVO) IV   11/1/2018 Day 1, Cycle 1 480 mg   11/29/2018 Day 1, Cycle 2 480 mg   1/3/2019 Day 1, Cycle 3 480 mg   1/31/2019 Day 1, Cycle 4 480 mg   2/28/2019 Day 1, Cycle 5 480 mg   3/28/2019 Day 1, Cycle 6 480 mg   5/2/2019 Day 1, Cycle 7 480 mg   5/30/2019 Day 1, Cycle 8 480 mg   6/27/2019 Day 1, Cycle 9 480 mg   7/22/2019 Day 1, Cycle 10 480 mg   8/22/2019 Day 1, Cycle 11 480 mg   9/23/2019 Day 1, Cycle 12 480 mg   10/24/2019 Day 1, Cycle 13 480 mg   11/21/2019 Day 1, Cycle 14 480 mg   12/19/2019 Day 1, Cycle 15 480 mg   1/16/2020 Day 1, Cycle 16 480 mg   2/13/2020 Day 1, Cycle 17 480 mg   3/12/2020 Day 1, Cycle 18 480 mg   4/23/2020 Day 1, Cycle 19 480 mg   5/21/2020 Day 1, Cycle 20 480 mg   6/18/2020 Day 1, Cycle 21 480 mg   7/16/2020 Day 1, Cycle 22 480 mg   8/13/2020 Day 1, Cycle 23 480 mg   9/10/2020 Day 1, Cycle 24 480 mg     Medications: reviewed.  Current Outpatient Medications   Medication     fluticasone (FLONASE) 50 MCG/ACT nasal spray     VENTOLIN  (90 Base) MCG/ACT inhaler     No current facility-administered medications for this visit.             Physical Exam:   No physical exam as this is a telephone visit.         Data:   I have personally reviewed the following labs/imaging:  Recent Labs   Lab Test 09/10/20  0849 06/18/20  0922 06/11/20  0856 04/23/20  1033   WBC 3.7* 4.5 4.1 5.4   RBC 4.72 4.67 4.55 4.81   HGB 12.6* 12.8* 12.5* 13.2*   HCT 39.1* 39.5* 38.1* 40.4   MCV 83 85 84 84   MCH 26.7 27.4 27.5 27.4   MCHC 32.2 32.4 32.8 32.7   RDW 13.2 13.2 13.1 13.2    184 173 176   NEUTROPHIL 38.6 40.3  --  39.5     Recent Labs   Lab Test  09/10/20  0849 08/13/20  0920 07/16/20  0854    138 137   POTASSIUM 4.0 4.0 3.9   CHLORIDE 108 109 106   CO2 24 25 25   ANIONGAP 6 4 6   * 96 92   BUN 13 9 12   CR 0.70 0.77 0.66   STACEY 8.6 8.4* 8.4*     Recent Labs   Lab Test 09/10/20  0849 08/13/20  0920 07/16/20  0854   PROTTOTAL 7.7 7.7 7.8   ALBUMIN 3.4 3.4 3.5   BILITOTAL 0.4 0.4 0.5   ALKPHOS 111 102 99   AST 28 30 31   ALT 39 43 36       Cancer antigen   Results for PREETI PASCUAL (MRN [id number]) as of 10/4/2020 19:53   Ref. Range 1/17/2019 08:43 4/22/2019 07:32 5/2/2019 08:23 6/27/2019 09:32 9/23/2019 11:15 12/19/2019 08:11 4/23/2020 10:33 6/11/2020 08:56 6/18/2020 09:22 9/10/2020 08:49   Alpha Fetoprotein Latest Ref Range: 0 - 8 ug/L 15.2 (H) 13.7 (H) 12.7 (H) 17.5 (H) 25.5 (H) 27.4 (H) 28.7 (H) 23.7 (H) 24.4 (H) 44.5 (H)       Images:   CT CAP 10/01/2020  IMPRESSION: In this patient with history of hepatocellular carcinoma  status post wide 90 radioembolization, Nivolumab, & rising  alpha-fetoprotein:     1. Stable Y90 radioembolization changes with unchanged areas of adjacent subtle indeterminate hypoenhancement. This may be changes  from radioembolization or potentially viable tumor. MRI may be helpful to further characterize.  2. Unchanged right adrenal metastatic lesions.  3. Stable mildly enlarged right hilar lymph node, precaval lymph node, and prominent right pericardiophrenic node.         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   The patient is maintaining an normal performance status and has no apparent immune toxicity from the nivolumab.    We reviewed his current imaging and reviewed the results with him which shows the disease in his liver and adrenal glands to be stable. There is nothing new to suggest progression of his disease. His lab shows  his HBV to be undetectable. His other labs are unremarkable with mild anemia and normal liver function tests. His AFP is doubled from last visit. Now 44.5 from 24.4.  We discussed that alpha-fetoprotein elevation might be nonspecific variation however also may indicate to early disease progression.  In case of progression we mentioned briefly NK cell trial for HCC patient.  In the meanwhile we will continue nivolumab as scheduled and see him in 3 months with  MRI liver.    Plan:  - Continue nivolumab with the current dose and schedule every 4 weeks   - Reevaluate the status of his disease again in another 3  Months with MRI liver and CEA.       My attending (Dr. Kyle) and I have participated in reviewing patient's history, labs/images and treatment options.       Ronald Najera M.D.   Heme/Onc Fellow  Pager: [phone]    10/05/2020    .Attending note: I saw the patient in conjunction with the fellow and agree with the above. I think we will go ahead and get the MRI scan sooner to try and clarify if there is progression

## 2020-10-05 NOTE — LETTER
"    10/5/2020         RE: Preeti Pascual  [address]  [facility] [address]      Preeti Pascual is a 54 year old male who is being evaluated via a billable telephone visit.      The patient has been notified of following:     \"This telephone visit will be conducted via a call between you and your physician/provider. We have found that certain health care needs can be provided without the need for a physical exam.  This service lets us provide the care you need with a short phone conversation.  If a prescription is necessary we can send it directly to your pharmacy.  If lab work is needed we can place an order for that and you can then stop by our lab to have the test done at a later time.    Telephone visits are billed at different rates depending on your insurance coverage. During this emergency period, for some insurers they may be billed the same as an in-person visit.  Please reach out to your insurance provider with any questions.    If during the course of the call the physician/provider feels a telephone visit is not appropriate, you will not be charged for this service.\"    Patient has given verbal consent for Telephone visit?  Yes    What phone number would you like to be contacted at? [phone]    How would you like to obtain your AVS? Mail a copy    Oralia Bayron PADRON    Phone call duration: 30 minutes        Oncology Note - Phone Visit     Preeti Pascual MRN# [id number]   Age: 54 year old YOB: 1965       Diease:   Metastatic hepatocellular carcinoma.   Treatment:   Interval History:   Pretei Pascual  who is being evaluated via a telephone visit due to COVID-19 pandemic.      Today's evaluation is to assess his ongoing response.He reported feeling fine without any complains.No N/V/D. No chest pain or SOB. No urine issues or legs swelling. No pain.     Remainder of her 10 point review of systems is otherwise negative.      Oncology History:   Mr. Pascual has metastatic hepatocellular " carcinoma related to hepatitis B and originally was treated with radioembolization in 2016 but developed metastatic disease to the adrenal gland in 2018 and progressed on sorafenib.  He has been on nivolumab since November 2018 with good control of his disease.      Oncology Medications yttrium Y-90 brachytherapy, non-stranded (THERASPHERE) IA   1/25/2016 63.4 mCi   11/23/2016 76.4 mCi     Oncology Medications Day, Cycle nivolumab (OPDIVO) IV   11/1/2018 Day 1, Cycle 1 480 mg   11/29/2018 Day 1, Cycle 2 480 mg   1/3/2019 Day 1, Cycle 3 480 mg   1/31/2019 Day 1, Cycle 4 480 mg   2/28/2019 Day 1, Cycle 5 480 mg   3/28/2019 Day 1, Cycle 6 480 mg   5/2/2019 Day 1, Cycle 7 480 mg   5/30/2019 Day 1, Cycle 8 480 mg   6/27/2019 Day 1, Cycle 9 480 mg   7/22/2019 Day 1, Cycle 10 480 mg   8/22/2019 Day 1, Cycle 11 480 mg   9/23/2019 Day 1, Cycle 12 480 mg   10/24/2019 Day 1, Cycle 13 480 mg   11/21/2019 Day 1, Cycle 14 480 mg   12/19/2019 Day 1, Cycle 15 480 mg   1/16/2020 Day 1, Cycle 16 480 mg   2/13/2020 Day 1, Cycle 17 480 mg   3/12/2020 Day 1, Cycle 18 480 mg   4/23/2020 Day 1, Cycle 19 480 mg   5/21/2020 Day 1, Cycle 20 480 mg   6/18/2020 Day 1, Cycle 21 480 mg   7/16/2020 Day 1, Cycle 22 480 mg   8/13/2020 Day 1, Cycle 23 480 mg   9/10/2020 Day 1, Cycle 24 480 mg     Medications: reviewed.  Current Outpatient Medications   Medication     fluticasone (FLONASE) 50 MCG/ACT nasal spray     VENTOLIN  (90 Base) MCG/ACT inhaler     No current facility-administered medications for this visit.             Physical Exam:   No physical exam as this is a telephone visit.         Data:   I have personally reviewed the following labs/imaging:  Recent Labs   Lab Test 09/10/20  0849 06/18/20  0922 06/11/20  0856 04/23/20  1033   WBC 3.7* 4.5 4.1 5.4   RBC 4.72 4.67 4.55 4.81   HGB 12.6* 12.8* 12.5* 13.2*   HCT 39.1* 39.5* 38.1* 40.4   MCV 83 85 84 84   MCH 26.7 27.4 27.5 27.4   MCHC 32.2 32.4 32.8 32.7   RDW 13.2 13.2 13.1 13.2     184 173 176   NEUTROPHIL 38.6 40.3  --  39.5     Recent Labs   Lab Test 09/10/20  0849 08/13/20  0920 07/16/20  0854    138 137   POTASSIUM 4.0 4.0 3.9   CHLORIDE 108 109 106   CO2 24 25 25   ANIONGAP 6 4 6   * 96 92   BUN 13 9 12   CR 0.70 0.77 0.66   STACEY 8.6 8.4* 8.4*     Recent Labs   Lab Test 09/10/20  0849 08/13/20  0920 07/16/20  0854   PROTTOTAL 7.7 7.7 7.8   ALBUMIN 3.4 3.4 3.5   BILITOTAL 0.4 0.4 0.5   ALKPHOS 111 102 99   AST 28 30 31   ALT 39 43 36       Cancer antigen   Results for PREETI PASCUAL (MRN [id number]) as of 10/4/2020 19:53   Ref. Range 1/17/2019 08:43 4/22/2019 07:32 5/2/2019 08:23 6/27/2019 09:32 9/23/2019 11:15 12/19/2019 08:11 4/23/2020 10:33 6/11/2020 08:56 6/18/2020 09:22 9/10/2020 08:49   Alpha Fetoprotein Latest Ref Range: 0 - 8 ug/L 15.2 (H) 13.7 (H) 12.7 (H) 17.5 (H) 25.5 (H) 27.4 (H) 28.7 (H) 23.7 (H) 24.4 (H) 44.5 (H)       Images:   CT CAP 10/01/2020  IMPRESSION: In this patient with history of hepatocellular carcinoma  status post wide 90 radioembolization, Nivolumab, & rising  alpha-fetoprotein:     1. Stable Y90 radioembolization changes with unchanged areas of adjacent subtle indeterminate hypoenhancement. This may be changes  from radioembolization or potentially viable tumor. MRI may be helpful to further characterize.  2. Unchanged right adrenal metastatic lesions.  3. Stable mildly enlarged right hilar lymph node, precaval lymph node, and prominent right pericardiophrenic node.         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   The patient is maintaining an normal performance status and has no apparent immune toxicity from the nivolumab.    We reviewed his current imaging and reviewed the results with him which shows the disease in his liver and adrenal glands to be  stable. There is nothing new to suggest progression of his disease. His lab shows his HBV to be undetectable. His other labs are unremarkable with mild anemia and normal liver function tests. His AFP is doubled from last visit. Now 44.5 from 24.4.  We discussed that alpha-fetoprotein elevation might be nonspecific variation however also may indicate to early disease progression.  In case of progression we mentioned briefly NK cell trial for HCC patient.  In the meanwhile we will continue nivolumab as scheduled and see him in 3 months with  MRI liver.    Plan:  - Continue nivolumab with the current dose and schedule every 4 weeks   - Reevaluate the status of his disease again in another 3  Months with MRI liver and CEA.       My attending (Dr. Kyle) and I have participated in reviewing patient's history, labs/images and treatment options.       Ronald Najera M.D.   Heme/Onc Fellow  Pager: [phone]    10/05/2020    .Attending note: I saw the patient in conjunction with the fellow and agree with the above. I think we will go ahead and get the MRI scan sooner to try and clarify if there is progression          Agustin Kyle MD

## 2020-10-08 ENCOUNTER — INFUSION THERAPY VISIT (OUTPATIENT)
Dept: ONCOLOGY | Facility: CLINIC | Age: 55
End: 2020-10-08
Attending: INTERNAL MEDICINE
Payer: COMMERCIAL

## 2020-10-08 ENCOUNTER — APPOINTMENT (OUTPATIENT)
Dept: LAB | Facility: CLINIC | Age: 55
End: 2020-10-08
Attending: INTERNAL MEDICINE
Payer: COMMERCIAL

## 2020-10-08 VITALS
SYSTOLIC BLOOD PRESSURE: 116 MMHG | HEART RATE: 67 BPM | RESPIRATION RATE: 16 BRPM | OXYGEN SATURATION: 100 % | BODY MASS INDEX: 24.91 KG/M2 | WEIGHT: 163.8 LBS | TEMPERATURE: 98 F | DIASTOLIC BLOOD PRESSURE: 75 MMHG

## 2020-10-08 DIAGNOSIS — C22.0 HCC (HEPATOCELLULAR CARCINOMA) (H): Primary | ICD-10-CM

## 2020-10-08 LAB
ALBUMIN SERPL-MCNC: 3.4 G/DL (ref 3.4–5)
ALP SERPL-CCNC: 100 U/L (ref 40–150)
ALT SERPL W P-5'-P-CCNC: 32 U/L (ref 0–70)
ANION GAP SERPL CALCULATED.3IONS-SCNC: 6 MMOL/L (ref 3–14)
AST SERPL W P-5'-P-CCNC: 26 U/L (ref 0–45)
BILIRUB SERPL-MCNC: 0.4 MG/DL (ref 0.2–1.3)
BUN SERPL-MCNC: 14 MG/DL (ref 7–30)
CALCIUM SERPL-MCNC: 8.8 MG/DL (ref 8.5–10.1)
CHLORIDE SERPL-SCNC: 108 MMOL/L (ref 94–109)
CO2 SERPL-SCNC: 24 MMOL/L (ref 20–32)
CREAT SERPL-MCNC: 0.7 MG/DL (ref 0.66–1.25)
GFR SERPL CREATININE-BSD FRML MDRD: >90 ML/MIN/{1.73_M2}
GLUCOSE SERPL-MCNC: 96 MG/DL (ref 70–99)
POTASSIUM SERPL-SCNC: 3.7 MMOL/L (ref 3.4–5.3)
PROT SERPL-MCNC: 7.8 G/DL (ref 6.8–8.8)
SODIUM SERPL-SCNC: 138 MMOL/L (ref 133–144)
TSH SERPL DL<=0.005 MIU/L-ACNC: 1.73 MU/L (ref 0.4–4)

## 2020-10-08 PROCEDURE — 80053 COMPREHEN METABOLIC PANEL: CPT | Performed by: PATHOLOGY

## 2020-10-08 PROCEDURE — 250N000011 HC RX IP 250 OP 636: Performed by: INTERNAL MEDICINE

## 2020-10-08 PROCEDURE — 258N000003 HC RX IP 258 OP 636: Performed by: INTERNAL MEDICINE

## 2020-10-08 PROCEDURE — 96413 CHEMO IV INFUSION 1 HR: CPT

## 2020-10-08 PROCEDURE — 99207 PR NO CHARGE LOS: CPT

## 2020-10-08 PROCEDURE — 84443 ASSAY THYROID STIM HORMONE: CPT | Performed by: PATHOLOGY

## 2020-10-08 PROCEDURE — 999N000127 HC STATISTIC PERIPHERAL IV START W US GUIDANCE

## 2020-10-08 RX ORDER — ALBUTEROL SULFATE 0.83 MG/ML
2.5 SOLUTION RESPIRATORY (INHALATION)
Status: CANCELLED | OUTPATIENT
Start: 2020-10-08

## 2020-10-08 RX ORDER — SODIUM CHLORIDE 9 MG/ML
1000 INJECTION, SOLUTION INTRAVENOUS CONTINUOUS PRN
Status: CANCELLED
Start: 2020-10-08

## 2020-10-08 RX ORDER — METHYLPREDNISOLONE SODIUM SUCCINATE 125 MG/2ML
125 INJECTION, POWDER, LYOPHILIZED, FOR SOLUTION INTRAMUSCULAR; INTRAVENOUS
Status: CANCELLED
Start: 2020-10-08

## 2020-10-08 RX ORDER — DIPHENHYDRAMINE HYDROCHLORIDE 50 MG/ML
50 INJECTION INTRAMUSCULAR; INTRAVENOUS
Status: CANCELLED
Start: 2020-10-08

## 2020-10-08 RX ORDER — ALBUTEROL SULFATE 90 UG/1
1-2 AEROSOL, METERED RESPIRATORY (INHALATION)
Status: CANCELLED
Start: 2020-10-08

## 2020-10-08 RX ORDER — LORAZEPAM 2 MG/ML
0.5 INJECTION INTRAMUSCULAR EVERY 4 HOURS PRN
Status: CANCELLED
Start: 2020-10-08

## 2020-10-08 RX ORDER — EPINEPHRINE 1 MG/ML
0.3 INJECTION, SOLUTION INTRAMUSCULAR; SUBCUTANEOUS EVERY 5 MIN PRN
Status: CANCELLED | OUTPATIENT
Start: 2020-10-08

## 2020-10-08 RX ORDER — EPINEPHRINE 0.3 MG/.3ML
0.3 INJECTION SUBCUTANEOUS EVERY 5 MIN PRN
Status: CANCELLED | OUTPATIENT
Start: 2020-10-08

## 2020-10-08 RX ORDER — MEPERIDINE HYDROCHLORIDE 25 MG/ML
25 INJECTION INTRAMUSCULAR; INTRAVENOUS; SUBCUTANEOUS EVERY 30 MIN PRN
Status: CANCELLED | OUTPATIENT
Start: 2020-10-08

## 2020-10-08 RX ADMIN — SODIUM CHLORIDE 250 ML: 9 INJECTION, SOLUTION INTRAVENOUS at 10:32

## 2020-10-08 RX ADMIN — SODIUM CHLORIDE 480 MG: 9 INJECTION, SOLUTION INTRAVENOUS at 10:33

## 2020-10-08 ASSESSMENT — PAIN SCALES - GENERAL: PAINLEVEL: NO PAIN (0)

## 2020-10-08 NOTE — PATIENT INSTRUCTIONS
[facility] Triage and after hours / weekends / holidays:  [phone]    Please call the triage or after hours line if you experience a temperature greater than or equal to 100.5, shaking chills, have uncontrolled nausea, vomiting and/or diarrhea, dizziness, shortness of breath, chest pain, bleeding, unexplained bruising, or if you have any other new/concerning symptoms, questions or concerns.      If you are having any concerning symptoms or wish to speak to a provider before your next infusion visit, please call your care coordinator or triage to notify them so we can adequately serve you.     If you need a refill on a narcotic prescription or other medication, please call before your infusion appointment.             Dr Kyle would like you to have MRI next week.  A request was sent to scheduling to have this scheduled for Thursday 10/15/20

## 2020-10-08 NOTE — NURSING NOTE
Chief Complaint   Patient presents with     Blood Draw     Labs drawn via PIV by RN. VS taken.     Labs drawn with PIV start by rn ([facility]).  Pt tolerated well.  VS taken and pt checked in for next appt.    Azeem Fragoso RN.

## 2020-10-31 ENCOUNTER — ANCILLARY PROCEDURE (OUTPATIENT)
Dept: MRI IMAGING | Facility: CLINIC | Age: 55
End: 2020-10-31
Attending: INTERNAL MEDICINE
Payer: COMMERCIAL

## 2020-10-31 DIAGNOSIS — C22.0 HCC (HEPATOCELLULAR CARCINOMA) (H): ICD-10-CM

## 2020-10-31 PROCEDURE — A9585 GADOBUTROL INJECTION: HCPCS | Performed by: RADIOLOGY

## 2020-10-31 PROCEDURE — 74183 MRI ABD W/O CNTR FLWD CNTR: CPT | Performed by: RADIOLOGY

## 2020-10-31 RX ORDER — GADOBUTROL 604.72 MG/ML
7.5 INJECTION INTRAVENOUS ONCE
Status: COMPLETED | OUTPATIENT
Start: 2020-10-31 | End: 2020-10-31

## 2020-10-31 RX ADMIN — GADOBUTROL 7.5 ML: 604.72 INJECTION INTRAVENOUS at 06:59

## 2020-10-31 NOTE — DISCHARGE INSTRUCTIONS
MRI Contrast Discharge Instructions    The IV contrast you received today will pass out of your body in your  urine. This will happen in the next 24 hours. You will not feel this process.  Your urine will not change color.    Drink at least 4 extra glasses of water or juice today (unless your doctor  has restricted your fluids). This reduces the stress on your kidneys.  You may take your regular medicines.    If you are on dialysis: It is best to have dialysis today.    If you have a reaction: Most reactions happen right away. If you have  any new symptoms after leaving the hospital (such as hives or swelling),  call your hospital at the correct number below. Or call your family doctor.  If you have breathing distress or wheezing, call 911.    Special instructions: ***    I have read and understand the above information.    Signature:______________________________________ Date:___________    Staff:__________________________________________ Date:___________     Time:__________    [facility] Departments:    ___[place]: [phone]  ___[facility]: [phone]  ___[place]: [phone] ___[facility]: [phone]  ___[facility]: [phone]  ___[facility]: [phone]  ___[phone]  ___[facility]: [phone]  ___[phone]

## 2020-11-03 ENCOUNTER — VIRTUAL VISIT (OUTPATIENT)
Dept: ONCOLOGY | Facility: CLINIC | Age: 55
End: 2020-11-03
Attending: INTERNAL MEDICINE
Payer: COMMERCIAL

## 2020-11-03 DIAGNOSIS — C22.8 LIVER CANCER, PRIMARY, WITH METASTASIS FROM LIVER TO OTHER SITE (H): ICD-10-CM

## 2020-11-03 DIAGNOSIS — B18.1 CHRONIC VIRAL HEPATITIS B WITHOUT DELTA AGENT AND WITHOUT COMA (H): ICD-10-CM

## 2020-11-03 DIAGNOSIS — K74.69 OTHER CIRRHOSIS OF LIVER (H): ICD-10-CM

## 2020-11-03 DIAGNOSIS — C22.0 HCC (HEPATOCELLULAR CARCINOMA) (H): Primary | ICD-10-CM

## 2020-11-03 PROCEDURE — 99213 OFFICE O/P EST LOW 20 MIN: CPT | Mod: TEL | Performed by: NURSE PRACTITIONER

## 2020-11-03 PROCEDURE — 999N001193 HC VIDEO/TELEPHONE VISIT; NO CHARGE

## 2020-11-03 RX ORDER — DIPHENHYDRAMINE HYDROCHLORIDE 50 MG/ML
50 INJECTION INTRAMUSCULAR; INTRAVENOUS
Status: CANCELLED
Start: 2020-11-05

## 2020-11-03 RX ORDER — ALBUTEROL SULFATE 90 UG/1
1-2 AEROSOL, METERED RESPIRATORY (INHALATION)
Status: CANCELLED
Start: 2020-11-05

## 2020-11-03 RX ORDER — METHYLPREDNISOLONE SODIUM SUCCINATE 125 MG/2ML
125 INJECTION, POWDER, LYOPHILIZED, FOR SOLUTION INTRAMUSCULAR; INTRAVENOUS
Status: CANCELLED
Start: 2020-11-05

## 2020-11-03 RX ORDER — LORAZEPAM 2 MG/ML
0.5 INJECTION INTRAMUSCULAR EVERY 4 HOURS PRN
Status: CANCELLED
Start: 2020-11-05

## 2020-11-03 RX ORDER — EPINEPHRINE 1 MG/ML
0.3 INJECTION, SOLUTION INTRAMUSCULAR; SUBCUTANEOUS EVERY 5 MIN PRN
Status: CANCELLED | OUTPATIENT
Start: 2020-11-05

## 2020-11-03 RX ORDER — SODIUM CHLORIDE 9 MG/ML
1000 INJECTION, SOLUTION INTRAVENOUS CONTINUOUS PRN
Status: CANCELLED
Start: 2020-11-05

## 2020-11-03 RX ORDER — MEPERIDINE HYDROCHLORIDE 25 MG/ML
25 INJECTION INTRAMUSCULAR; INTRAVENOUS; SUBCUTANEOUS EVERY 30 MIN PRN
Status: CANCELLED | OUTPATIENT
Start: 2020-11-05

## 2020-11-03 RX ORDER — EPINEPHRINE 0.3 MG/.3ML
0.3 INJECTION SUBCUTANEOUS EVERY 5 MIN PRN
Status: CANCELLED | OUTPATIENT
Start: 2020-11-05

## 2020-11-03 RX ORDER — ALBUTEROL SULFATE 0.83 MG/ML
2.5 SOLUTION RESPIRATORY (INHALATION)
Status: CANCELLED | OUTPATIENT
Start: 2020-11-05

## 2020-11-03 NOTE — LETTER
"    11/3/2020         RE: Preeti Pascual  [address]  [facility] [address]        Dear Colleague,    Thank you for referring your patient, Preeti Pascual, to the [facility]. Please see a copy of my visit note below.    Preeti Pascual is a 54 year old male who is being evaluated via a billable telephone visit.      The patient has been notified of following:     \"This telephone visit will be conducted via a call between you and your physician/provider. We have found that certain health care needs can be provided without the need for a physical exam.  This service lets us provide the care you need with a short phone conversation.  If a prescription is necessary we can send it directly to your pharmacy.  If lab work is needed we can place an order for that and you can then stop by our lab to have the test done at a later time.    Telephone visits are billed at different rates depending on your insurance coverage. During this emergency period, for some insurers they may be billed the same as an in-person visit.  Please reach out to your insurance provider with any questions.    If during the course of the call the physician/provider feels a telephone visit is not appropriate, you will not be charged for this service.\"    Patient has given verbal consent for Telephone visit?  Yes    What phone number would you like to be contacted at? [phone]    How would you like to obtain your AVS? Marium     Vitals - Patient Reported  Weight (Patient Reported): 73.9 kg (163 lb)  Height (Patient Reported): 172.7 cm (5' 7.99\")  BMI (Based on Pt Reported Ht/Wt): 24.79  Pain Score: No Pain (0)    Efrain Calderon LPN    Phone call duration: 20 minutes, additional 5 minutes reviewing scan with Dr. Anabella Smiley, APRN CNP     Reason for Visit: seen in f/u of metastatic hepatocellular carcinoma    Oncology HPI:    Preeti Pascual is a 54 year old man with a PMH of hep. B and cirrhosis. He " was previously treated with radioembolization in 2016 with an excellent response. He declined a liver transplantation. 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 He was initiated Sorafenib, but tolerated it poorly and was dosed at 200 mg bid. He was found to have progression and initiated on Nivolumab 480 mg monthly on 11/1/18. He has had stable disease on this.    Interval history: Preeti is doing well. He wishes his scans would show regression of his cancer. He is tolerating the infusions well. No adverse side effects. No fevers/chills, cough, sob, cp, palpitation. No abdominal pain, bloating. N/V, diarrhea. No skin rash, bleeding, bruising. Energy is good. Appetite is good. Denies any new complaints today. Wants to review his MRI results.    Current Outpatient Medications   Medication Sig Dispense Refill     fluticasone (FLONASE) 50 MCG/ACT nasal spray Spray 2 sprays into both nostrils daily 16 mL 1     VENTOLIN  (90 Base) MCG/ACT inhaler 1-2 INHALATIONS EVERY 4-6 HOURS AS NEEDED FOR WHEEZING. DISPENSE SPACER AS NEEDED.  0        No Known Allergies      Objective: alert, oriented, speech clear. There were no vitals taken for this visit.  Wt Readings from Last 4 Encounters:   10/08/20 74.3 kg (163 lb 12.8 oz)   10/01/20 74.3 kg (163 lb 12.8 oz)   09/10/20 74.8 kg (164 lb 14.4 oz)   08/13/20 74 kg (163 lb 3.2 oz)       Labs: pending    Imaging: MRI ABDOMEN     CLINICAL HISTORY:  Hepatocellular carcinoma. According to medical  record, hepatitis B, status post Y90 radio embolization, metastatic  disease to the adrenal gland, immunotherapy.     TECHNIQUE: Images were acquired with and without intravenous contrast  through the abdomen. The following MR images were acquired: TrueFISP,  multiplanar T2 weighted, axial T1 in/out of phase, diffusion-weighted.  Multiplanar T1-weighted images with fat saturation were before  contrast administration and at multiple time points following  the  administration of intravenous contrast. Contrast dose: 7.5 ml Gadavist     FINDINGS:     Comparison study: CT exam 10/1/2020; 6/11/2020     Liver: Cirrhotic morphology of the liver parenchyma. Hypertrophy of  the left and caudate lobes. Before meals T2 signal with delayed  reticular pattern of contrast enhancement likely due to fibrotic  changes. No hepatic steatosis. No iron deposition. Patent liver  vasculature. No biliary tree dilation.     Posttreatment changes of radio embolization centered in hepatic  segment 5/8 with increased precontrast T1 signal due to coagulative  necrosis. There is associated capsular retraction. Nodular arterial  enhancement particularly at the inferior margin extending within  hepatic segment 4A/B measuring approximately 5.2 cm abutting the  portal vein confluence with intermediate T2 signal, restricted  diffusion and washout. Additional right pericardiophrenic lymph node  of arterial enhancement measuring 1.5 cm, series 15 image 24 with  intermediate T2 signal and restricted diffusion concerning for  metastatic implant.     Perfusion/fibrotic changes about the hepatic segments 6/7. Small  subcapsular perihepatic fluid collection along the treatment zone.     Gallbladder: Presumed biliary sludge. No gallbladder wall thickening.     Spleen: Not enlarged. No focal lesions. Presumed small splenules.     Kidneys: No suspicious renal lesions. No hydronephrosis.     Adrenal glands: Arterial enhancing nodular foci at the right adrenal  gland with restricted diffusion, intermediate T2 signal concerning for  metastatic foci, grossly stable. Unremarkable left adrenal gland     Pancreas: Preserved increased precontrast T1 signal. No focal lesions.  Main pancreatic duct is not dilated.     Bowel: No dilated loops of bowel. Mild colonic diverticulosis. Hiatal  hernia.     Lymph nodes: Borderline enlarged tiffanie hepatis lymph nodes, stable.     Blood vessels: Patent abdominal vasculature. No  abdominal aortic  aneurysm.     Lung bases: Bilateral lower lobes dependent atelectasis. No pleural  effusions. No pericardial effusions.     Bones and soft tissues: No convincing aggressive bone lesions. Mild  bilateral gynecomastia.     Mesentery and abdominal wall: Unremarkable.     Ascites: Minimal perihepatic free fluid along the treatment zone.                                                                      IMPRESSION:    1. Cirrhotic configuration of the liver parenchyma. No significant  ascites. No splenomegaly.  2. Posttreatment changes of Y90 radio embolization about hepatic  segments 5/8 with concern for recurrence extending within hepatic  segment 4A/B as described above. LIRADS 5TR viable.  3. Additional suspicious right pericardiophrenic lymph node as well as  stable metastatic right adrenal gland lesions.  4. Based on this exam only, the patient is not within Prasad criteria.  5. Additional incidental findings as described above.     OPTN/LIRADS definitions.  LIRADS v2018.     LIRADS 1:  Definitely benign.  LIRADS 2:  Probably benign.  LIRADS 3:  Indeterminate for HCC.  LIRADS 4:  Probably HCC.  LIRADS M:  Probably malignant.  Not specific for HCC.  LIRADS 5TR- nonviable:  Previously treated HCC without residual  malignancy identified  LIRADS 5TR- viable:  Previously treated HCC with findings indicating  residual viable malignancy  LIRADS 5TR- equivocal:  Previously treated HCC with findings that may  be treatment changes or viable malignancy     OPTN 5a:  Diagnostic for HCC.  < 2 cm.  OPTN 5b:  Diagnostic for HCC.  > Or = 2 cm and < 5 cm.  OPTN 5x:  Diagnostic for HCC.  > Or = 5 cm.        Prasad criteria for liver transplantation:    1. Presence of no HCCs greater than 5 cm. One HCC measuring 3-5 cm is  allowed if no other HCCs are present.  2. Maximum of 3 HCCs measuring 3 cm or less.  3. No vascular invasion.  4. No extrahepatic metastases.     LESLY GAN MD    Impression/plan:    Metastatic HCC with stable disease on Nivolumab  -I reviewed his imaging with Dr. Kyle. At his last visit, there was concern of disease progression given the rising AFP. Dr. Kyle had reviewed the recent MRI. Overall, findings are stable. The areas that were interpreted as new are just easier to see due to subtelties of contrast timing. While hard to measure, he didn't feel they represented a change in size. OK to continue the Nivolumab  -he is tolerating treatment well. No adverse side effects. OK to continue Nivoumab on Thursday as long as labs are stable. Will f/u with Dr. Kyle in December with restaging scans      Again, thank you for allowing me to participate in the care of your patient.        Sincerely,        MITCH Jacobs CNP

## 2020-11-03 NOTE — PROGRESS NOTES
"Preeti Pascual is a 54 year old male who is being evaluated via a billable telephone visit.      The patient has been notified of following:     \"This telephone visit will be conducted via a call between you and your physician/provider. We have found that certain health care needs can be provided without the need for a physical exam.  This service lets us provide the care you need with a short phone conversation.  If a prescription is necessary we can send it directly to your pharmacy.  If lab work is needed we can place an order for that and you can then stop by our lab to have the test done at a later time.    Telephone visits are billed at different rates depending on your insurance coverage. During this emergency period, for some insurers they may be billed the same as an in-person visit.  Please reach out to your insurance provider with any questions.    If during the course of the call the physician/provider feels a telephone visit is not appropriate, you will not be charged for this service.\"    Patient has given verbal consent for Telephone visit?  Yes    What phone number would you like to be contacted at? [phone]    How would you like to obtain your AVS? Amandahart     Vitals - Patient Reported  Weight (Patient Reported): 73.9 kg (163 lb)  Height (Patient Reported): 172.7 cm (5' 7.99\")  BMI (Based on Pt Reported Ht/Wt): 24.79  Pain Score: No Pain (0)    Efrain Calderon LPN    Phone call duration: 20 minutes, additional 5 minutes reviewing scan with Dr. Anabella Smiley, APRN CNP     Reason for Visit: seen in f/u of metastatic hepatocellular carcinoma    Oncology HPI:    Preeti Pascual is a 54 year old man with a PMH of hep. B and cirrhosis. He was previously treated with radioembolization in 2016 with an excellent response. He declined a liver transplantation. 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 He was initiated Sorafenib, but tolerated it poorly and was dosed at " 200 mg bid. He was found to have progression and initiated on Nivolumab 480 mg monthly on 11/1/18. He has had stable disease on this.    Interval history: Preeti is doing well. He wishes his scans would show regression of his cancer. He is tolerating the infusions well. No adverse side effects. No fevers/chills, cough, sob, cp, palpitation. No abdominal pain, bloating. N/V, diarrhea. No skin rash, bleeding, bruising. Energy is good. Appetite is good. Denies any new complaints today. Wants to review his MRI results.    Current Outpatient Medications   Medication Sig Dispense Refill     fluticasone (FLONASE) 50 MCG/ACT nasal spray Spray 2 sprays into both nostrils daily 16 mL 1     VENTOLIN  (90 Base) MCG/ACT inhaler 1-2 INHALATIONS EVERY 4-6 HOURS AS NEEDED FOR WHEEZING. DISPENSE SPACER AS NEEDED.  0        No Known Allergies      Objective: alert, oriented, speech clear. There were no vitals taken for this visit.  Wt Readings from Last 4 Encounters:   10/08/20 74.3 kg (163 lb 12.8 oz)   10/01/20 74.3 kg (163 lb 12.8 oz)   09/10/20 74.8 kg (164 lb 14.4 oz)   08/13/20 74 kg (163 lb 3.2 oz)       Labs: pending    Imaging: MRI ABDOMEN     CLINICAL HISTORY:  Hepatocellular carcinoma. According to medical  record, hepatitis B, status post Y90 radio embolization, metastatic  disease to the adrenal gland, immunotherapy.     TECHNIQUE: Images were acquired with and without intravenous contrast  through the abdomen. The following MR images were acquired: TrueFISP,  multiplanar T2 weighted, axial T1 in/out of phase, diffusion-weighted.  Multiplanar T1-weighted images with fat saturation were before  contrast administration and at multiple time points following the  administration of intravenous contrast. Contrast dose: 7.5 ml Gadavist     FINDINGS:     Comparison study: CT exam 10/1/2020; 6/11/2020     Liver: Cirrhotic morphology of the liver parenchyma. Hypertrophy of  the left and caudate lobes. Before meals T2  signal with delayed  reticular pattern of contrast enhancement likely due to fibrotic  changes. No hepatic steatosis. No iron deposition. Patent liver  vasculature. No biliary tree dilation.     Posttreatment changes of radio embolization centered in hepatic  segment 5/8 with increased precontrast T1 signal due to coagulative  necrosis. There is associated capsular retraction. Nodular arterial  enhancement particularly at the inferior margin extending within  hepatic segment 4A/B measuring approximately 5.2 cm abutting the  portal vein confluence with intermediate T2 signal, restricted  diffusion and washout. Additional right pericardiophrenic lymph node  of arterial enhancement measuring 1.5 cm, series 15 image 24 with  intermediate T2 signal and restricted diffusion concerning for  metastatic implant.     Perfusion/fibrotic changes about the hepatic segments 6/7. Small  subcapsular perihepatic fluid collection along the treatment zone.     Gallbladder: Presumed biliary sludge. No gallbladder wall thickening.     Spleen: Not enlarged. No focal lesions. Presumed small splenules.     Kidneys: No suspicious renal lesions. No hydronephrosis.     Adrenal glands: Arterial enhancing nodular foci at the right adrenal  gland with restricted diffusion, intermediate T2 signal concerning for  metastatic foci, grossly stable. Unremarkable left adrenal gland     Pancreas: Preserved increased precontrast T1 signal. No focal lesions.  Main pancreatic duct is not dilated.     Bowel: No dilated loops of bowel. Mild colonic diverticulosis. Hiatal  hernia.     Lymph nodes: Borderline enlarged tiffanie hepatis lymph nodes, stable.     Blood vessels: Patent abdominal vasculature. No abdominal aortic  aneurysm.     Lung bases: Bilateral lower lobes dependent atelectasis. No pleural  effusions. No pericardial effusions.     Bones and soft tissues: No convincing aggressive bone lesions. Mild  bilateral gynecomastia.     Mesentery and  abdominal wall: Unremarkable.     Ascites: Minimal perihepatic free fluid along the treatment zone.                                                                      IMPRESSION:    1. Cirrhotic configuration of the liver parenchyma. No significant  ascites. No splenomegaly.  2. Posttreatment changes of Y90 radio embolization about hepatic  segments 5/8 with concern for recurrence extending within hepatic  segment 4A/B as described above. LIRADS 5TR viable.  3. Additional suspicious right pericardiophrenic lymph node as well as  stable metastatic right adrenal gland lesions.  4. Based on this exam only, the patient is not within Prasad criteria.  5. Additional incidental findings as described above.     OPTN/LIRADS definitions.  LIRADS v2018.     LIRADS 1:  Definitely benign.  LIRADS 2:  Probably benign.  LIRADS 3:  Indeterminate for HCC.  LIRADS 4:  Probably HCC.  LIRADS M:  Probably malignant.  Not specific for HCC.  LIRADS 5TR- nonviable:  Previously treated HCC without residual  malignancy identified  LIRADS 5TR- viable:  Previously treated HCC with findings indicating  residual viable malignancy  LIRADS 5TR- equivocal:  Previously treated HCC with findings that may  be treatment changes or viable malignancy     OPTN 5a:  Diagnostic for HCC.  < 2 cm.  OPTN 5b:  Diagnostic for HCC.  > Or = 2 cm and < 5 cm.  OPTN 5x:  Diagnostic for HCC.  > Or = 5 cm.        Hallieford criteria for liver transplantation:    1. Presence of no HCCs greater than 5 cm. One HCC measuring 3-5 cm is  allowed if no other HCCs are present.  2. Maximum of 3 HCCs measuring 3 cm or less.  3. No vascular invasion.  4. No extrahepatic metastases.     LESLY GAN MD    Impression/plan:   Metastatic HCC with stable disease on Nivolumab  -I reviewed his imaging with Dr. Kyle. At his last visit, there was concern of disease progression given the rising AFP. Dr. Kyle had reviewed the recent MRI. Overall, findings are stable. The areas that  were interpreted as new are just easier to see due to subtelties of contrast timing. While hard to measure, he didn't feel they represented a change in size. OK to continue the Nivolumab  -he is tolerating treatment well. No adverse side effects. OK to continue Nivoumab on Thursday as long as labs are stable. Will f/u with Dr. Kyle in December with restaging scans

## 2020-11-05 ENCOUNTER — APPOINTMENT (OUTPATIENT)
Dept: LAB | Facility: CLINIC | Age: 55
End: 2020-11-05
Attending: INTERNAL MEDICINE
Payer: COMMERCIAL

## 2020-11-05 ENCOUNTER — INFUSION THERAPY VISIT (OUTPATIENT)
Dept: ONCOLOGY | Facility: CLINIC | Age: 55
End: 2020-11-05
Attending: INTERNAL MEDICINE
Payer: COMMERCIAL

## 2020-11-05 VITALS
DIASTOLIC BLOOD PRESSURE: 77 MMHG | RESPIRATION RATE: 15 BRPM | HEART RATE: 63 BPM | WEIGHT: 163.4 LBS | TEMPERATURE: 98.1 F | BODY MASS INDEX: 24.84 KG/M2 | OXYGEN SATURATION: 98 % | SYSTOLIC BLOOD PRESSURE: 135 MMHG

## 2020-11-05 DIAGNOSIS — C22.0 HCC (HEPATOCELLULAR CARCINOMA) (H): Primary | ICD-10-CM

## 2020-11-05 LAB
ALBUMIN SERPL-MCNC: 3.4 G/DL (ref 3.4–5)
ALP SERPL-CCNC: 106 U/L (ref 40–150)
ALT SERPL W P-5'-P-CCNC: 28 U/L (ref 0–70)
ANION GAP SERPL CALCULATED.3IONS-SCNC: 6 MMOL/L (ref 3–14)
AST SERPL W P-5'-P-CCNC: 25 U/L (ref 0–45)
BILIRUB SERPL-MCNC: 0.3 MG/DL (ref 0.2–1.3)
BUN SERPL-MCNC: 11 MG/DL (ref 7–30)
CALCIUM SERPL-MCNC: 8.6 MG/DL (ref 8.5–10.1)
CHLORIDE SERPL-SCNC: 111 MMOL/L (ref 94–109)
CO2 SERPL-SCNC: 24 MMOL/L (ref 20–32)
CREAT SERPL-MCNC: 0.75 MG/DL (ref 0.66–1.25)
GFR SERPL CREATININE-BSD FRML MDRD: >90 ML/MIN/{1.73_M2}
GLUCOSE SERPL-MCNC: 98 MG/DL (ref 70–99)
POTASSIUM SERPL-SCNC: 3.9 MMOL/L (ref 3.4–5.3)
PROT SERPL-MCNC: 7.8 G/DL (ref 6.8–8.8)
SODIUM SERPL-SCNC: 141 MMOL/L (ref 133–144)
TSH SERPL DL<=0.005 MIU/L-ACNC: 1.03 MU/L (ref 0.4–4)

## 2020-11-05 PROCEDURE — 258N000003 HC RX IP 258 OP 636: Performed by: NURSE PRACTITIONER

## 2020-11-05 PROCEDURE — 250N000011 HC RX IP 250 OP 636: Performed by: NURSE PRACTITIONER

## 2020-11-05 PROCEDURE — 84443 ASSAY THYROID STIM HORMONE: CPT | Performed by: NURSE PRACTITIONER

## 2020-11-05 PROCEDURE — 999N000127 HC STATISTIC PERIPHERAL IV START W US GUIDANCE

## 2020-11-05 PROCEDURE — 99207 PR NO BILLABLE SERVICE THIS VISIT: CPT

## 2020-11-05 PROCEDURE — 80053 COMPREHEN METABOLIC PANEL: CPT | Performed by: NURSE PRACTITIONER

## 2020-11-05 PROCEDURE — 96413 CHEMO IV INFUSION 1 HR: CPT

## 2020-11-05 RX ADMIN — SODIUM CHLORIDE 480 MG: 9 INJECTION, SOLUTION INTRAVENOUS at 09:36

## 2020-11-05 RX ADMIN — SODIUM CHLORIDE 250 ML: 9 INJECTION, SOLUTION INTRAVENOUS at 09:35

## 2020-11-05 ASSESSMENT — PAIN SCALES - GENERAL: PAINLEVEL: NO PAIN (0)

## 2020-11-05 NOTE — PROGRESS NOTES
Chief Complaint   Patient presents with     Blood Draw     IV placed, blood drawn, vitals taken, checked into next appointment.     Labs drawn via IV placed by JACKIE Manuel in left arm.    Pamela Allen MA

## 2020-11-05 NOTE — PATIENT INSTRUCTIONS
Contact Numbers  [facility]: [phone] (for symptom and scheduling needs)    Please call the [facility] Triage line if you experience a temperature greater than or equal to 100.4, shaking chills, have uncontrolled nausea, vomiting and/or diarrhea, dizziness, shortness of breath, chest pain, bleeding, unexplained bruising, or if you have any other new/concerning symptoms, questions or concerns.     If you are having any concerning symptoms or wish to speak to a provider before your next infusion visit, please call your care coordinator or triage to notify them so we can adequately serve you.     If you need a refill on a narcotic prescription or other medication, please call triage before your infusion appointment.      Lab Results:  Recent Results (from the past 12 hour(s))   Comprehensive metabolic panel    Collection Time: 11/05/20  8:18 AM   Result Value Ref Range    Sodium 141 133 - 144 mmol/L    Potassium 3.9 3.4 - 5.3 mmol/L    Chloride 111 (H) 94 - 109 mmol/L    Carbon Dioxide 24 20 - 32 mmol/L    Anion Gap 6 3 - 14 mmol/L    Glucose 98 70 - 99 mg/dL    Urea Nitrogen 11 7 - 30 mg/dL    Creatinine 0.75 0.66 - 1.25 mg/dL    GFR Estimate >90 >60 mL/min/[1.73_m2]    GFR Estimate If Black >90 >60 mL/min/[1.73_m2]    Calcium 8.6 8.5 - 10.1 mg/dL    Bilirubin Total 0.3 0.2 - 1.3 mg/dL    Albumin 3.4 3.4 - 5.0 g/dL    Protein Total 7.8 6.8 - 8.8 g/dL    Alkaline Phosphatase 106 40 - 150 U/L    ALT 28 0 - 70 U/L    AST 25 0 - 45 U/L   TSH with free T4 reflex    Collection Time: 11/05/20  8:18 AM   Result Value Ref Range    TSH 1.03 0.40 - 4.00 mU/L

## 2020-11-05 NOTE — PROGRESS NOTES
Infusion Nursing Note:  Preeti Pascual presents today for Day 1 Cycle 26 Nivolumab.    Patient seen by provider today: No   present during visit today: Not Applicable.    Note: Preeti arrives to infusion today doing well. He offers no changes or concerns since his visit with Nallely Smiley NP on 11/3.     Intravenous Access:  Peripheral IV placed.    Treatment Conditions:  Lab Results   Component Value Date     11/05/2020                   Lab Results   Component Value Date    POTASSIUM 3.9 11/05/2020           No results found for: MAG         Lab Results   Component Value Date    CR 0.75 11/05/2020                   Lab Results   Component Value Date    STACEY 8.6 11/05/2020                Lab Results   Component Value Date    BILITOTAL 0.3 11/05/2020           Lab Results   Component Value Date    ALBUMIN 3.4 11/05/2020                    Lab Results   Component Value Date    ALT 28 11/05/2020           Lab Results   Component Value Date    AST 25 11/05/2020       Results reviewed, labs MET treatment parameters, ok to proceed with treatment.    Post Infusion Assessment:  Patient tolerated infusion without incident.  Blood return noted pre and post infusion.  Site patent and intact, free from redness, edema or discomfort.  No evidence of extravasations.  Access discontinued per protocol.     Discharge Plan:   Patient declined prescription refills.  Copy of AVS reviewed with patient.  Patient will return 12/3 for next appointment.  Patient discharged in stable condition accompanied by: self.  Departure Mode: Ambulatory.    Shruti Campbell RN

## 2020-12-02 RX ORDER — EPINEPHRINE 0.3 MG/.3ML
0.3 INJECTION SUBCUTANEOUS EVERY 5 MIN PRN
Status: CANCELLED | OUTPATIENT
Start: 2020-12-03

## 2020-12-02 RX ORDER — MEPERIDINE HYDROCHLORIDE 25 MG/ML
25 INJECTION INTRAMUSCULAR; INTRAVENOUS; SUBCUTANEOUS EVERY 30 MIN PRN
Status: CANCELLED | OUTPATIENT
Start: 2020-12-03

## 2020-12-02 RX ORDER — LORAZEPAM 2 MG/ML
0.5 INJECTION INTRAMUSCULAR EVERY 4 HOURS PRN
Status: CANCELLED
Start: 2020-12-03

## 2020-12-02 RX ORDER — SODIUM CHLORIDE 9 MG/ML
1000 INJECTION, SOLUTION INTRAVENOUS CONTINUOUS PRN
Status: CANCELLED
Start: 2020-12-03

## 2020-12-02 RX ORDER — ALBUTEROL SULFATE 90 UG/1
1-2 AEROSOL, METERED RESPIRATORY (INHALATION)
Status: CANCELLED
Start: 2020-12-03

## 2020-12-02 RX ORDER — METHYLPREDNISOLONE SODIUM SUCCINATE 125 MG/2ML
125 INJECTION, POWDER, LYOPHILIZED, FOR SOLUTION INTRAMUSCULAR; INTRAVENOUS
Status: CANCELLED
Start: 2020-12-03

## 2020-12-02 RX ORDER — DIPHENHYDRAMINE HYDROCHLORIDE 50 MG/ML
50 INJECTION INTRAMUSCULAR; INTRAVENOUS
Status: CANCELLED
Start: 2020-12-03

## 2020-12-02 RX ORDER — EPINEPHRINE 1 MG/ML
0.3 INJECTION, SOLUTION INTRAMUSCULAR; SUBCUTANEOUS EVERY 5 MIN PRN
Status: CANCELLED | OUTPATIENT
Start: 2020-12-03

## 2020-12-02 RX ORDER — ALBUTEROL SULFATE 0.83 MG/ML
2.5 SOLUTION RESPIRATORY (INHALATION)
Status: CANCELLED | OUTPATIENT
Start: 2020-12-03

## 2020-12-03 ENCOUNTER — INFUSION THERAPY VISIT (OUTPATIENT)
Dept: ONCOLOGY | Facility: CLINIC | Age: 55
End: 2020-12-03
Attending: INTERNAL MEDICINE
Payer: COMMERCIAL

## 2020-12-03 ENCOUNTER — APPOINTMENT (OUTPATIENT)
Dept: LAB | Facility: CLINIC | Age: 55
End: 2020-12-03
Attending: INTERNAL MEDICINE
Payer: COMMERCIAL

## 2020-12-03 VITALS
WEIGHT: 164.9 LBS | DIASTOLIC BLOOD PRESSURE: 78 MMHG | TEMPERATURE: 98.1 F | HEART RATE: 67 BPM | SYSTOLIC BLOOD PRESSURE: 135 MMHG | OXYGEN SATURATION: 99 % | BODY MASS INDEX: 25.07 KG/M2 | RESPIRATION RATE: 16 BRPM

## 2020-12-03 DIAGNOSIS — C22.0 HCC (HEPATOCELLULAR CARCINOMA) (H): Primary | ICD-10-CM

## 2020-12-03 LAB
ALBUMIN SERPL-MCNC: 3.3 G/DL (ref 3.4–5)
ALP SERPL-CCNC: 99 U/L (ref 40–150)
ALT SERPL W P-5'-P-CCNC: 38 U/L (ref 0–70)
ANION GAP SERPL CALCULATED.3IONS-SCNC: 6 MMOL/L (ref 3–14)
AST SERPL W P-5'-P-CCNC: ABNORMAL U/L (ref 0–45)
BILIRUB SERPL-MCNC: 0.3 MG/DL (ref 0.2–1.3)
BUN SERPL-MCNC: 10 MG/DL (ref 7–30)
CALCIUM SERPL-MCNC: 8.6 MG/DL (ref 8.5–10.1)
CHLORIDE SERPL-SCNC: 108 MMOL/L (ref 94–109)
CO2 SERPL-SCNC: 24 MMOL/L (ref 20–32)
CREAT SERPL-MCNC: 0.71 MG/DL (ref 0.66–1.25)
GFR SERPL CREATININE-BSD FRML MDRD: >90 ML/MIN/{1.73_M2}
GLUCOSE SERPL-MCNC: 94 MG/DL (ref 70–99)
POTASSIUM SERPL-SCNC: 4.6 MMOL/L (ref 3.4–5.3)
PROT SERPL-MCNC: 7.8 G/DL (ref 6.8–8.8)
SODIUM SERPL-SCNC: 137 MMOL/L (ref 133–144)
TSH SERPL DL<=0.005 MIU/L-ACNC: 0.91 MU/L (ref 0.4–4)

## 2020-12-03 PROCEDURE — 84443 ASSAY THYROID STIM HORMONE: CPT | Performed by: INTERNAL MEDICINE

## 2020-12-03 PROCEDURE — 80048 BASIC METABOLIC PNL TOTAL CA: CPT

## 2020-12-03 PROCEDURE — 999N000127 HC STATISTIC PERIPHERAL IV START W US GUIDANCE

## 2020-12-03 PROCEDURE — 96413 CHEMO IV INFUSION 1 HR: CPT

## 2020-12-03 PROCEDURE — 82040 ASSAY OF SERUM ALBUMIN: CPT

## 2020-12-03 PROCEDURE — 84155 ASSAY OF PROTEIN SERUM: CPT

## 2020-12-03 PROCEDURE — 258N000003 HC RX IP 258 OP 636: Performed by: INTERNAL MEDICINE

## 2020-12-03 PROCEDURE — 82247 BILIRUBIN TOTAL: CPT

## 2020-12-03 PROCEDURE — 84460 ALANINE AMINO (ALT) (SGPT): CPT

## 2020-12-03 PROCEDURE — 84075 ASSAY ALKALINE PHOSPHATASE: CPT

## 2020-12-03 PROCEDURE — 250N000011 HC RX IP 250 OP 636: Performed by: INTERNAL MEDICINE

## 2020-12-03 PROCEDURE — 99207 PR NO BILLABLE SERVICE THIS VISIT: CPT

## 2020-12-03 RX ADMIN — SODIUM CHLORIDE 480 MG: 9 INJECTION, SOLUTION INTRAVENOUS at 09:24

## 2020-12-03 RX ADMIN — SODIUM CHLORIDE 250 ML: 9 INJECTION, SOLUTION INTRAVENOUS at 09:24

## 2020-12-03 ASSESSMENT — PAIN SCALES - GENERAL: PAINLEVEL: NO PAIN (0)

## 2020-12-03 NOTE — PATIENT INSTRUCTIONS
Contact numbers:    Triage/[phone]    Call with chills and/or temperature greater than or equal to 100.5 and questions or concerns.    If after hours, weekends, or holidays, call main hospital  at  [phone] and ask for Oncology doctor on call.                      1     2     3    [facility] DRAW   8:00 AM   (15 min.)    [facility] DRAW   [facility] ONC INFUSION 60   8:30 AM   (60 min.)    ONCOLOGY INFUSION   [facility] 4     5       6     7     8     9     10     11     12       13     14     15     16     17     18     19       20     21     22     23     24    LAB   9:30 AM   (15 min.)    LAB   [facility] [place]    MR LIVER WO & W CONTRAST   9:45 AM   (60 min.)   [facility]   [facility] MRI [place] 25  Happy Birthday!     26       27     28    VIDEO VISIT RETURN   7:30 AM   (30 min.)   Agustin Kyle MD   [facility] 29     30     31    [facility] DRAW   8:00 AM   (15 min.)    [facility] DRAW   [facility] ONC INFUSION 60   8:30 AM   (60 min.)    ONCOLOGY INFUSION   [facility]                                                   1     2       3     4     5     6     7     8     9       10     11     12     13     14     15     16       17     18     19     20     21     22     23       24     25     26     27     28     29     30       31                                                   Lab Results:  Recent Results (from the past 12 hour(s))   Comprehensive metabolic panel    Collection Time: 20  8:03 AM   Result Value Ref Range    Sodium 137 133 - 144 mmol/L    Potassium 4.6 3.4 - 5.3 mmol/L    Chloride 108 94  - 109 mmol/L    Carbon Dioxide 24 20 - 32 mmol/L    Anion Gap 6 3 - 14 mmol/L    Glucose 94 70 - 99 mg/dL    Urea Nitrogen 10 7 - 30 mg/dL    Creatinine 0.71 0.66 - 1.25 mg/dL    GFR Estimate >90 >60 mL/min/[1.73_m2]    GFR Estimate If Black >90 >60 mL/min/[1.73_m2]    Calcium 8.6 8.5 - 10.1 mg/dL    Bilirubin Total 0.3 0.2 - 1.3 mg/dL    Albumin 3.3 (L) 3.4 - 5.0 g/dL    Protein Total 7.8 6.8 - 8.8 g/dL    Alkaline Phosphatase 99 40 - 150 U/L    ALT 38 0 - 70 U/L    AST Canceled, Test credited 0 - 45 U/L   TSH with free T4 reflex    Collection Time: 12/03/20  8:03 AM   Result Value Ref Range    TSH 0.91 0.40 - 4.00 mU/L

## 2020-12-03 NOTE — PROGRESS NOTES
Infusion Nursing Note:  Preeti Pascual presents for C27 Nivolumab    Note: pt feeling well today, no new issues or concerns to report.     CMP hemolyzed and AST unable to be reported.    MERARY Smiley DNP/Skylar Armijo, RN  --okay to proceed with treatment without drawing a new CMP as long as ALT and bili are WNL and pt feeling well    Pain: denies    Treatment Conditions:  Lab Results   Component Value Date    HGB 12.6 09/10/2020     Lab Results   Component Value Date    WBC 3.7 09/10/2020      Lab Results   Component Value Date    ANEU 1.4 09/10/2020     Lab Results   Component Value Date     09/10/2020      Lab Results   Component Value Date     12/03/2020                   Lab Results   Component Value Date    POTASSIUM 4.6 12/03/2020           No results found for: MAG         Lab Results   Component Value Date    CR 0.71 12/03/2020                   Lab Results   Component Value Date    STACEY 8.6 12/03/2020                Lab Results   Component Value Date    BILITOTAL 0.3 12/03/2020           Lab Results   Component Value Date    ALBUMIN 3.3 12/03/2020                    Lab Results   Component Value Date    ALT 38 12/03/2020           Lab Results   Component Value Date    AST Canceled, Test credited 12/03/2020         Intravenous Access:  Peripheral IV placed.    Post Infusion Assessment:  Patient tolerated infusion without incident.  Blood return noted pre and post infusion.  No evidence of extravasations.  Access discontinued per protocol.    Discharge Plan:   Patient declined prescription refills.  Discharge instructions reviewed with: Patient.  Patient and/or family verbalized understanding of discharge instructions and all questions answered.  Copy of AVS reviewed with patient and/or family.  Patient will return 12/28 for next appointment with Dr. Kyle, 12/31 for infusion  Patient discharged in stable condition accompanied by: self.    Skylar Armijo, RN, RN

## 2020-12-03 NOTE — PROGRESS NOTES
Chief Complaint   Patient presents with     Blood Draw     IV placed, blood drawn, vitals taken, checked into next appointment.     Labs drawn via IV placed by JAKCIE Manuel in left arm.    Pamela Allen MA

## 2020-12-24 ENCOUNTER — ANCILLARY PROCEDURE (OUTPATIENT)
Dept: MRI IMAGING | Facility: CLINIC | Age: 55
End: 2020-12-24
Attending: INTERNAL MEDICINE
Payer: COMMERCIAL

## 2020-12-24 DIAGNOSIS — C22.0 HCC (HEPATOCELLULAR CARCINOMA) (H): ICD-10-CM

## 2020-12-24 DIAGNOSIS — B18.1 CHRONIC VIRAL HEPATITIS B WITHOUT DELTA AGENT AND WITHOUT COMA (H): ICD-10-CM

## 2020-12-24 LAB
AFP SERPL-MCNC: 82.4 UG/L (ref 0–8)
ALBUMIN SERPL-MCNC: 3.5 G/DL (ref 3.4–5)
ALP SERPL-CCNC: 98 U/L (ref 40–150)
ALT SERPL W P-5'-P-CCNC: 29 U/L (ref 0–70)
ANION GAP SERPL CALCULATED.3IONS-SCNC: 5 MMOL/L (ref 3–14)
AST SERPL W P-5'-P-CCNC: 25 U/L (ref 0–45)
BASOPHILS # BLD AUTO: 0.1 10E9/L (ref 0–0.2)
BASOPHILS NFR BLD AUTO: 2 %
BILIRUB DIRECT SERPL-MCNC: 0.1 MG/DL (ref 0–0.2)
BILIRUB SERPL-MCNC: 0.4 MG/DL (ref 0.2–1.3)
BUN SERPL-MCNC: 10 MG/DL (ref 7–30)
CALCIUM SERPL-MCNC: 8.7 MG/DL (ref 8.5–10.1)
CEA SERPL-MCNC: 1.1 UG/L (ref 0–2.5)
CHLORIDE SERPL-SCNC: 106 MMOL/L (ref 94–109)
CO2 SERPL-SCNC: 28 MMOL/L (ref 20–32)
CREAT SERPL-MCNC: 0.74 MG/DL (ref 0.66–1.25)
DIFFERENTIAL METHOD BLD: ABNORMAL
EOSINOPHIL # BLD AUTO: 0.7 10E9/L (ref 0–0.7)
EOSINOPHIL NFR BLD AUTO: 17.8 %
ERYTHROCYTE [DISTWIDTH] IN BLOOD BY AUTOMATED COUNT: 13.2 % (ref 10–15)
GFR SERPL CREATININE-BSD FRML MDRD: >90 ML/MIN/{1.73_M2}
GLUCOSE SERPL-MCNC: 96 MG/DL (ref 70–99)
HCT VFR BLD AUTO: 38.3 % (ref 40–53)
HGB BLD-MCNC: 11.6 G/DL (ref 13.3–17.7)
IMM GRANULOCYTES # BLD: 0 10E9/L (ref 0–0.4)
IMM GRANULOCYTES NFR BLD: 0 %
INR PPP: 1.21 (ref 0.86–1.14)
LYMPHOCYTES # BLD AUTO: 1 10E9/L (ref 0.8–5.3)
LYMPHOCYTES NFR BLD AUTO: 24.1 %
MCH RBC QN AUTO: 24.7 PG (ref 26.5–33)
MCHC RBC AUTO-ENTMCNC: 30.3 G/DL (ref 31.5–36.5)
MCV RBC AUTO: 82 FL (ref 78–100)
MONOCYTES # BLD AUTO: 0.4 10E9/L (ref 0–1.3)
MONOCYTES NFR BLD AUTO: 9.9 %
NEUTROPHILS # BLD AUTO: 1.8 10E9/L (ref 1.6–8.3)
NEUTROPHILS NFR BLD AUTO: 46.2 %
NRBC # BLD AUTO: 0 10*3/UL
NRBC BLD AUTO-RTO: 0 /100
PLATELET # BLD AUTO: 184 10E9/L (ref 150–450)
POTASSIUM SERPL-SCNC: 4.4 MMOL/L (ref 3.4–5.3)
PROT SERPL-MCNC: 7.8 G/DL (ref 6.8–8.8)
RBC # BLD AUTO: 4.7 10E12/L (ref 4.4–5.9)
SODIUM SERPL-SCNC: 140 MMOL/L (ref 133–144)
WBC # BLD AUTO: 3.9 10E9/L (ref 4–11)

## 2020-12-24 PROCEDURE — 99000 SPECIMEN HANDLING OFFICE-LAB: CPT | Performed by: PATHOLOGY

## 2020-12-24 PROCEDURE — 36415 COLL VENOUS BLD VENIPUNCTURE: CPT | Performed by: PATHOLOGY

## 2020-12-24 PROCEDURE — 74183 MRI ABD W/O CNTR FLWD CNTR: CPT | Mod: GC | Performed by: RADIOLOGY

## 2020-12-24 PROCEDURE — 82248 BILIRUBIN DIRECT: CPT | Performed by: PATHOLOGY

## 2020-12-24 PROCEDURE — A9585 GADOBUTROL INJECTION: HCPCS | Performed by: RADIOLOGY

## 2020-12-24 PROCEDURE — 80053 COMPREHEN METABOLIC PANEL: CPT | Performed by: PATHOLOGY

## 2020-12-24 PROCEDURE — 82105 ALPHA-FETOPROTEIN SERUM: CPT | Mod: 90 | Performed by: PATHOLOGY

## 2020-12-24 PROCEDURE — 87517 HEPATITIS B DNA QUANT: CPT | Mod: 90 | Performed by: PATHOLOGY

## 2020-12-24 PROCEDURE — 85610 PROTHROMBIN TIME: CPT | Performed by: PATHOLOGY

## 2020-12-24 PROCEDURE — 82378 CARCINOEMBRYONIC ANTIGEN: CPT | Mod: 90 | Performed by: PATHOLOGY

## 2020-12-24 PROCEDURE — 85025 COMPLETE CBC W/AUTO DIFF WBC: CPT | Performed by: PATHOLOGY

## 2020-12-24 RX ORDER — GADOBUTROL 604.72 MG/ML
7.5 INJECTION INTRAVENOUS ONCE
Status: COMPLETED | OUTPATIENT
Start: 2020-12-24 | End: 2020-12-24

## 2020-12-24 RX ADMIN — GADOBUTROL 7.5 ML: 604.72 INJECTION INTRAVENOUS at 10:20

## 2020-12-24 NOTE — DISCHARGE INSTRUCTIONS
MRI Contrast Discharge Instructions    The IV contrast you received today will pass out of your body in your  urine. This will happen in the next 24 hours. You will not feel this process.  Your urine will not change color.    Drink at least 4 extra glasses of water or juice today (unless your doctor  has restricted your fluids). This reduces the stress on your kidneys.  You may take your regular medicines.    If you are on dialysis: It is best to have dialysis today.    If you have a reaction: Most reactions happen right away. If you have  any new symptoms after leaving the hospital (such as hives or swelling),  call your hospital at the correct number below. Or call your family doctor.  If you have breathing distress or wheezing, call 911.    Special instructions: ***    I have read and understand the above information.    Signature:______________________________________ Date:___________    Staff:__________________________________________ Date:___________     Time:__________    [place] [facility] Departments:    ___[place]: [phone]  ___[facility]: [phone]  ___[place]: [phone] ___[facility]: [phone]  ___[facility]: [phone]  ___[facility]: [phone]  ___[phone]  ___[facility]: [phone]  ___[phone]

## 2020-12-24 NOTE — NURSING NOTE
Chief Complaint   Patient presents with     Lab Only     labs drawn with vpt by rn.       Labs drawn with vpt by rn.  Pt tolerated well.      Maricruz Feldman RN

## 2020-12-25 LAB
HBV DNA SERPL NAA+PROBE-ACNC: <20 [IU]/ML
HBV DNA SERPL NAA+PROBE-LOG IU: <1.3 {LOG_IU}/ML

## 2020-12-27 ENCOUNTER — HEALTH MAINTENANCE LETTER (OUTPATIENT)
Age: 55
End: 2020-12-27

## 2020-12-27 NOTE — PROGRESS NOTES
"Preeti Pascual is a 55 year old male who is being evaluated via a billable telephone visit.      The patient has been notified of following:     \"This telephone visit will be conducted via a call between you and your physician/provider. We have found that certain health care needs can be provided without the need for a physical exam.  This service lets us provide the care you need with a short phone conversation.  If a prescription is necessary we can send it directly to your pharmacy.  If lab work is needed we can place an order for that and you can then stop by our lab to have the test done at a later time.    Telephone visits are billed at different rates depending on your insurance coverage. During this emergency period, for some insurers they may be billed the same as an in-person visit.  Please reach out to your insurance provider with any questions.    If during the course of the call the physician/provider feels a telephone visit is not appropriate, you will not be charged for this service.\"    Patient has given verbal consent for Telephone visit?  Yes    What phone number would you like to be contacted at? [phone]    How would you like to obtain your AVS? Marium    I have reviewed and updated the patient's allergies and medication list.    Concerns: none  Refills: Flonase needed    Vitals - Patient Reported  Weight (Patient Reported): 74.4 kg (164 lb)  Height (Patient Reported): 185.4 cm (6' 1\")  BMI (Based on Pt Reported Ht/Wt): 21.64  Pain Score: No Pain (0)    Patricia Bowden CMA    Phone call duration: 30 minutes          Oncology Note - Video/Phone Visit     Preeti Pascual MRN# [id number]   Age: 55 year old YOB: 1965     Interval History:   Preeti Pascual  who is being evaluated via a telephone/video visit due to COVID-19 pandemic.      She reported feeling good without any complains. Otherwise her appetite is good and gained some weight.No N/V/D. No chest pain or SOB. No urine " issues or legs swelling. No pain.     Remainder of her 10 point review of systems is otherwise negative.      Oncology History:   Mr. Pascual has metastatic hepatocellular carcinoma related to hepatitis B and originally was treated with radioembolization in 2016 but developed metastatic disease to the adrenal gland in 2018 and progressed on sorafenib.  He has been on nivolumab since November 2018 with good control of his disease.     Oncology Medications 1/25/2016 11/23/2016   yttrium Y-90 brachytherapy, non-stranded (THERASPHERE) IA 63.4 mCi 76.4 mCi     11/1/2018 Started Nivo  12/3/2020 Last dose of Nivo 480 mg Q 4 weeks.         Medications: reviewed.  Current Outpatient Medications   Medication     fluticasone (FLONASE) 50 MCG/ACT nasal spray     VENTOLIN  (90 Base) MCG/ACT inhaler     No current facility-administered medications for this visit.             Physical Exam:   Over the phone. Sounds healthy with no apparent respiratory distress and normal mood and speech..             Data:   I have personally reviewed the following labs/imaging:  Recent Labs   Lab Test 12/24/20  1119 09/10/20  0849 06/18/20  0922   WBC 3.9* 3.7* 4.5   RBC 4.70 4.72 4.67   HGB 11.6* 12.6* 12.8*   HCT 38.3* 39.1* 39.5*   MCV 82 83 85   MCH 24.7* 26.7 27.4   MCHC 30.3* 32.2 32.4   RDW 13.2 13.2 13.2    173 184   NEUTROPHIL 46.2 38.6 40.3     Recent Labs   Lab Test 12/24/20  1119 12/03/20  0803 11/05/20  0818    137 141   POTASSIUM 4.4 4.6 3.9   CHLORIDE 106 108 111*   CO2 28 24 24   ANIONGAP 5 6 6   GLC 96 94 98   BUN 10 10 11   CR 0.74 0.71 0.75   STACEY 8.7 8.6 8.6     Recent Labs   Lab Test 12/24/20  1119 12/03/20  0803 11/05/20  0818   PROTTOTAL 7.8 7.8 7.8   ALBUMIN 3.5 3.3* 3.4   BILITOTAL 0.4 0.3 0.3   ALKPHOS 98 99 106   AST 25 Canceled, Test credited 25   ALT 29 38 28     Lab Results   Component Value Date    CEA 1.1 12/24/2020     Results for TOD PASCUAL (MRN [id number]) as of 12/27/2020 14:20   Ref. Range  4/23/2020 10:33 6/11/2020 08:56 6/18/2020 09:22 9/10/2020 08:49 12/24/2020 11:19   Alpha Fetoprotein Latest Ref Range: 0 - 8 ug/L 28.7 (H) 23.7 (H) 24.4 (H) 44.5 (H) 82.4 (H)     12/24/2020 HBV DNA is detected, less than 20 HBV DNA IU/mL   Images:   CT CAP 12/24/2020  IMPRESSION:    1. Cirrhotic without evidence of portal hypertension.  2. Posttreatment changes of Y90 delivery to the hepatic segment 5/8 with stable in size of the 4.3 cm T2 hyperintense lesion with diffusion restriction along the inferior margin of treatment site  which extending medially into the hepatic segment 4A/B since 10/31/2020. The mass again abuts the portal vein without definite invasion. LR TR viable.  3. Stable suspicious right pericardiophrenic lymph node as well as stable metastatic right adrenal gland lesions.  4. Based on this exam only, the patient is not within Prasad criteria.  5. Additional incidental findings as described above.          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  The patient is maintaining an normal performance status and has no apparent immune toxicity from the nivolumab.    Current image showed that his liver and adrenal glands to be stable and no findings to suggest disease progression.   His lab shows his HBV DNA is detected, less than 20 HBV DNA IU/mL, which was undetectable in the last visit. His other labs are unremarkable with mild anemia and normal liver function tests.     AFP has doubled from last visit. Now 82 from 44.5 on 9//2002.  We discussed that alpha-fetoprotein elevation may indicate to underlying disease with HBV. Now HBV-DNA is detectable at very low level. He has been seen by GI in Aug 2020. His LFTs WNL.Otherwise, his disease is stable.  We will watch him closely with repeating tumor markers and  CT CAP in 3 months.       Plan:  - Continue nivolumab with the current dose and schedule every 4 weeks   - RTC in 3 months with CEA, AFP and CT CAP.       My attending (Dr. Kyle) and I have participated in reviewing patient's history, labs/images and treatment options.       Ronald Najera M.D.   Heme/Onc Fellow  Pager: [phone]    12/28/2020    Attending note: I saw the patient in conjunction with the fellow and agree with the above. The increase in his AFP is relatively minor and not enough to indicate disease progression so we will continue with current therapy.

## 2020-12-28 ENCOUNTER — VIRTUAL VISIT (OUTPATIENT)
Dept: ONCOLOGY | Facility: CLINIC | Age: 55
End: 2020-12-28
Attending: INTERNAL MEDICINE
Payer: COMMERCIAL

## 2020-12-28 DIAGNOSIS — B18.1 CHRONIC VIRAL HEPATITIS B WITHOUT DELTA AGENT AND WITHOUT COMA (H): ICD-10-CM

## 2020-12-28 DIAGNOSIS — K74.69 OTHER CIRRHOSIS OF LIVER (H): ICD-10-CM

## 2020-12-28 DIAGNOSIS — C22.0 HCC (HEPATOCELLULAR CARCINOMA) (H): Primary | ICD-10-CM

## 2020-12-28 PROCEDURE — 99214 OFFICE O/P EST MOD 30 MIN: CPT | Mod: TEL | Performed by: INTERNAL MEDICINE

## 2020-12-28 PROCEDURE — 999N001193 HC VIDEO/TELEPHONE VISIT; NO CHARGE

## 2020-12-28 NOTE — LETTER
"    12/28/2020         RE: Preeti Pascual  [address]  [place] [address]        Dear Colleague,    Thank you for referring your patient, Preeti Pascual, to the [facility]. Please see a copy of my visit note below.    Preeti Pascual is a 55 year old male who is being evaluated via a billable telephone visit.      The patient has been notified of following:     \"This telephone visit will be conducted via a call between you and your physician/provider. We have found that certain health care needs can be provided without the need for a physical exam.  This service lets us provide the care you need with a short phone conversation.  If a prescription is necessary we can send it directly to your pharmacy.  If lab work is needed we can place an order for that and you can then stop by our lab to have the test done at a later time.    Telephone visits are billed at different rates depending on your insurance coverage. During this emergency period, for some insurers they may be billed the same as an in-person visit.  Please reach out to your insurance provider with any questions.    If during the course of the call the physician/provider feels a telephone visit is not appropriate, you will not be charged for this service.\"    Patient has given verbal consent for Telephone visit?  Yes    What phone number would you like to be contacted at? [phone]    How would you like to obtain your AVS? Marium    I have reviewed and updated the patient's allergies and medication list.    Concerns: none  Refills: Flonase needed    Vitals - Patient Reported  Weight (Patient Reported): 74.4 kg (164 lb)  Height (Patient Reported): 185.4 cm (6' 1\")  BMI (Based on Pt Reported Ht/Wt): 21.64  Pain Score: No Pain (0)    Patricia Bowden CMA    Phone call duration: 30 minutes          Oncology Note - Video/Phone Visit     Preeti Pascual MRN# [id number]   Age: 55 year old YOB: 1965 "     Interval History:   Preeti Pascual  who is being evaluated via a telephone/video visit due to COVID-19 pandemic.      She reported feeling good without any complains. Otherwise her appetite is good and gained some weight.No N/V/D. No chest pain or SOB. No urine issues or legs swelling. No pain.     Remainder of her 10 point review of systems is otherwise negative.      Oncology History:   Mr. Pascual has metastatic hepatocellular carcinoma related to hepatitis B and originally was treated with radioembolization in 2016 but developed metastatic disease to the adrenal gland in 2018 and progressed on sorafenib.  He has been on nivolumab since November 2018 with good control of his disease.     Oncology Medications 1/25/2016 11/23/2016   yttrium Y-90 brachytherapy, non-stranded (THERASPHERE) IA 63.4 mCi 76.4 mCi     11/1/2018 Started Nivo  12/3/2020 Last dose of Nivo 480 mg Q 4 weeks.         Medications: reviewed.  Current Outpatient Medications   Medication     fluticasone (FLONASE) 50 MCG/ACT nasal spray     VENTOLIN  (90 Base) MCG/ACT inhaler     No current facility-administered medications for this visit.             Physical Exam:   Over the phone. Sounds healthy with no apparent respiratory distress and normal mood and speech..             Data:   I have personally reviewed the following labs/imaging:  Recent Labs   Lab Test 12/24/20  1119 09/10/20  0849 06/18/20  0922   WBC 3.9* 3.7* 4.5   RBC 4.70 4.72 4.67   HGB 11.6* 12.6* 12.8*   HCT 38.3* 39.1* 39.5*   MCV 82 83 85   MCH 24.7* 26.7 27.4   MCHC 30.3* 32.2 32.4   RDW 13.2 13.2 13.2    173 184   NEUTROPHIL 46.2 38.6 40.3     Recent Labs   Lab Test 12/24/20  1119 12/03/20  0803 11/05/20  0818    137 141   POTASSIUM 4.4 4.6 3.9   CHLORIDE 106 108 111*   CO2 28 24 24   ANIONGAP 5 6 6   GLC 96 94 98   BUN 10 10 11   CR 0.74 0.71 0.75   STACEY 8.7 8.6 8.6     Recent Labs   Lab Test 12/24/20  1119 12/03/20  0803 11/05/20  0818   PROTTOTAL 7.8 7.8  7.8   ALBUMIN 3.5 3.3* 3.4   BILITOTAL 0.4 0.3 0.3   ALKPHOS 98 99 106   AST 25 Canceled, Test credited 25   ALT 29 38 28     Lab Results   Component Value Date    CEA 1.1 12/24/2020     Results for PREETI PASCUAL (MRN [id number]) as of 12/27/2020 14:20   Ref. Range 4/23/2020 10:33 6/11/2020 08:56 6/18/2020 09:22 9/10/2020 08:49 12/24/2020 11:19   Alpha Fetoprotein Latest Ref Range: 0 - 8 ug/L 28.7 (H) 23.7 (H) 24.4 (H) 44.5 (H) 82.4 (H)     12/24/2020 HBV DNA is detected, less than 20 HBV DNA IU/mL   Images:   CT CAP 12/24/2020  IMPRESSION:    1. Cirrhotic without evidence of portal hypertension.  2. Posttreatment changes of Y90 delivery to the hepatic segment 5/8 with stable in size of the 4.3 cm T2 hyperintense lesion with diffusion restriction along the inferior margin of treatment site  which extending medially into the hepatic segment 4A/B since 10/31/2020. The mass again abuts the portal vein without definite invasion. LR TR viable.  3. Stable suspicious right pericardiophrenic lymph node as well as stable metastatic right adrenal gland lesions.  4. Based on this exam only, the patient is not within Prasad criteria.  5. Additional incidental findings as described above.          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  The patient is maintaining an normal performance status and has no apparent immune toxicity from the nivolumab.    Current image showed that his liver and adrenal glands to be stable and no findings to suggest disease progression.   His lab shows his HBV DNA is detected, less than 20 HBV DNA IU/mL, which was undetectable in the last visit. His other labs are unremarkable with mild anemia and normal liver function tests.     AFP has doubled from last visit. Now 82 from 44.5 on 9//2002.  We  discussed that alpha-fetoprotein elevation may indicate to underlying disease with HBV. Now HBV-DNA is detectable at very low level. He has been seen by GI in Aug 2020. His LFTs WNL.Otherwise, his disease is stable.  We will watch him closely with repeating tumor markers and CT CAP in 3 months.       Plan:  - Continue nivolumab with the current dose and schedule every 4 weeks   - RTC in 3 months with CEA, AFP and CT CAP.       My attending (Dr. Kyle) and I have participated in reviewing patient's history, labs/images and treatment options.       Ronald Najera M.D.   Heme/Onc Fellow  Pager: [phone]    12/28/2020    Attending note: I saw the patient in conjunction with the fellow and agree with the above. The increase in his AFP is relatively minor and not enough to indicate disease progression so we will continue with current therapy.        Again, thank you for allowing me to participate in the care of your patient.        Sincerely,        Agustin Kyle MD

## 2020-12-30 RX ORDER — MEPERIDINE HYDROCHLORIDE 25 MG/ML
25 INJECTION INTRAMUSCULAR; INTRAVENOUS; SUBCUTANEOUS EVERY 30 MIN PRN
Status: CANCELLED | OUTPATIENT
Start: 2020-12-31

## 2020-12-30 RX ORDER — LORAZEPAM 2 MG/ML
0.5 INJECTION INTRAMUSCULAR EVERY 4 HOURS PRN
Status: CANCELLED
Start: 2020-12-31

## 2020-12-30 RX ORDER — SODIUM CHLORIDE 9 MG/ML
1000 INJECTION, SOLUTION INTRAVENOUS CONTINUOUS PRN
Status: CANCELLED
Start: 2020-12-31

## 2020-12-30 RX ORDER — DIPHENHYDRAMINE HYDROCHLORIDE 50 MG/ML
50 INJECTION INTRAMUSCULAR; INTRAVENOUS
Status: CANCELLED
Start: 2020-12-31

## 2020-12-30 RX ORDER — ALBUTEROL SULFATE 90 UG/1
1-2 AEROSOL, METERED RESPIRATORY (INHALATION)
Status: CANCELLED
Start: 2020-12-31

## 2020-12-30 RX ORDER — EPINEPHRINE 1 MG/ML
0.3 INJECTION, SOLUTION INTRAMUSCULAR; SUBCUTANEOUS EVERY 5 MIN PRN
Status: CANCELLED | OUTPATIENT
Start: 2020-12-31

## 2020-12-30 RX ORDER — METHYLPREDNISOLONE SODIUM SUCCINATE 125 MG/2ML
125 INJECTION, POWDER, LYOPHILIZED, FOR SOLUTION INTRAMUSCULAR; INTRAVENOUS
Status: CANCELLED
Start: 2020-12-31

## 2020-12-30 RX ORDER — ALBUTEROL SULFATE 0.83 MG/ML
2.5 SOLUTION RESPIRATORY (INHALATION)
Status: CANCELLED | OUTPATIENT
Start: 2020-12-31

## 2020-12-30 RX ORDER — EPINEPHRINE 0.3 MG/.3ML
0.3 INJECTION SUBCUTANEOUS EVERY 5 MIN PRN
Status: CANCELLED | OUTPATIENT
Start: 2020-12-31

## 2020-12-31 ENCOUNTER — INFUSION THERAPY VISIT (OUTPATIENT)
Dept: ONCOLOGY | Facility: CLINIC | Age: 55
End: 2020-12-31
Attending: INTERNAL MEDICINE
Payer: COMMERCIAL

## 2020-12-31 VITALS
RESPIRATION RATE: 16 BRPM | DIASTOLIC BLOOD PRESSURE: 80 MMHG | WEIGHT: 165.3 LBS | SYSTOLIC BLOOD PRESSURE: 145 MMHG | HEART RATE: 68 BPM | OXYGEN SATURATION: 99 % | BODY MASS INDEX: 25.13 KG/M2 | TEMPERATURE: 98.1 F

## 2020-12-31 DIAGNOSIS — C22.0 HCC (HEPATOCELLULAR CARCINOMA) (H): Primary | ICD-10-CM

## 2020-12-31 LAB
ALBUMIN SERPL-MCNC: 3.4 G/DL (ref 3.4–5)
ALP SERPL-CCNC: 100 U/L (ref 40–150)
ALT SERPL W P-5'-P-CCNC: 33 U/L (ref 0–70)
ANION GAP SERPL CALCULATED.3IONS-SCNC: 6 MMOL/L (ref 3–14)
AST SERPL W P-5'-P-CCNC: 21 U/L (ref 0–45)
BILIRUB SERPL-MCNC: 0.5 MG/DL (ref 0.2–1.3)
BUN SERPL-MCNC: 9 MG/DL (ref 7–30)
CALCIUM SERPL-MCNC: 8.8 MG/DL (ref 8.5–10.1)
CHLORIDE SERPL-SCNC: 107 MMOL/L (ref 94–109)
CO2 SERPL-SCNC: 25 MMOL/L (ref 20–32)
CREAT SERPL-MCNC: 0.76 MG/DL (ref 0.66–1.25)
GFR SERPL CREATININE-BSD FRML MDRD: >90 ML/MIN/{1.73_M2}
GLUCOSE SERPL-MCNC: 96 MG/DL (ref 70–99)
POTASSIUM SERPL-SCNC: 3.8 MMOL/L (ref 3.4–5.3)
PROT SERPL-MCNC: 7.7 G/DL (ref 6.8–8.8)
SODIUM SERPL-SCNC: 138 MMOL/L (ref 133–144)
TSH SERPL DL<=0.005 MIU/L-ACNC: 1.37 MU/L (ref 0.4–4)

## 2020-12-31 PROCEDURE — 258N000003 HC RX IP 258 OP 636: Performed by: INTERNAL MEDICINE

## 2020-12-31 PROCEDURE — 84443 ASSAY THYROID STIM HORMONE: CPT | Performed by: INTERNAL MEDICINE

## 2020-12-31 PROCEDURE — 96413 CHEMO IV INFUSION 1 HR: CPT

## 2020-12-31 PROCEDURE — 80053 COMPREHEN METABOLIC PANEL: CPT | Performed by: INTERNAL MEDICINE

## 2020-12-31 PROCEDURE — 250N000011 HC RX IP 250 OP 636: Performed by: INTERNAL MEDICINE

## 2020-12-31 RX ADMIN — SODIUM CHLORIDE 480 MG: 9 INJECTION, SOLUTION INTRAVENOUS at 09:17

## 2020-12-31 RX ADMIN — SODIUM CHLORIDE 250 ML: 9 INJECTION, SOLUTION INTRAVENOUS at 09:00

## 2020-12-31 ASSESSMENT — PAIN SCALES - GENERAL: PAINLEVEL: NO PAIN (0)

## 2020-12-31 NOTE — PATIENT INSTRUCTIONS
[facility] Main Line: [phone]    Call triage nurse with chills and/or temperature greater than or equal to 100.4, uncontrolled nausea/vomiting, diarrhea, constipation, dizziness, shortness of breath, chest pain, bleeding, unexplained bruising, or any new/concerning symptoms, questions/concerns.   If you are having any concerning symptoms or wish to speak to a provider before your next infusion visit, please call your care coordinator or triage to notify them so we can adequately serve you.   Triage Nurse Line: [phone]    If after hours, weekends, or holidays [phone]

## 2020-12-31 NOTE — PROGRESS NOTES
Chief Complaint   Patient presents with     Blood Draw     IV placed, blood drawn, vitals taken, checked into next appointment.     Labs drawn via IV placed by Pamela Allen MA in right arm.    Pamela Allen MA

## 2020-12-31 NOTE — PROGRESS NOTES
Infusion Nursing Note:  Preeti Pascual presents today for Cycle 28 Day 1 Nivolumab.    Patient seen by provider today: No   present during visit today: Not Applicable.    Note: Evaluated by Dr. Kyle on Monday.  Preeti has no complaints other than being tired today as he didn't sleep well last night.  See doc flow sheet for assessment.    Intravenous Access:  Peripheral IV placed in lab    Treatment Conditions:  Lab Results   Component Value Date     12/31/2020                   Lab Results   Component Value Date    POTASSIUM 3.8 12/31/2020           No results found for: MAG         Lab Results   Component Value Date    CR 0.76 12/31/2020                   Lab Results   Component Value Date    STACEY 8.8 12/31/2020                Lab Results   Component Value Date    BILITOTAL 0.5 12/31/2020           Lab Results   Component Value Date    ALBUMIN 3.4 12/31/2020                    Lab Results   Component Value Date    ALT 33 12/31/2020           Lab Results   Component Value Date    AST 21 12/31/2020     TSH   Date Value Ref Range Status   12/31/2020 1.37 0.40 - 4.00 mU/L Final         Results reviewed, labs MET treatment parameters, ok to proceed with treatment.      Post Infusion Assessment:  Patient tolerated infusion without incident.  Blood return noted pre and post infusion.  Site patent and intact, free from redness, edema or discomfort.  No evidence of extravasations.  Access discontinued per protocol.       Discharge Plan:   Patient declined prescription refills.  Copy of AVS reviewed with patient and/or family.  Patient will return 1/28/21 labs/Nivolumab for next appointment.  Patient discharged in stable condition accompanied by: self.  Departure Mode: Ambulatory.  Face to Face time: 0.    Stephanie Gray RN

## 2021-01-28 ENCOUNTER — APPOINTMENT (OUTPATIENT)
Dept: LAB | Facility: CLINIC | Age: 56
End: 2021-01-28
Attending: INTERNAL MEDICINE
Payer: COMMERCIAL

## 2021-01-28 ENCOUNTER — INFUSION THERAPY VISIT (OUTPATIENT)
Dept: ONCOLOGY | Facility: CLINIC | Age: 56
End: 2021-01-28
Attending: INTERNAL MEDICINE
Payer: COMMERCIAL

## 2021-01-28 VITALS
BODY MASS INDEX: 24.91 KG/M2 | HEART RATE: 66 BPM | RESPIRATION RATE: 17 BRPM | SYSTOLIC BLOOD PRESSURE: 131 MMHG | OXYGEN SATURATION: 100 % | WEIGHT: 163.8 LBS | TEMPERATURE: 98.1 F | DIASTOLIC BLOOD PRESSURE: 70 MMHG

## 2021-01-28 DIAGNOSIS — C22.0 HCC (HEPATOCELLULAR CARCINOMA) (H): Primary | ICD-10-CM

## 2021-01-28 LAB
ALBUMIN SERPL-MCNC: 3.5 G/DL (ref 3.4–5)
ALP SERPL-CCNC: 103 U/L (ref 40–150)
ALT SERPL W P-5'-P-CCNC: 32 U/L (ref 0–70)
ANION GAP SERPL CALCULATED.3IONS-SCNC: 4 MMOL/L (ref 3–14)
AST SERPL W P-5'-P-CCNC: 28 U/L (ref 0–45)
BILIRUB SERPL-MCNC: 0.2 MG/DL (ref 0.2–1.3)
BUN SERPL-MCNC: 12 MG/DL (ref 7–30)
CALCIUM SERPL-MCNC: 8.7 MG/DL (ref 8.5–10.1)
CHLORIDE SERPL-SCNC: 110 MMOL/L (ref 94–109)
CO2 SERPL-SCNC: 24 MMOL/L (ref 20–32)
CREAT SERPL-MCNC: 0.65 MG/DL (ref 0.66–1.25)
GFR SERPL CREATININE-BSD FRML MDRD: >90 ML/MIN/{1.73_M2}
GLUCOSE SERPL-MCNC: 104 MG/DL (ref 70–99)
POTASSIUM SERPL-SCNC: 4.2 MMOL/L (ref 3.4–5.3)
PROT SERPL-MCNC: 7.9 G/DL (ref 6.8–8.8)
SODIUM SERPL-SCNC: 139 MMOL/L (ref 133–144)
TSH SERPL DL<=0.005 MIU/L-ACNC: 0.77 MU/L (ref 0.4–4)

## 2021-01-28 PROCEDURE — 99207 PR NO CHARGE LOS: CPT

## 2021-01-28 PROCEDURE — 258N000003 HC RX IP 258 OP 636: Performed by: INTERNAL MEDICINE

## 2021-01-28 PROCEDURE — 80053 COMPREHEN METABOLIC PANEL: CPT | Performed by: INTERNAL MEDICINE

## 2021-01-28 PROCEDURE — 250N000011 HC RX IP 250 OP 636: Performed by: INTERNAL MEDICINE

## 2021-01-28 PROCEDURE — 96413 CHEMO IV INFUSION 1 HR: CPT

## 2021-01-28 PROCEDURE — 84443 ASSAY THYROID STIM HORMONE: CPT | Performed by: INTERNAL MEDICINE

## 2021-01-28 RX ORDER — MEPERIDINE HYDROCHLORIDE 25 MG/ML
25 INJECTION INTRAMUSCULAR; INTRAVENOUS; SUBCUTANEOUS EVERY 30 MIN PRN
Status: CANCELLED | OUTPATIENT
Start: 2021-01-28

## 2021-01-28 RX ORDER — METHYLPREDNISOLONE SODIUM SUCCINATE 125 MG/2ML
125 INJECTION, POWDER, LYOPHILIZED, FOR SOLUTION INTRAMUSCULAR; INTRAVENOUS
Status: CANCELLED
Start: 2021-01-28

## 2021-01-28 RX ORDER — DIPHENHYDRAMINE HYDROCHLORIDE 50 MG/ML
50 INJECTION INTRAMUSCULAR; INTRAVENOUS
Status: CANCELLED
Start: 2021-01-28

## 2021-01-28 RX ORDER — ALBUTEROL SULFATE 90 UG/1
1-2 AEROSOL, METERED RESPIRATORY (INHALATION)
Status: CANCELLED
Start: 2021-01-28

## 2021-01-28 RX ORDER — SODIUM CHLORIDE 9 MG/ML
1000 INJECTION, SOLUTION INTRAVENOUS CONTINUOUS PRN
Status: CANCELLED
Start: 2021-01-28

## 2021-01-28 RX ORDER — EPINEPHRINE 1 MG/ML
0.3 INJECTION, SOLUTION INTRAMUSCULAR; SUBCUTANEOUS EVERY 5 MIN PRN
Status: CANCELLED | OUTPATIENT
Start: 2021-01-28

## 2021-01-28 RX ORDER — ALBUTEROL SULFATE 0.83 MG/ML
2.5 SOLUTION RESPIRATORY (INHALATION)
Status: CANCELLED | OUTPATIENT
Start: 2021-01-28

## 2021-01-28 RX ORDER — LORAZEPAM 2 MG/ML
0.5 INJECTION INTRAMUSCULAR EVERY 4 HOURS PRN
Status: CANCELLED
Start: 2021-01-28

## 2021-01-28 RX ORDER — EPINEPHRINE 0.3 MG/.3ML
0.3 INJECTION SUBCUTANEOUS EVERY 5 MIN PRN
Status: CANCELLED | OUTPATIENT
Start: 2021-01-28

## 2021-01-28 RX ADMIN — SODIUM CHLORIDE 480 MG: 9 INJECTION, SOLUTION INTRAVENOUS at 11:18

## 2021-01-28 ASSESSMENT — PAIN SCALES - GENERAL: PAINLEVEL: NO PAIN (0)

## 2021-01-28 NOTE — NURSING NOTE
Chief Complaint   Patient presents with     Blood Draw     IV placed, blood drawn, vitals taken, checked into next appointment.     Labs drawn via IV placed by Pamela Allen MA in left arm.    Pamela Allen MA

## 2021-01-28 NOTE — PROGRESS NOTES
"Infusion Nursing Note:  Preeti Pascual presents today for Cycle 29 Day 1 Nivolumab.    Patient seen by provider today: No   present during visit today: Not Applicable.    Note: Pt denies acute issues or concerns today; states \"everything is the same\"; wishes to proceed with treatment.     Intravenous Access:  Peripheral IV placed.    Treatment Conditions:  Lab Results   Component Value Date     01/28/2021                   Lab Results   Component Value Date    POTASSIUM 4.2 01/28/2021           No results found for: MAG         Lab Results   Component Value Date    CR 0.65 01/28/2021                   Lab Results   Component Value Date    STACEY 8.7 01/28/2021                Lab Results   Component Value Date    BILITOTAL 0.2 01/28/2021           Lab Results   Component Value Date    ALBUMIN 3.5 01/28/2021                    Lab Results   Component Value Date    ALT 32 01/28/2021           Lab Results   Component Value Date    AST 28 01/28/2021       Results reviewed, labs MET treatment parameters, ok to proceed with treatment.      Post Infusion Assessment:  Patient tolerated infusion without incident.  Blood return noted pre and post infusion.  No evidence of extravasations.  Access discontinued per protocol.       Discharge Plan:   [facility]  Patient will return 2/25 for next appointment.   Departure Mode: Ambulatory.    Bailey Amezcua RN                        "

## 2021-02-11 DIAGNOSIS — K74.60 CIRRHOSIS OF LIVER WITHOUT ASCITES, UNSPECIFIED HEPATIC CIRRHOSIS TYPE (H): Primary | ICD-10-CM

## 2021-02-18 DIAGNOSIS — K74.60 CIRRHOSIS OF LIVER WITHOUT ASCITES, UNSPECIFIED HEPATIC CIRRHOSIS TYPE (H): ICD-10-CM

## 2021-02-18 LAB
ALBUMIN SERPL-MCNC: 3.5 G/DL (ref 3.4–5)
ALP SERPL-CCNC: 95 U/L (ref 40–150)
ALT SERPL W P-5'-P-CCNC: 30 U/L (ref 0–70)
ANION GAP SERPL CALCULATED.3IONS-SCNC: 6 MMOL/L (ref 3–14)
AST SERPL W P-5'-P-CCNC: 25 U/L (ref 0–45)
BILIRUB DIRECT SERPL-MCNC: 0.1 MG/DL (ref 0–0.2)
BILIRUB SERPL-MCNC: 0.5 MG/DL (ref 0.2–1.3)
BUN SERPL-MCNC: 12 MG/DL (ref 7–30)
CALCIUM SERPL-MCNC: 8.6 MG/DL (ref 8.5–10.1)
CHLORIDE SERPL-SCNC: 108 MMOL/L (ref 94–109)
CO2 SERPL-SCNC: 26 MMOL/L (ref 20–32)
CREAT SERPL-MCNC: 0.76 MG/DL (ref 0.66–1.25)
ERYTHROCYTE [DISTWIDTH] IN BLOOD BY AUTOMATED COUNT: 14.3 % (ref 10–15)
GFR SERPL CREATININE-BSD FRML MDRD: >90 ML/MIN/{1.73_M2}
GLUCOSE SERPL-MCNC: 93 MG/DL (ref 70–99)
HCT VFR BLD AUTO: 39.1 % (ref 40–53)
HGB BLD-MCNC: 11.9 G/DL (ref 13.3–17.7)
INR PPP: 1.21 (ref 0.86–1.14)
MCH RBC QN AUTO: 24.5 PG (ref 26.5–33)
MCHC RBC AUTO-ENTMCNC: 30.4 G/DL (ref 31.5–36.5)
MCV RBC AUTO: 81 FL (ref 78–100)
PLATELET # BLD AUTO: 195 10E9/L (ref 150–450)
POTASSIUM SERPL-SCNC: 3.8 MMOL/L (ref 3.4–5.3)
PROT SERPL-MCNC: 7.6 G/DL (ref 6.8–8.8)
RBC # BLD AUTO: 4.86 10E12/L (ref 4.4–5.9)
SODIUM SERPL-SCNC: 140 MMOL/L (ref 133–144)
WBC # BLD AUTO: 3.6 10E9/L (ref 4–11)

## 2021-02-18 PROCEDURE — 85610 PROTHROMBIN TIME: CPT | Performed by: PATHOLOGY

## 2021-02-18 PROCEDURE — 80076 HEPATIC FUNCTION PANEL: CPT | Performed by: PATHOLOGY

## 2021-02-18 PROCEDURE — 85027 COMPLETE CBC AUTOMATED: CPT | Performed by: PATHOLOGY

## 2021-02-18 PROCEDURE — 36415 COLL VENOUS BLD VENIPUNCTURE: CPT | Performed by: PATHOLOGY

## 2021-02-18 PROCEDURE — 80048 BASIC METABOLIC PNL TOTAL CA: CPT | Performed by: PATHOLOGY

## 2021-02-23 ENCOUNTER — VIRTUAL VISIT (OUTPATIENT)
Dept: GASTROENTEROLOGY | Facility: CLINIC | Age: 56
End: 2021-02-23
Attending: INTERNAL MEDICINE
Payer: COMMERCIAL

## 2021-02-23 DIAGNOSIS — D50.0 IRON DEFICIENCY ANEMIA DUE TO CHRONIC BLOOD LOSS: ICD-10-CM

## 2021-02-23 DIAGNOSIS — B18.1 CHRONIC VIRAL HEPATITIS B WITHOUT DELTA AGENT AND WITHOUT COMA (H): Primary | ICD-10-CM

## 2021-02-23 PROCEDURE — 99214 OFFICE O/P EST MOD 30 MIN: CPT | Mod: TEL | Performed by: INTERNAL MEDICINE

## 2021-02-23 RX ORDER — FERROUS GLUCONATE 324(38)MG
324 TABLET ORAL 2 TIMES DAILY WITH MEALS
Qty: 60 TABLET | Refills: 1 | Status: SHIPPED | OUTPATIENT
Start: 2021-02-23 | End: 2022-03-31

## 2021-02-23 NOTE — PROGRESS NOTES
Preeti is a 55 year old who is being evaluated via a billable telephone visit.      What phone number would you like to be contacted at? [phone]  How would you like to obtain your AVS? Mail a copy    Subjective   Preeti is a 55 year old who presents for the following health issues: Chronic hepatitis B complicated by hepatocellular carcinoma.    HPI: HISTORY OF PRESENT ILLNESS:  I had the pleasure of seeing Preeti Pascual for followup in the Liver Clinic at the [facility] on  February 23, 2020.  Mr. Pascual returns for followup of hepatocellular carcinoma complicating chronic hepatitis B.  He is on nivolumab therapy and seems to be getting a good response.  Indeed he was first diagnosed with HCC now 4  and one half years ago.      He feels well.  He denies any abdominal pain, itching or skin rash or fatigue.  He denies any increased abdominal girth or lower extremity edema.       He denies any fevers or chills, cough or shortness of breath.  He denies any nausea or vomiting, diarrhea or constipation.  His appetite has been good, and his weight has been stable.       There have been no other new events since he was last seen.      He has now been on nivolumab therapy for almost 2 and one half years.     Current Outpatient Medications   Medication     fluticasone (FLONASE) 50 MCG/ACT nasal spray     VENTOLIN  (90 Base) MCG/ACT inhaler     No current facility-administered medications for this visit.      Objective      Vitals: No vitals were obtained today due to virtual visit.    Physical Exam: GENERAL APPEARANCE:in no distress. PSYCH: Alert and oriented times 3; coherent speech, normal   rate and volume, able to articulate logical thoughts, able to abstract reason, no tangential thoughts, no hallucinations   or delusions  His affect is normal. RESP: No cough, no audible wheezing, able to talk in full sentences  Remainder of exam unable to be completed due to telephone  visits    Recent Results (from the past 168 hour(s))   INR    Collection Time: 02/18/21 10:00 AM   Result Value Ref Range    INR 1.21 (H) 0.86 - 1.14   Hepatic panel    Collection Time: 02/18/21 10:00 AM   Result Value Ref Range    Bilirubin Direct 0.1 0.0 - 0.2 mg/dL    Bilirubin Total 0.5 0.2 - 1.3 mg/dL    Albumin 3.5 3.4 - 5.0 g/dL    Protein Total 7.6 6.8 - 8.8 g/dL    Alkaline Phosphatase 95 40 - 150 U/L    ALT 30 0 - 70 U/L    AST 25 0 - 45 U/L   Basic metabolic panel    Collection Time: 02/18/21 10:00 AM   Result Value Ref Range    Sodium 140 133 - 144 mmol/L    Potassium 3.8 3.4 - 5.3 mmol/L    Chloride 108 94 - 109 mmol/L    Carbon Dioxide 26 20 - 32 mmol/L    Anion Gap 6 3 - 14 mmol/L    Glucose 93 70 - 99 mg/dL    Urea Nitrogen 12 7 - 30 mg/dL    Creatinine 0.76 0.66 - 1.25 mg/dL    GFR Estimate >90 >60 mL/min/[1.73_m2]    GFR Estimate If Black >90 >60 mL/min/[1.73_m2]    Calcium 8.6 8.5 - 10.1 mg/dL   CBC with platelets    Collection Time: 02/18/21 10:00 AM   Result Value Ref Range    WBC 3.6 (L) 4.0 - 11.0 10e9/L    RBC Count 4.86 4.4 - 5.9 10e12/L    Hemoglobin 11.9 (L) 13.3 - 17.7 g/dL    Hematocrit 39.1 (L) 40.0 - 53.0 %    MCV 81 78 - 100 fl    MCH 24.5 (L) 26.5 - 33.0 pg    MCHC 30.4 (L) 31.5 - 36.5 g/dL    RDW 14.3 10.0 - 15.0 %    Platelet Count 195 150 - 450 10e9/L      My impression is that Mr. Pascual is doing well.  I did review his CT scan of his chest, abdomen and pelvis, which does show good disease control.   Unfortunately his alpha-fetoprotein has been rising. I know Dr. Kyle is planning on continuing the nivolumab given his excellent response to date.  My plan will be to see him back in the clinic in 6 months for repeat blood work.  Currently all complications are well addressed.    He is mildly anemic and it does appear to be iron deficient based on his low MCV.  I will give him ferrous gluconate for 1 month.    We did discuss COVID-19 and I strongly encouraged him to get the vaccine  once is made available to him.     Thank you very much for allowing me to participate in the care of this patient.  If you have any questions regarding my recommendations, please do not hesitate to contact me.         Trevor Trujillo MD      Professor of Medicine  [employer]      Executive Medical Director, Solid Organ Transplant Program  [facility]    Phone call duration: 15 minutes

## 2021-02-23 NOTE — LETTER
2/23/2021         RE: Preeti Pascual  [address]  [place] [facility] [place] [facility] [address]        Dear Colleague,    Thank you for referring your patient, Preeti Pascual, to the [facility] [place]. Please see a copy of my visit note below.        Preeti is a 55 year old who is being evaluated via a billable telephone visit.      What phone number would you like to be contacted at? [phone]  How would you like to obtain your AVS? Mail a copy    Subjective   Preeti is a 55 year old who presents for the following health issues: Chronic hepatitis B complicated by hepatocellular carcinoma.    HPI: HISTORY OF PRESENT ILLNESS:  I had the pleasure of seeing Preeti Pascual for followup in the Liver Clinic at the [facility] on  February 23, 2020.  Mr. Pascual returns for followup of hepatocellular carcinoma complicating chronic hepatitis B.  He is on nivolumab therapy and seems to be getting a good response.  Indeed he was first diagnosed with HCC now 4  and one half years ago.      He feels well.  He denies any abdominal pain, itching or skin rash or fatigue.  He denies any increased abdominal girth or lower extremity edema.       He denies any fevers or chills, cough or shortness of breath.  He denies any nausea or vomiting, diarrhea or constipation.  His appetite has been good, and his weight has been stable.       There have been no other new events since he was last seen.      He has now been on nivolumab therapy for almost 2 and one half years.     Current Outpatient Medications   Medication     fluticasone (FLONASE) 50 MCG/ACT nasal spray     VENTOLIN  (90 Base) MCG/ACT inhaler     No current facility-administered medications for this visit.      Objective      Vitals: No vitals were obtained today due to virtual visit.    Physical Exam: GENERAL APPEARANCE:in no distress. PSYCH: Alert and oriented times 3; coherent speech, normal   rate and volume, able  to articulate logical thoughts, able to abstract reason, no tangential thoughts, no hallucinations   or delusions  His affect is normal. RESP: No cough, no audible wheezing, able to talk in full sentences  Remainder of exam unable to be completed due to telephone visits    Recent Results (from the past 168 hour(s))   INR    Collection Time: 02/18/21 10:00 AM   Result Value Ref Range    INR 1.21 (H) 0.86 - 1.14   Hepatic panel    Collection Time: 02/18/21 10:00 AM   Result Value Ref Range    Bilirubin Direct 0.1 0.0 - 0.2 mg/dL    Bilirubin Total 0.5 0.2 - 1.3 mg/dL    Albumin 3.5 3.4 - 5.0 g/dL    Protein Total 7.6 6.8 - 8.8 g/dL    Alkaline Phosphatase 95 40 - 150 U/L    ALT 30 0 - 70 U/L    AST 25 0 - 45 U/L   Basic metabolic panel    Collection Time: 02/18/21 10:00 AM   Result Value Ref Range    Sodium 140 133 - 144 mmol/L    Potassium 3.8 3.4 - 5.3 mmol/L    Chloride 108 94 - 109 mmol/L    Carbon Dioxide 26 20 - 32 mmol/L    Anion Gap 6 3 - 14 mmol/L    Glucose 93 70 - 99 mg/dL    Urea Nitrogen 12 7 - 30 mg/dL    Creatinine 0.76 0.66 - 1.25 mg/dL    GFR Estimate >90 >60 mL/min/[1.73_m2]    GFR Estimate If Black >90 >60 mL/min/[1.73_m2]    Calcium 8.6 8.5 - 10.1 mg/dL   CBC with platelets    Collection Time: 02/18/21 10:00 AM   Result Value Ref Range    WBC 3.6 (L) 4.0 - 11.0 10e9/L    RBC Count 4.86 4.4 - 5.9 10e12/L    Hemoglobin 11.9 (L) 13.3 - 17.7 g/dL    Hematocrit 39.1 (L) 40.0 - 53.0 %    MCV 81 78 - 100 fl    MCH 24.5 (L) 26.5 - 33.0 pg    MCHC 30.4 (L) 31.5 - 36.5 g/dL    RDW 14.3 10.0 - 15.0 %    Platelet Count 195 150 - 450 10e9/L      My impression is that Mr. Pascual is doing well.  I did review his CT scan of his chest, abdomen and pelvis, which does show good disease control.   Unfortunately his alpha-fetoprotein has been rising. I know Dr. Kyle is planning on continuing the nivolumab given his excellent response to date.  My plan will be to see him back in the clinic in 6 months for repeat blood  work.  Currently all complications are well addressed.    He is mildly anemic and it does appear to be iron deficient based on his low MCV.  I will give him ferrous gluconate for 1 month.    We did discuss COVID-19 and I strongly encouraged him to get the vaccine once is made available to him.     Thank you very much for allowing me to participate in the care of this patient.  If you have any questions regarding my recommendations, please do not hesitate to contact me.         Trevor Trujillo MD      Professor of Medicine  [employer]      Executive Medical Director, Solid Organ Transplant Program  [facility]    Phone call duration: 15 minutes        Again, thank you for allowing me to participate in the care of your patient.        Sincerely,        Trevor Trujillo MD

## 2021-02-25 ENCOUNTER — INFUSION THERAPY VISIT (OUTPATIENT)
Dept: ONCOLOGY | Facility: CLINIC | Age: 56
End: 2021-02-25
Attending: INTERNAL MEDICINE
Payer: COMMERCIAL

## 2021-02-25 ENCOUNTER — APPOINTMENT (OUTPATIENT)
Dept: LAB | Facility: CLINIC | Age: 56
End: 2021-02-25
Attending: INTERNAL MEDICINE
Payer: COMMERCIAL

## 2021-02-25 VITALS
WEIGHT: 161 LBS | DIASTOLIC BLOOD PRESSURE: 80 MMHG | OXYGEN SATURATION: 96 % | SYSTOLIC BLOOD PRESSURE: 137 MMHG | RESPIRATION RATE: 16 BRPM | HEART RATE: 69 BPM | TEMPERATURE: 98 F | BODY MASS INDEX: 24.48 KG/M2

## 2021-02-25 DIAGNOSIS — R05.9 COUGH: ICD-10-CM

## 2021-02-25 DIAGNOSIS — C22.0 HCC (HEPATOCELLULAR CARCINOMA) (H): ICD-10-CM

## 2021-02-25 DIAGNOSIS — R09.82 POST-NASAL DRAINAGE: ICD-10-CM

## 2021-02-25 DIAGNOSIS — B18.1 CHRONIC VIRAL HEPATITIS B WITHOUT DELTA AGENT AND WITHOUT COMA (H): Primary | ICD-10-CM

## 2021-02-25 LAB
ALBUMIN SERPL-MCNC: 3.4 G/DL (ref 3.4–5)
ALP SERPL-CCNC: 101 U/L (ref 40–150)
ALT SERPL W P-5'-P-CCNC: 30 U/L (ref 0–70)
ANION GAP SERPL CALCULATED.3IONS-SCNC: 5 MMOL/L (ref 3–14)
AST SERPL W P-5'-P-CCNC: 26 U/L (ref 0–45)
BILIRUB SERPL-MCNC: 0.4 MG/DL (ref 0.2–1.3)
BUN SERPL-MCNC: 10 MG/DL (ref 7–30)
CALCIUM SERPL-MCNC: 8.5 MG/DL (ref 8.5–10.1)
CHLORIDE SERPL-SCNC: 109 MMOL/L (ref 94–109)
CO2 SERPL-SCNC: 25 MMOL/L (ref 20–32)
CREAT SERPL-MCNC: 0.8 MG/DL (ref 0.66–1.25)
GFR SERPL CREATININE-BSD FRML MDRD: >90 ML/MIN/{1.73_M2}
GLUCOSE SERPL-MCNC: 97 MG/DL (ref 70–99)
POTASSIUM SERPL-SCNC: 3.9 MMOL/L (ref 3.4–5.3)
PROT SERPL-MCNC: 7.5 G/DL (ref 6.8–8.8)
SODIUM SERPL-SCNC: 140 MMOL/L (ref 133–144)
TSH SERPL DL<=0.005 MIU/L-ACNC: 0.88 MU/L (ref 0.4–4)

## 2021-02-25 PROCEDURE — 80053 COMPREHEN METABOLIC PANEL: CPT | Performed by: INTERNAL MEDICINE

## 2021-02-25 PROCEDURE — 96413 CHEMO IV INFUSION 1 HR: CPT

## 2021-02-25 PROCEDURE — 84443 ASSAY THYROID STIM HORMONE: CPT | Performed by: INTERNAL MEDICINE

## 2021-02-25 PROCEDURE — 999N000127 HC STATISTIC PERIPHERAL IV START W US GUIDANCE

## 2021-02-25 PROCEDURE — 258N000003 HC RX IP 258 OP 636: Performed by: INTERNAL MEDICINE

## 2021-02-25 PROCEDURE — 250N000011 HC RX IP 250 OP 636: Performed by: INTERNAL MEDICINE

## 2021-02-25 RX ORDER — ALBUTEROL SULFATE 0.83 MG/ML
2.5 SOLUTION RESPIRATORY (INHALATION)
Status: CANCELLED | OUTPATIENT
Start: 2021-02-25

## 2021-02-25 RX ORDER — MEPERIDINE HYDROCHLORIDE 25 MG/ML
25 INJECTION INTRAMUSCULAR; INTRAVENOUS; SUBCUTANEOUS EVERY 30 MIN PRN
Status: CANCELLED | OUTPATIENT
Start: 2021-02-25

## 2021-02-25 RX ORDER — SODIUM CHLORIDE 9 MG/ML
1000 INJECTION, SOLUTION INTRAVENOUS CONTINUOUS PRN
Status: CANCELLED
Start: 2021-02-25

## 2021-02-25 RX ORDER — ALBUTEROL SULFATE 90 UG/1
1-2 AEROSOL, METERED RESPIRATORY (INHALATION)
Status: CANCELLED
Start: 2021-02-25

## 2021-02-25 RX ORDER — DIPHENHYDRAMINE HYDROCHLORIDE 50 MG/ML
50 INJECTION INTRAMUSCULAR; INTRAVENOUS
Status: CANCELLED
Start: 2021-02-25

## 2021-02-25 RX ORDER — EPINEPHRINE 0.3 MG/.3ML
0.3 INJECTION SUBCUTANEOUS EVERY 5 MIN PRN
Status: CANCELLED | OUTPATIENT
Start: 2021-02-25

## 2021-02-25 RX ORDER — METHYLPREDNISOLONE SODIUM SUCCINATE 125 MG/2ML
125 INJECTION, POWDER, LYOPHILIZED, FOR SOLUTION INTRAMUSCULAR; INTRAVENOUS
Status: CANCELLED
Start: 2021-02-25

## 2021-02-25 RX ORDER — FLUTICASONE PROPIONATE 50 MCG
2 SPRAY, SUSPENSION (ML) NASAL DAILY
Qty: 16 ML | Refills: 1 | Status: SHIPPED | OUTPATIENT
Start: 2021-02-25 | End: 2021-07-09

## 2021-02-25 RX ORDER — LORAZEPAM 2 MG/ML
0.5 INJECTION INTRAMUSCULAR EVERY 4 HOURS PRN
Status: CANCELLED
Start: 2021-02-25

## 2021-02-25 RX ORDER — EPINEPHRINE 1 MG/ML
0.3 INJECTION, SOLUTION INTRAMUSCULAR; SUBCUTANEOUS EVERY 5 MIN PRN
Status: CANCELLED | OUTPATIENT
Start: 2021-02-25

## 2021-02-25 RX ADMIN — SODIUM CHLORIDE 250 ML: 9 INJECTION, SOLUTION INTRAVENOUS at 10:29

## 2021-02-25 RX ADMIN — SODIUM CHLORIDE 480 MG: 9 INJECTION, SOLUTION INTRAVENOUS at 10:29

## 2021-02-25 ASSESSMENT — PAIN SCALES - GENERAL: PAINLEVEL: NO PAIN (0)

## 2021-02-25 NOTE — PROGRESS NOTES
Infusion Nursing Note:  Preeti Pascual presents today for Cycle 30 Day 1 Nivolumab.    Patient spoke with provider today: No    Treatment Conditions:  Lab Results   Component Value Date    HGB 11.9 02/18/2021     Lab Results   Component Value Date    WBC 3.6 02/18/2021      Lab Results   Component Value Date    ANEU 1.8 12/24/2020     Lab Results   Component Value Date     02/18/2021      Lab Results   Component Value Date     02/25/2021                   Lab Results   Component Value Date    POTASSIUM 3.9 02/25/2021           No results found for: MAG         Lab Results   Component Value Date    CR 0.80 02/25/2021                   Lab Results   Component Value Date    STACEY 8.5 02/25/2021                Lab Results   Component Value Date    BILITOTAL 0.4 02/25/2021           Lab Results   Component Value Date    ALBUMIN 3.4 02/25/2021                    Lab Results   Component Value Date    ALT 30 02/25/2021           Lab Results   Component Value Date    AST 26 02/25/2021       Results reviewed, labs MET treatment parameters, ok to proceed with treatment.      Note: Pt with no concerns. Denies fever/chills/SOB or new cough.  CT scheduled for 3/25/21 and Dr. Kyle on 3/29/21.  Pt wanted to wait to talk to Dr. Kyle before scheduling next Nivolumab infusion.    Intravenous Access:  Peripheral IV placed.    Post Infusion Assessment:  Patient tolerated infusion without incident.  Blood return noted pre and post infusion.  Access discontinued per protocol.    Discharge Plan:   Prescription refills given for Flonase.  Discharge instructions reviewed with: Patient.  Patient and/or family verbalized understanding of discharge instructions and all questions answered.  Copy of AVS reviewed with patient and/or family.  Patient will return 3/29/21 for next appointment with Dr. Kyle to discuss CT results.  Patient discharged in stable condition accompanied by: self.  Departure Mode: Ambulatory.    Crystal JURADO  JACKIE Oconnor

## 2021-02-25 NOTE — NURSING NOTE
Chief Complaint   Patient presents with     Blood Draw     No lab orders. VS taken.       As above.     Franca Brewer RN

## 2021-02-25 NOTE — PATIENT INSTRUCTIONS
[facility] Triage and after hours / weekends / holidays:  [phone]    Please call the triage or after hours line if you experience a temperature greater than or equal to 100.5, shaking chills, have uncontrolled nausea, vomiting and/or diarrhea, dizziness, shortness of breath, chest pain, bleeding, unexplained bruising, or if you have any other new/concerning symptoms, questions or concerns.      If you are having any concerning symptoms or wish to speak to a provider before your next infusion visit, please call your care coordinator or triage to notify them so we can adequately serve you.     If you need a refill on a narcotic prescription or other medication, please call before your infusion appointment.           February 2021 Sunday Monday Tuesday Wednesday Thursday Friday Saturday        1     2     3     4     5     6       7     8     9     10     11     12     13       14     15     16     17     18    LAB   9:00 AM   (15 min.)    LAB   [facility] [place] 19     20       21     22     23    TELEPHONE VISIT RETURN   8:30 AM   (30 min.)   Trevor Trujillo MD   [facility] [place] 24     25    [facility] DRAW   8:00 AM   (15 min.)   [facility] D[facility]   [facility] ONC INFUSION 60   8:30 AM   (60 min.)    ONCOLOGY INFUSION   [facility] 26     27       28 March 2021 Sunday Monday Tuesday Wednesday Thursday Friday Saturday        1     2     3     4     5     6       7     8     9     10     11     12     13       14     15     16     17     18     19     20       21     22     23     24     25    LAB   8:30 AM   (15 min.)    LAB   [facility]    CT CHEST ABDOMEN PELVIS WWO   8:45 AM   (20 min.)   [facility]   [facility] [place] 26     27       28     29    TELEPHONE VISIT RETURN   7:45  AM   (30 min.)   Agustin Kyle MD   [facility] 30     31                                   Recent Results (from the past 24 hour(s))   TSH with free T4 reflex    Collection Time: 02/25/21  8:30 AM   Result Value Ref Range    TSH 0.88 0.40 - 4.00 mU/L   Comprehensive metabolic panel    Collection Time: 02/25/21  8:30 AM   Result Value Ref Range    Sodium 140 133 - 144 mmol/L    Potassium 3.9 3.4 - 5.3 mmol/L    Chloride 109 94 - 109 mmol/L    Carbon Dioxide 25 20 - 32 mmol/L    Anion Gap 5 3 - 14 mmol/L    Glucose 97 70 - 99 mg/dL    Urea Nitrogen 10 7 - 30 mg/dL    Creatinine 0.80 0.66 - 1.25 mg/dL    GFR Estimate >90 >60 mL/min/[1.73_m2]    GFR Estimate If Black >90 >60 mL/min/[1.73_m2]    Calcium 8.5 8.5 - 10.1 mg/dL    Bilirubin Total 0.4 0.2 - 1.3 mg/dL    Albumin 3.4 3.4 - 5.0 g/dL    Protein Total 7.5 6.8 - 8.8 g/dL    Alkaline Phosphatase 101 40 - 150 U/L    ALT 30 0 - 70 U/L    AST 26 0 - 45 U/L

## 2021-03-25 ENCOUNTER — INFUSION THERAPY VISIT (OUTPATIENT)
Dept: ONCOLOGY | Facility: CLINIC | Age: 56
End: 2021-03-25
Attending: INTERNAL MEDICINE
Payer: COMMERCIAL

## 2021-03-25 ENCOUNTER — ANCILLARY PROCEDURE (OUTPATIENT)
Dept: CT IMAGING | Facility: CLINIC | Age: 56
End: 2021-03-25
Attending: INTERNAL MEDICINE
Payer: COMMERCIAL

## 2021-03-25 VITALS
BODY MASS INDEX: 24.59 KG/M2 | DIASTOLIC BLOOD PRESSURE: 89 MMHG | HEART RATE: 63 BPM | SYSTOLIC BLOOD PRESSURE: 155 MMHG | RESPIRATION RATE: 16 BRPM | TEMPERATURE: 97.9 F | WEIGHT: 161.7 LBS | OXYGEN SATURATION: 98 %

## 2021-03-25 DIAGNOSIS — C22.0 HCC (HEPATOCELLULAR CARCINOMA) (H): ICD-10-CM

## 2021-03-25 DIAGNOSIS — B18.1 CHRONIC VIRAL HEPATITIS B WITHOUT DELTA AGENT AND WITHOUT COMA (H): ICD-10-CM

## 2021-03-25 DIAGNOSIS — C22.0 HCC (HEPATOCELLULAR CARCINOMA) (H): Primary | ICD-10-CM

## 2021-03-25 DIAGNOSIS — K74.69 OTHER CIRRHOSIS OF LIVER (H): ICD-10-CM

## 2021-03-25 LAB
ALBUMIN SERPL-MCNC: 3.2 G/DL (ref 3.4–5)
ALP SERPL-CCNC: 100 U/L (ref 40–150)
ALT SERPL W P-5'-P-CCNC: 30 U/L (ref 0–70)
ANION GAP SERPL CALCULATED.3IONS-SCNC: 4 MMOL/L (ref 3–14)
AST SERPL W P-5'-P-CCNC: 22 U/L (ref 0–45)
BASOPHILS # BLD AUTO: 0.1 10E9/L (ref 0–0.2)
BASOPHILS NFR BLD AUTO: 1.8 %
BILIRUB SERPL-MCNC: 0.4 MG/DL (ref 0.2–1.3)
BUN SERPL-MCNC: 10 MG/DL (ref 7–30)
CALCIUM SERPL-MCNC: 8.7 MG/DL (ref 8.5–10.1)
CEA SERPL-MCNC: 1 UG/L (ref 0–2.5)
CHLORIDE SERPL-SCNC: 107 MMOL/L (ref 94–109)
CO2 SERPL-SCNC: 26 MMOL/L (ref 20–32)
CREAT BLD-MCNC: 0.8 MG/DL (ref 0.66–1.25)
CREAT SERPL-MCNC: 0.73 MG/DL (ref 0.66–1.25)
DIFFERENTIAL METHOD BLD: ABNORMAL
EOSINOPHIL # BLD AUTO: 0.5 10E9/L (ref 0–0.7)
EOSINOPHIL NFR BLD AUTO: 15.6 %
ERYTHROCYTE [DISTWIDTH] IN BLOOD BY AUTOMATED COUNT: 14.2 % (ref 10–15)
GFR SERPL CREATININE-BSD FRML MDRD: >90 ML/MIN/{1.73_M2}
GFR SERPL CREATININE-BSD FRML MDRD: >90 ML/MIN/{1.73_M2}
GLUCOSE SERPL-MCNC: 94 MG/DL (ref 70–99)
HCT VFR BLD AUTO: 36.7 % (ref 40–53)
HGB BLD-MCNC: 11.3 G/DL (ref 13.3–17.7)
IMM GRANULOCYTES # BLD: 0 10E9/L (ref 0–0.4)
IMM GRANULOCYTES NFR BLD: 0 %
LYMPHOCYTES # BLD AUTO: 0.8 10E9/L (ref 0.8–5.3)
LYMPHOCYTES NFR BLD AUTO: 22.9 %
MCH RBC QN AUTO: 24.7 PG (ref 26.5–33)
MCHC RBC AUTO-ENTMCNC: 30.8 G/DL (ref 31.5–36.5)
MCV RBC AUTO: 80 FL (ref 78–100)
MONOCYTES # BLD AUTO: 0.4 10E9/L (ref 0–1.3)
MONOCYTES NFR BLD AUTO: 11.5 %
NEUTROPHILS # BLD AUTO: 1.6 10E9/L (ref 1.6–8.3)
NEUTROPHILS NFR BLD AUTO: 48.2 %
NRBC # BLD AUTO: 0 10*3/UL
NRBC BLD AUTO-RTO: 0 /100
PLATELET # BLD AUTO: 162 10E9/L (ref 150–450)
POTASSIUM SERPL-SCNC: 4.1 MMOL/L (ref 3.4–5.3)
PROT SERPL-MCNC: 7.2 G/DL (ref 6.8–8.8)
RBC # BLD AUTO: 4.57 10E12/L (ref 4.4–5.9)
SODIUM SERPL-SCNC: 137 MMOL/L (ref 133–144)
TSH SERPL DL<=0.005 MIU/L-ACNC: 0.7 MU/L (ref 0.4–4)
WBC # BLD AUTO: 3.4 10E9/L (ref 4–11)

## 2021-03-25 PROCEDURE — 71260 CT THORAX DX C+: CPT | Mod: GC | Performed by: RADIOLOGY

## 2021-03-25 PROCEDURE — 80053 COMPREHEN METABOLIC PANEL: CPT | Performed by: INTERNAL MEDICINE

## 2021-03-25 PROCEDURE — 96413 CHEMO IV INFUSION 1 HR: CPT

## 2021-03-25 PROCEDURE — 250N000011 HC RX IP 250 OP 636: Performed by: INTERNAL MEDICINE

## 2021-03-25 PROCEDURE — 258N000003 HC RX IP 258 OP 636: Performed by: INTERNAL MEDICINE

## 2021-03-25 PROCEDURE — 85025 COMPLETE CBC W/AUTO DIFF WBC: CPT | Performed by: INTERNAL MEDICINE

## 2021-03-25 PROCEDURE — 82378 CARCINOEMBRYONIC ANTIGEN: CPT | Performed by: INTERNAL MEDICINE

## 2021-03-25 PROCEDURE — 82565 ASSAY OF CREATININE: CPT | Performed by: PATHOLOGY

## 2021-03-25 PROCEDURE — 84443 ASSAY THYROID STIM HORMONE: CPT | Performed by: INTERNAL MEDICINE

## 2021-03-25 PROCEDURE — 74177 CT ABD & PELVIS W/CONTRAST: CPT | Mod: GC | Performed by: RADIOLOGY

## 2021-03-25 RX ORDER — ALBUTEROL SULFATE 90 UG/1
1-2 AEROSOL, METERED RESPIRATORY (INHALATION)
Status: CANCELLED
Start: 2021-03-25

## 2021-03-25 RX ORDER — METHYLPREDNISOLONE SODIUM SUCCINATE 125 MG/2ML
125 INJECTION, POWDER, LYOPHILIZED, FOR SOLUTION INTRAMUSCULAR; INTRAVENOUS
Status: CANCELLED
Start: 2021-03-25

## 2021-03-25 RX ORDER — ALBUTEROL SULFATE 0.83 MG/ML
2.5 SOLUTION RESPIRATORY (INHALATION)
Status: CANCELLED | OUTPATIENT
Start: 2021-03-25

## 2021-03-25 RX ORDER — IOPAMIDOL 755 MG/ML
99 INJECTION, SOLUTION INTRAVASCULAR ONCE
Status: COMPLETED | OUTPATIENT
Start: 2021-03-25 | End: 2021-03-25

## 2021-03-25 RX ORDER — DIPHENHYDRAMINE HYDROCHLORIDE 50 MG/ML
50 INJECTION INTRAMUSCULAR; INTRAVENOUS
Status: CANCELLED
Start: 2021-03-25

## 2021-03-25 RX ORDER — MEPERIDINE HYDROCHLORIDE 25 MG/ML
25 INJECTION INTRAMUSCULAR; INTRAVENOUS; SUBCUTANEOUS EVERY 30 MIN PRN
Status: CANCELLED | OUTPATIENT
Start: 2021-03-25

## 2021-03-25 RX ORDER — SODIUM CHLORIDE 9 MG/ML
1000 INJECTION, SOLUTION INTRAVENOUS CONTINUOUS PRN
Status: CANCELLED
Start: 2021-03-25

## 2021-03-25 RX ORDER — EPINEPHRINE 1 MG/ML
0.3 INJECTION, SOLUTION INTRAMUSCULAR; SUBCUTANEOUS EVERY 5 MIN PRN
Status: CANCELLED | OUTPATIENT
Start: 2021-03-25

## 2021-03-25 RX ORDER — EPINEPHRINE 0.3 MG/.3ML
0.3 INJECTION SUBCUTANEOUS EVERY 5 MIN PRN
Status: CANCELLED | OUTPATIENT
Start: 2021-03-25

## 2021-03-25 RX ORDER — LORAZEPAM 2 MG/ML
0.5 INJECTION INTRAMUSCULAR EVERY 4 HOURS PRN
Status: CANCELLED
Start: 2021-03-25

## 2021-03-25 RX ADMIN — IOPAMIDOL 99 ML: 755 INJECTION, SOLUTION INTRAVASCULAR at 08:49

## 2021-03-25 RX ADMIN — SODIUM CHLORIDE 250 ML: 9 INJECTION, SOLUTION INTRAVENOUS at 10:44

## 2021-03-25 RX ADMIN — SODIUM CHLORIDE 480 MG: 9 INJECTION, SOLUTION INTRAVENOUS at 10:44

## 2021-03-25 ASSESSMENT — PAIN SCALES - GENERAL: PAINLEVEL: NO PAIN (0)

## 2021-03-25 NOTE — PROGRESS NOTES
Infusion Nursing Note:  Preeti Pascual presents today for Cycle 31 Nivolumab.        Note: Preeti feels well today and denies pain.  He denies fevers/chills, cough, rash, and diarrhea.    Pt had a scan this am and does not see Dr Kyle until 3/29/21.  Dr Kyle called this RN and said he looked at the scan and it is ok to proceed with cycle 31 as planned.    Intravenous Access:  Peripheral IV placed in lab    Treatment Conditions:  Lab Results   Component Value Date    HGB 11.3 03/25/2021     Lab Results   Component Value Date    WBC 3.4 03/25/2021      Lab Results   Component Value Date    ANEU 1.6 03/25/2021     Lab Results   Component Value Date     03/25/2021      Lab Results   Component Value Date     03/25/2021                   Lab Results   Component Value Date    POTASSIUM 4.1 03/25/2021           No results found for: MAG         Lab Results   Component Value Date    CR 0.73 03/25/2021                   Lab Results   Component Value Date    STACEY 8.7 03/25/2021                Lab Results   Component Value Date    BILITOTAL 0.4 03/25/2021           Lab Results   Component Value Date    ALBUMIN 3.2 03/25/2021                    Lab Results   Component Value Date    ALT 30 03/25/2021           Lab Results   Component Value Date    AST 22 03/25/2021       Results reviewed, labs MET treatment parameters, ok to proceed with treatment.      Post Infusion Assessment:  Patient tolerated infusion without incident.       Discharge Plan:   AVS to patient via [facility].  Patient will return for a visit with Dr Kyle on 3.29.   Face to Face time: 0.    Nahomi Allen RN

## 2021-03-25 NOTE — NURSING NOTE
Chief Complaint   Patient presents with     Blood Draw     Labs drawn via PIV by RN in lab. VS taken.      Labs drawn from PIV by RN. Line flushed with saline. Vitals taken. Pt checked in for appointment(s).    Racquel LORENZO RN PHN BSN  BMT/Oncology Lab

## 2021-03-25 NOTE — PATIENT INSTRUCTIONS
[facility] Triage and after hours / weekends / holidays:  [phone]    Please call the triage or after hours line if you experience a temperature greater than or equal to 100.5, shaking chills, have uncontrolled nausea, vomiting and/or diarrhea, dizziness, shortness of breath, chest pain, bleeding, unexplained bruising, or if you have any other new/concerning symptoms, questions or concerns.      If you are having any concerning symptoms or wish to speak to a provider before your next infusion visit, please call your care coordinator or triage to notify them so we can adequately serve you.     If you need a refill on a narcotic prescription or other medication, please call before your infusion appointment.

## 2021-03-29 ENCOUNTER — VIRTUAL VISIT (OUTPATIENT)
Dept: ONCOLOGY | Facility: CLINIC | Age: 56
End: 2021-03-29
Attending: INTERNAL MEDICINE
Payer: COMMERCIAL

## 2021-03-29 DIAGNOSIS — B18.1 CHRONIC VIRAL HEPATITIS B WITHOUT DELTA AGENT AND WITHOUT COMA (H): ICD-10-CM

## 2021-03-29 DIAGNOSIS — K74.69 OTHER CIRRHOSIS OF LIVER (H): ICD-10-CM

## 2021-03-29 DIAGNOSIS — C22.0 HCC (HEPATOCELLULAR CARCINOMA) (H): Primary | ICD-10-CM

## 2021-03-29 PROCEDURE — 99215 OFFICE O/P EST HI 40 MIN: CPT | Mod: TEL | Performed by: INTERNAL MEDICINE

## 2021-03-29 PROCEDURE — 999N001193 HC VIDEO/TELEPHONE VISIT; NO CHARGE

## 2021-03-29 NOTE — PROGRESS NOTES
"Preeti is a 55 year old who is being evaluated via a billable telephone visit.      What phone number would you like to be contacted at? [phone]  How would you like to obtain your AVS? Mail a copy     Vitals - Patient Reported  Weight (Patient Reported): 72.6 kg (160 lb)  Height (Patient Reported): 167.6 cm (5' 6\")  BMI (Based on Pt Reported Ht/Wt): 25.82  Pain Score: No Pain (0)    Aleksandra Berger CMA March 29, 2021  7:21 AM       Phone call duration: 30 minutes    I am seeing Preeti Pascual today in follow-up of advanced hepatocellular carcinoma.    Mr. Pascual has chronic hepatitis B with compensated cirrhosis and hepatocellular carcinoma originally treated with radio embolization in 2016.  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  He is now been on nivolumab since November 2018 with good control of his disease and returns today for ongoing monitoring of response.    He tells me he continues to feel quite well.  He has good appetite and energy and his weight has been stable.  He has had no problems with bleeding, confusion, or lower extremity edema.  He continues to be able to carry on with his usual activities without limitation.  Remainder of his complete review of systems is otherwise negative.    On the phone he sounds quite well.    I personally reviewed his CT scan and went over the results with him.  He has bilateral adrenal nodules and radiology thought there was a new area in his left adrenal.  To me they do not look much different.  The disease within his liver continues to look well controlled.  His lab work is most notable for an increase in his alpha-fetoprotein up to almost 120.  He has normal electrolytes and renal function.  His albumin is mildly depressed at 3.2 and the bilirubin is normal with normal liver enzymes.  His blood counts are mildly depressed consistent with prior values.    Assessment/plan: Hepatocellular " carcinoma with adrenal gland metastases.  He probably is starting to progress but the changes are small enough that I do not want to change therapy just yet.  After some discussion with the patient we decided he would do 1 more cycle of the nivolumab and we will see him back before the next 1 after that with repeat imaging and lab work.  If he has further evidence of progression at that point we will discuss moving onto another line of therapy.  He expressed a good understanding of this and was satisfied with the plan.    Total time by me today inclusive of the visit, review of imaging and labs, and documentation was approximately 40 minutes.

## 2021-03-29 NOTE — LETTER
"    3/29/2021         RE: Preeti Pascual  [address]  [place] [facility] [place] [facility] [address]        Dear Colleague,    Thank you for referring your patient, Preeti Pascual, to the [facility]. Please see a copy of my visit note below.    Preeti is a 55 year old who is being evaluated via a billable telephone visit.      What phone number would you like to be contacted at? [phone]  How would you like to obtain your AVS? Mail a copy     Vitals - Patient Reported  Weight (Patient Reported): 72.6 kg (160 lb)  Height (Patient Reported): 167.6 cm (5' 6\")  BMI (Based on Pt Reported Ht/Wt): 25.82  Pain Score: No Pain (0)    Aleksandra Berger CMA March 29, 2021  7:21 AM       Phone call duration: 30 minutes    I am seeing Preeti Pascual today in follow-up of advanced hepatocellular carcinoma.    Mr. Pascual has chronic hepatitis B with compensated cirrhosis and hepatocellular carcinoma originally treated with radio embolization in 2016.  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  He is now been on nivolumab since November 2018 with good control of his disease and returns today for ongoing monitoring of response.    He tells me he continues to feel quite well.  He has good appetite and energy and his weight has been stable.  He has had no problems with bleeding, confusion, or lower extremity edema.  He continues to be able to carry on with his usual activities without limitation.  Remainder of his complete review of systems is otherwise negative.    On the phone he sounds quite well.    I personally reviewed his CT scan and went over the results with him.  He has bilateral adrenal nodules and radiology thought there was a new area in his left adrenal.  To me they do not look much different.  The disease within his liver continues to look well controlled.  His lab work is most notable for an increase in his alpha-fetoprotein up to " almost 120.  He has normal electrolytes and renal function.  His albumin is mildly depressed at 3.2 and the bilirubin is normal with normal liver enzymes.  His blood counts are mildly depressed consistent with prior values.    Assessment/plan: Hepatocellular carcinoma with adrenal gland metastases.  He probably is starting to progress but the changes are small enough that I do not want to change therapy just yet.  After some discussion with the patient we decided he would do 1 more cycle of the nivolumab and we will see him back before the next 1 after that with repeat imaging and lab work.  If he has further evidence of progression at that point we will discuss moving onto another line of therapy.  He expressed a good understanding of this and was satisfied with the plan.    Total time by me today inclusive of the visit, review of imaging and labs, and documentation was approximately 40 minutes.      Again, thank you for allowing me to participate in the care of your patient.        Sincerely,        Agustin Kyle MD

## 2021-04-08 ENCOUNTER — PATIENT OUTREACH (OUTPATIENT)
Dept: ONCOLOGY | Facility: CLINIC | Age: 56
End: 2021-04-08

## 2021-04-08 NOTE — PROGRESS NOTES
RN Care Coordination Note  Incoming Call:   Received multiple VMMs from patient stating he does not like time appointment with Dr Kyle is schedule. States he wants to be seen at 8am.      Outgoing Call:   Placed return call to patient to further discuss. Unable to reach patient. Left detailed message for patient explaining schedule is unfortunately full, his current appointment was add on time. Did offer to move appointment to 5/17 at 7:15am if this will work better for patient.      Sharlene Mendez RN, BSN, OCN   RN Care Coordinator   [facility]

## 2021-04-19 RX ORDER — EPINEPHRINE 1 MG/ML
0.3 INJECTION, SOLUTION INTRAMUSCULAR; SUBCUTANEOUS EVERY 5 MIN PRN
Status: CANCELLED | OUTPATIENT
Start: 2021-04-22

## 2021-04-19 RX ORDER — LORAZEPAM 2 MG/ML
0.5 INJECTION INTRAMUSCULAR EVERY 4 HOURS PRN
Status: CANCELLED
Start: 2021-04-22

## 2021-04-19 RX ORDER — ALBUTEROL SULFATE 0.83 MG/ML
2.5 SOLUTION RESPIRATORY (INHALATION)
Status: CANCELLED | OUTPATIENT
Start: 2021-04-22

## 2021-04-19 RX ORDER — DIPHENHYDRAMINE HYDROCHLORIDE 50 MG/ML
50 INJECTION INTRAMUSCULAR; INTRAVENOUS
Status: CANCELLED
Start: 2021-04-22

## 2021-04-19 RX ORDER — EPINEPHRINE 0.3 MG/.3ML
0.3 INJECTION SUBCUTANEOUS EVERY 5 MIN PRN
Status: CANCELLED | OUTPATIENT
Start: 2021-04-22

## 2021-04-19 RX ORDER — SODIUM CHLORIDE 9 MG/ML
1000 INJECTION, SOLUTION INTRAVENOUS CONTINUOUS PRN
Status: CANCELLED
Start: 2021-04-22

## 2021-04-19 RX ORDER — MEPERIDINE HYDROCHLORIDE 25 MG/ML
25 INJECTION INTRAMUSCULAR; INTRAVENOUS; SUBCUTANEOUS EVERY 30 MIN PRN
Status: CANCELLED | OUTPATIENT
Start: 2021-04-22

## 2021-04-19 RX ORDER — ALBUTEROL SULFATE 90 UG/1
1-2 AEROSOL, METERED RESPIRATORY (INHALATION)
Status: CANCELLED
Start: 2021-04-22

## 2021-04-19 RX ORDER — METHYLPREDNISOLONE SODIUM SUCCINATE 125 MG/2ML
125 INJECTION, POWDER, LYOPHILIZED, FOR SOLUTION INTRAMUSCULAR; INTRAVENOUS
Status: CANCELLED
Start: 2021-04-22

## 2021-04-22 ENCOUNTER — INFUSION THERAPY VISIT (OUTPATIENT)
Dept: ONCOLOGY | Facility: CLINIC | Age: 56
End: 2021-04-22
Attending: INTERNAL MEDICINE
Payer: COMMERCIAL

## 2021-04-22 ENCOUNTER — APPOINTMENT (OUTPATIENT)
Dept: LAB | Facility: CLINIC | Age: 56
End: 2021-04-22
Attending: INTERNAL MEDICINE
Payer: COMMERCIAL

## 2021-04-22 VITALS
HEART RATE: 64 BPM | RESPIRATION RATE: 18 BRPM | BODY MASS INDEX: 24.18 KG/M2 | WEIGHT: 159 LBS | DIASTOLIC BLOOD PRESSURE: 70 MMHG | OXYGEN SATURATION: 98 % | TEMPERATURE: 98.1 F | SYSTOLIC BLOOD PRESSURE: 122 MMHG

## 2021-04-22 DIAGNOSIS — C22.0 HCC (HEPATOCELLULAR CARCINOMA) (H): Primary | ICD-10-CM

## 2021-04-22 LAB
ALBUMIN SERPL-MCNC: 3.5 G/DL (ref 3.4–5)
ALP SERPL-CCNC: 106 U/L (ref 40–150)
ALT SERPL W P-5'-P-CCNC: 33 U/L (ref 0–70)
ANION GAP SERPL CALCULATED.3IONS-SCNC: 6 MMOL/L (ref 3–14)
AST SERPL W P-5'-P-CCNC: 27 U/L (ref 0–45)
BILIRUB SERPL-MCNC: 0.4 MG/DL (ref 0.2–1.3)
BUN SERPL-MCNC: 9 MG/DL (ref 7–30)
CALCIUM SERPL-MCNC: 8.7 MG/DL (ref 8.5–10.1)
CHLORIDE SERPL-SCNC: 109 MMOL/L (ref 94–109)
CO2 SERPL-SCNC: 24 MMOL/L (ref 20–32)
CREAT SERPL-MCNC: 0.75 MG/DL (ref 0.66–1.25)
GFR SERPL CREATININE-BSD FRML MDRD: >90 ML/MIN/{1.73_M2}
GLUCOSE SERPL-MCNC: 95 MG/DL (ref 70–99)
POTASSIUM SERPL-SCNC: 4 MMOL/L (ref 3.4–5.3)
PROT SERPL-MCNC: 7.8 G/DL (ref 6.8–8.8)
SODIUM SERPL-SCNC: 139 MMOL/L (ref 133–144)
TSH SERPL DL<=0.005 MIU/L-ACNC: 0.7 MU/L (ref 0.4–4)

## 2021-04-22 PROCEDURE — 999N000127 HC STATISTIC PERIPHERAL IV START W US GUIDANCE

## 2021-04-22 PROCEDURE — 258N000003 HC RX IP 258 OP 636: Performed by: INTERNAL MEDICINE

## 2021-04-22 PROCEDURE — 250N000011 HC RX IP 250 OP 636: Performed by: INTERNAL MEDICINE

## 2021-04-22 PROCEDURE — 84443 ASSAY THYROID STIM HORMONE: CPT | Performed by: INTERNAL MEDICINE

## 2021-04-22 PROCEDURE — 80053 COMPREHEN METABOLIC PANEL: CPT | Performed by: INTERNAL MEDICINE

## 2021-04-22 PROCEDURE — 96413 CHEMO IV INFUSION 1 HR: CPT

## 2021-04-22 RX ADMIN — SODIUM CHLORIDE 250 ML: 9 INJECTION, SOLUTION INTRAVENOUS at 10:38

## 2021-04-22 RX ADMIN — SODIUM CHLORIDE 480 MG: 9 INJECTION, SOLUTION INTRAVENOUS at 10:39

## 2021-04-22 ASSESSMENT — PAIN SCALES - GENERAL: PAINLEVEL: NO PAIN (0)

## 2021-04-22 NOTE — PROGRESS NOTES
Infusion Nursing Note:  Preeti Pascual presents today for cycle 32 day 1 Nivolumab.    Patient seen by provider today: No   present during visit today: Not Applicable.    Note: Patient arrives feeling well today. He denies having any pain, shortness of breath, fevers, cough, or nausea/vomiting.    Intravenous Access:  Peripheral IV placed.    Treatment Conditions:  Lab Results   Component Value Date    HGB 11.3 03/25/2021     Lab Results   Component Value Date    WBC 3.4 03/25/2021      Lab Results   Component Value Date    ANEU 1.6 03/25/2021     Lab Results   Component Value Date     03/25/2021      Lab Results   Component Value Date     04/22/2021                   Lab Results   Component Value Date    POTASSIUM 4.0 04/22/2021           No results found for: MAG         Lab Results   Component Value Date    CR 0.75 04/22/2021                   Lab Results   Component Value Date    STACEY 8.7 04/22/2021                Lab Results   Component Value Date    BILITOTAL 0.4 04/22/2021           Lab Results   Component Value Date    ALBUMIN 3.5 04/22/2021                    Lab Results   Component Value Date    ALT 33 04/22/2021           Lab Results   Component Value Date    AST 27 04/22/2021       Results reviewed, labs MET treatment parameters, ok to proceed with treatment.      Post Infusion Assessment:  Patient tolerated infusion without incident.  Blood return noted pre and post infusion.  Site patent and intact, free from redness, edema or discomfort.  No evidence of extravasations.  Access discontinued per protocol.       Discharge Plan:   Patient declined prescription refills.  Discharge instructions reviewed with: Patient.  Patient and/or family verbalized understanding of discharge instructions and all questions answered.  Copy of AVS reviewed with patient and/or family.  Patient will return 5/20/21 for next appointment.  Patient discharged in stable condition accompanied by:  self.  Departure Mode: Ambulatory.    Aleksandra Falcon RN

## 2021-04-22 NOTE — PATIENT INSTRUCTIONS
Contact Numbers:   [facility] [place]: [phone]    Call triage to speak with triage if you are experiencing chills and/or temperature greater than or equal to 100.5, uncontrolled nausea/vomiting, diarrhea, constipation, dizziness, shortness of breath, chest pain, bleeding, unexplained bruising, or any new/concerning symptoms, questions/concerns.     If you are having any concerning symptoms or wish to speak to a provider before your next infusion visit, please call your care coordinator or triage to notify them so we can adequately serve you.     If you need a refill on a medication or narcotic prescription, please call triage or your care coordinator before your infusion appointment.                 April 2021 Sunday Monday Tuesday Wednesday Thursday Friday Saturday                       1     2     3       4     5     6     7     8     9     10       11     12     13     14     15     16     17       18     19     20     21     22    LAB PERIPHERAL   8:45 AM   (15 min.)   [facility]   [facility] ONC INFUSION 60   9:30 AM   (60 min.)    ONCOLOGY INFUSION   [facility] 23     24       25     26     27     28     29     30                      May 2021      [place] Monday Tuesday Wednesday Thursday Friday Saturday                                 1       2     3     4     5     6    LAB   7:30 AM   (15 min.)   [facility]   [facility] [place]    CT CHEST ABDOMEN PELVIS WWO   8:10 AM   (20 min.)   [facility]   [facility] [place] 7     8       9     10    RETURN   2:15 PM   (30 min.)   Agustin Kyle MD   [facility] 11     12     13     14     15       16     17     18     19     20    LAB PERIPHERAL   9:00 AM   (15 min.)    [facility]   [facility] ONC INFUSION 60   9:30 AM   (60 min.)    ONCOLOGY INFUSION   [facility]  [facility] 21     22       23     24     25     26     27     28     29       30     31                                              Lab Results:  Recent Results (from the past 12 hour(s))   Comprehensive metabolic panel    Collection Time: 04/22/21  8:57 AM   Result Value Ref Range    Sodium 139 133 - 144 mmol/L    Potassium 4.0 3.4 - 5.3 mmol/L    Chloride 109 94 - 109 mmol/L    Carbon Dioxide 24 20 - 32 mmol/L    Anion Gap 6 3 - 14 mmol/L    Glucose 95 70 - 99 mg/dL    Urea Nitrogen 9 7 - 30 mg/dL    Creatinine 0.75 0.66 - 1.25 mg/dL    GFR Estimate >90 >60 mL/min/[1.73_m2]    GFR Estimate If Black >90 >60 mL/min/[1.73_m2]    Calcium 8.7 8.5 - 10.1 mg/dL    Bilirubin Total 0.4 0.2 - 1.3 mg/dL    Albumin 3.5 3.4 - 5.0 g/dL    Protein Total 7.8 6.8 - 8.8 g/dL    Alkaline Phosphatase 106 40 - 150 U/L    ALT 33 0 - 70 U/L    AST 27 0 - 45 U/L   TSH with free T4 reflex    Collection Time: 04/22/21  8:57 AM   Result Value Ref Range    TSH 0.70 0.40 - 4.00 mU/L

## 2021-04-24 ENCOUNTER — HEALTH MAINTENANCE LETTER (OUTPATIENT)
Age: 56
End: 2021-04-24

## 2021-05-06 ENCOUNTER — ANCILLARY PROCEDURE (OUTPATIENT)
Dept: CT IMAGING | Facility: CLINIC | Age: 56
End: 2021-05-06
Attending: INTERNAL MEDICINE
Payer: COMMERCIAL

## 2021-05-06 DIAGNOSIS — K74.69 OTHER CIRRHOSIS OF LIVER (H): ICD-10-CM

## 2021-05-06 DIAGNOSIS — C22.0 HCC (HEPATOCELLULAR CARCINOMA) (H): ICD-10-CM

## 2021-05-06 DIAGNOSIS — B18.1 CHRONIC VIRAL HEPATITIS B WITHOUT DELTA AGENT AND WITHOUT COMA (H): ICD-10-CM

## 2021-05-06 LAB
AFP SERPL-MCNC: 138.9 UG/L (ref 0–8)
ALBUMIN SERPL-MCNC: 3.3 G/DL (ref 3.4–5)
ALP SERPL-CCNC: 111 U/L (ref 40–150)
ALT SERPL W P-5'-P-CCNC: 28 U/L (ref 0–70)
ANION GAP SERPL CALCULATED.3IONS-SCNC: 5 MMOL/L (ref 3–14)
AST SERPL W P-5'-P-CCNC: 22 U/L (ref 0–45)
BASOPHILS # BLD AUTO: 0.1 10E9/L (ref 0–0.2)
BASOPHILS NFR BLD AUTO: 1.8 %
BILIRUB SERPL-MCNC: 0.5 MG/DL (ref 0.2–1.3)
BUN SERPL-MCNC: 13 MG/DL (ref 7–30)
CALCIUM SERPL-MCNC: 8.5 MG/DL (ref 8.5–10.1)
CHLORIDE SERPL-SCNC: 111 MMOL/L (ref 94–109)
CO2 SERPL-SCNC: 25 MMOL/L (ref 20–32)
CREAT SERPL-MCNC: 0.8 MG/DL (ref 0.66–1.25)
DIFFERENTIAL METHOD BLD: ABNORMAL
EOSINOPHIL # BLD AUTO: 0.5 10E9/L (ref 0–0.7)
EOSINOPHIL NFR BLD AUTO: 14.2 %
ERYTHROCYTE [DISTWIDTH] IN BLOOD BY AUTOMATED COUNT: 14.1 % (ref 10–15)
GFR SERPL CREATININE-BSD FRML MDRD: >90 ML/MIN/{1.73_M2}
GLUCOSE SERPL-MCNC: 110 MG/DL (ref 70–99)
HCT VFR BLD AUTO: 36.2 % (ref 40–53)
HGB BLD-MCNC: 11.3 G/DL (ref 13.3–17.7)
IMM GRANULOCYTES # BLD: 0 10E9/L (ref 0–0.4)
IMM GRANULOCYTES NFR BLD: 0 %
INR PPP: 1.23 (ref 0.86–1.14)
LYMPHOCYTES # BLD AUTO: 1.1 10E9/L (ref 0.8–5.3)
LYMPHOCYTES NFR BLD AUTO: 27.6 %
MCH RBC QN AUTO: 24.7 PG (ref 26.5–33)
MCHC RBC AUTO-ENTMCNC: 31.2 G/DL (ref 31.5–36.5)
MCV RBC AUTO: 79 FL (ref 78–100)
MONOCYTES # BLD AUTO: 0.4 10E9/L (ref 0–1.3)
MONOCYTES NFR BLD AUTO: 10.3 %
NEUTROPHILS # BLD AUTO: 1.8 10E9/L (ref 1.6–8.3)
NEUTROPHILS NFR BLD AUTO: 46.1 %
NRBC # BLD AUTO: 0 10*3/UL
NRBC BLD AUTO-RTO: 0 /100
PLATELET # BLD AUTO: 189 10E9/L (ref 150–450)
POTASSIUM SERPL-SCNC: 3.6 MMOL/L (ref 3.4–5.3)
PROT SERPL-MCNC: 7.6 G/DL (ref 6.8–8.8)
RBC # BLD AUTO: 4.58 10E12/L (ref 4.4–5.9)
SODIUM SERPL-SCNC: 141 MMOL/L (ref 133–144)
WBC # BLD AUTO: 3.8 10E9/L (ref 4–11)

## 2021-05-06 PROCEDURE — 80053 COMPREHEN METABOLIC PANEL: CPT | Performed by: PATHOLOGY

## 2021-05-06 PROCEDURE — 82105 ALPHA-FETOPROTEIN SERUM: CPT | Mod: 90 | Performed by: PATHOLOGY

## 2021-05-06 PROCEDURE — 36415 COLL VENOUS BLD VENIPUNCTURE: CPT | Performed by: PATHOLOGY

## 2021-05-06 PROCEDURE — 85610 PROTHROMBIN TIME: CPT | Performed by: PATHOLOGY

## 2021-05-06 PROCEDURE — 99000 SPECIMEN HANDLING OFFICE-LAB: CPT | Performed by: PATHOLOGY

## 2021-05-06 PROCEDURE — 85025 COMPLETE CBC W/AUTO DIFF WBC: CPT | Performed by: PATHOLOGY

## 2021-05-06 PROCEDURE — 71260 CT THORAX DX C+: CPT | Mod: GC | Performed by: RADIOLOGY

## 2021-05-06 PROCEDURE — 74178 CT ABD&PLV WO CNTR FLWD CNTR: CPT | Mod: GC | Performed by: RADIOLOGY

## 2021-05-06 RX ORDER — IOPAMIDOL 755 MG/ML
97 INJECTION, SOLUTION INTRAVASCULAR ONCE
Status: COMPLETED | OUTPATIENT
Start: 2021-05-06 | End: 2021-05-06

## 2021-05-06 RX ADMIN — IOPAMIDOL 97 ML: 755 INJECTION, SOLUTION INTRAVASCULAR at 08:07

## 2021-05-10 ENCOUNTER — ONCOLOGY VISIT (OUTPATIENT)
Dept: ONCOLOGY | Facility: CLINIC | Age: 56
End: 2021-05-10
Attending: INTERNAL MEDICINE
Payer: COMMERCIAL

## 2021-05-10 VITALS
SYSTOLIC BLOOD PRESSURE: 135 MMHG | DIASTOLIC BLOOD PRESSURE: 69 MMHG | HEART RATE: 69 BPM | BODY MASS INDEX: 24.57 KG/M2 | OXYGEN SATURATION: 97 % | RESPIRATION RATE: 16 BRPM | TEMPERATURE: 97.6 F | WEIGHT: 161.6 LBS

## 2021-05-10 DIAGNOSIS — C22.0 HCC (HEPATOCELLULAR CARCINOMA) (H): Primary | ICD-10-CM

## 2021-05-10 PROCEDURE — G0463 HOSPITAL OUTPT CLINIC VISIT: HCPCS

## 2021-05-10 PROCEDURE — 999N001193 HC VIDEO/TELEPHONE VISIT; NO CHARGE

## 2021-05-10 PROCEDURE — 99417 PROLNG OP E/M EACH 15 MIN: CPT | Performed by: INTERNAL MEDICINE

## 2021-05-10 PROCEDURE — 99215 OFFICE O/P EST HI 40 MIN: CPT | Performed by: INTERNAL MEDICINE

## 2021-05-10 RX ORDER — ALBUTEROL SULFATE 90 UG/1
1-2 AEROSOL, METERED RESPIRATORY (INHALATION)
Status: CANCELLED
Start: 2021-05-27

## 2021-05-10 RX ORDER — HEPARIN SODIUM (PORCINE) LOCK FLUSH IV SOLN 100 UNIT/ML 100 UNIT/ML
5 SOLUTION INTRAVENOUS
Status: CANCELLED | OUTPATIENT
Start: 2021-05-27

## 2021-05-10 RX ORDER — LORAZEPAM 2 MG/ML
0.5 INJECTION INTRAMUSCULAR EVERY 4 HOURS PRN
Status: CANCELLED
Start: 2021-05-27

## 2021-05-10 RX ORDER — DIPHENHYDRAMINE HYDROCHLORIDE 50 MG/ML
50 INJECTION INTRAMUSCULAR; INTRAVENOUS
Status: CANCELLED
Start: 2021-05-27

## 2021-05-10 RX ORDER — HEPARIN SODIUM,PORCINE 10 UNIT/ML
5 VIAL (ML) INTRAVENOUS
Status: CANCELLED | OUTPATIENT
Start: 2021-05-27

## 2021-05-10 RX ORDER — MEPERIDINE HYDROCHLORIDE 25 MG/ML
25 INJECTION INTRAMUSCULAR; INTRAVENOUS; SUBCUTANEOUS EVERY 30 MIN PRN
Status: CANCELLED | OUTPATIENT
Start: 2021-05-27

## 2021-05-10 RX ORDER — ALBUTEROL SULFATE 0.83 MG/ML
2.5 SOLUTION RESPIRATORY (INHALATION)
Status: CANCELLED | OUTPATIENT
Start: 2021-05-27

## 2021-05-10 RX ORDER — NALOXONE HYDROCHLORIDE 0.4 MG/ML
.1-.4 INJECTION, SOLUTION INTRAMUSCULAR; INTRAVENOUS; SUBCUTANEOUS
Status: CANCELLED | OUTPATIENT
Start: 2021-05-27

## 2021-05-10 RX ORDER — SODIUM CHLORIDE 9 MG/ML
1000 INJECTION, SOLUTION INTRAVENOUS CONTINUOUS PRN
Status: CANCELLED
Start: 2021-05-27

## 2021-05-10 RX ORDER — METHYLPREDNISOLONE SODIUM SUCCINATE 125 MG/2ML
125 INJECTION, POWDER, LYOPHILIZED, FOR SOLUTION INTRAMUSCULAR; INTRAVENOUS
Status: CANCELLED
Start: 2021-05-27

## 2021-05-10 RX ORDER — EPINEPHRINE 1 MG/ML
0.3 INJECTION, SOLUTION INTRAMUSCULAR; SUBCUTANEOUS EVERY 5 MIN PRN
Status: CANCELLED | OUTPATIENT
Start: 2021-05-27

## 2021-05-10 ASSESSMENT — PAIN SCALES - GENERAL: PAINLEVEL: NO PAIN (0)

## 2021-05-10 NOTE — PROGRESS NOTES
I am seeing Preeti Pascual today in follow-up of advanced hepatocellular carcinoma.    Mr. Zuniga has chronic hepatitis B with compensated cirrhosis and was originally diagnosed with hepatocellular carcinoma in 2016 and was treated with radio embolization.  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  He has since been on treatment with nivolumab with good control.  He is here today for ongoing response assessment.  He tells me that overall he feels well.  His appetite and energy are stable.  He has no pain.  He has had no problems with bleeding confusion or edema.    On exam he is alert and well-appearing with unremarkable vital signs.  He has no scleral icterus.  He has no palpable adenopathy in his neck or supraclavicular spaces.  His lungs are clear to auscultation dullness to percussion.  His abdomen is soft and nontender without demonstrable ascites.  He has a trace of pretibial edema bilaterally.  Speech is fluent and his cranial nerves are grossly intact.    I personally reviewed his CT scan with over the results with him.  His right adrenal metastasis is larger as it is a lymph node in the pericaval space.  The nodularity overlying his previously treated primary appears little bit larger.  He has a new 7 mm nodule in his right lung.  His lab work is notable for normal electrolytes and renal function.  His bilirubin is normal and his albumin only mildly depressed at 3.3.  With normal transaminases and alkaline phosphatase.  He has very mild pancytopenia consistent with prior values.  His alpha-fetoprotein has risen significantly now at 140.    Assessment/plan: Progressive metastatic hepatocellular carcinoma in a patient with a normal performance status and compensated chronic liver disease.  I discussed with him in great length a number of options.  My recommendation was consideration of a research study which aims to restore sensitivity  to checkpoint inhibitor therapy with the addition of additional immunotherapy, but he was quite opposed to considering the research trial.  We could consider other tyrosine kinase inhibitors but he clearly progressed on sorafenib was very poorly tolerant of it and I do not know that he will tolerate any of the alternatives very well.  Is appealing given his long period of control with immunotherapy to try and take further advantage of that my recommendation was that we consider Avastin and atezolizumab, but he is unwilling to consider undergoing the endoscopy.  Be necessary to be sure he does not have varices would be a risk for bleeding.  After much discussion we decided to proceed with ipilimumab and nivolumab.  I reviewed with him the increased risk of autoimmune toxicities as we intensify his immunotherapy.  At the end of this long discussion he expressed a good understanding and had all his questions answered.  We will plan on getting started as soon as we have insurance approval and we will plan on a response assessment after 2 months of therapy.    Total time by me on the date of visit inclusive of the above visit, review of his imaging and lab work, coordination of care and documentation was approximately 75 minutes.

## 2021-05-10 NOTE — LETTER
5/10/2021         RE: Preeti Pascual  [address]  [place] [address]        I am seeing Preeti Pascual today in follow-up of advanced hepatocellular carcinoma.    Mr. Zuniga has chronic hepatitis B with compensated cirrhosis and was originally diagnosed with hepatocellular carcinoma in 2016 and was treated with radio embolization.  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  He has since been on treatment with nivolumab with good control.  He is here today for ongoing response assessment.  He tells me that overall he feels well.  His appetite and energy are stable.  He has no pain.  He has had no problems with bleeding confusion or edema.    On exam he is alert and well-appearing with unremarkable vital signs.  He has no scleral icterus.  He has no palpable adenopathy in his neck or supraclavicular spaces.  His lungs are clear to auscultation dullness to percussion.  His abdomen is soft and nontender without demonstrable ascites.  He has a trace of pretibial edema bilaterally.  Speech is fluent and his cranial nerves are grossly intact.    I personally reviewed his CT scan with over the results with him.  His right adrenal metastasis is larger as it is a lymph node in the pericaval space.  The nodularity overlying his previously treated primary appears little bit larger.  He has a new 7 mm nodule in his right lung.  His lab work is notable for normal electrolytes and renal function.  His bilirubin is normal and his albumin only mildly depressed at 3.3.  With normal transaminases and alkaline phosphatase.  He has very mild pancytopenia consistent with prior values.  His alpha-fetoprotein has risen significantly now at 140.    Assessment/plan: Progressive metastatic hepatocellular carcinoma in a patient with a normal performance status and compensated chronic liver disease.  I discussed with him in great length a number of  options.  My recommendation was consideration of a research study which aims to restore sensitivity to checkpoint inhibitor therapy with the addition of additional immunotherapy, but he was quite opposed to considering the research trial.  We could consider other tyrosine kinase inhibitors but he clearly progressed on sorafenib was very poorly tolerant of it and I do not know that he will tolerate any of the alternatives very well.  Is appealing given his long period of control with immunotherapy to try and take further advantage of that my recommendation was that we consider Avastin and atezolizumab, but he is unwilling to consider undergoing the endoscopy.  Be necessary to be sure he does not have varices would be a risk for bleeding.  After much discussion we decided to proceed with ipilimumab and nivolumab.  I reviewed with him the increased risk of autoimmune toxicities as we intensify his immunotherapy.  At the end of this long discussion he expressed a good understanding and had all his questions answered.  We will plan on getting started as soon as we have insurance approval and we will plan on a response assessment after 2 months of therapy.    Total time by me on the date of visit inclusive of the above visit, review of his imaging and lab work, coordination of care and documentation was approximately 75 minutes.        Agustin Kyle MD

## 2021-05-10 NOTE — NURSING NOTE
"Oncology Rooming Note    May 10, 2021 2:25 PM   Preeti Pascual is a 55 year old male who presents for:    Chief Complaint   Patient presents with     Oncology Clinic Visit     Return: HCC (hepatocellular carcinoma)      Initial Vitals: /69 (BP Location: Right arm, Patient Position: Sitting, Cuff Size: Adult Regular)   Pulse 69   Temp 97.6  F (36.4  C) (Tympanic)   Resp 16   Wt 73.3 kg (161 lb 9.6 oz)   SpO2 97%   BMI 24.57 kg/m   Estimated body mass index is 24.57 kg/m  as calculated from the following:    Height as of 12/19/19: 1.727 m (5' 8\").    Weight as of this encounter: 73.3 kg (161 lb 9.6 oz). Body surface area is 1.88 meters squared.  No Pain (0) Comment: Data Unavailable   No LMP for male patient.  Allergies reviewed: Yes  Medications reviewed: Yes    Medications: Medication refills not needed today.  Pharmacy name entered into [facility]:    [id number] - [place], [place] - [address] [place] [address] NE AT [facility] & [address] [place] [address] - [address] [facility] [phone]    Clinical concerns: N/A      Oralia Verma CMA              "

## 2021-05-10 NOTE — Clinical Note
5/10/2021         RE: Preeti Pascual  [address]  [place] [address]        Dear Colleague,    Thank you for referring your patient, Pretei Pascual, to the [facility]. Please see a copy of my visit note below.    No sxs  Tumor bigger, AFP up  Refuses study, refuses endoscopy  previoouslintolerance to sorafenib  Start ipi/nivo          Again, thank you for allowing me to participate in the care of your patient.        Sincerely,        Agustin Kyle MD

## 2021-05-19 ENCOUNTER — PATIENT OUTREACH (OUTPATIENT)
Dept: ONCOLOGY | Facility: CLINIC | Age: 56
End: 2021-05-19

## 2021-05-19 NOTE — PROGRESS NOTES
RN Care Coordination Note  Placed call to patient to review insurance coverage for ipi/nivo has not been received yet. Left detailed message explaining infusion finance team is still working with insurance for determination. Infusion appt for tomorrow will be cancelled. [employer] message also sent to patient with update.        Sharlene Mendez RN, BSN, OCN   RN Care Coordinator   [facility]

## 2021-05-27 ENCOUNTER — APPOINTMENT (OUTPATIENT)
Dept: LAB | Facility: CLINIC | Age: 56
End: 2021-05-27
Payer: COMMERCIAL

## 2021-05-27 ENCOUNTER — INFUSION THERAPY VISIT (OUTPATIENT)
Dept: ONCOLOGY | Facility: CLINIC | Age: 56
End: 2021-05-27
Payer: COMMERCIAL

## 2021-05-27 VITALS
OXYGEN SATURATION: 98 % | TEMPERATURE: 97.5 F | DIASTOLIC BLOOD PRESSURE: 83 MMHG | HEART RATE: 68 BPM | SYSTOLIC BLOOD PRESSURE: 152 MMHG | RESPIRATION RATE: 16 BRPM | WEIGHT: 158 LBS | BODY MASS INDEX: 24.02 KG/M2

## 2021-05-27 DIAGNOSIS — C22.0 HCC (HEPATOCELLULAR CARCINOMA) (H): Primary | ICD-10-CM

## 2021-05-27 LAB
ALBUMIN SERPL-MCNC: 3.6 G/DL (ref 3.4–5)
ALP SERPL-CCNC: 110 U/L (ref 40–150)
ALT SERPL W P-5'-P-CCNC: 29 U/L (ref 0–70)
ANION GAP SERPL CALCULATED.3IONS-SCNC: 8 MMOL/L (ref 3–14)
AST SERPL W P-5'-P-CCNC: 24 U/L (ref 0–45)
BILIRUB SERPL-MCNC: 0.4 MG/DL (ref 0.2–1.3)
BUN SERPL-MCNC: 14 MG/DL (ref 7–30)
CALCIUM SERPL-MCNC: 8.8 MG/DL (ref 8.5–10.1)
CHLORIDE SERPL-SCNC: 108 MMOL/L (ref 94–109)
CO2 SERPL-SCNC: 24 MMOL/L (ref 20–32)
CREAT SERPL-MCNC: 0.84 MG/DL (ref 0.66–1.25)
GFR SERPL CREATININE-BSD FRML MDRD: >90 ML/MIN/{1.73_M2}
GLUCOSE SERPL-MCNC: 98 MG/DL (ref 70–99)
POTASSIUM SERPL-SCNC: 3.8 MMOL/L (ref 3.4–5.3)
PROT SERPL-MCNC: 7.7 G/DL (ref 6.8–8.8)
SODIUM SERPL-SCNC: 140 MMOL/L (ref 133–144)
TSH SERPL DL<=0.005 MIU/L-ACNC: 1.23 MU/L (ref 0.4–4)

## 2021-05-27 PROCEDURE — 80053 COMPREHEN METABOLIC PANEL: CPT | Performed by: INTERNAL MEDICINE

## 2021-05-27 PROCEDURE — 96417 CHEMO IV INFUS EACH ADDL SEQ: CPT

## 2021-05-27 PROCEDURE — 250N000011 HC RX IP 250 OP 636: Performed by: INTERNAL MEDICINE

## 2021-05-27 PROCEDURE — 84443 ASSAY THYROID STIM HORMONE: CPT | Performed by: INTERNAL MEDICINE

## 2021-05-27 PROCEDURE — 96413 CHEMO IV INFUSION 1 HR: CPT

## 2021-05-27 PROCEDURE — 258N000003 HC RX IP 258 OP 636: Performed by: INTERNAL MEDICINE

## 2021-05-27 RX ADMIN — SODIUM CHLORIDE 80 MG: 9 INJECTION, SOLUTION INTRAVENOUS at 08:38

## 2021-05-27 RX ADMIN — SODIUM CHLORIDE 250 ML: 9 INJECTION, SOLUTION INTRAVENOUS at 08:38

## 2021-05-27 RX ADMIN — SODIUM CHLORIDE 219 MG: 900 INJECTION, SOLUTION INTRAVENOUS at 09:23

## 2021-05-27 ASSESSMENT — PAIN SCALES - GENERAL: PAINLEVEL: NO PAIN (0)

## 2021-05-27 NOTE — NURSING NOTE
Chief Complaint   Patient presents with     Blood Draw     Labs drawn via PIV placed by RN in lab. VS taken.     Labs drawn from PIV placed by RN. Line flushed with saline. Vitals taken. Pt checked in for appointment(s).    Racquel LORENZO RN PHN BSN  BMT/Oncology Lab

## 2021-05-27 NOTE — PROGRESS NOTES
Infusion Nursing Note:  Preeti Pascual presents today for Day 1 Cycle 1 Nivolumab Ipilumumab.    Patient seen by provider today: No   present during visit today: Not Applicable.    Note: Preeti arrives to infusion today doing well. He is receiving Ipilumumab for the first time today and has received nivolumab previously. He received educational information about these drugs via [employer] and does not have additional questions today.     Intravenous Access:  Peripheral IV placed.    Treatment Conditions:  Lab Results   Component Value Date     05/27/2021                   Lab Results   Component Value Date    POTASSIUM 3.8 05/27/2021           No results found for: MAG         Lab Results   Component Value Date    CR 0.84 05/27/2021                   Lab Results   Component Value Date    STACEY 8.8 05/27/2021                Lab Results   Component Value Date    BILITOTAL 0.4 05/27/2021           Lab Results   Component Value Date    ALBUMIN 3.6 05/27/2021                    Lab Results   Component Value Date    ALT 29 05/27/2021           Lab Results   Component Value Date    AST 24 05/27/2021       Results reviewed, labs MET treatment parameters, ok to proceed with treatment.    Post Infusion Assessment:  Patient tolerated infusion without incident.  Blood return noted pre and post infusion.  Access discontinued per protocol.     Discharge Plan:   Prescription refills given for flonase.  Copy of AVS reviewed with patient.  Patient will return 6/17 for next appointment.  Patient discharged in stable condition accompanied by: self.  Departure Mode: Ambulatory.    Shruti Campbell RN

## 2021-05-27 NOTE — PATIENT INSTRUCTIONS
Contact Numbers  [facility]: [phone] (for symptom and scheduling needs)    Please call the [facility] Triage line if you experience a temperature greater than or equal to 100.4, shaking chills, have uncontrolled nausea, vomiting and/or diarrhea, dizziness, shortness of breath, chest pain, bleeding, unexplained bruising, or if you have any other new/concerning symptoms, questions or concerns.     If you are having any concerning symptoms or wish to speak to a provider before your next infusion visit, please call your care coordinator or triage to notify them so we can adequately serve you.     If you need a refill on a narcotic prescription or other medication, please call triage before your infusion appointment.        Lab Results:  Recent Results (from the past 12 hour(s))   Comprehensive metabolic panel    Collection Time: 05/27/21  7:17 AM   Result Value Ref Range    Sodium 140 133 - 144 mmol/L    Potassium 3.8 3.4 - 5.3 mmol/L    Chloride 108 94 - 109 mmol/L    Carbon Dioxide 24 20 - 32 mmol/L    Anion Gap 8 3 - 14 mmol/L    Glucose 98 70 - 99 mg/dL    Urea Nitrogen 14 7 - 30 mg/dL    Creatinine 0.84 0.66 - 1.25 mg/dL    GFR Estimate >90 >60 mL/min/[1.73_m2]    GFR Estimate If Black >90 >60 mL/min/[1.73_m2]    Calcium 8.8 8.5 - 10.1 mg/dL    Bilirubin Total 0.4 0.2 - 1.3 mg/dL    Albumin 3.6 3.4 - 5.0 g/dL    Protein Total 7.7 6.8 - 8.8 g/dL    Alkaline Phosphatase 110 40 - 150 U/L    ALT 29 0 - 70 U/L    AST 24 0 - 45 U/L   TSH with free T4 reflex    Collection Time: 05/27/21  7:17 AM   Result Value Ref Range    TSH 1.23 0.40 - 4.00 mU/L

## 2021-06-16 RX ORDER — ALBUTEROL SULFATE 0.83 MG/ML
2.5 SOLUTION RESPIRATORY (INHALATION)
Status: CANCELLED | OUTPATIENT
Start: 2021-06-17

## 2021-06-16 RX ORDER — METHYLPREDNISOLONE SODIUM SUCCINATE 125 MG/2ML
125 INJECTION, POWDER, LYOPHILIZED, FOR SOLUTION INTRAMUSCULAR; INTRAVENOUS
Status: CANCELLED
Start: 2021-06-17

## 2021-06-16 RX ORDER — HEPARIN SODIUM,PORCINE 10 UNIT/ML
5 VIAL (ML) INTRAVENOUS
Status: CANCELLED | OUTPATIENT
Start: 2021-06-17

## 2021-06-16 RX ORDER — HEPARIN SODIUM (PORCINE) LOCK FLUSH IV SOLN 100 UNIT/ML 100 UNIT/ML
5 SOLUTION INTRAVENOUS
Status: CANCELLED | OUTPATIENT
Start: 2021-06-17

## 2021-06-16 RX ORDER — ALBUTEROL SULFATE 90 UG/1
1-2 AEROSOL, METERED RESPIRATORY (INHALATION)
Status: CANCELLED
Start: 2021-06-17

## 2021-06-16 RX ORDER — LORAZEPAM 2 MG/ML
0.5 INJECTION INTRAMUSCULAR EVERY 4 HOURS PRN
Status: CANCELLED
Start: 2021-06-17

## 2021-06-16 RX ORDER — MEPERIDINE HYDROCHLORIDE 25 MG/ML
25 INJECTION INTRAMUSCULAR; INTRAVENOUS; SUBCUTANEOUS EVERY 30 MIN PRN
Status: CANCELLED | OUTPATIENT
Start: 2021-06-17

## 2021-06-16 RX ORDER — NALOXONE HYDROCHLORIDE 0.4 MG/ML
.1-.4 INJECTION, SOLUTION INTRAMUSCULAR; INTRAVENOUS; SUBCUTANEOUS
Status: CANCELLED | OUTPATIENT
Start: 2021-06-17

## 2021-06-16 RX ORDER — EPINEPHRINE 1 MG/ML
0.3 INJECTION, SOLUTION INTRAMUSCULAR; SUBCUTANEOUS EVERY 5 MIN PRN
Status: CANCELLED | OUTPATIENT
Start: 2021-06-17

## 2021-06-16 RX ORDER — SODIUM CHLORIDE 9 MG/ML
1000 INJECTION, SOLUTION INTRAVENOUS CONTINUOUS PRN
Status: CANCELLED
Start: 2021-06-17

## 2021-06-16 RX ORDER — DIPHENHYDRAMINE HYDROCHLORIDE 50 MG/ML
50 INJECTION INTRAMUSCULAR; INTRAVENOUS
Status: CANCELLED
Start: 2021-06-17

## 2021-06-17 ENCOUNTER — INFUSION THERAPY VISIT (OUTPATIENT)
Dept: ONCOLOGY | Facility: CLINIC | Age: 56
End: 2021-06-17
Payer: COMMERCIAL

## 2021-06-17 VITALS
BODY MASS INDEX: 23.86 KG/M2 | HEART RATE: 65 BPM | SYSTOLIC BLOOD PRESSURE: 127 MMHG | TEMPERATURE: 97.6 F | DIASTOLIC BLOOD PRESSURE: 83 MMHG | RESPIRATION RATE: 16 BRPM | OXYGEN SATURATION: 100 % | WEIGHT: 156.9 LBS

## 2021-06-17 DIAGNOSIS — B18.1 CHRONIC VIRAL HEPATITIS B WITHOUT DELTA AGENT AND WITHOUT COMA (H): ICD-10-CM

## 2021-06-17 DIAGNOSIS — C22.0 HCC (HEPATOCELLULAR CARCINOMA) (H): Primary | ICD-10-CM

## 2021-06-17 LAB
AFP SERPL-MCNC: 206.7 UG/L (ref 0–8)
ALBUMIN SERPL-MCNC: 3.2 G/DL (ref 3.4–5)
ALP SERPL-CCNC: 96 U/L (ref 40–150)
ALT SERPL W P-5'-P-CCNC: 33 U/L (ref 0–70)
ANION GAP SERPL CALCULATED.3IONS-SCNC: 5 MMOL/L (ref 3–14)
AST SERPL W P-5'-P-CCNC: 26 U/L (ref 0–45)
BILIRUB DIRECT SERPL-MCNC: 0.1 MG/DL (ref 0–0.2)
BILIRUB SERPL-MCNC: 0.3 MG/DL (ref 0.2–1.3)
BUN SERPL-MCNC: 15 MG/DL (ref 7–30)
CALCIUM SERPL-MCNC: 8.4 MG/DL (ref 8.5–10.1)
CHLORIDE SERPL-SCNC: 108 MMOL/L (ref 94–109)
CO2 SERPL-SCNC: 27 MMOL/L (ref 20–32)
CREAT SERPL-MCNC: 0.73 MG/DL (ref 0.66–1.25)
ERYTHROCYTE [DISTWIDTH] IN BLOOD BY AUTOMATED COUNT: 14.2 % (ref 10–15)
GFR SERPL CREATININE-BSD FRML MDRD: >90 ML/MIN/{1.73_M2}
GLUCOSE SERPL-MCNC: 104 MG/DL (ref 70–99)
HCT VFR BLD AUTO: 35.1 % (ref 40–53)
HGB BLD-MCNC: 11 G/DL (ref 13.3–17.7)
INR PPP: 1.2 (ref 0.86–1.14)
MCH RBC QN AUTO: 24.9 PG (ref 26.5–33)
MCHC RBC AUTO-ENTMCNC: 31.3 G/DL (ref 31.5–36.5)
MCV RBC AUTO: 79 FL (ref 78–100)
PLATELET # BLD AUTO: 225 10E9/L (ref 150–450)
POTASSIUM SERPL-SCNC: 4 MMOL/L (ref 3.4–5.3)
PROT SERPL-MCNC: 7.5 G/DL (ref 6.8–8.8)
RBC # BLD AUTO: 4.42 10E12/L (ref 4.4–5.9)
SODIUM SERPL-SCNC: 139 MMOL/L (ref 133–144)
TSH SERPL DL<=0.005 MIU/L-ACNC: 0.96 MU/L (ref 0.4–4)
WBC # BLD AUTO: 4 10E9/L (ref 4–11)

## 2021-06-17 PROCEDURE — 96417 CHEMO IV INFUS EACH ADDL SEQ: CPT

## 2021-06-17 PROCEDURE — 258N000003 HC RX IP 258 OP 636: Performed by: INTERNAL MEDICINE

## 2021-06-17 PROCEDURE — 85027 COMPLETE CBC AUTOMATED: CPT | Performed by: INTERNAL MEDICINE

## 2021-06-17 PROCEDURE — 84443 ASSAY THYROID STIM HORMONE: CPT | Performed by: INTERNAL MEDICINE

## 2021-06-17 PROCEDURE — 82105 ALPHA-FETOPROTEIN SERUM: CPT | Performed by: INTERNAL MEDICINE

## 2021-06-17 PROCEDURE — 250N000011 HC RX IP 250 OP 636: Performed by: INTERNAL MEDICINE

## 2021-06-17 PROCEDURE — 80053 COMPREHEN METABOLIC PANEL: CPT | Performed by: INTERNAL MEDICINE

## 2021-06-17 PROCEDURE — 87517 HEPATITIS B DNA QUANT: CPT | Performed by: INTERNAL MEDICINE

## 2021-06-17 PROCEDURE — 85610 PROTHROMBIN TIME: CPT | Performed by: INTERNAL MEDICINE

## 2021-06-17 PROCEDURE — 96413 CHEMO IV INFUSION 1 HR: CPT

## 2021-06-17 PROCEDURE — 82248 BILIRUBIN DIRECT: CPT | Performed by: INTERNAL MEDICINE

## 2021-06-17 RX ADMIN — SODIUM CHLORIDE 80 MG: 9 INJECTION, SOLUTION INTRAVENOUS at 09:36

## 2021-06-17 RX ADMIN — SODIUM CHLORIDE 219 MG: 900 INJECTION, SOLUTION INTRAVENOUS at 10:19

## 2021-06-17 RX ADMIN — SODIUM CHLORIDE 250 ML: 9 INJECTION, SOLUTION INTRAVENOUS at 09:36

## 2021-06-17 ASSESSMENT — PAIN SCALES - GENERAL: PAINLEVEL: NO PAIN (0)

## 2021-06-17 NOTE — PROGRESS NOTES
Infusion Nursing Note:  Preeti Pascual presents today for Cycle 2 Day 1 Yervoy and Opdivo.        Note: Preeti comes to infusion today with no questions or concerns.  He has no pain. He has been afebrile and denies signs and symptoms of infection including: cough, SOB, sore throat, diarrhea, vomiting, rash, or pain with urination.  He  wishes to proceed with today's planned treatment    Intravenous Access:  Peripheral IV placed in lab    Treatment Conditions:  Lab Results   Component Value Date    HGB 11.0 06/17/2021     Lab Results   Component Value Date    WBC 4.0 06/17/2021      Lab Results   Component Value Date    ANEU 1.8 05/06/2021     Lab Results   Component Value Date     06/17/2021      Lab Results   Component Value Date     06/17/2021                   Lab Results   Component Value Date    POTASSIUM 4.0 06/17/2021           No results found for: MAG         Lab Results   Component Value Date    CR 0.73 06/17/2021                   Lab Results   Component Value Date    STACEY 8.4 06/17/2021                Lab Results   Component Value Date    BILITOTAL 0.3 06/17/2021           Lab Results   Component Value Date    ALBUMIN 3.2 06/17/2021                    Lab Results   Component Value Date    ALT 33 06/17/2021           Lab Results   Component Value Date    AST 26 06/17/2021       Results reviewed, labs MET treatment parameters, ok to proceed with treatment.      Post Infusion Assessment:  Patient tolerated infusion without incident.       Discharge Plan:     Patient declined prescription refills.  Copy of AVS reviewed with patient and/or family.  Patient will return 7/8/21 for cycle 3.      Nahomi Allen RN

## 2021-06-17 NOTE — PATIENT INSTRUCTIONS
[facility] Triage and after hours / weekends / holidays:  [phone]    Please call the triage or after hours line if you experience a temperature greater than or equal to 100.5, shaking chills, have uncontrolled nausea, vomiting and/or diarrhea, dizziness, shortness of breath, chest pain, bleeding, unexplained bruising, or if you have any other new/concerning symptoms, questions or concerns.      If you are having any concerning symptoms or wish to speak to a provider before your next infusion visit, please call your care coordinator or triage to notify them so we can adequately serve you.     If you need a refill on a narcotic prescription or other medication, please call before your infusion appointment.

## 2021-06-18 LAB
HBV DNA SERPL NAA+PROBE-ACNC: NORMAL [IU]/ML
HBV DNA SERPL NAA+PROBE-LOG IU: NORMAL {LOG_IU}/ML

## 2021-07-07 RX ORDER — METHYLPREDNISOLONE SODIUM SUCCINATE 125 MG/2ML
125 INJECTION, POWDER, LYOPHILIZED, FOR SOLUTION INTRAMUSCULAR; INTRAVENOUS
Status: CANCELLED
Start: 2021-07-08

## 2021-07-07 RX ORDER — ALBUTEROL SULFATE 0.83 MG/ML
2.5 SOLUTION RESPIRATORY (INHALATION)
Status: CANCELLED | OUTPATIENT
Start: 2021-07-08

## 2021-07-07 RX ORDER — EPINEPHRINE 1 MG/ML
0.3 INJECTION, SOLUTION INTRAMUSCULAR; SUBCUTANEOUS EVERY 5 MIN PRN
Status: CANCELLED | OUTPATIENT
Start: 2021-07-08

## 2021-07-07 RX ORDER — MEPERIDINE HYDROCHLORIDE 25 MG/ML
25 INJECTION INTRAMUSCULAR; INTRAVENOUS; SUBCUTANEOUS EVERY 30 MIN PRN
Status: CANCELLED | OUTPATIENT
Start: 2021-07-08

## 2021-07-07 RX ORDER — ALBUTEROL SULFATE 90 UG/1
1-2 AEROSOL, METERED RESPIRATORY (INHALATION)
Status: CANCELLED
Start: 2021-07-08

## 2021-07-07 RX ORDER — SODIUM CHLORIDE 9 MG/ML
1000 INJECTION, SOLUTION INTRAVENOUS CONTINUOUS PRN
Status: CANCELLED
Start: 2021-07-08

## 2021-07-07 RX ORDER — NALOXONE HYDROCHLORIDE 0.4 MG/ML
.1-.4 INJECTION, SOLUTION INTRAMUSCULAR; INTRAVENOUS; SUBCUTANEOUS
Status: CANCELLED | OUTPATIENT
Start: 2021-07-08

## 2021-07-07 RX ORDER — LORAZEPAM 2 MG/ML
0.5 INJECTION INTRAMUSCULAR EVERY 4 HOURS PRN
Status: CANCELLED
Start: 2021-07-08

## 2021-07-07 RX ORDER — HEPARIN SODIUM (PORCINE) LOCK FLUSH IV SOLN 100 UNIT/ML 100 UNIT/ML
5 SOLUTION INTRAVENOUS
Status: CANCELLED | OUTPATIENT
Start: 2021-07-08

## 2021-07-07 RX ORDER — DIPHENHYDRAMINE HYDROCHLORIDE 50 MG/ML
50 INJECTION INTRAMUSCULAR; INTRAVENOUS
Status: CANCELLED
Start: 2021-07-08

## 2021-07-07 RX ORDER — HEPARIN SODIUM,PORCINE 10 UNIT/ML
5 VIAL (ML) INTRAVENOUS
Status: CANCELLED | OUTPATIENT
Start: 2021-07-08

## 2021-07-08 ENCOUNTER — APPOINTMENT (OUTPATIENT)
Dept: LAB | Facility: CLINIC | Age: 56
End: 2021-07-08
Attending: INTERNAL MEDICINE
Payer: COMMERCIAL

## 2021-07-08 ENCOUNTER — INFUSION THERAPY VISIT (OUTPATIENT)
Dept: ONCOLOGY | Facility: CLINIC | Age: 56
End: 2021-07-08
Attending: INTERNAL MEDICINE
Payer: COMMERCIAL

## 2021-07-08 VITALS
OXYGEN SATURATION: 97 % | HEART RATE: 65 BPM | WEIGHT: 155.65 LBS | BODY MASS INDEX: 23.67 KG/M2 | SYSTOLIC BLOOD PRESSURE: 121 MMHG | DIASTOLIC BLOOD PRESSURE: 71 MMHG | TEMPERATURE: 98.1 F | RESPIRATION RATE: 16 BRPM

## 2021-07-08 DIAGNOSIS — C22.0 HCC (HEPATOCELLULAR CARCINOMA) (H): Primary | ICD-10-CM

## 2021-07-08 DIAGNOSIS — R09.82 POST-NASAL DRAINAGE: ICD-10-CM

## 2021-07-08 DIAGNOSIS — R05.9 COUGH: ICD-10-CM

## 2021-07-08 LAB
ALBUMIN SERPL-MCNC: 3.2 G/DL (ref 3.4–5)
ALP SERPL-CCNC: 108 U/L (ref 40–150)
ALT SERPL W P-5'-P-CCNC: 31 U/L (ref 0–70)
ANION GAP SERPL CALCULATED.3IONS-SCNC: 7 MMOL/L (ref 3–14)
AST SERPL W P-5'-P-CCNC: 27 U/L (ref 0–45)
BILIRUB SERPL-MCNC: 0.3 MG/DL (ref 0.2–1.3)
BUN SERPL-MCNC: 10 MG/DL (ref 7–30)
CALCIUM SERPL-MCNC: 8.3 MG/DL (ref 8.5–10.1)
CHLORIDE SERPL-SCNC: 110 MMOL/L (ref 94–109)
CO2 SERPL-SCNC: 24 MMOL/L (ref 20–32)
CREAT SERPL-MCNC: 0.73 MG/DL (ref 0.66–1.25)
GFR SERPL CREATININE-BSD FRML MDRD: >90 ML/MIN/{1.73_M2}
GLUCOSE SERPL-MCNC: 114 MG/DL (ref 70–99)
POTASSIUM SERPL-SCNC: 3.7 MMOL/L (ref 3.4–5.3)
PROT SERPL-MCNC: 7.6 G/DL (ref 6.8–8.8)
SODIUM SERPL-SCNC: 141 MMOL/L (ref 133–144)
TSH SERPL DL<=0.005 MIU/L-ACNC: 0.77 MU/L (ref 0.4–4)

## 2021-07-08 PROCEDURE — 258N000003 HC RX IP 258 OP 636: Performed by: INTERNAL MEDICINE

## 2021-07-08 PROCEDURE — 84443 ASSAY THYROID STIM HORMONE: CPT | Performed by: INTERNAL MEDICINE

## 2021-07-08 PROCEDURE — 96413 CHEMO IV INFUSION 1 HR: CPT

## 2021-07-08 PROCEDURE — 250N000011 HC RX IP 250 OP 636: Performed by: INTERNAL MEDICINE

## 2021-07-08 PROCEDURE — 999N000127 HC STATISTIC PERIPHERAL IV START W US GUIDANCE

## 2021-07-08 PROCEDURE — 80053 COMPREHEN METABOLIC PANEL: CPT | Performed by: INTERNAL MEDICINE

## 2021-07-08 PROCEDURE — 96417 CHEMO IV INFUS EACH ADDL SEQ: CPT

## 2021-07-08 RX ADMIN — SODIUM CHLORIDE 250 ML: 9 INJECTION, SOLUTION INTRAVENOUS at 10:20

## 2021-07-08 RX ADMIN — SODIUM CHLORIDE 219 MG: 900 INJECTION, SOLUTION INTRAVENOUS at 10:57

## 2021-07-08 RX ADMIN — SODIUM CHLORIDE 80 MG: 9 INJECTION, SOLUTION INTRAVENOUS at 10:21

## 2021-07-08 ASSESSMENT — PAIN SCALES - GENERAL: PAINLEVEL: NO PAIN (0)

## 2021-07-08 NOTE — NURSING NOTE
Chief Complaint   Patient presents with     Blood Draw     Labs drawn by RN in Lab following PIV placement by Vascular Access RN using US.      Stephanie Cleveland RN

## 2021-07-08 NOTE — PATIENT INSTRUCTIONS
[facility] Triage and after hours / weekends / holidays:  [phone]    Please call the triage or after hours line if you experience a temperature greater than or equal to 100.5, shaking chills, have uncontrolled nausea, vomiting and/or diarrhea, dizziness, shortness of breath, chest pain, bleeding, unexplained bruising, or if you have any other new/concerning symptoms, questions or concerns.      If you are having any concerning symptoms or wish to speak to a provider before your next infusion visit, please call your care coordinator or triage to notify them so we can adequately serve you.     If you need a refill on a narcotic prescription or other medication, please call before your infusion appointment.           July 2021 Sunday Monday Tuesday Wednesday Thursday Friday Saturday                       1     2     3       4     5     6     7     8    LAB PERIPHERAL   8:30 AM   (15 min.)   [facility] LAB DRAW   [facility]    ONC INFUSION 1 HR (60 MIN)   9:00 AM   (60 min.)    ONC INFUSION NURSE   [facility] 9     10       11     12     13     14     15     16     17       18     19     20     21     22     23     24       25     26     27     28     29    LAB PERIPHERAL   8:30 AM   (15 min.)   [facility]   [facility]    ONC INFUSION 1 HR (60 MIN)   9:00 AM   (60 min.)    ONC INFUSION NURSE   [facility] 30 31 August 2021 Sunday Monday Tuesday Wednesday Thursday Friday Saturday   1     2     3     4     5     6     7       8     9     10     11     12    LAB   8:45 AM   (15 min.)    LAB   [facility]    CT CHEST ABDOMEN PELVIS WWO   9:20 AM   (20 min.)   [facility]   [facility] [place] 13     14       15     16     17     18     19    RETURN   8:45 AM   (30 min.)   Agustin Kyle MD   [facility]  Cancer Clinic 20     21       22     23     24     25     26     27     28       29     30     31                                        Recent Results (from the past 24 hour(s))   Comprehensive metabolic panel    Collection Time: 07/08/21  8:39 AM   Result Value Ref Range    Sodium 141 133 - 144 mmol/L    Potassium 3.7 3.4 - 5.3 mmol/L    Chloride 110 (H) 94 - 109 mmol/L    Carbon Dioxide 24 20 - 32 mmol/L    Anion Gap 7 3 - 14 mmol/L    Glucose 114 (H) 70 - 99 mg/dL    Urea Nitrogen 10 7 - 30 mg/dL    Creatinine 0.73 0.66 - 1.25 mg/dL    GFR Estimate >90 >60 mL/min/[1.73_m2]    GFR Estimate If Black >90 >60 mL/min/[1.73_m2]    Calcium 8.3 (L) 8.5 - 10.1 mg/dL    Bilirubin Total 0.3 0.2 - 1.3 mg/dL    Albumin 3.2 (L) 3.4 - 5.0 g/dL    Protein Total 7.6 6.8 - 8.8 g/dL    Alkaline Phosphatase 108 40 - 150 U/L    ALT 31 0 - 70 U/L    AST 27 0 - 45 U/L   TSH with free T4 reflex    Collection Time: 07/08/21  8:39 AM   Result Value Ref Range    TSH 0.77 0.40 - 4.00 mU/L

## 2021-07-08 NOTE — TELEPHONE ENCOUNTER
Flonase   Last prescribing provider: Dr Kyle    Last clinic visit date: 5/10/21 Dr Kyle    Any missed appointments or no-shows since last clinic visit?: Cancelled appointments    Recommendations for requested medication (if none, N/A): Copied from chart note on 12/28/20  Refills: Flonase needed      Next clinic visit date: 8/19/21 Dr Kyle    Any other pertinent information (if none, N/A): N/A

## 2021-07-09 RX ORDER — FLUTICASONE PROPIONATE 50 MCG
SPRAY, SUSPENSION (ML) NASAL
Qty: 16 G | Refills: 1 | Status: SHIPPED | OUTPATIENT
Start: 2021-07-09 | End: 2022-01-01

## 2021-07-28 RX ORDER — LORAZEPAM 2 MG/ML
0.5 INJECTION INTRAMUSCULAR EVERY 4 HOURS PRN
Status: CANCELLED
Start: 2021-07-29

## 2021-07-28 RX ORDER — HEPARIN SODIUM (PORCINE) LOCK FLUSH IV SOLN 100 UNIT/ML 100 UNIT/ML
5 SOLUTION INTRAVENOUS
Status: CANCELLED | OUTPATIENT
Start: 2021-07-29

## 2021-07-28 RX ORDER — NALOXONE HYDROCHLORIDE 0.4 MG/ML
.1-.4 INJECTION, SOLUTION INTRAMUSCULAR; INTRAVENOUS; SUBCUTANEOUS
Status: CANCELLED | OUTPATIENT
Start: 2021-07-29

## 2021-07-28 RX ORDER — DIPHENHYDRAMINE HYDROCHLORIDE 50 MG/ML
50 INJECTION INTRAMUSCULAR; INTRAVENOUS
Status: CANCELLED
Start: 2021-07-29

## 2021-07-28 RX ORDER — METHYLPREDNISOLONE SODIUM SUCCINATE 125 MG/2ML
125 INJECTION, POWDER, LYOPHILIZED, FOR SOLUTION INTRAMUSCULAR; INTRAVENOUS
Status: CANCELLED
Start: 2021-07-29

## 2021-07-28 RX ORDER — ALBUTEROL SULFATE 90 UG/1
1-2 AEROSOL, METERED RESPIRATORY (INHALATION)
Status: CANCELLED
Start: 2021-07-29

## 2021-07-28 RX ORDER — ALBUTEROL SULFATE 0.83 MG/ML
2.5 SOLUTION RESPIRATORY (INHALATION)
Status: CANCELLED | OUTPATIENT
Start: 2021-07-29

## 2021-07-28 RX ORDER — SODIUM CHLORIDE 9 MG/ML
1000 INJECTION, SOLUTION INTRAVENOUS CONTINUOUS PRN
Status: CANCELLED
Start: 2021-07-29

## 2021-07-28 RX ORDER — HEPARIN SODIUM,PORCINE 10 UNIT/ML
5 VIAL (ML) INTRAVENOUS
Status: CANCELLED | OUTPATIENT
Start: 2021-07-29

## 2021-07-28 RX ORDER — MEPERIDINE HYDROCHLORIDE 25 MG/ML
25 INJECTION INTRAMUSCULAR; INTRAVENOUS; SUBCUTANEOUS EVERY 30 MIN PRN
Status: CANCELLED | OUTPATIENT
Start: 2021-07-29

## 2021-07-28 RX ORDER — EPINEPHRINE 1 MG/ML
0.3 INJECTION, SOLUTION INTRAMUSCULAR; SUBCUTANEOUS EVERY 5 MIN PRN
Status: CANCELLED | OUTPATIENT
Start: 2021-07-29

## 2021-07-29 ENCOUNTER — LAB (OUTPATIENT)
Dept: LAB | Facility: CLINIC | Age: 56
End: 2021-07-29
Attending: INTERNAL MEDICINE
Payer: COMMERCIAL

## 2021-07-29 ENCOUNTER — INFUSION THERAPY VISIT (OUTPATIENT)
Dept: ONCOLOGY | Facility: CLINIC | Age: 56
End: 2021-07-29
Attending: INTERNAL MEDICINE
Payer: COMMERCIAL

## 2021-07-29 VITALS
TEMPERATURE: 98.6 F | SYSTOLIC BLOOD PRESSURE: 124 MMHG | RESPIRATION RATE: 18 BRPM | HEART RATE: 73 BPM | WEIGHT: 156 LBS | BODY MASS INDEX: 23.72 KG/M2 | OXYGEN SATURATION: 96 % | DIASTOLIC BLOOD PRESSURE: 78 MMHG

## 2021-07-29 DIAGNOSIS — C22.0 HCC (HEPATOCELLULAR CARCINOMA) (H): Primary | ICD-10-CM

## 2021-07-29 LAB
ALBUMIN SERPL-MCNC: 3.3 G/DL (ref 3.4–5)
ALP SERPL-CCNC: 99 U/L (ref 40–150)
ALT SERPL W P-5'-P-CCNC: 48 U/L (ref 0–70)
ANION GAP SERPL CALCULATED.3IONS-SCNC: 6 MMOL/L (ref 3–14)
AST SERPL W P-5'-P-CCNC: 43 U/L (ref 0–45)
BILIRUB SERPL-MCNC: 0.4 MG/DL (ref 0.2–1.3)
BUN SERPL-MCNC: 12 MG/DL (ref 7–30)
CALCIUM SERPL-MCNC: 8.7 MG/DL (ref 8.5–10.1)
CHLORIDE BLD-SCNC: 109 MMOL/L (ref 94–109)
CO2 SERPL-SCNC: 25 MMOL/L (ref 20–32)
CREAT SERPL-MCNC: 0.74 MG/DL (ref 0.66–1.25)
GFR SERPL CREATININE-BSD FRML MDRD: >90 ML/MIN/1.73M2
GLUCOSE BLD-MCNC: 101 MG/DL (ref 70–99)
POTASSIUM BLD-SCNC: 4 MMOL/L (ref 3.4–5.3)
PROT SERPL-MCNC: 7.6 G/DL (ref 6.8–8.8)
SODIUM SERPL-SCNC: 140 MMOL/L (ref 133–144)
TSH SERPL DL<=0.005 MIU/L-ACNC: 0.74 MU/L (ref 0.4–4)

## 2021-07-29 PROCEDURE — 82040 ASSAY OF SERUM ALBUMIN: CPT | Performed by: INTERNAL MEDICINE

## 2021-07-29 PROCEDURE — 250N000011 HC RX IP 250 OP 636: Performed by: INTERNAL MEDICINE

## 2021-07-29 PROCEDURE — 84443 ASSAY THYROID STIM HORMONE: CPT | Performed by: INTERNAL MEDICINE

## 2021-07-29 PROCEDURE — 96417 CHEMO IV INFUS EACH ADDL SEQ: CPT

## 2021-07-29 PROCEDURE — 258N000003 HC RX IP 258 OP 636: Performed by: INTERNAL MEDICINE

## 2021-07-29 PROCEDURE — 96413 CHEMO IV INFUSION 1 HR: CPT

## 2021-07-29 PROCEDURE — 36415 COLL VENOUS BLD VENIPUNCTURE: CPT | Performed by: INTERNAL MEDICINE

## 2021-07-29 RX ADMIN — SODIUM CHLORIDE 80 MG: 9 INJECTION, SOLUTION INTRAVENOUS at 10:57

## 2021-07-29 RX ADMIN — SODIUM CHLORIDE 219 MG: 900 INJECTION, SOLUTION INTRAVENOUS at 11:36

## 2021-07-29 RX ADMIN — SODIUM CHLORIDE 250 ML: 9 INJECTION, SOLUTION INTRAVENOUS at 10:57

## 2021-07-29 ASSESSMENT — PAIN SCALES - GENERAL: PAINLEVEL: NO PAIN (0)

## 2021-07-29 NOTE — PROGRESS NOTES
Infusion Nursing Note:  Preeti Pascual presents today for C4D1 Nivolumab/Ipilumumab.    Patient seen by provider today: No   present during visit today: Not Applicable.    Note: Patient reported to clinic today with no new complaints or concenrs.  Patient did meet criteria for an asymptomatic covid-19 PCR test in infusion today. Patient declined the covid-19 test.    Intravenous Access:  Peripheral IV placed.    Treatment Conditions:  Lab Results   Component Value Date    HGB 11.0 06/17/2021     Lab Results   Component Value Date    WBC 4.0 06/17/2021      Lab Results   Component Value Date    ANEU 1.8 05/06/2021     Lab Results   Component Value Date     06/17/2021      Lab Results   Component Value Date     07/29/2021     07/08/2021                   Lab Results   Component Value Date    POTASSIUM 4.0 07/29/2021    POTASSIUM 3.7 07/08/2021           No results found for: MAG         Lab Results   Component Value Date    CR 0.74 07/29/2021    CR 0.73 07/08/2021                   Lab Results   Component Value Date    STACEY 8.7 07/29/2021    STACEY 8.3 07/08/2021                Lab Results   Component Value Date    BILITOTAL 0.4 07/29/2021    BILITOTAL 0.3 07/08/2021           Lab Results   Component Value Date    ALBUMIN 3.3 07/29/2021    ALBUMIN 3.2 07/08/2021                    Lab Results   Component Value Date    ALT 48 07/29/2021    ALT 31 07/08/2021           Lab Results   Component Value Date    AST 43 07/29/2021    AST 27 07/08/2021       Results reviewed, labs MET treatment parameters, ok to proceed with treatment.      Post Infusion Assessment:  Patient tolerated infusion without incident.  Blood return noted pre and post infusion.  Site patent and intact, free from redness, edema or discomfort.  No evidence of extravasations.  Access discontinued per protocol.       Discharge Plan:   Prescription refills given for Flonase.  Discharge instructions reviewed with: Patient.  Patient  and/or family verbalized understanding of discharge instructions and all questions answered.  Copy of AVS reviewed with patient and/or family.  Patient will return 8/19/21 for next appointment.  Patient discharged in stable condition accompanied by: self.  Departure Mode: Ambulatory.  Face to Face time: 0 minutes.      Holger Quezada RN

## 2021-07-29 NOTE — PATIENT INSTRUCTIONS
[facility] Main Line: [phone]    Call triage nurse with chills and/or temperature greater than or equal to 100.4, uncontrolled nausea/vomiting, diarrhea, constipation, dizziness, shortness of breath, chest pain, bleeding, unexplained bruising, or any new/concerning symptoms, questions/concerns.   If you are having any concerning symptoms or wish to speak to a provider before your next infusion visit, please call your care coordinator or triage to notify them so we can adequately serve you.   Nurse Triage line:  [phone]    If after hours, weekends, or holidays, call main hospital  and ask for Oncology doctor on call @ [phone]      July 2021 Sunday Monday Tuesday Wednesday Thursday Friday Saturday                       1     2     3       4     5     6     7     8    LAB PERIPHERAL   8:30 AM   (15 min.)   [facility] LAB DRAW   [facility]    ONC INFUSION 1 HR (60 MIN)   9:00 AM   (60 min.)    ONC INFUSION NURSE   [facility] 9     10       11     12     13     14     15     16     17       18     19     20     21     22     23     24       25     26     27     28     29    LAB PERIPHERAL   8:30 AM   (15 min.)   [facility] LAB DRAW   [facility]    ONC INFUSION 1 HR (60 MIN)   9:00 AM   (60 min.)    ONC INFUSION NURSE   [facility] 30 31 August 2021 Sunday Monday Tuesday Wednesday Thursday Friday Saturday   1     2     3     4     5     6     7       8     9     10     11     12    LAB   8:45 AM   (15 min.)   [facility] [place]    CT CHEST ABDOMEN PELVIS WWO   9:20 AM   (20 min.)   [facility]   [facility] [place] 13     14       15     16     17     18     19    RETURN   8:45 AM   (30 min.)   Agustin Kyle MD   [facility] 20 21 22      23     24     25     26     27     28       29     30     31                                         Lab Results:  Recent Results (from the past 12 hour(s))   Comprehensive metabolic panel    Collection Time: 07/29/21  9:08 AM   Result Value Ref Range    Sodium 140 133 - 144 mmol/L    Potassium 4.0 3.4 - 5.3 mmol/L    Chloride 109 94 - 109 mmol/L    Carbon Dioxide (CO2) 25 20 - 32 mmol/L    Anion Gap 6 3 - 14 mmol/L    Urea Nitrogen 12 7 - 30 mg/dL    Creatinine 0.74 0.66 - 1.25 mg/dL    Calcium 8.7 8.5 - 10.1 mg/dL    Glucose 101 (H) 70 - 99 mg/dL    Alkaline Phosphatase 99 40 - 150 U/L    AST 43 0 - 45 U/L    ALT 48 0 - 70 U/L    Protein Total 7.6 6.8 - 8.8 g/dL    Albumin 3.3 (L) 3.4 - 5.0 g/dL    Bilirubin Total 0.4 0.2 - 1.3 mg/dL    GFR Estimate >90 >60 mL/min/1.73m2   TSH with free T4 reflex    Collection Time: 07/29/21  9:08 AM   Result Value Ref Range    TSH 0.74 0.40 - 4.00 mU/L

## 2021-07-29 NOTE — PATIENT INSTRUCTIONS
Contact Numbers  [facility]: [phone] (for symptom and scheduling needs)    Please call the [facility] Triage line if you experience a temperature greater than or equal to 100.4, shaking chills, have uncontrolled nausea, vomiting and/or diarrhea, dizziness, shortness of breath, chest pain, bleeding, unexplained bruising, or if you have any other new/concerning symptoms, questions or concerns.     If you are having any concerning symptoms or wish to speak to a provider before your next infusion visit, please call your care coordinator or triage to notify them so we can adequately serve you.     If you need a refill on a narcotic prescription or other medication, please call triage before your infusion appointment.          July 2021 Sunday Monday Tuesday Wednesday Thursday Friday Saturday                       1     2     3       4     5     6     7     8    LAB PERIPHERAL   8:30 AM   (15 min.)   [facility] LAB DRAW   [facility]    ONC INFUSION 1 HR (60 MIN)   9:00 AM   (60 min.)    ONC INFUSION NURSE   [facility] 9     10       11     12     13     14     15     16     17       18     19     20     21     22     23     24       25     26     27     28     29    LAB PERIPHERAL   8:30 AM   (15 min.)   [facility] LAB DRAW   [facility]    ONC INFUSION 1 HR (60 MIN)   9:00 AM   (60 min.)    ONC INFUSION NURSE   [facility] 30 31 August 2021 Sunday Monday Tuesday Wednesday Thursday Friday Saturday   1     2     3     4     5     6     7       8     9     10     11     12    LAB   8:45 AM   (15 min.)   [facility]   [facility]    CT CHEST ABDOMEN PELVIS WWO   9:20 AM   (20 min.)   [facility]   [facility] [place] 13     14       15     16     17     18     19    RETURN   8:45 AM   (30 min.)   Agustin Kyle MD     [facility] 20     21       22     23     24     25     26     27     28       29     30     31                                         Lab Results:  Recent Results (from the past 12 hour(s))   Comprehensive metabolic panel    Collection Time: 07/29/21  9:08 AM   Result Value Ref Range    Sodium 140 133 - 144 mmol/L    Potassium 4.0 3.4 - 5.3 mmol/L    Chloride 109 94 - 109 mmol/L    Carbon Dioxide (CO2) 25 20 - 32 mmol/L    Anion Gap 6 3 - 14 mmol/L    Urea Nitrogen 12 7 - 30 mg/dL    Creatinine 0.74 0.66 - 1.25 mg/dL    Calcium 8.7 8.5 - 10.1 mg/dL    Glucose 101 (H) 70 - 99 mg/dL    Alkaline Phosphatase 99 40 - 150 U/L    AST 43 0 - 45 U/L    ALT 48 0 - 70 U/L    Protein Total 7.6 6.8 - 8.8 g/dL    Albumin 3.3 (L) 3.4 - 5.0 g/dL    Bilirubin Total 0.4 0.2 - 1.3 mg/dL    GFR Estimate >90 >60 mL/min/1.73m2   TSH with free T4 reflex    Collection Time: 07/29/21  9:08 AM   Result Value Ref Range    TSH 0.74 0.40 - 4.00 mU/L

## 2021-08-12 ENCOUNTER — LAB (OUTPATIENT)
Dept: LAB | Facility: CLINIC | Age: 56
End: 2021-08-12
Payer: COMMERCIAL

## 2021-08-12 ENCOUNTER — ANCILLARY PROCEDURE (OUTPATIENT)
Dept: CT IMAGING | Facility: CLINIC | Age: 56
End: 2021-08-12
Attending: INTERNAL MEDICINE
Payer: COMMERCIAL

## 2021-08-12 DIAGNOSIS — C22.0 HCC (HEPATOCELLULAR CARCINOMA) (H): ICD-10-CM

## 2021-08-12 PROCEDURE — 74178 CT ABD&PLV WO CNTR FLWD CNTR: CPT | Mod: GC | Performed by: RADIOLOGY

## 2021-08-12 PROCEDURE — 71260 CT THORAX DX C+: CPT | Mod: GC | Performed by: RADIOLOGY

## 2021-08-12 RX ORDER — IOPAMIDOL 755 MG/ML
96 INJECTION, SOLUTION INTRAVASCULAR ONCE
Status: COMPLETED | OUTPATIENT
Start: 2021-08-12 | End: 2021-08-12

## 2021-08-12 RX ADMIN — IOPAMIDOL 96 ML: 755 INJECTION, SOLUTION INTRAVASCULAR at 09:11

## 2021-08-12 NOTE — NURSING NOTE
Chief Complaint   Patient presents with     Lab Only     blood drawn via previously placed IV     Drawn via previously placed IV.    Pamela Allen MA

## 2021-08-19 ENCOUNTER — ONCOLOGY VISIT (OUTPATIENT)
Dept: ONCOLOGY | Facility: CLINIC | Age: 56
End: 2021-08-19
Attending: INTERNAL MEDICINE
Payer: COMMERCIAL

## 2021-08-19 ENCOUNTER — PATIENT OUTREACH (OUTPATIENT)
Dept: ONCOLOGY | Facility: CLINIC | Age: 56
End: 2021-08-19

## 2021-08-19 ENCOUNTER — APPOINTMENT (OUTPATIENT)
Dept: LAB | Facility: CLINIC | Age: 56
End: 2021-08-19
Attending: INTERNAL MEDICINE
Payer: COMMERCIAL

## 2021-08-19 VITALS
DIASTOLIC BLOOD PRESSURE: 74 MMHG | WEIGHT: 154.9 LBS | OXYGEN SATURATION: 98 % | BODY MASS INDEX: 23.55 KG/M2 | HEART RATE: 84 BPM | TEMPERATURE: 99.6 F | RESPIRATION RATE: 18 BRPM | SYSTOLIC BLOOD PRESSURE: 130 MMHG

## 2021-08-19 DIAGNOSIS — C22.0 HCC (HEPATOCELLULAR CARCINOMA) (H): Primary | ICD-10-CM

## 2021-08-19 LAB
AFP SERPL-MCNC: 493.6 UG/L (ref 0–8)
ALBUMIN SERPL-MCNC: 3 G/DL (ref 3.4–5)
ALBUMIN UR-MCNC: NEGATIVE MG/DL
ALP SERPL-CCNC: 135 U/L (ref 40–150)
ALT SERPL W P-5'-P-CCNC: 304 U/L (ref 0–70)
ANION GAP SERPL CALCULATED.3IONS-SCNC: 1 MMOL/L (ref 3–14)
AST SERPL W P-5'-P-CCNC: 221 U/L (ref 0–45)
BASOPHILS # BLD MANUAL: 0 10E3/UL (ref 0–0.2)
BASOPHILS NFR BLD MANUAL: 1 %
BILIRUB SERPL-MCNC: 0.7 MG/DL (ref 0.2–1.3)
BUN SERPL-MCNC: 8 MG/DL (ref 7–30)
CALCIUM SERPL-MCNC: 8.4 MG/DL (ref 8.5–10.1)
CHLORIDE BLD-SCNC: 107 MMOL/L (ref 94–109)
CO2 SERPL-SCNC: 25 MMOL/L (ref 20–32)
CREAT SERPL-MCNC: 0.77 MG/DL (ref 0.66–1.25)
EOSINOPHIL # BLD MANUAL: 0.1 10E3/UL (ref 0–0.7)
EOSINOPHIL NFR BLD MANUAL: 2 %
ERYTHROCYTE [DISTWIDTH] IN BLOOD BY AUTOMATED COUNT: 14.9 % (ref 10–15)
GFR SERPL CREATININE-BSD FRML MDRD: >90 ML/MIN/1.73M2
GLUCOSE BLD-MCNC: 99 MG/DL (ref 70–99)
HCT VFR BLD AUTO: 35.2 % (ref 40–53)
HGB BLD-MCNC: 11 G/DL (ref 13.3–17.7)
LYMPHOCYTES # BLD MANUAL: 1.2 10E3/UL (ref 0.8–5.3)
LYMPHOCYTES NFR BLD MANUAL: 25 %
MCH RBC QN AUTO: 23.6 PG (ref 26.5–33)
MCHC RBC AUTO-ENTMCNC: 31.3 G/DL (ref 31.5–36.5)
MCV RBC AUTO: 75 FL (ref 78–100)
MONOCYTES # BLD MANUAL: 0.6 10E3/UL (ref 0–1.3)
MONOCYTES NFR BLD MANUAL: 13 %
NEUTROPHILS # BLD MANUAL: 2.7 10E3/UL (ref 1.6–8.3)
NEUTROPHILS NFR BLD MANUAL: 59 %
PLAT MORPH BLD: NORMAL
PLATELET # BLD AUTO: 243 10E3/UL (ref 150–450)
POTASSIUM BLD-SCNC: 3.9 MMOL/L (ref 3.4–5.3)
PROT SERPL-MCNC: 7.4 G/DL (ref 6.8–8.8)
RBC # BLD AUTO: 4.67 10E6/UL (ref 4.4–5.9)
RBC MORPH BLD: NORMAL
SODIUM SERPL-SCNC: 133 MMOL/L (ref 133–144)
T4 FREE SERPL-MCNC: 1.32 NG/DL (ref 0.76–1.46)
TSH SERPL DL<=0.005 MIU/L-ACNC: 0.39 MU/L (ref 0.4–4)
WBC # BLD AUTO: 4.6 10E3/UL (ref 4–11)

## 2021-08-19 PROCEDURE — 96413 CHEMO IV INFUSION 1 HR: CPT

## 2021-08-19 PROCEDURE — 84439 ASSAY OF FREE THYROXINE: CPT | Performed by: INTERNAL MEDICINE

## 2021-08-19 PROCEDURE — 36415 COLL VENOUS BLD VENIPUNCTURE: CPT

## 2021-08-19 PROCEDURE — 36415 COLL VENOUS BLD VENIPUNCTURE: CPT | Performed by: INTERNAL MEDICINE

## 2021-08-19 PROCEDURE — 250N000011 HC RX IP 250 OP 636: Performed by: INTERNAL MEDICINE

## 2021-08-19 PROCEDURE — 250N000013 HC RX MED GY IP 250 OP 250 PS 637: Performed by: INTERNAL MEDICINE

## 2021-08-19 PROCEDURE — 82374 ASSAY BLOOD CARBON DIOXIDE: CPT | Performed by: INTERNAL MEDICINE

## 2021-08-19 PROCEDURE — 82105 ALPHA-FETOPROTEIN SERUM: CPT

## 2021-08-19 PROCEDURE — 84443 ASSAY THYROID STIM HORMONE: CPT | Performed by: INTERNAL MEDICINE

## 2021-08-19 PROCEDURE — 82040 ASSAY OF SERUM ALBUMIN: CPT | Performed by: INTERNAL MEDICINE

## 2021-08-19 PROCEDURE — G0463 HOSPITAL OUTPT CLINIC VISIT: HCPCS

## 2021-08-19 PROCEDURE — 81003 URINALYSIS AUTO W/O SCOPE: CPT | Performed by: INTERNAL MEDICINE

## 2021-08-19 PROCEDURE — 85027 COMPLETE CBC AUTOMATED: CPT

## 2021-08-19 PROCEDURE — 99215 OFFICE O/P EST HI 40 MIN: CPT | Performed by: INTERNAL MEDICINE

## 2021-08-19 PROCEDURE — 258N000003 HC RX IP 258 OP 636: Performed by: INTERNAL MEDICINE

## 2021-08-19 RX ORDER — HEPARIN SODIUM,PORCINE 10 UNIT/ML
5 VIAL (ML) INTRAVENOUS
Status: CANCELLED | OUTPATIENT
Start: 2021-08-19

## 2021-08-19 RX ORDER — ALBUTEROL SULFATE 90 UG/1
1-2 AEROSOL, METERED RESPIRATORY (INHALATION)
Status: CANCELLED
Start: 2021-08-19

## 2021-08-19 RX ORDER — MEPERIDINE HYDROCHLORIDE 25 MG/ML
25 INJECTION INTRAMUSCULAR; INTRAVENOUS; SUBCUTANEOUS EVERY 30 MIN PRN
Status: CANCELLED | OUTPATIENT
Start: 2021-08-19

## 2021-08-19 RX ORDER — DIPHENHYDRAMINE HYDROCHLORIDE 50 MG/ML
50 INJECTION INTRAMUSCULAR; INTRAVENOUS
Status: CANCELLED
Start: 2021-08-19

## 2021-08-19 RX ORDER — HEPARIN SODIUM (PORCINE) LOCK FLUSH IV SOLN 100 UNIT/ML 100 UNIT/ML
5 SOLUTION INTRAVENOUS
Status: CANCELLED | OUTPATIENT
Start: 2021-08-19

## 2021-08-19 RX ORDER — METHYLPREDNISOLONE SODIUM SUCCINATE 125 MG/2ML
125 INJECTION, POWDER, LYOPHILIZED, FOR SOLUTION INTRAMUSCULAR; INTRAVENOUS
Status: CANCELLED
Start: 2021-08-19

## 2021-08-19 RX ORDER — DIPHENHYDRAMINE HCL 25 MG
50 CAPSULE ORAL ONCE
Status: COMPLETED | OUTPATIENT
Start: 2021-08-19 | End: 2021-08-19

## 2021-08-19 RX ORDER — ALBUTEROL SULFATE 0.83 MG/ML
2.5 SOLUTION RESPIRATORY (INHALATION)
Status: CANCELLED | OUTPATIENT
Start: 2021-08-19

## 2021-08-19 RX ORDER — NALOXONE HYDROCHLORIDE 0.4 MG/ML
0.2 INJECTION, SOLUTION INTRAMUSCULAR; INTRAVENOUS; SUBCUTANEOUS
Status: CANCELLED | OUTPATIENT
Start: 2021-08-19

## 2021-08-19 RX ORDER — EPINEPHRINE 1 MG/ML
0.3 INJECTION, SOLUTION INTRAMUSCULAR; SUBCUTANEOUS EVERY 5 MIN PRN
Status: CANCELLED | OUTPATIENT
Start: 2021-08-19

## 2021-08-19 RX ORDER — DIPHENHYDRAMINE HCL 25 MG
50 CAPSULE ORAL ONCE
Status: CANCELLED
Start: 2021-08-19

## 2021-08-19 RX ORDER — LORAZEPAM 2 MG/ML
0.5 INJECTION INTRAMUSCULAR EVERY 4 HOURS PRN
Status: CANCELLED | OUTPATIENT
Start: 2021-08-19

## 2021-08-19 RX ADMIN — DIPHENHYDRAMINE HYDROCHLORIDE 50 MG: 25 CAPSULE ORAL at 10:49

## 2021-08-19 RX ADMIN — SODIUM CHLORIDE 600 MG: 9 INJECTION, SOLUTION INTRAVENOUS at 11:30

## 2021-08-19 RX ADMIN — SODIUM CHLORIDE 250 ML: 9 INJECTION, SOLUTION INTRAVENOUS at 11:28

## 2021-08-19 ASSESSMENT — PAIN SCALES - GENERAL: PAINLEVEL: MILD PAIN (3)

## 2021-08-19 NOTE — PROGRESS NOTES
Infusion Nursing Note:  Preeti Pascual presents today for Cycle 1 Day 1 Ramucirumab  Patient seen by provider today: Yes: Dr. Kyle   present during visit today: Not Applicable.    Note: Preeti presents to infusion today stating he feels well. He denies any symptoms, concerns, or pain today.     Patient is receiving Ramucirumab for the first time. Verified that patient recieved written chemotherapy information previously.  Verbally reviewed chemotherapy teaching, side effects, take-home medications, and follow-up schedule with patient. Patient instructed to call triage with any questions or if he experiences a temperature >100.5, shaking chills, uncontrolled nausea/vomiting/diarrhea, dizziness, shortness of breath, bleeding not relieved with pressure, or with any other concerns. Please call triage line at [phone] if any changes.     Intravenous Access:  Peripheral IV placed.    Treatment Conditions:  Lab Results   Component Value Date    HGB 11.0 08/19/2021    HGB 11.0 06/17/2021     Lab Results   Component Value Date    WBC 4.6 08/19/2021    WBC 4.0 06/17/2021      Lab Results   Component Value Date    ANEU 2.7 08/19/2021    ANEU 1.8 05/06/2021     Lab Results   Component Value Date     08/19/2021     06/17/2021      Lab Results   Component Value Date     08/19/2021     07/08/2021                   Lab Results   Component Value Date    POTASSIUM 3.9 08/19/2021    POTASSIUM 3.7 07/08/2021           No results found for: MAG         Lab Results   Component Value Date    CR 0.77 08/19/2021    CR 0.73 07/08/2021                   Lab Results   Component Value Date    STACEY 8.4 08/19/2021    STACEY 8.3 07/08/2021                Lab Results   Component Value Date    BILITOTAL 0.7 08/19/2021    BILITOTAL 0.3 07/08/2021           Lab Results   Component Value Date    ALBUMIN 3.0 08/19/2021    ALBUMIN 3.2 07/08/2021                    Lab Results   Component Value Date      08/19/2021    ALT 31 07/08/2021           Lab Results   Component Value Date     08/19/2021    AST 27 07/08/2021   Urine Negative.      Post Infusion Assessment:  Patient tolerated infusion without incident.  Blood return noted pre and post infusion.  Access discontinued per protocol.       Discharge Plan:   Patient declined prescription refills.  AVS to patient via [employer].  Patient due for infusion next on 9/2 (message sent to scheduling to update his appointments)  Patient discharged in stable condition accompanied by: self.  Departure Mode: Ambulatory.  Face to Face time: 0.      Nini Santos RN

## 2021-08-19 NOTE — LETTER
8/19/2021         RE: Preeti Pascual  [address]  [place] [address]      I am seeing Preeti Pascual today in follow-up of metastatic hepatocellular carcinoma.    He has chronic hepatitis B with compensated cirrhosis and his cancer was originally diagnosed in 2016.  He had initial treatment with radio embolization but then developed metastasis to his bilateral adrenal glands in 2018.  He had initial treatment with sorafenib and progressed on that.He since has been on treatment with nivolumab with good control though with a rising alpha-fetoprotein for which we added ipilumimab in May.  He returns today for ongoing response assessment.  He tells me that he is starting to feel worse with decreased energy and appetite.  He does not have any significant pain.  He has had no bleeding.    I personally reviewed his imaging and lab work and went over the results with him.  On his CT scan he now has an area adjacent to one of his prior treated lesions with clear growing active tumor of a couple centimeters in one of his adrenal glands is enlarging now as well.  His alpha-fetoprotein has gone up significantly now up to more than 400.  His transaminases are now elevated at a couple 100 but his bilirubin remains normal.  His albumin is stable and mildly depressed at 3.0.  His blood counts are notable for his hemoglobin having drifted down a little bit now to 10.6 with mild microcytosis.  He has undetectable hepatitis B virus.    Assessment/plan: Hepatocellular carcinoma with metastatic disease now with disease progression on nivolumab.  I think his elevated transaminases are a function of his disease progression, but could be related to immune toxicity as well, and is not due to reactivation of his hepatitis B given the lack of detectable virus.  I think the developing microcytic anemia is due to chronic disease rather than iron deficiency but will need to do some lab work to confirm that.  I discussed with  him that we needed to move onto different therapy at this point.  He has previously declined any of our studies aimed at further intensifying his immune therapy, and I have minor concern that the rising liver enzymes could be related to autoimmune hepatitis. Other tyrosine kinase inhibitors are possibilities though he progressed rapidly and did not tolerate the sorafenib previously and I do not have much enthusiasm for those.  Ramucirumab would be appropriate since his alpha-fetoprotein is more than 400 and I think he would tolerate this well.  He has never had any significant bleeding in the past but I discussed with him that that is a risk along with issues of worsened hypertension and potentially clotting problems as well.    At the end of our discussion he had had all his questions answered and expressed a good understanding.  We will plan on proceeding with Ramucirumab every 2 weeks as soon as we have insurance approval.  We will see him back within the first month for toxicity assessment and after 2 months for his first response assessment.        Agustin Kyle MD

## 2021-08-19 NOTE — PATIENT INSTRUCTIONS
[facility] Triage and after hours / weekends / holidays:  [phone]    Please call the triage or after hours line if you experience a temperature greater than or equal to 100.5, shaking chills, have uncontrolled nausea, vomiting and/or diarrhea, dizziness, shortness of breath, chest pain, bleeding, unexplained bruising, or if you have any other new/concerning symptoms, questions or concerns.      If you are having any concerning symptoms or wish to speak to a provider before your next infusion visit, please call your care coordinator or triage to notify them so we can adequately serve you.     If you need a refill on a narcotic prescription or other medication, please call before your infusion appointment.           August 2021 Sunday Monday Tuesday Wednesday Thursday Friday Saturday   1     2     3     4     5     6     7       8     9     10     11     12    CT CHEST ABDOMEN PELVIS WWO   9:20 AM   (20 min.)   [facility]   [facility] [place]    LAB PERIPHERAL   9:30 AM   (15 min.)   [facility] LAB DRAW   [facility] 13     14       15     16     17     18     19    LAB PERIPHERAL   8:30 AM   (15 min.)   [facility] LAB DRAW   [facility]    RETURN   8:45 AM   (30 min.)   Agustin Kyle MD   [facility]    ONC INFUSION 1 HR (60 MIN)   9:30 AM   (60 min.)    ONC INFUSION NURSE   [facility] 20     21       22     23     24     25     26     27     28       29     30     31 September 2021 Sunday Monday Tuesday Wednesday Thursday Friday Saturday                  1     2     3     4       5     6     7     8     9    LAB PERIPHERAL   9:30 AM   (15 min.)   [facility] DRAW   [facility]    ONC INFUSION 1 HR (60 MIN)  10:00 AM   (60 min.)    ONC INFUSION NURSE   [facility]  [facility]cer Clinic 10     11       12     13     14     15     16     17     18       19     20     21     22     23     24     25       26     27     28     29     30                              Recent Results (from the past 24 hour(s))   Comprehensive metabolic panel    Collection Time: 08/19/21  8:48 AM   Result Value Ref Range    Sodium 133 133 - 144 mmol/L    Potassium 3.9 3.4 - 5.3 mmol/L    Chloride 107 94 - 109 mmol/L    Carbon Dioxide (CO2) 25 20 - 32 mmol/L    Anion Gap 1 (L) 3 - 14 mmol/L    Urea Nitrogen 8 7 - 30 mg/dL    Creatinine 0.77 0.66 - 1.25 mg/dL    Calcium 8.4 (L) 8.5 - 10.1 mg/dL    Glucose 99 70 - 99 mg/dL    Alkaline Phosphatase 135 40 - 150 U/L     (H) 0 - 45 U/L     (H) 0 - 70 U/L    Protein Total 7.4 6.8 - 8.8 g/dL    Albumin 3.0 (L) 3.4 - 5.0 g/dL    Bilirubin Total 0.7 0.2 - 1.3 mg/dL    GFR Estimate >90 >60 mL/min/1.73m2   TSH with free T4 reflex    Collection Time: 08/19/21  8:48 AM   Result Value Ref Range    TSH 0.39 (L) 0.40 - 4.00 mU/L   T4 free    Collection Time: 08/19/21  8:48 AM   Result Value Ref Range    Free T4 1.32 0.76 - 1.46 ng/dL   Protein qualitative urine    Collection Time: 08/19/21 10:32 AM   Result Value Ref Range    Protein Albumin Urine Negative Negative mg/dL   CBC with platelets and differential    Collection Time: 08/19/21 10:32 AM   Result Value Ref Range    WBC Count 4.6 4.0 - 11.0 10e3/uL    RBC Count 4.67 4.40 - 5.90 10e6/uL    Hemoglobin 11.0 (L) 13.3 - 17.7 g/dL    Hematocrit 35.2 (L) 40.0 - 53.0 %    MCV 75 (L) 78 - 100 fL    MCH 23.6 (L) 26.5 - 33.0 pg    MCHC 31.3 (L) 31.5 - 36.5 g/dL    RDW 14.9 10.0 - 15.0 %    Platelet Count 243 150 - 450 10e3/uL   Manual Differential    Collection Time: 08/19/21 10:32 AM   Result Value Ref Range    % Neutrophils 59 %    % Lymphocytes 25 %    % Monocytes 13 %    % Eosinophils 2 %    % Basophils 1 %    Absolute Neutrophils 2.7 1.6 - 8.3 10e3/uL    Absolute Lymphocytes 1.2 0.8 - 5.3 10e3/uL    Absolute  Monocytes 0.6 0.0 - 1.3 10e3/uL    Absolute Eosinophils 0.1 0.0 - 0.7 10e3/uL    Absolute Basophils 0.0 0.0 - 0.2 10e3/uL    RBC Morphology Confirmed RBC Indices     Platelet Assessment  Automated Count Confirmed. Platelet morphology is normal.     Automated Count Confirmed. Platelet morphology is normal.

## 2021-08-19 NOTE — PROGRESS NOTES
I am seeing Preeti Pascual today in follow-up of metastatic hepatocellular carcinoma.    He has chronic hepatitis B with compensated cirrhosis and his cancer was originally diagnosed in 2016.  He had initial treatment with radio embolization but then developed metastasis to his bilateral adrenal glands in 2018.  He had initial treatment with sorafenib and progressed on that.He since has been on treatment with nivolumab with good control though with a rising alpha-fetoprotein for which we added ipilumimab in May.  He returns today for ongoing response assessment.  He tells me that he is starting to feel worse with decreased energy and appetite.  He does not have any significant pain.  He has had no bleeding.    I personally reviewed his imaging and lab work and went over the results with him.  On his CT scan he now has an area adjacent to one of his prior treated lesions with clear growing active tumor of a couple centimeters in one of his adrenal glands is enlarging now as well.  His alpha-fetoprotein has gone up significantly now up to more than 400.  His transaminases are now elevated at a couple 100 but his bilirubin remains normal.  His albumin is stable and mildly depressed at 3.0.  His blood counts are notable for his hemoglobin having drifted down a little bit now to 10.6 with mild microcytosis.  He has undetectable hepatitis B virus.    Assessment/plan: Hepatocellular carcinoma with metastatic disease now with disease progression on nivolumab.  I think his elevated transaminases are a function of his disease progression, but could be related to immune toxicity as well, and is not due to reactivation of his hepatitis B given the lack of detectable virus.  I think the developing microcytic anemia is due to chronic disease rather than iron deficiency but will need to do some lab work to confirm that.  I discussed with him that we needed to move onto different therapy at this point.  He has previously declined any  of our studies aimed at further intensifying his immune therapy, and I have minor concern that the rising liver enzymes could be related to autoimmune hepatitis. Other tyrosine kinase inhibitors are possibilities though he progressed rapidly and did not tolerate the sorafenib previously and I do not have much enthusiasm for those.  Ramucirumab would be appropriate since his alpha-fetoprotein is more than 400 and I think he would tolerate this well.  He has never had any significant bleeding in the past but I discussed with him that that is a risk along with issues of worsened hypertension and potentially clotting problems as well.    At the end of our discussion he had had all his questions answered and expressed a good understanding.  We will plan on proceeding with Ramucirumab every 2 weeks as soon as we have insurance approval.  We will see him back within the first month for toxicity assessment and after 2 months for his first response assessment.

## 2021-08-19 NOTE — NURSING NOTE
"Oncology Rooming Note    August 19, 2021 8:58 AM   Preeti Pascual is a 55 year old male who presents for:    Chief Complaint   Patient presents with     Labs Only     venipuncture, vitals checked.PIV placed     Oncology Clinic Visit     HCC     Initial Vitals: /74   Pulse 84   Temp 99.6  F (37.6  C) (Axillary)   Resp 18   Wt 70.3 kg (154 lb 14.4 oz)   SpO2 98%   BMI 23.55 kg/m   Estimated body mass index is 23.55 kg/m  as calculated from the following:    Height as of 12/19/19: 1.727 m (5' 8\").    Weight as of this encounter: 70.3 kg (154 lb 14.4 oz). Body surface area is 1.84 meters squared.  Mild Pain (3) Comment: Data Unavailable   No LMP for male patient.  Allergies reviewed: Yes  Medications reviewed: Yes    Medications: Medication refills not needed today.  Pharmacy name entered into [employer]:    [id number] - [place], [place] - [address] [place] [address] AT [address] - [address] [place] [address] [phone]    Clinical concerns: New concerns: Dry mouth, increased thirst, burning feeling in the abdomen. Today patient temp is elevated. Tympanic:101.1 F, oral 100.2 F, Axillary 99.6 F.       Mariama Guardado LPN August 19, 2021 8:59 AM                "

## 2021-08-19 NOTE — NURSING NOTE
Chief Complaint   Patient presents with     Labs Only     venipuncture, vitals checked     Alize Linder RN on 8/19/2021 at 8:39 AM

## 2021-08-23 ENCOUNTER — TELEPHONE (OUTPATIENT)
Dept: GASTROENTEROLOGY | Facility: CLINIC | Age: 56
End: 2021-08-23

## 2021-08-24 ENCOUNTER — PATIENT OUTREACH (OUTPATIENT)
Dept: ONCOLOGY | Facility: CLINIC | Age: 56
End: 2021-08-24

## 2021-08-24 NOTE — PROGRESS NOTES
RN Care Coordination Note  Incoming Call:   Received call from patient states he has lost his appetite and nothing tastes good. Reports he is concerned about losing more weight. We dicussed adding Ensure or Boost. Also discussed trying small, frequent meals throughout day. Patient is hopefully he will feel improvement in appetite with new treatment change. Offered appointment with dietician. Patient denies at this time. Answered all questions to his stated satisfaction.                 Sharlene Mendez RN, BSN, OCN   RN Care Coordinator   [facility]

## 2021-08-24 NOTE — TELEPHONE ENCOUNTER
Returned pt call, no answer. LVM asking patient to call clinic back or send [employer] message with questions for Dr. Trujillo.    Albina MCKNIGHT LPN  Hepatology Clinic

## 2021-08-25 NOTE — TELEPHONE ENCOUNTER
Second and finale attempt to reach pt. LVM asking pt to call clinic back, number given or send creadshart message with questions. Writer is closing encounter.    Albina MCKNIGHT LPN  Hepatology Clinic

## 2021-09-02 ENCOUNTER — INFUSION THERAPY VISIT (OUTPATIENT)
Dept: ONCOLOGY | Facility: CLINIC | Age: 56
End: 2021-09-02
Attending: INTERNAL MEDICINE
Payer: COMMERCIAL

## 2021-09-02 ENCOUNTER — LAB (OUTPATIENT)
Dept: LAB | Facility: CLINIC | Age: 56
End: 2021-09-02
Attending: INTERNAL MEDICINE
Payer: COMMERCIAL

## 2021-09-02 VITALS
BODY MASS INDEX: 23.34 KG/M2 | DIASTOLIC BLOOD PRESSURE: 78 MMHG | SYSTOLIC BLOOD PRESSURE: 123 MMHG | HEART RATE: 86 BPM | OXYGEN SATURATION: 96 % | TEMPERATURE: 98.8 F | RESPIRATION RATE: 16 BRPM | WEIGHT: 153.5 LBS

## 2021-09-02 DIAGNOSIS — B18.1 CHRONIC VIRAL HEPATITIS B WITHOUT DELTA AGENT AND WITHOUT COMA (H): ICD-10-CM

## 2021-09-02 DIAGNOSIS — C22.0 HCC (HEPATOCELLULAR CARCINOMA) (H): Primary | ICD-10-CM

## 2021-09-02 LAB
ALBUMIN SERPL-MCNC: 2.3 G/DL (ref 3.4–5)
ALBUMIN UR-MCNC: NEGATIVE MG/DL
ALP SERPL-CCNC: 167 U/L (ref 40–150)
ALT SERPL W P-5'-P-CCNC: 489 U/L (ref 0–70)
ANION GAP SERPL CALCULATED.3IONS-SCNC: 8 MMOL/L (ref 3–14)
AST SERPL W P-5'-P-CCNC: 607 U/L (ref 0–45)
BASOPHILS # BLD AUTO: 0.1 10E3/UL (ref 0–0.2)
BASOPHILS NFR BLD AUTO: 1 %
BILIRUB SERPL-MCNC: 0.9 MG/DL (ref 0.2–1.3)
BUN SERPL-MCNC: 8 MG/DL (ref 7–30)
CALCIUM SERPL-MCNC: 8.2 MG/DL (ref 8.5–10.1)
CHLORIDE BLD-SCNC: 105 MMOL/L (ref 94–109)
CO2 SERPL-SCNC: 24 MMOL/L (ref 20–32)
CREAT SERPL-MCNC: 0.67 MG/DL (ref 0.66–1.25)
EOSINOPHIL # BLD AUTO: 0.1 10E3/UL (ref 0–0.7)
EOSINOPHIL NFR BLD AUTO: 4 %
ERYTHROCYTE [DISTWIDTH] IN BLOOD BY AUTOMATED COUNT: 17 % (ref 10–15)
GFR SERPL CREATININE-BSD FRML MDRD: >90 ML/MIN/1.73M2
GLUCOSE BLD-MCNC: 95 MG/DL (ref 70–99)
HCT VFR BLD AUTO: 31.7 % (ref 40–53)
HGB BLD-MCNC: 10.1 G/DL (ref 13.3–17.7)
HOLD SPECIMEN: NORMAL
IMM GRANULOCYTES # BLD: 0 10E3/UL
IMM GRANULOCYTES NFR BLD: 0 %
LYMPHOCYTES # BLD AUTO: 0.8 10E3/UL (ref 0.8–5.3)
LYMPHOCYTES NFR BLD AUTO: 23 %
MCH RBC QN AUTO: 24 PG (ref 26.5–33)
MCHC RBC AUTO-ENTMCNC: 31.9 G/DL (ref 31.5–36.5)
MCV RBC AUTO: 75 FL (ref 78–100)
MONOCYTES # BLD AUTO: 0.5 10E3/UL (ref 0–1.3)
MONOCYTES NFR BLD AUTO: 14 %
NEUTROPHILS # BLD AUTO: 2.2 10E3/UL (ref 1.6–8.3)
NEUTROPHILS NFR BLD AUTO: 58 %
NRBC # BLD AUTO: 0 10E3/UL
NRBC BLD AUTO-RTO: 0 /100
PLATELET # BLD AUTO: 229 10E3/UL (ref 150–450)
POTASSIUM BLD-SCNC: 4 MMOL/L (ref 3.4–5.3)
PROT SERPL-MCNC: 6.9 G/DL (ref 6.8–8.8)
RBC # BLD AUTO: 4.21 10E6/UL (ref 4.4–5.9)
SARS-COV-2 RNA RESP QL NAA+PROBE: NEGATIVE
SODIUM SERPL-SCNC: 137 MMOL/L (ref 133–144)
WBC # BLD AUTO: 3.7 10E3/UL (ref 4–11)

## 2021-09-02 PROCEDURE — U0005 INFEC AGEN DETEC AMPLI PROBE: HCPCS

## 2021-09-02 PROCEDURE — 82040 ASSAY OF SERUM ALBUMIN: CPT | Performed by: INTERNAL MEDICINE

## 2021-09-02 PROCEDURE — 250N000013 HC RX MED GY IP 250 OP 250 PS 637: Performed by: NURSE PRACTITIONER

## 2021-09-02 PROCEDURE — 36592 COLLECT BLOOD FROM PICC: CPT | Performed by: INTERNAL MEDICINE

## 2021-09-02 PROCEDURE — 258N000003 HC RX IP 258 OP 636: Performed by: NURSE PRACTITIONER

## 2021-09-02 PROCEDURE — 85025 COMPLETE CBC W/AUTO DIFF WBC: CPT | Performed by: INTERNAL MEDICINE

## 2021-09-02 PROCEDURE — 250N000011 HC RX IP 250 OP 636: Performed by: NURSE PRACTITIONER

## 2021-09-02 PROCEDURE — G0463 HOSPITAL OUTPT CLINIC VISIT: HCPCS | Mod: 25

## 2021-09-02 PROCEDURE — 96413 CHEMO IV INFUSION 1 HR: CPT

## 2021-09-02 PROCEDURE — 36415 COLL VENOUS BLD VENIPUNCTURE: CPT | Performed by: INTERNAL MEDICINE

## 2021-09-02 PROCEDURE — 87040 BLOOD CULTURE FOR BACTERIA: CPT | Performed by: INTERNAL MEDICINE

## 2021-09-02 PROCEDURE — 81003 URINALYSIS AUTO W/O SCOPE: CPT | Performed by: INTERNAL MEDICINE

## 2021-09-02 RX ORDER — HEPARIN SODIUM (PORCINE) LOCK FLUSH IV SOLN 100 UNIT/ML 100 UNIT/ML
5 SOLUTION INTRAVENOUS
Status: CANCELLED | OUTPATIENT
Start: 2021-09-02

## 2021-09-02 RX ORDER — DIPHENHYDRAMINE HYDROCHLORIDE 50 MG/ML
50 INJECTION INTRAMUSCULAR; INTRAVENOUS
Status: CANCELLED
Start: 2021-09-02

## 2021-09-02 RX ORDER — HEPARIN SODIUM,PORCINE 10 UNIT/ML
5 VIAL (ML) INTRAVENOUS
Status: CANCELLED | OUTPATIENT
Start: 2021-09-02

## 2021-09-02 RX ORDER — NALOXONE HYDROCHLORIDE 0.4 MG/ML
0.2 INJECTION, SOLUTION INTRAMUSCULAR; INTRAVENOUS; SUBCUTANEOUS
Status: CANCELLED | OUTPATIENT
Start: 2021-09-02

## 2021-09-02 RX ORDER — ALBUTEROL SULFATE 0.83 MG/ML
2.5 SOLUTION RESPIRATORY (INHALATION)
Status: CANCELLED | OUTPATIENT
Start: 2021-09-02

## 2021-09-02 RX ORDER — EPINEPHRINE 1 MG/ML
0.3 INJECTION, SOLUTION INTRAMUSCULAR; SUBCUTANEOUS EVERY 5 MIN PRN
Status: CANCELLED | OUTPATIENT
Start: 2021-09-02

## 2021-09-02 RX ORDER — METHYLPREDNISOLONE SODIUM SUCCINATE 125 MG/2ML
125 INJECTION, POWDER, LYOPHILIZED, FOR SOLUTION INTRAMUSCULAR; INTRAVENOUS
Status: CANCELLED
Start: 2021-09-02

## 2021-09-02 RX ORDER — DIPHENHYDRAMINE HCL 25 MG
50 CAPSULE ORAL ONCE
Status: CANCELLED
Start: 2021-09-02

## 2021-09-02 RX ORDER — DIPHENHYDRAMINE HCL 25 MG
50 CAPSULE ORAL ONCE
Status: COMPLETED | OUTPATIENT
Start: 2021-09-02 | End: 2021-09-02

## 2021-09-02 RX ORDER — LORAZEPAM 2 MG/ML
0.5 INJECTION INTRAMUSCULAR EVERY 4 HOURS PRN
Status: CANCELLED | OUTPATIENT
Start: 2021-09-02

## 2021-09-02 RX ORDER — ALBUTEROL SULFATE 90 UG/1
1-2 AEROSOL, METERED RESPIRATORY (INHALATION)
Status: CANCELLED
Start: 2021-09-02

## 2021-09-02 RX ORDER — MEPERIDINE HYDROCHLORIDE 25 MG/ML
25 INJECTION INTRAMUSCULAR; INTRAVENOUS; SUBCUTANEOUS EVERY 30 MIN PRN
Status: CANCELLED | OUTPATIENT
Start: 2021-09-02

## 2021-09-02 RX ADMIN — SODIUM CHLORIDE 250 ML: 9 INJECTION, SOLUTION INTRAVENOUS at 10:40

## 2021-09-02 RX ADMIN — DIPHENHYDRAMINE HYDROCHLORIDE 50 MG: 25 CAPSULE ORAL at 10:12

## 2021-09-02 RX ADMIN — SODIUM CHLORIDE 600 MG: 9 INJECTION, SOLUTION INTRAVENOUS at 10:40

## 2021-09-02 ASSESSMENT — PAIN SCALES - GENERAL: PAINLEVEL: MODERATE PAIN (5)

## 2021-09-02 NOTE — PROGRESS NOTES
"Infusion Nursing Note:  Preeti Pascual presents today for Day 1 Cycle 2 ramucirumab.    Patient seen by provider : No       present during visit today: Not Applicable.    Note: Preeti arrives to infusion today with a fever in lab. Temp 101.4 initially, 100.0 upon recheck in infusion. He denies feeling unwell today, specifically denies SOB, cough, chills, chest pain, urinary difficulty/pain, diarrhea/constipation, or other symptoms/signs of infection. He states that since his last treatment he struggles with his appetite and food has lost its taste. He feels very fatigued and has increased abdominal pain and states it was \"very bad\" for the first week following his last treatment. He is not taking any pain medication at this time and would like some medication if possible for use at home.     Dr. Kyle paged regarding the above information.     TORB @ [id number] Dr. Kyle/ Shruti Campbell RN: Do COVID swab to rule out COVID-19 today. Blood cultures OK, no further infectious work up needed at this time. OK to give ramucirumab today as ordered. OK for patient to take Tylenol at home for pain. He should take no more than 2g per day.     Writer reviewed the above information with patient. He initially refused the COVID test/swab however he decided he'd like to test for it today. COVID test completed and instructions for Tylenol reviewed with patient, who verbalized understanding.     Liver enzymes elevated today as well. Writer reviewed these by telephone with Dr. Kyle. No change to current treatment at this time.     Intravenous Access:  Peripheral IV placed.    Treatment Conditions:  Lab Results   Component Value Date    HGB 10.1 (L) 09/02/2021    WBC 3.7 (L) 09/02/2021    ANEU 2.7 08/19/2021    ANEUTAUTO 2.2 09/02/2021     09/02/2021      Lab Results   Component Value Date     09/02/2021    POTASSIUM 4.0 09/02/2021    CR 0.67 09/02/2021    STACEY 8.2 (L) 09/02/2021    BILITOTAL 0.9 09/02/2021    " ALBUMIN 2.3 (L) 09/02/2021     (H) 09/02/2021     (HH) 09/02/2021     Results reviewed, labs MET treatment parameters, ok to proceed with treatment.  Urine protein: NEGATIVE.    Post Infusion Assessment:  Patient tolerated infusion without incident.  Blood return noted pre and post infusion.  Site patent and intact, free from redness, edema or discomfort.  No evidence of extravasations.  Access discontinued per protocol.     Discharge Plan:   Patient declined prescription refills.  AVS to patient via [facility].  Patient will return 9/16 for visit with VIRGILIO and infusion appointment.   Patient discharged in stable condition accompanied by: self.  Departure Mode: Ambulatory.  Face to Face time: 10.    Shruti Campbell RN

## 2021-09-02 NOTE — NURSING NOTE
Chief Complaint   Patient presents with     Blood Draw     Labs drawn via piv placed by RN in lab. VS taken.      Labs drawn from PIV placed by RN. Line flushed with saline. Vitals taken. Pt checked in for appointment(s). Pt temperature in lab 101.4. 2 sets blood cultures drawn. Notified and discussed with emerald infusion RN.     Franca Brewer RN

## 2021-09-03 ENCOUNTER — PATIENT OUTREACH (OUTPATIENT)
Dept: ONCOLOGY | Facility: CLINIC | Age: 56
End: 2021-09-03

## 2021-09-03 NOTE — PROGRESS NOTES
RN Care Coordination Note  Incoming Call:    Received call from patient stating he was told he could take Tylenol for abdominal pain while in infusion yesterday. Asks for prescription to be sent.       Outgoing Call:   Placed return call to patient. Reviewed this is over the counter medication which can be purchased at any retail pharmacy. Also discussed he should only take up to 2 grams.     Unable to reach patient left detailed message with info.     Sharlene Mendez RN, BSN, OCN   RN Care Coordinator   [facility]

## 2021-09-07 LAB — BACTERIA BLD CULT: NO GROWTH

## 2021-09-08 ENCOUNTER — PATIENT OUTREACH (OUTPATIENT)
Dept: ONCOLOGY | Facility: CLINIC | Age: 56
End: 2021-09-08

## 2021-09-08 ENCOUNTER — TELEPHONE (OUTPATIENT)
Dept: ONCOLOGY | Facility: CLINIC | Age: 56
End: 2021-09-08

## 2021-09-08 NOTE — TELEPHONE ENCOUNTER
Calls in today with complaint of pain in both legs. Numbness in bilateral feet, scared that there is no blood going to feet. When he works and has to stand for 8 hours then his legs get swollen.   States he does not have time to talk right now because he has to go back to work. Patient states he will call back to talk later given triage hours of 7 am to 5 pm that oncology triage is in the office if he calls after those hours he will be sent to the after hours line.

## 2021-09-08 NOTE — PROGRESS NOTES
RN Care Coordination Note  Incoming Call:   Received call from patient stating since last infusion of ramucirumab has had swelling in both feet. He also states feet have been numb. Questions if there is a prescription he can take.     Outgoing Call:   Placed return call to patient to further triage. Unable to reach patient. Left detailed message explaining we need to be sure feet are equal in swelling and no pain in lower legs. Asked in numbness is new. Encouraged patient to call back and speak with triage.       Sharlene Mendez RN, BSN, OCN   RN Care Coordinator   [facility]

## 2021-09-09 ENCOUNTER — ONCOLOGY VISIT (OUTPATIENT)
Dept: ONCOLOGY | Facility: CLINIC | Age: 56
End: 2021-09-09
Attending: NURSE PRACTITIONER
Payer: COMMERCIAL

## 2021-09-09 ENCOUNTER — TELEPHONE (OUTPATIENT)
Dept: ONCOLOGY | Facility: CLINIC | Age: 56
End: 2021-09-09

## 2021-09-09 ENCOUNTER — APPOINTMENT (OUTPATIENT)
Dept: LAB | Facility: CLINIC | Age: 56
End: 2021-09-09
Attending: NURSE PRACTITIONER
Payer: COMMERCIAL

## 2021-09-09 VITALS
HEART RATE: 90 BPM | SYSTOLIC BLOOD PRESSURE: 134 MMHG | DIASTOLIC BLOOD PRESSURE: 75 MMHG | TEMPERATURE: 98.2 F | OXYGEN SATURATION: 100 % | RESPIRATION RATE: 16 BRPM | WEIGHT: 151.9 LBS | BODY MASS INDEX: 23.1 KG/M2

## 2021-09-09 DIAGNOSIS — Z86.19 HISTORY OF HEPATITIS B: ICD-10-CM

## 2021-09-09 DIAGNOSIS — R79.89 ELEVATED LFTS: ICD-10-CM

## 2021-09-09 DIAGNOSIS — M79.662 BILATERAL CALF PAIN: ICD-10-CM

## 2021-09-09 DIAGNOSIS — C22.0 HEPATOCELLULAR CARCINOMA (H): Primary | ICD-10-CM

## 2021-09-09 DIAGNOSIS — C22.0 HEPATOCELLULAR CARCINOMA (H): ICD-10-CM

## 2021-09-09 DIAGNOSIS — M79.661 BILATERAL CALF PAIN: ICD-10-CM

## 2021-09-09 DIAGNOSIS — R60.0 LOCALIZED EDEMA: ICD-10-CM

## 2021-09-09 DIAGNOSIS — R53.83 FATIGUE, UNSPECIFIED TYPE: Primary | ICD-10-CM

## 2021-09-09 LAB
ALBUMIN SERPL-MCNC: 2.2 G/DL (ref 3.4–5)
ALP SERPL-CCNC: 186 U/L (ref 40–150)
ALT SERPL W P-5'-P-CCNC: 596 U/L (ref 0–70)
ANION GAP SERPL CALCULATED.3IONS-SCNC: 7 MMOL/L (ref 3–14)
AST SERPL W P-5'-P-CCNC: 701 U/L (ref 0–45)
BASOPHILS # BLD AUTO: 0.1 10E3/UL (ref 0–0.2)
BASOPHILS NFR BLD AUTO: 2 %
BILIRUB SERPL-MCNC: 0.7 MG/DL (ref 0.2–1.3)
BUN SERPL-MCNC: 10 MG/DL (ref 7–30)
CALCIUM SERPL-MCNC: 8.1 MG/DL (ref 8.5–10.1)
CHLORIDE BLD-SCNC: 108 MMOL/L (ref 94–109)
CO2 SERPL-SCNC: 25 MMOL/L (ref 20–32)
CREAT SERPL-MCNC: 0.68 MG/DL (ref 0.66–1.25)
EOSINOPHIL # BLD AUTO: 0.1 10E3/UL (ref 0–0.7)
EOSINOPHIL NFR BLD AUTO: 4 %
ERYTHROCYTE [DISTWIDTH] IN BLOOD BY AUTOMATED COUNT: 18 % (ref 10–15)
GFR SERPL CREATININE-BSD FRML MDRD: >90 ML/MIN/1.73M2
GLUCOSE BLD-MCNC: 120 MG/DL (ref 70–99)
HCT VFR BLD AUTO: 33 % (ref 40–53)
HGB BLD-MCNC: 10.5 G/DL (ref 13.3–17.7)
IMM GRANULOCYTES # BLD: 0 10E3/UL
IMM GRANULOCYTES NFR BLD: 0 %
LYMPHOCYTES # BLD AUTO: 0.8 10E3/UL (ref 0.8–5.3)
LYMPHOCYTES NFR BLD AUTO: 28 %
MCH RBC QN AUTO: 24.4 PG (ref 26.5–33)
MCHC RBC AUTO-ENTMCNC: 31.8 G/DL (ref 31.5–36.5)
MCV RBC AUTO: 77 FL (ref 78–100)
MONOCYTES # BLD AUTO: 0.4 10E3/UL (ref 0–1.3)
MONOCYTES NFR BLD AUTO: 14 %
NEUTROPHILS # BLD AUTO: 1.6 10E3/UL (ref 1.6–8.3)
NEUTROPHILS NFR BLD AUTO: 52 %
NRBC # BLD AUTO: 0 10E3/UL
NRBC BLD AUTO-RTO: 0 /100
PLATELET # BLD AUTO: 233 10E3/UL (ref 150–450)
POTASSIUM BLD-SCNC: 4 MMOL/L (ref 3.4–5.3)
PROT SERPL-MCNC: 6.8 G/DL (ref 6.8–8.8)
RBC # BLD AUTO: 4.3 10E6/UL (ref 4.4–5.9)
SODIUM SERPL-SCNC: 140 MMOL/L (ref 133–144)
TSH SERPL DL<=0.005 MIU/L-ACNC: 1.2 MU/L (ref 0.4–4)
WBC # BLD AUTO: 3.1 10E3/UL (ref 4–11)

## 2021-09-09 PROCEDURE — 87517 HEPATITIS B DNA QUANT: CPT | Performed by: NURSE PRACTITIONER

## 2021-09-09 PROCEDURE — 36415 COLL VENOUS BLD VENIPUNCTURE: CPT | Performed by: NURSE PRACTITIONER

## 2021-09-09 PROCEDURE — 84443 ASSAY THYROID STIM HORMONE: CPT | Performed by: NURSE PRACTITIONER

## 2021-09-09 PROCEDURE — 82040 ASSAY OF SERUM ALBUMIN: CPT | Performed by: NURSE PRACTITIONER

## 2021-09-09 PROCEDURE — 85004 AUTOMATED DIFF WBC COUNT: CPT | Performed by: NURSE PRACTITIONER

## 2021-09-09 PROCEDURE — 99214 OFFICE O/P EST MOD 30 MIN: CPT | Performed by: NURSE PRACTITIONER

## 2021-09-09 PROCEDURE — G0463 HOSPITAL OUTPT CLINIC VISIT: HCPCS

## 2021-09-09 RX ORDER — TRAMADOL HYDROCHLORIDE 50 MG/1
50 TABLET ORAL EVERY 6 HOURS PRN
Qty: 50 TABLET | Refills: 0 | Status: SHIPPED | OUTPATIENT
Start: 2021-09-09 | End: 2022-03-31

## 2021-09-09 ASSESSMENT — PAIN SCALES - GENERAL: PAINLEVEL: MILD PAIN (3)

## 2021-09-09 NOTE — LETTER
9/9/2021         RE: Preeti Pascual  [address]  [facility] [address]        Dear Colleague,    Thank you for referring your patient, Preeti Pascual, to the [facility]. Please see a copy of my visit note below.    Reason for Visit: seen in f/u of HCC, add on visit for evaluation of leg swelling, pain    Oncology HPI:    Preeti Pascual is a 55 year old man with a PMH of hep. B and cirrhosis. He was previously treated with radioembolization in 2016 with an excellent response. He declined a liver transplantation. 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 He was initiated Sorafenib, but tolerated it poorly and was dosed at 200 mg bid. He was found to have progression and initiated on Nivolumab 480 mg monthly on 11/1/18. Ipi/Nivo was started in May when he was found to have progression.  On 8/19/21, he was found to have progression and started on ramucirumab.    Interval history: Feels poorly. Has noted increased fatigue and low appetite over the past couple of weeks. Also has noted increased swelling in the bilateral legs. Legs ache, particularly with walking. Has noted some tingling over the dorsum of the feet. Also notes increased pain in the mid/upper abdomen and RUQ. No burping/bloating. No reflux. No nausea. Bowels are moving ok. No dysuria.     Current Outpatient Medications   Medication Sig Dispense Refill     ferrous gluconate (FERGON) 324 (38 Fe) MG tablet Take 1 tablet (324 mg) by mouth 2 times daily (with meals) (Patient not taking: Reported on 5/10/2021) 60 tablet 1     fluticasone (FLONASE) 50 MCG/ACT nasal spray USE 2 SPRAYS IN EACH NOSTRIL ONCE DAILY (Patient not taking: Reported on 9/9/2021) 16 g 1     VENTOLIN  (90 Base) MCG/ACT inhaler 1-2 INHALATIONS EVERY 4-6 HOURS AS NEEDED FOR WHEEZING. DISPENSE SPACER AS NEEDED. (Patient not taking: Reported on 9/9/2021)  0        No Known Allergies      Exam: alert, appears fatigued.  Blood pressure 134/75, pulse 90, temperature 98.2  F (36.8  C), temperature source Oral, resp. rate 16, weight 68.9 kg (151 lb 14.4 oz), SpO2 100 %.  Wt Readings from Last 4 Encounters:   09/09/21 68.9 kg (151 lb 14.4 oz)   09/02/21 69.6 kg (153 lb 8 oz)   08/19/21 70.3 kg (154 lb 14.4 oz)   07/29/21 70.8 kg (156 lb)     Oropharynx is moist and without lesion. Neck supple and without adenopathy. Lungs:CTA. Heart: RRR, no murmur or rub. Abdomen: soft, mildly tender mid-abd, BS active, no masses .  Extremities: warm, 1+ BLE edema, no palpable cord. Mild tenderness over the shins and calves. Pedal pulses are palpable. Speech is clear. CN wnl. Gait/station wnl.       Labs: Results for TOD RICKS (MRN [id number]) as of 9/10/2021 15:26   Ref. Range 9/9/2021 16:23   Sodium Latest Ref Range: 133 - 144 mmol/L 140   Potassium Latest Ref Range: 3.4 - 5.3 mmol/L 4.0   Chloride Latest Ref Range: 94 - 109 mmol/L 108   Carbon Dioxide Latest Ref Range: 20 - 32 mmol/L 25   Urea Nitrogen Latest Ref Range: 7 - 30 mg/dL 10   Creatinine Latest Ref Range: 0.66 - 1.25 mg/dL 0.68   GFR Estimate Latest Ref Range: >60 mL/min/1.73m2 >90   Calcium Latest Ref Range: 8.5 - 10.1 mg/dL 8.1 (L)   Anion Gap Latest Ref Range: 3 - 14 mmol/L 7   Albumin Latest Ref Range: 3.4 - 5.0 g/dL 2.2 (L)   Protein Total Latest Ref Range: 6.8 - 8.8 g/dL 6.8   Bilirubin Total Latest Ref Range: 0.2 - 1.3 mg/dL 0.7   Alkaline Phosphatase Latest Ref Range: 40 - 150 U/L 186 (H)   ALT Latest Ref Range: 0 - 70 U/L 596 (HH)   AST Latest Ref Range: 0 - 45 U/L 701 (HH)   TSH Latest Ref Range: 0.40 - 4.00 mU/L 1.20   Glucose Latest Ref Range: 70 - 99 mg/dL 120 (H)   WBC Latest Ref Range: 4.0 - 11.0 10e3/uL 3.1 (L)   Hemoglobin Latest Ref Range: 13.3 - 17.7 g/dL 10.5 (L)   Hematocrit Latest Ref Range: 40.0 - 53.0 % 33.0 (L)   Platelet Count Latest Ref Range: 150 - 450 10e3/uL 233   RBC Count Latest Ref Range: 4.40 - 5.90 10e6/uL 4.30 (L)   MCV Latest Ref Range: 78 - 100  fL 77 (L)   MCH Latest Ref Range: 26.5 - 33.0 pg 24.4 (L)   MCHC Latest Ref Range: 31.5 - 36.5 g/dL 31.8   RDW Latest Ref Range: 10.0 - 15.0 % 18.0 (H)   % Neutrophils Latest Units: % 52   % Lymphocytes Latest Units: % 28   % Monocytes Latest Units: % 14   % Eosinophils Latest Units: % 4   Absolute Basophils Latest Ref Range: 0.0 - 0.2 10e3/uL 0.1   % Basophils Latest Units: % 2   Absolute Eosinophils Latest Ref Range: 0.0 - 0.7 10e3/uL 0.1   Absolute Immature Granulocytes Latest Ref Range: <=0.0 10e3/uL 0.0   Absolute Lymphocytes Latest Ref Range: 0.8 - 5.3 10e3/uL 0.8   Absolute Monocytes Latest Ref Range: 0.0 - 1.3 10e3/uL 0.4   % Immature Granulocytes Latest Units: % 0   Absolute Neutrophils Latest Ref Range: 1.6 - 8.3 10e3/uL 1.6   Absolute NRBCs Latest Units: 10e3/uL 0.0   NRBCs per 100 WBC Latest Ref Range: <1 /100 0   HEP B VIRUS DNA QUANT REAL TIME PCR Unknown Rpt       Imaging: n/a    Impression/plan:   Metastatic HCC on current therapy with ramucirumab. Having increased abdominal pain, fatigue, anorexia and BLE with rising LFTs  -concerned about hepatitis s/t check point inhibitors. Has a hx of Hep B, not currently on antiviral therapy. Hep B was undetectable a month ago.  Rapid disease progression may also be contributing.  Will check Hep B and Hep C quant.  Recommended a trial of high dose steroids, prednisone 80 mg po daily in the AM. Plan to recheck labs on Thursday. Unlikely to give ramucirumab on that day, but need to have him come in to check labs.  -discussed side effects of steroids, need to take in the AM with food    Will also check US of BLE to eval for DVT given the BLE edema and leg pain.    Given a script for tramadol to help with pain.      Again, thank you for allowing me to participate in the care of your patient.        Sincerely,        Nallely Smiley, MITCH CNP

## 2021-09-09 NOTE — TELEPHONE ENCOUNTER
S: Bilateral foot and finger pain and numbness. Reporting some intermittent chest/abdominal pain.    B: Pt is calling to discuss reported symptoms.  Started after second infusion.    Bilateral foot pain and numbness--goes from middle of dorsal aspect of feet to toes.   Pain and umbness in fingers of both hands, especially finger tips, sensitive to touch.     Bilateral lower leg pain. 8/10. Worse with standing. Has to stand 8 hours at his job and afterwards, the pain is almost intolerable. Legs are sensitive to touch. Pt tries to rub lotion on legs but it hurts both to rub on the lotion and just hurts in general. Describes pain as it hurts like the inside of a wound.  Bilateral lower leg swelling below the knee.   Swelling is equal in both legs--started after first infusion and is more noticeable since the second infusion. No redness/discoloration, warmth, tenderness, or increased pain with dorsiflexion.    When pt was at infusion, RN told him that he could take Tylenol for the pain. Pt has not taken any tylenol yet because he wants to take a medication for both the pain and numbness.    Pt reporting new intermittent abdominal pain that he states started after infusion #2. Pt is referring to this as abdominal pain, but describes pain that begins in mid-sternal chest area and goes into right and left chest.  Frequency is 1-2 times per day, not related to any activity/eating, occurs randomly. Rates pain as  2-3/10, sometimes when it comes, he holds his hand over the area for 2-5 min, and then it goes away.     Appetite very slow.  Mobility has slowed down with the leg pain.    Denies fever, chills, SOB, dizziness, or other signs.     Pt's questions are:  What can he take for leg pain and numbness?  Will these symptoms improve/go away in the future?    Pt can be reached today at [phone], but has an apt at noon today.     A: Informed pt that I would route this to his doctor and care team.     R: Routed to   Nallely Kyle and RNCC.    Discussed with RNCC.  Paged Dr. Kyle, [phone]    1115: Per Nallely, she can see pt at 1:30 today.   Spoke with pt who states that he cannot make 1:30 work today, but he can make 3:30 or 4 p.m. work. Per Nallely, she cannot see him at 3:30. Pt may need work up for PE or DVT.   1140:Nallely is able to offer pt a 4:30 apt if her scheduled can be adjusted. Requested pt have CBC and CMP orders placed for labs.   1150: Spoke with pt who agrees he can come for apt at 4:30 today.   1202: Lab orders placed. IB sent to scheduling.   1207: Call made to Preeti to inform him that labs are needed prior to apt. LVM letting him know scheduling will be calling him to schedule.

## 2021-09-09 NOTE — PROGRESS NOTES
Reason for Visit: seen in f/u of HCC, add on visit for evaluation of leg swelling, pain    Oncology HPI:    Preeti Pascual is a 55 year old man with a PMH of hep. B and cirrhosis. He was previously treated with radioembolization in 2016 with an excellent response. He declined a liver transplantation. 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 He was initiated Sorafenib, but tolerated it poorly and was dosed at 200 mg bid. He was found to have progression and initiated on Nivolumab 480 mg monthly on 11/1/18. Ipi/Nivo was started in May when he was found to have progression.  On 8/19/21, he was found to have progression and started on ramucirumab.    Interval history: Feels poorly. Has noted increased fatigue and low appetite over the past couple of weeks. Also has noted increased swelling in the bilateral legs. Legs ache, particularly with walking. Has noted some tingling over the dorsum of the feet. Also notes increased pain in the mid/upper abdomen and RUQ. No burping/bloating. No reflux. No nausea. Bowels are moving ok. No dysuria.     Current Outpatient Medications   Medication Sig Dispense Refill     ferrous gluconate (FERGON) 324 (38 Fe) MG tablet Take 1 tablet (324 mg) by mouth 2 times daily (with meals) (Patient not taking: Reported on 5/10/2021) 60 tablet 1     fluticasone (FLONASE) 50 MCG/ACT nasal spray USE 2 SPRAYS IN EACH NOSTRIL ONCE DAILY (Patient not taking: Reported on 9/9/2021) 16 g 1     VENTOLIN  (90 Base) MCG/ACT inhaler 1-2 INHALATIONS EVERY 4-6 HOURS AS NEEDED FOR WHEEZING. DISPENSE SPACER AS NEEDED. (Patient not taking: Reported on 9/9/2021)  0        No Known Allergies      Exam: alert, appears fatigued. Blood pressure 134/75, pulse 90, temperature 98.2  F (36.8  C), temperature source Oral, resp. rate 16, weight 68.9 kg (151 lb 14.4 oz), SpO2 100 %.  Wt Readings from Last 4 Encounters:   09/09/21 68.9 kg (151 lb 14.4 oz)   09/02/21 69.6 kg (153 lb 8 oz)    08/19/21 70.3 kg (154 lb 14.4 oz)   07/29/21 70.8 kg (156 lb)     Oropharynx is moist and without lesion. Neck supple and without adenopathy. Lungs:CTA. Heart: RRR, no murmur or rub. Abdomen: soft, mildly tender mid-abd, BS active, no masses .  Extremities: warm, 1+ BLE edema, no palpable cord. Mild tenderness over the shins and calves. Pedal pulses are palpable. Speech is clear. CN wnl. Gait/station wnl.       Labs: Results for TOD RICKS (MRN [id number]) as of 9/10/2021 15:26   Ref. Range 9/9/2021 16:23   Sodium Latest Ref Range: 133 - 144 mmol/L 140   Potassium Latest Ref Range: 3.4 - 5.3 mmol/L 4.0   Chloride Latest Ref Range: 94 - 109 mmol/L 108   Carbon Dioxide Latest Ref Range: 20 - 32 mmol/L 25   Urea Nitrogen Latest Ref Range: 7 - 30 mg/dL 10   Creatinine Latest Ref Range: 0.66 - 1.25 mg/dL 0.68   GFR Estimate Latest Ref Range: >60 mL/min/1.73m2 >90   Calcium Latest Ref Range: 8.5 - 10.1 mg/dL 8.1 (L)   Anion Gap Latest Ref Range: 3 - 14 mmol/L 7   Albumin Latest Ref Range: 3.4 - 5.0 g/dL 2.2 (L)   Protein Total Latest Ref Range: 6.8 - 8.8 g/dL 6.8   Bilirubin Total Latest Ref Range: 0.2 - 1.3 mg/dL 0.7   Alkaline Phosphatase Latest Ref Range: 40 - 150 U/L 186 (H)   ALT Latest Ref Range: 0 - 70 U/L 596 (HH)   AST Latest Ref Range: 0 - 45 U/L 701 (HH)   TSH Latest Ref Range: 0.40 - 4.00 mU/L 1.20   Glucose Latest Ref Range: 70 - 99 mg/dL 120 (H)   WBC Latest Ref Range: 4.0 - 11.0 10e3/uL 3.1 (L)   Hemoglobin Latest Ref Range: 13.3 - 17.7 g/dL 10.5 (L)   Hematocrit Latest Ref Range: 40.0 - 53.0 % 33.0 (L)   Platelet Count Latest Ref Range: 150 - 450 10e3/uL 233   RBC Count Latest Ref Range: 4.40 - 5.90 10e6/uL 4.30 (L)   MCV Latest Ref Range: 78 - 100 fL 77 (L)   MCH Latest Ref Range: 26.5 - 33.0 pg 24.4 (L)   MCHC Latest Ref Range: 31.5 - 36.5 g/dL 31.8   RDW Latest Ref Range: 10.0 - 15.0 % 18.0 (H)   % Neutrophils Latest Units: % 52   % Lymphocytes Latest Units: % 28   % Monocytes Latest Units: % 14    % Eosinophils Latest Units: % 4   Absolute Basophils Latest Ref Range: 0.0 - 0.2 10e3/uL 0.1   % Basophils Latest Units: % 2   Absolute Eosinophils Latest Ref Range: 0.0 - 0.7 10e3/uL 0.1   Absolute Immature Granulocytes Latest Ref Range: <=0.0 10e3/uL 0.0   Absolute Lymphocytes Latest Ref Range: 0.8 - 5.3 10e3/uL 0.8   Absolute Monocytes Latest Ref Range: 0.0 - 1.3 10e3/uL 0.4   % Immature Granulocytes Latest Units: % 0   Absolute Neutrophils Latest Ref Range: 1.6 - 8.3 10e3/uL 1.6   Absolute NRBCs Latest Units: 10e3/uL 0.0   NRBCs per 100 WBC Latest Ref Range: <1 /100 0   HEP B VIRUS DNA QUANT REAL TIME PCR Unknown Rpt       Imaging: n/a    Impression/plan:   Metastatic HCC on current therapy with ramucirumab. Having increased abdominal pain, fatigue, anorexia and BLE with rising LFTs  -concerned about hepatitis s/t check point inhibitors. Has a hx of Hep B, not currently on antiviral therapy. Hep B was undetectable a month ago.  Rapid disease progression may also be contributing.  Will check Hep B and Hep C quant.  Recommended a trial of high dose steroids, prednisone 80 mg po daily in the AM. Plan to recheck labs on Thursday. Unlikely to give ramucirumab on that day, but need to have him come in to check labs.  -discussed side effects of steroids, need to take in the AM with food    Will also check US of BLE to eval for DVT given the BLE edema and leg pain.    Given a script for tramadol to help with pain.

## 2021-09-09 NOTE — NURSING NOTE
"Oncology Rooming Note    September 9, 2021 4:32 PM   Preeti Pascual is a 55 year old male who presents for:    Chief Complaint   Patient presents with     Blood Draw     Labs drawn via  by RN in lab. VS taken.      Oncology Clinic Visit     HCC     Initial Vitals: /75 (BP Location: Right arm, Patient Position: Sitting, Cuff Size: Adult Regular)   Pulse 90   Temp 98.2  F (36.8  C) (Oral)   Resp 16   Wt 68.9 kg (151 lb 14.4 oz)   SpO2 100%   BMI 23.10 kg/m   Estimated body mass index is 23.1 kg/m  as calculated from the following:    Height as of 12/19/19: 1.727 m (5' 8\").    Weight as of this encounter: 68.9 kg (151 lb 14.4 oz). Body surface area is 1.82 meters squared.  Mild Pain (3) Comment: Data Unavailable   No LMP for male patient.  Allergies reviewed: Yes  Medications reviewed: Yes    Medications: Medication refills not needed today.  Pharmacy name entered into [facility]:    [id number] - [place], [place] - [address] [place] [address] NE AT [facility] & [address] [place] [address] - [address] [place] [address] [phone]    Clinical concerns: Decreased appetite, weight loss, pain in center of chest, pain in left elbow, pain in lower legs and neuropathy in feet.     Kendra Kidd [facility]              "

## 2021-09-09 NOTE — NURSING NOTE
Chief Complaint   Patient presents with     Blood Draw     Labs drawn via  by RN in lab. VS taken.      Labs collected from venipuncture by RN. Vitals taken. Checked in for appointment(s).    Franca Brewer RN

## 2021-09-10 RX ORDER — PREDNISONE 20 MG/1
TABLET ORAL
Qty: 100 TABLET | Refills: 0 | Status: SHIPPED | OUTPATIENT
Start: 2021-09-10 | End: 2022-03-31

## 2021-09-13 LAB — HBV DNA SERPL NAA+PROBE-ACNC: NOT DETECTED IU/ML

## 2021-09-16 ENCOUNTER — LAB (OUTPATIENT)
Dept: LAB | Facility: CLINIC | Age: 56
End: 2021-09-16
Attending: NURSE PRACTITIONER
Payer: COMMERCIAL

## 2021-09-16 ENCOUNTER — INFUSION THERAPY VISIT (OUTPATIENT)
Dept: ONCOLOGY | Facility: CLINIC | Age: 56
End: 2021-09-16
Attending: INTERNAL MEDICINE
Payer: COMMERCIAL

## 2021-09-16 ENCOUNTER — ONCOLOGY VISIT (OUTPATIENT)
Dept: ONCOLOGY | Facility: CLINIC | Age: 56
End: 2021-09-16
Attending: NURSE PRACTITIONER
Payer: COMMERCIAL

## 2021-09-16 ENCOUNTER — ANCILLARY PROCEDURE (OUTPATIENT)
Dept: ULTRASOUND IMAGING | Facility: CLINIC | Age: 56
End: 2021-09-16
Attending: NURSE PRACTITIONER
Payer: COMMERCIAL

## 2021-09-16 VITALS
OXYGEN SATURATION: 100 % | HEART RATE: 71 BPM | SYSTOLIC BLOOD PRESSURE: 160 MMHG | RESPIRATION RATE: 16 BRPM | WEIGHT: 148.6 LBS | TEMPERATURE: 97.2 F | DIASTOLIC BLOOD PRESSURE: 89 MMHG | BODY MASS INDEX: 22.59 KG/M2

## 2021-09-16 VITALS
BODY MASS INDEX: 22.59 KG/M2 | DIASTOLIC BLOOD PRESSURE: 89 MMHG | OXYGEN SATURATION: 100 % | WEIGHT: 148.6 LBS | HEART RATE: 71 BPM | RESPIRATION RATE: 16 BRPM | SYSTOLIC BLOOD PRESSURE: 160 MMHG | TEMPERATURE: 97.2 F

## 2021-09-16 DIAGNOSIS — R79.89 ELEVATED LFTS: ICD-10-CM

## 2021-09-16 DIAGNOSIS — M79.662 BILATERAL CALF PAIN: ICD-10-CM

## 2021-09-16 DIAGNOSIS — M79.661 BILATERAL CALF PAIN: ICD-10-CM

## 2021-09-16 DIAGNOSIS — C22.0 HCC (HEPATOCELLULAR CARCINOMA) (H): Primary | ICD-10-CM

## 2021-09-16 DIAGNOSIS — C22.0 HEPATOCELLULAR CARCINOMA (H): ICD-10-CM

## 2021-09-16 DIAGNOSIS — C22.0 HCC (HEPATOCELLULAR CARCINOMA) (H): ICD-10-CM

## 2021-09-16 DIAGNOSIS — K75.9 HEPATITIS: ICD-10-CM

## 2021-09-16 DIAGNOSIS — R60.0 LOCALIZED EDEMA: ICD-10-CM

## 2021-09-16 DIAGNOSIS — Z91.199 FAILURE TO ATTEND APPOINTMENT: ICD-10-CM

## 2021-09-16 LAB
AFP SERPL-MCNC: 690 UG/L (ref 0–8)
ALBUMIN SERPL-MCNC: 2.6 G/DL (ref 3.4–5)
ALBUMIN UR-MCNC: NEGATIVE MG/DL
ALP SERPL-CCNC: 161 U/L (ref 40–150)
ALT SERPL W P-5'-P-CCNC: 297 U/L (ref 0–70)
ANION GAP SERPL CALCULATED.3IONS-SCNC: 9 MMOL/L (ref 3–14)
AST SERPL W P-5'-P-CCNC: 153 U/L (ref 0–45)
BASOPHILS # BLD AUTO: 0 10E3/UL (ref 0–0.2)
BASOPHILS NFR BLD AUTO: 0 %
BILIRUB SERPL-MCNC: 0.5 MG/DL (ref 0.2–1.3)
BUN SERPL-MCNC: 13 MG/DL (ref 7–30)
CALCIUM SERPL-MCNC: 8.3 MG/DL (ref 8.5–10.1)
CHLORIDE BLD-SCNC: 108 MMOL/L (ref 94–109)
CO2 SERPL-SCNC: 25 MMOL/L (ref 20–32)
CREAT SERPL-MCNC: 0.68 MG/DL (ref 0.66–1.25)
EOSINOPHIL # BLD AUTO: 0 10E3/UL (ref 0–0.7)
EOSINOPHIL NFR BLD AUTO: 0 %
ERYTHROCYTE [DISTWIDTH] IN BLOOD BY AUTOMATED COUNT: 17.5 % (ref 10–15)
GFR SERPL CREATININE-BSD FRML MDRD: >90 ML/MIN/1.73M2
GLUCOSE BLD-MCNC: 86 MG/DL (ref 70–99)
HCT VFR BLD AUTO: 33.1 % (ref 40–53)
HGB BLD-MCNC: 10.2 G/DL (ref 13.3–17.7)
IMM GRANULOCYTES # BLD: 0 10E3/UL
IMM GRANULOCYTES NFR BLD: 0 %
LYMPHOCYTES # BLD AUTO: 2 10E3/UL (ref 0.8–5.3)
LYMPHOCYTES NFR BLD AUTO: 36 %
MCH RBC QN AUTO: 24.2 PG (ref 26.5–33)
MCHC RBC AUTO-ENTMCNC: 30.8 G/DL (ref 31.5–36.5)
MCV RBC AUTO: 79 FL (ref 78–100)
MONOCYTES # BLD AUTO: 0.7 10E3/UL (ref 0–1.3)
MONOCYTES NFR BLD AUTO: 13 %
NEUTROPHILS # BLD AUTO: 2.8 10E3/UL (ref 1.6–8.3)
NEUTROPHILS NFR BLD AUTO: 51 %
NRBC # BLD AUTO: 0 10E3/UL
NRBC BLD AUTO-RTO: 0 /100
PLATELET # BLD AUTO: 260 10E3/UL (ref 150–450)
POTASSIUM BLD-SCNC: 3.5 MMOL/L (ref 3.4–5.3)
PROT SERPL-MCNC: 6.9 G/DL (ref 6.8–8.8)
RBC # BLD AUTO: 4.21 10E6/UL (ref 4.4–5.9)
SODIUM SERPL-SCNC: 142 MMOL/L (ref 133–144)
WBC # BLD AUTO: 5.5 10E3/UL (ref 4–11)

## 2021-09-16 PROCEDURE — 36592 COLLECT BLOOD FROM PICC: CPT

## 2021-09-16 PROCEDURE — G0463 HOSPITAL OUTPT CLINIC VISIT: HCPCS

## 2021-09-16 PROCEDURE — 85025 COMPLETE CBC W/AUTO DIFF WBC: CPT

## 2021-09-16 PROCEDURE — 87522 HEPATITIS C REVRS TRNSCRPJ: CPT | Performed by: NURSE PRACTITIONER

## 2021-09-16 PROCEDURE — 80053 COMPREHEN METABOLIC PANEL: CPT

## 2021-09-16 PROCEDURE — 93970 EXTREMITY STUDY: CPT | Performed by: RADIOLOGY

## 2021-09-16 PROCEDURE — 82105 ALPHA-FETOPROTEIN SERUM: CPT

## 2021-09-16 PROCEDURE — 36415 COLL VENOUS BLD VENIPUNCTURE: CPT

## 2021-09-16 PROCEDURE — 99215 OFFICE O/P EST HI 40 MIN: CPT | Performed by: NURSE PRACTITIONER

## 2021-09-16 PROCEDURE — 81003 URINALYSIS AUTO W/O SCOPE: CPT | Performed by: INTERNAL MEDICINE

## 2021-09-16 PROCEDURE — 10000001 PR ERRONEOUS ENCOUNTER--DISREGARD

## 2021-09-16 ASSESSMENT — PAIN SCALES - GENERAL: PAINLEVEL: NO PAIN (0)

## 2021-09-16 NOTE — NURSING NOTE
"Oncology Rooming Note    September 16, 2021 7:35 AM   Preeti Pascual is a 55 year old male who presents for:    Chief Complaint   Patient presents with     Oncology Clinic Visit     Patient with hepatocellular carcinoma here for provider visit and lab review     Initial Vitals: BP (!) 160/89 (BP Location: Right arm, Patient Position: Sitting, Cuff Size: Adult Regular)   Pulse 71   Temp 97.2  F (36.2  C) (Tympanic)   Resp 16   Wt 67.4 kg (148 lb 9.6 oz)   SpO2 100%   BMI 22.59 kg/m   Estimated body mass index is 22.59 kg/m  as calculated from the following:    Height as of 12/19/19: 1.727 m (5' 8\").    Weight as of this encounter: 67.4 kg (148 lb 9.6 oz). Body surface area is 1.8 meters squared.  Data Unavailable Comment: Data Unavailable   No LMP for male patient.  Allergies reviewed: Yes  Medications reviewed: Yes    Medications: Medication refills not needed today.  Pharmacy name entered into [facility]:    [id number] - [place], [place] - [address] AT [facility] & [address] [place] [address] - [address] [place] [address] [phone]    Clinical concerns:      Antonette Adler CMA              "

## 2021-09-16 NOTE — PROGRESS NOTES
Reason for Visit: f/u HCC, elevated LFTs, leg swellign.    Oncology HPI: Preeti Pascual is a 55 year old man with a PMH of hep. B and cirrhosis. He was previously treated with radioembolization in 2016 with an excellent response. He declined a liver transplantation. 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 He was initiated Sorafenib, but tolerated it poorly and was dosed at 200 mg bid. He was found to have progression and initiated on Nivolumab 480 mg monthly on 11/1/18. Ipi/Nivo was started in May when he was found to have progression.  On 8/19/21, he was found to have progression and started on ramucirumab.    He was seen last week on an add on basis with complaints of low energy, anorexia, abdominal pain, edema and neuropathy. LFTs showed marked elevation. Hep B was negative. He was started on prednisone 80 mg daily for hepatitis s/t immunotherapy. He returns for ongoing evaluation and treatment today.    Interval history: Preeti is starting to feel much better. Since starting the prednisone, his abdominal pain has resolved. Energy is much better. Appetite is improving. No change to leg edema, but the discomfort in the legs is better. Still has some numbness in the toes. No fevers/chills, sweats. Bowels were soft, but now are having more form. Urination is normal. No itching, bruising or rash.    Current Outpatient Medications   Medication Sig Dispense Refill     ferrous gluconate (FERGON) 324 (38 Fe) MG tablet Take 1 tablet (324 mg) by mouth 2 times daily (with meals) (Patient not taking: Reported on 5/10/2021) 60 tablet 1     fluticasone (FLONASE) 50 MCG/ACT nasal spray USE 2 SPRAYS IN EACH NOSTRIL ONCE DAILY (Patient not taking: Reported on 9/9/2021) 16 g 1     predniSONE (DELTASONE) 20 MG tablet Start 80 mg po daily, take in AM with food. Will direct on a taper after reviewing labs on Thursday 100 tablet 0     traMADol (ULTRAM) 50 MG tablet Take 1 tablet (50 mg) by mouth every 6  hours as needed for severe pain 50 tablet 0     VENTOLIN  (90 Base) MCG/ACT inhaler 1-2 INHALATIONS EVERY 4-6 HOURS AS NEEDED FOR WHEEZING. DISPENSE SPACER AS NEEDED. (Patient not taking: Reported on 9/9/2021)  0        No Known Allergies      Exam: alert, appears in NAD.  Blood pressure (!) 160/89, pulse 71, temperature 97.2  F (36.2  C), temperature source Tympanic, resp. rate 16, weight 67.4 kg (148 lb 9.6 oz), SpO2 100 %.  Wt Readings from Last 4 Encounters:   09/16/21 67.4 kg (148 lb 9.6 oz)   09/16/21 67.4 kg (148 lb 9.6 oz)   09/09/21 68.9 kg (151 lb 14.4 oz)   09/02/21 69.6 kg (153 lb 8 oz)     Oropharynx is moist and without lesion. Neck supple and without adenopathy. Lungs:CTA. Heart: RRR, no murmur or rub. Abdomen: soft, nontender, BS active. Liver palpable from the xyphoid, extending inferiorly about 5 cm.   Extremities: warm, trace edema. Speech is clear. CN wnl. Gait/station wnl.     Labs: Results for TOD RICKS (MRN [id number]) as of 9/16/2021 09:53   Ref. Range 9/16/2021 07:19   Sodium Latest Ref Range: 133 - 144 mmol/L 142   Potassium Latest Ref Range: 3.4 - 5.3 mmol/L 3.5   Chloride Latest Ref Range: 94 - 109 mmol/L 108   Carbon Dioxide Latest Ref Range: 20 - 32 mmol/L 25   Urea Nitrogen Latest Ref Range: 7 - 30 mg/dL 13   Creatinine Latest Ref Range: 0.66 - 1.25 mg/dL 0.68   GFR Estimate Latest Ref Range: >60 mL/min/1.73m2 >90   Calcium Latest Ref Range: 8.5 - 10.1 mg/dL 8.3 (L)   Anion Gap Latest Ref Range: 3 - 14 mmol/L 9   Albumin Latest Ref Range: 3.4 - 5.0 g/dL 2.6 (L)   Protein Total Latest Ref Range: 6.8 - 8.8 g/dL 6.9   Bilirubin Total Latest Ref Range: 0.2 - 1.3 mg/dL 0.5   Alkaline Phosphatase Latest Ref Range: 40 - 150 U/L 161 (H)   ALT Latest Ref Range: 0 - 70 U/L 297 (H)   AST Latest Ref Range: 0 - 45 U/L 153 (H)   Glucose Latest Ref Range: 70 - 99 mg/dL 86   WBC Latest Ref Range: 4.0 - 11.0 10e3/uL 5.5   Hemoglobin Latest Ref Range: 13.3 - 17.7 g/dL 10.2 (L)   Hematocrit  Latest Ref Range: 40.0 - 53.0 % 33.1 (L)   Platelet Count Latest Ref Range: 150 - 450 10e3/uL 260   RBC Count Latest Ref Range: 4.40 - 5.90 10e6/uL 4.21 (L)   MCV Latest Ref Range: 78 - 100 fL 79   MCH Latest Ref Range: 26.5 - 33.0 pg 24.2 (L)   MCHC Latest Ref Range: 31.5 - 36.5 g/dL 30.8 (L)   RDW Latest Ref Range: 10.0 - 15.0 % 17.5 (H)   % Neutrophils Latest Units: % 51   % Lymphocytes Latest Units: % 36   % Monocytes Latest Units: % 13   % Eosinophils Latest Units: % 0   Absolute Basophils Latest Ref Range: 0.0 - 0.2 10e3/uL 0.0   % Basophils Latest Units: % 0   Absolute Eosinophils Latest Ref Range: 0.0 - 0.7 10e3/uL 0.0   Absolute Immature Granulocytes Latest Ref Range: <=0.0 10e3/uL 0.0   Absolute Lymphocytes Latest Ref Range: 0.8 - 5.3 10e3/uL 2.0   Absolute Monocytes Latest Ref Range: 0.0 - 1.3 10e3/uL 0.7   % Immature Granulocytes Latest Units: % 0   Absolute Neutrophils Latest Ref Range: 1.6 - 8.3 10e3/uL 2.8   Absolute NRBCs Latest Units: 10e3/uL 0.0   NRBCs per 100 WBC Latest Ref Range: <1 /100 0       Imaging:   BILATERAL LOWER EXTREMITY DUPLEX VENOUS ULTRASOUND 9/16/2021 6:46 AM     CLINICAL HISTORY: BLE edema, calf pain. Eval for DVT; Hepatocellular  carcinoma (H); Localized edema; Bilateral calf pain; Bilateral calf  pain       COMPARISONS: None available.     REFERRING PROVIDER: HUY GARZA     TECHNIQUE: Grayscale, color Doppler, Doppler waveform ultrasound  evaluation was performed through the bilateral common femoral,  femoral, and popliteal veins. Bilateral posterior tibial and peroneal  veins were evaluated with grayscale imaging and compression.     FINDINGS: Right common femoral, femoral, and popliteal veins are fully  compressible, patent, and demonstrate normal phasic Doppler waveforms.     Right posterior tibial veins are fully compressible to the ankle.     Right peroneal veins are fully compressible to the distal calf.     Left common femoral, femoral, and popliteal veins are  fully  compressible, patent, and demonstrate normal phasic Doppler waveforms.     Left posterior tibial veins are fully compressible to the ankle.     Left peroneal veins are fully compressible to the distal calf.                                                                      IMPRESSION: No deep venous thrombosis demonstrated in either leg.     VENUS SERRANO MD          Impression/plan:   Metastatic HCC  -due for ramucirumab. However, given treatment for immune hepatitis, will hold today and re-evaluate next week. If he is able to continue the prednisone taper and LFTs are improved, will plan to resume treatment    Leg edema-mild  -US reviewed, negative for DVT  -monitor for now, leg elevation when possible    Immunotherapy induced hepatitis  -has hx of hep B, but PCR negative. Hep C pending  -started on prednisone 80 mg po daily on 9/10/21  -LFTs improving, feeling much better. Will continue prednisone 80 mg daily through 9/20, then decrease to 60 mg daily, then repeat labs and evaluation on 9/23. Reviewed the plan for a slow steroid taper to avoid rebound hepatitis. Tolerating well, taking with food.    Abdominal pain likely s/t malignancy,exacerbated by hepatitis  -now resolved on prednisone  -has tramadol to use prn     Neuropathy in the toes  -not sure on cause. Seemed to start with his other symptoms. Possible it relates to the same inflammatory process from immunotherapy, but not typical. Not worsening, monitor.

## 2021-09-16 NOTE — NURSING NOTE
Chief Complaint   Patient presents with     Blood Draw     labs drawn with IV start by rn in lab.  vital signs taken.     Labs drawn with IV start by RN in lab.  Vital signs taken.  Pt checked in to next appointment.    Jenifer De Souza RN

## 2021-09-16 NOTE — PATIENT INSTRUCTIONS
Prednisone:    80 mg po daily through 9/20/21 then,    On 9/21/21 decrease prednisone to 60 mg (3 tablets). Check labs on Thursday 9/23/21 and wait for further direction on how to taper the prednisone.    Will continue to hold the Ramucirumab (IV therapy) until liver tests are improved and on lower doses of prednisone

## 2021-09-16 NOTE — LETTER
9/16/2021         RE: Preeti Pascual  [address]  [facility] [address]        Dear Colleague,    Thank you for referring your patient, Preeti Pascual, to the [facility]. Please see a copy of my visit note below.    Reason for Visit: f/u HCC, elevated LFTs, leg swellign.    Oncology HPI: Preeti Pascual is a 55 year old man with a PMH of hep. B and cirrhosis. He was previously treated with radioembolization in 2016 with an excellent response. He declined a liver transplantation. 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 He was initiated Sorafenib, but tolerated it poorly and was dosed at 200 mg bid. He was found to have progression and initiated on Nivolumab 480 mg monthly on 11/1/18. Ipi/Nivo was started in May when he was found to have progression.  On 8/19/21, he was found to have progression and started on ramucirumab.    He was seen last week on an add on basis with complaints of low energy, anorexia, abdominal pain, edema and neuropathy. LFTs showed marked elevation. Hep B was negative. He was started on prednisone 80 mg daily for hepatitis s/t immunotherapy. He returns for ongoing evaluation and treatment today.    Interval history: Preeti is starting to feel much better. Since starting the prednisone, his abdominal pain has resolved. Energy is much better. Appetite is improving. No change to leg edema, but the discomfort in the legs is better. Still has some numbness in the toes. No fevers/chills, sweats. Bowels were soft, but now are having more form. Urination is normal. No itching, bruising or rash.    Current Outpatient Medications   Medication Sig Dispense Refill     ferrous gluconate (FERGON) 324 (38 Fe) MG tablet Take 1 tablet (324 mg) by mouth 2 times daily (with meals) (Patient not taking: Reported on 5/10/2021) 60 tablet 1     fluticasone (FLONASE) 50 MCG/ACT nasal spray USE 2 SPRAYS IN EACH NOSTRIL ONCE DAILY (Patient not  taking: Reported on 9/9/2021) 16 g 1     predniSONE (DELTASONE) 20 MG tablet Start 80 mg po daily, take in AM with food. Will direct on a taper after reviewing labs on Thursday 100 tablet 0     traMADol (ULTRAM) 50 MG tablet Take 1 tablet (50 mg) by mouth every 6 hours as needed for severe pain 50 tablet 0     VENTOLIN  (90 Base) MCG/ACT inhaler 1-2 INHALATIONS EVERY 4-6 HOURS AS NEEDED FOR WHEEZING. DISPENSE SPACER AS NEEDED. (Patient not taking: Reported on 9/9/2021)  0        No Known Allergies      Exam: alert, appears in NAD.  Blood pressure (!) 160/89, pulse 71, temperature 97.2  F (36.2  C), temperature source Tympanic, resp. rate 16, weight 67.4 kg (148 lb 9.6 oz), SpO2 100 %.  Wt Readings from Last 4 Encounters:   09/16/21 67.4 kg (148 lb 9.6 oz)   09/16/21 67.4 kg (148 lb 9.6 oz)   09/09/21 68.9 kg (151 lb 14.4 oz)   09/02/21 69.6 kg (153 lb 8 oz)     Oropharynx is moist and without lesion. Neck supple and without adenopathy. Lungs:CTA. Heart: RRR, no murmur or rub. Abdomen: soft, nontender, BS active. Liver palpable from the xyphoid, extending inferiorly about 5 cm.   Extremities: warm, trace edema. Speech is clear. CN wnl. Gait/station wnl.     Labs: Results for TOD RICKS (MRN [id number]) as of 9/16/2021 09:53   Ref. Range 9/16/2021 07:19   Sodium Latest Ref Range: 133 - 144 mmol/L 142   Potassium Latest Ref Range: 3.4 - 5.3 mmol/L 3.5   Chloride Latest Ref Range: 94 - 109 mmol/L 108   Carbon Dioxide Latest Ref Range: 20 - 32 mmol/L 25   Urea Nitrogen Latest Ref Range: 7 - 30 mg/dL 13   Creatinine Latest Ref Range: 0.66 - 1.25 mg/dL 0.68   GFR Estimate Latest Ref Range: >60 mL/min/1.73m2 >90   Calcium Latest Ref Range: 8.5 - 10.1 mg/dL 8.3 (L)   Anion Gap Latest Ref Range: 3 - 14 mmol/L 9   Albumin Latest Ref Range: 3.4 - 5.0 g/dL 2.6 (L)   Protein Total Latest Ref Range: 6.8 - 8.8 g/dL 6.9   Bilirubin Total Latest Ref Range: 0.2 - 1.3 mg/dL 0.5   Alkaline Phosphatase Latest Ref Range: 40 -  150 U/L 161 (H)   ALT Latest Ref Range: 0 - 70 U/L 297 (H)   AST Latest Ref Range: 0 - 45 U/L 153 (H)   Glucose Latest Ref Range: 70 - 99 mg/dL 86   WBC Latest Ref Range: 4.0 - 11.0 10e3/uL 5.5   Hemoglobin Latest Ref Range: 13.3 - 17.7 g/dL 10.2 (L)   Hematocrit Latest Ref Range: 40.0 - 53.0 % 33.1 (L)   Platelet Count Latest Ref Range: 150 - 450 10e3/uL 260   RBC Count Latest Ref Range: 4.40 - 5.90 10e6/uL 4.21 (L)   MCV Latest Ref Range: 78 - 100 fL 79   MCH Latest Ref Range: 26.5 - 33.0 pg 24.2 (L)   MCHC Latest Ref Range: 31.5 - 36.5 g/dL 30.8 (L)   RDW Latest Ref Range: 10.0 - 15.0 % 17.5 (H)   % Neutrophils Latest Units: % 51   % Lymphocytes Latest Units: % 36   % Monocytes Latest Units: % 13   % Eosinophils Latest Units: % 0   Absolute Basophils Latest Ref Range: 0.0 - 0.2 10e3/uL 0.0   % Basophils Latest Units: % 0   Absolute Eosinophils Latest Ref Range: 0.0 - 0.7 10e3/uL 0.0   Absolute Immature Granulocytes Latest Ref Range: <=0.0 10e3/uL 0.0   Absolute Lymphocytes Latest Ref Range: 0.8 - 5.3 10e3/uL 2.0   Absolute Monocytes Latest Ref Range: 0.0 - 1.3 10e3/uL 0.7   % Immature Granulocytes Latest Units: % 0   Absolute Neutrophils Latest Ref Range: 1.6 - 8.3 10e3/uL 2.8   Absolute NRBCs Latest Units: 10e3/uL 0.0   NRBCs per 100 WBC Latest Ref Range: <1 /100 0       Imaging:   BILATERAL LOWER EXTREMITY DUPLEX VENOUS ULTRASOUND 9/16/2021 6:46 AM     CLINICAL HISTORY: BLE edema, calf pain. Eval for DVT; Hepatocellular  carcinoma (H); Localized edema; Bilateral calf pain; Bilateral calf  pain       COMPARISONS: None available.     REFERRING PROVIDER: HUY GARZA     TECHNIQUE: Grayscale, color Doppler, Doppler waveform ultrasound  evaluation was performed through the bilateral common femoral,  femoral, and popliteal veins. Bilateral posterior tibial and peroneal  veins were evaluated with grayscale imaging and compression.     FINDINGS: Right common femoral, femoral, and popliteal veins are  fully  compressible, patent, and demonstrate normal phasic Doppler waveforms.     Right posterior tibial veins are fully compressible to the ankle.     Right peroneal veins are fully compressible to the distal calf.     Left common femoral, femoral, and popliteal veins are fully  compressible, patent, and demonstrate normal phasic Doppler waveforms.     Left posterior tibial veins are fully compressible to the ankle.     Left peroneal veins are fully compressible to the distal calf.                                                                      IMPRESSION: No deep venous thrombosis demonstrated in either leg.     VENUS SERRANO MD          Impression/plan:   Metastatic HCC  -due for ramucirumab. However, given treatment for immune hepatitis, will hold today and re-evaluate next week. If he is able to continue the prednisone taper and LFTs are improved, will plan to resume treatment    Leg edema-mild  -US reviewed, negative for DVT  -monitor for now, leg elevation when possible    Immunotherapy induced hepatitis  -has hx of hep B, but PCR negative. Hep C pending  -started on prednisone 80 mg po daily on 9/10/21  -LFTs improving, feeling much better. Will continue prednisone 80 mg daily through 9/20, then decrease to 60 mg daily, then repeat labs and evaluation on 9/23. Reviewed the plan for a slow steroid taper to avoid rebound hepatitis. Tolerating well, taking with food.    Abdominal pain likely s/t malignancy,exacerbated by hepatitis  -now resolved on prednisone  -has tramadol to use prn     Neuropathy in the toes  -not sure on cause. Seemed to start with his other symptoms. Possible it relates to the same inflammatory process from immunotherapy, but not typical. Not worsening, monitor.        Again, thank you for allowing me to participate in the care of your patient.        Sincerely,        MITCH Jacobs CNP

## 2021-09-19 LAB — HCV RNA SERPL NAA+PROBE-ACNC: NOT DETECTED IU/ML

## 2021-09-23 ENCOUNTER — ONCOLOGY VISIT (OUTPATIENT)
Dept: ONCOLOGY | Facility: CLINIC | Age: 56
End: 2021-09-23
Attending: NURSE PRACTITIONER
Payer: COMMERCIAL

## 2021-09-23 ENCOUNTER — APPOINTMENT (OUTPATIENT)
Dept: LAB | Facility: CLINIC | Age: 56
End: 2021-09-23
Attending: NURSE PRACTITIONER
Payer: COMMERCIAL

## 2021-09-23 VITALS
SYSTOLIC BLOOD PRESSURE: 146 MMHG | BODY MASS INDEX: 23.99 KG/M2 | TEMPERATURE: 97.9 F | OXYGEN SATURATION: 100 % | HEART RATE: 68 BPM | WEIGHT: 149.3 LBS | RESPIRATION RATE: 14 BRPM | DIASTOLIC BLOOD PRESSURE: 81 MMHG | HEIGHT: 66 IN

## 2021-09-23 DIAGNOSIS — C22.0 HCC (HEPATOCELLULAR CARCINOMA) (H): ICD-10-CM

## 2021-09-23 DIAGNOSIS — R79.89 ELEVATED LFTS: ICD-10-CM

## 2021-09-23 LAB
ALBUMIN SERPL-MCNC: 2.7 G/DL (ref 3.4–5)
ALP SERPL-CCNC: 145 U/L (ref 40–150)
ALT SERPL W P-5'-P-CCNC: 118 U/L (ref 0–70)
ANION GAP SERPL CALCULATED.3IONS-SCNC: 10 MMOL/L (ref 3–14)
AST SERPL W P-5'-P-CCNC: 50 U/L (ref 0–45)
BILIRUB SERPL-MCNC: 0.5 MG/DL (ref 0.2–1.3)
BUN SERPL-MCNC: 13 MG/DL (ref 7–30)
CALCIUM SERPL-MCNC: 8.2 MG/DL (ref 8.5–10.1)
CHLORIDE BLD-SCNC: 106 MMOL/L (ref 94–109)
CO2 SERPL-SCNC: 24 MMOL/L (ref 20–32)
CREAT SERPL-MCNC: 0.69 MG/DL (ref 0.66–1.25)
GFR SERPL CREATININE-BSD FRML MDRD: >90 ML/MIN/1.73M2
GLUCOSE BLD-MCNC: 85 MG/DL (ref 70–99)
HOLD SPECIMEN: NORMAL
HOLD SPECIMEN: NORMAL
POTASSIUM BLD-SCNC: 3.5 MMOL/L (ref 3.4–5.3)
PROT SERPL-MCNC: 6.6 G/DL (ref 6.8–8.8)
SODIUM SERPL-SCNC: 140 MMOL/L (ref 133–144)

## 2021-09-23 PROCEDURE — G0463 HOSPITAL OUTPT CLINIC VISIT: HCPCS

## 2021-09-23 PROCEDURE — 36415 COLL VENOUS BLD VENIPUNCTURE: CPT | Performed by: NURSE PRACTITIONER

## 2021-09-23 PROCEDURE — 99214 OFFICE O/P EST MOD 30 MIN: CPT | Performed by: NURSE PRACTITIONER

## 2021-09-23 PROCEDURE — 80053 COMPREHEN METABOLIC PANEL: CPT | Performed by: NURSE PRACTITIONER

## 2021-09-23 ASSESSMENT — PAIN SCALES - GENERAL: PAINLEVEL: NO PAIN (0)

## 2021-09-23 ASSESSMENT — MIFFLIN-ST. JEOR: SCORE: 1444.97

## 2021-09-23 NOTE — NURSING NOTE
"Oncology Rooming Note    September 23, 2021 8:25 AM   Preeti Pascual is a 55 year old male who presents for:    Chief Complaint   Patient presents with     Blood Draw     Labs drawn via  by RN in lab. VS taken.      Oncology Clinic Visit     hepatocellular carcinoma     Initial Vitals: BP (!) 146/81   Pulse 68   Temp 97.9  F (36.6  C) (Tympanic)   Resp 14   Ht 1.676 m (5' 6\")   Wt 67.7 kg (149 lb 4.8 oz)   SpO2 100%   BMI 24.10 kg/m   Estimated body mass index is 24.1 kg/m  as calculated from the following:    Height as of this encounter: 1.676 m (5' 6\").    Weight as of this encounter: 67.7 kg (149 lb 4.8 oz). Body surface area is 1.78 meters squared.  No Pain (0) Comment: Data Unavailable   No LMP for male patient.  Allergies reviewed: Yes  Medications reviewed: Yes    Medications: Medication refills not needed today.  Pharmacy name entered into [facility]:    [id number] - [place], [place] - [address] AT [facility] & [address] [place] [address] - [address] [place] [address] [phone]    Clinical concerns: Has a few questions        Logan Kellogg MA            "

## 2021-09-23 NOTE — LETTER
9/23/2021         RE: Preeti Pascual  [address]  [place] [address]        Dear Colleague,    Thank you for referring your patient, Preeti Pascual, to the [facility]. Please see a copy of my visit note below.    Reason for Visit: f/u HCC and immunotherapy related hepatitis    Oncology HPI:   Preeti Pascual is a 55 year old man with a PMH of hep. B and cirrhosis. He was previously treated with radioembolization in 2016 with an excellent response. He declined a liver transplantation. 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 He was initiated Sorafenib, but tolerated it poorly and was dosed at 200 mg bid. He was found to have progression and initiated on Nivolumab 480 mg monthly on 11/1/18. Ipi/Nivo was started in May when he was found to have progression.  On 8/19/21, he was found to have progression and started on ramucirumab.    He was found to have acute elevation of transaminases on 9/9/21. Hep B was negative. He was started on prednisone 80 mg/day.     Interval history: Feeling well. No pain. Appetite and energy remain good. No leg aches, no change to neuropathy in the toes, not significantly worsening. Is concerned about his cancer treatment as he isn't sure he is able to continue with the Ramucirumab with side effects.    Current Outpatient Medications   Medication Sig Dispense Refill     ferrous gluconate (FERGON) 324 (38 Fe) MG tablet Take 1 tablet (324 mg) by mouth 2 times daily (with meals) (Patient not taking: Reported on 5/10/2021) 60 tablet 1     fluticasone (FLONASE) 50 MCG/ACT nasal spray USE 2 SPRAYS IN EACH NOSTRIL ONCE DAILY (Patient not taking: Reported on 9/9/2021) 16 g 1     predniSONE (DELTASONE) 20 MG tablet Start 80 mg po daily, take in AM with food. Will direct on a taper after reviewing labs on Thursday 100 tablet 0     traMADol (ULTRAM) 50 MG tablet Take 1 tablet (50 mg) by mouth every 6 hours as needed for severe  "pain (Patient not taking: Reported on 9/16/2021) 50 tablet 0     VENTOLIN  (90 Base) MCG/ACT inhaler 1-2 INHALATIONS EVERY 4-6 HOURS AS NEEDED FOR WHEEZING. DISPENSE SPACER AS NEEDED. (Patient not taking: Reported on 9/9/2021)  0        No Known Allergies      Exam: alert, appears well.  Blood pressure (!) 146/81, pulse 68, temperature 97.9  F (36.6  C), temperature source Tympanic, resp. rate 14, height 1.676 m (5' 6\"), weight 67.7 kg (149 lb 4.8 oz), SpO2 100 %.  Wt Readings from Last 4 Encounters:   09/16/21 67.4 kg (148 lb 9.6 oz)   09/16/21 67.4 kg (148 lb 9.6 oz)   09/09/21 68.9 kg (151 lb 14.4 oz)   09/02/21 69.6 kg (153 lb 8 oz)   No icterus. No abdominal pain on palpation. BLE edema has resolved.    Labs: Results for TOD RICKS (MRN [id number]) as of 9/23/2021 18:38   Ref. Range 9/23/2021 08:12   Sodium Latest Ref Range: 133 - 144 mmol/L 140   Potassium Latest Ref Range: 3.4 - 5.3 mmol/L 3.5   Chloride Latest Ref Range: 94 - 109 mmol/L 106   Carbon Dioxide Latest Ref Range: 20 - 32 mmol/L 24   Urea Nitrogen Latest Ref Range: 7 - 30 mg/dL 13   Creatinine Latest Ref Range: 0.66 - 1.25 mg/dL 0.69   GFR Estimate Latest Ref Range: >60 mL/min/1.73m2 >90   Calcium Latest Ref Range: 8.5 - 10.1 mg/dL 8.2 (L)   Anion Gap Latest Ref Range: 3 - 14 mmol/L 10   Albumin Latest Ref Range: 3.4 - 5.0 g/dL 2.7 (L)   Protein Total Latest Ref Range: 6.8 - 8.8 g/dL 6.6 (L)   Bilirubin Total Latest Ref Range: 0.2 - 1.3 mg/dL 0.5   Alkaline Phosphatase Latest Ref Range: 40 - 150 U/L 145   ALT Latest Ref Range: 0 - 70 U/L 118 (H)   AST Latest Ref Range: 0 - 45 U/L 50 (H)   Glucose Latest Ref Range: 70 - 99 mg/dL 85       Imaging: n/a    Impression/plan:   Immunotherapy induced hepatitis  -has hx of hep B, but PCR negative. Hep C pending  -started on prednisone 80 mg po daily on 9/10/21 with improvement in LFTs. Tolerating taper. LFTs and symtoms continue to improve with the taper. Will decrease to 40 mg starting on " 9/27/21.  -explained that this hepatitis is not related to the ramucirumab, but to the prior immunotherapy. I would anticipate being able to resume ramucirumab if his LFTs remain stable    Metastatic HCC  -on ramucirumab after progression on sorafenib and immunotherapy  -was due for a dose last week, but held s/t complications of immune hepatitis  -we are seeing improvement in the hepatitis. Will continue prednisone taper as above  I will see him next week prior to scheduled infusion. If LFTs are stable with the prednisone taper, plan to resume the ramucirumab    Leg edema-mild  -US reviewed, negative for DVT  -resolved now    Abdominal pain likely s/t malignancy,exacerbated by hepatitis  -resolved  -has tramadol to use prn      Neuropathy in the toes  -not sure on cause. Seemed to start with his other symptoms of immunotoxicity. Not anticipated to be a side effect of ramucirumab        Again, thank you for allowing me to participate in the care of your patient.        Sincerely,        MITCH Jacobs CNP

## 2021-09-23 NOTE — PROGRESS NOTES
Reason for Visit: f/u HCC and immunotherapy related hepatitis    Oncology HPI:   Preeti Pascual is a 55 year old man with a PMH of hep. B and cirrhosis. He was previously treated with radioembolization in 2016 with an excellent response. He declined a liver transplantation. 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 He was initiated Sorafenib, but tolerated it poorly and was dosed at 200 mg bid. He was found to have progression and initiated on Nivolumab 480 mg monthly on 11/1/18. Ipi/Nivo was started in May when he was found to have progression.  On 8/19/21, he was found to have progression and started on ramucirumab.    He was found to have acute elevation of transaminases on 9/9/21. Hep B was negative. He was started on prednisone 80 mg/day.     Interval history: Feeling well. No pain. Appetite and energy remain good. No leg aches, no change to neuropathy in the toes, not significantly worsening. Is concerned about his cancer treatment as he isn't sure he is able to continue with the Ramucirumab with side effects.    Current Outpatient Medications   Medication Sig Dispense Refill     ferrous gluconate (FERGON) 324 (38 Fe) MG tablet Take 1 tablet (324 mg) by mouth 2 times daily (with meals) (Patient not taking: Reported on 5/10/2021) 60 tablet 1     fluticasone (FLONASE) 50 MCG/ACT nasal spray USE 2 SPRAYS IN EACH NOSTRIL ONCE DAILY (Patient not taking: Reported on 9/9/2021) 16 g 1     predniSONE (DELTASONE) 20 MG tablet Start 80 mg po daily, take in AM with food. Will direct on a taper after reviewing labs on Thursday 100 tablet 0     traMADol (ULTRAM) 50 MG tablet Take 1 tablet (50 mg) by mouth every 6 hours as needed for severe pain (Patient not taking: Reported on 9/16/2021) 50 tablet 0     VENTOLIN  (90 Base) MCG/ACT inhaler 1-2 INHALATIONS EVERY 4-6 HOURS AS NEEDED FOR WHEEZING. DISPENSE SPACER AS NEEDED. (Patient not taking: Reported on 9/9/2021)  0        No Known  "Allergies      Exam: alert, appears well.  Blood pressure (!) 146/81, pulse 68, temperature 97.9  F (36.6  C), temperature source Tympanic, resp. rate 14, height 1.676 m (5' 6\"), weight 67.7 kg (149 lb 4.8 oz), SpO2 100 %.  Wt Readings from Last 4 Encounters:   09/16/21 67.4 kg (148 lb 9.6 oz)   09/16/21 67.4 kg (148 lb 9.6 oz)   09/09/21 68.9 kg (151 lb 14.4 oz)   09/02/21 69.6 kg (153 lb 8 oz)   No icterus. No abdominal pain on palpation. BLE edema has resolved.    Labs: Results for TOD RICKS (MRN [id number]) as of 9/23/2021 18:38   Ref. Range 9/23/2021 08:12   Sodium Latest Ref Range: 133 - 144 mmol/L 140   Potassium Latest Ref Range: 3.4 - 5.3 mmol/L 3.5   Chloride Latest Ref Range: 94 - 109 mmol/L 106   Carbon Dioxide Latest Ref Range: 20 - 32 mmol/L 24   Urea Nitrogen Latest Ref Range: 7 - 30 mg/dL 13   Creatinine Latest Ref Range: 0.66 - 1.25 mg/dL 0.69   GFR Estimate Latest Ref Range: >60 mL/min/1.73m2 >90   Calcium Latest Ref Range: 8.5 - 10.1 mg/dL 8.2 (L)   Anion Gap Latest Ref Range: 3 - 14 mmol/L 10   Albumin Latest Ref Range: 3.4 - 5.0 g/dL 2.7 (L)   Protein Total Latest Ref Range: 6.8 - 8.8 g/dL 6.6 (L)   Bilirubin Total Latest Ref Range: 0.2 - 1.3 mg/dL 0.5   Alkaline Phosphatase Latest Ref Range: 40 - 150 U/L 145   ALT Latest Ref Range: 0 - 70 U/L 118 (H)   AST Latest Ref Range: 0 - 45 U/L 50 (H)   Glucose Latest Ref Range: 70 - 99 mg/dL 85       Imaging: n/a    Impression/plan:   Immunotherapy induced hepatitis  -has hx of hep B, but PCR negative. Hep C pending  -started on prednisone 80 mg po daily on 9/10/21 with improvement in LFTs. Tolerating taper. LFTs and symtoms continue to improve with the taper. Will decrease to 40 mg starting on 9/27/21.  -explained that this hepatitis is not related to the ramucirumab, but to the prior immunotherapy. I would anticipate being able to resume ramucirumab if his LFTs remain stable    Metastatic HCC  -on ramucirumab after progression on sorafenib and " immunotherapy  -was due for a dose last week, but held s/t complications of immune hepatitis  -we are seeing improvement in the hepatitis. Will continue prednisone taper as above  I will see him next week prior to scheduled infusion. If LFTs are stable with the prednisone taper, plan to resume the ramucirumab    Leg edema-mild  -US reviewed, negative for DVT  -resolved now    Abdominal pain likely s/t malignancy,exacerbated by hepatitis  -resolved  -has tramadol to use prn      Neuropathy in the toes  -not sure on cause. Seemed to start with his other symptoms of immunotoxicity. Not anticipated to be a side effect of ramucirumab

## 2021-09-23 NOTE — NURSING NOTE
Chief Complaint   Patient presents with     Blood Draw     Labs drawn via  by RN in lab. VS taken.      Labs drawn via venipuncture. Vital signs taken. Checked into next appointment.   Laura Alonzo RN

## 2021-09-30 ENCOUNTER — LAB (OUTPATIENT)
Dept: LAB | Facility: CLINIC | Age: 56
End: 2021-09-30
Attending: NURSE PRACTITIONER
Payer: COMMERCIAL

## 2021-09-30 ENCOUNTER — ONCOLOGY VISIT (OUTPATIENT)
Dept: ONCOLOGY | Facility: CLINIC | Age: 56
End: 2021-09-30
Attending: NURSE PRACTITIONER
Payer: COMMERCIAL

## 2021-09-30 ENCOUNTER — INFUSION THERAPY VISIT (OUTPATIENT)
Dept: ONCOLOGY | Facility: CLINIC | Age: 56
End: 2021-09-30
Attending: INTERNAL MEDICINE
Payer: COMMERCIAL

## 2021-09-30 VITALS
HEART RATE: 74 BPM | BODY MASS INDEX: 24.4 KG/M2 | SYSTOLIC BLOOD PRESSURE: 134 MMHG | OXYGEN SATURATION: 100 % | TEMPERATURE: 97.7 F | WEIGHT: 151.2 LBS | RESPIRATION RATE: 16 BRPM | DIASTOLIC BLOOD PRESSURE: 75 MMHG

## 2021-09-30 DIAGNOSIS — C22.0 HCC (HEPATOCELLULAR CARCINOMA) (H): Primary | ICD-10-CM

## 2021-09-30 DIAGNOSIS — R79.89 ELEVATED LFTS: ICD-10-CM

## 2021-09-30 LAB
ALBUMIN SERPL-MCNC: 2.8 G/DL (ref 3.4–5)
ALBUMIN UR-MCNC: NEGATIVE MG/DL
ALP SERPL-CCNC: 112 U/L (ref 40–150)
ALT SERPL W P-5'-P-CCNC: 72 U/L (ref 0–70)
ANION GAP SERPL CALCULATED.3IONS-SCNC: 6 MMOL/L (ref 3–14)
AST SERPL W P-5'-P-CCNC: 37 U/L (ref 0–45)
BILIRUB SERPL-MCNC: 0.4 MG/DL (ref 0.2–1.3)
BUN SERPL-MCNC: 13 MG/DL (ref 7–30)
CALCIUM SERPL-MCNC: 8.1 MG/DL (ref 8.5–10.1)
CHLORIDE BLD-SCNC: 110 MMOL/L (ref 94–109)
CO2 SERPL-SCNC: 25 MMOL/L (ref 20–32)
CREAT SERPL-MCNC: 0.62 MG/DL (ref 0.66–1.25)
GFR SERPL CREATININE-BSD FRML MDRD: >90 ML/MIN/1.73M2
GLUCOSE BLD-MCNC: 87 MG/DL (ref 70–99)
POTASSIUM BLD-SCNC: 3.7 MMOL/L (ref 3.4–5.3)
PROT SERPL-MCNC: 6.5 G/DL (ref 6.8–8.8)
SODIUM SERPL-SCNC: 141 MMOL/L (ref 133–144)

## 2021-09-30 PROCEDURE — 250N000013 HC RX MED GY IP 250 OP 250 PS 637: Performed by: NURSE PRACTITIONER

## 2021-09-30 PROCEDURE — 999N000127 HC STATISTIC PERIPHERAL IV START W US GUIDANCE

## 2021-09-30 PROCEDURE — 96413 CHEMO IV INFUSION 1 HR: CPT

## 2021-09-30 PROCEDURE — 99214 OFFICE O/P EST MOD 30 MIN: CPT | Performed by: NURSE PRACTITIONER

## 2021-09-30 PROCEDURE — 36415 COLL VENOUS BLD VENIPUNCTURE: CPT | Performed by: NURSE PRACTITIONER

## 2021-09-30 PROCEDURE — G0463 HOSPITAL OUTPT CLINIC VISIT: HCPCS

## 2021-09-30 PROCEDURE — 258N000003 HC RX IP 258 OP 636: Performed by: NURSE PRACTITIONER

## 2021-09-30 PROCEDURE — 80053 COMPREHEN METABOLIC PANEL: CPT | Performed by: NURSE PRACTITIONER

## 2021-09-30 PROCEDURE — 81003 URINALYSIS AUTO W/O SCOPE: CPT | Performed by: NURSE PRACTITIONER

## 2021-09-30 PROCEDURE — 250N000011 HC RX IP 250 OP 636: Performed by: NURSE PRACTITIONER

## 2021-09-30 RX ORDER — ALBUTEROL SULFATE 0.83 MG/ML
2.5 SOLUTION RESPIRATORY (INHALATION)
Status: CANCELLED | OUTPATIENT
Start: 2021-09-30

## 2021-09-30 RX ORDER — ALBUTEROL SULFATE 90 UG/1
1-2 AEROSOL, METERED RESPIRATORY (INHALATION)
Status: CANCELLED
Start: 2021-09-30

## 2021-09-30 RX ORDER — DIPHENHYDRAMINE HYDROCHLORIDE 50 MG/ML
50 INJECTION INTRAMUSCULAR; INTRAVENOUS
Status: CANCELLED
Start: 2021-09-30

## 2021-09-30 RX ORDER — METHYLPREDNISOLONE SODIUM SUCCINATE 125 MG/2ML
125 INJECTION, POWDER, LYOPHILIZED, FOR SOLUTION INTRAMUSCULAR; INTRAVENOUS
Status: CANCELLED
Start: 2021-09-30

## 2021-09-30 RX ORDER — MEPERIDINE HYDROCHLORIDE 25 MG/ML
25 INJECTION INTRAMUSCULAR; INTRAVENOUS; SUBCUTANEOUS EVERY 30 MIN PRN
Status: CANCELLED | OUTPATIENT
Start: 2021-09-30

## 2021-09-30 RX ORDER — DIPHENHYDRAMINE HCL 25 MG
50 CAPSULE ORAL ONCE
Status: COMPLETED | OUTPATIENT
Start: 2021-09-30 | End: 2021-09-30

## 2021-09-30 RX ORDER — LORAZEPAM 2 MG/ML
0.5 INJECTION INTRAMUSCULAR EVERY 4 HOURS PRN
Status: CANCELLED | OUTPATIENT
Start: 2021-09-30

## 2021-09-30 RX ORDER — HEPARIN SODIUM,PORCINE 10 UNIT/ML
5 VIAL (ML) INTRAVENOUS
Status: CANCELLED | OUTPATIENT
Start: 2021-09-30

## 2021-09-30 RX ORDER — EPINEPHRINE 1 MG/ML
0.3 INJECTION, SOLUTION INTRAMUSCULAR; SUBCUTANEOUS EVERY 5 MIN PRN
Status: CANCELLED | OUTPATIENT
Start: 2021-09-30

## 2021-09-30 RX ORDER — HEPARIN SODIUM (PORCINE) LOCK FLUSH IV SOLN 100 UNIT/ML 100 UNIT/ML
5 SOLUTION INTRAVENOUS
Status: CANCELLED | OUTPATIENT
Start: 2021-09-30

## 2021-09-30 RX ORDER — DIPHENHYDRAMINE HCL 25 MG
50 CAPSULE ORAL ONCE
Status: CANCELLED
Start: 2021-09-30

## 2021-09-30 RX ORDER — NALOXONE HYDROCHLORIDE 0.4 MG/ML
0.2 INJECTION, SOLUTION INTRAMUSCULAR; INTRAVENOUS; SUBCUTANEOUS
Status: CANCELLED | OUTPATIENT
Start: 2021-09-30

## 2021-09-30 RX ADMIN — SODIUM CHLORIDE 600 MG: 9 INJECTION, SOLUTION INTRAVENOUS at 09:40

## 2021-09-30 RX ADMIN — DIPHENHYDRAMINE HYDROCHLORIDE 50 MG: 25 CAPSULE ORAL at 09:06

## 2021-09-30 RX ADMIN — SODIUM CHLORIDE 250 ML: 9 INJECTION, SOLUTION INTRAVENOUS at 09:06

## 2021-09-30 ASSESSMENT — PAIN SCALES - GENERAL: PAINLEVEL: NO PAIN (0)

## 2021-09-30 NOTE — PROGRESS NOTES
Reason for Visit: seen in f/u of HCC, immunotherapy related hepatitis    Oncology HPI:   Preeti Pascual is a 55 year old man with a PMH of hep. B and cirrhosis. He was previously treated with radioembolization in 2016 with an excellent response. He declined a liver transplantation. 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 He was initiated Sorafenib, but tolerated it poorly and was dosed at 200 mg bid. He was found to have progression and initiated on Nivolumab 480 mg monthly on 11/1/18. Ipi/Nivo was started in May when he was found to have progression.  On 8/19/21, he was found to have progression and started on ramucirumab.     He was found to have acute elevation of transaminases on 9/9/21. Hep B was negative. 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 His LFTs improved and he has been on a prednisone taper.  He returns for ongoing follow-up and consideration to resume ramucirumab.    Interval history: Feeling well today. Tolerating the taper down on prednisone. Has not had recurrence of pain, fatigue, edema. Energy is good. Appetite has improved. Still has neuropathy in the toes, unchanged. No fevers/chills. Isn't sure he wants to continue ramucirumab. Concerned about side effects and wants to discuss alternate treatments.    Current Outpatient Medications   Medication Sig Dispense Refill     ferrous gluconate (FERGON) 324 (38 Fe) MG tablet Take 1 tablet (324 mg) by mouth 2 times daily (with meals) (Patient not taking: Reported on 5/10/2021) 60 tablet 1     fluticasone (FLONASE) 50 MCG/ACT nasal spray USE 2 SPRAYS IN EACH NOSTRIL ONCE DAILY (Patient not taking: Reported on 9/9/2021) 16 g 1     predniSONE (DELTASONE) 20 MG tablet Start 80 mg po daily, take in AM with food. Will direct on a taper after reviewing labs on Thursday 100 tablet 0     traMADol (ULTRAM) 50 MG tablet Take 1 tablet (50 mg) by mouth every 6 hours as needed for severe pain (Patient not taking:  Reported on 9/16/2021) 50 tablet 0     VENTOLIN  (90 Base) MCG/ACT inhaler 1-2 INHALATIONS EVERY 4-6 HOURS AS NEEDED FOR WHEEZING. DISPENSE SPACER AS NEEDED. (Patient not taking: Reported on 9/9/2021)  0        No Known Allergies      Exam: alert, appears well.  Blood pressure 134/75, pulse 74, temperature 97.7  F (36.5  C), temperature source Tympanic, resp. rate 16, weight 68.6 kg (151 lb 3.2 oz), SpO2 100 %.  Wt Readings from Last 4 Encounters:   09/23/21 67.7 kg (149 lb 4.8 oz)   09/16/21 67.4 kg (148 lb 9.6 oz)   09/16/21 67.4 kg (148 lb 9.6 oz)   09/09/21 68.9 kg (151 lb 14.4 oz)     No icterus. Abdomen: soft, nontender. Extremities: no edema.    Labs: Results for TOD RICKS (MRN [id number]) as of 9/30/2021 09:46   Ref. Range 9/16/2021 07:19 9/16/2021 07:23 9/23/2021 08:12 9/30/2021 07:52   Sodium Latest Ref Range: 133 - 144 mmol/L 142  140 141   Potassium Latest Ref Range: 3.4 - 5.3 mmol/L 3.5  3.5 3.7   Chloride Latest Ref Range: 94 - 109 mmol/L 108  106 110 (H)   Carbon Dioxide Latest Ref Range: 20 - 32 mmol/L 25  24 25   Urea Nitrogen Latest Ref Range: 7 - 30 mg/dL 13  13 13   Creatinine Latest Ref Range: 0.66 - 1.25 mg/dL 0.68  0.69 0.62 (L)   GFR Estimate Latest Ref Range: >60 mL/min/1.73m2 >90  >90 >90   Calcium Latest Ref Range: 8.5 - 10.1 mg/dL 8.3 (L)  8.2 (L) 8.1 (L)   Anion Gap Latest Ref Range: 3 - 14 mmol/L 9  10 6   Albumin Latest Ref Range: 3.4 - 5.0 g/dL 2.6 (L)  2.7 (L) 2.8 (L)   Protein Total Latest Ref Range: 6.8 - 8.8 g/dL 6.9  6.6 (L) 6.5 (L)   Bilirubin Total Latest Ref Range: 0.2 - 1.3 mg/dL 0.5  0.5 0.4   Alkaline Phosphatase Latest Ref Range: 40 - 150 U/L 161 (H)  145 112   ALT Latest Ref Range: 0 - 70 U/L 297 (H)  118 (H) 72 (H)   AST Latest Ref Range: 0 - 45 U/L 153 (H)  50 (H) 37   Alpha Fetoprotein Latest Ref Range: 0.0 - 8.0 ug/L 690.0 (H)      Glucose Latest Ref Range: 70 - 99 mg/dL 86  85 87   WBC Latest Ref Range: 4.0 - 11.0 10e3/uL 5.5      Hemoglobin Latest Ref  Range: 13.3 - 17.7 g/dL 10.2 (L)      Hematocrit Latest Ref Range: 40.0 - 53.0 % 33.1 (L)      Platelet Count Latest Ref Range: 150 - 450 10e3/uL 260      RBC Count Latest Ref Range: 4.40 - 5.90 10e6/uL 4.21 (L)      MCV Latest Ref Range: 78 - 100 fL 79      MCH Latest Ref Range: 26.5 - 33.0 pg 24.2 (L)      MCHC Latest Ref Range: 31.5 - 36.5 g/dL 30.8 (L)      RDW Latest Ref Range: 10.0 - 15.0 % 17.5 (H)      % Neutrophils Latest Units: % 51      % Lymphocytes Latest Units: % 36      % Monocytes Latest Units: % 13      % Eosinophils Latest Units: % 0      Absolute Basophils Latest Ref Range: 0.0 - 0.2 10e3/uL 0.0      % Basophils Latest Units: % 0      Absolute Eosinophils Latest Ref Range: 0.0 - 0.7 10e3/uL 0.0      Absolute Immature Granulocytes Latest Ref Range: <=0.0 10e3/uL 0.0      Absolute Lymphocytes Latest Ref Range: 0.8 - 5.3 10e3/uL 2.0      Absolute Monocytes Latest Ref Range: 0.0 - 1.3 10e3/uL 0.7      % Immature Granulocytes Latest Units: % 0      Absolute Neutrophils Latest Ref Range: 1.6 - 8.3 10e3/uL 2.8      Absolute NRBCs Latest Units: 10e3/uL 0.0      NRBCs per 100 WBC Latest Ref Range: <1 /100 0      Protein Albumin Urine Latest Ref Range: Negative mg/dL  Negative  Negative   HCV RNA Quant IU/ml Latest Ref Range: Not Detected IU/mL Not Detected        Results for TOD RICKS (MRN [id number]) as of 9/30/2021 09:46   Ref. Range 6/17/2021 08:05 8/12/2021 09:56 8/19/2021 11:40 9/16/2021 07:19   Alpha Fetoprotein Latest Ref Range: 0.0 - 8.0 ug/L 206.7 (H) 470.0 (H) 493.6 (H) 690.0 (H)     Imaging: n/a    Impression/plan:     Metastatic HCC  -on ramucirumab after progression on sorafenib and immunotherapy  -dose 3 was held due to complications of immune hepatitis. The hepatitis is now improved, he is tolerating the prednisone taper. OK to resume ramucirumab today. Discussed his concerns. Initially, he thought the elevated LFTs were related to the ramucirumab. Discussed that it was related to  prior immunotherapy, not likely the current treatment. He would like to consider other options. Discussed that we would like to see how he is responding to this treatment before switching therapies. His AFP has been trending up, but that might be elevated some due to his acute hepatitis that is now resolving  -after reviewing options, he was ok to continue. Will plan for restaging CT scans next week and have f/u with Dr. Kyle to review results and discuss future treatment.    Immunotherapy induced hepatitis  -has hx of hep B, but PCR negative.   -started on prednisone 80 mg po daily on 9/10/21 with improvement in LFTs. Continued improvement with the taper, currently on prednisone 40 mg daily.   -recommended he continue daily through 10/3/21, then decrease to 20 mg daily. Recheck labs and f/u with me on 10/7/21. If labs are stable, will then discontinue prednisone and recheck labs at the 10/11/21 visit with Dr. Kyle  -reviewed risk of recurrent hepatitis as he is off of prednisone. Should let us know if he starts to have pain, fatigue, fevers again.      Neuropathy in the toes  -unclear cause. Started at the same time as the hepatitis, but seems unlikely to be related unless it was another immunotoxicity. Not significantly changed.

## 2021-09-30 NOTE — LETTER
9/30/2021         RE: Preeti Pascual  [address]  [place] [address]        Dear Colleague,    Thank you for referring your patient, Preeti Pascual, to the [facility]. Please see a copy of my visit note below.    Reason for Visit: seen in f/u of HCC, immunotherapy related hepatitis    Oncology HPI:   Preeti Pascual is a 55 year old man with a PMH of hep. B and cirrhosis. He was previously treated with radioembolization in 2016 with an excellent response. He declined a liver transplantation. 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 He was initiated Sorafenib, but tolerated it poorly and was dosed at 200 mg bid. He was found to have progression and initiated on Nivolumab 480 mg monthly on 11/1/18. Ipi/Nivo was started in May when he was found to have progression.  On 8/19/21, he was found to have progression and started on ramucirumab.     He was found to have acute elevation of transaminases on 9/9/21. Hep B was negative. 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 His LFTs improved and he has been on a prednisone taper.  He returns for ongoing follow-up and consideration to resume ramucirumab.    Interval history: Feeling well today. Tolerating the taper down on prednisone. Has not had recurrence of pain, fatigue, edema. Energy is good. Appetite has improved. Still has neuropathy in the toes, unchanged. No fevers/chills. Isn't sure he wants to continue ramucirumab. Concerned about side effects and wants to discuss alternate treatments.    Current Outpatient Medications   Medication Sig Dispense Refill     ferrous gluconate (FERGON) 324 (38 Fe) MG tablet Take 1 tablet (324 mg) by mouth 2 times daily (with meals) (Patient not taking: Reported on 5/10/2021) 60 tablet 1     fluticasone (FLONASE) 50 MCG/ACT nasal spray USE 2 SPRAYS IN EACH NOSTRIL ONCE DAILY (Patient not taking: Reported on 9/9/2021) 16 g 1     predniSONE  (DELTASONE) 20 MG tablet Start 80 mg po daily, take in AM with food. Will direct on a taper after reviewing labs on Thursday 100 tablet 0     traMADol (ULTRAM) 50 MG tablet Take 1 tablet (50 mg) by mouth every 6 hours as needed for severe pain (Patient not taking: Reported on 9/16/2021) 50 tablet 0     VENTOLIN  (90 Base) MCG/ACT inhaler 1-2 INHALATIONS EVERY 4-6 HOURS AS NEEDED FOR WHEEZING. DISPENSE SPACER AS NEEDED. (Patient not taking: Reported on 9/9/2021)  0        No Known Allergies      Exam: alert, appears well.  Blood pressure 134/75, pulse 74, temperature 97.7  F (36.5  C), temperature source Tympanic, resp. rate 16, weight 68.6 kg (151 lb 3.2 oz), SpO2 100 %.  Wt Readings from Last 4 Encounters:   09/23/21 67.7 kg (149 lb 4.8 oz)   09/16/21 67.4 kg (148 lb 9.6 oz)   09/16/21 67.4 kg (148 lb 9.6 oz)   09/09/21 68.9 kg (151 lb 14.4 oz)     No icterus. Abdomen: soft, nontender. Extremities: no edema.    Labs: Results for TOD RICKS (MRN [id number]) as of 9/30/2021 09:46   Ref. Range 9/16/2021 07:19 9/16/2021 07:23 9/23/2021 08:12 9/30/2021 07:52   Sodium Latest Ref Range: 133 - 144 mmol/L 142  140 141   Potassium Latest Ref Range: 3.4 - 5.3 mmol/L 3.5  3.5 3.7   Chloride Latest Ref Range: 94 - 109 mmol/L 108  106 110 (H)   Carbon Dioxide Latest Ref Range: 20 - 32 mmol/L 25  24 25   Urea Nitrogen Latest Ref Range: 7 - 30 mg/dL 13  13 13   Creatinine Latest Ref Range: 0.66 - 1.25 mg/dL 0.68  0.69 0.62 (L)   GFR Estimate Latest Ref Range: >60 mL/min/1.73m2 >90  >90 >90   Calcium Latest Ref Range: 8.5 - 10.1 mg/dL 8.3 (L)  8.2 (L) 8.1 (L)   Anion Gap Latest Ref Range: 3 - 14 mmol/L 9  10 6   Albumin Latest Ref Range: 3.4 - 5.0 g/dL 2.6 (L)  2.7 (L) 2.8 (L)   Protein Total Latest Ref Range: 6.8 - 8.8 g/dL 6.9  6.6 (L) 6.5 (L)   Bilirubin Total Latest Ref Range: 0.2 - 1.3 mg/dL 0.5  0.5 0.4   Alkaline Phosphatase Latest Ref Range: 40 - 150 U/L 161 (H)  145 112   ALT Latest Ref Range: 0 - 70 U/L 297  (H)  118 (H) 72 (H)   AST Latest Ref Range: 0 - 45 U/L 153 (H)  50 (H) 37   Alpha Fetoprotein Latest Ref Range: 0.0 - 8.0 ug/L 690.0 (H)      Glucose Latest Ref Range: 70 - 99 mg/dL 86  85 87   WBC Latest Ref Range: 4.0 - 11.0 10e3/uL 5.5      Hemoglobin Latest Ref Range: 13.3 - 17.7 g/dL 10.2 (L)      Hematocrit Latest Ref Range: 40.0 - 53.0 % 33.1 (L)      Platelet Count Latest Ref Range: 150 - 450 10e3/uL 260      RBC Count Latest Ref Range: 4.40 - 5.90 10e6/uL 4.21 (L)      MCV Latest Ref Range: 78 - 100 fL 79      MCH Latest Ref Range: 26.5 - 33.0 pg 24.2 (L)      MCHC Latest Ref Range: 31.5 - 36.5 g/dL 30.8 (L)      RDW Latest Ref Range: 10.0 - 15.0 % 17.5 (H)      % Neutrophils Latest Units: % 51      % Lymphocytes Latest Units: % 36      % Monocytes Latest Units: % 13      % Eosinophils Latest Units: % 0      Absolute Basophils Latest Ref Range: 0.0 - 0.2 10e3/uL 0.0      % Basophils Latest Units: % 0      Absolute Eosinophils Latest Ref Range: 0.0 - 0.7 10e3/uL 0.0      Absolute Immature Granulocytes Latest Ref Range: <=0.0 10e3/uL 0.0      Absolute Lymphocytes Latest Ref Range: 0.8 - 5.3 10e3/uL 2.0      Absolute Monocytes Latest Ref Range: 0.0 - 1.3 10e3/uL 0.7      % Immature Granulocytes Latest Units: % 0      Absolute Neutrophils Latest Ref Range: 1.6 - 8.3 10e3/uL 2.8      Absolute NRBCs Latest Units: 10e3/uL 0.0      NRBCs per 100 WBC Latest Ref Range: <1 /100 0      Protein Albumin Urine Latest Ref Range: Negative mg/dL  Negative  Negative   HCV RNA Quant IU/ml Latest Ref Range: Not Detected IU/mL Not Detected        Results for TOD RICKS (MRN [id number]) as of 9/30/2021 09:46   Ref. Range 6/17/2021 08:05 8/12/2021 09:56 8/19/2021 11:40 9/16/2021 07:19   Alpha Fetoprotein Latest Ref Range: 0.0 - 8.0 ug/L 206.7 (H) 470.0 (H) 493.6 (H) 690.0 (H)     Imaging: n/a    Impression/plan:     Metastatic HCC  -on ramucirumab after progression on sorafenib and immunotherapy  -dose 3 was held due to  complications of immune hepatitis. The hepatitis is now improved, he is tolerating the prednisone taper. OK to resume ramucirumab today. Discussed his concerns. Initially, he thought the elevated LFTs were related to the ramucirumab. Discussed that it was related to prior immunotherapy, not likely the current treatment. He would like to consider other options. Discussed that we would like to see how he is responding to this treatment before switching therapies. His AFP has been trending up, but that might be elevated some due to his acute hepatitis that is now resolving  -after reviewing options, he was ok to continue. Will plan for restaging CT scans next week and have f/u with Dr. Kyle to review results and discuss future treatment.    Immunotherapy induced hepatitis  -has hx of hep B, but PCR negative.   -started on prednisone 80 mg po daily on 9/10/21 with improvement in LFTs. Continued improvement with the taper, currently on prednisone 40 mg daily.   -recommended he continue daily through 10/3/21, then decrease to 20 mg daily. Recheck labs and f/u with me on 10/7/21. If labs are stable, will then discontinue prednisone and recheck labs at the 10/11/21 visit with Dr. Kyle  -reviewed risk of recurrent hepatitis as he is off of prednisone. Should let us know if he starts to have pain, fatigue, fevers again.      Neuropathy in the toes  -unclear cause. Started at the same time as the hepatitis, but seems unlikely to be related unless it was another immunotoxicity. Not significantly changed.        Again, thank you for allowing me to participate in the care of your patient.        Sincerely,        MITCH Jacobs CNP

## 2021-09-30 NOTE — PROGRESS NOTES
Infusion Nursing Note:  Preeti Pascual presents today for Day 1 Cycle 3 Cyramza.    Patient seen by provider : Yes: Nallely Smiley CNP  Date of visit: 9/30/21      present during visit today: Not Applicable.    Note: Preeti arrives to infusion doing well. He offers no concerns today.     Intravenous Access:  Peripheral IV placed.    Treatment Conditions:    Lab Results   Component Value Date     09/30/2021    POTASSIUM 3.7 09/30/2021    CR 0.62 (L) 09/30/2021    STACEY 8.1 (L) 09/30/2021    BILITOTAL 0.4 09/30/2021    ALBUMIN 2.8 (L) 09/30/2021    ALT 72 (H) 09/30/2021    AST 37 09/30/2021   Urine protein: NEGATIVE    Post Infusion Assessment:  Patient tolerated infusion without incident.  Blood return noted pre and post infusion.  Site patent and intact, free from redness, edema or discomfort.  No evidence of extravasations.  Access discontinued per protocol.     Discharge Plan:   Patient declined prescription refills.  Copy of AVS reviewed with patient and/or family.  Patient will return 10/7 for next appointment.  Patient discharged in stable condition accompanied by: self.  Departure Mode: Ambulatory.    Shruti Campbell RN

## 2021-09-30 NOTE — NURSING NOTE
Chief Complaint   Patient presents with     Blood Draw     labs drawn with piv start by rn.  vs taken     Labs drawn with PIV start by rn.  Pt tolerated well.  VS taken and pt checked in for next appt.    Maricruz Feldman RN

## 2021-10-04 ENCOUNTER — HEALTH MAINTENANCE LETTER (OUTPATIENT)
Age: 56
End: 2021-10-04

## 2021-10-07 ENCOUNTER — LAB (OUTPATIENT)
Dept: LAB | Facility: CLINIC | Age: 56
End: 2021-10-07
Attending: NURSE PRACTITIONER
Payer: COMMERCIAL

## 2021-10-07 ENCOUNTER — ANCILLARY PROCEDURE (OUTPATIENT)
Dept: CT IMAGING | Facility: CLINIC | Age: 56
End: 2021-10-07
Attending: INTERNAL MEDICINE
Payer: COMMERCIAL

## 2021-10-07 ENCOUNTER — ONCOLOGY VISIT (OUTPATIENT)
Dept: ONCOLOGY | Facility: CLINIC | Age: 56
End: 2021-10-07
Attending: NURSE PRACTITIONER
Payer: COMMERCIAL

## 2021-10-07 VITALS
BODY MASS INDEX: 24.41 KG/M2 | RESPIRATION RATE: 16 BRPM | OXYGEN SATURATION: 100 % | HEART RATE: 73 BPM | SYSTOLIC BLOOD PRESSURE: 137 MMHG | DIASTOLIC BLOOD PRESSURE: 75 MMHG | TEMPERATURE: 98.2 F | HEIGHT: 66 IN | WEIGHT: 151.9 LBS

## 2021-10-07 DIAGNOSIS — C22.0 HCC (HEPATOCELLULAR CARCINOMA) (H): ICD-10-CM

## 2021-10-07 DIAGNOSIS — R79.89 ELEVATED LFTS: ICD-10-CM

## 2021-10-07 DIAGNOSIS — C22.0 HCC (HEPATOCELLULAR CARCINOMA) (H): Primary | ICD-10-CM

## 2021-10-07 DIAGNOSIS — K75.9 HEPATITIS: ICD-10-CM

## 2021-10-07 LAB
ALBUMIN SERPL-MCNC: 3 G/DL (ref 3.4–5)
ALP SERPL-CCNC: 132 U/L (ref 40–150)
ALT SERPL W P-5'-P-CCNC: 64 U/L (ref 0–70)
ANION GAP SERPL CALCULATED.3IONS-SCNC: 7 MMOL/L (ref 3–14)
AST SERPL W P-5'-P-CCNC: 35 U/L (ref 0–45)
BILIRUB SERPL-MCNC: 0.5 MG/DL (ref 0.2–1.3)
BUN SERPL-MCNC: 12 MG/DL (ref 7–30)
CALCIUM SERPL-MCNC: 8.5 MG/DL (ref 8.5–10.1)
CHLORIDE BLD-SCNC: 109 MMOL/L (ref 94–109)
CO2 SERPL-SCNC: 25 MMOL/L (ref 20–32)
CREAT SERPL-MCNC: 0.62 MG/DL (ref 0.66–1.25)
GFR SERPL CREATININE-BSD FRML MDRD: >90 ML/MIN/1.73M2
GLUCOSE BLD-MCNC: 84 MG/DL (ref 70–99)
POTASSIUM BLD-SCNC: 3.7 MMOL/L (ref 3.4–5.3)
PROT SERPL-MCNC: 6.6 G/DL (ref 6.8–8.8)
SODIUM SERPL-SCNC: 141 MMOL/L (ref 133–144)

## 2021-10-07 PROCEDURE — 80053 COMPREHEN METABOLIC PANEL: CPT | Mod: 90 | Performed by: PATHOLOGY

## 2021-10-07 PROCEDURE — 99214 OFFICE O/P EST MOD 30 MIN: CPT | Performed by: NURSE PRACTITIONER

## 2021-10-07 PROCEDURE — 36415 COLL VENOUS BLD VENIPUNCTURE: CPT | Performed by: PATHOLOGY

## 2021-10-07 PROCEDURE — 74178 CT ABD&PLV WO CNTR FLWD CNTR: CPT | Mod: GC | Performed by: RADIOLOGY

## 2021-10-07 PROCEDURE — G0463 HOSPITAL OUTPT CLINIC VISIT: HCPCS

## 2021-10-07 PROCEDURE — 71260 CT THORAX DX C+: CPT | Mod: GC | Performed by: RADIOLOGY

## 2021-10-07 RX ORDER — IOPAMIDOL 755 MG/ML
92 INJECTION, SOLUTION INTRAVASCULAR ONCE
Status: COMPLETED | OUTPATIENT
Start: 2021-10-07 | End: 2021-10-07

## 2021-10-07 RX ADMIN — IOPAMIDOL 92 ML: 755 INJECTION, SOLUTION INTRAVASCULAR at 07:56

## 2021-10-07 ASSESSMENT — MIFFLIN-ST. JEOR: SCORE: 1456.51

## 2021-10-07 ASSESSMENT — PAIN SCALES - GENERAL: PAINLEVEL: NO PAIN (0)

## 2021-10-07 NOTE — PROGRESS NOTES
Reason for Visit: seen in f/u of HCC, immunotherapy related hepatitis    Oncology HPI:   Preeti Pascual is a 55 year old man with a PMH of hep. B and cirrhosis. He was previously treated with radioembolization in 2016 with an excellent response. He declined a liver transplantation. 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 He was initiated Sorafenib, but tolerated it poorly and was dosed at 200 mg bid. He was found to have progression and initiated on Nivolumab 480 mg monthly on 11/1/18. Ipi/Nivo was started in May when he was found to have progression.  On 8/19/21, he was found to have progression and started on ramucirumab.     He was found to have acute elevation of transaminases on 9/9/21. Hep B was negative. 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 His LFTs improved and he has been on a prednisone taper.      He resumed ramucirumab on 9/30/21.      Interval history: Feeling well. His last infusion went better than the previous one. Energy is good. Appetite is good. No N/V. No edema. No abdominal pain. No fevers/chills.    Current Outpatient Medications   Medication Sig Dispense Refill     ferrous gluconate (FERGON) 324 (38 Fe) MG tablet Take 1 tablet (324 mg) by mouth 2 times daily (with meals) (Patient not taking: Reported on 5/10/2021) 60 tablet 1     fluticasone (FLONASE) 50 MCG/ACT nasal spray USE 2 SPRAYS IN EACH NOSTRIL ONCE DAILY (Patient not taking: Reported on 9/9/2021) 16 g 1     predniSONE (DELTASONE) 20 MG tablet Start 80 mg po daily, take in AM with food. Will direct on a taper after reviewing labs on Thursday 100 tablet 0     traMADol (ULTRAM) 50 MG tablet Take 1 tablet (50 mg) by mouth every 6 hours as needed for severe pain (Patient not taking: Reported on 9/16/2021) 50 tablet 0     VENTOLIN  (90 Base) MCG/ACT inhaler 1-2 INHALATIONS EVERY 4-6 HOURS AS NEEDED FOR WHEEZING. DISPENSE SPACER AS NEEDED. (Patient not taking: Reported on 9/9/2021)   0        No Known Allergies      Exam: alert, appears well.  There were no vitals taken for this visit.  Wt Readings from Last 4 Encounters:   09/30/21 68.6 kg (151 lb 3.2 oz)   09/23/21 67.7 kg (149 lb 4.8 oz)   09/16/21 67.4 kg (148 lb 9.6 oz)   09/16/21 67.4 kg (148 lb 9.6 oz)       Labs: Results for TOD RICKS (MRN [id number]) as of 10/7/2021 14:07   Ref. Range 10/7/2021 06:54   Sodium Latest Ref Range: 133 - 144 mmol/L 141   Potassium Latest Ref Range: 3.4 - 5.3 mmol/L 3.7   Chloride Latest Ref Range: 94 - 109 mmol/L 109   Carbon Dioxide Latest Ref Range: 20 - 32 mmol/L 25   Urea Nitrogen Latest Ref Range: 7 - 30 mg/dL 12   Creatinine Latest Ref Range: 0.66 - 1.25 mg/dL 0.62 (L)   GFR Estimate Latest Ref Range: >60 mL/min/1.73m2 >90   Calcium Latest Ref Range: 8.5 - 10.1 mg/dL 8.5   Anion Gap Latest Ref Range: 3 - 14 mmol/L 7   Albumin Latest Ref Range: 3.4 - 5.0 g/dL 3.0 (L)   Protein Total Latest Ref Range: 6.8 - 8.8 g/dL 6.6 (L)   Bilirubin Total Latest Ref Range: 0.2 - 1.3 mg/dL 0.5   Alkaline Phosphatase Latest Ref Range: 40 - 150 U/L 132   ALT Latest Ref Range: 0 - 70 U/L 64   AST Latest Ref Range: 0 - 45 U/L 35   Glucose Latest Ref Range: 70 - 99 mg/dL 84       Imaging: pending CT    Impression/plan:   Metastatic HCC  -on ramucirumab after progression on sorafenib and immunotherapy  -dose 3 was held due to complications of immune hepatitis. Resumed last week when hepatitis was improving with the prednisone taper.  -tolerated well. Reviewed that his prior intolerance was more likely related to his symptoms of hepatitis.  -has f/u CT today and will f/u with Dr. Kyle next week Monday to review results.        Immunotherapy induced hepatitis  -has hx of hep B, but recent PCR negative.   -started on prednisone 80 mg po daily on 9/10/21 with improvement in LFTs. Has tapered every 5-6 days, current at 20 mg.    -LFTs normalized. Will continue 20 mg through tomorrow, then stop.       Neuropathy in the  toes  -unclear cause. Started at the same time as the hepatitis. No change.

## 2021-10-07 NOTE — NURSING NOTE
"Oncology Rooming Note    October 7, 2021 7:16 AM   Preeti Pascual is a 55 year old male who presents for:    Chief Complaint   Patient presents with     Oncology Clinic Visit     HCC     Initial Vitals: /75 (BP Location: Right arm, Patient Position: Sitting, Cuff Size: Adult Regular)   Pulse 73   Temp 98.2  F (36.8  C) (Oral)   Resp 16   Ht 1.676 m (5' 5.98\")   Wt 68.9 kg (151 lb 14.4 oz)   SpO2 100%   BMI 24.53 kg/m   Estimated body mass index is 24.53 kg/m  as calculated from the following:    Height as of this encounter: 1.676 m (5' 5.98\").    Weight as of this encounter: 68.9 kg (151 lb 14.4 oz). Body surface area is 1.79 meters squared.  No Pain (0) Comment: Data Unavailable   No LMP for male patient.  Allergies reviewed: Yes  Medications reviewed: Yes    Medications: Medication refills not needed today.  Pharmacy name entered into [facility]:    [id number] - [place], [place] - [address] AT [facility] & [address] [place] [address] - [address] [place] [address] [phone]    Clinical concerns: None.        Kendra Kidd CMA              "

## 2021-10-07 NOTE — LETTER
10/7/2021         RE: Preeti Pascual  [address]  [place] [facility] [address]        Dear Colleague,    Thank you for referring your patient, Preeti Pascual, to the [facility]. Please see a copy of my visit note below.    Reason for Visit: seen in f/u of HCC, immunotherapy related hepatitis    Oncology HPI:   Preeti Pascual is a 55 year old man with a PMH of hep. B and cirrhosis. He was previously treated with radioembolization in 2016 with an excellent response. He declined a liver transplantation. 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 He was initiated Sorafenib, but tolerated it poorly and was dosed at 200 mg bid. He was found to have progression and initiated on Nivolumab 480 mg monthly on 11/1/18. Ipi/Nivo was started in May when he was found to have progression.  On 8/19/21, he was found to have progression and started on ramucirumab.     He was found to have acute elevation of transaminases on 9/9/21. Hep B was negative. 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 His LFTs improved and he has been on a prednisone taper.      He resumed ramucirumab on 9/30/21.      Interval history: Feeling well. His last infusion went better than the previous one. Energy is good. Appetite is good. No N/V. No edema. No abdominal pain. No fevers/chills.    Current Outpatient Medications   Medication Sig Dispense Refill     ferrous gluconate (FERGON) 324 (38 Fe) MG tablet Take 1 tablet (324 mg) by mouth 2 times daily (with meals) (Patient not taking: Reported on 5/10/2021) 60 tablet 1     fluticasone (FLONASE) 50 MCG/ACT nasal spray USE 2 SPRAYS IN EACH NOSTRIL ONCE DAILY (Patient not taking: Reported on 9/9/2021) 16 g 1     predniSONE (DELTASONE) 20 MG tablet Start 80 mg po daily, take in AM with food. Will direct on a taper after reviewing labs on Thursday 100 tablet 0     traMADol (ULTRAM) 50 MG tablet Take 1 tablet (50 mg) by mouth every 6  hours as needed for severe pain (Patient not taking: Reported on 9/16/2021) 50 tablet 0     VENTOLIN  (90 Base) MCG/ACT inhaler 1-2 INHALATIONS EVERY 4-6 HOURS AS NEEDED FOR WHEEZING. DISPENSE SPACER AS NEEDED. (Patient not taking: Reported on 9/9/2021)  0        No Known Allergies      Exam: alert, appears well.  There were no vitals taken for this visit.  Wt Readings from Last 4 Encounters:   09/30/21 68.6 kg (151 lb 3.2 oz)   09/23/21 67.7 kg (149 lb 4.8 oz)   09/16/21 67.4 kg (148 lb 9.6 oz)   09/16/21 67.4 kg (148 lb 9.6 oz)       Labs: Results for TOD RICKS (MRN [id number]) as of 10/7/2021 14:07   Ref. Range 10/7/2021 06:54   Sodium Latest Ref Range: 133 - 144 mmol/L 141   Potassium Latest Ref Range: 3.4 - 5.3 mmol/L 3.7   Chloride Latest Ref Range: 94 - 109 mmol/L 109   Carbon Dioxide Latest Ref Range: 20 - 32 mmol/L 25   Urea Nitrogen Latest Ref Range: 7 - 30 mg/dL 12   Creatinine Latest Ref Range: 0.66 - 1.25 mg/dL 0.62 (L)   GFR Estimate Latest Ref Range: >60 mL/min/1.73m2 >90   Calcium Latest Ref Range: 8.5 - 10.1 mg/dL 8.5   Anion Gap Latest Ref Range: 3 - 14 mmol/L 7   Albumin Latest Ref Range: 3.4 - 5.0 g/dL 3.0 (L)   Protein Total Latest Ref Range: 6.8 - 8.8 g/dL 6.6 (L)   Bilirubin Total Latest Ref Range: 0.2 - 1.3 mg/dL 0.5   Alkaline Phosphatase Latest Ref Range: 40 - 150 U/L 132   ALT Latest Ref Range: 0 - 70 U/L 64   AST Latest Ref Range: 0 - 45 U/L 35   Glucose Latest Ref Range: 70 - 99 mg/dL 84       Imaging: pending CT    Impression/plan:   Metastatic HCC  -on ramucirumab after progression on sorafenib and immunotherapy  -dose 3 was held due to complications of immune hepatitis. Resumed last week when hepatitis was improving with the prednisone taper.  -tolerated well. Reviewed that his prior intolerance was more likely related to his symptoms of hepatitis.  -has f/u CT today and will f/u with Dr. Kyle next week Monday to review results.        Immunotherapy induced  hepatitis  -has hx of hep B, but recent PCR negative.   -started on prednisone 80 mg po daily on 9/10/21 with improvement in LFTs. Has tapered every 5-6 days, current at 20 mg.    -LFTs normalized. Will continue 20 mg through tomorrow, then stop.       Neuropathy in the toes  -unclear cause. Started at the same time as the hepatitis. No change.      Again, thank you for allowing me to participate in the care of your patient.        Sincerely,        MITCH Jacobs CNP

## 2021-10-11 ENCOUNTER — ONCOLOGY VISIT (OUTPATIENT)
Dept: ONCOLOGY | Facility: CLINIC | Age: 56
End: 2021-10-11
Attending: INTERNAL MEDICINE
Payer: COMMERCIAL

## 2021-10-11 VITALS
SYSTOLIC BLOOD PRESSURE: 149 MMHG | RESPIRATION RATE: 18 BRPM | OXYGEN SATURATION: 98 % | HEART RATE: 94 BPM | DIASTOLIC BLOOD PRESSURE: 83 MMHG | WEIGHT: 151.9 LBS | TEMPERATURE: 99.7 F | BODY MASS INDEX: 24.53 KG/M2

## 2021-10-11 DIAGNOSIS — K74.69 OTHER CIRRHOSIS OF LIVER (H): ICD-10-CM

## 2021-10-11 DIAGNOSIS — C22.0 HCC (HEPATOCELLULAR CARCINOMA) (H): Primary | ICD-10-CM

## 2021-10-11 PROCEDURE — G0463 HOSPITAL OUTPT CLINIC VISIT: HCPCS

## 2021-10-11 PROCEDURE — 99215 OFFICE O/P EST HI 40 MIN: CPT | Mod: GC | Performed by: INTERNAL MEDICINE

## 2021-10-11 ASSESSMENT — PAIN SCALES - GENERAL: PAINLEVEL: NO PAIN (0)

## 2021-10-11 NOTE — Clinical Note
10/11/2021         RE: Preeti Pascual  [address]  [facility] [address]        Dear Colleague,    Thank you for referring your patient, Preeti Pascual, to the [facility]. Please see a copy of my visit note below.    No notes on file    Again, thank you for allowing me to participate in the care of your patient.        Sincerely,        Agustin Kyle MD

## 2021-10-11 NOTE — PROGRESS NOTES
"Reason for Visit: seen in f/u of HCC, immunotherapy related hepatitis    Oncology HPI:   Preeti Pascual is a 55 year old man with a PMH of hep. B and cirrhosis. He was previously treated with radioembolization in 2016 with an excellent response. He declined a liver transplantation. 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 He was initiated Sorafenib, but tolerated it poorly and was dosed at 200 mg bid. He was found to have progression and initiated on Nivolumab 480 mg monthly on 11/1/18. Ipi/Nivo was started in May when he was found to have progression.  On 8/19/21, he was found to have progression and started on ramucirumab.     He was found to have acute elevation of transaminases on 9/9/21. Hep B was negative. He was started on prednisone 80 mg/day for immune hepatitis and LFTs improved.       He resumed ramucirumab on 9/30/21.      Interval history: He finished his prednisone taper on 10/8, and has been feeling relatively well since then.  No nausea, vomiting, fevers, chills, constipation, diarrhea, change in energy or appetite.  He also had resolution of abdominal pain with prednisone, however reports minimal discomfort yesterday.  He reviewed his imaging results prior to his appointment today and is primarily concerned about whether he is on the \"best treatment\" given no significant change in tumor size.    Current Outpatient Medications   Medication Sig Dispense Refill     ferrous gluconate (FERGON) 324 (38 Fe) MG tablet Take 1 tablet (324 mg) by mouth 2 times daily (with meals) (Patient not taking: Reported on 5/10/2021) 60 tablet 1     fluticasone (FLONASE) 50 MCG/ACT nasal spray USE 2 SPRAYS IN EACH NOSTRIL ONCE DAILY (Patient not taking: Reported on 9/9/2021) 16 g 1     predniSONE (DELTASONE) 20 MG tablet Start 80 mg po daily, take in AM with food. Will direct on a taper after reviewing labs on Thursday (Patient not taking: Reported on 10/11/2021) 100 tablet 0     traMADol " (ULTRAM) 50 MG tablet Take 1 tablet (50 mg) by mouth every 6 hours as needed for severe pain (Patient not taking: Reported on 9/16/2021) 50 tablet 0     VENTOLIN  (90 Base) MCG/ACT inhaler 1-2 INHALATIONS EVERY 4-6 HOURS AS NEEDED FOR WHEEZING. DISPENSE SPACER AS NEEDED. (Patient not taking: Reported on 9/9/2021)  0        No Known Allergies      Exam: alert, appears well.  Warm and well perfused.  Breathing comfortably on room air.  Moving extremities spontaneously.  Cranial nerves grossly intact.  Conversant, minimally anxious. Blood pressure (!) 149/83, pulse 94, temperature 99.7  F (37.6  C), temperature source Oral, resp. rate 18, weight 68.9 kg (151 lb 14.4 oz), SpO2 98 %.  Wt Readings from Last 4 Encounters:   10/11/21 68.9 kg (151 lb 14.4 oz)   10/07/21 68.9 kg (151 lb 14.4 oz)   09/30/21 68.6 kg (151 lb 3.2 oz)   09/23/21 67.7 kg (149 lb 4.8 oz)       Labs: Results for TOD RICKS (MRN [id number]) as of 10/11/2021 15:04   Ref. Range 10/7/2021 06:54   Sodium Latest Ref Range: 133 - 144 mmol/L 141   Potassium Latest Ref Range: 3.4 - 5.3 mmol/L 3.7   Chloride Latest Ref Range: 94 - 109 mmol/L 109   Carbon Dioxide Latest Ref Range: 20 - 32 mmol/L 25   Urea Nitrogen Latest Ref Range: 7 - 30 mg/dL 12   Creatinine Latest Ref Range: 0.66 - 1.25 mg/dL 0.62 (L)   GFR Estimate Latest Ref Range: >60 mL/min/1.73m2 >90   Calcium Latest Ref Range: 8.5 - 10.1 mg/dL 8.5   Anion Gap Latest Ref Range: 3 - 14 mmol/L 7   Albumin Latest Ref Range: 3.4 - 5.0 g/dL 3.0 (L)   Protein Total Latest Ref Range: 6.8 - 8.8 g/dL 6.6 (L)   Bilirubin Total Latest Ref Range: 0.2 - 1.3 mg/dL 0.5   Alkaline Phosphatase Latest Ref Range: 40 - 150 U/L 132   ALT Latest Ref Range: 0 - 70 U/L 64   AST Latest Ref Range: 0 - 45 U/L 35   Glucose Latest Ref Range: 70 - 99 mg/dL 84     Imaging:   CT ABDOMEN WO & W CHEST PELVIS W CONTRAST 10/7/2021 8:18 AM     History: Hepatocellular carcinoma, assess treatment response; HCC  (hepatocellular  carcinoma) (H)     Comparison: CT chest abdomen and pelvis 8/12/2021     Technique: Helical CT acquisition from the lung apices to the pubic  symphysis was obtained with intravenous contrast. Axial, coronal, and  sagittal reconstructions were obtained and reviewed.     Contrast: Isovue 370 92cc     Findings:      CHEST:      Lungs: There is a new 2 mm pulmonary nodule within the anterior right  upper lobe (series 12 image 94). Unchanged calcified granulomas. No  pneumothorax or focal pulmonary consolidation. No pleural effusion.  Airways: Central tracheobronchial tree is clear. Small focus of gas  posterior and lateral to the trachea (series 11 image 33) is unchanged  since the prior exam may represent small tracheal diverticulum.  Vessels: Main pulmonary artery and aorta are normal in caliber. Normal  three-vessel arch. Mild aortic atherosclerosis. Coronary stents and  coronary atherosclerosis noted.  Heart: Heart size is normal without pericardial effusion.  Lymph nodes: Reduction in size of mildly prominent precardiac nodes  measuring approximately 4 mm on today's exam, previously 6 mm (series  11 image 187).  Thyroid: Within normal limits.  Esophagus: Within normal limits     ABDOMEN PELVIS:     Liver: Post embolization changes in the right eighth segment of the  liver with areas of peripheral hyperdensity, not significantly changed  since the prior exam, no arterial enhancement is noted within the  ablation bed, however there is a arterial enhancing focus anteromedial  to the ablation bed which measures 11 x 10 mm, previously 8 x 6 mm  (series 8 image 21) without definite mass LI-RADS category 3.  Additional arterial enhancing area medial to the ablation bed measures  22 x 14 mm, previously 18 x 19 mm (series 8 image 31) which  demonstrates washout on portal venous phase imaging which is  suspicious for local recurrence [id number]. A vague hypoenhancing  area along the medial anterior aspect of the lesion is  again  appreciated, for example series 11 image 240, similar to prior study.  This region is not arterial enhancing though. Fibrosis along the  inferior margin of the lesion again noted.  No new lesions are identified on CT.  Biliary system: Gallbladder is within normal limits. No intrahepatic  or extrahepatic biliary ductal dilatation.  Pancreas: No focal mass or dilation of the main pancreatic duct.  Stomach: Within normal limits.   Spleen: Stable accessory splenules.  Adrenal glands: Within the right adrenal gland there is a 12 x 11 mm  lesion which previously measured 11 x 10 mm (series 8 image 70).  Adjacent to the right adrenal gland, there is a nodule which measures  10 x 8 mm (series 8 image 79), previously 14 x 11 mm. Within the left  adrenal gland, there is a nodule which measures 14 x 11 mm, previously  12 x 10 mm which are consistent with metastatic disease when compared  to prior studies.   Kidneys: No focal mass, hydronephrosis, or stone.  Bladder: Mild prostatomegaly..  Reproductive organs: Within normal limits.  Colon: Diverticulosis without signs of acute diverticulitis.  Appendix: Within normal limits.  Small Bowel: Fecalization of small bowel which likely indicates slow  transit times.  Lymph nodes: Unchanged mildly prominent tiffanie hepatis nodes such as a  8 mm node is seen on series 11 image 296.  Vasculature: Accessory right hepatic artery off the superior  mesenteric artery.  Peritoneum: No intraabdominal free fluid or air.      Soft tissues: No suspicious soft tissue mass or fluid collection.   Bones: No suspicious osseous lesion.                                                                      IMPRESSION:   1. Redemonstration of post embolization changes within the eighth  segment of the liver with interval increase in size of arterially  enhancing foci adjacent to the ablation bed as detailed above. These  are considered [id number] and are suspicious for local disease  progression.    2. There is a new 2 mm nodule within the anterior right upper lobe.  3. Bilateral adrenal nodules are consistent with metastatic disease  when compared to more remote exams, size is relatively unchanged since  the prior exam as detailed above.  4. Relatively similar appearance of tiffanie hepatis lymph nodes and  interval reduction in size of the enlarged periaortic node and  abnormal precardial lymph nodes.     I have personally reviewed the examination and initial interpretation  and I agree with the findings.     KIKA PETER MD        Impression/plan:   Metastatic HCC  -on ramucirumab after progression on sorafenib and immunotherapy. Continue q2week infusions.  -dose 3 was held due to complications of immune hepatitis. Resumed 9/30 when hepatitis was improving with the prednisone taper. Completed taper and labs WNL.  -I reviewed CT from 10/7 which appears unchanged since prior.  -Repeat CT with f/u with me in 2 months.     Immunotherapy induced hepatitis  -LFTs normalized. Completed course of prednisone. Continue to monitor.    Margarita Sorensen, MS4  [facility] [employer]    I saw the patient in conjunction with the medical student who served as a scribe for this visit. The above note has been edited by me and I have personally confirmed the history and physical exam and developed the assessment and plan.

## 2021-10-11 NOTE — LETTER
"    10/11/2021         RE: Preeti Pascual  [address]  [facility] [address]      Reason for Visit: seen in f/u of HCC, immunotherapy related hepatitis    Oncology HPI:   Preeti Pascual is a 55 year old man with a PMH of hep. B and cirrhosis. He was previously treated with radioembolization in 2016 with an excellent response. He declined a liver transplantation. 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 He was initiated Sorafenib, but tolerated it poorly and was dosed at 200 mg bid. He was found to have progression and initiated on Nivolumab 480 mg monthly on 11/1/18. Ipi/Nivo was started in May when he was found to have progression.  On 8/19/21, he was found to have progression and started on ramucirumab.     He was found to have acute elevation of transaminases on 9/9/21. Hep B was negative. He was started on prednisone 80 mg/day for immune hepatitis and LFTs improved.       He resumed ramucirumab on 9/30/21.      Interval history: He finished his prednisone taper on 10/8, and has been feeling relatively well since then.  No nausea, vomiting, fevers, chills, constipation, diarrhea, change in energy or appetite.  He also had resolution of abdominal pain with prednisone, however reports minimal discomfort yesterday.  He reviewed his imaging results prior to his appointment today and is primarily concerned about whether he is on the \"best treatment\" given no significant change in tumor size.    Current Outpatient Medications   Medication Sig Dispense Refill     ferrous gluconate (FERGON) 324 (38 Fe) MG tablet Take 1 tablet (324 mg) by mouth 2 times daily (with meals) (Patient not taking: Reported on 5/10/2021) 60 tablet 1     fluticasone (FLONASE) 50 MCG/ACT nasal spray USE 2 SPRAYS IN EACH NOSTRIL ONCE DAILY (Patient not taking: Reported on 9/9/2021) 16 g 1     predniSONE (DELTASONE) 20 MG tablet Start 80 mg po daily, take in AM with food. Will direct on a taper after " reviewing labs on Thursday (Patient not taking: Reported on 10/11/2021) 100 tablet 0     traMADol (ULTRAM) 50 MG tablet Take 1 tablet (50 mg) by mouth every 6 hours as needed for severe pain (Patient not taking: Reported on 9/16/2021) 50 tablet 0     VENTOLIN  (90 Base) MCG/ACT inhaler 1-2 INHALATIONS EVERY 4-6 HOURS AS NEEDED FOR WHEEZING. DISPENSE SPACER AS NEEDED. (Patient not taking: Reported on 9/9/2021)  0        No Known Allergies      Exam: alert, appears well.  Warm and well perfused.  Breathing comfortably on room air.  Moving extremities spontaneously.  Cranial nerves grossly intact.  Conversant, minimally anxious. Blood pressure (!) 149/83, pulse 94, temperature 99.7  F (37.6  C), temperature source Oral, resp. rate 18, weight 68.9 kg (151 lb 14.4 oz), SpO2 98 %.  Wt Readings from Last 4 Encounters:   10/11/21 68.9 kg (151 lb 14.4 oz)   10/07/21 68.9 kg (151 lb 14.4 oz)   09/30/21 68.6 kg (151 lb 3.2 oz)   09/23/21 67.7 kg (149 lb 4.8 oz)       Labs: Results for TOD RICKS (MRN [id number]) as of 10/11/2021 15:04   Ref. Range 10/7/2021 06:54   Sodium Latest Ref Range: 133 - 144 mmol/L 141   Potassium Latest Ref Range: 3.4 - 5.3 mmol/L 3.7   Chloride Latest Ref Range: 94 - 109 mmol/L 109   Carbon Dioxide Latest Ref Range: 20 - 32 mmol/L 25   Urea Nitrogen Latest Ref Range: 7 - 30 mg/dL 12   Creatinine Latest Ref Range: 0.66 - 1.25 mg/dL 0.62 (L)   GFR Estimate Latest Ref Range: >60 mL/min/1.73m2 >90   Calcium Latest Ref Range: 8.5 - 10.1 mg/dL 8.5   Anion Gap Latest Ref Range: 3 - 14 mmol/L 7   Albumin Latest Ref Range: 3.4 - 5.0 g/dL 3.0 (L)   Protein Total Latest Ref Range: 6.8 - 8.8 g/dL 6.6 (L)   Bilirubin Total Latest Ref Range: 0.2 - 1.3 mg/dL 0.5   Alkaline Phosphatase Latest Ref Range: 40 - 150 U/L 132   ALT Latest Ref Range: 0 - 70 U/L 64   AST Latest Ref Range: 0 - 45 U/L 35   Glucose Latest Ref Range: 70 - 99 mg/dL 84     Imaging:   CT ABDOMEN WO & W CHEST PELVIS W CONTRAST 10/7/2021  8:18 AM     History: Hepatocellular carcinoma, assess treatment response; HCC  (hepatocellular carcinoma) (H)     Comparison: CT chest abdomen and pelvis 8/12/2021     Technique: Helical CT acquisition from the lung apices to the pubic  symphysis was obtained with intravenous contrast. Axial, coronal, and  sagittal reconstructions were obtained and reviewed.     Contrast: Isovue 370 92cc     Findings:      CHEST:      Lungs: There is a new 2 mm pulmonary nodule within the anterior right  upper lobe (series 12 image 94). Unchanged calcified granulomas. No  pneumothorax or focal pulmonary consolidation. No pleural effusion.  Airways: Central tracheobronchial tree is clear. Small focus of gas  posterior and lateral to the trachea (series 11 image 33) is unchanged  since the prior exam may represent small tracheal diverticulum.  Vessels: Main pulmonary artery and aorta are normal in caliber. Normal  three-vessel arch. Mild aortic atherosclerosis. Coronary stents and  coronary atherosclerosis noted.  Heart: Heart size is normal without pericardial effusion.  Lymph nodes: Reduction in size of mildly prominent precardiac nodes  measuring approximately 4 mm on today's exam, previously 6 mm (series  11 image 187).  Thyroid: Within normal limits.  Esophagus: Within normal limits     ABDOMEN PELVIS:     Liver: Post embolization changes in the right eighth segment of the  liver with areas of peripheral hyperdensity, not significantly changed  since the prior exam, no arterial enhancement is noted within the  ablation bed, however there is a arterial enhancing focus anteromedial  to the ablation bed which measures 11 x 10 mm, previously 8 x 6 mm  (series 8 image 21) without definite mass LI-RADS category 3.  Additional arterial enhancing area medial to the ablation bed measures  22 x 14 mm, previously 18 x 19 mm (series 8 image 31) which  demonstrates washout on portal venous phase imaging which is  suspicious for local  recurrence [id number]. A vague hypoenhancing  area along the medial anterior aspect of the lesion is again  appreciated, for example series 11 image 240, similar to prior study.  This region is not arterial enhancing though. Fibrosis along the  inferior margin of the lesion again noted.  No new lesions are identified on CT.  Biliary system: Gallbladder is within normal limits. No intrahepatic  or extrahepatic biliary ductal dilatation.  Pancreas: No focal mass or dilation of the main pancreatic duct.  Stomach: Within normal limits.   Spleen: Stable accessory splenules.  Adrenal glands: Within the right adrenal gland there is a 12 x 11 mm  lesion which previously measured 11 x 10 mm (series 8 image 70).  Adjacent to the right adrenal gland, there is a nodule which measures  10 x 8 mm (series 8 image 79), previously 14 x 11 mm. Within the left  adrenal gland, there is a nodule which measures 14 x 11 mm, previously  12 x 10 mm which are consistent with metastatic disease when compared  to prior studies.   Kidneys: No focal mass, hydronephrosis, or stone.  Bladder: Mild prostatomegaly..  Reproductive organs: Within normal limits.  Colon: Diverticulosis without signs of acute diverticulitis.  Appendix: Within normal limits.  Small Bowel: Fecalization of small bowel which likely indicates slow  transit times.  Lymph nodes: Unchanged mildly prominent tiffanie hepatis nodes such as a  8 mm node is seen on series 11 image 296.  Vasculature: Accessory right hepatic artery off the superior  mesenteric artery.  Peritoneum: No intraabdominal free fluid or air.      Soft tissues: No suspicious soft tissue mass or fluid collection.   Bones: No suspicious osseous lesion.                                                                      IMPRESSION:   1. Redemonstration of post embolization changes within the eighth  segment of the liver with interval increase in size of arterially  enhancing foci adjacent to the ablation bed as  detailed above. These  are considered [id number] and are suspicious for local disease  progression.   2. There is a new 2 mm nodule within the anterior right upper lobe.  3. Bilateral adrenal nodules are consistent with metastatic disease  when compared to more remote exams, size is relatively unchanged since  the prior exam as detailed above.  4. Relatively similar appearance of tiffanie hepatis lymph nodes and  interval reduction in size of the enlarged periaortic node and  abnormal precardial lymph nodes.     I have personally reviewed the examination and initial interpretation  and I agree with the findings.     KIKA PETER MD        Impression/plan:   Metastatic HCC  -on ramucirumab after progression on sorafenib and immunotherapy. Continue q2week infusions.  -dose 3 was held due to complications of immune hepatitis. Resumed 9/30 when hepatitis was improving with the prednisone taper. Completed taper and labs WNL.  -I reviewed CT from 10/7 which appears unchanged since prior.  -Repeat CT with f/u with me in 2 months.     Immunotherapy induced hepatitis  -LFTs normalized. Completed course of prednisone. Continue to monitor.    Margarita Sorensen, MS4  [employer]    I saw the patient in conjunction with the medical student who served as a scribe for this visit. The above note has been edited by me and I have personally confirmed the history and physical exam and developed the assessment and plan.        Agustin Kyle MD

## 2021-10-11 NOTE — NURSING NOTE
"Oncology Rooming Note    October 11, 2021 2:09 PM   Preeti Pascual is a 55 year old male who presents for:    Chief Complaint   Patient presents with     Oncology Clinic Visit     Hepatocellular carcinoma      Initial Vitals: BP (!) 149/83   Pulse 94   Temp 99.7  F (37.6  C) (Oral)   Resp 18   Wt 68.9 kg (151 lb 14.4 oz)   SpO2 98%   BMI 24.53 kg/m   Estimated body mass index is 24.53 kg/m  as calculated from the following:    Height as of 10/7/21: 1.676 m (5' 5.98\").    Weight as of this encounter: 68.9 kg (151 lb 14.4 oz). Body surface area is 1.79 meters squared.  No Pain (0) Comment: Data Unavailable   No LMP for male patient.  Allergies reviewed: Yes  Medications reviewed: Yes    Medications: Medication refills not needed today.  Pharmacy name entered into [employer]:    [id number] [place], [place] - [address] AT [facility] & [address] [place] [address] - [address] [place] [address] [phone]    Clinical concerns: Wondering if he should continue taking prednisone or stop completely.       Miesha Davey LPN            "

## 2021-10-13 ENCOUNTER — MYC MEDICAL ADVICE (OUTPATIENT)
Dept: ONCOLOGY | Facility: CLINIC | Age: 56
End: 2021-10-13

## 2021-10-13 DIAGNOSIS — C22.0 HCC (HEPATOCELLULAR CARCINOMA) (H): Primary | ICD-10-CM

## 2021-10-13 RX ORDER — EPINEPHRINE 1 MG/ML
0.3 INJECTION, SOLUTION INTRAMUSCULAR; SUBCUTANEOUS EVERY 5 MIN PRN
Status: CANCELLED | OUTPATIENT
Start: 2021-10-14

## 2021-10-13 RX ORDER — DIPHENHYDRAMINE HCL 25 MG
50 CAPSULE ORAL ONCE
Status: CANCELLED | OUTPATIENT
Start: 2021-10-14

## 2021-10-13 RX ORDER — HEPARIN SODIUM (PORCINE) LOCK FLUSH IV SOLN 100 UNIT/ML 100 UNIT/ML
5 SOLUTION INTRAVENOUS
Status: CANCELLED | OUTPATIENT
Start: 2021-10-14

## 2021-10-13 RX ORDER — ALBUTEROL SULFATE 90 UG/1
1-2 AEROSOL, METERED RESPIRATORY (INHALATION)
Status: CANCELLED
Start: 2021-10-14

## 2021-10-13 RX ORDER — MEPERIDINE HYDROCHLORIDE 25 MG/ML
25 INJECTION INTRAMUSCULAR; INTRAVENOUS; SUBCUTANEOUS EVERY 30 MIN PRN
Status: CANCELLED | OUTPATIENT
Start: 2021-10-14

## 2021-10-13 RX ORDER — METHYLPREDNISOLONE SODIUM SUCCINATE 125 MG/2ML
125 INJECTION, POWDER, LYOPHILIZED, FOR SOLUTION INTRAMUSCULAR; INTRAVENOUS
Status: CANCELLED
Start: 2021-10-14

## 2021-10-13 RX ORDER — LORAZEPAM 2 MG/ML
0.5 INJECTION INTRAMUSCULAR EVERY 4 HOURS PRN
Status: CANCELLED | OUTPATIENT
Start: 2021-10-14

## 2021-10-13 RX ORDER — HEPARIN SODIUM,PORCINE 10 UNIT/ML
5 VIAL (ML) INTRAVENOUS
Status: CANCELLED | OUTPATIENT
Start: 2021-10-14

## 2021-10-13 RX ORDER — DIPHENHYDRAMINE HYDROCHLORIDE 50 MG/ML
50 INJECTION INTRAMUSCULAR; INTRAVENOUS
Status: CANCELLED
Start: 2021-10-14

## 2021-10-13 RX ORDER — NALOXONE HYDROCHLORIDE 0.4 MG/ML
0.2 INJECTION, SOLUTION INTRAMUSCULAR; INTRAVENOUS; SUBCUTANEOUS
Status: CANCELLED | OUTPATIENT
Start: 2021-10-14

## 2021-10-13 RX ORDER — ALBUTEROL SULFATE 0.83 MG/ML
2.5 SOLUTION RESPIRATORY (INHALATION)
Status: CANCELLED | OUTPATIENT
Start: 2021-10-14

## 2021-10-14 ENCOUNTER — APPOINTMENT (OUTPATIENT)
Dept: LAB | Facility: CLINIC | Age: 56
End: 2021-10-14
Attending: INTERNAL MEDICINE
Payer: COMMERCIAL

## 2021-10-14 ENCOUNTER — INFUSION THERAPY VISIT (OUTPATIENT)
Dept: ONCOLOGY | Facility: CLINIC | Age: 56
End: 2021-10-14
Attending: INTERNAL MEDICINE
Payer: COMMERCIAL

## 2021-10-14 VITALS
HEART RATE: 92 BPM | OXYGEN SATURATION: 100 % | BODY MASS INDEX: 24.55 KG/M2 | RESPIRATION RATE: 16 BRPM | TEMPERATURE: 98.7 F | WEIGHT: 152 LBS | DIASTOLIC BLOOD PRESSURE: 79 MMHG | SYSTOLIC BLOOD PRESSURE: 144 MMHG

## 2021-10-14 DIAGNOSIS — C22.0 HCC (HEPATOCELLULAR CARCINOMA) (H): Primary | ICD-10-CM

## 2021-10-14 LAB
AFP SERPL-MCNC: 614.4 UG/L (ref 0–8)
ALBUMIN SERPL-MCNC: 3 G/DL (ref 3.4–5)
ALBUMIN UR-MCNC: NEGATIVE MG/DL
ALP SERPL-CCNC: 115 U/L (ref 40–150)
ALT SERPL W P-5'-P-CCNC: 80 U/L (ref 0–70)
ANION GAP SERPL CALCULATED.3IONS-SCNC: 6 MMOL/L (ref 3–14)
AST SERPL W P-5'-P-CCNC: 60 U/L (ref 0–45)
BASOPHILS # BLD AUTO: 0.1 10E3/UL (ref 0–0.2)
BASOPHILS NFR BLD AUTO: 2 %
BILIRUB SERPL-MCNC: 0.8 MG/DL (ref 0.2–1.3)
BUN SERPL-MCNC: 7 MG/DL (ref 7–30)
CALCIUM SERPL-MCNC: 8.5 MG/DL (ref 8.5–10.1)
CHLORIDE BLD-SCNC: 108 MMOL/L (ref 94–109)
CO2 SERPL-SCNC: 22 MMOL/L (ref 20–32)
CREAT SERPL-MCNC: 0.73 MG/DL (ref 0.66–1.25)
EOSINOPHIL # BLD AUTO: 1.3 10E3/UL (ref 0–0.7)
EOSINOPHIL NFR BLD AUTO: 28 %
ERYTHROCYTE [DISTWIDTH] IN BLOOD BY AUTOMATED COUNT: 16 % (ref 10–15)
GFR SERPL CREATININE-BSD FRML MDRD: >90 ML/MIN/1.73M2
GLUCOSE BLD-MCNC: 100 MG/DL (ref 70–99)
HCT VFR BLD AUTO: 33.8 % (ref 40–53)
HGB BLD-MCNC: 10.4 G/DL (ref 13.3–17.7)
IMM GRANULOCYTES # BLD: 0 10E3/UL
IMM GRANULOCYTES NFR BLD: 0 %
LYMPHOCYTES # BLD AUTO: 0.9 10E3/UL (ref 0.8–5.3)
LYMPHOCYTES NFR BLD AUTO: 18 %
MCH RBC QN AUTO: 24.1 PG (ref 26.5–33)
MCHC RBC AUTO-ENTMCNC: 30.8 G/DL (ref 31.5–36.5)
MCV RBC AUTO: 78 FL (ref 78–100)
MONOCYTES # BLD AUTO: 0.6 10E3/UL (ref 0–1.3)
MONOCYTES NFR BLD AUTO: 13 %
NEUTROPHILS # BLD AUTO: 1.8 10E3/UL (ref 1.6–8.3)
NEUTROPHILS NFR BLD AUTO: 39 %
NRBC # BLD AUTO: 0 10E3/UL
NRBC BLD AUTO-RTO: 0 /100
PLATELET # BLD AUTO: 194 10E3/UL (ref 150–450)
POTASSIUM BLD-SCNC: 3.7 MMOL/L (ref 3.4–5.3)
PROT SERPL-MCNC: 6.9 G/DL (ref 6.8–8.8)
RBC # BLD AUTO: 4.32 10E6/UL (ref 4.4–5.9)
SODIUM SERPL-SCNC: 136 MMOL/L (ref 133–144)
WBC # BLD AUTO: 4.6 10E3/UL (ref 4–11)

## 2021-10-14 PROCEDURE — 85025 COMPLETE CBC W/AUTO DIFF WBC: CPT

## 2021-10-14 PROCEDURE — 250N000011 HC RX IP 250 OP 636: Performed by: INTERNAL MEDICINE

## 2021-10-14 PROCEDURE — 81003 URINALYSIS AUTO W/O SCOPE: CPT | Performed by: INTERNAL MEDICINE

## 2021-10-14 PROCEDURE — 80053 COMPREHEN METABOLIC PANEL: CPT

## 2021-10-14 PROCEDURE — 258N000003 HC RX IP 258 OP 636: Performed by: INTERNAL MEDICINE

## 2021-10-14 PROCEDURE — 36591 DRAW BLOOD OFF VENOUS DEVICE: CPT

## 2021-10-14 PROCEDURE — 96413 CHEMO IV INFUSION 1 HR: CPT

## 2021-10-14 PROCEDURE — 82105 ALPHA-FETOPROTEIN SERUM: CPT

## 2021-10-14 RX ADMIN — SODIUM CHLORIDE 250 ML: 9 INJECTION, SOLUTION INTRAVENOUS at 09:48

## 2021-10-14 RX ADMIN — SODIUM CHLORIDE 600 MG: 9 INJECTION, SOLUTION INTRAVENOUS at 10:13

## 2021-10-14 ASSESSMENT — PAIN SCALES - GENERAL: PAINLEVEL: NO PAIN (0)

## 2021-10-14 NOTE — TELEPHONE ENCOUNTER
Pt complaining of diarrhea, only had one episode which was just now. Pt describes consistency as loose, normal color of brown. Denies any blood or black tarry stools. Denies any distention, or antibiotics. No nausea, vomiting or lightheadedness. No issues with oral intake.    Last therapy was this morning- this is the first episode of diarrhea he has had. Pt calling and is wondering what to do prophylactically.      Pt advised on diarrhea protocol and the use of immodium, BRAT diet, and skin hygiene. Writer advised pt on worsening of symptoms and when to call or be seen. Pt verbalized understanding and is in agreement with plan.

## 2021-10-14 NOTE — PROGRESS NOTES
Infusion Nursing Note:  Preeti Pascual presents today for C4 D1 Cyramza.    Patient seen by provider today: No   present during visit today: Not Applicable.    Note: Patient reports intermittent headaches and asking if okay to omit PO Benadryl today d/t dizziness. Pharmacy okay with this as he has tolerated previous doses well. Pt states he is hydrating well and he was stressed this morning about his infusion schedules that he hand wrote down, but RN then printed AVS for pt which pt appreciated.    Per KELLY Smiley NP/Kendra Rodrigues, RN @0950 10/14/21:  - Okay to omit Benadryl as pre-medication if pharmacy fine with it    IB sent to Dr. Kyle to update treatment plan.       Intravenous Access:  Peripheral IV placed.    Treatment Conditions:  Lab Results   Component Value Date    HGB 10.4 (L) 10/14/2021    WBC 4.6 10/14/2021    ANEU 2.7 08/19/2021    ANEUTAUTO 1.8 10/14/2021     10/14/2021      Lab Results   Component Value Date     10/14/2021    POTASSIUM 3.7 10/14/2021    CR 0.73 10/14/2021    STACEY 8.5 10/14/2021    BILITOTAL 0.8 10/14/2021    ALBUMIN 3.0 (L) 10/14/2021    ALT 80 (H) 10/14/2021    AST 60 (H) 10/14/2021     Results reviewed, labs MET treatment parameters, ok to proceed with treatment.  Urine protein negative.  BP elevated in lab but improved to 140s/70s upon recheck.      Post Infusion Assessment:  Patient tolerated infusion without incident.  Blood return noted pre and post infusion.  Site patent and intact, free from redness, edema or discomfort.  No evidence of extravasations.  Access discontinued per protocol.       Discharge Plan:   Patient declined prescription refills.  Discharge instructions reviewed with: Patient.  Patient and/or family verbalized understanding of discharge instructions and all questions answered.  Copy of AVS reviewed with patient and/or family.  Patient will return 10/28 for next appointment.  Patient discharged in stable condition accompanied  by: self.  Departure Mode: Ambulatory.      Traci Suh RN

## 2021-10-14 NOTE — PATIENT INSTRUCTIONS
Contact Numbers    [facility] [place] (for Scheduling/Triage/After Hours Nurse Line): [phone]    Please call the [facility] nurse triage or the after hours nurse line if you experience a temperature greater than or equal to 100.5, shaking chills, have uncontrolled nausea, vomiting and/or diarrhea, dizziness, lightheadedness, shortness of breath, chest pain, bleeding, unexplained bruising, or if you have any other new/concerning symptoms, questions or concerns.     If you are having any concerning symptoms or wish to speak to a provider before your next infusion visit, please call your care coordinator or triage to notify them so we can adequately serve you.     If you need any refills on medications (narcotics or other medications), please call before your infusion appointment.       October 2021 Sunday Monday Tuesday Wednesday Thursday Friday Saturday                            1     2       3     4     5     6     7    LAB   6:30 AM   (15 min.)   [facility]   [facility]    RETURN   6:45 AM   (45 min.)   Nallely Smiley, MITCH CAMARGO   [facility]    CT CHEST ABDOMEN PELVIS WWO   7:50 AM   (20 min.)   [facility]   [facility] 8     9       10     11    RETURN   1:45 PM   (30 min.)   Agustin Kyle MD   [facility] 12     13     14    LAB PERIPHERAL   8:30 AM   (15 min.)   [facility]   [facility]    ONC INFUSION 2 HR (120 MIN)   9:00 AM   (120 min.)    ONC INFUSION NURSE   [facility] 15     16       17     18     19     20     21     22     23       24     25     26     27     28    LAB PERIPHERAL   8:00 AM   (15 min.)   [facility]   [facility]    ONC INFUSION 2 HR (120 MIN)   8:30 AM   (120 min.)    ONC INFUSION NURSE   [facility] 29     30       31                                                November 2021 Sunday Monday Tuesday Wednesday Thursday Friday Saturday        1     2     3     4     5     6       7     8     9     10     11    LAB PERIPHERAL   7:30 AM   (15 min.)    [facility]   [facility]    ONC INFUSION 2 HR (120 MIN)   8:00 AM   (120 min.)    ONC INFUSION NURSE   [facility] 12     13       14     15     16     17     18     19     20       21     22     23     24     25     26    LAB PERIPHERAL   8:30 AM   (15 min.)    [facility]   [facility]    ONC INFUSION 2 HR (120 MIN)   9:00 AM   (120 min.)    ONC INFUSION NURSE   [facility] 27       28     29     30                                         Lab Results:  Recent Results (from the past 12 hour(s))   Comprehensive metabolic panel    Collection Time: 10/14/21  9:03 AM   Result Value Ref Range    Sodium 136 133 - 144 mmol/L    Potassium 3.7 3.4 - 5.3 mmol/L    Chloride 108 94 - 109 mmol/L    Carbon Dioxide (CO2) 22 20 - 32 mmol/L    Anion Gap 6 3 - 14 mmol/L    Urea Nitrogen 7 7 - 30 mg/dL    Creatinine 0.73 0.66 - 1.25 mg/dL    Calcium 8.5 8.5 - 10.1 mg/dL    Glucose 100 (H) 70 - 99 mg/dL    Alkaline Phosphatase 115 40 - 150 U/L    AST 60 (H) 0 - 45 U/L    ALT 80 (H) 0 - 70 U/L    Protein Total 6.9 6.8 - 8.8 g/dL    Albumin 3.0 (L) 3.4 - 5.0 g/dL    Bilirubin Total 0.8 0.2 - 1.3 mg/dL    GFR Estimate >90 >60 mL/min/1.73m2   CBC with platelets and differential    Collection Time: 10/14/21  9:03 AM   Result Value Ref Range    WBC Count 4.6 4.0 - 11.0 10e3/uL    RBC Count 4.32 (L) 4.40 - 5.90 10e6/uL    Hemoglobin 10.4 (L) 13.3 - 17.7 g/dL    Hematocrit 33.8 (L) 40.0 - 53.0 %    MCV 78 78 - 100 fL    MCH 24.1 (L) 26.5 - 33.0 pg    MCHC 30.8 (L) 31.5 - 36.5 g/dL    RDW 16.0 (H) 10.0 - 15.0 %    Platelet Count 194 150 - 450 10e3/uL    % Neutrophils 39 %    % Lymphocytes  18 %    % Monocytes 13 %    % Eosinophils 28 %    % Basophils 2 %    % Immature Granulocytes 0 %    NRBCs per 100 WBC 0 <1 /100    Absolute Neutrophils 1.8 1.6 - 8.3 10e3/uL    Absolute Lymphocytes 0.9 0.8 - 5.3 10e3/uL    Absolute Monocytes 0.6 0.0 - 1.3 10e3/uL    Absolute Eosinophils 1.3 (H) 0.0 - 0.7 10e3/uL    Absolute Basophils 0.1 0.0 - 0.2 10e3/uL    Absolute Immature Granulocytes 0.0 <=0.0 10e3/uL    Absolute NRBCs 0.0 10e3/uL   Protein qualitative urine    Collection Time: 10/14/21  9:08 AM   Result Value Ref Range    Protein Albumin Urine Negative Negative mg/dL

## 2021-10-15 RX ORDER — ALBUTEROL SULFATE 0.83 MG/ML
2.5 SOLUTION RESPIRATORY (INHALATION)
Status: CANCELLED | OUTPATIENT
Start: 2021-10-29

## 2021-10-15 RX ORDER — DIPHENHYDRAMINE HYDROCHLORIDE 50 MG/ML
50 INJECTION INTRAMUSCULAR; INTRAVENOUS
Status: CANCELLED
Start: 2021-10-29

## 2021-10-15 RX ORDER — ALBUTEROL SULFATE 90 UG/1
1-2 AEROSOL, METERED RESPIRATORY (INHALATION)
Status: CANCELLED
Start: 2021-10-29

## 2021-10-15 RX ORDER — HEPARIN SODIUM,PORCINE 10 UNIT/ML
5 VIAL (ML) INTRAVENOUS
Status: CANCELLED | OUTPATIENT
Start: 2021-10-29

## 2021-10-15 RX ORDER — HEPARIN SODIUM (PORCINE) LOCK FLUSH IV SOLN 100 UNIT/ML 100 UNIT/ML
5 SOLUTION INTRAVENOUS
Status: CANCELLED | OUTPATIENT
Start: 2021-10-29

## 2021-10-15 RX ORDER — MEPERIDINE HYDROCHLORIDE 25 MG/ML
25 INJECTION INTRAMUSCULAR; INTRAVENOUS; SUBCUTANEOUS EVERY 30 MIN PRN
Status: CANCELLED | OUTPATIENT
Start: 2021-10-29

## 2021-10-15 RX ORDER — LORAZEPAM 2 MG/ML
0.5 INJECTION INTRAMUSCULAR EVERY 4 HOURS PRN
Status: CANCELLED | OUTPATIENT
Start: 2021-10-29

## 2021-10-15 RX ORDER — METHYLPREDNISOLONE SODIUM SUCCINATE 125 MG/2ML
125 INJECTION, POWDER, LYOPHILIZED, FOR SOLUTION INTRAMUSCULAR; INTRAVENOUS
Status: CANCELLED
Start: 2021-10-29

## 2021-10-15 RX ORDER — EPINEPHRINE 1 MG/ML
0.3 INJECTION, SOLUTION INTRAMUSCULAR; SUBCUTANEOUS EVERY 5 MIN PRN
Status: CANCELLED | OUTPATIENT
Start: 2021-10-29

## 2021-10-15 RX ORDER — NALOXONE HYDROCHLORIDE 0.4 MG/ML
0.2 INJECTION, SOLUTION INTRAMUSCULAR; INTRAVENOUS; SUBCUTANEOUS
Status: CANCELLED | OUTPATIENT
Start: 2021-10-29

## 2021-10-28 ENCOUNTER — APPOINTMENT (OUTPATIENT)
Dept: LAB | Facility: CLINIC | Age: 56
End: 2021-10-28
Attending: INTERNAL MEDICINE
Payer: COMMERCIAL

## 2021-10-28 ENCOUNTER — INFUSION THERAPY VISIT (OUTPATIENT)
Dept: ONCOLOGY | Facility: CLINIC | Age: 56
End: 2021-10-28
Attending: INTERNAL MEDICINE
Payer: COMMERCIAL

## 2021-10-28 VITALS
DIASTOLIC BLOOD PRESSURE: 80 MMHG | OXYGEN SATURATION: 98 % | BODY MASS INDEX: 24.34 KG/M2 | HEART RATE: 75 BPM | WEIGHT: 150.7 LBS | RESPIRATION RATE: 16 BRPM | TEMPERATURE: 98.3 F | SYSTOLIC BLOOD PRESSURE: 142 MMHG

## 2021-10-28 DIAGNOSIS — C22.0 HCC (HEPATOCELLULAR CARCINOMA) (H): Primary | ICD-10-CM

## 2021-10-28 DIAGNOSIS — R79.89 ELEVATED LFTS: ICD-10-CM

## 2021-10-28 LAB
ALBUMIN SERPL-MCNC: 2.8 G/DL (ref 3.4–5)
ALBUMIN UR-MCNC: NEGATIVE MG/DL
ALP SERPL-CCNC: 144 U/L (ref 40–150)
ALT SERPL W P-5'-P-CCNC: 124 U/L (ref 0–70)
ANION GAP SERPL CALCULATED.3IONS-SCNC: 5 MMOL/L (ref 3–14)
AST SERPL W P-5'-P-CCNC: 112 U/L (ref 0–45)
BILIRUB SERPL-MCNC: 0.4 MG/DL (ref 0.2–1.3)
BUN SERPL-MCNC: 9 MG/DL (ref 7–30)
CALCIUM SERPL-MCNC: 8.8 MG/DL (ref 8.5–10.1)
CHLORIDE BLD-SCNC: 111 MMOL/L (ref 94–109)
CO2 SERPL-SCNC: 22 MMOL/L (ref 20–32)
CREAT SERPL-MCNC: 0.62 MG/DL (ref 0.66–1.25)
GFR SERPL CREATININE-BSD FRML MDRD: >90 ML/MIN/1.73M2
GLUCOSE BLD-MCNC: 97 MG/DL (ref 70–99)
POTASSIUM BLD-SCNC: 3.8 MMOL/L (ref 3.4–5.3)
PROT SERPL-MCNC: 7.1 G/DL (ref 6.8–8.8)
SODIUM SERPL-SCNC: 138 MMOL/L (ref 133–144)

## 2021-10-28 PROCEDURE — 258N000003 HC RX IP 258 OP 636: Performed by: INTERNAL MEDICINE

## 2021-10-28 PROCEDURE — 82040 ASSAY OF SERUM ALBUMIN: CPT

## 2021-10-28 PROCEDURE — 36415 COLL VENOUS BLD VENIPUNCTURE: CPT

## 2021-10-28 PROCEDURE — 999N000127 HC STATISTIC PERIPHERAL IV START W US GUIDANCE

## 2021-10-28 PROCEDURE — 96413 CHEMO IV INFUSION 1 HR: CPT

## 2021-10-28 PROCEDURE — 250N000011 HC RX IP 250 OP 636: Performed by: INTERNAL MEDICINE

## 2021-10-28 PROCEDURE — 81003 URINALYSIS AUTO W/O SCOPE: CPT | Performed by: INTERNAL MEDICINE

## 2021-10-28 RX ADMIN — SODIUM CHLORIDE 600 MG: 9 INJECTION, SOLUTION INTRAVENOUS at 10:13

## 2021-10-28 RX ADMIN — SODIUM CHLORIDE 250 ML: 9 INJECTION, SOLUTION INTRAVENOUS at 10:13

## 2021-10-28 ASSESSMENT — PAIN SCALES - GENERAL: PAINLEVEL: NO PAIN (0)

## 2021-10-28 NOTE — NURSING NOTE
Chief Complaint   Patient presents with     Blood Draw     Labs drawn via PIV by RN in lab. VS taken     Labs drawn from PIV placed by RN. Line flushed with saline. Vitals taken. Pt checked in for next appointment.      Smiley Tineo RN

## 2021-10-28 NOTE — PATIENT INSTRUCTIONS
Contact Numbers    [facility] Main Line/Triage and after hours / weekends / holidays: [phone]      Please call the triage or after hours line if you experience a temperature greater than or equal to 100.5, shaking chills, have uncontrolled nausea, vomiting and/or diarrhea, dizziness, shortness of breath, chest pain, bleeding, unexplained bruising, or if you have any other new/concerning symptoms, questions or concerns.      If you are having any concerning symptoms or wish to speak to a provider before your next infusion visit, please call your care coordinator or triage to notify them so we can adequately serve you.     If you need a refill on a narcotic prescription or other medication, please call before your infusion appointment.

## 2021-10-28 NOTE — PROGRESS NOTES
Infusion Nursing Note:  Preeti Pascual presents today for Cycle 5 Day 1 Ramucirumab.    Patient seen by provider today: No   present during visit today: Not Applicable.    Note: Preeti presents to infusion today feeling well. He has had some mild episodes of abdominal pain after eating, but this has greatly improved over the last 3-4 days. He denies any concerns or pain today.       Intravenous Access:  Peripheral IV placed by lab.    Treatment Conditions:  Lab Results   Component Value Date     10/28/2021    POTASSIUM 3.8 10/28/2021    CR 0.62 (L) 10/28/2021    STACEY 8.8 10/28/2021    BILITOTAL 0.4 10/28/2021    ALBUMIN 2.8 (L) 10/28/2021     (H) 10/28/2021     (H) 10/28/2021   Urine Protein: Negative.  Results reviewed, labs MET treatment parameters, ok to proceed with treatment.        Post Infusion Assessment:  Patient tolerated infusion without incident.  Blood return noted pre and post infusion.  Site patent and intact, free from redness, edema or discomfort.  No evidence of extravasations.  Access discontinued per protocol.       Discharge Plan:   Patient declined prescription refills.  Discharge instructions reviewed with: Patient.  Patient and/or family verbalized understanding of discharge instructions and all questions answered.  Copy of AVS reviewed with patient and/or family.  Patient will return 11/11/21 for next appointment.  Patient discharged in stable condition accompanied by: self.  Departure Mode: Ambulatory.      Kendra Rodrigues RN

## 2021-11-01 DIAGNOSIS — C22.0 HCC (HEPATOCELLULAR CARCINOMA) (H): Primary | ICD-10-CM

## 2021-11-01 RX ORDER — LORAZEPAM 2 MG/ML
0.5 INJECTION INTRAMUSCULAR EVERY 4 HOURS PRN
Status: CANCELLED | OUTPATIENT
Start: 2021-11-13

## 2021-11-01 RX ORDER — ALBUTEROL SULFATE 0.83 MG/ML
2.5 SOLUTION RESPIRATORY (INHALATION)
Status: CANCELLED | OUTPATIENT
Start: 2021-11-13

## 2021-11-01 RX ORDER — EPINEPHRINE 1 MG/ML
0.3 INJECTION, SOLUTION INTRAMUSCULAR; SUBCUTANEOUS EVERY 5 MIN PRN
Status: CANCELLED | OUTPATIENT
Start: 2021-11-13

## 2021-11-01 RX ORDER — HEPARIN SODIUM (PORCINE) LOCK FLUSH IV SOLN 100 UNIT/ML 100 UNIT/ML
5 SOLUTION INTRAVENOUS
Status: CANCELLED | OUTPATIENT
Start: 2021-11-13

## 2021-11-01 RX ORDER — DIPHENHYDRAMINE HYDROCHLORIDE 50 MG/ML
50 INJECTION INTRAMUSCULAR; INTRAVENOUS
Status: CANCELLED
Start: 2021-11-13

## 2021-11-01 RX ORDER — ALBUTEROL SULFATE 90 UG/1
1-2 AEROSOL, METERED RESPIRATORY (INHALATION)
Status: CANCELLED
Start: 2021-11-13

## 2021-11-01 RX ORDER — MEPERIDINE HYDROCHLORIDE 25 MG/ML
25 INJECTION INTRAMUSCULAR; INTRAVENOUS; SUBCUTANEOUS EVERY 30 MIN PRN
Status: CANCELLED | OUTPATIENT
Start: 2021-11-13

## 2021-11-01 RX ORDER — NALOXONE HYDROCHLORIDE 0.4 MG/ML
0.2 INJECTION, SOLUTION INTRAMUSCULAR; INTRAVENOUS; SUBCUTANEOUS
Status: CANCELLED | OUTPATIENT
Start: 2021-11-13

## 2021-11-01 RX ORDER — HEPARIN SODIUM,PORCINE 10 UNIT/ML
5 VIAL (ML) INTRAVENOUS
Status: CANCELLED | OUTPATIENT
Start: 2021-11-13

## 2021-11-01 RX ORDER — METHYLPREDNISOLONE SODIUM SUCCINATE 125 MG/2ML
125 INJECTION, POWDER, LYOPHILIZED, FOR SOLUTION INTRAMUSCULAR; INTRAVENOUS
Status: CANCELLED
Start: 2021-11-13

## 2021-11-11 ENCOUNTER — INFUSION THERAPY VISIT (OUTPATIENT)
Dept: ONCOLOGY | Facility: CLINIC | Age: 56
End: 2021-11-11
Attending: INTERNAL MEDICINE
Payer: COMMERCIAL

## 2021-11-11 ENCOUNTER — APPOINTMENT (OUTPATIENT)
Dept: LAB | Facility: CLINIC | Age: 56
End: 2021-11-11
Attending: INTERNAL MEDICINE
Payer: COMMERCIAL

## 2021-11-11 VITALS
DIASTOLIC BLOOD PRESSURE: 80 MMHG | WEIGHT: 150.9 LBS | RESPIRATION RATE: 16 BRPM | OXYGEN SATURATION: 99 % | SYSTOLIC BLOOD PRESSURE: 150 MMHG | BODY MASS INDEX: 24.37 KG/M2 | TEMPERATURE: 97.7 F | HEART RATE: 72 BPM

## 2021-11-11 DIAGNOSIS — C22.0 HCC (HEPATOCELLULAR CARCINOMA) (H): Primary | ICD-10-CM

## 2021-11-11 LAB
ALBUMIN SERPL-MCNC: 2.9 G/DL (ref 3.4–5)
ALBUMIN UR-MCNC: NEGATIVE MG/DL
ALP SERPL-CCNC: 148 U/L (ref 40–150)
ALT SERPL W P-5'-P-CCNC: 43 U/L (ref 0–70)
ANION GAP SERPL CALCULATED.3IONS-SCNC: 6 MMOL/L (ref 3–14)
AST SERPL W P-5'-P-CCNC: 59 U/L (ref 0–45)
BILIRUB SERPL-MCNC: 0.3 MG/DL (ref 0.2–1.3)
BUN SERPL-MCNC: 9 MG/DL (ref 7–30)
CALCIUM SERPL-MCNC: 8.8 MG/DL (ref 8.5–10.1)
CHLORIDE BLD-SCNC: 110 MMOL/L (ref 94–109)
CO2 SERPL-SCNC: 24 MMOL/L (ref 20–32)
CREAT SERPL-MCNC: 0.63 MG/DL (ref 0.66–1.25)
GFR SERPL CREATININE-BSD FRML MDRD: >90 ML/MIN/1.73M2
GLUCOSE BLD-MCNC: 98 MG/DL (ref 70–99)
POTASSIUM BLD-SCNC: 4.1 MMOL/L (ref 3.4–5.3)
PROT SERPL-MCNC: 7.1 G/DL (ref 6.8–8.8)
SODIUM SERPL-SCNC: 140 MMOL/L (ref 133–144)

## 2021-11-11 PROCEDURE — 250N000011 HC RX IP 250 OP 636: Performed by: NURSE PRACTITIONER

## 2021-11-11 PROCEDURE — 81003 URINALYSIS AUTO W/O SCOPE: CPT | Performed by: NURSE PRACTITIONER

## 2021-11-11 PROCEDURE — 36415 COLL VENOUS BLD VENIPUNCTURE: CPT | Performed by: NURSE PRACTITIONER

## 2021-11-11 PROCEDURE — 82040 ASSAY OF SERUM ALBUMIN: CPT | Performed by: NURSE PRACTITIONER

## 2021-11-11 PROCEDURE — 96413 CHEMO IV INFUSION 1 HR: CPT

## 2021-11-11 PROCEDURE — 258N000003 HC RX IP 258 OP 636: Performed by: NURSE PRACTITIONER

## 2021-11-11 RX ADMIN — SODIUM CHLORIDE 250 ML: 9 INJECTION, SOLUTION INTRAVENOUS at 08:48

## 2021-11-11 RX ADMIN — SODIUM CHLORIDE 600 MG: 9 INJECTION, SOLUTION INTRAVENOUS at 09:07

## 2021-11-11 ASSESSMENT — PAIN SCALES - GENERAL: PAINLEVEL: NO PAIN (0)

## 2021-11-11 NOTE — PATIENT INSTRUCTIONS
[facility] Triage Nurse Line: [phone]    Call triage nurse with chills and/or temperature greater than or equal to 100.4, uncontrolled nausea/vomiting, diarrhea, constipation, dizziness, shortness of breath, chest pain, bleeding, unexplained bruising, or any new/concerning symptoms, questions/concerns.     If you are having any concerning symptoms or wish to speak to a provider before your next infusion visit, please call your care coordinator or triage to notify them so we can adequately serve you.       Lab Results:  Recent Results (from the past 12 hour(s))   Comprehensive metabolic panel    Collection Time: 11/11/21  8:09 AM   Result Value Ref Range    Sodium 140 133 - 144 mmol/L    Potassium 4.1 3.4 - 5.3 mmol/L    Chloride 110 (H) 94 - 109 mmol/L    Carbon Dioxide (CO2) 24 20 - 32 mmol/L    Anion Gap 6 3 - 14 mmol/L    Urea Nitrogen 9 7 - 30 mg/dL    Creatinine 0.63 (L) 0.66 - 1.25 mg/dL    Calcium 8.8 8.5 - 10.1 mg/dL    Glucose 98 70 - 99 mg/dL    Alkaline Phosphatase 148 40 - 150 U/L    AST 59 (H) 0 - 45 U/L    ALT 43 0 - 70 U/L    Protein Total 7.1 6.8 - 8.8 g/dL    Albumin 2.9 (L) 3.4 - 5.0 g/dL    Bilirubin Total 0.3 0.2 - 1.3 mg/dL    GFR Estimate >90 >60 mL/min/1.73m2   Protein qualitative urine    Collection Time: 11/11/21  8:09 AM   Result Value Ref Range    Protein Albumin Urine Negative Negative mg/dL

## 2021-11-26 ENCOUNTER — INFUSION THERAPY VISIT (OUTPATIENT)
Dept: ONCOLOGY | Facility: CLINIC | Age: 56
End: 2021-11-26
Attending: INTERNAL MEDICINE
Payer: COMMERCIAL

## 2021-11-26 ENCOUNTER — LAB (OUTPATIENT)
Dept: LAB | Facility: CLINIC | Age: 56
End: 2021-11-26
Attending: INTERNAL MEDICINE
Payer: COMMERCIAL

## 2021-11-26 VITALS
DIASTOLIC BLOOD PRESSURE: 85 MMHG | TEMPERATURE: 98 F | RESPIRATION RATE: 16 BRPM | WEIGHT: 147.1 LBS | BODY MASS INDEX: 23.75 KG/M2 | SYSTOLIC BLOOD PRESSURE: 148 MMHG | OXYGEN SATURATION: 100 % | HEART RATE: 73 BPM

## 2021-11-26 DIAGNOSIS — C22.0 HCC (HEPATOCELLULAR CARCINOMA) (H): Primary | ICD-10-CM

## 2021-11-26 LAB
ALBUMIN SERPL-MCNC: 2.9 G/DL (ref 3.4–5)
ALBUMIN UR-MCNC: NEGATIVE MG/DL
ALP SERPL-CCNC: 140 U/L (ref 40–150)
ALT SERPL W P-5'-P-CCNC: 36 U/L (ref 0–70)
ANION GAP SERPL CALCULATED.3IONS-SCNC: 7 MMOL/L (ref 3–14)
AST SERPL W P-5'-P-CCNC: 41 U/L (ref 0–45)
BILIRUB SERPL-MCNC: 0.4 MG/DL (ref 0.2–1.3)
BUN SERPL-MCNC: 8 MG/DL (ref 7–30)
CALCIUM SERPL-MCNC: 9 MG/DL (ref 8.5–10.1)
CHLORIDE BLD-SCNC: 110 MMOL/L (ref 94–109)
CO2 SERPL-SCNC: 23 MMOL/L (ref 20–32)
CREAT SERPL-MCNC: 0.62 MG/DL (ref 0.66–1.25)
GFR SERPL CREATININE-BSD FRML MDRD: >90 ML/MIN/1.73M2
GLUCOSE BLD-MCNC: 99 MG/DL (ref 70–99)
POTASSIUM BLD-SCNC: 3.9 MMOL/L (ref 3.4–5.3)
PROT SERPL-MCNC: 7.2 G/DL (ref 6.8–8.8)
SODIUM SERPL-SCNC: 140 MMOL/L (ref 133–144)

## 2021-11-26 PROCEDURE — 250N000011 HC RX IP 250 OP 636: Performed by: INTERNAL MEDICINE

## 2021-11-26 PROCEDURE — 81003 URINALYSIS AUTO W/O SCOPE: CPT | Performed by: INTERNAL MEDICINE

## 2021-11-26 PROCEDURE — 80053 COMPREHEN METABOLIC PANEL: CPT | Performed by: INTERNAL MEDICINE

## 2021-11-26 PROCEDURE — 258N000003 HC RX IP 258 OP 636: Performed by: INTERNAL MEDICINE

## 2021-11-26 PROCEDURE — 999N000127 HC STATISTIC PERIPHERAL IV START W US GUIDANCE

## 2021-11-26 PROCEDURE — 96413 CHEMO IV INFUSION 1 HR: CPT

## 2021-11-26 PROCEDURE — 36415 COLL VENOUS BLD VENIPUNCTURE: CPT | Performed by: INTERNAL MEDICINE

## 2021-11-26 RX ORDER — DIPHENHYDRAMINE HYDROCHLORIDE 50 MG/ML
50 INJECTION INTRAMUSCULAR; INTRAVENOUS
Status: CANCELLED
Start: 2021-11-26

## 2021-11-26 RX ORDER — HEPARIN SODIUM (PORCINE) LOCK FLUSH IV SOLN 100 UNIT/ML 100 UNIT/ML
5 SOLUTION INTRAVENOUS
Status: CANCELLED | OUTPATIENT
Start: 2021-11-26

## 2021-11-26 RX ORDER — MEPERIDINE HYDROCHLORIDE 25 MG/ML
25 INJECTION INTRAMUSCULAR; INTRAVENOUS; SUBCUTANEOUS EVERY 30 MIN PRN
Status: CANCELLED | OUTPATIENT
Start: 2021-11-26

## 2021-11-26 RX ORDER — METHYLPREDNISOLONE SODIUM SUCCINATE 125 MG/2ML
125 INJECTION, POWDER, LYOPHILIZED, FOR SOLUTION INTRAMUSCULAR; INTRAVENOUS
Status: CANCELLED
Start: 2021-11-26

## 2021-11-26 RX ORDER — ALBUTEROL SULFATE 0.83 MG/ML
2.5 SOLUTION RESPIRATORY (INHALATION)
Status: CANCELLED | OUTPATIENT
Start: 2021-11-26

## 2021-11-26 RX ORDER — LORAZEPAM 2 MG/ML
0.5 INJECTION INTRAMUSCULAR EVERY 4 HOURS PRN
Status: CANCELLED | OUTPATIENT
Start: 2021-11-26

## 2021-11-26 RX ORDER — ALBUTEROL SULFATE 90 UG/1
1-2 AEROSOL, METERED RESPIRATORY (INHALATION)
Status: CANCELLED
Start: 2021-11-26

## 2021-11-26 RX ORDER — NALOXONE HYDROCHLORIDE 0.4 MG/ML
0.2 INJECTION, SOLUTION INTRAMUSCULAR; INTRAVENOUS; SUBCUTANEOUS
Status: CANCELLED | OUTPATIENT
Start: 2021-11-26

## 2021-11-26 RX ORDER — HEPARIN SODIUM,PORCINE 10 UNIT/ML
5 VIAL (ML) INTRAVENOUS
Status: CANCELLED | OUTPATIENT
Start: 2021-11-26

## 2021-11-26 RX ORDER — EPINEPHRINE 1 MG/ML
0.3 INJECTION, SOLUTION INTRAMUSCULAR; SUBCUTANEOUS EVERY 5 MIN PRN
Status: CANCELLED | OUTPATIENT
Start: 2021-11-26

## 2021-11-26 RX ADMIN — SODIUM CHLORIDE 250 ML: 9 INJECTION, SOLUTION INTRAVENOUS at 09:46

## 2021-11-26 RX ADMIN — SODIUM CHLORIDE 600 MG: 9 INJECTION, SOLUTION INTRAVENOUS at 10:20

## 2021-11-26 ASSESSMENT — PAIN SCALES - GENERAL: PAINLEVEL: NO PAIN (0)

## 2021-11-26 NOTE — PROGRESS NOTES
Infusion Nursing Note:  Preeti Pascual presents today for C7D1 Cyramza.    Patient seen by provider today: No   present during visit today: Not Applicable.    Note: Patient reported to clinic today with no new complaints or concerns. His appetite is still decreased but he is attempting to eat. He is a little concerned about losing 3 lbs in 2 weeks, but he will continue to monitor. IB sent to provider per patient request to notify him about this.    Intravenous Access:  Labs drawn without difficulty.  Peripheral IV placed.  By lab staff.    Treatment Conditions:  Lab Results   Component Value Date     11/26/2021    POTASSIUM 3.9 11/26/2021    CR 0.62 (L) 11/26/2021    STACEY 9.0 11/26/2021    BILITOTAL 0.4 11/26/2021    ALBUMIN 2.9 (L) 11/26/2021    ALT 36 11/26/2021    AST 41 11/26/2021     Results reviewed, labs MET treatment parameters, ok to proceed with treatment.    Post Infusion Assessment:  Patient tolerated infusion without incident.  Blood return noted pre and post infusion.  Site patent and intact, free from redness, edema or discomfort.  No evidence of extravasations.  Access discontinued per protocol.     Discharge Plan:   Patient declined prescription refills.  Discharge instructions reviewed with: Patient.  Patient and/or family verbalized understanding of discharge instructions and all questions answered.  Copy of AVS reviewed with patient and/or family.  Patient will return 12/9/21 for next appointment.  Patient discharged in stable condition accompanied by: self.  Departure Mode: Ambulatory.  Face to Face time: 5 minutes.    Holger Quezada RN

## 2021-11-26 NOTE — PATIENT INSTRUCTIONS
[facility] Main Line: [phone]    Call triage nurse with chills and/or temperature greater than or equal to 100.4, uncontrolled nausea/vomiting, diarrhea, constipation, dizziness, shortness of breath, chest pain, bleeding, unexplained bruising, or any new/concerning symptoms, questions/concerns.   If you are having any concerning symptoms or wish to speak to a provider before your next infusion visit, please call your care coordinator or triage to notify them so we can adequately serve you.   Nurse Triage line:  [phone]    If after hours, weekends, or holidays, call main hospital  and ask for Oncology doctor on call @ [phone]      November 2021 Sunday Monday Tuesday Wednesday Thursday Friday Saturday        1     2     3     4     5     6       7     8     9     10     11    LAB PERIPHERAL   7:30 AM   (15 min.)   [facility] DRAW   [facility]    ONC INFUSION 2 HR (120 MIN)   8:00 AM   (120 min.)    ONC INFUSION NURSE   [facility] 12     13       14     15     16     17     18     19     20       21     22     23     24     25     26    LAB PERIPHERAL   8:30 AM   (15 min.)   [facility]   [facility]    ONC INFUSION 2 HR (120 MIN)   9:00 AM   (120 min.)    ONC INFUSION NURSE   [facility] 27       28     29 30 December 2021 Sunday Monday Tuesday Wednesday Thursday Friday Saturday                  1     2     3     4       5     6     7     8     9    LAB PERIPHERAL   7:30 AM   (15 min.)   [facility]   [facility]    ONC INFUSION 2 HR (120 MIN)   8:00 AM   (120 min.)    ONC INFUSION NURSE   [facility] 10     11       12     13     14     15     16    CT CHEST ABDOMEN PELVIS WWO   7:50 AM   (20 min.)   [email or web address]   [facility] [place]    RETURN  10:15 AM   (30 min.)   Agustin Kyle MD   [facility] 17    UM RETURN GENERAL LIVER   8:15 AM   (30 min.)   Trevor Trujillo MD   [facility] [place] 18       19     20     21     22     23     24    LAB PERIPHERAL   8:00 AM   (15 min.)   [facility] LAB DRAW   [facility]    ONC INFUSION 2 HR (120 MIN)   8:30 AM   (120 min.)    ONC INFUSION NURSE   [facility] 25  Happy Birthday!       26     27     28     29     30     31                          Lab Results:  Recent Results (from the past 12 hour(s))   Comprehensive metabolic panel    Collection Time: 11/26/21  8:20 AM   Result Value Ref Range    Sodium 140 133 - 144 mmol/L    Potassium 3.9 3.4 - 5.3 mmol/L    Chloride 110 (H) 94 - 109 mmol/L    Carbon Dioxide (CO2) 23 20 - 32 mmol/L    Anion Gap 7 3 - 14 mmol/L    Urea Nitrogen 8 7 - 30 mg/dL    Creatinine 0.62 (L) 0.66 - 1.25 mg/dL    Calcium 9.0 8.5 - 10.1 mg/dL    Glucose 99 70 - 99 mg/dL    Alkaline Phosphatase 140 40 - 150 U/L    AST 41 0 - 45 U/L    ALT 36 0 - 70 U/L    Protein Total 7.2 6.8 - 8.8 g/dL    Albumin 2.9 (L) 3.4 - 5.0 g/dL    Bilirubin Total 0.4 0.2 - 1.3 mg/dL    GFR Estimate >90 >60 mL/min/1.73m2   Protein qualitative urine    Collection Time: 11/26/21  8:26 AM   Result Value Ref Range    Protein Albumin Urine Negative Negative mg/dL

## 2021-11-26 NOTE — NURSING NOTE
Chief Complaint   Patient presents with     Blood Draw     IV placed, blood drawn, vitals taken, checked into next appointment     Labs drawn via IV placed by JACKIE Marks  in left arm.    Pamela Allen MA

## 2021-12-01 NOTE — PATIENT INSTRUCTIONS
Contact Numbers  [facility]: [phone] (for symptom and scheduling needs)    Please call the [facility] Triage line if you experience a temperature greater than or equal to 100.4, shaking chills, have uncontrolled nausea, vomiting and/or diarrhea, dizziness, shortness of breath, chest pain, bleeding, unexplained bruising, or if you have any other new/concerning symptoms, questions or concerns.     If you are having any concerning symptoms or wish to speak to a provider before your next infusion visit, please call your care coordinator or triage to notify them so we can adequately serve you.     If you need a refill on a narcotic prescription or other medication, please call triage before your infusion appointment.        Lab Results:  Recent Results (from the past 12 hour(s))   Protein qualitative urine    Collection Time: 09/30/21  7:52 AM   Result Value Ref Range    Protein Albumin Urine Negative Negative mg/dL   Comprehensive metabolic panel    Collection Time: 09/30/21  7:52 AM   Result Value Ref Range    Sodium 141 133 - 144 mmol/L    Potassium 3.7 3.4 - 5.3 mmol/L    Chloride 110 (H) 94 - 109 mmol/L    Carbon Dioxide (CO2) 25 20 - 32 mmol/L    Anion Gap 6 3 - 14 mmol/L    Urea Nitrogen 13 7 - 30 mg/dL    Creatinine 0.62 (L) 0.66 - 1.25 mg/dL    Calcium 8.1 (L) 8.5 - 10.1 mg/dL    Glucose 87 70 - 99 mg/dL    Alkaline Phosphatase 112 40 - 150 U/L    AST 37 0 - 45 U/L    ALT 72 (H) 0 - 70 U/L    Protein Total 6.5 (L) 6.8 - 8.8 g/dL    Albumin 2.8 (L) 3.4 - 5.0 g/dL    Bilirubin Total 0.4 0.2 - 1.3 mg/dL    GFR Estimate >90 >60 mL/min/1.73m2        Split-Thickness Skin Graft Text: The defect edges were debeveled with a #15 scalpel blade.  Given the location of the defect, shape of the defect and the proximity to free margins a split thickness skin graft was deemed most appropriate.  Using a sterile surgical marker, the primary defect shape was transferred to the donor site. The split thickness graft was then harvested.  The skin graft was then placed in the primary defect and oriented appropriately.

## 2021-12-05 ENCOUNTER — NURSE TRIAGE (OUTPATIENT)
Dept: NURSING | Facility: CLINIC | Age: 56
End: 2021-12-05
Payer: COMMERCIAL

## 2021-12-05 NOTE — TELEPHONE ENCOUNTER
"Caller is reporting that he has pain both arms onset yesterday; no injury; normal appearance and  has  full ROM.  Caller reports he is  becoming very weak; Tried to shop but was  unable; exposed to cold air.   Caller is currently receiving chemo for   HCC; does not have a port  or central line.  Denies any  change in appearance of venipuncture site      Caller also feeling weak today but denies  fever or other symptoms;   ha  Caller reports pain constant 6/10; caller is requesting pain medications; does know if he can not use  acetaminophen or ibuprofen; tramadol stopped because it made him dizzy   Triage protocol revieiwed      On call for  [facility] Dr. Kyle paged via  @   Discussed with Dr. Sifuentes; requested patient be contacted to see if he wanted       Tramadol or has used ibuprofen   1:28 PM  Discussed with malik and he has o tramdol but would like RX   1:34 PM   Call back from  On call provider  And now  Advising OTC tylenol or ibuprofen and not tramdol   Call clinic tomorrow if not improving; Go to ED if develops unilateral  Arm weakness   Patient contacted and understands   Gina Espinoza RN  FNA                                                Reason for Disposition    Arm pain    Additional Information    Negative: Shock suspected (e.g., cold/pale/clammy skin, too weak to stand, low BP, rapid pulse)    Negative: [1] Similar pain previously AND [2] it was from \"heart attack\"    Negative: [1] Similar pain previously AND [2] it was from \"angina\" AND [3] not relieved by nitroglycerin    Negative: Sounds like a life-threatening emergency to the triager    Negative: Followed an arm injury    Negative: Chest pain    Negative: Pain, redness, or swelling at intravenous (IV) site or along course of vein    Negative: Wound looks infected    Negative: Elbow pain is main symptom    Negative: Wrist pain is main symptom    Negative: Difficulty breathing or unusual sweating (e.g., sweating without exertion)    " "Negative: [1] Age > 40 AND [2] associated chest or jaw pain AND [3] pain lasts > 5 minutes    Negative: [1] Age > 40 AND [2] no obvious cause AND [3] pain even when not moving the arm    (Exception: pain is clearly made worse by moving arm or bending neck)    Negative: [1] SEVERE pain AND [2] not improved 2 hours after pain medicine    Negative: [1] Red area or streak AND [2] fever    Negative: [1] Swollen joint AND [2] fever    Negative: Patient sounds very sick or weak to the triager    Negative: [1] Red area or streak AND [2] large (> 2 in. or 5 cm)    Negative: Entire arm is swollen    Negative: [1] Cast on wrist or arm AND [2] now increased pain    Negative: Weakness (i.e., loss of strength) in hand or fingers     (Exception: not truly weak; hand feels weak because of pain)    Negative: [1] Arm pains with exertion (e.g., walking) AND [2] pain goes away on resting AND [3] not present now    Negative: [1] Painful rash AND [2] multiple small blisters grouped together (i.e., dermatomal distribution or \"band\" or \"stripe\")    Negative: Looks like a boil, infected sore, deep ulcer or other infected rash (spreading redness, pus)    Negative: [1] Localized rash is very painful AND [2] no fever    Negative: Localized pain, redness or hard lump along vein    Negative: Numbness (i.e., loss of sensation) in hand or fingers    Negative: [1] MODERATE pain (e.g., interferes with normal activities) AND [2] present > 3 days    Negative: Pain is worsened or caused by bending the neck    Negative: [1] MILD pain (e.g., does not interfere with normal activities) AND [2] present > 7 days    Negative: Arm pain is a chronic symptom (recurrent or ongoing AND present > 4 weeks)    Protocols used: ARM PAIN-A-AH      "

## 2021-12-09 ENCOUNTER — APPOINTMENT (OUTPATIENT)
Dept: LAB | Facility: CLINIC | Age: 56
End: 2021-12-09
Attending: INTERNAL MEDICINE
Payer: COMMERCIAL

## 2021-12-09 ENCOUNTER — INFUSION THERAPY VISIT (OUTPATIENT)
Dept: ONCOLOGY | Facility: CLINIC | Age: 56
End: 2021-12-09
Attending: INTERNAL MEDICINE
Payer: COMMERCIAL

## 2021-12-09 VITALS
DIASTOLIC BLOOD PRESSURE: 83 MMHG | HEART RATE: 70 BPM | WEIGHT: 149.3 LBS | OXYGEN SATURATION: 100 % | BODY MASS INDEX: 24.11 KG/M2 | TEMPERATURE: 97.1 F | SYSTOLIC BLOOD PRESSURE: 136 MMHG | RESPIRATION RATE: 16 BRPM

## 2021-12-09 DIAGNOSIS — C22.0 HCC (HEPATOCELLULAR CARCINOMA) (H): Primary | ICD-10-CM

## 2021-12-09 DIAGNOSIS — K74.69 OTHER CIRRHOSIS OF LIVER (H): ICD-10-CM

## 2021-12-09 LAB
AFP SERPL-MCNC: 1286.6 UG/L (ref 0–8)
ALBUMIN SERPL-MCNC: 2.8 G/DL (ref 3.4–5)
ALBUMIN UR-MCNC: NEGATIVE MG/DL
ALP SERPL-CCNC: 135 U/L (ref 40–150)
ALT SERPL W P-5'-P-CCNC: 42 U/L (ref 0–70)
ANION GAP SERPL CALCULATED.3IONS-SCNC: 7 MMOL/L (ref 3–14)
AST SERPL W P-5'-P-CCNC: 57 U/L (ref 0–45)
BASOPHILS # BLD AUTO: 0 10E3/UL (ref 0–0.2)
BASOPHILS NFR BLD AUTO: 1 %
BILIRUB SERPL-MCNC: 0.2 MG/DL (ref 0.2–1.3)
BUN SERPL-MCNC: 7 MG/DL (ref 7–30)
CALCIUM SERPL-MCNC: 8.2 MG/DL (ref 8.5–10.1)
CHLORIDE BLD-SCNC: 109 MMOL/L (ref 94–109)
CO2 SERPL-SCNC: 25 MMOL/L (ref 20–32)
CREAT SERPL-MCNC: 0.58 MG/DL (ref 0.66–1.25)
EOSINOPHIL # BLD AUTO: 1.1 10E3/UL (ref 0–0.7)
EOSINOPHIL NFR BLD AUTO: 29 %
ERYTHROCYTE [DISTWIDTH] IN BLOOD BY AUTOMATED COUNT: 15.4 % (ref 10–15)
GFR SERPL CREATININE-BSD FRML MDRD: >90 ML/MIN/1.73M2
GLUCOSE BLD-MCNC: 98 MG/DL (ref 70–99)
HCT VFR BLD AUTO: 33.8 % (ref 40–53)
HGB BLD-MCNC: 10.4 G/DL (ref 13.3–17.7)
IMM GRANULOCYTES # BLD: 0 10E3/UL
IMM GRANULOCYTES NFR BLD: 0 %
LYMPHOCYTES # BLD AUTO: 1.4 10E3/UL (ref 0.8–5.3)
LYMPHOCYTES NFR BLD AUTO: 38 %
MCH RBC QN AUTO: 23.6 PG (ref 26.5–33)
MCHC RBC AUTO-ENTMCNC: 30.8 G/DL (ref 31.5–36.5)
MCV RBC AUTO: 77 FL (ref 78–100)
MONOCYTES # BLD AUTO: 0.4 10E3/UL (ref 0–1.3)
MONOCYTES NFR BLD AUTO: 11 %
NEUTROPHILS # BLD AUTO: 0.8 10E3/UL (ref 1.6–8.3)
NEUTROPHILS NFR BLD AUTO: 21 %
NRBC # BLD AUTO: 0 10E3/UL
NRBC BLD AUTO-RTO: 0 /100
PLATELET # BLD AUTO: 121 10E3/UL (ref 150–450)
POTASSIUM BLD-SCNC: 3.8 MMOL/L (ref 3.4–5.3)
PROT SERPL-MCNC: 6.6 G/DL (ref 6.8–8.8)
RBC # BLD AUTO: 4.4 10E6/UL (ref 4.4–5.9)
SODIUM SERPL-SCNC: 141 MMOL/L (ref 133–144)
WBC # BLD AUTO: 3.7 10E3/UL (ref 4–11)

## 2021-12-09 PROCEDURE — 96413 CHEMO IV INFUSION 1 HR: CPT

## 2021-12-09 PROCEDURE — 36415 COLL VENOUS BLD VENIPUNCTURE: CPT

## 2021-12-09 PROCEDURE — 82105 ALPHA-FETOPROTEIN SERUM: CPT

## 2021-12-09 PROCEDURE — 250N000011 HC RX IP 250 OP 636: Performed by: INTERNAL MEDICINE

## 2021-12-09 PROCEDURE — 258N000003 HC RX IP 258 OP 636: Performed by: INTERNAL MEDICINE

## 2021-12-09 PROCEDURE — 81003 URINALYSIS AUTO W/O SCOPE: CPT | Performed by: INTERNAL MEDICINE

## 2021-12-09 PROCEDURE — 999N000127 HC STATISTIC PERIPHERAL IV START W US GUIDANCE

## 2021-12-09 PROCEDURE — 85004 AUTOMATED DIFF WBC COUNT: CPT

## 2021-12-09 PROCEDURE — 80053 COMPREHEN METABOLIC PANEL: CPT | Performed by: INTERNAL MEDICINE

## 2021-12-09 RX ORDER — MEPERIDINE HYDROCHLORIDE 25 MG/ML
25 INJECTION INTRAMUSCULAR; INTRAVENOUS; SUBCUTANEOUS EVERY 30 MIN PRN
Status: CANCELLED | OUTPATIENT
Start: 2021-12-09

## 2021-12-09 RX ORDER — NALOXONE HYDROCHLORIDE 0.4 MG/ML
0.2 INJECTION, SOLUTION INTRAMUSCULAR; INTRAVENOUS; SUBCUTANEOUS
Status: CANCELLED | OUTPATIENT
Start: 2021-12-09

## 2021-12-09 RX ORDER — METHYLPREDNISOLONE SODIUM SUCCINATE 125 MG/2ML
125 INJECTION, POWDER, LYOPHILIZED, FOR SOLUTION INTRAMUSCULAR; INTRAVENOUS
Status: CANCELLED
Start: 2021-12-09

## 2021-12-09 RX ORDER — EPINEPHRINE 1 MG/ML
0.3 INJECTION, SOLUTION INTRAMUSCULAR; SUBCUTANEOUS EVERY 5 MIN PRN
Status: CANCELLED | OUTPATIENT
Start: 2021-12-09

## 2021-12-09 RX ORDER — ALBUTEROL SULFATE 0.83 MG/ML
2.5 SOLUTION RESPIRATORY (INHALATION)
Status: CANCELLED | OUTPATIENT
Start: 2021-12-09

## 2021-12-09 RX ORDER — HEPARIN SODIUM (PORCINE) LOCK FLUSH IV SOLN 100 UNIT/ML 100 UNIT/ML
5 SOLUTION INTRAVENOUS
Status: CANCELLED | OUTPATIENT
Start: 2021-12-09

## 2021-12-09 RX ORDER — HEPARIN SODIUM,PORCINE 10 UNIT/ML
5 VIAL (ML) INTRAVENOUS
Status: CANCELLED | OUTPATIENT
Start: 2021-12-09

## 2021-12-09 RX ORDER — LORAZEPAM 2 MG/ML
0.5 INJECTION INTRAMUSCULAR EVERY 4 HOURS PRN
Status: CANCELLED | OUTPATIENT
Start: 2021-12-09

## 2021-12-09 RX ORDER — DIPHENHYDRAMINE HYDROCHLORIDE 50 MG/ML
50 INJECTION INTRAMUSCULAR; INTRAVENOUS
Status: CANCELLED
Start: 2021-12-09

## 2021-12-09 RX ORDER — ALBUTEROL SULFATE 90 UG/1
1-2 AEROSOL, METERED RESPIRATORY (INHALATION)
Status: CANCELLED
Start: 2021-12-09

## 2021-12-09 RX ADMIN — SODIUM CHLORIDE 250 ML: 9 INJECTION, SOLUTION INTRAVENOUS at 09:25

## 2021-12-09 RX ADMIN — SODIUM CHLORIDE 600 MG: 9 INJECTION, SOLUTION INTRAVENOUS at 09:58

## 2021-12-09 ASSESSMENT — PAIN SCALES - GENERAL: PAINLEVEL: NO PAIN (0)

## 2021-12-09 NOTE — PROGRESS NOTES
Infusion Nursing Note:  Preeti Pascual presents today for cycle 8 day 1 cyramza.    Patient seen by provider today: No   present during visit today: Not Applicable.    Note:   Patient continues having decreased appetite - he reports he eats a good breakfast and eats less later in the day. Encouraged him to eat smaller but more frequent meals. He reports he is keeping up on his fluid intake. He reports no changes in his fatigue level and weakness. He denies fevers, chills, SOB, nausea, diarrhea, and pain.    Intravenous Access:  Peripheral IV placed in lab.    Treatment Conditions:  Lab Results   Component Value Date    HGB 10.4 (L) 12/09/2021    WBC 3.7 (L) 12/09/2021    ANEU 2.7 08/19/2021    ANEUTAUTO 0.8 (L) 12/09/2021     (L) 12/09/2021      Lab Results   Component Value Date     12/09/2021    POTASSIUM 3.8 12/09/2021    CR 0.58 (L) 12/09/2021    STACEY 8.2 (L) 12/09/2021    BILITOTAL 0.2 12/09/2021    ALBUMIN 2.8 (L) 12/09/2021    ALT 42 12/09/2021    AST 57 (H) 12/09/2021     Results reviewed, labs MET treatment parameters, ok to proceed with treatment.  Urine protein negative.    Post Infusion Assessment:  Patient tolerated infusion without incident.  Blood return noted pre and post infusion.  Site patent and intact, free from redness, edema or discomfort.  No evidence of extravasations.  Access discontinued per protocol.       Discharge Plan:   Patient declined prescription refills.  Discharge instructions reviewed with: Patient.  Patient and/or family verbalized understanding of discharge instructions and all questions answered.  Copy of AVS reviewed with patient and/or family.  Patient will return 12/24 for next appointment.   Patient discharged in stable condition accompanied by: self.  Departure Mode: Ambulatory.      Leena Lo RN

## 2021-12-09 NOTE — NURSING NOTE
Chief Complaint   Patient presents with     Blood Draw     labs drawn with piv start by rn.  vs taken     Labs drawn with PIV start by rn; urine collected and sent as well.  Pt tolerated well.  VS taken and pt checked in for next appt.    Marciruz Feldman RN

## 2021-12-16 ENCOUNTER — ANCILLARY PROCEDURE (OUTPATIENT)
Dept: CT IMAGING | Facility: CLINIC | Age: 56
End: 2021-12-16
Attending: INTERNAL MEDICINE
Payer: COMMERCIAL

## 2021-12-16 ENCOUNTER — VIRTUAL VISIT (OUTPATIENT)
Dept: ONCOLOGY | Facility: CLINIC | Age: 56
End: 2021-12-16
Attending: INTERNAL MEDICINE
Payer: COMMERCIAL

## 2021-12-16 DIAGNOSIS — K74.69 OTHER CIRRHOSIS OF LIVER (H): ICD-10-CM

## 2021-12-16 DIAGNOSIS — C22.0 HCC (HEPATOCELLULAR CARCINOMA) (H): ICD-10-CM

## 2021-12-16 DIAGNOSIS — B18.1 CHRONIC VIRAL HEPATITIS B WITHOUT DELTA AGENT AND WITHOUT COMA (H): ICD-10-CM

## 2021-12-16 DIAGNOSIS — C22.0 HCC (HEPATOCELLULAR CARCINOMA) (H): Primary | ICD-10-CM

## 2021-12-16 PROCEDURE — 71260 CT THORAX DX C+: CPT | Mod: GC | Performed by: RADIOLOGY

## 2021-12-16 PROCEDURE — 99215 OFFICE O/P EST HI 40 MIN: CPT | Mod: GT | Performed by: INTERNAL MEDICINE

## 2021-12-16 PROCEDURE — 99417 PROLNG OP E/M EACH 15 MIN: CPT | Performed by: INTERNAL MEDICINE

## 2021-12-16 PROCEDURE — 74178 CT ABD&PLV WO CNTR FLWD CNTR: CPT | Mod: GC | Performed by: RADIOLOGY

## 2021-12-16 PROCEDURE — 999N001193 HC VIDEO/TELEPHONE VISIT; NO CHARGE

## 2021-12-16 RX ORDER — IOPAMIDOL 755 MG/ML
92 INJECTION, SOLUTION INTRAVASCULAR ONCE
Status: COMPLETED | OUTPATIENT
Start: 2021-12-16 | End: 2021-12-16

## 2021-12-16 RX ADMIN — IOPAMIDOL 92 ML: 755 INJECTION, SOLUTION INTRAVASCULAR at 08:42

## 2021-12-16 NOTE — PROGRESS NOTES
Preeti is a 55 year old who is being evaluated via a billable video visit.       How would you like to obtain your AVS? Kulizahart  If the video visit is dropped, the invitation should be resent by: Text to cell phone: [phone]   Will anyone else be joining your video visit? No        Video-Visit Details    Type of service:  Video Visit    Originating Location (pt. Location): Home    Distant Location (provider location):  [facility]     Platform used for Video Visit: Reginald Valle      I am seeing Preeti Pascual today in follow-up of metastatic hepatocellular carcinoma.    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  He had initial treatment with radioembolization and declined liver transplant.  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  He was initially treated with sorafenib but had very poor tolerance and was never able to get above the dose of 200 mg twice daily and progressed on that regimen.  He was started on nivolumab in November 2018 with initial control then progression and then was escalated to ipilimumab/nivolumab in May of this year.  In August he had further progression and was started on Ramucirumab.  After his first month that was temporarily discontinued when he developed marked elevation of his transaminases which we attributed to his prior immunotherapy and which improved after a course of steroids.  He resumed the Ramucirumab 2 months ago and returns today for ongoing response assessment.  He tells me overall he is feeling better in the last 2 months with some improvement in his weakness but still finds himself fairly weak.  He has however been able to continue working.  He was having a fair bit of pain in both arms which is resolved.  He is eating well and his weight which has been going down up till August seems to have stabilized.  He has minor edema and no swelling of his  abdomen.    GENERAL: Healthy, alert and no distress  EYES: Eyes grossly normal to inspection.  No discharge or erythema, or obvious scleral/conjunctival abnormalities.  RESP: No audible wheeze, cough, or visible cyanosis.  No visible retractions or increased work of breathing.    SKIN: Visible skin clear. No significant rash, abnormal pigmentation or lesions.  NEURO: Cranial nerves grossly intact.  Mentation and speech appropriate for age.  PSYCH: Mentation appears normal, affect normal/bright, judgement and insight intact, normal speech and appearance well-groomed.    I personally reviewed his CT scan and went over the results with him to my review there is no increase in the size of his tumors.  Radiology trepidation was not available at the time of my initial review and they felt his adrenal metastases are slightly increased in size.  His labs are notable for his alpha-fetoprotein having doubled to 1200.  His electrolytes and renal function are normal.  His albumin is low but stable at 2.8 and his bilirubin and liver enzymes are normal.  He has his usual mild pancytopenia.    Assessment/plan: Metastatic hepatocellular carcinoma with equivocal evidence of progression on Ramucirumab in a patient with an ECOG performance status of 1.  I think he probably is progressing but the radiographic changes are quite small at this point and some of the elevation in alpha-fetoprotein might represent the healing liver from his recent acute hepatitis.  He has very limited additional treatment options at this point, and the only meaningful choice would be lenvatinib but I am not optimistic that he will be able to tolerate that given how poorly he did with sorafenib.  Given the poor options moving forward and the equivocal nature of his progression I think he will be best served by continuing with his current regimen which he is tolerating quite well for another 2 months until it becomes more clear that he truly is progressing.   At that point we can proceed with a switch to lenvatinib if in fact he does have more clear-cut progression.  He will let us know in the interim if he has symptomatic worsening.    Total time by me on the date of service inclusive of the above visit review of his imaging, coordination of care and documentation was 70 minutes.

## 2021-12-17 ENCOUNTER — OFFICE VISIT (OUTPATIENT)
Dept: GASTROENTEROLOGY | Facility: CLINIC | Age: 56
End: 2021-12-17
Attending: INTERNAL MEDICINE
Payer: COMMERCIAL

## 2021-12-17 VITALS — OXYGEN SATURATION: 97 % | DIASTOLIC BLOOD PRESSURE: 86 MMHG | HEART RATE: 70 BPM | SYSTOLIC BLOOD PRESSURE: 145 MMHG

## 2021-12-17 DIAGNOSIS — C22.0 HCC (HEPATOCELLULAR CARCINOMA) (H): ICD-10-CM

## 2021-12-17 DIAGNOSIS — B18.1 CHRONIC VIRAL HEPATITIS B WITHOUT DELTA AGENT AND WITHOUT COMA (H): Primary | ICD-10-CM

## 2021-12-17 DIAGNOSIS — K21.9 GASTROESOPHAGEAL REFLUX DISEASE WITHOUT ESOPHAGITIS: ICD-10-CM

## 2021-12-17 PROCEDURE — 99214 OFFICE O/P EST MOD 30 MIN: CPT | Mod: GC | Performed by: INTERNAL MEDICINE

## 2021-12-17 ASSESSMENT — PAIN SCALES - GENERAL: PAINLEVEL: NO PAIN (0)

## 2021-12-17 NOTE — PROGRESS NOTES
"HEPATOLOGY CLINIC FOLLOW UP VISIT    HPI: 55 year old male with Chronic hepatitis B complicated by hepatocellular carcinoma returns for follow up visit.  This patient was last seen 2/21/21    On 8/19/21, he was changed from nivolumab to ramucirumab d/t progression of disease. He was found to have acute elevation of transaminases on 9/9/21. Hep B was negative. He was started on prednisone 80 mg/day for immune hepatitis and LFTs improved. He resumed ramucirumab on 9/30/21.      Overall, he is feeling okay. He describes occasional burning epigastric pain that comes and goes, sometimes after eating food. He has not taken any medications for this. He is worried he is losing weight, per chart 15 lb weight loss since last year. Feels he is getting weaker, for example lifting a 24 pack of water bottles is more difficult than it was before. Appetite is getting better than it was. No problem with BMs. Denies nausea or vomiting. He denies any increased abdominal girth. Some lower extremity edema, noting a \"sock line\" when he takes off his socks.He denies any fevers or chills, cough or shortness of breath.       ROS: 10pt ROS performed and otherwise negative.     PERTINENT PAST MEDICAL/SURGICAL HISTORY:  Past Medical History:   Diagnosis Date     Hepatitis B      Hepatocellular carcinoma (H)      Past Surgical History:   Procedure Laterality Date     Y-90 Delivery       As noted above.    PERTINENT MEDICATIONS:  Current Outpatient Medications   Medication     ferrous gluconate (FERGON) 324 (38 Fe) MG tablet     fluticasone (FLONASE) 50 MCG/ACT nasal spray     predniSONE (DELTASONE) 20 MG tablet     traMADol (ULTRAM) 50 MG tablet     VENTOLIN  (90 Base) MCG/ACT inhaler     No current facility-administered medications for this visit.     Medications reviewed with patient today.      PHYSICAL EXAMINATION:  Vitals reviewed  BP (!) 145/86   Pulse 70   SpO2 97%   Wt Readings from Last 2 Encounters:   12/09/21 67.7 kg (149 lb " 4.8 oz)   11/26/21 66.7 kg (147 lb 1.6 oz)       Gen: well appearing, in NAD  HEENT: anicteric sclera  CV: regular rate 2+ radial pulses  Pulm: normal work of breathing on ambient air  Abd: soft, mild tenderness in the epigastric area, no palpable masses, no HSM  Ext: trace lower extremity edema  Skin: warm, perfused, no jaundice  Neuro: alert and oriented, conversant, no asterixis noted    PERTINENT STUDIES:  12/9  AFP 1286 <- 614  LFT: AST 87, ALT 52, alb 2.8  Wbc 3.7, hgb 10.4, plt 121    12/16 CT A/P  Abdomen and pelvis:  Post embolization within segment 8 of the liver with multiple areas  which are not significant changed compared to prior exam. 1.9 x 1.2 cm  arterially enhancing focus anteromedial to the ablation zone which  previously measured 1.1 x 1.0 cm (series 8 image 44). There is no  washout or pseudocapsule formation associated with this lesion.  Additional 2.1 x 1.6 cm enhancing focus along the medial aspect of the  ablation zone demonstrates washout and previously measured 2.2 x 1.4  cm (series 8 image 50), suspicious for local recurrence  LR-TR viable.  No new arterially enhancing lesions. No significant change in vague  hypoattenuating region which lies just inferomedial to the ablation  bed. The gallbladder is unremarkable. No pancreatic ductal dilatation.  Small splenules in the left upper quadrant. Slight increase in size of  1.3 x 1.3 cm right adrenal nodule, previously measuring 1.2 x 1.1 cm  (series 8 image 93). An additional nodule just medial to the right  adrenal gland has also increased in size measuring 1.2 x 0.8 cm,  previously measuring 1.0 x 0.7 cm. Increased left adrenal nodule  measuring 1.6 x 1.3 cm, previously measuring 1.3 x 1.1 cm (series 8  image 88). Symmetric renal cortical enhancement. No hydronephrosis or  nephrolithiasis. The urinary bladder is moderately distended with mild  asymmetric bladder wall thickening and perivesicular fat stranding.  Prostatomegaly. No  significant free fluid within the pelvis. No free  intraperitoneal air. Colonic diverticulosis without evidence of acute  diverticulitis. Small bowel fecalization is again seen. The major  intra-abdominal vasculature is widely patent. The infrarenal aorta is  of normal caliber. Mild aortoiliac atherosclerotic calcifications. No  inguinal lymphadenopathy. Slight increase in borderline enlarged tiffanei  hepatis lymph node measuring up to 10 mm in short axis (series 11  image 307).     Bones:  Multilevel degenerative changes in the spine. No suspicious or  aggressive appearing bone lesions.                                                                      IMPRESSION:  1. Postembolization changes to segment 8 of the liver with overall  slight increase in arterial enhancing foci adjacent to the ablation  bed as described above. Findings are suspicious for local disease  progression.  2. Increased size of bilateral adrenal nodules, consistent with  metastatic disease.  3. Slight increase in borderline enlarged tiffanie hepatis lymph nodes  with unchanged prominence precardial lymph nodes.  4. Previously described 2 mm nodule in the right upper lobe is not  appreciated on this examination.  5. Increase in biapical predominant patchy groundglass nodular  opacities, likely infectious/inflammatory in nature.  6. Additional chronic incidental findings further described in the  body of the report.      ASSESSMENT/PLAN:    Problem list:   1. Chronic Hepatitis B  2. HCC   3. Likely GERD    Unfortunately, there has been some progression of HCC since his last appointment in February. He was changed from nivolumab to  ramucirumab in August. AFP continues to rise, recently doubled. CT C/A/P done on 12/16 which shows mild progression of disease. I will add patient's case on to today's tumor board for further discussion of his treatment regimen.    He describes epigastric burning which sounds like GERD, less likely d/t liver disease  given mild progression. I sent a prescription for omeprazole 20 mg daily to his pharmacy.     PLAN:   - Discuss patient's case at tumor board today, consider changing from ramucirumab   - For GERD symptoms, omeprazole 20 mg every day sent to pharmacy  - I will call him after tumor board discussion     RTC in 6 months     Patient seen and discussed with attending GI physician, Dr. Trujillo.     Marilou An, DO  Internal Medicine, PGY-3  [facility]

## 2021-12-17 NOTE — LETTER
"    12/17/2021         RE: Preeti Pascual  [address]  [facility] [address]        Dear Colleague,    Thank you for referring your patient, Preeti Pascual, to the [facility] HEPATOLOG[facility] [place]. Please see a copy of my visit note below.    HEPATOLOGY CLINIC FOLLOW UP VISIT    HPI: 55 year old male with Chronic hepatitis B complicated by hepatocellular carcinoma returns for follow up visit.  This patient was last seen 2/21/21    On 8/19/21, he was changed from nivolumab to ramucirumab d/t progression of disease. He was found to have acute elevation of transaminases on 9/9/21. Hep B was negative. He was started on prednisone 80 mg/day for immune hepatitis and LFTs improved. He resumed ramucirumab on 9/30/21.      Overall, he is feeling okay. He describes occasional burning epigastric pain that comes and goes, sometimes after eating food. He has not taken any medications for this. He is worried he is losing weight, per chart 15 lb weight loss since last year. Feels he is getting weaker, for example lifting a 24 pack of water bottles is more difficult than it was before. Appetite is getting better than it was. No problem with BMs. Denies nausea or vomiting. He denies any increased abdominal girth. Some lower extremity edema, noting a \"sock line\" when he takes off his socks.He denies any fevers or chills, cough or shortness of breath.       ROS: 10pt ROS performed and otherwise negative.     PERTINENT PAST MEDICAL/SURGICAL HISTORY:  Past Medical History:   Diagnosis Date     Hepatitis B      Hepatocellular carcinoma (H)      Past Surgical History:   Procedure Laterality Date     Y-90 Delivery       As noted above.    PERTINENT MEDICATIONS:  Current Outpatient Medications   Medication     ferrous gluconate (FERGON) 324 (38 Fe) MG tablet     fluticasone (FLONASE) 50 MCG/ACT nasal spray     predniSONE (DELTASONE) 20 MG tablet     traMADol (ULTRAM) 50 MG tablet     VENTOLIN  (90 Base) " MCG/ACT inhaler     No current facility-administered medications for this visit.     Medications reviewed with patient today.      PHYSICAL EXAMINATION:  Vitals reviewed  BP (!) 145/86   Pulse 70   SpO2 97%   Wt Readings from Last 2 Encounters:   12/09/21 67.7 kg (149 lb 4.8 oz)   11/26/21 66.7 kg (147 lb 1.6 oz)       Gen: well appearing, in NAD  HEENT: anicteric sclera  CV: regular rate 2+ radial pulses  Pulm: normal work of breathing on ambient air  Abd: soft, mild tenderness in the epigastric area, no palpable masses, no HSM  Ext: trace lower extremity edema  Skin: warm, perfused, no jaundice  Neuro: alert and oriented, conversant, no asterixis noted    PERTINENT STUDIES:  12/9  AFP 1286 <- 614  LFT: AST 87, ALT 52, alb 2.8  Wbc 3.7, hgb 10.4, plt 121    12/16 CT A/P  Abdomen and pelvis:  Post embolization within segment 8 of the liver with multiple areas  which are not significant changed compared to prior exam. 1.9 x 1.2 cm  arterially enhancing focus anteromedial to the ablation zone which  previously measured 1.1 x 1.0 cm (series 8 image 44). There is no  washout or pseudocapsule formation associated with this lesion.  Additional 2.1 x 1.6 cm enhancing focus along the medial aspect of the  ablation zone demonstrates washout and previously measured 2.2 x 1.4  cm (series 8 image 50), suspicious for local recurrence  LR-TR viable.  No new arterially enhancing lesions. No significant change in vague  hypoattenuating region which lies just inferomedial to the ablation  bed. The gallbladder is unremarkable. No pancreatic ductal dilatation.  Small splenules in the left upper quadrant. Slight increase in size of  1.3 x 1.3 cm right adrenal nodule, previously measuring 1.2 x 1.1 cm  (series 8 image 93). An additional nodule just medial to the right  adrenal gland has also increased in size measuring 1.2 x 0.8 cm,  previously measuring 1.0 x 0.7 cm. Increased left adrenal nodule  measuring 1.6 x 1.3 cm, previously  measuring 1.3 x 1.1 cm (series 8  image 88). Symmetric renal cortical enhancement. No hydronephrosis or  nephrolithiasis. The urinary bladder is moderately distended with mild  asymmetric bladder wall thickening and perivesicular fat stranding.  Prostatomegaly. No significant free fluid within the pelvis. No free  intraperitoneal air. Colonic diverticulosis without evidence of acute  diverticulitis. Small bowel fecalization is again seen. The major  intra-abdominal vasculature is widely patent. The infrarenal aorta is  of normal caliber. Mild aortoiliac atherosclerotic calcifications. No  inguinal lymphadenopathy. Slight increase in borderline enlarged tiffanie  hepatis lymph node measuring up to 10 mm in short axis (series 11  image 307).     Bones:  Multilevel degenerative changes in the spine. No suspicious or  aggressive appearing bone lesions.                                                                      IMPRESSION:  1. Postembolization changes to segment 8 of the liver with overall  slight increase in arterial enhancing foci adjacent to the ablation  bed as described above. Findings are suspicious for local disease  progression.  2. Increased size of bilateral adrenal nodules, consistent with  metastatic disease.  3. Slight increase in borderline enlarged tiffanie hepatis lymph nodes  with unchanged prominence precardial lymph nodes.  4. Previously described 2 mm nodule in the right upper lobe is not  appreciated on this examination.  5. Increase in biapical predominant patchy groundglass nodular  opacities, likely infectious/inflammatory in nature.  6. Additional chronic incidental findings further described in the  body of the report.      ASSESSMENT/PLAN:    Problem list:   1. Chronic Hepatitis B  2. HCC   3. Likely GERD    Unfortunately, there has been some progression of HCC since his last appointment in February. He was changed from nivolumab to  ramucirumab in August. AFP continues to rise, recently  doubled. CT C/A/P done on 12/16 which shows mild progression of disease. I will add patient's case on to today's tumor board for further discussion of his treatment regimen.    He describes epigastric burning which sounds like GERD, less likely d/t liver disease given mild progression. I sent a prescription for omeprazole 20 mg daily to his pharmacy.     PLAN:   - Discuss patient's case at tumor board today, consider changing from ramucirumab   - For GERD symptoms, omeprazole 20 mg every day sent to pharmacy  - I will call him after tumor board discussion     RTC in 6 months     Patient seen and discussed with attending GI physician, Dr. Trujillo.     Marilou An, DO  Internal Medicine, PGY-3  [facility]            Attestation signed by Trevor Trujillo MD at 12/17/2021 10:07 AM:  The patient was seen and examined.  The above assessment and plan was developed jointly with the resident.      Thank you very much for allowing me to participate in the care of this patient.  If you have any questions regarding my recommendations, please do not hesitate to contact me.         Trevor Trujillo MD      Professor of Medicine  [facility] [employer]      Executive Medical Director, Solid Organ Transplant Program  [facility]        Again, thank you for allowing me to participate in the care of your patient.        Sincerely,        Trevor Trujillo MD

## 2021-12-17 NOTE — LETTER
Date:December 19, 2021      Patient was self referred, no letter generated. Do not send.        [facility] Health Information

## 2021-12-23 RX ORDER — EPINEPHRINE 1 MG/ML
0.3 INJECTION, SOLUTION INTRAMUSCULAR; SUBCUTANEOUS EVERY 5 MIN PRN
Status: CANCELLED | OUTPATIENT
Start: 2021-12-24

## 2021-12-23 RX ORDER — MEPERIDINE HYDROCHLORIDE 25 MG/ML
25 INJECTION INTRAMUSCULAR; INTRAVENOUS; SUBCUTANEOUS EVERY 30 MIN PRN
Status: CANCELLED | OUTPATIENT
Start: 2021-12-24

## 2021-12-23 RX ORDER — HEPARIN SODIUM (PORCINE) LOCK FLUSH IV SOLN 100 UNIT/ML 100 UNIT/ML
5 SOLUTION INTRAVENOUS
Status: CANCELLED | OUTPATIENT
Start: 2021-12-24

## 2021-12-23 RX ORDER — NALOXONE HYDROCHLORIDE 0.4 MG/ML
0.2 INJECTION, SOLUTION INTRAMUSCULAR; INTRAVENOUS; SUBCUTANEOUS
Status: CANCELLED | OUTPATIENT
Start: 2021-12-24

## 2021-12-23 RX ORDER — LORAZEPAM 2 MG/ML
0.5 INJECTION INTRAMUSCULAR EVERY 4 HOURS PRN
Status: CANCELLED | OUTPATIENT
Start: 2021-12-24

## 2021-12-23 RX ORDER — DIPHENHYDRAMINE HYDROCHLORIDE 50 MG/ML
50 INJECTION INTRAMUSCULAR; INTRAVENOUS
Status: CANCELLED
Start: 2021-12-24

## 2021-12-23 RX ORDER — METHYLPREDNISOLONE SODIUM SUCCINATE 125 MG/2ML
125 INJECTION, POWDER, LYOPHILIZED, FOR SOLUTION INTRAMUSCULAR; INTRAVENOUS
Status: CANCELLED
Start: 2021-12-24

## 2021-12-23 RX ORDER — ALBUTEROL SULFATE 0.83 MG/ML
2.5 SOLUTION RESPIRATORY (INHALATION)
Status: CANCELLED | OUTPATIENT
Start: 2021-12-24

## 2021-12-23 RX ORDER — HEPARIN SODIUM,PORCINE 10 UNIT/ML
5 VIAL (ML) INTRAVENOUS
Status: CANCELLED | OUTPATIENT
Start: 2021-12-24

## 2021-12-23 RX ORDER — ALBUTEROL SULFATE 90 UG/1
1-2 AEROSOL, METERED RESPIRATORY (INHALATION)
Status: CANCELLED
Start: 2021-12-24

## 2021-12-24 ENCOUNTER — INFUSION THERAPY VISIT (OUTPATIENT)
Dept: ONCOLOGY | Facility: CLINIC | Age: 56
End: 2021-12-24
Attending: INTERNAL MEDICINE
Payer: COMMERCIAL

## 2021-12-24 ENCOUNTER — APPOINTMENT (OUTPATIENT)
Dept: LAB | Facility: CLINIC | Age: 56
End: 2021-12-24
Attending: INTERNAL MEDICINE
Payer: COMMERCIAL

## 2021-12-24 VITALS
BODY MASS INDEX: 23.98 KG/M2 | DIASTOLIC BLOOD PRESSURE: 85 MMHG | HEART RATE: 72 BPM | TEMPERATURE: 98.1 F | OXYGEN SATURATION: 100 % | SYSTOLIC BLOOD PRESSURE: 137 MMHG | WEIGHT: 148.5 LBS | RESPIRATION RATE: 16 BRPM

## 2021-12-24 DIAGNOSIS — C22.0 HCC (HEPATOCELLULAR CARCINOMA) (H): Primary | ICD-10-CM

## 2021-12-24 LAB
ALBUMIN SERPL-MCNC: 2.8 G/DL (ref 3.4–5)
ALBUMIN UR-MCNC: NEGATIVE MG/DL
ALP SERPL-CCNC: 145 U/L (ref 40–150)
ALT SERPL W P-5'-P-CCNC: 43 U/L (ref 0–70)
ANION GAP SERPL CALCULATED.3IONS-SCNC: 6 MMOL/L (ref 3–14)
AST SERPL W P-5'-P-CCNC: 50 U/L (ref 0–45)
BILIRUB SERPL-MCNC: 0.3 MG/DL (ref 0.2–1.3)
BUN SERPL-MCNC: 9 MG/DL (ref 7–30)
CALCIUM SERPL-MCNC: 8.4 MG/DL (ref 8.5–10.1)
CHLORIDE BLD-SCNC: 112 MMOL/L (ref 94–109)
CO2 SERPL-SCNC: 25 MMOL/L (ref 20–32)
CREAT SERPL-MCNC: 0.6 MG/DL (ref 0.66–1.25)
GFR SERPL CREATININE-BSD FRML MDRD: >90 ML/MIN/1.73M2
GLUCOSE BLD-MCNC: 93 MG/DL (ref 70–99)
POTASSIUM BLD-SCNC: 4 MMOL/L (ref 3.4–5.3)
PROT SERPL-MCNC: 6.8 G/DL (ref 6.8–8.8)
SODIUM SERPL-SCNC: 143 MMOL/L (ref 133–144)

## 2021-12-24 PROCEDURE — 36415 COLL VENOUS BLD VENIPUNCTURE: CPT | Performed by: INTERNAL MEDICINE

## 2021-12-24 PROCEDURE — 250N000011 HC RX IP 250 OP 636: Performed by: INTERNAL MEDICINE

## 2021-12-24 PROCEDURE — 81003 URINALYSIS AUTO W/O SCOPE: CPT | Performed by: INTERNAL MEDICINE

## 2021-12-24 PROCEDURE — 80053 COMPREHEN METABOLIC PANEL: CPT | Performed by: INTERNAL MEDICINE

## 2021-12-24 PROCEDURE — 999N000128 HC STATISTIC PERIPHERAL IV START W/O US GUIDANCE

## 2021-12-24 PROCEDURE — 96413 CHEMO IV INFUSION 1 HR: CPT

## 2021-12-24 PROCEDURE — 258N000003 HC RX IP 258 OP 636: Performed by: INTERNAL MEDICINE

## 2021-12-24 RX ADMIN — SODIUM CHLORIDE 250 ML: 9 INJECTION, SOLUTION INTRAVENOUS at 09:39

## 2021-12-24 RX ADMIN — SODIUM CHLORIDE 600 MG: 9 INJECTION, SOLUTION INTRAVENOUS at 10:00

## 2021-12-24 ASSESSMENT — PAIN SCALES - GENERAL: PAINLEVEL: MODERATE PAIN (5)

## 2021-12-24 NOTE — NURSING NOTE
Chief Complaint   Patient presents with     Blood Draw     Labs drawn via piv by rn in lab. VS taken.     Labs drawn from PIV placed by RN. Line flushed with saline. Vitals taken. Pt checked in for appointment(s).    Trevor Yoder RN

## 2021-12-24 NOTE — PROGRESS NOTES
"Infusion Nursing Note:  Preeti Pascual presents today for Cycle 9 Day 1 ramucirumab.    Patient seen by provider today: No   present during visit today: Not Applicable.    Note: Preeti presents today feeling \"okay\". Endorses intermittent abdominal pain, which is not new or changed for him; declines intervention at this visit. Denies nausea/vomiting. Denies fevers/chills. Denies SOB, cough, chest pain, or dizziness/lightheadedness. Denies constipation/diarrhea. Denies urinary issues. Denies neuropathy. Offers no new concerns at this appointment.      Intravenous Access:  Peripheral IV placed.    Treatment Conditions:    Results for PREETI PASCUAL (MRN [id number]) as of 12/24/2021 09:35   Ref. Range 12/24/2021 08:31   Sodium Latest Ref Range: 133 - 144 mmol/L 143   Potassium Latest Ref Range: 3.4 - 5.3 mmol/L 4.0   Chloride Latest Ref Range: 94 - 109 mmol/L 112 (H)   Carbon Dioxide Latest Ref Range: 20 - 32 mmol/L 25   Urea Nitrogen Latest Ref Range: 7 - 30 mg/dL 9   Creatinine Latest Ref Range: 0.66 - 1.25 mg/dL 0.60 (L)   GFR Estimate Latest Ref Range: >60 mL/min/1.73m2 >90   Calcium Latest Ref Range: 8.5 - 10.1 mg/dL 8.4 (L)   Anion Gap Latest Ref Range: 3 - 14 mmol/L 6   Albumin Latest Ref Range: 3.4 - 5.0 g/dL 2.8 (L)   Protein Total Latest Ref Range: 6.8 - 8.8 g/dL 6.8   Bilirubin Total Latest Ref Range: 0.2 - 1.3 mg/dL 0.3   Alkaline Phosphatase Latest Ref Range: 40 - 150 U/L 145   ALT Latest Ref Range: 0 - 70 U/L 43   AST Latest Ref Range: 0 - 45 U/L 50 (H)   Glucose Latest Ref Range: 70 - 99 mg/dL 93   Protein Albumin Urine Latest Ref Range: Negative mg/dL Negative     Results reviewed, labs MET treatment parameters, ok to proceed with treatment.      Post Infusion Assessment:  Patient tolerated infusion without incident.  Blood return noted pre and post infusion.  Site patent and intact, free from redness, edema or discomfort.  No evidence of extravasations.  Access discontinued per protocol. "       Discharge Plan:   Patient declined prescription refills.  Discharge instructions reviewed with: Patient.  Patient and/or family verbalized understanding of discharge instructions and all questions answered.  Copy of AVS reviewed with patient and/or family.  Patient will return 01/07/22 for next infusion appointment.  Patient discharged in stable condition accompanied by: self.  Departure Mode: Ambulatory.      Marie Carvalho RN

## 2021-12-24 NOTE — PATIENT INSTRUCTIONS
[facility] Triage Nurse Line: [phone]    Call triage nurse with chills and/or temperature greater than or equal to 100.4, uncontrolled nausea/vomiting, diarrhea, constipation, dizziness, shortness of breath, chest pain, bleeding, unexplained bruising, or any new/concerning symptoms, questions/concerns.     If you are having any concerning symptoms or wish to speak to a provider before your next infusion visit, please call your care coordinator or triage to notify them so we can adequately serve you.       Lab Results:  Recent Results (from the past 12 hour(s))   Comprehensive metabolic panel    Collection Time: 12/24/21  8:31 AM   Result Value Ref Range    Sodium 143 133 - 144 mmol/L    Potassium 4.0 3.4 - 5.3 mmol/L    Chloride 112 (H) 94 - 109 mmol/L    Carbon Dioxide (CO2) 25 20 - 32 mmol/L    Anion Gap 6 3 - 14 mmol/L    Urea Nitrogen 9 7 - 30 mg/dL    Creatinine 0.60 (L) 0.66 - 1.25 mg/dL    Calcium 8.4 (L) 8.5 - 10.1 mg/dL    Glucose 93 70 - 99 mg/dL    Alkaline Phosphatase 145 40 - 150 U/L    AST 50 (H) 0 - 45 U/L    ALT 43 0 - 70 U/L    Protein Total 6.8 6.8 - 8.8 g/dL    Albumin 2.8 (L) 3.4 - 5.0 g/dL    Bilirubin Total 0.3 0.2 - 1.3 mg/dL    GFR Estimate >90 >60 mL/min/1.73m2   Protein qualitative urine    Collection Time: 12/24/21  8:31 AM   Result Value Ref Range    Protein Albumin Urine Negative Negative mg/dL

## 2022-01-01 ENCOUNTER — NURSE TRIAGE (OUTPATIENT)
Dept: NURSING | Facility: CLINIC | Age: 57
End: 2022-01-01

## 2022-01-01 ENCOUNTER — ANCILLARY PROCEDURE (OUTPATIENT)
Dept: CT IMAGING | Facility: CLINIC | Age: 57
End: 2022-01-01
Attending: INTERNAL MEDICINE
Payer: COMMERCIAL

## 2022-01-01 ENCOUNTER — OFFICE VISIT (OUTPATIENT)
Dept: GASTROENTEROLOGY | Facility: CLINIC | Age: 57
End: 2022-01-01
Payer: COMMERCIAL

## 2022-01-01 ENCOUNTER — TELEPHONE (OUTPATIENT)
Dept: ONCOLOGY | Facility: CLINIC | Age: 57
End: 2022-01-01

## 2022-01-01 ENCOUNTER — INFUSION THERAPY VISIT (OUTPATIENT)
Dept: ONCOLOGY | Facility: CLINIC | Age: 57
End: 2022-01-01
Attending: INTERNAL MEDICINE
Payer: COMMERCIAL

## 2022-01-01 ENCOUNTER — INFUSION THERAPY VISIT (OUTPATIENT)
Dept: ONCOLOGY | Facility: CLINIC | Age: 57
End: 2022-01-01
Attending: PHYSICIAN ASSISTANT
Payer: COMMERCIAL

## 2022-01-01 ENCOUNTER — MYC MEDICAL ADVICE (OUTPATIENT)
Dept: ONCOLOGY | Facility: CLINIC | Age: 57
End: 2022-01-01

## 2022-01-01 ENCOUNTER — APPOINTMENT (OUTPATIENT)
Dept: LAB | Facility: CLINIC | Age: 57
End: 2022-01-01
Attending: INTERNAL MEDICINE
Payer: COMMERCIAL

## 2022-01-01 ENCOUNTER — INFUSION THERAPY VISIT (OUTPATIENT)
Dept: ONCOLOGY | Facility: CLINIC | Age: 57
End: 2022-01-01
Payer: COMMERCIAL

## 2022-01-01 ENCOUNTER — PATIENT OUTREACH (OUTPATIENT)
Dept: ONCOLOGY | Facility: CLINIC | Age: 57
End: 2022-01-01

## 2022-01-01 ENCOUNTER — DOCUMENTATION ONLY (OUTPATIENT)
Dept: ONCOLOGY | Facility: CLINIC | Age: 57
End: 2022-01-01

## 2022-01-01 ENCOUNTER — LAB (OUTPATIENT)
Dept: LAB | Facility: CLINIC | Age: 57
End: 2022-01-01
Payer: COMMERCIAL

## 2022-01-01 ENCOUNTER — TELEPHONE (OUTPATIENT)
Dept: ONCOLOGY | Facility: CLINIC | Age: 57
End: 2022-01-01
Payer: COMMERCIAL

## 2022-01-01 ENCOUNTER — VIRTUAL VISIT (OUTPATIENT)
Dept: ONCOLOGY | Facility: CLINIC | Age: 57
End: 2022-01-01
Attending: NURSE PRACTITIONER
Payer: COMMERCIAL

## 2022-01-01 ENCOUNTER — APPOINTMENT (OUTPATIENT)
Dept: LAB | Facility: CLINIC | Age: 57
End: 2022-01-01
Payer: COMMERCIAL

## 2022-01-01 ENCOUNTER — HEALTH MAINTENANCE LETTER (OUTPATIENT)
Age: 57
End: 2022-01-01

## 2022-01-01 ENCOUNTER — TELEPHONE (OUTPATIENT)
Dept: GASTROENTEROLOGY | Facility: CLINIC | Age: 57
End: 2022-01-01

## 2022-01-01 VITALS
OXYGEN SATURATION: 100 % | SYSTOLIC BLOOD PRESSURE: 147 MMHG | BODY MASS INDEX: 23.01 KG/M2 | RESPIRATION RATE: 16 BRPM | DIASTOLIC BLOOD PRESSURE: 81 MMHG | WEIGHT: 142.5 LBS | HEART RATE: 73 BPM | TEMPERATURE: 98.2 F

## 2022-01-01 VITALS
SYSTOLIC BLOOD PRESSURE: 137 MMHG | TEMPERATURE: 97.3 F | OXYGEN SATURATION: 99 % | RESPIRATION RATE: 16 BRPM | BODY MASS INDEX: 22.66 KG/M2 | HEART RATE: 68 BPM | DIASTOLIC BLOOD PRESSURE: 81 MMHG | WEIGHT: 140.3 LBS

## 2022-01-01 VITALS
DIASTOLIC BLOOD PRESSURE: 81 MMHG | HEART RATE: 82 BPM | WEIGHT: 123.4 LBS | RESPIRATION RATE: 14 BRPM | SYSTOLIC BLOOD PRESSURE: 132 MMHG | BODY MASS INDEX: 19.93 KG/M2 | OXYGEN SATURATION: 97 % | TEMPERATURE: 97.9 F

## 2022-01-01 VITALS
WEIGHT: 136.6 LBS | HEART RATE: 70 BPM | RESPIRATION RATE: 16 BRPM | SYSTOLIC BLOOD PRESSURE: 141 MMHG | OXYGEN SATURATION: 99 % | BODY MASS INDEX: 22.06 KG/M2 | TEMPERATURE: 97.9 F | DIASTOLIC BLOOD PRESSURE: 87 MMHG

## 2022-01-01 VITALS
TEMPERATURE: 98.6 F | RESPIRATION RATE: 16 BRPM | HEART RATE: 86 BPM | OXYGEN SATURATION: 98 % | DIASTOLIC BLOOD PRESSURE: 80 MMHG | SYSTOLIC BLOOD PRESSURE: 133 MMHG

## 2022-01-01 VITALS
BODY MASS INDEX: 19.31 KG/M2 | WEIGHT: 119.6 LBS | SYSTOLIC BLOOD PRESSURE: 130 MMHG | DIASTOLIC BLOOD PRESSURE: 76 MMHG | TEMPERATURE: 98.1 F | HEART RATE: 82 BPM | OXYGEN SATURATION: 95 % | RESPIRATION RATE: 16 BRPM

## 2022-01-01 VITALS
WEIGHT: 128.8 LBS | TEMPERATURE: 97.2 F | BODY MASS INDEX: 20.8 KG/M2 | RESPIRATION RATE: 18 BRPM | SYSTOLIC BLOOD PRESSURE: 144 MMHG | HEART RATE: 81 BPM | DIASTOLIC BLOOD PRESSURE: 88 MMHG | OXYGEN SATURATION: 98 %

## 2022-01-01 VITALS
TEMPERATURE: 97.9 F | RESPIRATION RATE: 16 BRPM | DIASTOLIC BLOOD PRESSURE: 86 MMHG | SYSTOLIC BLOOD PRESSURE: 145 MMHG | HEART RATE: 84 BPM | OXYGEN SATURATION: 98 %

## 2022-01-01 VITALS
TEMPERATURE: 98.3 F | DIASTOLIC BLOOD PRESSURE: 77 MMHG | OXYGEN SATURATION: 100 % | HEART RATE: 83 BPM | BODY MASS INDEX: 21.96 KG/M2 | SYSTOLIC BLOOD PRESSURE: 158 MMHG | WEIGHT: 136 LBS

## 2022-01-01 VITALS
RESPIRATION RATE: 14 BRPM | HEART RATE: 80 BPM | TEMPERATURE: 98 F | DIASTOLIC BLOOD PRESSURE: 81 MMHG | OXYGEN SATURATION: 99 % | SYSTOLIC BLOOD PRESSURE: 147 MMHG

## 2022-01-01 VITALS — SYSTOLIC BLOOD PRESSURE: 138 MMHG | HEART RATE: 69 BPM | DIASTOLIC BLOOD PRESSURE: 83 MMHG

## 2022-01-01 VITALS
HEART RATE: 82 BPM | RESPIRATION RATE: 16 BRPM | HEIGHT: 66 IN | BODY MASS INDEX: 21.17 KG/M2 | WEIGHT: 131.7 LBS | OXYGEN SATURATION: 99 % | DIASTOLIC BLOOD PRESSURE: 82 MMHG | TEMPERATURE: 98.2 F | SYSTOLIC BLOOD PRESSURE: 143 MMHG

## 2022-01-01 VITALS
OXYGEN SATURATION: 100 % | TEMPERATURE: 98.2 F | RESPIRATION RATE: 16 BRPM | HEART RATE: 72 BPM | BODY MASS INDEX: 21.72 KG/M2 | DIASTOLIC BLOOD PRESSURE: 83 MMHG | WEIGHT: 134.5 LBS | SYSTOLIC BLOOD PRESSURE: 161 MMHG

## 2022-01-01 VITALS
SYSTOLIC BLOOD PRESSURE: 145 MMHG | RESPIRATION RATE: 16 BRPM | OXYGEN SATURATION: 100 % | TEMPERATURE: 97.6 F | HEART RATE: 85 BPM | DIASTOLIC BLOOD PRESSURE: 80 MMHG

## 2022-01-01 VITALS
OXYGEN SATURATION: 99 % | DIASTOLIC BLOOD PRESSURE: 83 MMHG | BODY MASS INDEX: 21.7 KG/M2 | WEIGHT: 134.4 LBS | HEART RATE: 76 BPM | TEMPERATURE: 97.4 F | SYSTOLIC BLOOD PRESSURE: 145 MMHG

## 2022-01-01 VITALS
TEMPERATURE: 98.3 F | HEART RATE: 74 BPM | WEIGHT: 135 LBS | BODY MASS INDEX: 21.8 KG/M2 | SYSTOLIC BLOOD PRESSURE: 171 MMHG | DIASTOLIC BLOOD PRESSURE: 97 MMHG | OXYGEN SATURATION: 100 %

## 2022-01-01 VITALS
SYSTOLIC BLOOD PRESSURE: 139 MMHG | RESPIRATION RATE: 16 BRPM | TEMPERATURE: 98.4 F | HEART RATE: 75 BPM | OXYGEN SATURATION: 100 % | DIASTOLIC BLOOD PRESSURE: 79 MMHG | BODY MASS INDEX: 22.85 KG/M2 | WEIGHT: 141.5 LBS

## 2022-01-01 VITALS
RESPIRATION RATE: 16 BRPM | BODY MASS INDEX: 22.14 KG/M2 | WEIGHT: 137.1 LBS | SYSTOLIC BLOOD PRESSURE: 161 MMHG | OXYGEN SATURATION: 99 % | HEART RATE: 77 BPM | DIASTOLIC BLOOD PRESSURE: 81 MMHG

## 2022-01-01 VITALS
HEART RATE: 74 BPM | WEIGHT: 138 LBS | BODY MASS INDEX: 22.28 KG/M2 | TEMPERATURE: 98.2 F | OXYGEN SATURATION: 100 % | RESPIRATION RATE: 16 BRPM | DIASTOLIC BLOOD PRESSURE: 80 MMHG | SYSTOLIC BLOOD PRESSURE: 157 MMHG

## 2022-01-01 DIAGNOSIS — C79.71 MALIGNANT NEOPLASM METASTATIC TO BOTH ADRENAL GLANDS (H): Primary | ICD-10-CM

## 2022-01-01 DIAGNOSIS — C22.0 HCC (HEPATOCELLULAR CARCINOMA) (H): Primary | ICD-10-CM

## 2022-01-01 DIAGNOSIS — C79.71 MALIGNANT NEOPLASM METASTATIC TO BOTH ADRENAL GLANDS (H): ICD-10-CM

## 2022-01-01 DIAGNOSIS — R63.0 ANOREXIA: ICD-10-CM

## 2022-01-01 DIAGNOSIS — T45.1X5A CHEMOTHERAPY-INDUCED NAUSEA: Primary | ICD-10-CM

## 2022-01-01 DIAGNOSIS — C79.72 MALIGNANT NEOPLASM METASTATIC TO BOTH ADRENAL GLANDS (H): ICD-10-CM

## 2022-01-01 DIAGNOSIS — G89.3 CANCER ASSOCIATED PAIN: Primary | ICD-10-CM

## 2022-01-01 DIAGNOSIS — G89.3 CANCER ASSOCIATED PAIN: ICD-10-CM

## 2022-01-01 DIAGNOSIS — K74.69 OTHER CIRRHOSIS OF LIVER (H): ICD-10-CM

## 2022-01-01 DIAGNOSIS — B18.1 CHRONIC VIRAL HEPATITIS B WITHOUT DELTA AGENT AND WITHOUT COMA (H): ICD-10-CM

## 2022-01-01 DIAGNOSIS — C79.72 MALIGNANT NEOPLASM METASTATIC TO BOTH ADRENAL GLANDS (H): Primary | ICD-10-CM

## 2022-01-01 DIAGNOSIS — R10.84 ABDOMINAL PAIN, GENERALIZED: ICD-10-CM

## 2022-01-01 DIAGNOSIS — C22.0 HCC (HEPATOCELLULAR CARCINOMA) (H): ICD-10-CM

## 2022-01-01 DIAGNOSIS — R12 HEARTBURN: ICD-10-CM

## 2022-01-01 DIAGNOSIS — J06.9 VIRAL UPPER RESPIRATORY ILLNESS: ICD-10-CM

## 2022-01-01 DIAGNOSIS — T45.1X5A CHEMOTHERAPY-INDUCED NAUSEA: ICD-10-CM

## 2022-01-01 DIAGNOSIS — R79.89 ELEVATED LFTS: ICD-10-CM

## 2022-01-01 DIAGNOSIS — R10.13 DYSPEPSIA: ICD-10-CM

## 2022-01-01 DIAGNOSIS — R05.9 COUGH: ICD-10-CM

## 2022-01-01 DIAGNOSIS — R06.6 HICCUPS: Primary | ICD-10-CM

## 2022-01-01 DIAGNOSIS — R11.0 CHEMOTHERAPY-INDUCED NAUSEA: Primary | ICD-10-CM

## 2022-01-01 DIAGNOSIS — B18.1 CHRONIC VIRAL HEPATITIS B WITHOUT DELTA AGENT AND WITHOUT COMA (H): Primary | ICD-10-CM

## 2022-01-01 DIAGNOSIS — R11.0 CHEMOTHERAPY-INDUCED NAUSEA: ICD-10-CM

## 2022-01-01 LAB
ACTH PLAS-MCNC: 150 PG/ML
AFP SERPL-MCNC: 1181.6 UG/L (ref 0–8)
AFP SERPL-MCNC: 9913 NG/ML
AFP SERPL-MCNC: ABNORMAL NG/ML
AFP SERPL-MCNC: ABNORMAL NG/ML
ALBUMIN MFR UR ELPH: 12.4 MG/DL
ALBUMIN MFR UR ELPH: 6.1 MG/DL
ALBUMIN SERPL BCG-MCNC: 3.2 G/DL (ref 3.5–5.2)
ALBUMIN SERPL BCG-MCNC: 3.4 G/DL (ref 3.5–5.2)
ALBUMIN SERPL BCG-MCNC: 3.5 G/DL (ref 3.5–5.2)
ALBUMIN SERPL BCG-MCNC: 3.5 G/DL (ref 3.5–5.2)
ALBUMIN SERPL BCG-MCNC: 3.7 G/DL (ref 3.5–5.2)
ALBUMIN SERPL BCG-MCNC: 3.7 G/DL (ref 3.5–5.2)
ALBUMIN SERPL BCG-MCNC: 3.8 G/DL (ref 3.5–5.2)
ALBUMIN SERPL BCG-MCNC: 3.8 G/DL (ref 3.5–5.2)
ALBUMIN SERPL-MCNC: 2.6 G/DL (ref 3.4–5)
ALBUMIN SERPL-MCNC: 3 G/DL (ref 3.4–5)
ALBUMIN SERPL-MCNC: 3.2 G/DL (ref 3.4–5)
ALBUMIN SERPL-MCNC: 3.2 G/DL (ref 3.4–5)
ALBUMIN UR-MCNC: NEGATIVE MG/DL
ALP SERPL-CCNC: 123 U/L (ref 40–150)
ALP SERPL-CCNC: 148 U/L (ref 40–150)
ALP SERPL-CCNC: 154 U/L (ref 40–150)
ALP SERPL-CCNC: 155 U/L (ref 40–129)
ALP SERPL-CCNC: 160 U/L (ref 40–150)
ALP SERPL-CCNC: 161 U/L (ref 40–129)
ALP SERPL-CCNC: 177 U/L (ref 40–129)
ALP SERPL-CCNC: 180 U/L (ref 40–129)
ALP SERPL-CCNC: 239 U/L (ref 40–129)
ALP SERPL-CCNC: 255 U/L (ref 40–129)
ALP SERPL-CCNC: 268 U/L (ref 40–129)
ALP SERPL-CCNC: 294 U/L (ref 40–129)
ALT SERPL W P-5'-P-CCNC: 147 U/L (ref 10–50)
ALT SERPL W P-5'-P-CCNC: 40 U/L (ref 0–70)
ALT SERPL W P-5'-P-CCNC: 40 U/L (ref 10–50)
ALT SERPL W P-5'-P-CCNC: 42 U/L (ref 10–50)
ALT SERPL W P-5'-P-CCNC: 52 U/L (ref 10–50)
ALT SERPL W P-5'-P-CCNC: 56 U/L (ref 0–70)
ALT SERPL W P-5'-P-CCNC: 60 U/L (ref 10–50)
ALT SERPL W P-5'-P-CCNC: 62 U/L (ref 0–70)
ALT SERPL W P-5'-P-CCNC: 64 U/L (ref 10–50)
ALT SERPL W P-5'-P-CCNC: 69 U/L (ref 10–50)
ALT SERPL W P-5'-P-CCNC: 73 U/L (ref 0–70)
ALT SERPL W P-5'-P-CCNC: 98 U/L (ref 10–50)
AMMONIA PLAS-SCNC: 59 UMOL/L (ref 10–50)
ANION GAP SERPL CALCULATED.3IONS-SCNC: 10 MMOL/L (ref 3–14)
ANION GAP SERPL CALCULATED.3IONS-SCNC: 10 MMOL/L (ref 7–15)
ANION GAP SERPL CALCULATED.3IONS-SCNC: 10 MMOL/L (ref 7–15)
ANION GAP SERPL CALCULATED.3IONS-SCNC: 11 MMOL/L (ref 7–15)
ANION GAP SERPL CALCULATED.3IONS-SCNC: 7 MMOL/L (ref 3–14)
ANION GAP SERPL CALCULATED.3IONS-SCNC: 8 MMOL/L (ref 3–14)
ANION GAP SERPL CALCULATED.3IONS-SCNC: 8 MMOL/L (ref 7–15)
ANION GAP SERPL CALCULATED.3IONS-SCNC: 9 MMOL/L (ref 3–14)
ANION GAP SERPL CALCULATED.3IONS-SCNC: 9 MMOL/L (ref 7–15)
ANION GAP SERPL CALCULATED.3IONS-SCNC: 9 MMOL/L (ref 7–15)
APPEARANCE UR: ABNORMAL
AST SERPL W P-5'-P-CCNC: 113 U/L (ref 10–50)
AST SERPL W P-5'-P-CCNC: 148 U/L (ref 10–50)
AST SERPL W P-5'-P-CCNC: 165 U/L (ref 10–50)
AST SERPL W P-5'-P-CCNC: 171 U/L (ref 10–50)
AST SERPL W P-5'-P-CCNC: 345 U/L (ref 10–50)
AST SERPL W P-5'-P-CCNC: 46 U/L (ref 0–45)
AST SERPL W P-5'-P-CCNC: 69 U/L (ref 0–45)
AST SERPL W P-5'-P-CCNC: 74 U/L (ref 0–45)
AST SERPL W P-5'-P-CCNC: 80 U/L (ref 10–50)
AST SERPL W P-5'-P-CCNC: 84 U/L (ref 10–50)
AST SERPL W P-5'-P-CCNC: 94 U/L (ref 0–45)
AST SERPL W P-5'-P-CCNC: ABNORMAL U/L
BASOPHILS # BLD AUTO: 0 10E3/UL (ref 0–0.2)
BASOPHILS # BLD AUTO: 0 10E3/UL (ref 0–0.2)
BASOPHILS # BLD AUTO: 0.1 10E3/UL (ref 0–0.2)
BASOPHILS NFR BLD AUTO: 1 %
BASOPHILS NFR BLD AUTO: 2 %
BILIRUB DIRECT SERPL-MCNC: 0.2 MG/DL (ref 0–0.2)
BILIRUB SERPL-MCNC: 0.4 MG/DL
BILIRUB SERPL-MCNC: 0.5 MG/DL
BILIRUB SERPL-MCNC: 0.5 MG/DL
BILIRUB SERPL-MCNC: 0.5 MG/DL (ref 0.2–1.3)
BILIRUB SERPL-MCNC: 0.6 MG/DL (ref 0.2–1.3)
BILIRUB SERPL-MCNC: 0.7 MG/DL
BILIRUB SERPL-MCNC: 0.7 MG/DL (ref 0.2–1.3)
BILIRUB SERPL-MCNC: 0.7 MG/DL (ref 0.2–1.3)
BILIRUB SERPL-MCNC: 0.9 MG/DL
BILIRUB SERPL-MCNC: 1 MG/DL
BILIRUB UR QL STRIP: NEGATIVE
BUN SERPL-MCNC: 10 MG/DL (ref 6–20)
BUN SERPL-MCNC: 10.3 MG/DL (ref 6–20)
BUN SERPL-MCNC: 11 MG/DL (ref 7–30)
BUN SERPL-MCNC: 12 MG/DL (ref 7–30)
BUN SERPL-MCNC: 12 MG/DL (ref 7–30)
BUN SERPL-MCNC: 5.8 MG/DL (ref 8–23)
BUN SERPL-MCNC: 7 MG/DL (ref 7–30)
BUN SERPL-MCNC: 7.3 MG/DL (ref 6–20)
BUN SERPL-MCNC: 8.4 MG/DL (ref 6–20)
BUN SERPL-MCNC: 8.9 MG/DL (ref 6–20)
BUN SERPL-MCNC: 9.2 MG/DL (ref 6–20)
BUN SERPL-MCNC: 9.9 MG/DL (ref 6–20)
CALCIUM SERPL-MCNC: 8.6 MG/DL (ref 8.5–10.1)
CALCIUM SERPL-MCNC: 8.7 MG/DL (ref 8.6–10)
CALCIUM SERPL-MCNC: 8.7 MG/DL (ref 8.6–10)
CALCIUM SERPL-MCNC: 8.8 MG/DL (ref 8.5–10.1)
CALCIUM SERPL-MCNC: 8.8 MG/DL (ref 8.6–10)
CALCIUM SERPL-MCNC: 8.8 MG/DL (ref 8.6–10)
CALCIUM SERPL-MCNC: 9 MG/DL (ref 8.6–10)
CALCIUM SERPL-MCNC: 9.1 MG/DL (ref 8.5–10.1)
CALCIUM SERPL-MCNC: 9.3 MG/DL (ref 8.6–10)
CALCIUM SERPL-MCNC: 9.3 MG/DL (ref 8.6–10)
CALCIUM SERPL-MCNC: 9.6 MG/DL (ref 8.5–10.1)
CALCIUM SERPL-MCNC: 9.6 MG/DL (ref 8.6–10)
CHLORIDE BLD-SCNC: 102 MMOL/L (ref 94–109)
CHLORIDE BLD-SCNC: 107 MMOL/L (ref 94–109)
CHLORIDE BLD-SCNC: 107 MMOL/L (ref 94–109)
CHLORIDE BLD-SCNC: 109 MMOL/L (ref 94–109)
CHLORIDE SERPL-SCNC: 100 MMOL/L (ref 98–107)
CHLORIDE SERPL-SCNC: 103 MMOL/L (ref 98–107)
CHLORIDE SERPL-SCNC: 104 MMOL/L (ref 98–107)
CHLORIDE SERPL-SCNC: 105 MMOL/L (ref 98–107)
CHLORIDE SERPL-SCNC: 107 MMOL/L (ref 98–107)
CHLORIDE SERPL-SCNC: 107 MMOL/L (ref 98–107)
CO2 SERPL-SCNC: 23 MMOL/L (ref 20–32)
CO2 SERPL-SCNC: 24 MMOL/L (ref 20–32)
CO2 SERPL-SCNC: 24 MMOL/L (ref 20–32)
CO2 SERPL-SCNC: 26 MMOL/L (ref 20–32)
COLOR UR AUTO: YELLOW
CORTIS SERPL-MCNC: 12.9 UG/DL
CREAT SERPL-MCNC: 0.54 MG/DL (ref 0.67–1.17)
CREAT SERPL-MCNC: 0.57 MG/DL (ref 0.66–1.25)
CREAT SERPL-MCNC: 0.57 MG/DL (ref 0.67–1.17)
CREAT SERPL-MCNC: 0.58 MG/DL (ref 0.67–1.17)
CREAT SERPL-MCNC: 0.6 MG/DL (ref 0.67–1.17)
CREAT SERPL-MCNC: 0.6 MG/DL (ref 0.67–1.17)
CREAT SERPL-MCNC: 0.61 MG/DL (ref 0.67–1.17)
CREAT SERPL-MCNC: 0.62 MG/DL (ref 0.66–1.25)
CREAT SERPL-MCNC: 0.62 MG/DL (ref 0.67–1.17)
CREAT SERPL-MCNC: 0.63 MG/DL (ref 0.67–1.17)
CREAT SERPL-MCNC: 0.64 MG/DL (ref 0.66–1.25)
CREAT SERPL-MCNC: 0.68 MG/DL (ref 0.66–1.25)
CREAT UR-MCNC: 106 MG/DL
CREAT UR-MCNC: 46.8 MG/DL
CREAT UR-MCNC: 84 MG/DL
CREAT UR-MCNC: 88 MG/DL
DEPRECATED HCO3 PLAS-SCNC: 18 MMOL/L (ref 22–29)
DEPRECATED HCO3 PLAS-SCNC: 21 MMOL/L (ref 22–29)
DEPRECATED HCO3 PLAS-SCNC: 22 MMOL/L (ref 22–29)
DEPRECATED HCO3 PLAS-SCNC: 23 MMOL/L (ref 22–29)
EOSINOPHIL # BLD AUTO: 0.1 10E3/UL (ref 0–0.7)
EOSINOPHIL # BLD AUTO: 0.1 10E3/UL (ref 0–0.7)
EOSINOPHIL # BLD AUTO: 0.2 10E3/UL (ref 0–0.7)
EOSINOPHIL # BLD AUTO: 0.6 10E3/UL (ref 0–0.7)
EOSINOPHIL # BLD AUTO: 0.6 10E3/UL (ref 0–0.7)
EOSINOPHIL # BLD AUTO: 0.7 10E3/UL (ref 0–0.7)
EOSINOPHIL # BLD AUTO: 0.8 10E3/UL (ref 0–0.7)
EOSINOPHIL # BLD AUTO: 0.8 10E3/UL (ref 0–0.7)
EOSINOPHIL NFR BLD AUTO: 16 %
EOSINOPHIL NFR BLD AUTO: 17 %
EOSINOPHIL NFR BLD AUTO: 2 %
EOSINOPHIL NFR BLD AUTO: 26 %
EOSINOPHIL NFR BLD AUTO: 3 %
EOSINOPHIL NFR BLD AUTO: 4 %
ERYTHROCYTE [DISTWIDTH] IN BLOOD BY AUTOMATED COUNT: 14.3 % (ref 10–15)
ERYTHROCYTE [DISTWIDTH] IN BLOOD BY AUTOMATED COUNT: 14.5 % (ref 10–15)
ERYTHROCYTE [DISTWIDTH] IN BLOOD BY AUTOMATED COUNT: 14.7 % (ref 10–15)
ERYTHROCYTE [DISTWIDTH] IN BLOOD BY AUTOMATED COUNT: 15.1 % (ref 10–15)
ERYTHROCYTE [DISTWIDTH] IN BLOOD BY AUTOMATED COUNT: 15.5 % (ref 10–15)
ERYTHROCYTE [DISTWIDTH] IN BLOOD BY AUTOMATED COUNT: 16.5 % (ref 10–15)
ERYTHROCYTE [DISTWIDTH] IN BLOOD BY AUTOMATED COUNT: 16.6 % (ref 10–15)
ERYTHROCYTE [DISTWIDTH] IN BLOOD BY AUTOMATED COUNT: 17.1 % (ref 10–15)
ERYTHROCYTE [DISTWIDTH] IN BLOOD BY AUTOMATED COUNT: 17.2 % (ref 10–15)
GFR SERPL CREATININE-BSD FRML MDRD: >90 ML/MIN/1.73M2
GLUCOSE BLD-MCNC: 118 MG/DL (ref 70–99)
GLUCOSE BLD-MCNC: 93 MG/DL (ref 70–99)
GLUCOSE BLD-MCNC: 95 MG/DL (ref 70–99)
GLUCOSE BLD-MCNC: 98 MG/DL (ref 70–99)
GLUCOSE SERPL-MCNC: 109 MG/DL (ref 70–99)
GLUCOSE SERPL-MCNC: 87 MG/DL (ref 70–99)
GLUCOSE SERPL-MCNC: 91 MG/DL (ref 70–99)
GLUCOSE SERPL-MCNC: 91 MG/DL (ref 70–99)
GLUCOSE SERPL-MCNC: 94 MG/DL (ref 70–99)
GLUCOSE SERPL-MCNC: 97 MG/DL (ref 70–99)
GLUCOSE SERPL-MCNC: 98 MG/DL (ref 70–99)
GLUCOSE SERPL-MCNC: 98 MG/DL (ref 70–99)
GLUCOSE UR STRIP-MCNC: NEGATIVE MG/DL
HCT VFR BLD AUTO: 35 % (ref 40–53)
HCT VFR BLD AUTO: 35.7 % (ref 40–53)
HCT VFR BLD AUTO: 36.3 % (ref 40–53)
HCT VFR BLD AUTO: 37.4 % (ref 40–53)
HCT VFR BLD AUTO: 37.8 % (ref 40–53)
HCT VFR BLD AUTO: 38 % (ref 40–53)
HCT VFR BLD AUTO: 39.8 % (ref 40–53)
HCT VFR BLD AUTO: 39.9 % (ref 40–53)
HCT VFR BLD AUTO: 40.7 % (ref 40–53)
HGB BLD-MCNC: 11 G/DL (ref 13.3–17.7)
HGB BLD-MCNC: 12.2 G/DL (ref 13.3–17.7)
HGB BLD-MCNC: 12.2 G/DL (ref 13.3–17.7)
HGB BLD-MCNC: 12.3 G/DL (ref 13.3–17.7)
HGB BLD-MCNC: 12.7 G/DL (ref 13.3–17.7)
HGB BLD-MCNC: 12.8 G/DL (ref 13.3–17.7)
HGB BLD-MCNC: 12.8 G/DL (ref 13.3–17.7)
HGB BLD-MCNC: 13.3 G/DL (ref 13.3–17.7)
HGB BLD-MCNC: 13.3 G/DL (ref 13.3–17.7)
HGB UR QL STRIP: NEGATIVE
HOLD SPECIMEN: NORMAL
HYALINE CASTS: 1 /LPF
IMM GRANULOCYTES # BLD: 0 10E3/UL
IMM GRANULOCYTES NFR BLD: 0 %
IMM GRANULOCYTES NFR BLD: 1 %
INR PPP: 1.19 (ref 0.85–1.15)
INR PPP: 1.19 (ref 0.85–1.15)
KETONES UR STRIP-MCNC: NEGATIVE MG/DL
LEUKOCYTE ESTERASE UR QL STRIP: NEGATIVE
LYMPHOCYTES # BLD AUTO: 0.6 10E3/UL (ref 0.8–5.3)
LYMPHOCYTES # BLD AUTO: 0.7 10E3/UL (ref 0.8–5.3)
LYMPHOCYTES # BLD AUTO: 1 10E3/UL (ref 0.8–5.3)
LYMPHOCYTES # BLD AUTO: 1.2 10E3/UL (ref 0.8–5.3)
LYMPHOCYTES # BLD AUTO: 1.4 10E3/UL (ref 0.8–5.3)
LYMPHOCYTES NFR BLD AUTO: 17 %
LYMPHOCYTES NFR BLD AUTO: 21 %
LYMPHOCYTES NFR BLD AUTO: 24 %
LYMPHOCYTES NFR BLD AUTO: 27 %
LYMPHOCYTES NFR BLD AUTO: 28 %
LYMPHOCYTES NFR BLD AUTO: 28 %
LYMPHOCYTES NFR BLD AUTO: 29 %
LYMPHOCYTES NFR BLD AUTO: 30 %
MAGNESIUM SERPL-MCNC: 1.8 MG/DL (ref 1.7–2.3)
MAGNESIUM SERPL-MCNC: 1.9 MG/DL (ref 1.6–2.3)
MAGNESIUM SERPL-MCNC: 2 MG/DL (ref 1.7–2.3)
MAGNESIUM SERPL-MCNC: 2.1 MG/DL (ref 1.7–2.3)
MAGNESIUM SERPL-MCNC: 2.1 MG/DL (ref 1.7–2.3)
MAGNESIUM SERPL-MCNC: 2.2 MG/DL (ref 1.7–2.3)
MAGNESIUM SERPL-MCNC: 2.3 MG/DL (ref 1.6–2.3)
MAGNESIUM SERPL-MCNC: 2.4 MG/DL (ref 1.6–2.3)
MCH RBC QN AUTO: 24.8 PG (ref 26.5–33)
MCH RBC QN AUTO: 25.1 PG (ref 26.5–33)
MCH RBC QN AUTO: 27.4 PG (ref 26.5–33)
MCH RBC QN AUTO: 27.9 PG (ref 26.5–33)
MCH RBC QN AUTO: 28.1 PG (ref 26.5–33)
MCH RBC QN AUTO: 28.2 PG (ref 26.5–33)
MCH RBC QN AUTO: 28.3 PG (ref 26.5–33)
MCH RBC QN AUTO: 28.8 PG (ref 26.5–33)
MCH RBC QN AUTO: 29 PG (ref 26.5–33)
MCHC RBC AUTO-ENTMCNC: 31.4 G/DL (ref 31.5–36.5)
MCHC RBC AUTO-ENTMCNC: 31.4 G/DL (ref 31.5–36.5)
MCHC RBC AUTO-ENTMCNC: 32.9 G/DL (ref 31.5–36.5)
MCHC RBC AUTO-ENTMCNC: 33.3 G/DL (ref 31.5–36.5)
MCHC RBC AUTO-ENTMCNC: 33.4 G/DL (ref 31.5–36.5)
MCHC RBC AUTO-ENTMCNC: 33.6 G/DL (ref 31.5–36.5)
MCHC RBC AUTO-ENTMCNC: 33.6 G/DL (ref 31.5–36.5)
MCHC RBC AUTO-ENTMCNC: 33.7 G/DL (ref 31.5–36.5)
MCHC RBC AUTO-ENTMCNC: 34.2 G/DL (ref 31.5–36.5)
MCV RBC AUTO: 79 FL (ref 78–100)
MCV RBC AUTO: 80 FL (ref 78–100)
MCV RBC AUTO: 82 FL (ref 78–100)
MCV RBC AUTO: 83 FL (ref 78–100)
MCV RBC AUTO: 84 FL (ref 78–100)
MCV RBC AUTO: 84 FL (ref 78–100)
MCV RBC AUTO: 85 FL (ref 78–100)
MCV RBC AUTO: 86 FL (ref 78–100)
MCV RBC AUTO: 87 FL (ref 78–100)
MONOCYTES # BLD AUTO: 0.2 10E3/UL (ref 0–1.3)
MONOCYTES # BLD AUTO: 0.4 10E3/UL (ref 0–1.3)
MONOCYTES # BLD AUTO: 0.7 10E3/UL (ref 0–1.3)
MONOCYTES # BLD AUTO: 1 10E3/UL (ref 0–1.3)
MONOCYTES NFR BLD AUTO: 10 %
MONOCYTES NFR BLD AUTO: 11 %
MONOCYTES NFR BLD AUTO: 19 %
MONOCYTES NFR BLD AUTO: 20 %
MONOCYTES NFR BLD AUTO: 5 %
MONOCYTES NFR BLD AUTO: 9 %
MUCOUS THREADS #/AREA URNS LPF: PRESENT /LPF
NEUTROPHILS # BLD AUTO: 1 10E3/UL (ref 1.6–8.3)
NEUTROPHILS # BLD AUTO: 1.6 10E3/UL (ref 1.6–8.3)
NEUTROPHILS # BLD AUTO: 1.7 10E3/UL (ref 1.6–8.3)
NEUTROPHILS # BLD AUTO: 1.9 10E3/UL (ref 1.6–8.3)
NEUTROPHILS # BLD AUTO: 1.9 10E3/UL (ref 1.6–8.3)
NEUTROPHILS # BLD AUTO: 2.1 10E3/UL (ref 1.6–8.3)
NEUTROPHILS # BLD AUTO: 2.2 10E3/UL (ref 1.6–8.3)
NEUTROPHILS # BLD AUTO: 2.7 10E3/UL (ref 1.6–8.3)
NEUTROPHILS NFR BLD AUTO: 32 %
NEUTROPHILS NFR BLD AUTO: 43 %
NEUTROPHILS NFR BLD AUTO: 44 %
NEUTROPHILS NFR BLD AUTO: 56 %
NEUTROPHILS NFR BLD AUTO: 59 %
NEUTROPHILS NFR BLD AUTO: 66 %
NITRATE UR QL: NEGATIVE
NRBC # BLD AUTO: 0 10E3/UL
NRBC BLD AUTO-RTO: 0 /100
PH UR STRIP: 7.5 [PH] (ref 5–7)
PLATELET # BLD AUTO: 106 10E3/UL (ref 150–450)
PLATELET # BLD AUTO: 142 10E3/UL (ref 150–450)
PLATELET # BLD AUTO: 150 10E3/UL (ref 150–450)
PLATELET # BLD AUTO: 151 10E3/UL (ref 150–450)
PLATELET # BLD AUTO: 152 10E3/UL (ref 150–450)
PLATELET # BLD AUTO: 153 10E3/UL (ref 150–450)
PLATELET # BLD AUTO: 162 10E3/UL (ref 150–450)
PLATELET # BLD AUTO: 170 10E3/UL (ref 150–450)
PLATELET # BLD AUTO: 247 10E3/UL (ref 150–450)
POTASSIUM BLD-SCNC: 3.9 MMOL/L (ref 3.4–5.3)
POTASSIUM BLD-SCNC: 4.2 MMOL/L (ref 3.4–5.3)
POTASSIUM BLD-SCNC: 4.2 MMOL/L (ref 3.4–5.3)
POTASSIUM BLD-SCNC: 4.3 MMOL/L (ref 3.4–5.3)
POTASSIUM SERPL-SCNC: 3.6 MMOL/L (ref 3.4–5.3)
POTASSIUM SERPL-SCNC: 3.7 MMOL/L (ref 3.4–5.3)
POTASSIUM SERPL-SCNC: 4 MMOL/L (ref 3.4–5.3)
POTASSIUM SERPL-SCNC: 4 MMOL/L (ref 3.4–5.3)
POTASSIUM SERPL-SCNC: 4.1 MMOL/L (ref 3.4–5.3)
POTASSIUM SERPL-SCNC: 4.3 MMOL/L (ref 3.4–5.3)
PROT SERPL-MCNC: 7 G/DL (ref 6.8–8.8)
PROT SERPL-MCNC: 7.2 G/DL (ref 6.4–8.3)
PROT SERPL-MCNC: 7.2 G/DL (ref 6.4–8.3)
PROT SERPL-MCNC: 7.3 G/DL (ref 6.4–8.3)
PROT SERPL-MCNC: 7.5 G/DL (ref 6.4–8.3)
PROT SERPL-MCNC: 7.5 G/DL (ref 6.4–8.3)
PROT SERPL-MCNC: 7.8 G/DL (ref 6.4–8.3)
PROT SERPL-MCNC: 7.8 G/DL (ref 6.8–8.8)
PROT SERPL-MCNC: 7.9 G/DL (ref 6.4–8.3)
PROT SERPL-MCNC: 8 G/DL (ref 6.4–8.3)
PROT SERPL-MCNC: 8 G/DL (ref 6.8–8.8)
PROT SERPL-MCNC: 8.4 G/DL (ref 6.8–8.8)
PROT UR-MCNC: 0.09 G/L
PROT UR-MCNC: 0.14 G/L
PROT/CREAT 24H UR: 0.1 G/G CR (ref 0–0.2)
PROT/CREAT 24H UR: 0.12 MG/MG CR (ref 0–0.2)
PROT/CREAT 24H UR: 0.13 MG/MG CR (ref 0–0.2)
PROT/CREAT 24H UR: 0.17 G/G CR (ref 0–0.2)
RBC # BLD AUTO: 4.24 10E6/UL (ref 4.4–5.9)
RBC # BLD AUTO: 4.32 10E6/UL (ref 4.4–5.9)
RBC # BLD AUTO: 4.41 10E6/UL (ref 4.4–5.9)
RBC # BLD AUTO: 4.44 10E6/UL (ref 4.4–5.9)
RBC # BLD AUTO: 4.52 10E6/UL (ref 4.4–5.9)
RBC # BLD AUTO: 4.53 10E6/UL (ref 4.4–5.9)
RBC # BLD AUTO: 4.59 10E6/UL (ref 4.4–5.9)
RBC # BLD AUTO: 4.86 10E6/UL (ref 4.4–5.9)
RBC # BLD AUTO: 5.09 10E6/UL (ref 4.4–5.9)
RBC URINE: <1 /HPF
SODIUM SERPL-SCNC: 132 MMOL/L (ref 136–145)
SODIUM SERPL-SCNC: 134 MMOL/L (ref 136–145)
SODIUM SERPL-SCNC: 136 MMOL/L (ref 133–144)
SODIUM SERPL-SCNC: 136 MMOL/L (ref 136–145)
SODIUM SERPL-SCNC: 136 MMOL/L (ref 136–145)
SODIUM SERPL-SCNC: 137 MMOL/L (ref 133–144)
SODIUM SERPL-SCNC: 137 MMOL/L (ref 136–145)
SODIUM SERPL-SCNC: 137 MMOL/L (ref 136–145)
SODIUM SERPL-SCNC: 139 MMOL/L (ref 136–145)
SODIUM SERPL-SCNC: 140 MMOL/L (ref 136–145)
SODIUM SERPL-SCNC: 141 MMOL/L (ref 133–144)
SODIUM SERPL-SCNC: 142 MMOL/L (ref 133–144)
SP GR UR STRIP: 1.01 (ref 1–1.03)
T4 FREE SERPL-MCNC: 1.01 NG/DL (ref 0.76–1.46)
T4 FREE SERPL-MCNC: 1.18 NG/DL (ref 0.9–1.7)
T4 FREE SERPL-MCNC: 1.21 NG/DL (ref 0.9–1.7)
TSH SERPL DL<=0.005 MIU/L-ACNC: 1.7 MU/L (ref 0.4–4)
TSH SERPL DL<=0.005 MIU/L-ACNC: 2.71 MU/L (ref 0.4–4)
TSH SERPL DL<=0.005 MIU/L-ACNC: 4.35 UIU/ML (ref 0.3–4.2)
TSH SERPL DL<=0.005 MIU/L-ACNC: 4.38 UIU/ML (ref 0.3–4.2)
TSH SERPL DL<=0.005 MIU/L-ACNC: 6.54 MU/L (ref 0.4–4)
UROBILINOGEN UR STRIP-MCNC: NORMAL MG/DL
WBC # BLD AUTO: 3.1 10E3/UL (ref 4–11)
WBC # BLD AUTO: 3.3 10E3/UL (ref 4–11)
WBC # BLD AUTO: 3.5 10E3/UL (ref 4–11)
WBC # BLD AUTO: 3.7 10E3/UL (ref 4–11)
WBC # BLD AUTO: 3.8 10E3/UL (ref 4–11)
WBC # BLD AUTO: 4.4 10E3/UL (ref 4–11)
WBC # BLD AUTO: 4.5 10E3/UL (ref 4–11)
WBC # BLD AUTO: 4.8 10E3/UL (ref 4–11)
WBC # BLD AUTO: 4.9 10E3/UL (ref 4–11)
WBC URINE: 2 /HPF

## 2022-01-01 PROCEDURE — 82105 ALPHA-FETOPROTEIN SERUM: CPT

## 2022-01-01 PROCEDURE — 81003 URINALYSIS AUTO W/O SCOPE: CPT | Performed by: INTERNAL MEDICINE

## 2022-01-01 PROCEDURE — 82140 ASSAY OF AMMONIA: CPT | Performed by: INTERNAL MEDICINE

## 2022-01-01 PROCEDURE — 36415 COLL VENOUS BLD VENIPUNCTURE: CPT | Performed by: INTERNAL MEDICINE

## 2022-01-01 PROCEDURE — 85018 HEMOGLOBIN: CPT | Performed by: INTERNAL MEDICINE

## 2022-01-01 PROCEDURE — 96415 CHEMO IV INFUSION ADDL HR: CPT

## 2022-01-01 PROCEDURE — 96413 CHEMO IV INFUSION 1 HR: CPT

## 2022-01-01 PROCEDURE — 82374 ASSAY BLOOD CARBON DIOXIDE: CPT | Performed by: NURSE PRACTITIONER

## 2022-01-01 PROCEDURE — 36415 COLL VENOUS BLD VENIPUNCTURE: CPT

## 2022-01-01 PROCEDURE — 85027 COMPLETE CBC AUTOMATED: CPT

## 2022-01-01 PROCEDURE — 999N000248 HC STATISTIC IV INSERT WITH US BY RN

## 2022-01-01 PROCEDURE — G0463 HOSPITAL OUTPT CLINIC VISIT: HCPCS

## 2022-01-01 PROCEDURE — 80053 COMPREHEN METABOLIC PANEL: CPT

## 2022-01-01 PROCEDURE — 36415 COLL VENOUS BLD VENIPUNCTURE: CPT | Performed by: PATHOLOGY

## 2022-01-01 PROCEDURE — 71260 CT THORAX DX C+: CPT | Performed by: RADIOLOGY

## 2022-01-01 PROCEDURE — 84443 ASSAY THYROID STIM HORMONE: CPT

## 2022-01-01 PROCEDURE — 96375 TX/PRO/DX INJ NEW DRUG ADDON: CPT

## 2022-01-01 PROCEDURE — 258N000003 HC RX IP 258 OP 636: Performed by: INTERNAL MEDICINE

## 2022-01-01 PROCEDURE — 74178 CT ABD&PLV WO CNTR FLWD CNTR: CPT | Performed by: RADIOLOGY

## 2022-01-01 PROCEDURE — 99214 OFFICE O/P EST MOD 30 MIN: CPT | Mod: TEL | Performed by: NURSE PRACTITIONER

## 2022-01-01 PROCEDURE — 84439 ASSAY OF FREE THYROXINE: CPT

## 2022-01-01 PROCEDURE — 71260 CT THORAX DX C+: CPT | Mod: GC | Performed by: RADIOLOGY

## 2022-01-01 PROCEDURE — 84460 ALANINE AMINO (ALT) (SGPT): CPT

## 2022-01-01 PROCEDURE — 99214 OFFICE O/P EST MOD 30 MIN: CPT | Performed by: INTERNAL MEDICINE

## 2022-01-01 PROCEDURE — 250N000011 HC RX IP 250 OP 636: Performed by: INTERNAL MEDICINE

## 2022-01-01 PROCEDURE — 84156 ASSAY OF PROTEIN URINE: CPT

## 2022-01-01 PROCEDURE — 99215 OFFICE O/P EST HI 40 MIN: CPT | Performed by: PHYSICIAN ASSISTANT

## 2022-01-01 PROCEDURE — 36415 COLL VENOUS BLD VENIPUNCTURE: CPT | Performed by: NURSE PRACTITIONER

## 2022-01-01 PROCEDURE — 250N000013 HC RX MED GY IP 250 OP 250 PS 637: Performed by: PHYSICIAN ASSISTANT

## 2022-01-01 PROCEDURE — 83735 ASSAY OF MAGNESIUM: CPT

## 2022-01-01 PROCEDURE — 250N000011 HC RX IP 250 OP 636: Performed by: PHYSICIAN ASSISTANT

## 2022-01-01 PROCEDURE — 74178 CT ABD&PLV WO CNTR FLWD CNTR: CPT | Mod: GC | Performed by: RADIOLOGY

## 2022-01-01 PROCEDURE — 85610 PROTHROMBIN TIME: CPT | Performed by: INTERNAL MEDICINE

## 2022-01-01 PROCEDURE — 82533 TOTAL CORTISOL: CPT | Performed by: PHYSICIAN ASSISTANT

## 2022-01-01 PROCEDURE — 85025 COMPLETE CBC W/AUTO DIFF WBC: CPT | Performed by: INTERNAL MEDICINE

## 2022-01-01 PROCEDURE — 85004 AUTOMATED DIFF WBC COUNT: CPT

## 2022-01-01 PROCEDURE — 999N000128 HC STATISTIC PERIPHERAL IV START W/O US GUIDANCE

## 2022-01-01 PROCEDURE — 84155 ASSAY OF PROTEIN SERUM: CPT

## 2022-01-01 PROCEDURE — 81003 URINALYSIS AUTO W/O SCOPE: CPT | Performed by: NURSE PRACTITIONER

## 2022-01-01 PROCEDURE — 250N000013 HC RX MED GY IP 250 OP 250 PS 637: Performed by: INTERNAL MEDICINE

## 2022-01-01 PROCEDURE — 80053 COMPREHEN METABOLIC PANEL: CPT | Performed by: PATHOLOGY

## 2022-01-01 PROCEDURE — 99214 OFFICE O/P EST MOD 30 MIN: CPT | Mod: GT | Performed by: NURSE PRACTITIONER

## 2022-01-01 PROCEDURE — 99417 PROLNG OP E/M EACH 15 MIN: CPT | Performed by: INTERNAL MEDICINE

## 2022-01-01 PROCEDURE — 85025 COMPLETE CBC W/AUTO DIFF WBC: CPT | Performed by: PATHOLOGY

## 2022-01-01 PROCEDURE — 258N000003 HC RX IP 258 OP 636: Performed by: PHYSICIAN ASSISTANT

## 2022-01-01 PROCEDURE — 96367 TX/PROPH/DG ADDL SEQ IV INF: CPT

## 2022-01-01 PROCEDURE — 99215 OFFICE O/P EST HI 40 MIN: CPT | Performed by: INTERNAL MEDICINE

## 2022-01-01 PROCEDURE — 85025 COMPLETE CBC W/AUTO DIFF WBC: CPT | Performed by: NURSE PRACTITIONER

## 2022-01-01 PROCEDURE — 258N000003 HC RX IP 258 OP 636: Performed by: NURSE PRACTITIONER

## 2022-01-01 PROCEDURE — 36592 COLLECT BLOOD FROM PICC: CPT

## 2022-01-01 PROCEDURE — 99215 OFFICE O/P EST HI 40 MIN: CPT | Mod: GT | Performed by: NURSE PRACTITIONER

## 2022-01-01 PROCEDURE — 82040 ASSAY OF SERUM ALBUMIN: CPT

## 2022-01-01 PROCEDURE — 82105 ALPHA-FETOPROTEIN SERUM: CPT | Performed by: INTERNAL MEDICINE

## 2022-01-01 PROCEDURE — 82105 ALPHA-FETOPROTEIN SERUM: CPT | Mod: 90 | Performed by: PATHOLOGY

## 2022-01-01 PROCEDURE — 99000 SPECIMEN HANDLING OFFICE-LAB: CPT | Performed by: PATHOLOGY

## 2022-01-01 PROCEDURE — 999N000285 HC STATISTIC VASC ACCESS LAB DRAW WITH PIV START

## 2022-01-01 PROCEDURE — 84156 ASSAY OF PROTEIN URINE: CPT | Performed by: PHYSICIAN ASSISTANT

## 2022-01-01 PROCEDURE — 250N000011 HC RX IP 250 OP 636: Performed by: NURSE PRACTITIONER

## 2022-01-01 PROCEDURE — 81001 URINALYSIS AUTO W/SCOPE: CPT | Performed by: PHYSICIAN ASSISTANT

## 2022-01-01 PROCEDURE — 82024 ASSAY OF ACTH: CPT | Performed by: PHYSICIAN ASSISTANT

## 2022-01-01 PROCEDURE — 99214 OFFICE O/P EST MOD 30 MIN: CPT | Performed by: NURSE PRACTITIONER

## 2022-01-01 PROCEDURE — 82105 ALPHA-FETOPROTEIN SERUM: CPT | Performed by: PHYSICIAN ASSISTANT

## 2022-01-01 PROCEDURE — 85610 PROTHROMBIN TIME: CPT

## 2022-01-01 PROCEDURE — 82248 BILIRUBIN DIRECT: CPT | Performed by: INTERNAL MEDICINE

## 2022-01-01 PROCEDURE — 80053 COMPREHEN METABOLIC PANEL: CPT | Performed by: INTERNAL MEDICINE

## 2022-01-01 PROCEDURE — 85025 COMPLETE CBC W/AUTO DIFF WBC: CPT

## 2022-01-01 PROCEDURE — 999N000127 HC STATISTIC PERIPHERAL IV START W US GUIDANCE

## 2022-01-01 PROCEDURE — 83735 ASSAY OF MAGNESIUM: CPT | Performed by: INTERNAL MEDICINE

## 2022-01-01 PROCEDURE — 82310 ASSAY OF CALCIUM: CPT | Performed by: NURSE PRACTITIONER

## 2022-01-01 PROCEDURE — 99442 PR PHYSICIAN TELEPHONE EVALUATION 11-20 MIN: CPT | Performed by: PHYSICIAN ASSISTANT

## 2022-01-01 PROCEDURE — 84450 TRANSFERASE (AST) (SGOT): CPT | Performed by: INTERNAL MEDICINE

## 2022-01-01 PROCEDURE — 83735 ASSAY OF MAGNESIUM: CPT | Performed by: NURSE PRACTITIONER

## 2022-01-01 PROCEDURE — 84443 ASSAY THYROID STIM HORMONE: CPT | Performed by: INTERNAL MEDICINE

## 2022-01-01 PROCEDURE — 82040 ASSAY OF SERUM ALBUMIN: CPT | Performed by: INTERNAL MEDICINE

## 2022-01-01 RX ORDER — METHYLPREDNISOLONE SODIUM SUCCINATE 125 MG/2ML
125 INJECTION, POWDER, LYOPHILIZED, FOR SOLUTION INTRAMUSCULAR; INTRAVENOUS
Status: CANCELLED
Start: 2022-01-01

## 2022-01-01 RX ORDER — EPINEPHRINE 1 MG/ML
0.3 INJECTION, SOLUTION INTRAMUSCULAR; SUBCUTANEOUS EVERY 5 MIN PRN
Status: CANCELLED | OUTPATIENT
Start: 2022-01-01

## 2022-01-01 RX ORDER — ALBUTEROL SULFATE 90 UG/1
1-2 AEROSOL, METERED RESPIRATORY (INHALATION)
Status: CANCELLED
Start: 2022-01-01

## 2022-01-01 RX ORDER — IOPAMIDOL 755 MG/ML
78 INJECTION, SOLUTION INTRAVASCULAR ONCE
Status: COMPLETED | OUTPATIENT
Start: 2022-01-01 | End: 2022-01-01

## 2022-01-01 RX ORDER — MEPERIDINE HYDROCHLORIDE 25 MG/ML
25 INJECTION INTRAMUSCULAR; INTRAVENOUS; SUBCUTANEOUS EVERY 30 MIN PRN
Status: CANCELLED | OUTPATIENT
Start: 2022-01-01

## 2022-01-01 RX ORDER — HEPARIN SODIUM,PORCINE 10 UNIT/ML
5 VIAL (ML) INTRAVENOUS
Status: DISCONTINUED | OUTPATIENT
Start: 2022-01-01 | End: 2022-01-01 | Stop reason: HOSPADM

## 2022-01-01 RX ORDER — HEPARIN SODIUM (PORCINE) LOCK FLUSH IV SOLN 100 UNIT/ML 100 UNIT/ML
5 SOLUTION INTRAVENOUS
Status: CANCELLED | OUTPATIENT
Start: 2022-01-01

## 2022-01-01 RX ORDER — ONDANSETRON 2 MG/ML
8 INJECTION INTRAMUSCULAR; INTRAVENOUS ONCE
Status: COMPLETED | OUTPATIENT
Start: 2022-01-01 | End: 2022-01-01

## 2022-01-01 RX ORDER — ALBUTEROL SULFATE 0.83 MG/ML
2.5 SOLUTION RESPIRATORY (INHALATION)
Status: CANCELLED | OUTPATIENT
Start: 2022-01-01

## 2022-01-01 RX ORDER — LORAZEPAM 2 MG/ML
0.5 INJECTION INTRAMUSCULAR EVERY 4 HOURS PRN
Status: CANCELLED | OUTPATIENT
Start: 2022-01-01

## 2022-01-01 RX ORDER — DIPHENHYDRAMINE HYDROCHLORIDE 50 MG/ML
50 INJECTION INTRAMUSCULAR; INTRAVENOUS
Status: CANCELLED
Start: 2022-01-01

## 2022-01-01 RX ORDER — DIPHENHYDRAMINE HCL 25 MG
50 CAPSULE ORAL ONCE
Status: COMPLETED | OUTPATIENT
Start: 2022-01-01 | End: 2022-01-01

## 2022-01-01 RX ORDER — HEPARIN SODIUM (PORCINE) LOCK FLUSH IV SOLN 100 UNIT/ML 100 UNIT/ML
5 SOLUTION INTRAVENOUS
Status: DISCONTINUED | OUTPATIENT
Start: 2022-01-01 | End: 2022-01-01 | Stop reason: HOSPADM

## 2022-01-01 RX ORDER — PROCHLORPERAZINE MALEATE 10 MG
10 TABLET ORAL EVERY 6 HOURS PRN
Qty: 30 TABLET | Refills: 2 | Status: SHIPPED | OUTPATIENT
Start: 2022-01-01 | End: 2023-01-01

## 2022-01-01 RX ORDER — ONDANSETRON 8 MG/1
8 TABLET, FILM COATED ORAL EVERY 8 HOURS PRN
Qty: 30 TABLET | Refills: 2 | Status: SHIPPED | OUTPATIENT
Start: 2022-01-01 | End: 2023-01-01

## 2022-01-01 RX ORDER — OXYCODONE HYDROCHLORIDE 5 MG/1
5-10 TABLET ORAL EVERY 4 HOURS PRN
Qty: 30 TABLET | Refills: 0 | Status: SHIPPED | OUTPATIENT
Start: 2022-01-01 | End: 2022-01-01

## 2022-01-01 RX ORDER — ONDANSETRON 2 MG/ML
8 INJECTION INTRAMUSCULAR; INTRAVENOUS ONCE
Status: CANCELLED | OUTPATIENT
Start: 2022-01-01

## 2022-01-01 RX ORDER — HEPARIN SODIUM,PORCINE 10 UNIT/ML
5 VIAL (ML) INTRAVENOUS
Status: CANCELLED | OUTPATIENT
Start: 2022-01-01

## 2022-01-01 RX ORDER — MORPHINE SULFATE 15 MG/1
15 TABLET, FILM COATED, EXTENDED RELEASE ORAL EVERY 12 HOURS
Qty: 60 TABLET | Refills: 0 | Status: SHIPPED | OUTPATIENT
Start: 2022-01-01 | End: 2023-01-01

## 2022-01-01 RX ORDER — OLANZAPINE 5 MG/1
5 TABLET ORAL AT BEDTIME
Qty: 30 TABLET | Refills: 3 | Status: SHIPPED | OUTPATIENT
Start: 2022-01-01 | End: 2022-01-01

## 2022-01-01 RX ORDER — MORPHINE SULFATE 15 MG/1
15 TABLET, FILM COATED, EXTENDED RELEASE ORAL EVERY 12 HOURS
Qty: 60 TABLET | Refills: 0 | Status: SHIPPED | OUTPATIENT
Start: 2022-01-01 | End: 2022-01-01

## 2022-01-01 RX ORDER — OXYCODONE HYDROCHLORIDE 5 MG/1
5-10 TABLET ORAL EVERY 4 HOURS PRN
Qty: 100 TABLET | Refills: 0 | Status: SHIPPED | OUTPATIENT
Start: 2022-01-01 | End: 2022-01-01

## 2022-01-01 RX ORDER — HYDROMORPHONE HYDROCHLORIDE 2 MG/1
2 TABLET ORAL EVERY 6 HOURS PRN
Qty: 30 TABLET | Refills: 0 | Status: SHIPPED | OUTPATIENT
Start: 2022-01-01 | End: 2022-01-01

## 2022-01-01 RX ORDER — IOPAMIDOL 755 MG/ML
85 INJECTION, SOLUTION INTRAVASCULAR ONCE
Status: COMPLETED | OUTPATIENT
Start: 2022-01-01 | End: 2022-01-01

## 2022-01-01 RX ORDER — DIPHENHYDRAMINE HCL 25 MG
50 CAPSULE ORAL ONCE
Status: CANCELLED
Start: 2022-01-01

## 2022-01-01 RX ORDER — ACETAMINOPHEN 325 MG/1
650 TABLET ORAL EVERY 8 HOURS PRN
COMMUNITY

## 2022-01-01 RX ORDER — LORAZEPAM 2 MG/ML
0.5 INJECTION INTRAMUSCULAR EVERY 4 HOURS PRN
Status: DISCONTINUED | OUTPATIENT
Start: 2022-01-01 | End: 2022-01-01 | Stop reason: HOSPADM

## 2022-01-01 RX ORDER — ALBUTEROL SULFATE 90 UG/1
1-2 AEROSOL, METERED RESPIRATORY (INHALATION) EVERY 6 HOURS PRN
Qty: 18 G | Refills: 1 | Status: SHIPPED | OUTPATIENT
Start: 2022-01-01 | End: 2022-01-01

## 2022-01-01 RX ORDER — TRAMADOL HYDROCHLORIDE 50 MG/1
50 TABLET ORAL EVERY 6 HOURS PRN
Qty: 30 TABLET | Refills: 0 | Status: SHIPPED | OUTPATIENT
Start: 2022-01-01 | End: 2022-01-01

## 2022-01-01 RX ORDER — IOPAMIDOL 755 MG/ML
74 INJECTION, SOLUTION INTRAVASCULAR ONCE
Status: COMPLETED | OUTPATIENT
Start: 2022-01-01 | End: 2022-01-01

## 2022-01-01 RX ORDER — NICOTINE POLACRILEX 4 MG/1
20 GUM, CHEWING ORAL DAILY
Qty: 60 TABLET | Refills: 1 | Status: SHIPPED | OUTPATIENT
Start: 2022-01-01 | End: 2022-01-01

## 2022-01-01 RX ORDER — OXYCODONE HYDROCHLORIDE 5 MG/1
5-10 TABLET ORAL EVERY 4 HOURS PRN
Qty: 100 TABLET | Refills: 0 | Status: SHIPPED | OUTPATIENT
Start: 2022-01-01 | End: 2023-01-01

## 2022-01-01 RX ORDER — FAMOTIDINE 20 MG/1
20 TABLET, FILM COATED ORAL DAILY
Qty: 30 TABLET | Refills: 0 | Status: SHIPPED | OUTPATIENT
Start: 2022-01-01 | End: 2022-01-01 | Stop reason: SINTOL

## 2022-01-01 RX ORDER — OXYCODONE HYDROCHLORIDE 5 MG/1
TABLET ORAL
Qty: 100 TABLET | Refills: 0 | OUTPATIENT
Start: 2022-01-01

## 2022-01-01 RX ORDER — CALCIUM CARBONATE 500 MG/1
1000 TABLET, CHEWABLE ORAL DAILY PRN
Status: DISCONTINUED | OUTPATIENT
Start: 2022-01-01 | End: 2022-01-01 | Stop reason: HOSPADM

## 2022-01-01 RX ORDER — LENVATINIB 4 MG/1
4 CAPSULE ORAL DAILY
Qty: 30 EACH | Refills: 0 | Status: SHIPPED | OUTPATIENT
Start: 2022-01-01 | End: 2022-01-01

## 2022-01-01 RX ORDER — SENNA AND DOCUSATE SODIUM 50; 8.6 MG/1; MG/1
1-2 TABLET, FILM COATED ORAL 2 TIMES DAILY PRN
Qty: 60 TABLET | Refills: 1 | Status: SHIPPED | OUTPATIENT
Start: 2022-01-01 | End: 2023-01-01

## 2022-01-01 RX ADMIN — DEXTROSE MONOHYDRATE 250 ML: 50 INJECTION, SOLUTION INTRAVENOUS at 07:49

## 2022-01-01 RX ADMIN — ONDANSETRON 8 MG: 2 INJECTION INTRAMUSCULAR; INTRAVENOUS at 09:59

## 2022-01-01 RX ADMIN — DIPHENHYDRAMINE HYDROCHLORIDE 50 MG: 25 CAPSULE ORAL at 07:36

## 2022-01-01 RX ADMIN — SODIUM CHLORIDE 250 ML: 9 INJECTION, SOLUTION INTRAVENOUS at 08:30

## 2022-01-01 RX ADMIN — CALCIUM CARBONATE (ANTACID) CHEW TAB 500 MG 1000 MG: 500 CHEW TAB at 11:37

## 2022-01-01 RX ADMIN — DEXAMETHASONE SODIUM PHOSPHATE: 10 INJECTION, SOLUTION INTRAMUSCULAR; INTRAVENOUS at 10:08

## 2022-01-01 RX ADMIN — OXALIPLATIN 150 MG: 5 INJECTION, SOLUTION INTRAVENOUS at 11:08

## 2022-01-01 RX ADMIN — GEMCITABINE 1600 MG: 38 INJECTION, SOLUTION INTRAVENOUS at 09:43

## 2022-01-01 RX ADMIN — SODIUM CHLORIDE 250 ML: 9 INJECTION, SOLUTION INTRAVENOUS at 10:35

## 2022-01-01 RX ADMIN — SODIUM CHLORIDE 250 ML: 9 INJECTION, SOLUTION INTRAVENOUS at 09:07

## 2022-01-01 RX ADMIN — ONDANSETRON 8 MG: 2 INJECTION INTRAMUSCULAR; INTRAVENOUS at 09:07

## 2022-01-01 RX ADMIN — DIPHENHYDRAMINE HYDROCHLORIDE 50 MG: 25 CAPSULE ORAL at 10:01

## 2022-01-01 RX ADMIN — FOSAPREPITANT: 150 INJECTION, POWDER, LYOPHILIZED, FOR SOLUTION INTRAVENOUS at 09:32

## 2022-01-01 RX ADMIN — SODIUM CHLORIDE 500 MG: 9 INJECTION, SOLUTION INTRAVENOUS at 10:39

## 2022-01-01 RX ADMIN — DEXAMETHASONE SODIUM PHOSPHATE: 10 INJECTION, SOLUTION INTRAMUSCULAR; INTRAVENOUS at 07:50

## 2022-01-01 RX ADMIN — OXALIPLATIN 180 MG: 5 INJECTION, SOLUTION INTRAVENOUS at 08:30

## 2022-01-01 RX ADMIN — ONDANSETRON 8 MG: 2 INJECTION INTRAMUSCULAR; INTRAVENOUS at 09:11

## 2022-01-01 RX ADMIN — SODIUM CHLORIDE 750 ML: 9 INJECTION, SOLUTION INTRAVENOUS at 08:44

## 2022-01-01 RX ADMIN — SODIUM CHLORIDE 250 ML: 9 INJECTION, SOLUTION INTRAVENOUS at 08:35

## 2022-01-01 RX ADMIN — GEMCITABINE 1600 MG: 38 INJECTION, SOLUTION INTRAVENOUS at 09:58

## 2022-01-01 RX ADMIN — DEXTROSE MONOHYDRATE 250 ML: 50 INJECTION, SOLUTION INTRAVENOUS at 10:10

## 2022-01-01 RX ADMIN — DEXAMETHASONE SODIUM PHOSPHATE: 10 INJECTION, SOLUTION INTRAMUSCULAR; INTRAVENOUS at 10:09

## 2022-01-01 RX ADMIN — SODIUM CHLORIDE 500 MG: 9 INJECTION, SOLUTION INTRAVENOUS at 08:35

## 2022-01-01 RX ADMIN — DIPHENHYDRAMINE HYDROCHLORIDE 50 MG: 25 CAPSULE ORAL at 07:35

## 2022-01-01 RX ADMIN — IOPAMIDOL 85 ML: 755 INJECTION, SOLUTION INTRAVASCULAR at 07:56

## 2022-01-01 RX ADMIN — IOPAMIDOL 78 ML: 755 INJECTION, SOLUTION INTRAVASCULAR at 11:28

## 2022-01-01 RX ADMIN — DEXAMETHASONE SODIUM PHOSPHATE: 10 INJECTION, SOLUTION INTRAMUSCULAR; INTRAVENOUS at 10:41

## 2022-01-01 RX ADMIN — DEXTROSE MONOHYDRATE 250 ML: 50 INJECTION, SOLUTION INTRAVENOUS at 10:41

## 2022-01-01 RX ADMIN — FOSAPREPITANT: 150 INJECTION, POWDER, LYOPHILIZED, FOR SOLUTION INTRAVENOUS at 09:07

## 2022-01-01 RX ADMIN — DIPHENHYDRAMINE HYDROCHLORIDE 50 MG: 25 CAPSULE ORAL at 08:48

## 2022-01-01 RX ADMIN — IOPAMIDOL 78 ML: 755 INJECTION, SOLUTION INTRAVASCULAR at 10:54

## 2022-01-01 RX ADMIN — IOPAMIDOL 74 ML: 755 INJECTION, SOLUTION INTRAVASCULAR at 12:04

## 2022-01-01 RX ADMIN — OXALIPLATIN 150 MG: 5 INJECTION, SOLUTION INTRAVENOUS at 10:51

## 2022-01-01 RX ADMIN — SODIUM CHLORIDE 250 ML: 9 INJECTION, SOLUTION INTRAVENOUS at 09:58

## 2022-01-01 RX ADMIN — SODIUM CHLORIDE 250 ML: 9 INJECTION, SOLUTION INTRAVENOUS at 09:11

## 2022-01-01 RX ADMIN — SODIUM CHLORIDE 500 MG: 9 INJECTION, SOLUTION INTRAVENOUS at 09:43

## 2022-01-01 RX ADMIN — GEMCITABINE 1800 MG: 38 INJECTION, SOLUTION INTRAVENOUS at 11:15

## 2022-01-01 RX ADMIN — SODIUM CHLORIDE 500 MG: 9 INJECTION, SOLUTION INTRAVENOUS at 08:31

## 2022-01-01 RX ADMIN — LORAZEPAM 0.5 MG: 2 INJECTION, SOLUTION INTRAMUSCULAR; INTRAVENOUS at 10:39

## 2022-01-01 ASSESSMENT — PAIN SCALES - GENERAL
PAINLEVEL: NO PAIN (1)
PAINLEVEL: SEVERE PAIN (6)
PAINLEVEL: EXTREME PAIN (8)
PAINLEVEL: EXTREME PAIN (9)
PAINLEVEL: NO PAIN (0)
PAINLEVEL: EXTREME PAIN (8)
PAINLEVEL: EXTREME PAIN (9)
PAINLEVEL: EXTREME PAIN (9)
PAINLEVEL: SEVERE PAIN (7)
PAINLEVEL: SEVERE PAIN (6)
PAINLEVEL: NO PAIN (0)
PAINLEVEL: MILD PAIN (3)
PAINLEVEL: NO PAIN (0)
PAINLEVEL: MODERATE PAIN (4)

## 2022-01-04 RX ORDER — HEPARIN SODIUM,PORCINE 10 UNIT/ML
5 VIAL (ML) INTRAVENOUS
Status: CANCELLED | OUTPATIENT
Start: 2022-01-07

## 2022-01-04 RX ORDER — NALOXONE HYDROCHLORIDE 0.4 MG/ML
0.2 INJECTION, SOLUTION INTRAMUSCULAR; INTRAVENOUS; SUBCUTANEOUS
Status: CANCELLED | OUTPATIENT
Start: 2022-01-07

## 2022-01-04 RX ORDER — EPINEPHRINE 1 MG/ML
0.3 INJECTION, SOLUTION INTRAMUSCULAR; SUBCUTANEOUS EVERY 5 MIN PRN
Status: CANCELLED | OUTPATIENT
Start: 2022-01-07

## 2022-01-04 RX ORDER — DIPHENHYDRAMINE HYDROCHLORIDE 50 MG/ML
50 INJECTION INTRAMUSCULAR; INTRAVENOUS
Status: CANCELLED
Start: 2022-01-07

## 2022-01-04 RX ORDER — ALBUTEROL SULFATE 90 UG/1
1-2 AEROSOL, METERED RESPIRATORY (INHALATION)
Status: CANCELLED
Start: 2022-01-07

## 2022-01-04 RX ORDER — LORAZEPAM 2 MG/ML
0.5 INJECTION INTRAMUSCULAR EVERY 4 HOURS PRN
Status: CANCELLED | OUTPATIENT
Start: 2022-01-07

## 2022-01-04 RX ORDER — ALBUTEROL SULFATE 0.83 MG/ML
2.5 SOLUTION RESPIRATORY (INHALATION)
Status: CANCELLED | OUTPATIENT
Start: 2022-01-07

## 2022-01-04 RX ORDER — HEPARIN SODIUM (PORCINE) LOCK FLUSH IV SOLN 100 UNIT/ML 100 UNIT/ML
5 SOLUTION INTRAVENOUS
Status: CANCELLED | OUTPATIENT
Start: 2022-01-07

## 2022-01-04 RX ORDER — METHYLPREDNISOLONE SODIUM SUCCINATE 125 MG/2ML
125 INJECTION, POWDER, LYOPHILIZED, FOR SOLUTION INTRAMUSCULAR; INTRAVENOUS
Status: CANCELLED
Start: 2022-01-07

## 2022-01-04 RX ORDER — MEPERIDINE HYDROCHLORIDE 25 MG/ML
25 INJECTION INTRAMUSCULAR; INTRAVENOUS; SUBCUTANEOUS EVERY 30 MIN PRN
Status: CANCELLED | OUTPATIENT
Start: 2022-01-07

## 2022-01-06 ENCOUNTER — INFUSION THERAPY VISIT (OUTPATIENT)
Dept: ONCOLOGY | Facility: CLINIC | Age: 57
End: 2022-01-06
Attending: INTERNAL MEDICINE
Payer: COMMERCIAL

## 2022-01-06 ENCOUNTER — APPOINTMENT (OUTPATIENT)
Dept: LAB | Facility: CLINIC | Age: 57
End: 2022-01-06
Attending: INTERNAL MEDICINE
Payer: COMMERCIAL

## 2022-01-06 VITALS
BODY MASS INDEX: 23.9 KG/M2 | HEART RATE: 73 BPM | TEMPERATURE: 97.4 F | SYSTOLIC BLOOD PRESSURE: 137 MMHG | RESPIRATION RATE: 16 BRPM | WEIGHT: 148 LBS | DIASTOLIC BLOOD PRESSURE: 80 MMHG | OXYGEN SATURATION: 97 %

## 2022-01-06 DIAGNOSIS — C22.0 HCC (HEPATOCELLULAR CARCINOMA) (H): Primary | ICD-10-CM

## 2022-01-06 LAB
ALBUMIN SERPL-MCNC: 3.1 G/DL (ref 3.4–5)
ALBUMIN UR-MCNC: NEGATIVE MG/DL
ALP SERPL-CCNC: 159 U/L (ref 40–150)
ALT SERPL W P-5'-P-CCNC: 48 U/L (ref 0–70)
ANION GAP SERPL CALCULATED.3IONS-SCNC: 8 MMOL/L (ref 3–14)
AST SERPL W P-5'-P-CCNC: 57 U/L (ref 0–45)
BILIRUB SERPL-MCNC: 0.5 MG/DL (ref 0.2–1.3)
BUN SERPL-MCNC: 9 MG/DL (ref 7–30)
CALCIUM SERPL-MCNC: 8.4 MG/DL (ref 8.5–10.1)
CHLORIDE BLD-SCNC: 108 MMOL/L (ref 94–109)
CO2 SERPL-SCNC: 24 MMOL/L (ref 20–32)
CREAT SERPL-MCNC: 0.58 MG/DL (ref 0.66–1.25)
GFR SERPL CREATININE-BSD FRML MDRD: >90 ML/MIN/1.73M2
GLUCOSE BLD-MCNC: 99 MG/DL (ref 70–99)
POTASSIUM BLD-SCNC: 3.8 MMOL/L (ref 3.4–5.3)
PROT SERPL-MCNC: 7 G/DL (ref 6.8–8.8)
SODIUM SERPL-SCNC: 140 MMOL/L (ref 133–144)

## 2022-01-06 PROCEDURE — 96413 CHEMO IV INFUSION 1 HR: CPT

## 2022-01-06 PROCEDURE — 36415 COLL VENOUS BLD VENIPUNCTURE: CPT | Performed by: INTERNAL MEDICINE

## 2022-01-06 PROCEDURE — 81003 URINALYSIS AUTO W/O SCOPE: CPT | Performed by: INTERNAL MEDICINE

## 2022-01-06 PROCEDURE — 258N000003 HC RX IP 258 OP 636: Performed by: INTERNAL MEDICINE

## 2022-01-06 PROCEDURE — 80053 COMPREHEN METABOLIC PANEL: CPT | Performed by: INTERNAL MEDICINE

## 2022-01-06 PROCEDURE — 250N000011 HC RX IP 250 OP 636: Performed by: INTERNAL MEDICINE

## 2022-01-06 PROCEDURE — 82040 ASSAY OF SERUM ALBUMIN: CPT | Performed by: INTERNAL MEDICINE

## 2022-01-06 RX ADMIN — SODIUM CHLORIDE 600 MG: 9 INJECTION, SOLUTION INTRAVENOUS at 08:12

## 2022-01-06 RX ADMIN — SODIUM CHLORIDE 250 ML: 9 INJECTION, SOLUTION INTRAVENOUS at 08:11

## 2022-01-06 ASSESSMENT — PAIN SCALES - GENERAL: PAINLEVEL: NO PAIN (0)

## 2022-01-06 NOTE — PROGRESS NOTES
Infusion Nursing Note:  Preeti Pascual presents today for Cycle 10 Cyramza.        Note: Preeti comes to infusion today with no questions or concerns.  He denies pain.  He has been afebrile and denies signs and symptoms of infection including: cough, SOB, sore throat, diarrhea, vomiting, rash, or pain with urination.  He wishes to proceed with today's planned treatment.    Preeti does note some pruritis on hands and chest on and off.  Notes no reddness or rash.  Hands appear dry and there is some discoloration on the back of his hands that coincides with the dryness.  He is using lotion with some relief.  This RN suggestion pt  eucerin at his local pharmacy.  Verified with Dr Kyle that it is ok for pt to use topical and po benadryl as needed.  This information was included in his discharge instructions.    Pt weight is down and calculated dose of cyramza is 536.8mg.  Orderd dose is 600mg which is outside of 10%.  Dr Kyle notified.  Plan to continue with 600mg of cyramza going forward.  Treatment plan was updated.      Intravenous Access:  Peripheral IV placed in lab    Treatment Conditions:    Component      Latest Ref Rng & Units 1/6/2022   Sodium      133 - 144 mmol/L 140   Potassium      3.4 - 5.3 mmol/L 3.8   Chloride      94 - 109 mmol/L 108   Carbon Dioxide      20 - 32 mmol/L 24   Anion Gap      3 - 14 mmol/L 8   Urea Nitrogen      7 - 30 mg/dL 9   Creatinine      0.66 - 1.25 mg/dL 0.58 (L)   Calcium      8.5 - 10.1 mg/dL 8.4 (L)   Glucose      70 - 99 mg/dL 99   Alkaline Phosphatase      40 - 150 U/L 159 (H)   AST      0 - 45 U/L 57 (H)   ALT      0 - 70 U/L 48   Protein Total      6.8 - 8.8 g/dL 7.0   Albumin      3.4 - 5.0 g/dL 3.1 (L)   Bilirubin Total      0.2 - 1.3 mg/dL 0.5   GFR Estimate      >60 mL/min/1.73m2 >90       Urine negative for protein.  Results reviewed, labs MET treatment parameters, ok to proceed with treatment.        Post Infusion Assessment:  Patient tolerated infusion  without incident.       Discharge Plan:   Patient declined prescription refills.  Copy of AVS reviewed with patient and/or family.  Patient will return 1/20/21 for cycle 11.  Nahomi Allen RN                  \

## 2022-01-06 NOTE — PATIENT INSTRUCTIONS
Dr Kyle stated it was ok to use benadryl (Diphenhydramine) for itching.  You can get benadryl cream over the counter and use it per package directions.  You can also get benadryl pills over the counter and use per package directions.  For dry skin I recommend a lotion called Eucerin (advanced repair cream in particular).  This can be purchased at any chain drug store.      [facility] Triage and after hours / weekends / holidays:  [phone]    Please call the triage or after hours line if you experience a temperature greater than or equal to 100.5, shaking chills, have uncontrolled nausea, vomiting and/or diarrhea, dizziness, shortness of breath, chest pain, bleeding, unexplained bruising, or if you have any other new/concerning symptoms, questions or concerns.      If you are having any concerning symptoms or wish to speak to a provider before your next infusion visit, please call your care coordinator or triage to notify them so we can adequately serve you.     If you need a refill on a narcotic prescription or other medication, please call before your infusion appointment.

## 2022-01-10 ENCOUNTER — TELEPHONE (OUTPATIENT)
Dept: ONCOLOGY | Facility: CLINIC | Age: 57
End: 2022-01-10
Payer: COMMERCIAL

## 2022-01-10 NOTE — TELEPHONE ENCOUNTER
S: Cough, fever, pain in abdominal area, loss of appetite.    B: Background:  Liver Cancer:  Infusion on 1/6/22 of Ramucirumab  Started on weekend    A: COVID SX Assessment:   Fever or chills yes, chills, no fever.  Cough yes--Coughing all night, waking him up every 10-15 min.  Shortness of breath or difficulty breathing no  Fatigue Yes, couldn't control balance this morning; when asked if pt feels dizzy, he said sometimes.  Muscle or body aches yes, especially on two arms, pain in joints  Headache No   New loss of taste or smell No  Sore throat yes x 2-3 days  Congestion or runny nose No  Nausea or vomiting No  Diarrhea No  N/V, diarrhea, no sx    Other SX: Had pain in abdominal area: middle of belly.   Would come and go; sharp, Pain rated: 5/10, took tylenol but it didn't help.   Appetite still down.    Take fluids: taking two to three bottles of fluid per day     Reports normal bowel and bladder functioning.    R: Recommendations:  Advised pt be evaluated in Urgent Care today. Pt states he does not have a PCP and that he was expecting that Dr. Kyle would prescribe him some medicine for his cough. Pt said he wants to see Dr. Kyle or have him prescribe something. Explained that Dr. Kyle does not have appointments available in his schedule today, but would want pt to be evaluated for cough, chills and other sx reported by a provider who could listen to his lungs and do a covid test.    Pt did agree to call  in [place] and be seen today.    Explained that I would route this message to Dr. Kyle so he would be aware of pt's sx.     Follow up:  Pt verbalized understanding and agreement with plan.    Routed to Dr. Kyle and RNCC.

## 2022-01-11 ENCOUNTER — TELEPHONE (OUTPATIENT)
Dept: GASTROENTEROLOGY | Facility: CLINIC | Age: 57
End: 2022-01-11
Payer: COMMERCIAL

## 2022-01-11 NOTE — TELEPHONE ENCOUNTER
Left message for patient that per Dr. Trujillo pt needs a COVID test and it will not affect his liver or his HCC.     Advised pt go to a COVID testing center ASAP.    Albina MCKNIGHT LPN  Hepatology Clinic          -------  Trevor Trujillo MD [place], Shannon, LPN  Caller: Unspecified (Today,  8:48 AM)  He needs a COVID test and it will not affect his liver or his HCC.

## 2022-01-20 ENCOUNTER — APPOINTMENT (OUTPATIENT)
Dept: LAB | Facility: CLINIC | Age: 57
End: 2022-01-20
Attending: INTERNAL MEDICINE
Payer: COMMERCIAL

## 2022-01-20 ENCOUNTER — INFUSION THERAPY VISIT (OUTPATIENT)
Dept: ONCOLOGY | Facility: CLINIC | Age: 57
End: 2022-01-20
Attending: INTERNAL MEDICINE
Payer: COMMERCIAL

## 2022-01-20 VITALS
WEIGHT: 145.1 LBS | RESPIRATION RATE: 16 BRPM | DIASTOLIC BLOOD PRESSURE: 77 MMHG | OXYGEN SATURATION: 100 % | BODY MASS INDEX: 23.43 KG/M2 | TEMPERATURE: 97.8 F | HEART RATE: 74 BPM | SYSTOLIC BLOOD PRESSURE: 139 MMHG

## 2022-01-20 DIAGNOSIS — C22.0 HCC (HEPATOCELLULAR CARCINOMA) (H): Primary | ICD-10-CM

## 2022-01-20 LAB
ALBUMIN SERPL-MCNC: 2.9 G/DL (ref 3.4–5)
ALBUMIN UR-MCNC: NEGATIVE MG/DL
ALP SERPL-CCNC: 118 U/L (ref 40–150)
ALT SERPL W P-5'-P-CCNC: 36 U/L (ref 0–70)
ANION GAP SERPL CALCULATED.3IONS-SCNC: 11 MMOL/L (ref 3–14)
AST SERPL W P-5'-P-CCNC: 43 U/L (ref 0–45)
BILIRUB SERPL-MCNC: 0.3 MG/DL (ref 0.2–1.3)
BUN SERPL-MCNC: 10 MG/DL (ref 7–30)
CALCIUM SERPL-MCNC: 8.8 MG/DL (ref 8.5–10.1)
CHLORIDE BLD-SCNC: 108 MMOL/L (ref 94–109)
CO2 SERPL-SCNC: 23 MMOL/L (ref 20–32)
CREAT SERPL-MCNC: 0.64 MG/DL (ref 0.66–1.25)
GFR SERPL CREATININE-BSD FRML MDRD: >90 ML/MIN/1.73M2
GLUCOSE BLD-MCNC: 97 MG/DL (ref 70–99)
POTASSIUM BLD-SCNC: 3.8 MMOL/L (ref 3.4–5.3)
PROT SERPL-MCNC: 7.2 G/DL (ref 6.8–8.8)
SODIUM SERPL-SCNC: 142 MMOL/L (ref 133–144)

## 2022-01-20 PROCEDURE — 82040 ASSAY OF SERUM ALBUMIN: CPT | Performed by: INTERNAL MEDICINE

## 2022-01-20 PROCEDURE — 81003 URINALYSIS AUTO W/O SCOPE: CPT | Performed by: INTERNAL MEDICINE

## 2022-01-20 PROCEDURE — 96413 CHEMO IV INFUSION 1 HR: CPT

## 2022-01-20 PROCEDURE — 250N000011 HC RX IP 250 OP 636: Performed by: INTERNAL MEDICINE

## 2022-01-20 PROCEDURE — 36415 COLL VENOUS BLD VENIPUNCTURE: CPT | Performed by: INTERNAL MEDICINE

## 2022-01-20 PROCEDURE — 80053 COMPREHEN METABOLIC PANEL: CPT | Performed by: INTERNAL MEDICINE

## 2022-01-20 PROCEDURE — 258N000003 HC RX IP 258 OP 636: Performed by: INTERNAL MEDICINE

## 2022-01-20 RX ORDER — HEPARIN SODIUM,PORCINE 10 UNIT/ML
5 VIAL (ML) INTRAVENOUS
Status: CANCELLED | OUTPATIENT
Start: 2022-01-20

## 2022-01-20 RX ORDER — EPINEPHRINE 1 MG/ML
0.3 INJECTION, SOLUTION INTRAMUSCULAR; SUBCUTANEOUS EVERY 5 MIN PRN
Status: CANCELLED | OUTPATIENT
Start: 2022-01-20

## 2022-01-20 RX ORDER — METHYLPREDNISOLONE SODIUM SUCCINATE 125 MG/2ML
125 INJECTION, POWDER, LYOPHILIZED, FOR SOLUTION INTRAMUSCULAR; INTRAVENOUS
Status: CANCELLED
Start: 2022-01-20

## 2022-01-20 RX ORDER — ALBUTEROL SULFATE 0.83 MG/ML
2.5 SOLUTION RESPIRATORY (INHALATION)
Status: CANCELLED | OUTPATIENT
Start: 2022-01-20

## 2022-01-20 RX ORDER — ALBUTEROL SULFATE 90 UG/1
1-2 AEROSOL, METERED RESPIRATORY (INHALATION)
Status: CANCELLED
Start: 2022-01-20

## 2022-01-20 RX ORDER — DIPHENHYDRAMINE HYDROCHLORIDE 50 MG/ML
50 INJECTION INTRAMUSCULAR; INTRAVENOUS
Status: CANCELLED
Start: 2022-01-20

## 2022-01-20 RX ORDER — MEPERIDINE HYDROCHLORIDE 25 MG/ML
25 INJECTION INTRAMUSCULAR; INTRAVENOUS; SUBCUTANEOUS EVERY 30 MIN PRN
Status: CANCELLED | OUTPATIENT
Start: 2022-01-20

## 2022-01-20 RX ORDER — HEPARIN SODIUM (PORCINE) LOCK FLUSH IV SOLN 100 UNIT/ML 100 UNIT/ML
5 SOLUTION INTRAVENOUS
Status: CANCELLED | OUTPATIENT
Start: 2022-01-20

## 2022-01-20 RX ORDER — NALOXONE HYDROCHLORIDE 0.4 MG/ML
0.2 INJECTION, SOLUTION INTRAMUSCULAR; INTRAVENOUS; SUBCUTANEOUS
Status: CANCELLED | OUTPATIENT
Start: 2022-01-20

## 2022-01-20 RX ORDER — LORAZEPAM 2 MG/ML
0.5 INJECTION INTRAMUSCULAR EVERY 4 HOURS PRN
Status: CANCELLED | OUTPATIENT
Start: 2022-01-20

## 2022-01-20 RX ADMIN — SODIUM CHLORIDE 600 MG: 9 INJECTION, SOLUTION INTRAVENOUS at 09:03

## 2022-01-20 RX ADMIN — SODIUM CHLORIDE 250 ML: 9 INJECTION, SOLUTION INTRAVENOUS at 09:03

## 2022-01-20 ASSESSMENT — PAIN SCALES - GENERAL: PAINLEVEL: NO PAIN (0)

## 2022-01-20 NOTE — NURSING NOTE
Chief Complaint   Patient presents with     Chemotherapy     Cycle 11 Day 1 Ramucirumab     Blood Draw     Labs drawn via PIV placed by RN in lab. VS taken.     Labs drawn from PIV placed by RN. Line flushed with saline. Vitals taken. Pt checked in for appointment(s).    Racquel LORENZO RN PHN BSN  BMT/Oncology Lab

## 2022-01-20 NOTE — PROGRESS NOTES
Infusion Nursing Note:  Preeti Pascual presents today for Cycle 11 Day 1 Ramucirumab (Cyramza).    Patient spoke with provider today: No    Treatment Conditions:  Lab Results   Component Value Date     01/20/2022    POTASSIUM 3.8 01/20/2022    CR 0.64 (L) 01/20/2022    STACEY 8.8 01/20/2022    BILITOTAL 0.3 01/20/2022    ALBUMIN 2.9 (L) 01/20/2022    ALT 36 01/20/2022    AST 43 01/20/2022     Urine protein: negative.      Note: Pt with no concerns today.  Denies fever/chills, SOB or cough. No reports of itchy/dry skin.    Pt requesting refill on Ventolin inhaler. No refills ordered.  Pharmacy sent Dr. Kyle a message and requested refill on inhaler.    Next infusion not scheduled at time of discharge today. Pt due for next infusion in 2 weeks on 2/3/22.    Request sent to scheduling to add infusion and labs for 2/3/22.      Intravenous Access:  Peripheral IV placed.    Post Infusion Assessment:  Patient tolerated infusion without incident.  Blood return noted pre and post infusion.  Access discontinued per protocol.    Discharge Plan:   Prescription refills given for flonase.  Discharge instructions reviewed with: Patient.  Patient and/or family verbalized understanding of discharge instructions and all questions answered.  Copy of AVS reviewed with patient and/or family.    Patient discharged in stable condition accompanied by: self.  Departure Mode: Ambulatory.    Crystal Oconnor RN

## 2022-01-20 NOTE — PATIENT INSTRUCTIONS
[facility] Triage and after hours / weekends / holidays:  [phone]    Please call the triage or after hours line if you experience a temperature greater than or equal to 100.5, shaking chills, have uncontrolled nausea, vomiting and/or diarrhea, dizziness, shortness of breath, chest pain, bleeding, unexplained bruising, or if you have any other new/concerning symptoms, questions or concerns.      If you are having any concerning symptoms or wish to speak to a provider before your next infusion visit, please call your care coordinator or triage to notify them so we can adequately serve you.     If you need a refill on a narcotic prescription or other medication, please call before your infusion appointment.             January 2022 Sunday Monday Tuesday Wednesday Thursday Friday Saturday                                 1       2     3     4     5     6    LAB PERIPHERAL   6:30 AM   (15 min.)   [facility] DRAW   [facility]    ONC INFUSION 2 HR (120 MIN)   7:00 AM   (120 min.)    ONC INFUSION NURSE   [facility] 7     8       9     10     11     12     13     14     15       16     17     18     19     20    LAB PERIPHERAL   7:30 AM   (15 min.)   [facility] DRAW   [facility]    ONC INFUSION 2 HR (120 MIN)   8:00 AM   (120 min.)    ONC INFUSION NURSE   [facility] 21     22       23     24     25     26     27     28     29       30     31                                          February 2022 Sunday Monday Tuesday Wednesday Thursday Friday Saturday             1     2     3     4     5       6     7     8     9     10     11     12       13     14     15     16     17    LAB   8:00 AM   (15 min.)    LAB   [facility] [place]    CT CHEST ABDOMEN PELVIS WWO   8:30 AM   (20 min.)   [facility]   [facility] [place] 18     19       20      21    RETURN   4:45 PM   (30 min.)   Agustin Kyle MD   [facility] 22     23     24     25     26       27     28                                             Recent Results (from the past 24 hour(s))   Comprehensive metabolic panel    Collection Time: 01/20/22  7:40 AM   Result Value Ref Range    Sodium 142 133 - 144 mmol/L    Potassium 3.8 3.4 - 5.3 mmol/L    Chloride 108 94 - 109 mmol/L    Carbon Dioxide (CO2) 23 20 - 32 mmol/L    Anion Gap 11 3 - 14 mmol/L    Urea Nitrogen 10 7 - 30 mg/dL    Creatinine 0.64 (L) 0.66 - 1.25 mg/dL    Calcium 8.8 8.5 - 10.1 mg/dL    Glucose 97 70 - 99 mg/dL    Alkaline Phosphatase 118 40 - 150 U/L    AST 43 0 - 45 U/L    ALT 36 0 - 70 U/L    Protein Total 7.2 6.8 - 8.8 g/dL    Albumin 2.9 (L) 3.4 - 5.0 g/dL    Bilirubin Total 0.3 0.2 - 1.3 mg/dL    GFR Estimate >90 >60 mL/min/1.73m2   Protein qualitative urine    Collection Time: 01/20/22  7:40 AM   Result Value Ref Range    Protein Albumin Urine Negative Negative mg/dL

## 2022-01-31 DIAGNOSIS — C22.0 HCC (HEPATOCELLULAR CARCINOMA) (H): Primary | ICD-10-CM

## 2022-01-31 RX ORDER — ALBUTEROL SULFATE 90 UG/1
1-2 AEROSOL, METERED RESPIRATORY (INHALATION)
Status: CANCELLED
Start: 2022-02-03

## 2022-01-31 RX ORDER — METHYLPREDNISOLONE SODIUM SUCCINATE 125 MG/2ML
125 INJECTION, POWDER, LYOPHILIZED, FOR SOLUTION INTRAMUSCULAR; INTRAVENOUS
Status: CANCELLED
Start: 2022-02-03

## 2022-01-31 RX ORDER — HEPARIN SODIUM,PORCINE 10 UNIT/ML
5 VIAL (ML) INTRAVENOUS
Status: CANCELLED | OUTPATIENT
Start: 2022-02-03

## 2022-01-31 RX ORDER — NALOXONE HYDROCHLORIDE 0.4 MG/ML
0.2 INJECTION, SOLUTION INTRAMUSCULAR; INTRAVENOUS; SUBCUTANEOUS
Status: CANCELLED | OUTPATIENT
Start: 2022-02-03

## 2022-01-31 RX ORDER — DIPHENHYDRAMINE HYDROCHLORIDE 50 MG/ML
50 INJECTION INTRAMUSCULAR; INTRAVENOUS
Status: CANCELLED
Start: 2022-02-03

## 2022-01-31 RX ORDER — HEPARIN SODIUM (PORCINE) LOCK FLUSH IV SOLN 100 UNIT/ML 100 UNIT/ML
5 SOLUTION INTRAVENOUS
Status: CANCELLED | OUTPATIENT
Start: 2022-02-03

## 2022-01-31 RX ORDER — ALBUTEROL SULFATE 0.83 MG/ML
2.5 SOLUTION RESPIRATORY (INHALATION)
Status: CANCELLED | OUTPATIENT
Start: 2022-02-03

## 2022-01-31 RX ORDER — EPINEPHRINE 1 MG/ML
0.3 INJECTION, SOLUTION INTRAMUSCULAR; SUBCUTANEOUS EVERY 5 MIN PRN
Status: CANCELLED | OUTPATIENT
Start: 2022-02-03

## 2022-01-31 RX ORDER — MEPERIDINE HYDROCHLORIDE 25 MG/ML
25 INJECTION INTRAMUSCULAR; INTRAVENOUS; SUBCUTANEOUS EVERY 30 MIN PRN
Status: CANCELLED | OUTPATIENT
Start: 2022-02-03

## 2022-01-31 RX ORDER — LORAZEPAM 2 MG/ML
0.5 INJECTION INTRAMUSCULAR EVERY 4 HOURS PRN
Status: CANCELLED | OUTPATIENT
Start: 2022-02-03

## 2022-02-03 ENCOUNTER — INFUSION THERAPY VISIT (OUTPATIENT)
Dept: ONCOLOGY | Facility: CLINIC | Age: 57
End: 2022-02-03
Attending: INTERNAL MEDICINE
Payer: COMMERCIAL

## 2022-02-03 ENCOUNTER — APPOINTMENT (OUTPATIENT)
Dept: LAB | Facility: CLINIC | Age: 57
End: 2022-02-03
Attending: INTERNAL MEDICINE
Payer: COMMERCIAL

## 2022-02-03 VITALS
RESPIRATION RATE: 14 BRPM | TEMPERATURE: 97.4 F | DIASTOLIC BLOOD PRESSURE: 79 MMHG | BODY MASS INDEX: 23.83 KG/M2 | HEART RATE: 72 BPM | OXYGEN SATURATION: 98 % | SYSTOLIC BLOOD PRESSURE: 145 MMHG | WEIGHT: 147.6 LBS

## 2022-02-03 DIAGNOSIS — C22.0 HCC (HEPATOCELLULAR CARCINOMA) (H): Primary | ICD-10-CM

## 2022-02-03 LAB
ALBUMIN SERPL-MCNC: 2.9 G/DL (ref 3.4–5)
ALBUMIN UR-MCNC: NEGATIVE MG/DL
ALP SERPL-CCNC: 146 U/L (ref 40–150)
ALT SERPL W P-5'-P-CCNC: 50 U/L (ref 0–70)
ANION GAP SERPL CALCULATED.3IONS-SCNC: 8 MMOL/L (ref 3–14)
AST SERPL W P-5'-P-CCNC: 59 U/L (ref 0–45)
BILIRUB SERPL-MCNC: 0.4 MG/DL (ref 0.2–1.3)
BUN SERPL-MCNC: 10 MG/DL (ref 7–30)
CALCIUM SERPL-MCNC: 8.7 MG/DL (ref 8.5–10.1)
CHLORIDE BLD-SCNC: 109 MMOL/L (ref 94–109)
CO2 SERPL-SCNC: 23 MMOL/L (ref 20–32)
CREAT SERPL-MCNC: 0.59 MG/DL (ref 0.66–1.25)
GFR SERPL CREATININE-BSD FRML MDRD: >90 ML/MIN/1.73M2
GLUCOSE BLD-MCNC: 101 MG/DL (ref 70–99)
POTASSIUM BLD-SCNC: 3.9 MMOL/L (ref 3.4–5.3)
PROT SERPL-MCNC: 7.3 G/DL (ref 6.8–8.8)
SODIUM SERPL-SCNC: 140 MMOL/L (ref 133–144)

## 2022-02-03 PROCEDURE — 96413 CHEMO IV INFUSION 1 HR: CPT

## 2022-02-03 PROCEDURE — 81003 URINALYSIS AUTO W/O SCOPE: CPT | Performed by: INTERNAL MEDICINE

## 2022-02-03 PROCEDURE — 258N000003 HC RX IP 258 OP 636: Performed by: INTERNAL MEDICINE

## 2022-02-03 PROCEDURE — 250N000011 HC RX IP 250 OP 636: Performed by: INTERNAL MEDICINE

## 2022-02-03 PROCEDURE — 80053 COMPREHEN METABOLIC PANEL: CPT | Performed by: INTERNAL MEDICINE

## 2022-02-03 PROCEDURE — 36415 COLL VENOUS BLD VENIPUNCTURE: CPT | Performed by: INTERNAL MEDICINE

## 2022-02-03 RX ADMIN — SODIUM CHLORIDE 600 MG: 9 INJECTION, SOLUTION INTRAVENOUS at 09:39

## 2022-02-03 RX ADMIN — SODIUM CHLORIDE 250 ML: 9 INJECTION, SOLUTION INTRAVENOUS at 09:37

## 2022-02-03 ASSESSMENT — PAIN SCALES - GENERAL: PAINLEVEL: NO PAIN (0)

## 2022-02-03 NOTE — PATIENT INSTRUCTIONS
Contact Numbers  [facility]: [phone] (for symptom and scheduling needs)    Please call the [facility] Triage line if you experience a temperature greater than or equal to 100.4, shaking chills, have uncontrolled nausea, vomiting and/or diarrhea, dizziness, shortness of breath, chest pain, bleeding, unexplained bruising, or if you have any other new/concerning symptoms, questions or concerns.     If you are having any concerning symptoms or wish to speak to a provider before your next infusion visit, please call your care coordinator or triage to notify them so we can adequately serve you.     If you need a refill on a narcotic prescription or other medication, please call triage before your infusion appointment.          February 2022 Sunday Monday Tuesday Wednesday Thursday Friday Saturday             1     2     3    LAB PERIPHERAL   7:30 AM   (15 min.)   [facility] LAB DRAW   [facility]    ONC INFUSION 2 HR (120 MIN)   8:00 AM   (120 min.)    ONC INFUSION NURSE   [facility] 4     5       6     7     8     9     10     11     12       13     14     15     16     17    LAB   8:00 AM   (15 min.)    LAB   [facility] [place]    CT CHEST ABDOMEN PELVIS WWO   8:30 AM   (20 min.)   UCSCCT1   [facility] [place] 18     19       20     21    RETURN   4:45 PM   (30 min.)   Agustin Kyle MD   [facility] 22     23     24     25     26       27     28                                          March 2022 Sunday Monday Tuesday Wednesday Thursday Friday Saturday             1     2     3     4     5       6     7     8     9     10     11     12       13     14     15     16     17     18     19       20     21     22     23     24     25     26       27     28     29     30     31                               Lab Results:  Recent Results (from the past 12 hour(s))    Comprehensive metabolic panel    Collection Time: 02/03/22  7:50 AM   Result Value Ref Range    Sodium 140 133 - 144 mmol/L    Potassium 3.9 3.4 - 5.3 mmol/L    Chloride 109 94 - 109 mmol/L    Carbon Dioxide (CO2) 23 20 - 32 mmol/L    Anion Gap 8 3 - 14 mmol/L    Urea Nitrogen 10 7 - 30 mg/dL    Creatinine 0.59 (L) 0.66 - 1.25 mg/dL    Calcium 8.7 8.5 - 10.1 mg/dL    Glucose 101 (H) 70 - 99 mg/dL    Alkaline Phosphatase 146 40 - 150 U/L    AST 59 (H) 0 - 45 U/L    ALT 50 0 - 70 U/L    Protein Total 7.3 6.8 - 8.8 g/dL    Albumin 2.9 (L) 3.4 - 5.0 g/dL    Bilirubin Total 0.4 0.2 - 1.3 mg/dL    GFR Estimate >90 >60 mL/min/1.73m2   Protein qualitative urine    Collection Time: 02/03/22  7:50 AM   Result Value Ref Range    Protein Albumin Urine Negative Negative mg/dL

## 2022-02-03 NOTE — PROGRESS NOTES
Infusion Nursing Note:  Preeti Pascual presents today for Cycle 12 Day 1 Cyramza.    Patient seen by provider today: No   present during visit today: Not Applicable.    Note: Patient presents to infusion today doing okay. Reports worsening fatigue in last few weeks. States that he experiences pain in the bottom of his feet with walking which slows down his walking. No neuropathy. Mild swelling in feet. Wondering if he can take anything for the pain. No fevers, chills, shortness of breath, chest pains or cough. Eating and drinking well. Patient wishes to proceed with treatment today.    TORB @ 0927 Nallely Smiley NP/ Nathalia Morrison RN:  - Cyramza should not be causing the foot pain, recommend patient try taking tylenol for the pain, get supportive shoes and elevate feet  - Instruct patient to NOT take ibuprofen     Above relayed to patient. Patient verbalized understanding and is agreeable to plan.     Intravenous Access:  Peripheral IV placed.    Treatment Conditions:  Lab Results   Component Value Date     02/03/2022    POTASSIUM 3.9 02/03/2022    CR 0.59 (L) 02/03/2022    STACEY 8.7 02/03/2022    BILITOTAL 0.4 02/03/2022    ALBUMIN 2.9 (L) 02/03/2022    ALT 50 02/03/2022    AST 59 (H) 02/03/2022     Results reviewed, labs MET treatment parameters, ok to proceed with treatment.  Urine Negative.    Post Infusion Assessment:  Patient tolerated infusion without incident.  Blood return noted pre and post infusion.  Site patent and intact, free from redness, edema or discomfort.  No evidence of extravasations.  Access discontinued per protocol.     Discharge Plan:   Prescription refills given for Ventolin Inhaler.  Discharge instructions reviewed with: Patient.  Patient and/or family verbalized understanding of discharge instructions and all questions answered.  Copy of AVS reviewed with patient and/or family.  Patient will return 2/17 for next CT scan and 2/21 for next appointment with   Anabella.  Patient discharged in stable condition accompanied by: self.  Departure Mode: Ambulatory.      Nathalia Morrison RN

## 2022-02-17 ENCOUNTER — ANCILLARY PROCEDURE (OUTPATIENT)
Dept: CT IMAGING | Facility: CLINIC | Age: 57
End: 2022-02-17
Attending: INTERNAL MEDICINE
Payer: COMMERCIAL

## 2022-02-17 ENCOUNTER — LAB (OUTPATIENT)
Dept: LAB | Facility: CLINIC | Age: 57
End: 2022-02-17
Attending: INTERNAL MEDICINE
Payer: COMMERCIAL

## 2022-02-17 DIAGNOSIS — C22.0 HCC (HEPATOCELLULAR CARCINOMA) (H): ICD-10-CM

## 2022-02-17 DIAGNOSIS — B18.1 CHRONIC VIRAL HEPATITIS B WITHOUT DELTA AGENT AND WITHOUT COMA (H): ICD-10-CM

## 2022-02-17 DIAGNOSIS — K74.69 OTHER CIRRHOSIS OF LIVER (H): ICD-10-CM

## 2022-02-17 LAB
AFP SERPL-MCNC: 684 UG/L (ref 0–8)
ALBUMIN SERPL-MCNC: 2.8 G/DL (ref 3.4–5)
ALP SERPL-CCNC: 127 U/L (ref 40–150)
ALT SERPL W P-5'-P-CCNC: 49 U/L (ref 0–70)
ANION GAP SERPL CALCULATED.3IONS-SCNC: 8 MMOL/L (ref 3–14)
AST SERPL W P-5'-P-CCNC: 62 U/L (ref 0–45)
BASOPHILS # BLD AUTO: 0.1 10E3/UL (ref 0–0.2)
BASOPHILS NFR BLD AUTO: 2 %
BILIRUB SERPL-MCNC: 0.5 MG/DL (ref 0.2–1.3)
BUN SERPL-MCNC: 10 MG/DL (ref 7–30)
CALCIUM SERPL-MCNC: 8.4 MG/DL (ref 8.5–10.1)
CHLORIDE BLD-SCNC: 105 MMOL/L (ref 94–109)
CO2 SERPL-SCNC: 24 MMOL/L (ref 20–32)
CREAT SERPL-MCNC: 0.61 MG/DL (ref 0.66–1.25)
EOSINOPHIL # BLD AUTO: 1 10E3/UL (ref 0–0.7)
EOSINOPHIL NFR BLD AUTO: 25 %
ERYTHROCYTE [DISTWIDTH] IN BLOOD BY AUTOMATED COUNT: 16.9 % (ref 10–15)
GFR SERPL CREATININE-BSD FRML MDRD: >90 ML/MIN/1.73M2
GLUCOSE BLD-MCNC: 83 MG/DL (ref 70–99)
HCT VFR BLD AUTO: 33 % (ref 40–53)
HGB BLD-MCNC: 10.2 G/DL (ref 13.3–17.7)
IMM GRANULOCYTES # BLD: 0 10E3/UL
IMM GRANULOCYTES NFR BLD: 0 %
LYMPHOCYTES # BLD AUTO: 1.3 10E3/UL (ref 0.8–5.3)
LYMPHOCYTES NFR BLD AUTO: 32 %
MCH RBC QN AUTO: 24.3 PG (ref 26.5–33)
MCHC RBC AUTO-ENTMCNC: 30.9 G/DL (ref 31.5–36.5)
MCV RBC AUTO: 79 FL (ref 78–100)
MONOCYTES # BLD AUTO: 0.5 10E3/UL (ref 0–1.3)
MONOCYTES NFR BLD AUTO: 14 %
NEUTROPHILS # BLD AUTO: 1.1 10E3/UL (ref 1.6–8.3)
NEUTROPHILS NFR BLD AUTO: 27 %
NRBC # BLD AUTO: 0 10E3/UL
NRBC BLD AUTO-RTO: 0 /100
PLATELET # BLD AUTO: 131 10E3/UL (ref 150–450)
POTASSIUM BLD-SCNC: 4.1 MMOL/L (ref 3.4–5.3)
PROT SERPL-MCNC: 6.7 G/DL (ref 6.8–8.8)
RBC # BLD AUTO: 4.2 10E6/UL (ref 4.4–5.9)
SODIUM SERPL-SCNC: 137 MMOL/L (ref 133–144)
WBC # BLD AUTO: 3.9 10E3/UL (ref 4–11)

## 2022-02-17 PROCEDURE — 80053 COMPREHEN METABOLIC PANEL: CPT

## 2022-02-17 PROCEDURE — 85025 COMPLETE CBC W/AUTO DIFF WBC: CPT

## 2022-02-17 PROCEDURE — 36415 COLL VENOUS BLD VENIPUNCTURE: CPT

## 2022-02-17 PROCEDURE — 71260 CT THORAX DX C+: CPT | Performed by: RADIOLOGY

## 2022-02-17 PROCEDURE — 82105 ALPHA-FETOPROTEIN SERUM: CPT

## 2022-02-17 PROCEDURE — 74178 CT ABD&PLV WO CNTR FLWD CNTR: CPT | Performed by: RADIOLOGY

## 2022-02-17 RX ORDER — IOPAMIDOL 755 MG/ML
91 INJECTION, SOLUTION INTRAVASCULAR ONCE
Status: COMPLETED | OUTPATIENT
Start: 2022-02-17 | End: 2022-02-17

## 2022-02-17 RX ADMIN — IOPAMIDOL 91 ML: 755 INJECTION, SOLUTION INTRAVASCULAR at 08:18

## 2022-02-17 NOTE — NURSING NOTE
Chief Complaint   Patient presents with     Blood Draw     Labs drawn via PIV from CT by RN in lab. De-accessed after lab draw.     Smiley Tineo RN

## 2022-02-21 ENCOUNTER — ONCOLOGY VISIT (OUTPATIENT)
Dept: ONCOLOGY | Facility: CLINIC | Age: 57
End: 2022-02-21
Attending: INTERNAL MEDICINE
Payer: COMMERCIAL

## 2022-02-21 VITALS
HEART RATE: 81 BPM | TEMPERATURE: 98.8 F | SYSTOLIC BLOOD PRESSURE: 157 MMHG | WEIGHT: 144.6 LBS | BODY MASS INDEX: 23.35 KG/M2 | RESPIRATION RATE: 16 BRPM | OXYGEN SATURATION: 100 % | DIASTOLIC BLOOD PRESSURE: 72 MMHG

## 2022-02-21 DIAGNOSIS — K74.69 OTHER CIRRHOSIS OF LIVER (H): ICD-10-CM

## 2022-02-21 DIAGNOSIS — C22.0 HCC (HEPATOCELLULAR CARCINOMA) (H): Primary | ICD-10-CM

## 2022-02-21 DIAGNOSIS — B18.1 CHRONIC VIRAL HEPATITIS B WITHOUT DELTA AGENT AND WITHOUT COMA (H): ICD-10-CM

## 2022-02-21 PROCEDURE — 99215 OFFICE O/P EST HI 40 MIN: CPT | Performed by: INTERNAL MEDICINE

## 2022-02-21 PROCEDURE — 99417 PROLNG OP E/M EACH 15 MIN: CPT | Performed by: INTERNAL MEDICINE

## 2022-02-21 PROCEDURE — G0463 HOSPITAL OUTPT CLINIC VISIT: HCPCS

## 2022-02-21 ASSESSMENT — PAIN SCALES - GENERAL: PAINLEVEL: NO PAIN (0)

## 2022-02-21 NOTE — PROGRESS NOTES
Losing strength/weight--eating well, stools loose last few weeks, no blood  Minor edema-resolves overnight  Mom sick, may leave country in 2 month to see her  Wants to switch to oral--lenvatinib    I am seeing Preeti Pascual today in follow-up of metastatic hepatocellular carcinoma.    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  He had initial course of treatment with sorafenib which he did not tolerate well and progressed on a dose of just 200 mg twice per day.  He started treatment with nivolumab in November 2018 with initial control and then progression and then was escalated to ipilimumab/nivolumab in May.  He had further progression by August and was switched to Ramucirumab.  This required temporary discontinuation just shortly after starting when he developed severe hepatitis which we attributed to his immediately preceding immunotherapy and which seemed to resolve with a course of steroids.  After 2 months of sustained therapy he clinically was better but at our last evaluation 2 months with an increase in his alpha-fetoprotein but no radiographic progression.  He returns today for ongoing response assessment.    He tells me he still continues to feel quite weak and feels like he is losing muscle mass.  He is still able to work full-time although sometimes has a little bit of difficulty with some of the activities due to his weakness.  He feels like he is eating well.  He notes in the last few weeks he has been having intermittently loose stools immediately after eating, but did not characterize this as nadeen diarrhea.  There is been no blood in his stools.  He gets very slight edema by the end of the day which resolves overnight and has not noticed any swelling of his abdomen.    His mother  who still lives back in [place] is sick and he wants very much to switch his therapy to something he can continue over there.    On physical exam he is alert and well-appearing.  His weight is down another couple of pounds.    I personally reviewed his CT scan, which shows no overall change in his tumor volume, but some of the peripheral lesions on the edge of his previously treated intrahepatic disease look more necrotic now.  His alpha-fetoprotein which had gone up quite a bit at our last evaluation has fallen by half, now at about 650.  The rest of his lab work shows normal electrolytes and renal function.  His albumin remains quite low at 2.8 but his bilirubin is normal and his liver enzymes are nearly normal.  He has modest pancytopenia consistent with prior values.    Assessment/plan: Metastatic hepatocellular carcinoma in the setting of compensated cirrhosis due to hepatitis B.  Radiographically I would consider his disease stable and its reassuring that his alpha-fetoprotein has improved-I am not sure how much of this represents a return to normal after resolution of his acute hepatitis, which I thought was the cause of his acute elevation in the AFP.  I recommended the patient we continue on with the Ramucirumab Mab as it seems to be controlling his cancer but he is quite adamant that he needs to switch to something oral that he can continue when he travels back to [place] which he is planning in about 2 months.  I spent some time reviewing how things are going with the sorafenib, and given that we never got to a very high dose its possible that a different tyrosine kinase inhibitor might be effective if we can get to a more reasonable dose, but I discussed with him the likelihood that he will have difficulty tolerating that as well.  After much discussion we decided we would proceed with lenvatinib and I will started an 8 mg dose.  We discussed the expected toxicities and how we manage them.  We will plan on  seeing him about 2 weeks into therapy to assess his toxicity make any dose adjustments and then I will plan on seeing him back in early April just prior to his return to [place] so that we can reevaluate his disease status.    Total visit time by me on the date of service inclusive of the above visit, extensive review of his prior records and imaging, ordering, documentation and coordination of care was 70 minutes.

## 2022-02-21 NOTE — LETTER
Date:February 22, 2022      Patient was self referred, no letter generated. Do not send.        [facility] Health Information

## 2022-02-21 NOTE — NURSING NOTE
"Oncology Rooming Note    February 21, 2022 4:51 PM   Preeti Pascual is a 56 year old male who presents for:    Chief Complaint   Patient presents with     Oncology Clinic Visit     Return; HCC     Initial Vitals: BP (!) 157/72   Pulse 81   Temp 98.8  F (37.1  C) (Oral)   Resp 16   Wt 65.6 kg (144 lb 9.6 oz)   SpO2 100%   BMI 23.35 kg/m   Estimated body mass index is 23.35 kg/m  as calculated from the following:    Height as of 10/7/21: 1.676 m (5' 5.98\").    Weight as of this encounter: 65.6 kg (144 lb 9.6 oz). Body surface area is 1.75 meters squared.  No Pain (0) Comment: Data Unavailable   No LMP for male patient.  Allergies reviewed: Yes  Medications reviewed: Yes    Medications: Medication refills not needed today.  Pharmacy name entered into [employer]:    [id number] - [place], [place] - [address] [place] [address] NE AT [facility] & [address] [place] [address] - [address] [place] [address] [phone]    Clinical concerns: Patient states there are no new concerns to discuss with provider.  Dr Kyle was not notified.         Nay Jacobs CMA              "

## 2022-02-24 ENCOUNTER — PATIENT OUTREACH (OUTPATIENT)
Dept: ONCOLOGY | Facility: CLINIC | Age: 57
End: 2022-02-24
Payer: COMMERCIAL

## 2022-02-24 ENCOUNTER — TELEPHONE (OUTPATIENT)
Dept: ONCOLOGY | Facility: CLINIC | Age: 57
End: 2022-02-24
Payer: COMMERCIAL

## 2022-02-24 NOTE — TELEPHONE ENCOUNTER
PA Initiation    Medication: Lenvima PA Submitted  Insurance Company: [employer]  Pharmacy Filling the Rx:    Filling Pharmacy Phone:    Filling Pharmacy Fax:    Start Date: 2/24/2022     [id number]      Stephani Du  Oncology Pharmacy Liaison II  [email or web address]  Phone: [phone]  Fax: [phone]

## 2022-02-25 ENCOUNTER — PATIENT OUTREACH (OUTPATIENT)
Dept: ONCOLOGY | Facility: CLINIC | Age: 57
End: 2022-02-25
Payer: COMMERCIAL

## 2022-02-25 NOTE — PROGRESS NOTES
RN Care Coordination Note  Received message from scheduling team and triage patient called to further discuss the start of his lenvatinib. PA still pending. Placed return call to patient. Unable to reach. Left detailed message for patient stating insurance approval is pending for oral chemo. Once insurance approval has been received he will receive call from oral chemo pharmacy team and liaisons to set up delivery. When start date is know, we will schedule 2 week follow-up with VIRGILIO and 2 month follow-up and CT with Dr Kyle. Asked patient to return call with questions.       Sharlene Mendez RN, BSN, OCN   RN Care Coordinator   [facility]

## 2022-02-25 NOTE — PROGRESS NOTES
RN Care Coordination Note  Received message from patient asking about starting of his oral chemo. Placed return call to patient stating insurance approval was still pending. After insurance approval is received patient will be contacted by oral chemo pharmacy team. After he starts oral chemo will schedule 2 week follow-up with VIRGILIO. Was not able to reach patient. Left detailed message with above information.     Sharlene Mendez RN, BSN, OCN   RN Care Coordinator   [facility]

## 2022-02-28 ENCOUNTER — TELEPHONE (OUTPATIENT)
Dept: ONCOLOGY | Facility: CLINIC | Age: 57
End: 2022-02-28
Payer: COMMERCIAL

## 2022-02-28 ENCOUNTER — PATIENT OUTREACH (OUTPATIENT)
Dept: ONCOLOGY | Facility: CLINIC | Age: 57
End: 2022-02-28
Payer: COMMERCIAL

## 2022-02-28 NOTE — TELEPHONE ENCOUNTER
Stephani Du  Oncology Pharmacy Liaison II  [email or web address]  Phone: [phone]  Fax: [phone]

## 2022-02-28 NOTE — TELEPHONE ENCOUNTER
"Per Preeti, pt lost phone last Friday did not get the message from RNCC Sharlene on start of lenvatinib plan.    Pt states, \" was told by Dr. Kyle on Monday 2/21 that medication would be started in 1-2 days (from 2/21) and is concern about delay\" pt kept repeating versions of this statement and that pt attempted multiple times to call on status of starting medication.     This writer did share the details of the VM left by RNJACKI Nova on Friday 2/25 as pt lost phone on Friday. This writer also recommended pt to call insurance company as it was noted waiting on prior authorization from pt's insurance.     Preeti still had more questions for care team, this writer transferred call to RNCC, instructed to leave voicemail if no answer and will also send this written message to care team.     Today-  Best phone number [phone]  "

## 2022-02-28 NOTE — PROGRESS NOTES
RN Care Coordination Note  Placed call to patient to review Lenvima has been approved by insurance. Patient can expect call from oral chemo pharmacy team to review medication and where it will be filled. Unable to reach patient, left detailed message with above info.       Sharlene Mendez RN, BSN, OCN   RN Care Coordinator   [facility]

## 2022-02-28 NOTE — ORAL ONC MGMT
Oral Chemotherapy Monitoring Program     Placed call to patient in follow up of planned Lenvima therapy. Preeti was at work and could not talk, but I gave him a brief update that we will call him back once Dr. Kyle signs his Lenvima orders and we will then send it to pharmacy for him. He expressed understanding and agreement with this plan and thanked me for the call.     Leo GreenfieldD  [facility]  [phone]  February 28, 2022

## 2022-02-28 NOTE — TELEPHONE ENCOUNTER
Prior Authorization Approval    Authorization Effective Date: 1/26/2022  Authorization Expiration Date: 7/26/2022  Medication: eBau CARNEY Approved  Approved Dose/Quantity: 60/30 days  Reference #:  [id number]   Insurance Company: [employer] - Phone [phone] Fax [phone]  Expected CoPay:       CoPay Card Available:      Foundation Assistance Needed:    Which Pharmacy is filling the prescription (Not needed for infusion/clinic administered):    Pharmacy Notified: Yes  Patient Notified: Yes          Stephani Du  Oncology Pharmacy Liaison II  [email or web address]  Phone: [phone]  Fax: [phone]

## 2022-03-01 DIAGNOSIS — C22.0 HCC (HEPATOCELLULAR CARCINOMA) (H): Primary | ICD-10-CM

## 2022-03-02 ENCOUNTER — TELEPHONE (OUTPATIENT)
Dept: ONCOLOGY | Facility: CLINIC | Age: 57
End: 2022-03-02
Payer: COMMERCIAL

## 2022-03-02 DIAGNOSIS — C22.0 HCC (HEPATOCELLULAR CARCINOMA) (H): Primary | ICD-10-CM

## 2022-03-02 NOTE — ORAL ONC MGMT
Oral Chemotherapy Monitoring Program    Lab Monitoring Plan  CMP every two weeks x 2, then monthly and as needed, Magnesium Monthly, TSH monthly to start then as needed, Protein Random Urine monthly to start then as needed.  Subjective/Objective:  Preeti Pascual is a 56 year old male contacted by phone for an initial visit for oral chemotherapy education.     ORAL CHEMOTHERAPY 8/21/2018 8/21/2018 8/28/2018 10/18/2018 2/24/2022 2/28/2022 3/2/2022   Assessment Type - - - - Initial Work up Other New Teach   Diagnosis Code - - - - Hepatocellular Cancer Hepatocellular Cancer Hepatocellular Cancer   Providers - - - - Dr. Anabella Kyle   [facility]   Drug Name Nexavar (Sorafenib) Nexavar (Sorafenib) Nexavar (Sorafenib) Nexavar (Sorafenib) Lenvima (lenvatinib) Lenvima (lenvatinib) Lenvima (lenvatinib)   Dose - - - - 8 mg - 8 mg   Current Schedule PM AM BID BID Daily - Daily   Cycle Details Continuous Continuous Continuous Continuous Continuous - Continuous   Start Date of Last Cycle - - - - - - -   Doses missed in last 2 weeks - 0 - - - - -   Adherence Assessment - Non-adherent - - - - -   Reason for Non-adherence - Wanted to avoid side-effects - - - - -   Adherence Intervention Recommended - Medication education;Other - - - - -   Adverse Effects - - - Diarrhea;Other (see note for details) - - -   Diarrhea - - - Grade 1 - - -   Pharmacist Intervention(diarrhea) - - - Yes - - -   Other (See Note for Details) - - - (No Data) - - -   Pharmacist intervention(other) - - - No - - -   Any new drug interactions? - - - - - - Yes   Pharmacist Intervention? - - - - - - Yes   Intervention(s) - - - - - - Patient Education   Is the dose as ordered appropriate for the patient? - - - - - - -       Last PHQ-2 Score on record:   PHQ-2 ( 1999 [employer]) 2/23/2021 11/15/2018   Q1: Little interest or pleasure in doing things 0 1   Q2: Feeling down, depressed or hopeless 0 0   PHQ-2 Score 0 1  "  PHQ-2 Total Score (12-17 Years)- Positive if 3 or more points; Administer PHQ-A if positive 0 1       Vitals:  BP:   BP Readings from Last 1 Encounters:   02/21/22 (!) 157/72     Wt Readings from Last 1 Encounters:   02/21/22 65.6 kg (144 lb 9.6 oz)     Estimated body surface area is 1.75 meters squared as calculated from the following:    Height as of 10/7/21: 1.676 m (5' 5.98\").    Weight as of 2/21/22: 65.6 kg (144 lb 9.6 oz).    Labs:  _  Result Component Current Result Ref Range   Sodium 137 (2/17/2022) 133 - 144 mmol/L     _  Result Component Current Result Ref Range   Potassium 4.1 (2/17/2022) 3.4 - 5.3 mmol/L     _  Result Component Current Result Ref Range   Calcium 8.4 (L) (2/17/2022) 8.5 - 10.1 mg/dL     No results found for Mag within last 30 days.     No results found for Phos within last 30 days.     _  Result Component Current Result Ref Range   Albumin 2.8 (L) (2/17/2022) 3.4 - 5.0 g/dL     _  Result Component Current Result Ref Range   Urea Nitrogen 10 (2/17/2022) 7 - 30 mg/dL     _  Result Component Current Result Ref Range   Creatinine 0.61 (L) (2/17/2022) 0.66 - 1.25 mg/dL     _  Result Component Current Result Ref Range   AST 62 (H) (2/17/2022) 0 - 45 U/L     _  Result Component Current Result Ref Range   ALT 49 (2/17/2022) 0 - 70 U/L     _  Result Component Current Result Ref Range   Bilirubin Total 0.5 (2/17/2022) 0.2 - 1.3 mg/dL     _  Result Component Current Result Ref Range   WBC Count 3.9 (L) (2/17/2022) 4.0 - 11.0 10e3/uL     _  Result Component Current Result Ref Range   Hemoglobin 10.2 (L) (2/17/2022) 13.3 - 17.7 g/dL     _  Result Component Current Result Ref Range   Platelet Count 131 (L) (2/17/2022) 150 - 450 10e3/uL     No results found for ANC within last 30 days.     _  Result Component Current Result Ref Range   Absolute Neutrophils 1.1 (L) (2/17/2022) 1.6 - 8.3 10e3/uL        Assessment:  Patient is appropriate to start therapy.    Plan:  Basic chemotherapy teaching was " reviewed with the patient including indication, start date of therapy, dose, administration, adverse effects, missed doses, food and drug interactions, monitoring, side effect management, office contact information, and safe handling. Written materials were mailed and all questions answered to Preeti's stated satisfaction.    Follow-Up:  About one week after start of Lenvima.     Leo Lopez PharmD  [facility]  [phone]  March 2, 2022

## 2022-03-09 ENCOUNTER — TELEPHONE (OUTPATIENT)
Dept: ONCOLOGY | Facility: CLINIC | Age: 57
End: 2022-03-09
Payer: COMMERCIAL

## 2022-03-09 NOTE — TELEPHONE ENCOUNTER
Nurse Triage SBAR    Situation: Weakness and Fatigue    Background: DX: Hepatocellular Carcinoma. 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 Kyle's last note, 2/21:   Losing strength/weight--eating well, stools loose last few weeks, no blood  Minor edema-resolves overnight  Mom sick, may leave country in 2 month to see her  Wants to switch to oral--lenvatinib    Continues to feel quite weak and feels like he is losing muscle mass.  He is still able to work full-time although sometimes has a little bit of difficulty with some of the activities due to his weakness.    No swelling of his abdomen.    Started lenvatinib 3/2/22    Assessment: Nathalia from pharmacy is calling because pt pt is reporting more weakness and fatigue than should be expected after starting new oral chemo medication (Lenvatinib) five days ago.  Nathalia reports pt is frustrated because he feels he is missing calls d/t work schedule. Pt will be available on his lunch break at 1230.    Called pt at 1230 to assess: Pt reports that the weakness is not new since he started the lenvatinib, but he feels it is getting worse. Pt reports he thinks he has lost weight. States that Dr. Kyle recommended supplements but he is unwilling to do that because he thinks it would not help. Pt reports a decreased appetite. He has never been a big breakfast eater, but in general his appetite is diminished. He states he is forcing himself to eat, but he doesn't eat too much. Asked pt if he would like to see a dietician. He said he does not want to see a dietician because he thinks we can give him the information/recommendations over the phone.     Times that work for pt to receive a call back is after 4:30 P.m.    Recommendation:   Routed to Dr. Kyle and [facility]

## 2022-03-09 NOTE — ORAL ONC MGMT
Oral Chemotherapy Monitoring Program     Patient called oral chemotherapy line to discuss concerns. Patient reports start Lenvima 5 days ago. He reports feeling very fatigued, very weak, feeling very slow. Patient reports this is affecting his ability to function throughout the day and he is having difficulties at work. He reports having to do physical things at work and can barely lift very light things when previously this was not a problem for him. Lenvima may cause fatigue/weakness but given severity of symptoms, I am concerned there may be something else contributing and patient may need additional workup to rule out other issues. Discussed with triage RN, she will call and check in with patient to do more thorough assessment. Patient requests at call at 12:30 pm on his lunch break, this was relayed to triage RN.    Nathalia Mendez, PharmD, [facility]  Oral Chemotherapy Monitoring Program  [facility]  [phone]  March 9, 2022

## 2022-03-10 ENCOUNTER — PATIENT OUTREACH (OUTPATIENT)
Dept: ONCOLOGY | Facility: CLINIC | Age: 57
End: 2022-03-10
Payer: COMMERCIAL

## 2022-03-10 ENCOUNTER — TELEPHONE (OUTPATIENT)
Dept: ONCOLOGY | Facility: CLINIC | Age: 57
End: 2022-03-10
Payer: COMMERCIAL

## 2022-03-10 NOTE — ORAL ONC MGMT
Oral Chemotherapy Monitoring Program    Subjective/Objective:  Preeti Pascual is a 56 year old male contacted by phone for a follow-up visit for oral chemotherapy. Patient confirms starting Lenvima 8 mg daily on 3/5/22 (start was delayed a few days while waiting to receive medication from pharmacy). Patient reports still fatigued and weak but is feeling better today (he also discussed these symptoms today with Sharlene Mendez, RN - see her note for further details and her assessment). He is otherwise tolerating therapy, has not missed doses and no new drug interactions are noted. He also reports checking his blood pressure yesterday at [facility] - he reports /71 which he reports is about where it usually is (reports usually 130s/70-80s). Of note, patient needs labs scheduled for 2 weeks after starting, he would like to get these done 3/17/22 - I have sent an in basket message to the scheduling team to assist him with getting these scheduled.     ORAL CHEMOTHERAPY 8/28/2018 10/18/2018 2/24/2022 2/28/2022 3/2/2022 3/9/2022 3/10/2022   Assessment Type - - Initial Work up Other New Teach Incoming phone call Initial Follow up   Diagnosis Code - - Hepatocellular Cancer Hepatocellular Cancer Hepatocellular Cancer Hepatocellular Cancer Hepatocellular Cancer   Providers - - Dr. Anabella Kyle   [facility]   Drug Name Nexavar (Sorafenib) Nexavar (Sorafenib) Lenvima (lenvatinib) Lenvima (lenvatinib) Lenvima (lenvatinib) Lenvima (lenvatinib) Lenvima (lenvatinib)   Dose - - 8 mg - 8 mg 8 mg 8 mg   Current Schedule BID BID Daily - Daily Daily Daily   Cycle Details Continuous Continuous Continuous - Continuous Continuous Continuous   Start Date of Last Cycle - - - - - - 3/5/2022   Doses missed in last 2 weeks - - - - - - -   Adherence Assessment - - - - - - -   Reason for Non-adherence - - - - - - -   Adherence Intervention Recommended - - -  "- - - -   Adverse Effects - Diarrhea;Other (see note for details) - - - Fatigue;Other (See Note for Details) Fatigue   Diarrhea - Grade 1 - - - - -   Pharmacist Intervention(diarrhea) - Yes - - - - -   Fatigue - - - - - (No Data) (No Data)   Other (See Note for Details) - (No Data) - - - - -   Pharmacist intervention(other) - No - - - - -   Home BPs - - - - - - (No Data)   Any new drug interactions? - - - - Yes - No   Pharmacist Intervention? - - - - Yes - -   Intervention(s) - - - - Patient Education - -   Is the dose as ordered appropriate for the patient? - - - - - - -       Last PHQ-2 Score on record:   PHQ-2 ( 1999 [employer]) 2/23/2021 11/15/2018   Q1: Little interest or pleasure in doing things 0 1   Q2: Feeling down, depressed or hopeless 0 0   PHQ-2 Score 0 1   PHQ-2 Total Score (12-17 Years)- Positive if 3 or more points; Administer PHQ-A if positive 0 1       Vitals:  BP:   BP Readings from Last 1 Encounters:   02/21/22 (!) 157/72     Wt Readings from Last 1 Encounters:   02/21/22 65.6 kg (144 lb 9.6 oz)     Estimated body surface area is 1.75 meters squared as calculated from the following:    Height as of 10/7/21: 1.676 m (5' 5.98\").    Weight as of 2/21/22: 65.6 kg (144 lb 9.6 oz).    Labs:  _  Result Component Current Result Ref Range   Sodium 137 (2/17/2022) 133 - 144 mmol/L     _  Result Component Current Result Ref Range   Potassium 4.1 (2/17/2022) 3.4 - 5.3 mmol/L     _  Result Component Current Result Ref Range   Calcium 8.4 (L) (2/17/2022) 8.5 - 10.1 mg/dL     No results found for Mag within last 30 days.     No results found for Phos within last 30 days.     _  Result Component Current Result Ref Range   Albumin 2.8 (L) (2/17/2022) 3.4 - 5.0 g/dL     _  Result Component Current Result Ref Range   Urea Nitrogen 10 (2/17/2022) 7 - 30 mg/dL     _  Result Component Current Result Ref Range   Creatinine 0.61 (L) (2/17/2022) 0.66 - 1.25 mg/dL     _  Result Component Current Result Ref Range   AST 62 (H) " (2/17/2022) 0 - 45 U/L     _  Result Component Current Result Ref Range   ALT 49 (2/17/2022) 0 - 70 U/L     _  Result Component Current Result Ref Range   Bilirubin Total 0.5 (2/17/2022) 0.2 - 1.3 mg/dL     _  Result Component Current Result Ref Range   WBC Count 3.9 (L) (2/17/2022) 4.0 - 11.0 10e3/uL     _  Result Component Current Result Ref Range   Hemoglobin 10.2 (L) (2/17/2022) 13.3 - 17.7 g/dL     _  Result Component Current Result Ref Range   Platelet Count 131 (L) (2/17/2022) 150 - 450 10e3/uL     No results found for ANC within last 30 days.     _  Result Component Current Result Ref Range   Absolute Neutrophils 1.1 (L) (2/17/2022) 1.6 - 8.3 10e3/uL          Assessment/Plan:  Patient is tolerating Lenvima 8 mg po daily with some fatigue/weakness but feeling better today - continue taking as prescribed. Encouraged him to reach out if questions or concerns.     Follow-Up:  3/17 appointment with ANGELIKA Sarah. Also sent message to have labs scheduled as noted above.       Nathalia Mendez, PharmD, BCPS  Oral Chemotherapy Monitoring Program  [facility]  [phone]  March 10, 2022

## 2022-03-10 NOTE — PROGRESS NOTES
[facility]: Cancer Care Short Note                                    Discussion with Patient:                                                      Placed call to patient to review concerns voiced to triage and oral chemo pharmacy team. Patient states he feels fatigued and weakness has improved some from yesterday, however he does still feel weak. States this is not new since starting lenvatinib, but reports it may need slightly worse.     He also reports difficulty eating breakfast. Also, is not new. States he is able to eat well during the day but has trouble in am.         Assessment:                                                      Assessment completed with:: Patient    Constitutional  Fatigue: Fatigue relieved by rest    Gastrointestinal  Anorexia: Oral intake altered without significant weight loss or malnutrition OR oral nutritional supplements indicated    Musculoskeletal and Connective Tissue Disorders  Generalized Muscle Weakness: Symptomatic OR perceived by patient     Patient Coping: Accepting    Clinic Utilization  Patient Aware of Next Appointment?: Yes         Intervention/Education provided during outreach:                                                       Reviewed with patient recommendations for adding in supplements such as Boost or Ensure. Discussed this would be a good addition to morning, when unable to eat more. Reviewed increasing calorie intake and adding in more high fats foods, such has ice cream, butter, yogurt can help with weight gain. Offered nutrition referral. Patient not interested at this time.     Discussed weakness and fatigue are expected side effects with current therapy. Discussed referral to cancer rehab. Patient is not interested at this time.         Patient to follow up as scheduled at next apt      Sharlene Mendez RN, BSN, OCN   RN Care Coordinator   [facility]

## 2022-03-17 ENCOUNTER — VIRTUAL VISIT (OUTPATIENT)
Dept: ONCOLOGY | Facility: CLINIC | Age: 57
End: 2022-03-17
Attending: INTERNAL MEDICINE
Payer: COMMERCIAL

## 2022-03-17 ENCOUNTER — VIRTUAL VISIT (OUTPATIENT)
Dept: LAB | Facility: CLINIC | Age: 57
End: 2022-03-17
Attending: INTERNAL MEDICINE
Payer: COMMERCIAL

## 2022-03-17 DIAGNOSIS — C22.0 HCC (HEPATOCELLULAR CARCINOMA) (H): ICD-10-CM

## 2022-03-17 DIAGNOSIS — C22.0 HCC (HEPATOCELLULAR CARCINOMA) (H): Primary | ICD-10-CM

## 2022-03-17 LAB
ALBUMIN SERPL-MCNC: 2.9 G/DL (ref 3.4–5)
ALP SERPL-CCNC: 165 U/L (ref 40–150)
ALT SERPL W P-5'-P-CCNC: 63 U/L (ref 0–70)
ANION GAP SERPL CALCULATED.3IONS-SCNC: 7 MMOL/L (ref 3–14)
AST SERPL W P-5'-P-CCNC: 80 U/L (ref 0–45)
BILIRUB SERPL-MCNC: 0.5 MG/DL (ref 0.2–1.3)
BUN SERPL-MCNC: 11 MG/DL (ref 7–30)
CALCIUM SERPL-MCNC: 8.4 MG/DL (ref 8.5–10.1)
CHLORIDE BLD-SCNC: 110 MMOL/L (ref 94–109)
CO2 SERPL-SCNC: 24 MMOL/L (ref 20–32)
CREAT SERPL-MCNC: 0.55 MG/DL (ref 0.66–1.25)
CREAT UR-MCNC: 144 MG/DL
GFR SERPL CREATININE-BSD FRML MDRD: >90 ML/MIN/1.73M2
GLUCOSE BLD-MCNC: 99 MG/DL (ref 70–99)
MAGNESIUM SERPL-MCNC: 2.2 MG/DL (ref 1.6–2.3)
POTASSIUM BLD-SCNC: 3.9 MMOL/L (ref 3.4–5.3)
PROT SERPL-MCNC: 7.2 G/DL (ref 6.8–8.8)
PROT UR-MCNC: 0.2 G/L
PROT/CREAT 24H UR: 0.14 G/G CR (ref 0–0.2)
SODIUM SERPL-SCNC: 141 MMOL/L (ref 133–144)
TSH SERPL DL<=0.005 MIU/L-ACNC: 3.58 MU/L (ref 0.4–4)

## 2022-03-17 PROCEDURE — 84156 ASSAY OF PROTEIN URINE: CPT | Performed by: PATHOLOGY

## 2022-03-17 PROCEDURE — 84443 ASSAY THYROID STIM HORMONE: CPT | Performed by: PATHOLOGY

## 2022-03-17 PROCEDURE — 99214 OFFICE O/P EST MOD 30 MIN: CPT | Mod: TEL | Performed by: NURSE PRACTITIONER

## 2022-03-17 PROCEDURE — 80053 COMPREHEN METABOLIC PANEL: CPT | Performed by: PATHOLOGY

## 2022-03-17 PROCEDURE — 36415 COLL VENOUS BLD VENIPUNCTURE: CPT | Performed by: PATHOLOGY

## 2022-03-17 PROCEDURE — 83735 ASSAY OF MAGNESIUM: CPT | Performed by: PATHOLOGY

## 2022-03-17 NOTE — LETTER
3/17/2022         RE: Preeti Pascual  [address]  [place] [place] [address]        Dear Colleague,    Thank you for referring your patient, Preeti Pascual, to the [facility]. Please see a copy of my visit note below.    Reason for Visit: seen in f/u of metastatic HCC    Oncology HPI:   Preeti Pascual is a 55 year old man with a PMH of hep. B and cirrhosis. He was previously treated with radioembolization in 2016 with an excellent response. He declined a liver transplantation. 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 He was initiated Sorafenib, but tolerated it poorly and was dosed at 200 mg bid. He was found to have progression and initiated on Nivolumab 480 mg monthly on 11/1/18. Ipi/Nivo was started in May when he was found to have progression.  On 8/19/21, he was found to have progression and started on ramucirumab.     He was found to have acute elevation of transaminases on 9/9/21. Hep B was negative. 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 His LFTs improved and he has been on a prednisone taper.      He resumed ramucirumab on 9/30/21. He had stable disease on his last evaluation in February 2022.      He requested to switch to an oral agent as he needed to travel  to visit his mother.  He was switched to lenvatinib. He was started at the 8 mg.      Interval history: Preeti notes he has been more fatigued. Feeling weaker in general. Appetite is lower and he thinks he is losing weight. Denies nausea or vomiting. No abdominal pain. Bowels are improved in regularity. Has noted some vocal hoarseness. No reflux or heartburn. Has had occasional streaks of blood in the sputum. Previously had been having occasional nose bleeding, but that is getting better. No bruising, rash, edema.    Hopes to travel to [facility] at the end of April to visit his mother and hopes to stay for 2 months.     Current Outpatient Medications    Medication Sig Dispense Refill     ferrous gluconate (FERGON) 324 (38 Fe) MG tablet Take 1 tablet (324 mg) by mouth 2 times daily (with meals) (Patient not taking: Reported on 5/10/2021) 60 tablet 1     fluticasone (FLONASE) 50 MCG/ACT nasal spray USE 2 SPRAYS IN EACH NOSTRIL ONCE DAILY 16 g 1     Lenvatinib 8 MG, 2 x 4 mg capsules, (LENVIMA) 2 x 4 MG capsule Take 2 capsules (8 mg) by mouth daily 60 capsule 0     omeprazole (PRILOSEC) 20 MG DR capsule Take 1 capsule (20 mg) by mouth daily (Patient not taking: Reported on 12/24/2021) 30 capsule 3     predniSONE (DELTASONE) 20 MG tablet Start 80 mg po daily, take in AM with food. Will direct on a taper after reviewing labs on Thursday (Patient not taking: Reported on 10/11/2021) 100 tablet 0     traMADol (ULTRAM) 50 MG tablet Take 1 tablet (50 mg) by mouth every 6 hours as needed for severe pain (Patient not taking: Reported on 9/16/2021) 50 tablet 0     VENTOLIN  (90 Base) MCG/ACT inhaler 1-2 INHALATIONS EVERY 4-6 HOURS AS NEEDED FOR WHEEZING. DISPENSE SPACER AS NEEDED.  0        No Known Allergies      Alert, interactive on phone visit. Mild vocal hoarseness noted.    Labs:   Results for TOD RICKS (MRN [id number]) as of 3/17/2022 11:23   Ref. Range 3/17/2022 07:53 3/17/2022 07:57   Sodium Latest Ref Range: 133 - 144 mmol/L 141    Potassium Latest Ref Range: 3.4 - 5.3 mmol/L 3.9    Chloride Latest Ref Range: 94 - 109 mmol/L 110 (H)    Carbon Dioxide Latest Ref Range: 20 - 32 mmol/L 24    Urea Nitrogen Latest Ref Range: 7 - 30 mg/dL 11    Creatinine Latest Ref Range: 0.66 - 1.25 mg/dL 0.55 (L)    GFR Estimate Latest Ref Range: >60 mL/min/1.73m2 >90    Calcium Latest Ref Range: 8.5 - 10.1 mg/dL 8.4 (L)    Anion Gap Latest Ref Range: 3 - 14 mmol/L 7    Magnesium Latest Ref Range: 1.6 - 2.3 mg/dL 2.2    Albumin Latest Ref Range: 3.4 - 5.0 g/dL 2.9 (L)    Protein Total Latest Ref Range: 6.8 - 8.8 g/dL 7.2    Bilirubin Total Latest Ref Range: 0.2 - 1.3 mg/dL  0.5    Alkaline Phosphatase Latest Ref Range: 40 - 150 U/L 165 (H)    ALT Latest Ref Range: 0 - 70 U/L 63    AST Latest Ref Range: 0 - 45 U/L 80 (H)    Creatinine Urine Latest Units: mg/dL  144   Protein Random Urine Latest Units: g/L  0.20   Protein Total Urine g/gr Creatinine Latest Ref Range: 0.00 - 0.20 g/g Cr  0.14   TSH Latest Ref Range: 0.40 - 4.00 mU/L 3.58    Glucose Latest Ref Range: 70 - 99 mg/dL 99        Imaging: n/a    Impression/plan:     Metastatic HCC  -was stable on ramucirumab, but requested a switch to an oral agent so he could travel to [facility] [place] to visit his mother. Hopes to travel at the end of April  -initiated on lenvatinib 8 mg daily, has been on treatment for about 2 weeks.  -having moderate fatigue, weakness and decreased energy since starting treatment. Labs reviewed and stable  -discussed options to improve his strength/energy. He asked about steroid use, but I'd like to hold on that at this time. Recommended increasing nutrition, focusing on 2 nutrition shakes per day in addition to meals and high protein snacks  -will have a visit in 2 weeks to make sure he is not having further decline  -restaging and f/u with Dr. 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        Sincerely,        MITCH Jacobs CNP

## 2022-03-17 NOTE — PROGRESS NOTES
Preeti is a 56 year old who is being evaluated via a billable video visit.      If the video visit is dropped, the invitation should be resent by: Send to e-mail at: [email or web address]  Will anyone else be joining your video visit? No      Converted to a phone visit: duration of call 25 minutes

## 2022-03-17 NOTE — PROGRESS NOTES
Reason for Visit: seen in f/u of metastatic HCC    Oncology HPI:   Preeti Pascual is a 55 year old man with a PMH of hep. B and cirrhosis. He was previously treated with radioembolization in 2016 with an excellent response. He declined a liver transplantation. 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 He was initiated Sorafenib, but tolerated it poorly and was dosed at 200 mg bid. He was found to have progression and initiated on Nivolumab 480 mg monthly on 11/1/18. Ipi/Nivo was started in May when he was found to have progression.  On 8/19/21, he was found to have progression and started on ramucirumab.     He was found to have acute elevation of transaminases on 9/9/21. Hep B was negative. 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 His LFTs improved and he has been on a prednisone taper.      He resumed ramucirumab on 9/30/21. He had stable disease on his last evaluation in February 2022.      He requested to switch to an oral agent as he needed to travel  to visit his mother.  He was switched to lenvatinib. He was started at the 8 mg.      Interval history: Preeti notes he has been more fatigued. Feeling weaker in general. Appetite is lower and he thinks he is losing weight. Denies nausea or vomiting. No abdominal pain. Bowels are improved in regularity. Has noted some vocal hoarseness. No reflux or heartburn. Has had occasional streaks of blood in the sputum. Previously had been having occasional nose bleeding, but that is getting better. No bruising, rash, edema.    Hopes to travel to [facility] at the end of April to visit his mother and hopes to stay for 2 months.     Current Outpatient Medications   Medication Sig Dispense Refill     ferrous gluconate (FERGON) 324 (38 Fe) MG tablet Take 1 tablet (324 mg) by mouth 2 times daily (with meals) (Patient not taking: Reported on 5/10/2021) 60 tablet 1     fluticasone (FLONASE) 50 MCG/ACT nasal spray USE 2 SPRAYS IN EACH  NOSTRIL ONCE DAILY 16 g 1     Lenvatinib 8 MG, 2 x 4 mg capsules, (LENVIMA) 2 x 4 MG capsule Take 2 capsules (8 mg) by mouth daily 60 capsule 0     omeprazole (PRILOSEC) 20 MG DR capsule Take 1 capsule (20 mg) by mouth daily (Patient not taking: Reported on 12/24/2021) 30 capsule 3     predniSONE (DELTASONE) 20 MG tablet Start 80 mg po daily, take in AM with food. Will direct on a taper after reviewing labs on Thursday (Patient not taking: Reported on 10/11/2021) 100 tablet 0     traMADol (ULTRAM) 50 MG tablet Take 1 tablet (50 mg) by mouth every 6 hours as needed for severe pain (Patient not taking: Reported on 9/16/2021) 50 tablet 0     VENTOLIN  (90 Base) MCG/ACT inhaler 1-2 INHALATIONS EVERY 4-6 HOURS AS NEEDED FOR WHEEZING. DISPENSE SPACER AS NEEDED.  0        No Known Allergies      Alert, interactive on phone visit. Mild vocal hoarseness noted.    Labs:   Results for TOD RICKS (MRN [id number]) as of 3/17/2022 11:23   Ref. Range 3/17/2022 07:53 3/17/2022 07:57   Sodium Latest Ref Range: 133 - 144 mmol/L 141    Potassium Latest Ref Range: 3.4 - 5.3 mmol/L 3.9    Chloride Latest Ref Range: 94 - 109 mmol/L 110 (H)    Carbon Dioxide Latest Ref Range: 20 - 32 mmol/L 24    Urea Nitrogen Latest Ref Range: 7 - 30 mg/dL 11    Creatinine Latest Ref Range: 0.66 - 1.25 mg/dL 0.55 (L)    GFR Estimate Latest Ref Range: >60 mL/min/1.73m2 >90    Calcium Latest Ref Range: 8.5 - 10.1 mg/dL 8.4 (L)    Anion Gap Latest Ref Range: 3 - 14 mmol/L 7    Magnesium Latest Ref Range: 1.6 - 2.3 mg/dL 2.2    Albumin Latest Ref Range: 3.4 - 5.0 g/dL 2.9 (L)    Protein Total Latest Ref Range: 6.8 - 8.8 g/dL 7.2    Bilirubin Total Latest Ref Range: 0.2 - 1.3 mg/dL 0.5    Alkaline Phosphatase Latest Ref Range: 40 - 150 U/L 165 (H)    ALT Latest Ref Range: 0 - 70 U/L 63    AST Latest Ref Range: 0 - 45 U/L 80 (H)    Creatinine Urine Latest Units: mg/dL  144   Protein Random Urine Latest Units: g/L  0.20   Protein Total Urine g/gr  Creatinine Latest Ref Range: 0.00 - 0.20 g/g Cr  0.14   TSH Latest Ref Range: 0.40 - 4.00 mU/L 3.58    Glucose Latest Ref Range: 70 - 99 mg/dL 99        Imaging: n/a    Impression/plan:     Metastatic HCC  -was stable on ramucirumab, but requested a switch to an oral agent so he could travel to [facility] to visit his mother. Hopes to travel at the end of April  -initiated on lenvatinib 8 mg daily, has been on treatment for about 2 weeks.  -having moderate fatigue, weakness and decreased energy since starting treatment. Labs reviewed and stable  -discussed options to improve his strength/energy. He asked about steroid use, but I'd like to hold on that at this time. Recommended increasing nutrition, focusing on 2 nutrition shakes per day in addition to meals and high protein snacks  -will have a visit in 2 weeks to make sure he is not having further decline  -restaging and f/u with Dr. 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

## 2022-03-31 ENCOUNTER — APPOINTMENT (OUTPATIENT)
Dept: LAB | Facility: CLINIC | Age: 57
End: 2022-03-31
Attending: NURSE PRACTITIONER
Payer: COMMERCIAL

## 2022-03-31 ENCOUNTER — TELEPHONE (OUTPATIENT)
Dept: ONCOLOGY | Facility: CLINIC | Age: 57
End: 2022-03-31

## 2022-03-31 ENCOUNTER — VIRTUAL VISIT (OUTPATIENT)
Dept: ONCOLOGY | Facility: CLINIC | Age: 57
End: 2022-03-31
Attending: NURSE PRACTITIONER
Payer: COMMERCIAL

## 2022-03-31 VITALS
WEIGHT: 140.9 LBS | DIASTOLIC BLOOD PRESSURE: 78 MMHG | SYSTOLIC BLOOD PRESSURE: 149 MMHG | RESPIRATION RATE: 16 BRPM | HEART RATE: 74 BPM | OXYGEN SATURATION: 99 % | BODY MASS INDEX: 22.75 KG/M2 | TEMPERATURE: 98.3 F

## 2022-03-31 DIAGNOSIS — R63.0 ANOREXIA: Primary | ICD-10-CM

## 2022-03-31 DIAGNOSIS — C22.0 HCC (HEPATOCELLULAR CARCINOMA) (H): ICD-10-CM

## 2022-03-31 DIAGNOSIS — C22.0 HCC (HEPATOCELLULAR CARCINOMA) (H): Primary | ICD-10-CM

## 2022-03-31 LAB
ALBUMIN SERPL-MCNC: 2.7 G/DL (ref 3.4–5)
ALP SERPL-CCNC: 176 U/L (ref 40–150)
ALT SERPL W P-5'-P-CCNC: 70 U/L (ref 0–70)
ANION GAP SERPL CALCULATED.3IONS-SCNC: 8 MMOL/L (ref 3–14)
AST SERPL W P-5'-P-CCNC: 88 U/L (ref 0–45)
BILIRUB SERPL-MCNC: 0.6 MG/DL (ref 0.2–1.3)
BUN SERPL-MCNC: 10 MG/DL (ref 7–30)
CALCIUM SERPL-MCNC: 8.6 MG/DL (ref 8.5–10.1)
CHLORIDE BLD-SCNC: 108 MMOL/L (ref 94–109)
CO2 SERPL-SCNC: 25 MMOL/L (ref 20–32)
CREAT SERPL-MCNC: 0.61 MG/DL (ref 0.66–1.25)
CREAT UR-MCNC: 101 MG/DL
GFR SERPL CREATININE-BSD FRML MDRD: >90 ML/MIN/1.73M2
GLUCOSE BLD-MCNC: 96 MG/DL (ref 70–99)
MAGNESIUM SERPL-MCNC: 2.3 MG/DL (ref 1.6–2.3)
POTASSIUM BLD-SCNC: 4 MMOL/L (ref 3.4–5.3)
PROT SERPL-MCNC: 7.1 G/DL (ref 6.8–8.8)
PROT UR-MCNC: 0.16 G/L
PROT/CREAT 24H UR: 0.16 G/G CR (ref 0–0.2)
SODIUM SERPL-SCNC: 141 MMOL/L (ref 133–144)
TSH SERPL DL<=0.005 MIU/L-ACNC: 3.86 MU/L (ref 0.4–4)

## 2022-03-31 PROCEDURE — 83735 ASSAY OF MAGNESIUM: CPT | Mod: GT,95 | Performed by: NURSE PRACTITIONER

## 2022-03-31 PROCEDURE — 80053 COMPREHEN METABOLIC PANEL: CPT | Mod: GT,95 | Performed by: NURSE PRACTITIONER

## 2022-03-31 PROCEDURE — 36415 COLL VENOUS BLD VENIPUNCTURE: CPT | Mod: GT,95 | Performed by: NURSE PRACTITIONER

## 2022-03-31 PROCEDURE — 84443 ASSAY THYROID STIM HORMONE: CPT | Mod: GT,95 | Performed by: NURSE PRACTITIONER

## 2022-03-31 PROCEDURE — 99215 OFFICE O/P EST HI 40 MIN: CPT | Mod: TEL | Performed by: NURSE PRACTITIONER

## 2022-03-31 PROCEDURE — 84156 ASSAY OF PROTEIN URINE: CPT | Mod: GT,95 | Performed by: NURSE PRACTITIONER

## 2022-03-31 RX ORDER — DRONABINOL 2.5 MG/1
2.5 CAPSULE ORAL
Qty: 60 CAPSULE | Refills: 0 | Status: SHIPPED | OUTPATIENT
Start: 2022-03-31 | End: 2022-01-01

## 2022-03-31 ASSESSMENT — PAIN SCALES - GENERAL: PAINLEVEL: MODERATE PAIN (5)

## 2022-03-31 NOTE — ORAL ONC MGMT
Oral Chemotherapy Monitoring Program     Placed call to patient in follow up of oral chemotherapy. Preeti had questions about his lenvatinib refill. He has 3 days of medication left. Told him that his refill was sent to [facility] today, but that they would need to speak to him to set up the delivery. Provided phone number for [facility].  He had no further questions about this.     Nadia Mccray, PharmD, BCOP  Hematology/Oncology Clinical Pharmacist  [facility]  [facility]

## 2022-03-31 NOTE — PROGRESS NOTES
Preeti is a 56 year old who is being evaluated via a billable video visit.      How would you like to obtain your AVS? MyChart  If the video visit is dropped, the invitation should be resent by: Text to cell phone: [phone]  Will anyone else be joining your video visit? No      Danielle Antonio    Unable to connect on [employer] or [employer]  Phone call visit: 30 minutes duration    Reason for Visit: seen in f/u of metastatic HCC    Oncology HPI:   Preeti Pascual is a 55 year old man with a PMH of hep. B and cirrhosis. He was previously treated with radioembolization in 2016 with an excellent response. He declined a liver transplantation. 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 He was initiated Sorafenib, but tolerated it poorly and was dosed at 200 mg bid. He was found to have progression and initiated on Nivolumab 480 mg monthly on 11/1/18. Ipi/Nivo was started in May when he was found to have progression.  On 8/19/21, he was found to have progression and started on ramucirumab.     He was found to have acute elevation of transaminases on 9/9/21. Hep B was negative. 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 His LFTs improved and he has been on a prednisone taper.       He resumed ramucirumab on 9/30/21. He had stable disease on his last evaluation in February 2022.       He requested to switch to an oral agent as he needed to travel  to visit his mother.  He was switched to lenvatinib. He was started at the 8 mg.      Interval history:   Noting increased weakness since starting lenvatinib. Is going to work, but feels increased fatigue and weakness.   -has noted ongoing decreased appetite and weight loss. No N/V. No heartburn. Has been trying to eat protein shakes. Getting in 2 meals a day, but isn't always hungry for another meal. Had some generalized abdominal discomfort yesterday, but that is better. Bowels are regular. No mouth sores, rash, hand/foot tenderness.  -has  noted some nose bleeding on occasion, was present prior to starting lenvatinib.  -worried about what is happening with his cancer. Has questions about when we are checking his cancer, can he get 2 months of lenvatinib so he can travel internationally      Current Outpatient Medications   Medication Sig Dispense Refill     fluticasone (FLONASE) 50 MCG/ACT nasal spray USE 2 SPRAYS IN EACH NOSTRIL ONCE DAILY 16 g 1     Lenvatinib 8 MG, 2 x 4 mg capsules, (LENVIMA) 2 x 4 MG capsule Take 2 capsules (8 mg) by mouth daily 60 capsule 0     VENTOLIN  (90 Base) MCG/ACT inhaler 1-2 INHALATIONS EVERY 4-6 HOURS AS NEEDED FOR WHEEZING. DISPENSE SPACER AS NEEDED.  0        No Known Allergies    Phone exam: speech clear. Appears alert, attentive.    Blood pressure (!) 149/78, pulse 74, temperature 98.3  F (36.8  C), temperature source Oral, resp. rate 16, weight 63.9 kg (140 lb 14.4 oz), SpO2 99 %.  Wt Readings from Last 4 Encounters:   03/31/22 63.9 kg (140 lb 14.4 oz)   02/21/22 65.6 kg (144 lb 9.6 oz)   02/03/22 67 kg (147 lb 9.6 oz)   01/20/22 65.8 kg (145 lb 1.6 oz)       Labs:   Results for TOD RICKS (MRN [id number]) as of 3/31/2022 11:03   Ref. Range 3/31/2022 07:50 3/31/2022 07:55   Sodium Latest Ref Range: 133 - 144 mmol/L 141    Potassium Latest Ref Range: 3.4 - 5.3 mmol/L 4.0    Chloride Latest Ref Range: 94 - 109 mmol/L 108    Carbon Dioxide Latest Ref Range: 20 - 32 mmol/L 25    Urea Nitrogen Latest Ref Range: 7 - 30 mg/dL 10    Creatinine Latest Ref Range: 0.66 - 1.25 mg/dL 0.61 (L)    GFR Estimate Latest Ref Range: >60 mL/min/1.73m2 >90    Calcium Latest Ref Range: 8.5 - 10.1 mg/dL 8.6    Anion Gap Latest Ref Range: 3 - 14 mmol/L 8    Magnesium Latest Ref Range: 1.6 - 2.3 mg/dL 2.3    Albumin Latest Ref Range: 3.4 - 5.0 g/dL 2.7 (L)    Protein Total Latest Ref Range: 6.8 - 8.8 g/dL 7.1    Bilirubin Total Latest Ref Range: 0.2 - 1.3 mg/dL 0.6    Alkaline Phosphatase Latest Ref Range: 40 - 150 U/L 176 (H)     ALT Latest Ref Range: 0 - 70 U/L 70    AST Latest Ref Range: 0 - 45 U/L 88 (H)    Creatinine Urine Latest Units: mg/dL  101   Protein Random Urine Latest Units: g/L  0.16   Protein Total Urine g/gr Creatinine Latest Ref Range: 0.00 - 0.20 g/g Cr  0.16   TSH Latest Ref Range: 0.40 - 4.00 mU/L 3.86    Glucose Latest Ref Range: 70 - 99 mg/dL 96        Imaging: n/a    Impression/plan:   Metastatic HCC  -was stable on ramucirumab, but requested a switch to an oral agent so he could travel to [facility] to visit his mother. Now on lenvatinib, due for cycle 2  -having moderate side effects at the 8 mg dose.   -discussed strategies to increase appetite, stabilize weight loss--see below  -labs stable. Mild elevation of LFTs, but not requiring dose adjustment at this time.  -f/u with Dr. Kyle in 3 weeks with restaging scans. Will discuss his response and whether he is tolerating lenvatinib well enough to travel to [facility].    Anorexia/weight loss  -encouraged efforts to increase protein/calories, snacking  -discussed options for appetite stimulation. Will add marinol 2.5 mg bid. He is concerned about higher dosing interfering with his mentation.    40 minutes spent on the date of the encounter doing chart review, review of test results, interpretation of tests, patient visit and documentation

## 2022-03-31 NOTE — LETTER
3/31/2022         RE: Preeti Pascual  [address]  [facility] [address]        Dear Colleague,    Thank you for referring your patient, Preeti Pascual, to the [facility]. Please see a copy of my visit note below.    Preeti is a 56 year old who is being evaluated via a billable video visit.      How would you like to obtain your AVS? MyChart  If the video visit is dropped, the invitation should be resent by: Text to cell phone: [phone]  Will anyone else be joining your video visit? No      Danielle Antonio    Unable to connect on [employer] or [email or web address]  Phone call visit: 30 minutes duration    Reason for Visit: seen in f/u of metastatic HCC    Oncology HPI:   Preeti Pascual is a 55 year old man with a PMH of hep. B and cirrhosis. He was previously treated with radioembolization in 2016 with an excellent response. He declined a liver transplantation. 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 He was initiated Sorafenib, but tolerated it poorly and was dosed at 200 mg bid. He was found to have progression and initiated on Nivolumab 480 mg monthly on 11/1/18. Ipi/Nivo was started in May when he was found to have progression.  On 8/19/21, he was found to have progression and started on ramucirumab.     He was found to have acute elevation of transaminases on 9/9/21. Hep B was negative. 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 His LFTs improved and he has been on a prednisone taper.       He resumed ramucirumab on 9/30/21. He had stable disease on his last evaluation in February 2022.       He requested to switch to an oral agent as he needed to travel  to visit his mother.  He was switched to lenvatinib. He was started at the 8 mg.      Interval history:   Noting increased weakness since starting lenvatinib. Is going to work, but feels increased fatigue and weakness.   -has noted ongoing decreased appetite and weight loss. No N/V.  No heartburn. Has been trying to eat protein shakes. Getting in 2 meals a day, but isn't always hungry for another meal. Had some generalized abdominal discomfort yesterday, but that is better. Bowels are regular. No mouth sores, rash, hand/foot tenderness.  -has noted some nose bleeding on occasion, was present prior to starting lenvatinib.  -worried about what is happening with his cancer. Has questions about when we are checking his cancer, can he get 2 months of lenvatinib so he can travel internationally      Current Outpatient Medications   Medication Sig Dispense Refill     fluticasone (FLONASE) 50 MCG/ACT nasal spray USE 2 SPRAYS IN EACH NOSTRIL ONCE DAILY 16 g 1     Lenvatinib 8 MG, 2 x 4 mg capsules, (LENVIMA) 2 x 4 MG capsule Take 2 capsules (8 mg) by mouth daily 60 capsule 0     VENTOLIN  (90 Base) MCG/ACT inhaler 1-2 INHALATIONS EVERY 4-6 HOURS AS NEEDED FOR WHEEZING. DISPENSE SPACER AS NEEDED.  0        No Known Allergies    Phone exam: speech clear. Appears alert, attentive.    Blood pressure (!) 149/78, pulse 74, temperature 98.3  F (36.8  C), temperature source Oral, resp. rate 16, weight 63.9 kg (140 lb 14.4 oz), SpO2 99 %.  Wt Readings from Last 4 Encounters:   03/31/22 63.9 kg (140 lb 14.4 oz)   02/21/22 65.6 kg (144 lb 9.6 oz)   02/03/22 67 kg (147 lb 9.6 oz)   01/20/22 65.8 kg (145 lb 1.6 oz)       Labs:   Results for TOD RICKS (MRN [id number]) as of 3/31/2022 11:03   Ref. Range 3/31/2022 07:50 3/31/2022 07:55   Sodium Latest Ref Range: 133 - 144 mmol/L 141    Potassium Latest Ref Range: 3.4 - 5.3 mmol/L 4.0    Chloride Latest Ref Range: 94 - 109 mmol/L 108    Carbon Dioxide Latest Ref Range: 20 - 32 mmol/L 25    Urea Nitrogen Latest Ref Range: 7 - 30 mg/dL 10    Creatinine Latest Ref Range: 0.66 - 1.25 mg/dL 0.61 (L)    GFR Estimate Latest Ref Range: >60 mL/min/1.73m2 >90    Calcium Latest Ref Range: 8.5 - 10.1 mg/dL 8.6    Anion Gap Latest Ref Range: 3 - 14 mmol/L 8    Magnesium  Latest Ref Range: 1.6 - 2.3 mg/dL 2.3    Albumin Latest Ref Range: 3.4 - 5.0 g/dL 2.7 (L)    Protein Total Latest Ref Range: 6.8 - 8.8 g/dL 7.1    Bilirubin Total Latest Ref Range: 0.2 - 1.3 mg/dL 0.6    Alkaline Phosphatase Latest Ref Range: 40 - 150 U/L 176 (H)    ALT Latest Ref Range: 0 - 70 U/L 70    AST Latest Ref Range: 0 - 45 U/L 88 (H)    Creatinine Urine Latest Units: mg/dL  101   Protein Random Urine Latest Units: g/L  0.16   Protein Total Urine g/gr Creatinine Latest Ref Range: 0.00 - 0.20 g/g Cr  0.16   TSH Latest Ref Range: 0.40 - 4.00 mU/L 3.86    Glucose Latest Ref Range: 70 - 99 mg/dL 96        Imaging: n/a    Impression/plan:   Metastatic HCC  -was stable on ramucirumab, but requested a switch to an oral agent so he could travel to [place] to visit his mother. Now on lenvatinib, due for cycle 2  -having moderate side effects at the 8 mg dose.   -discussed strategies to increase appetite, stabilize weight loss--see below  -labs stable. Mild elevation of LFTs, but not requiring dose adjustment at this time.  -f/u with Dr. Kyle in 3 weeks with restaging scans. Will discuss his response and whether he is tolerating lenvatinib well enough to travel to [facility].    Anorexia/weight loss  -encouraged efforts to increase protein/calories, snacking  -discussed options for appetite stimulation. Will add marinol 2.5 mg bid. He is concerned about higher dosing interfering with his mentation.    40 minutes spent on the date of the encounter doing chart review, review of test results, interpretation of tests, patient visit and documentation           Again, thank you for allowing me to participate in the care of your patient.      Sincerely,    MITCH Jacobs CNP

## 2022-03-31 NOTE — NURSING NOTE
Chief Complaint   Patient presents with     Blood Draw     Labs drawn via  by rn in lab. VS taken.     Labs collected from venipuncture by RN. Vitals taken.     Trevor Yoder RN

## 2022-04-01 ENCOUNTER — DOCUMENTATION ONLY (OUTPATIENT)
Dept: ONCOLOGY | Facility: CLINIC | Age: 57
End: 2022-04-01
Payer: COMMERCIAL

## 2022-04-07 ENCOUNTER — TELEPHONE (OUTPATIENT)
Dept: ONCOLOGY | Facility: CLINIC | Age: 57
End: 2022-04-07
Payer: COMMERCIAL

## 2022-04-07 DIAGNOSIS — R52 PAIN: Primary | ICD-10-CM

## 2022-04-07 RX ORDER — ACETAMINOPHEN 500 MG
500-1000 TABLET ORAL EVERY 8 HOURS PRN
COMMUNITY
Start: 2022-04-07 | End: 2022-01-01 | Stop reason: ALTCHOICE

## 2022-04-07 NOTE — TELEPHONE ENCOUNTER
"Situation: [facility] Telephone Triage Note  Preeti (pt)reporting the following symptoms:  -sneezing/coughing  -blood streaked mucus  -stomach pain    Background:   Last provider visit on 3/31/22 withNallely Smiley  Assessment:     -Onset of coughing/sneezing started yesterday.  A few times a day    -For last few weeks, continues to be getting saliva with blood-streaked mucus.  Mucus is thick white and the blood-streaked in the mucus appears to be pink Blood is mixed in with sputum/Saliva, \"thin\"   Also waking up with nasal congestion/bloody nose.     Stomach/Abdomen pain for past few weeks  Rates 5/10  Pain moves around location, sometimes felt in lower (bellow belly button/hole), middle, left and right sides.  (difficulty explaining exact location).   Constant  Sometimes cramping sometimes sharp for 2-3minutes.   Not currently taking pain meds.   LBM 4/6/22      Denies fevers, chills, night sweats, headaches, dizziness, vision or hearing changes, chest pain, shortness of breath, nausea, vomiting, no changes to bowel or bladder, denies black tarry stools, denies bruising, blood in urine.     Pt is also wondering if okay to get a COVID booster vaccination before traveling?    Pt is hoping to travel on April 29th for 2 months to [facility] and wants to know from care team what medications to continue taking/preparing for travel?     Recommendations:   Instructed patient to seek care immediately for worsening symptoms, including: fever, chest pain, shortness of breath, dizziness, coughing up large amounts of blood, more than a few teaspoons, cyanosis, black tar-like stools, blood in urine.       Paged provider 0905 Nallely Smiley APRN    Follow-Up Plan:  0938 Per Nallely, Okay to get COVID booster before travel. Pt has Dr. Kyle appt on 4/25/22 and will discuss medications to take while traveling.   Continues to self-monitor blood streaked mucus  Tylenol OTC maximum of 3000mg/24hour period for abdominal pain. "     This writer called and relayed information to Pt. Asked pt to repeat back info/plan.  Pt was able to reverbalize understanding and wrote down instructions for OTC Tylenol.     This Writer spent over  25  minutes on triage call, education, follow-up coordination, and documentation.

## 2022-04-09 ENCOUNTER — NURSE TRIAGE (OUTPATIENT)
Dept: NURSING | Facility: CLINIC | Age: 57
End: 2022-04-09
Payer: COMMERCIAL

## 2022-04-09 NOTE — TELEPHONE ENCOUNTER
"Patient calling reporting increased symptoms of hoarse voice, ongoing cough, blood tinged sputum.    Reporting symptoms started \"several days\" ago.   New onset of hoarseness, sore throat.    Afebrile.      See telephone encounter from 4/7/22.     Reporting small amount of blood streaks in mucus, noticed x 1 yesterday.     Denies shortness of breath or chest pain. Reporting abdominal pain has resolved from 4/7/22.    Patient is requesting medication and questioning if it may be bronchitis?    Disposition per triage guidelines to see provider with in 24 hours.    Patient agrees to go to Urgent Care location today.    COVID 19 Nurse Triage Plan/Patient Instructions    Please be aware that novel coronavirus (COVID-19) may be circulating in the community. If you develop symptoms such as fever, cough, or SOB or if you have concerns about the presence of another infection including coronavirus (COVID-19), please contact your health care provider or visit [email or web address].     Disposition/Instructions    In-Person Visit with provider recommended. Reference Visit Selection Guide.    Thank you for taking steps to prevent the spread of this virus.  o Limit your contact with others.  o Wear a simple mask to cover your cough.  o Wash your hands well and often.    Resources    [facility]: About COVID-19: [email or web address]    CDC: What to Do If You're Sick: www.cdc.gov/coronavirus/2019-ncov/about/steps-when-sick.html    CDC: Ending Home Isolation: www.cdc.gov/coronavirus/2019-ncov/hcp/disposition-in-home-patients.html     CDC: Caring for Someone: www.cdc.gov/coronavirus/2019-ncov/if-you-are-sick/care-for-someone.html     [facility]: Interim Guidance for Hospital Discharge to Home: www.health.[facility].mn.us/diseases/coronavirus/hcp/hospdischarge.pdf    [facility] clinical trials (COVID-19 research studies): [email or web address].[email or web address]/umn-[email or web address]-trials     Below are the COVID-19 hotlines at the " "[place] Department of Health ([facility]). Interpreters are available.   o For health questions: Call 693-639-9830 or 1-773.190.1194 (7 a.m. to 7 p.m.)  o For questions about schools and childcare: Call 159-767-1572 or 1-180.838.8110 (7 a.m. to 7 p.m.)                         Reason for Disposition    Coughing up ana-colored sputum    Additional Information    Negative: Severe difficulty breathing (e.g., struggling for each breath, speaks in single words)    Negative: [1] Chest pain AND [2] difficulty breathing    Negative: Bluish (or gray) lips or face now    Negative: Passed out (i.e., lost consciousness, collapsed and was not responding)    Negative: Shock suspected (e.g., cold/pale/clammy skin, too weak to stand, low BP, rapid pulse)    Negative: Difficult to awaken or acting confused (e.g., disoriented, slurred speech)    Negative: Recent chest injury (i.e., past 24 hours)    Negative: [1] Coughed up blood AND [2] large amount (example: \"a cup of blood\")    Negative: Sounds like a life-threatening emergency to the triager    Negative: Difficulty breathing    Negative: Chest pain    Negative: Unclear to triager if the patient is coughing up blood or vomiting blood    Negative: History of prior \"blood clot\" in leg or lungs (i.e., deep vein thrombosis, pulmonary embolism)    Negative: History of inherited increased risk of blood clots (e.g., Factor 5 Leiden, Anti-thrombin 3, Protein C or Protein S deficiency, Prothrombin mutation)    Negative: Pregnant or pregnant in past month    Negative: Hip or leg fracture (broken bone) in past month (or had cast on leg or ankle in past month)    Negative: Prolonged travel within the last month  (e.g., long bus trip or plane trip)    Negative: Bedridden (e.g., nursing home patient, CVA, chronic illness, recovering from surgery)    Negative: Patient sounds very sick or weak to the triager    Negative: [1] Coughed up blood AND [2] > 1 tablespoon (15 ml) (Exception: blood-tinged " sputum)    Negative: Fever > 103 F (39.4 C)    Negative: [1] Fever > 101 F (38.3 C) AND [2] age > 60    Negative: [1] Fever > 100.0 F (37.8 C) AND [2] diabetes mellitus or weak immune system (e.g., HIV positive, cancer chemo, splenectomy, organ transplant, chronic steroids)    Negative: [1] Has underlying lung disease (e.g., COPD, chronic bronchitis or emphysema) AND    [2] sputum has turned yellow or green in color    Protocols used: COUGHING UP BLOOD-A-AH

## 2022-04-11 NOTE — TELEPHONE ENCOUNTER
Oncology Nurse Triage    Situation:   Preeti (pt) reporting the following symptoms:  -continued hoarse voice (onset 4/9/22)  -continues to have blood tinged mucus    Background:   Treating Provider:  Dr. Kyle    Date of last office visit: 3/31/22 Nallely MEYER    Recent Treatments: Lasy CYRAMZA on 2/3/22    Assessment:     Onset: continued from last week    Also still wakes up with dry blood in nose, denies any increase changes to blood in mucus.     Pt is mainly concerns with losing voice and unsure of reason for voice loss and wondering if has bronchitis?    Denies pain in mouth/throat.     Still swallowing solid foods/liquids okay.     Weekend triage advised to go to Urgent Care, Pt states, did not go to urgent care over the weekend as wanted to wait for weekday if can be seen by oncologist.     Denies fevers, chills, headaches, dizziness, chest pain, shortness of breath/ nausea, vomiting, changes to bowel or bladder, denies swelling    Best pharmacy [facility] [place]    Pt is currently at work.     This writer instructed patient to seek care immediately for worsening symptoms, including: fever, chest pain, shortness of breath, dizziness, trouble swallowing.       Recommendations:     1511 Paged provider  Nallely MEYER    Follow-Up Plan:  1546 Per janusz Browning to offer in-person appt tomorrow 4/12/22.     1550 Scheduling request sent to call pt to see if pt can make in-person visit tomorrow Tuesday5/12.

## 2022-04-14 ENCOUNTER — TELEPHONE (OUTPATIENT)
Dept: ONCOLOGY | Facility: CLINIC | Age: 57
End: 2022-04-14
Payer: COMMERCIAL

## 2022-04-14 NOTE — TELEPHONE ENCOUNTER
Call transferred by Clinic Coordinator  Diana is requesting a sooner apt with Dr. Kyle than 4/25.  Per Clinic coordinator, there are no sooner apts with Dr. Kyle.    Took pt's call, but was unable to hear most of what was said, d/t background noise.  Pt did want a sooner apt d/t his voice not being okay and wanting medicine from Dr. Kyle.  Pt did admit that he was in the car with multiple people who were all speaking.    Advised pt to call back Triage when he was home and in a quiet room so we could have a conversation about his concerns.    Routed to Dr. Kyle and Avtar Mendez as FYI    Per Avtar, Pt can be offered apt with either Sendy Yun or Rut Corrales tomorrow.   Times available now include:   3:30 with Rut  4:15 with Sendy  4:30 with Rut.    Pt needs to be seen in person and evaluated before any medication can be prescribed.    Call made to Preeti at 1617: LVM asking him to call back to discuss apt options for tomorrow. No apt available with Dr. Kyle before 4/25; pt needs to be seen in person.

## 2022-04-21 NOTE — NURSING NOTE
Chief Complaint   Patient presents with     Blood Draw     Labs drawn via piv by rn in lab. VS taken.     Labs drawn from PIV placed by RN. Line flushed with saline. Vitals taken.    Trevor Yoder RN

## 2022-04-21 NOTE — ORAL ONC MGMT
Oral Chemotherapy Monitoring Program    Subjective/Objective:  Preeti Pascual is a 56 year old male contacted by phone for a follow-up visit for oral chemotherapy with Lenvima.  Patient takes 2 tablets every morning, & never misses a dose.  Reviewed labs with patient.  He was anxious about CT scan results.  Read note to patient, stating no changes were seen.  He was disappointed.    Patient is very distressed with how uncomfortable he feels.  He continues to describe sharp abdominal pain, extreme fatigue, tiredness, and weight loss.  Prior to starting Lenvima, patient weighed 144 lb.  He now weights 137 lb, a 5% drop in approximately 6 weeks.   Albumin continues to drop with muscle wasting.      Labs: Liver enzymes (Alk phos, AST) are improving but still elevated.  Blood counts WBC, Plt, and ANC all dropped, except for Hgb which improved.  TSH is elevated, so will monitor future labs.  If TSH > 10, will recommend starting thyroid supplementation.    As for other side effects, BP remains elevated at 161/81.  Prior to decreasing Lenvima, should consider starting antihypertensive.  No c/o diarrhea but rather has constipation, which he refuses to take medications for.  Recommended increasing water intake (says he drinks 2-3 bottles of water daily) and move more throughout the day to get his bowels moving.  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  No c/o nausea/vomiting.  Takes Marinol every morning to help with appetite but he doesn't have a decrease in appetite; he thinks he eats the same amount as before starting the Lenvima.  I recommended drinking protein shakes but he already tried that & it didn't seem to help.  However, he still tries to drink one Ensure shake daily.  Lastly, he c/o dry mouth when he wakes in the morning, but no sores or irritation; still has blood-tinged sputum.       ORAL CHEMOTHERAPY 2/28/2022 3/2/2022 3/9/2022 3/10/2022 3/31/2022 4/1/2022 4/21/2022    Assessment Type Other New Teach Incoming phone call Initial Follow up Refill Chart Review Chart Review   Diagnosis Code Hepatocellular Cancer Hepatocellular Cancer Hepatocellular Cancer Hepatocellular Cancer Hepatocellular Cancer Hepatocellular Cancer Hepatocellular Cancer   Providers Dr. Anabella Kyle   [facility]   Drug Name Lenvima (lenvatinib) Lenvima (lenvatinib) Lenvima (lenvatinib) Lenvima (lenvatinib) Lenvima (lenvatinib) Lenvima (lenvatinib) Lenvima (lenvatinib)   Dose - 8 mg 8 mg 8 mg 8 mg 8 mg 8 mg   Current Schedule - Daily Daily Daily Daily Daily Daily   Cycle Details - Continuous Continuous Continuous Continuous Continuous Continuous   Start Date of Last Cycle - - - 3/5/2022 - - -   Doses missed in last 2 weeks - - - - - - 0   Adherence Assessment - - - - - - Adherent   Reason for Non-adherence - - - - - - -   Adherence Intervention Recommended - - - - - - -   Adverse Effects - - Fatigue;Other (See Note for Details) Fatigue - - Constipation;Decreased Appetite/Weight Loss;Anemia;Fatigue;Neutropenia;Increased AST/ALT/T BILI;Thrombocytopenia;Hypertension;Other (See Note for Details)   Constipation - - - - - - Grade 1   Pharmacist Intervention(constipation) - - - - - - Yes   Intervention(s) - - - - - - Patient education   Diarrhea - - - - - - -   Pharmacist Intervention(diarrhea) - - - - - - -   Decrease Appetite/Weight Loss - - - - - - Grade 1   Pharmacist Intervention(decreased appetite/weight loss) - - - - - - Yes   Intervention(s) - - - - - - Patient education;OTC recommendation   Anemia - - - - - - Grade 1   Pharmacist Intervention(anemia) - - - - - - No   Fatigue - - (No Data) (No Data) - - Grade 3   Pharmacist Intervention(fatigue) - - - - - - Yes   Intervention(s) - - - - - - Dose decreased (chemo)   Neutropenia - - - - - - Grade 2   Pharmacist Intervention(neutropenia) - -  "- - - - No   Increased AST/ALT/T BILI - - - - - - Grade 1   Pharmacist Intervention(increased ast/alt/t bili) - - - - - - No   Thrombocytopenia - - - - - - Grade 1   Pharmacist Intervention(thrombocytopenia) - - - - - - No   Hypertension - - - - - - Grade 3   Pharmacist intervention(hypertension) - - - - - - Yes   Intervention(s) - - - - - - Rx medication recommendation;Patient education   Other (See Note for Details) - - - - - - Dry mouth   Pharmacist intervention(other) - - - - - - Yes   Intervention(s) - - - - - - Patient education   Home BPs - - - (No Data) - - some BPs 140//100   Any new drug interactions? - Yes - No - - No   Pharmacist Intervention? - Yes - - - - -   Intervention(s) - Patient Education - - - - -   Is the dose as ordered appropriate for the patient? - - - - - - No   Pharmacist intervention for dose adjustment? - - - - - - Yes   Intervention(s) - - - - - - Dose decreae (chemo) recommended   Is the patient currently in pain? - - - - - - Yes   Does the patient feel the pain is currently being managed by a provider? - - - - - - Yes   Has the patient been assessed within the past 6 months for depression? - - - - - - No   Has the patient missed any days of school, work, or other routine activity? - - - - - - No   Since the last time we talked, have you been hospitalized or used the emergency room? - - - - - - No     Vitals:  BP:   BP Readings from Last 1 Encounters:   04/21/22 (!) 161/81     Wt Readings from Last 1 Encounters:   04/21/22 62.2 kg (137 lb 1.6 oz)     Estimated body surface area is 1.7 meters squared as calculated from the following:    Height as of 10/7/21: 1.676 m (5' 5.98\").    Weight as of an earlier encounter on 4/21/22: 62.2 kg (137 lb 1.6 oz).    Labs:  _  Result Component Current Result Ref Range   Sodium 142 (4/21/2022) 133 - 144 mmol/L     _  Result Component Current Result Ref Range   Potassium 3.9 (4/21/2022) 3.4 - 5.3 mmol/L     _  Result Component Current Result Ref " Range   Calcium 9.1 (4/21/2022) 8.5 - 10.1 mg/dL     _  Result Component Current Result Ref Range   Magnesium 1.9 (4/21/2022) 1.6 - 2.3 mg/dL     No results found for Phos within last 30 days.     _  Result Component Current Result Ref Range   Albumin 2.6 (L) (4/21/2022) 3.4 - 5.0 g/dL     _  Result Component Current Result Ref Range   Urea Nitrogen 11 (4/21/2022) 7 - 30 mg/dL     _  Result Component Current Result Ref Range   Creatinine 0.62 (L) (4/21/2022) 0.66 - 1.25 mg/dL     _  Result Component Current Result Ref Range   AST 69 (H) (4/21/2022) 0 - 45 U/L     _  Result Component Current Result Ref Range   ALT 56 (4/21/2022) 0 - 70 U/L     _  Result Component Current Result Ref Range   Bilirubin Total 0.6 (4/21/2022) 0.2 - 1.3 mg/dL     _  Result Component Current Result Ref Range   WBC Count 3.3 (L) (4/21/2022) 4.0 - 11.0 10e3/uL     _  Result Component Current Result Ref Range   Hemoglobin 11.0 (L) (4/21/2022) 13.3 - 17.7 g/dL     _  Result Component Current Result Ref Range   Platelet Count 106 (L) (4/21/2022) 150 - 450 10e3/uL     _  Result Component Current Result Ref Range   Absolute Neutrophils 1.0 (L) (4/21/2022) 1.6 - 8.3 10e3/uL       Assessment/Plan:  Patient feels uncomfortable all the time and it would be appropriate to decrease dose based on hypertension, intolerable fatigue, and decreased blood counts.  Per UpToDate, first dose reduction from 8mg once daily to 4mg once daily.  Discussed this with patient and he requested this be done prior to his trip next week.  Told patient to continue current regimen of 8mg once daily until someone reaches out to him from clinic.  Pt expressed understanding.  Will send IB to Dr Kyle recommending dose reduction.       Follow-Up:  Appointment with Dr Kyle next week 4/25, then is traveling to [facility] to see his sick mom for 2 months on 4/28/22.      Refill Due:  Will need 2 month supply by next Wednesday 4/27.  He has 20 tablets left.      Felicia Whitt,  PharmD  Oral Chemotherapy Monitoring Program  [facility]  [phone]

## 2022-04-25 NOTE — NURSING NOTE
"Oncology Rooming Note    April 25, 2022 5:08 PM   Preeti Pascual is a 56 year old male who presents for:    Chief Complaint   Patient presents with     Oncology Clinic Visit     HCC     Initial Vitals: BP (!) 171/97 (BP Location: Right arm, Patient Position: Sitting)   Pulse 74   Temp 98.3  F (36.8  C) (Oral)   Wt 61.2 kg (135 lb)   SpO2 100%   BMI 21.80 kg/m   Estimated body mass index is 21.8 kg/m  as calculated from the following:    Height as of 10/7/21: 1.676 m (5' 5.98\").    Weight as of this encounter: 61.2 kg (135 lb). Body surface area is 1.69 meters squared.  Severe Pain (7) Comment: Data Unavailable   No LMP for male patient.  Allergies reviewed: Yes  Medications reviewed: Yes    Medications: Medication refills not needed today.  Pharmacy name entered into [employer]:    [id number] - [place], [place] - [address] AT [facility] & [address] [place] [address] PHARMACY [place], [place] - [address] [facility] [phone]  [facility] - [place], [place] - [address]     Clinical concerns: visiting out of country, will need meds for 8 weeks    Unintended weight loss         Sanjeev Scales            "

## 2022-04-25 NOTE — PROGRESS NOTES
Preeti Pascual is here today in follow-up of hepatocellular carcinoma.    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  He had a poorly tolerated course of nivolumab at that time and progressed on a dose of 200 mg twice per day.  He started nivolumab 11/2018 with initial control and then progression and subsequent escalation to ipilimumab/nivolumab in May 2021.  In 8/2021 he had further progression and was switched to Ramucirumab, but initiation was delayed due to severe hepatitis thought due to his prior immunotherapy so he did not get a full 2 months of sustained therapy till later in the year and subsequently had radiographically stable disease with an improvement in his alpha-fetoprotein.  We recommended continued Ramucirumab,, but the patient insisted on a switch to oral therapy in anticipation of the need to go back to [facility],, and he now returns to assess his response on the Lenvima.  He tells me he continues to slowly lose weight and strength.  He has headaches briefly after taking the lenvatinib.  He is having very minor nosebleeds pretty much every night.  But does not note any other mucocutaneous toxicity.  He has noted no new adenopathy but does note some discomfort in his right groin at the site of his old arterial puncture site.    On physical exam he appears relatively well with vital signs showing an elevated blood pressure.  He has no icterus.  He has no palpable adenopathy  His lungs are clear.  His abdomen is soft and nontender without palpable mass, organomegaly or demonstrable ascites.  He has 1+ pitting edema at the ankles bilaterally with no tenderness in the calves or thighs.  His speech is fluent and his cranial nerves are grossly intact.    I personally  reviewed his scan and reviewed it with him. His liver lesions, adrenal mets and enlarged nodes are all stable.  His AFP is back up to 1200-where it was when we started ramucirumab.  Electrolytes, renal function, liver enzymes and bilirubin are normal, but albumin is worse at 2.6.  He has stable pancytopenia.  TSH is marginally elevated with a normal T4    A/P: Metastatic hepatocellular carcinoma in a patient with compensated chronic liver disease related to hepatitis B.  His overall clinical condition is declining and there is not clear radiographic evidence of progression of his cancer but his alpha-fetoprotein is rising.  I think some of his clinical decline might be related to his current therapy (certainly the hypertension and some component of the fatigue,) but also has been contributed to by his chronic liver disease and perhaps progression of his cancer.  I think the best option for him would be to go back to [facility], but he is planning on leaving next Friday to go back to [facility] for 2 months and this would not be feasible.  After extensive discussion he was not willing to reconsider his travel plans and the only feasible option would be to continue the lenvatinib but we will cut his dose in half to try and minimize toxicity.  Assuming he survives his trip we will plan on seeing him back for further evaluation within a couple days of his return.    Total time by me on the date of service inclusive of the above visit with an extended discussion, review of prior records, ordering and documentation was 75 minutes.

## 2022-04-25 NOTE — LETTER
4/25/2022         RE: Preeti Pascual  [address]  [facility] [address]        Preeti Pascual is here today in follow-up of hepatocellular carcinoma.    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  He had a poorly tolerated course of nivolumab at that time and progressed on a dose of 200 mg twice per day.  He started nivolumab 11/2018 with initial control and then progression and subsequent escalation to ipilimumab/nivolumab in May 2021.  In 8/2021 he had further progression and was switched to Ramucirumab, but initiation was delayed due to severe hepatitis thought due to his prior immunotherapy so he did not get a full 2 months of sustained therapy till later in the year and subsequently had radiographically stable disease with an improvement in his alpha-fetoprotein.  We recommended continued Ramucirumab,, but the patient insisted on a switch to oral therapy in anticipation of the need to go back to \[facility]",, and he now returns to assess his response on the Lenvima.  He tells me he continues to slowly lose weight and strength.  He has headaches briefly after taking the lenvatinib.  He is having very minor nosebleeds pretty much every night.  But does not note any other mucocutaneous toxicity.  He has noted no new adenopathy but does note some discomfort in his right groin at the site of his old arterial puncture site.    On physical exam he appears relatively well with vital signs showing an elevated blood pressure.  He has no icterus.  He has no palpable adenopathy  His lungs are clear.  His abdomen is soft and nontender without palpable mass, organomegaly or demonstrable ascites.  He has 1+ pitting edema at the ankles bilaterally with no tenderness in the calves  or thighs.  His speech is fluent and his cranial nerves are grossly intact.    I personally reviewed his scan and reviewed it with him. His liver lesions, adrenal mets and enlarged nodes are all stable.  His AFP is back up to 1200-where it was when we started ramucirumab.  Electrolytes, renal function, liver enzymes and bilirubin are normal, but albumin is worse at 2.6.  He has stable pancytopenia.  TSH is marginally elevated with a normal T4    A/P: Metastatic hepatocellular carcinoma in a patient with compensated chronic liver disease related to hepatitis B.  His overall clinical condition is declining and there is not clear radiographic evidence of progression of his cancer but his alpha-fetoprotein is rising.  I think some of his clinical decline might be related to his current therapy (certainly the hypertension and some component of the fatigue,) but also has been contributed to by his chronic liver disease and perhaps progression of his cancer.  I think the best option for him would be to go back to [facility], but he is planning on leaving next Friday to go back to \[facility]\"" for 2 months and this would not be feasible.  After extensive discussion he was not willing to reconsider his travel plans and the only feasible option would be to continue the lenvatinib but we will cut his dose in half to try and minimize toxicity.  Assuming he survives his trip we will plan on seeing him back for further evaluation within a couple days of his return.    Total time by me on the date of service inclusive of the above visit with an extended discussion, review of prior records, ordering and documentation was 75 minutes.        Agustin Kyle MD

## 2022-05-04 NOTE — ORAL ONC MGMT
Oral Chemotherapy Monitoring Program     Placed call to Preeti Pascual in follow up of Lenvima oral chemotherapy. This was to assess after dose reduction.   Left a message requesting a call back. No drug names were mentioned in the voicemail. Will update when response is received.    Of note- patient was planning trip to [facility] leaving 4/28/22 for 2 month. Unsure if he will be reachable during this time.    Yoko Zavala, PharmD  Hematology/Oncology Clinical Pharmacist  Oral Chemotherapy Monitoring Program  [facility]  [phone]  May 4, 2022

## 2022-06-27 NOTE — NURSING NOTE
Chief Complaint   Patient presents with     Blood Draw     IV placement with blood draw. Vitals taken and appointment arrived     Sendy Navarro RN

## 2022-06-27 NOTE — LETTER
6/27/2022         RE: Preeti Pascual  [address]  [facility] [address]          I am seeing Preeti Pascual today in follow-up of hepatocellular carcinoma.    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  He had initial therapy with nivolumab which he could tolerate only 200 mg/day and progressed.  Subsequent treatment with nivolumab provided.  Of control and then progression with subsequent escalation to ipilimumab/nivolumab in May 2021.  He had control through 8/21.  Upon progression he was switched to Ramucirumab Mab with treatment delayed for couple months due to severe hepatitis thought to be immune mediated due to his prior therapy.  He eventually had improvement in his alpha-fetoprotein and radiographically stable disease on Ramucirumab but a couple months ago he insisted on a switch to oral therapy so that he could travel back to [facility] and so started on  Lenvatinib several months ago.  2 months ago, just for leaving for [facility] his clinical condition declined perhaps due to toxicity without evidence of disease progression and so he embarked on his trip on a reduced dose of lenvatinib.   He now returns from [facility] for follow-up assessment of his disease status.  He tells me his trip went well and is very happy he went.  His mother's health is improved so he felt more comfortable coming back to this country.  Overall his appetite and energy are better on the reduced dose of treatment.  He does not have any significant pain at present his overall energy and exercise tolerance remain reduced, but he is able to carry on with his daily activities of life.  His only new complaint is he feels like his voice is changed a little bit.  He has no  respiratory symptoms or cough.    On exam he is alert and well-appearing his vitals are notable for mildly elevated blood pressure of 160/80.  He has no icterus.  He has no adenopathy.  He has no peripheral edema.    I personally reviewed his CT scan and went over the results with him.  His adrenal and murtaza metastases are stable.  There may be a slight increase of some areas of arterial enhancement adjacent to his previously treated liver lesions but I think the differences are related to technical issues on the timing of his contrast bolus.  His alpha-fetoprotein is stable at 1200.  His electrolytes and renal function are normal.  His albumin is depressed but improved at 3.2.  His bilirubin and liver enzymes are normal.  He has improved anemia with a hemoglobin of 12.8 and a normal white count and platelet count.    Assessment/plan: Metastatic hepatocellular carcinoma currently on reduced dose lenvatinib with stable disease and manageable toxicity.  He has hypertension related to treatment is not enough to intervene at this point.  His overall performance status and liver function are good enough to warrant continued therapy and we will reevaluate his disease status again in another 3 months on same reduced dose.            Agustin Kyle MD

## 2022-06-27 NOTE — NURSING NOTE
"Oncology Rooming Note    June 27, 2022 2:14 PM   Preeti Pascual is a 56 year old male who presents for:    Chief Complaint   Patient presents with     Blood Draw     IV placement with blood draw. Vitals taken and appointment arrived     Oncology Clinic Visit     HCC     Initial Vitals: BP (!) 161/83   Pulse 72   Temp 98.2  F (36.8  C) (Oral)   Resp 16   Wt 61 kg (134 lb 8 oz)   SpO2 100%   BMI 21.72 kg/m   Estimated body mass index is 21.72 kg/m  as calculated from the following:    Height as of 10/7/21: 1.676 m (5' 5.98\").    Weight as of this encounter: 61 kg (134 lb 8 oz). Body surface area is 1.69 meters squared.  No Pain (0) Comment: Data Unavailable   No LMP for male patient.  Allergies reviewed: Yes  Medications reviewed: Yes    Medications: Medication refills not needed today.  Pharmacy name entered into [employer]:    [id number] - [place], [place] - [address] [place] [address] NE AT [facility] & [address] [place] [address] PHARMACY [place], [place] - [address] [facility] [phone]  [facility] - [place], [place] - [address] [place] [address] [facility]    Clinical concerns: none       Patricia Bowden CMA            "

## 2022-06-27 NOTE — PROGRESS NOTES
I am seeing Preeti Pascual today in follow-up of hepatocellular carcinoma.    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  He had initial therapy with sorafenib of which he could tolerate only 200 mg/day and progressed.  Subsequent treatment with nivolumab provided a period of control and then progression with subsequent escalation to ipilimumab/nivolumab in May 2021.  He had control through 8/21.  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  He eventually had improvement in his alpha-fetoprotein and radiographically stable disease on Ramucirumab but a couple months ago he insisted on a switch to oral therapy so that he could travel back to [facility] and so started on  Lenvatinib several months ago.  2 months ago, just before leaving for [facility] his clinical condition declined perhaps due to toxicity without evidence of disease progression and so he embarked on his trip on a reduced dose of lenvatinib.   He now returns from [facility] for follow-up assessment of his disease status.  He tells me his trip went well and is very happy he went.  His mother's health is improved so he felt more comfortable coming back to this country.  Overall his appetite and energy are better on the reduced dose of treatment.  He does not have any significant pain at present his overall energy and exercise tolerance remain reduced, but he is able to carry on with his daily activities of life.  His only new complaint is he feels like his voice is changed a little bit.  He has no respiratory symptoms or cough.    On exam he is alert and well-appearing his vitals are  notable for mildly elevated blood pressure of 160/80.  He has no icterus.  He has no adenopathy.  He has no peripheral edema.    I personally reviewed his CT scan and went over the results with him.  His adrenal and murtaza metastases are stable.  There may be a slight increase of some areas of arterial enhancement adjacent to his previously treated liver lesions but I think the differences are related to technical issues on the timing of his contrast bolus.  His alpha-fetoprotein is stable at 1200.  His electrolytes and renal function are normal.  His albumin is depressed but improved at 3.2.  His bilirubin and liver enzymes are normal.  He has improved anemia with a hemoglobin of 12.8 and a normal white count and platelet count.    Assessment/plan: Metastatic hepatocellular carcinoma currently on reduced dose lenvatinib with stable disease and manageable toxicity.  He has hypertension related to treatment is not enough to intervene at this point.  His overall performance status and liver function are good enough to warrant continued therapy and we will reevaluate his disease status again in another 3 months on same reduced dose.

## 2022-06-27 NOTE — PROGRESS NOTES
"[facility]: Cancer Care                                                                                          Met with patient after his visit with Dr Kyle. Patient states he forgot to ask Dr Kyle about what could be done for his hoarse voice. Patient states he has trouble talking at work. He is also concerned about receiving next shipment of oral chemo.     Reviewed patient can use OTC cough drops to help with voice. Reviewed there is not a prescription medication able to fix hoarseness. Discussed we could refer patient to ENT. Patient insists he needs \"Dr Kyle to prescribe something to fix it.\" Again reviewed recommendations and RNCC would discuss with Dr Kyle and return call to patient. Also assured patient RNCC would follow-up with oral chemo team regarding next rx.     Reviewed with Dr Kyle, Agreed patient could try cough drops and antihistamine - Zyrtec or Claritin. No other recommendations at this time. Agreed with ENT referral if continues to be bothersome to patient.     Placed return call to patient. Review recommendations and again assured patient RNCC had followed up with oral chemo pharmacy team.      Sharlene Mendez RN, BSN, OCN   RN Care Coordinator   [facility]    "

## 2022-06-28 NOTE — TELEPHONE ENCOUNTER
Oncology Nurse Triage    Situation:   Juanitakevine (pt)reporting the following symptoms:  -increase cough from yesterday    Background:   Treating Provider:  Dr. Kyle    Date of last office visit: 6/27/22    Recent Treatments:CYRAMZA 2/3/22  On Levatinib     Is COVID vaccinated total of 2 shots.   Denies recent COVID exposure  Pt was out of the country and returned Tuesday 6/21/22.     Assessment:     Onset: Friday 6/24/22    Dry cough    Appetite food and fluid intake is good.     Denies fevers, chills, night sweats, headaches, dizziness, chest pain, shortness of breath, abdominal pain, nausea, vomiting, changes to bowel or bladder.      Instructed patient to seek care immediately for worsening symptoms, including: fever, chest pain, shortness of breath, dizziness.      Recommendations:   This writer educated on okay to try OTC antitussives for cough.   Drink plenty of fluids 1-2L to help thin out secretions (unless underlying cardiac/kidney issues)  Avoid precipitating factors that worsen cough (for example perfumes, tobacco smoke, cold/dry air)  Consider use of warm humidifier  Continue to self monitor for any sign of infection/fever.   In addition, pt to following up with PCP or Urgent Care.    Pt states does not have Primary Care provider as has not felt needed one.   Pt was hoping oncology/Dr. Kyle might be willing to advise.   Pt wondering if any OTC's better or should avoid that may effect oncology diagnosis of Hepatocellular Carcinoma.     Routed to Care Team.     1416 Per care team  RNCC Avtar talked with pt yesterday about using an antihistamine (claritin/zytrec) and cough drops to help the voice. Same would be true for the cough. If things worsen pt should call back to be evaluated.     This writer attempted to call pt unable to leave a VM, will send response in [employer].

## 2022-06-29 NOTE — TELEPHONE ENCOUNTER
Patient is calling and is having a cough.  Cough started last Friday June 24th.  FNA relayed message from Laura Casanova RN.  Patient Denies fever cough and shortness of breath.  Patient denies chest pain and denies fainting.  Denies coughing up blood.  Denies wheezing.  Denies COPD.  Cough is not continuous.   Patient states that he will try over the counter medicine and will phone back if cough does not get better.  Patient denies that cough is continuous.      COVID 19 Nurse Triage Plan/Patient Instructions    Please be aware that novel coronavirus (COVID-19) may be circulating in the community. If you develop symptoms such as fever, cough, or SOB or if you have concerns about the presence of another infection including coronavirus (COVID-19), please contact your health care provider or visit [email or web address].     Disposition/Instructions    Home care recommended. Follow home care protocol based instructions.    Thank you for taking steps to prevent the spread of this virus.  o Limit your contact with others.  o Wear a simple mask to cover your cough.  o Wash your hands well and often.    Resources    [facility]: About COVID-19: [email or web address]    CDC: What to Do If You're Sick: www.cdc.gov/coronavirus/2019-ncov/about/steps-when-sick.html    CDC: Ending Home Isolation: www.cdc.gov/coronavirus/2019-ncov/hcp/disposition-in-home-patients.html     CDC: Caring for Someone: www.cdc.gov/coronavirus/2019-ncov/if-you-are-sick/care-for-someone.html     [facility]: Interim Guidance for Hospital Discharge to Home: www.health.[facility].[place]/diseases/coronavirus/hcp/hospdischarge.pdf    [facility] clinical trials (COVID-19 research studies): clinicalaffairs.[facility]/um-clinical-trials     Below are the COVID-19 hotlines at the [place] Department of Health ([facility]). Interpreters are available.   o For health questions: Call 327-309-9928 or 1-456.285.9486 (7 a.m. to 7 p.m.)  o For  questions about schools and childcare: Call 240-486-6830 or 1-291.354.6345 (7 a.m. to 7 p.m.)                              Reason for Disposition    Cough with no complications    Additional Information    Negative: Bluish (or gray) lips or face    Negative: Severe difficulty breathing (e.g., struggling for each breath, speaks in single words)    Negative: Rapid onset of cough and has hives    Negative: Coughing started suddenly after medicine, an allergic food or bee sting    Negative: Difficulty breathing after exposure to flames, smoke, or fumes    Negative: Sounds like a life-threatening emergency to the triager    Negative: Previous asthma attacks and this feels like asthma attack    Negative: Chest pain present when not coughing    Negative: Difficulty breathing    Negative: Passed out (i.e., fainted, collapsed and was not responding)    Negative: Patient sounds very sick or weak to the triager    Negative: Coughed up > 1 tablespoon (15 ml) blood (Exception: blood-tinged sputum)    Negative: Fever > 103 F (39.4 C)    Negative: Fever > 101 F (38.3 C) and over 60 years of age    Negative: Fever > 100.0 F (37.8 C) and has diabetes mellitus or a weak immune system (e.g., HIV positive, cancer chemotherapy, organ transplant, splenectomy, chronic steroids)    Negative: Fever > 100.0 F (37.8 C) and bedridden (e.g., nursing home patient, stroke, chronic illness, recovering from surgery)    Negative: Increasing ankle swelling    Negative: Wheezing is present    Negative: SEVERE coughing spells (e.g., whooping sound after coughing, vomiting after coughing)    Negative: Coughing up ana-colored (reddish-brown) or blood-tinged sputum    Negative: Fever present > 3 days (72 hours)    Negative: Fever returns after gone for over 24 hours and symptoms worse or not improved    Negative: Using nasal washes and pain medicine > 24 hours and sinus pain persists    Negative: Known COPD or other severe lung disease (i.e.,  bronchiectasis, cystic fibrosis, lung surgery) and worsening symptoms (i.e., increased sputum purulence or amount, increased breathing difficulty)    Negative: Continuous (nonstop) coughing interferes with work or school and no improvement using cough treatment per Care Advice    Negative: Patient wants to be seen    Negative: Cough has been present for > 3 weeks    Negative: Allergy symptoms are also present (e.g., itchy eyes, clear nasal discharge, postnasal drip)    Negative: Nasal discharge present > 10 days    Negative: Exposure to TB (Tuberculosis)    Negative: Taking an ACE Inhibitor medication (e.g., benazepril/LOTENSIN, captopril/CAPOTEN, enalapril/VASOTEC, lisinopril/ZESTRIL)    Protocols used: COUGH-A-OH

## 2022-06-30 NOTE — ORAL ONC MGMT
Oral Chemotherapy Monitoring Program     Preeti called our oral chemotherapy line asking about his Lenvima. We sent a prescription to [facility] on 6/28 but appears there are some add'l insurance issues we are looking into. I have reached out to our liason Stephani Du and told Preeti we will look into what is going on and will call him back with additional information.    Fabio Espinoza, PharmD, BCPS, BCOP  Hematology/Oncology Clinical Pharmacist  [facility]  [phone]

## 2022-06-30 NOTE — PROGRESS NOTES
CLINICAL NUTRITION SERVICES     Reason for Contact: This patient has scored >= 6 on Nutrition question 1 and/or 2 on the Oncology Distress Screening questionaire. Nutritionist Referral recommended. See Onc Distress Screening Flowsheet    Action: RD called patient indicating reason for phone call. Left a VM with a return call back number.     Follow up: Wait for a return phone call.    Darcie Drew RD, LD  [phone]

## 2022-07-15 NOTE — TELEPHONE ENCOUNTER
Oral Chemotherapy Monitoring Program    Subjective/Objective:  Preeti Pascual is a 56 year old male contacted by phone for a follow-up visit for oral chemotherapy lenvatinib. Preeti confirmed dose of 4 mg daily continuously. He is adherent and has not missed any doses in the last 2 weeks. He denied any side effects including nausea, vomiting, diarrhea, or constipation. He has not been hospitalized recently nor has he started any new medications in the last 2 weeks.    ORAL CHEMOTHERAPY 3/10/2022 3/31/2022 4/1/2022 4/21/2022 4/27/2022 6/30/2022 7/15/2022   Assessment Type Initial Follow up Refill Chart Review Chart Review Refill Incoming phone call Monthly Follow up   Diagnosis Code Hepatocellular Cancer Hepatocellular Cancer Hepatocellular Cancer Hepatocellular Cancer Hepatocellular Cancer Hepatocellular Cancer Hepatocellular Cancer   Providers Dr. Anabella Kyle   [facility]   Drug Name Lenvima (lenvatinib) Lenvima (lenvatinib) Lenvima (lenvatinib) Lenvima (lenvatinib) Lenvima (lenvatinib) Lenvima (lenvatinib) Lenvima (lenvatinib)   Dose 8 mg 8 mg 8 mg 8 mg 4 mg - 4 mg   Current Schedule Daily Daily Daily Daily Daily - Daily   Cycle Details Continuous Continuous Continuous Continuous Continuous - Continuous   Start Date of Last Cycle 3/5/2022 - - - - - -   Doses missed in last 2 weeks - - - 0 - - 0   Adherence Assessment - - - Adherent - - Adherent   Reason for Non-adherence - - - - - - -   Adherence Intervention Recommended - - - - - - -   Adverse Effects Fatigue - - Constipation;Decreased Appetite/Weight Loss;Anemia;Fatigue;Neutropenia;Increased AST/ALT/T BILI;Thrombocytopenia;Hypertension;Other (See Note for Details) - - No AE identified during assessment   Constipation - - - Grade 1 - - -   Pharmacist Intervention(constipation) - - - Yes - - -   Intervention(s) - - - Patient education - -  -   Diarrhea - - - - - - -   Pharmacist Intervention(diarrhea) - - - - - - -   Decrease Appetite/Weight Loss - - - Grade 1 - - -   Pharmacist Intervention(decreased appetite/weight loss) - - - Yes - - -   Intervention(s) - - - Patient education;OTC recommendation - - -   Anemia - - - Grade 1 - - -   Pharmacist Intervention(anemia) - - - No - - -   Fatigue (No Data) - - Grade 3 - - -   Pharmacist Intervention(fatigue) - - - Yes - - -   Intervention(s) - - - Dose decreased (chemo) - - -   Neutropenia - - - Grade 2 - - -   Pharmacist Intervention(neutropenia) - - - No - - -   Increased AST/ALT/T BILI - - - Grade 1 - - -   Pharmacist Intervention(increased ast/alt/t bili) - - - No - - -   Thrombocytopenia - - - Grade 1 - - -   Pharmacist Intervention(thrombocytopenia) - - - No - - -   Hypertension - - - Grade 3 - - -   Pharmacist intervention(hypertension) - - - Yes - - -   Intervention(s) - - - Rx medication recommendation;Patient education - - -   Other (See Note for Details) - - - Dry mouth - - -   Pharmacist intervention(other) - - - Yes - - -   Intervention(s) - - - Patient education - - -   Home BPs (No Data) - - some BPs 140//100 - - -   Any new drug interactions? No - - No - - -   Pharmacist Intervention? - - - - - - -   Intervention(s) - - - - - - -   Is the dose as ordered appropriate for the patient? - - - No - - -   Pharmacist intervention for dose adjustment? - - - Yes - - -   Intervention(s) - - - Dose decreae (chemo) recommended - - -   Is the patient currently in pain? - - - Yes - - -   Does the patient feel the pain is currently being managed by a provider? - - - Yes - - -   Has the patient been assessed within the past 6 months for depression? - - - No - - -   Has the patient missed any days of school, work, or other routine activity? - - - No - - -   Since the last time we talked, have you been hospitalized or used the emergency room? - - - No - - -       Last PHQ-2 Score on record:   PHQ-2  "( 1999 Pfizer) 2/23/2021 11/15/2018   Q1: Little interest or pleasure in doing things 0 1   Q2: Feeling down, depressed or hopeless 0 0   PHQ-2 Score 0 1   PHQ-2 Total Score (12-17 Years)- Positive if 3 or more points; Administer PHQ-A if positive 0 1       Vitals:  BP:   BP Readings from Last 1 Encounters:   06/27/22 (!) 161/83     Wt Readings from Last 1 Encounters:   06/27/22 61 kg (134 lb 8 oz)     Estimated body surface area is 1.69 meters squared as calculated from the following:    Height as of 10/7/21: 1.676 m (5' 5.98\").    Weight as of 6/27/22: 61 kg (134 lb 8 oz).    Labs:  _  Result Component Current Result Ref Range   Sodium 136 (6/27/2022) 133 - 144 mmol/L     _  Result Component Current Result Ref Range   Potassium 4.2 (6/27/2022) 3.4 - 5.3 mmol/L     _  Result Component Current Result Ref Range   Calcium 9.6 (6/27/2022) 8.5 - 10.1 mg/dL     No results found for Mag within last 30 days.     No results found for Phos within last 30 days.     _  Result Component Current Result Ref Range   Albumin 3.2 (L) (6/27/2022) 3.4 - 5.0 g/dL     _  Result Component Current Result Ref Range   Urea Nitrogen 7 (6/27/2022) 7 - 30 mg/dL     _  Result Component Current Result Ref Range   Creatinine 0.68 (6/27/2022) 0.66 - 1.25 mg/dL     _  Result Component Current Result Ref Range   AST 46 (H) (6/27/2022) 0 - 45 U/L     _  Result Component Current Result Ref Range   ALT 40 (6/27/2022) 0 - 70 U/L     _  Result Component Current Result Ref Range   Bilirubin Total 0.7 (6/27/2022) 0.2 - 1.3 mg/dL     _  Result Component Current Result Ref Range   WBC Count 4.5 (6/27/2022) 4.0 - 11.0 10e3/uL     _  Result Component Current Result Ref Range   Hemoglobin 12.8 (L) (6/27/2022) 13.3 - 17.7 g/dL     _  Result Component Current Result Ref Range   Platelet Count 170 (6/27/2022) 150 - 450 10e3/uL     No results found for ANC within last 30 days.     _  Result Component Current Result Ref Range   Absolute Neutrophils 1.9 (6/27/2022) " 1.6 - 8.3 10e3/uL     Assessment/Plan:  Patient is tolerating lenvatinib therapy and it should be continued as planned.     Follow-Up:  Provider apt end of August to be scheduled.     Refill Due:  7/22/22    Jillian Patterson  Pharmacy Intern  Oral Chemotherapy Monitoring Program   [facility]  [phone]

## 2022-07-20 NOTE — TELEPHONE ENCOUNTER
PA Initiation    Medication: Beau CARNEY Renewal  Insurance Company: [employer] - Phone [phone] Fax [phone]  Pharmacy Filling the Rx: [facility] - [place], [place] - [facility] [address]  Filling Pharmacy Phone:    Filling Pharmacy Fax:    Start Date: 7/20/2022    [id number]      Stephani Du  Oncology Pharmacy Liaison II  [email or web address]  Phone: [phone]  Fax: [phone]

## 2022-07-21 NOTE — TELEPHONE ENCOUNTER
Prior Authorization Approval    Authorization Effective Date: 6/21/2022  Authorization Expiration Date: 7/21/2023  Medication: Antwonma PA Renewal Approved  Approved Dose/Quantity: 30 for 30  Reference #: [id number]   Insurance Company: [employer] - Phone [phone] Fax [phone]  Expected CoPay:       CoPay Card Available:      Foundation Assistance Needed:    Which Pharmacy is filling the prescription (Not needed for infusion/clinic administered): [facility] - [place] [address] [address]  Pharmacy Notified: Yes  Patient Notified: Yes          Stephani Du  Oncology Pharmacy Liaison II  [email or web address]  Phone: [phone]  Fax: [phone]

## 2022-07-28 NOTE — TELEPHONE ENCOUNTER
Oral Chemotherapy Monitoring Program    Subjective/Objective:  Preeti Pascual is a 56 year old male contacted by phone for a follow-up visit for oral chemotherapy.  Pt confirms taking the appropriate dose of Lenvima 4 mg once daily. Denies new or worsening side effects, missed doses, and recent hospital or ED visits. Patient has not had any recent medication changes (Lenvima is the only medication he takes). Preeti notes he needs delivery on Thursdays before noon. He also prefers his appts to be Thursday mornings - this allows him to not take PTO, which he does not have available at this time. He wonders when his next Dr. Kyle appt is, I will send a message to Avtar to help coordinate. Pt reports he currently has #20 tablets on hand at home.     ORAL CHEMOTHERAPY 3/31/2022 4/1/2022 4/21/2022 4/27/2022 6/30/2022 7/15/2022 7/28/2022   Assessment Type Refill Chart Review Chart Review Refill Incoming phone call Monthly Follow up Monthly Follow up;Refill   Diagnosis Code Hepatocellular Cancer Hepatocellular Cancer Hepatocellular Cancer Hepatocellular Cancer Hepatocellular Cancer Hepatocellular Cancer Hepatocellular Cancer   Providers Dr. Anabella Kyle   [facility]   Drug Name Lenvima (lenvatinib) Lenvima (lenvatinib) Lenvima (lenvatinib) Lenvima (lenvatinib) Lenvima (lenvatinib) Lenvima (lenvatinib) Lenvima (lenvatinib)   Dose 8 mg 8 mg 8 mg 4 mg - 4 mg 4 mg   Current Schedule Daily Daily Daily Daily - Daily Daily   Cycle Details Continuous Continuous Continuous Continuous - Continuous Continuous   Start Date of Last Cycle - - - - - - -   Planned next cycle start date - - - - - - 8/17/2022   Doses missed in last 2 weeks - - 0 - - 0 0   Adherence Assessment - - Adherent - - Adherent Adherent   Reason for Non-adherence - - - - - - -   Adherence Intervention Recommended - - - - - - -   Adverse  Effects - - Constipation;Decreased Appetite/Weight Loss;Anemia;Fatigue;Neutropenia;Increased AST/ALT/T BILI;Thrombocytopenia;Hypertension;Other (See Note for Details) - - No AE identified during assessment No AE identified during assessment   Constipation - - Grade 1 - - - -   Pharmacist Intervention(constipation) - - Yes - - - -   Intervention(s) - - Patient education - - - -   Diarrhea - - - - - - -   Pharmacist Intervention(diarrhea) - - - - - - -   Decrease Appetite/Weight Loss - - Grade 1 - - - -   Pharmacist Intervention(decreased appetite/weight loss) - - Yes - - - -   Intervention(s) - - Patient education;OTC recommendation - - - -   Anemia - - Grade 1 - - - -   Pharmacist Intervention(anemia) - - No - - - -   Fatigue - - Grade 3 - - - -   Pharmacist Intervention(fatigue) - - Yes - - - -   Intervention(s) - - Dose decreased (chemo) - - - -   Neutropenia - - Grade 2 - - - -   Pharmacist Intervention(neutropenia) - - No - - - -   Increased AST/ALT/T BILI - - Grade 1 - - - -   Pharmacist Intervention(increased ast/alt/t bili) - - No - - - -   Thrombocytopenia - - Grade 1 - - - -   Pharmacist Intervention(thrombocytopenia) - - No - - - -   Hypertension - - Grade 3 - - - -   Pharmacist intervention(hypertension) - - Yes - - - -   Intervention(s) - - Rx medication recommendation;Patient education - - - -   Other (See Note for Details) - - Dry mouth - - - -   Pharmacist intervention(other) - - Yes - - - -   Intervention(s) - - Patient education - - - -   Home BPs - - some BPs 140//100 - - - -   Any new drug interactions? - - No - - - No   Pharmacist Intervention? - - - - - - -   Intervention(s) - - - - - - -   Is the dose as ordered appropriate for the patient? - - No - - - Yes   Pharmacist intervention for dose adjustment? - - Yes - - - -   Intervention(s) - - Dose decreae (chemo) recommended - - - -   Is the patient currently in pain? - - Yes - - - -   Does the patient feel the pain is currently being  "managed by a provider? - - Yes - - - -   Has the patient been assessed within the past 6 months for depression? - - No - - - -   Has the patient missed any days of school, work, or other routine activity? - - No - - - -   Since the last time we talked, have you been hospitalized or used the emergency room? - - No - - - -       Last PHQ-2 Score on record:   PHQ-2 ( 1999 Pfizer) 2/23/2021 11/15/2018   Q1: Little interest or pleasure in doing things 0 1   Q2: Feeling down, depressed or hopeless 0 0   PHQ-2 Score 0 1   PHQ-2 Total Score (12-17 Years)- Positive if 3 or more points; Administer PHQ-A if positive 0 1       Vitals:  BP:   BP Readings from Last 1 Encounters:   06/27/22 (!) 161/83     Wt Readings from Last 1 Encounters:   06/27/22 61 kg (134 lb 8 oz)     Estimated body surface area is 1.69 meters squared as calculated from the following:    Height as of 10/7/21: 1.676 m (5' 5.98\").    Weight as of 6/27/22: 61 kg (134 lb 8 oz).    Labs:  No results found for NA within last 30 days.     No results found for K within last 30 days.     No results found for CA within last 30 days.     No results found for Mag within last 30 days.     No results found for Phos within last 30 days.     No results found for ALBUMIN within last 30 days.     No results found for BUN within last 30 days.     No results found for CR within last 30 days.     No results found for AST within last 30 days.     No results found for ALT within last 30 days.     No results found for BILITOTAL within last 30 days.     No results found for WBC within last 30 days.     No results found for HGB within last 30 days.     No results found for PLT within last 30 days.     No results found for ANC within last 30 days.     No results found for ANC within last 30 days.        Assessment/Plan:  Pt is tolerating Lenvima well.     Follow-Up:  Message sent to [facility] to help schedule his next Dr. Kyle appt.   9/01: monthly assessment      Refill Due:  [facility] will " deliver on Thursday 8/04/22 (before noon).     Sendy Howell, PharmD  Hematology/Oncology Clinical Pharmacist  [facility]  [phone]

## 2022-08-04 NOTE — TELEPHONE ENCOUNTER
Oral Chemotherapy Monitoring Program  Lab Follow Up    Reviewed lab results from today.    ORAL CHEMOTHERAPY 4/1/2022 4/21/2022 4/27/2022 6/30/2022 7/15/2022 7/28/2022 8/4/2022   Assessment Type Chart Review Chart Review Refill Incoming phone call Monthly Follow up Monthly Follow up;Refill Lab Monitoring   Diagnosis Code Hepatocellular Cancer Hepatocellular Cancer Hepatocellular Cancer Hepatocellular Cancer Hepatocellular Cancer Hepatocellular Cancer Hepatocellular Cancer   Providers Dr. Anabella Kyle   [facility]   Drug Name Lenvima (lenvatinib) Lenvima (lenvatinib) Lenvima (lenvatinib) Lenvima (lenvatinib) Lenvima (lenvatinib) Lenvima (lenvatinib) Lenvima (lenvatinib)   Dose 8 mg 8 mg 4 mg - 4 mg 4 mg 4 mg   Current Schedule Daily Daily Daily - Daily Daily Daily   Cycle Details Continuous Continuous Continuous - Continuous Continuous Continuous   Start Date of Last Cycle - - - - - - -   Planned next cycle start date - - - - - 8/17/2022 -   Doses missed in last 2 weeks - 0 - - 0 0 -   Adherence Assessment - Adherent - - Adherent Adherent -   Reason for Non-adherence - - - - - - -   Adherence Intervention Recommended - - - - - - -   Adverse Effects - Constipation;Decreased Appetite/Weight Loss;Anemia;Fatigue;Neutropenia;Increased AST/ALT/T BILI;Thrombocytopenia;Hypertension;Other (See Note for Details) - - No AE identified during assessment No AE identified during assessment -   Constipation - Grade 1 - - - - -   Pharmacist Intervention(constipation) - Yes - - - - -   Intervention(s) - Patient education - - - - -   Diarrhea - - - - - - -   Pharmacist Intervention(diarrhea) - - - - - - -   Decrease Appetite/Weight Loss - Grade 1 - - - - -   Pharmacist Intervention(decreased appetite/weight loss) - Yes - - - - -   Intervention(s) - Patient education;OTC recommendation - - - - -   Anemia - Grade  1 - - - - -   Pharmacist Intervention(anemia) - No - - - - -   Fatigue - Grade 3 - - - - -   Pharmacist Intervention(fatigue) - Yes - - - - -   Intervention(s) - Dose decreased (chemo) - - - - -   Neutropenia - Grade 2 - - - - -   Pharmacist Intervention(neutropenia) - No - - - - -   Increased AST/ALT/T BILI - Grade 1 - - - - -   Pharmacist Intervention(increased ast/alt/t bili) - No - - - - -   Thrombocytopenia - Grade 1 - - - - -   Pharmacist Intervention(thrombocytopenia) - No - - - - -   Hypertension - Grade 3 - - - - -   Pharmacist intervention(hypertension) - Yes - - - - -   Intervention(s) - Rx medication recommendation;Patient education - - - - -   Other (See Note for Details) - Dry mouth - - - - -   Pharmacist intervention(other) - Yes - - - - -   Intervention(s) - Patient education - - - - -   Home BPs - some BPs 140//100 - - - - -   Any new drug interactions? - No - - - No -   Pharmacist Intervention? - - - - - - -   Intervention(s) - - - - - - -   Is the dose as ordered appropriate for the patient? - No - - - Yes -   Pharmacist intervention for dose adjustment? - Yes - - - - -   Intervention(s) - Dose decreae (chemo) recommended - - - - -   Is the patient currently in pain? - Yes - - - - -   Does the patient feel the pain is currently being managed by a provider? - Yes - - - - -   Has the patient been assessed within the past 6 months for depression? - No - - - - -   Has the patient missed any days of school, work, or other routine activity? - No - - - - -   Since the last time we talked, have you been hospitalized or used the emergency room? - No - - - - -       Labs:  _  Result Component Current Result Ref Range   Sodium 141 (8/4/2022) 133 - 144 mmol/L     _  Result Component Current Result Ref Range   Potassium 4.3 (8/4/2022) 3.4 - 5.3 mmol/L     _  Result Component Current Result Ref Range   Calcium 8.6 (8/4/2022) 8.5 - 10.1 mg/dL     _  Result Component Current Result Ref Range   Magnesium 2.3  (8/4/2022) 1.6 - 2.3 mg/dL     No results found for Phos within last 30 days.     _  Result Component Current Result Ref Range   Albumin 3.0 (L) (8/4/2022) 3.4 - 5.0 g/dL     _  Result Component Current Result Ref Range   Urea Nitrogen 12 (8/4/2022) 7 - 30 mg/dL     _  Result Component Current Result Ref Range   Creatinine 0.57 (L) (8/4/2022) 0.66 - 1.25 mg/dL     _  Result Component Current Result Ref Range   AST 94 (H) (8/4/2022) 0 - 45 U/L     _  Result Component Current Result Ref Range   ALT 73 (H) (8/4/2022) 0 - 70 U/L     _  Result Component Current Result Ref Range   Bilirubin Total 0.5 (8/4/2022) 0.2 - 1.3 mg/dL     No results found for WBC within last 30 days.     No results found for HGB within last 30 days.     No results found for PLT within last 30 days.     No results found for ANC within last 30 days.     No results found for ANC within last 30 days.        Assessment & Plan:  Results show no concerning abnormalities. [employer] message sent to the patient on the results. Continue Lenvima therapy as planned.     Follow-Up:  8/29: labs + appt with Dr. Kyle  9/1: monthly assessment      Natalee Huggins, PharmD, BCACP  Hematology/Oncology Clinical Pharmacist  [facility]  [phone]

## 2022-08-29 PROBLEM — C79.72: Status: ACTIVE | Noted: 2022-01-01

## 2022-08-29 PROBLEM — C79.71: Status: ACTIVE | Noted: 2022-01-01

## 2022-08-29 NOTE — NURSING NOTE
"Oncology Rooming Note    August 29, 2022 2:07 PM   Preeti Pascual is a 56 year old male who presents for:    Chief Complaint   Patient presents with     Blood Draw     Vitals, blood drawn and PIV placed by LPN.      Oncology Clinic Visit     HCC     Initial Vitals: BP (!) 141/87   Pulse 70   Temp 97.9  F (36.6  C) (Oral)   Resp 16   Wt 62 kg (136 lb 9.6 oz)   SpO2 99%   BMI 22.06 kg/m   Estimated body mass index is 22.06 kg/m  as calculated from the following:    Height as of 10/7/21: 1.676 m (5' 5.98\").    Weight as of this encounter: 62 kg (136 lb 9.6 oz). Body surface area is 1.7 meters squared.  Moderate Pain (4) Comment: Data Unavailable   No LMP for male patient.  Allergies reviewed: Yes  Medications reviewed: Yes    Medications: Medication refills not needed today.  Pharmacy name entered into [facility]:    [id number] - [place], [place] - [address] AT [facility] & [address] [place] [address] PHARMACY [place], [place] - [phone]  [facility] - [place], [place] - [address]     Clinical concerns: none       Teresa Lewis"

## 2022-08-29 NOTE — PROGRESS NOTES
Preeti Pascual returns today in follow-up of hepatocellular carcinoma.    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  He had initial therapy with sorafenib which he tolerated only at a reduced dose of 200 mg/day and on which he progressed.  He had a long period of control with single agent nivolumab and when he progressed for a few months with ipilimumab/nivolumab.  In August 2021 he was switched to Ramucirumab, but did not actually start for many months due to severe hepatitis thought to be immune mediated due to his prior therapy.  His nephew to protein improved and his disease was radiographically stable on the Ramucirumab, but then he needed to travel out of the country and was switched to lenvatinib which had to be dose reduced due to toxicity.  At our last evaluation 2 months ago he had stable disease on the lenvatinib and returns today for ongoing response assessment.    He tells me he notes recently he is having some intermittent upper abdominal pain, mostly on the left side that comes and goes without particular pattern.  It is not severe.  He gets an occasional headache and notes he sometimes is getting up in the middle of the night now to urinate.  He otherwise is sleeping well and has had no problems with confusion.  He has had no bleeding of which she is aware and has not had any problems with edema lately.  He continues to work.    On exam he is alert and well-appearing.  His blood pressure is mildly elevated at 140/87.  He has no icterus.  He has no palpable adenopathy.  His lungs are clear.  His abdomen is soft and nontender with the liver margin extending about 4 cm below the left right costal margin.  There is no demonstrable ascites.  He has a trace of  pretibial edema bilaterally.  Is speech is fluent and his cranial nerves are grossly intact.  His gait and station are unremarkable.    I personally reviewed his CT scan for which I do not yet have the radiologist interpretation.  The multifocal disease surrounding his prior treatment bed in the liver is significantly more prominent but I do not see any new lesions within the liver.  His bilateral adrenal mets are perhaps 10% bigger.  His murtaza metastases looks about the same.  I do not see any new sites of disease.  Is alpha-fetoprotein is markedly increased to nearly 10,000.  His albumin is mildly depressed and stable at 3.2.  His bilirubin is normal.  And he has minimal changes in his liver enzymes.  His electrolytes and renal function are normal.  He has a mildly depressed platelet count otherwise normal blood counts.    Assessment/plan: Progressive metastatic hepatocellular carcinoma in a patient with an ECOG performance status of 1 and well compensated liver disease.  We had a long talk about how to proceed.  His disease was stable to improved when last he was on the Ramucirumab, and I recommended returning to that therapy.  We talked about what other options might be available.  He was quite sure and I agreed that he would not tolerate a higher dose of the lenvatinib.  I do not think he would tolerate any of the other tyrosine kinase inhibitors that might be active in the setting.  We briefly discussed research studies, but his chronic liver disease is severe enough that it would preclude most of those.  At the end of this long discussion he was agreeable to returning to his prior therapy.  We will plan on reassessment of his disease status in 2 months.    Total time by me on the date of service inclusive of the above visit with this long discussion, review of his imaging studies, ordering, coordination of care, and documentation was approximately 70 minutes.

## 2022-08-29 NOTE — NURSING NOTE
Chief Complaint   Patient presents with     Blood Draw     Vitals, blood drawn and PIV placed by SHARON.      EULOGIO Brown LPN

## 2022-08-30 NOTE — PROGRESS NOTES
[facility] Oral Chemotherapy Monitoring Program    Thank you for the opportunity to be a part in the care of this patient's oral chemotherapy. The oncology pharmacy will no longer be following this patient for oral chemotherapy. If there are any questions or the plan changes, feel free to contact us.    ORAL CHEMOTHERAPY 4/21/2022 4/27/2022 6/30/2022 7/15/2022 7/28/2022 8/4/2022 8/30/2022   Assessment Type Chart Review Refill Incoming phone call Monthly Follow up Monthly Follow up;Refill Lab Monitoring Discontinuation   Stop Date - - - - - - 8/29/2022   Reason for Discontinuation - - - - - - Disease progression   Diagnosis Code Hepatocellular Cancer Hepatocellular Cancer Hepatocellular Cancer Hepatocellular Cancer Hepatocellular Cancer Hepatocellular Cancer Hepatocellular Cancer   Providers Dr. Anabella Kyle   [facility]   Drug Name Lenvima (lenvatinib) Lenvima (lenvatinib) Lenvima (lenvatinib) Lenvima (lenvatinib) Lenvima (lenvatinib) Lenvima (lenvatinib) Lenvima (lenvatinib)   Dose 8 mg 4 mg - 4 mg 4 mg 4 mg 4 mg   Current Schedule Daily Daily - Daily Daily Daily Daily   Cycle Details Continuous Continuous - Continuous Continuous Continuous Continuous   Start Date of Last Cycle - - - - - - -   Planned next cycle start date - - - - 8/17/2022 - -   Doses missed in last 2 weeks 0 - - 0 0 - -   Adherence Assessment Adherent - - Adherent Adherent - -   Reason for Non-adherence - - - - - - -   Adherence Intervention Recommended - - - - - - -   Adverse Effects Constipation;Decreased Appetite/Weight Loss;Anemia;Fatigue;Neutropenia;Increased AST/ALT/T BILI;Thrombocytopenia;Hypertension;Other (See Note for Details) - - No AE identified during assessment No AE identified during assessment - -   Constipation Grade 1 - - - - - -   Pharmacist  Intervention(constipation) Yes - - - - - -   Intervention(s) Patient education - - - - - -   Diarrhea - - - - - - -   Pharmacist Intervention(diarrhea) - - - - - - -   Decrease Appetite/Weight Loss Grade 1 - - - - - -   Pharmacist Intervention(decreased appetite/weight loss) Yes - - - - - -   Intervention(s) Patient education;OTC recommendation - - - - - -   Anemia Grade 1 - - - - - -   Pharmacist Intervention(anemia) No - - - - - -   Fatigue Grade 3 - - - - - -   Pharmacist Intervention(fatigue) Yes - - - - - -   Intervention(s) Dose decreased (chemo) - - - - - -   Neutropenia Grade 2 - - - - - -   Pharmacist Intervention(neutropenia) No - - - - - -   Increased AST/ALT/T BILI Grade 1 - - - - - -   Pharmacist Intervention(increased ast/alt/t bili) No - - - - - -   Thrombocytopenia Grade 1 - - - - - -   Pharmacist Intervention(thrombocytopenia) No - - - - - -   Hypertension Grade 3 - - - - - -   Pharmacist intervention(hypertension) Yes - - - - - -   Intervention(s) Rx medication recommendation;Patient education - - - - - -   Other (See Note for Details) Dry mouth - - - - - -   Pharmacist intervention(other) Yes - - - - - -   Intervention(s) Patient education - - - - - -   Home BPs some BPs 140//100 - - - - - -   Any new drug interactions? No - - - No - -   Pharmacist Intervention? - - - - - - -   Intervention(s) - - - - - - -   Is the dose as ordered appropriate for the patient? No - - - Yes - -   Pharmacist intervention for dose adjustment? Yes - - - - - -   Intervention(s) Dose decreae (chemo) recommended - - - - - -   Is the patient currently in pain? Yes - - - - - -   Does the patient feel the pain is currently being managed by a provider? Yes - - - - - -   Has the patient been assessed within the past 6 months for depression? No - - - - - -   Has the patient missed any days of school, work, or other routine activity? No - - - - - -   Since the last time we talked, have you been hospitalized or used the  emergency room? No - - - - - -       Natalee Huggins, PharmD, BCACP  Hematology/Oncology Clinical Pharmacist  [facility]  [phone]

## 2022-09-01 NOTE — PROGRESS NOTES
CLINICAL NUTRITION SERVICES- ONCOLOGY DISTRESS SCREENING     Identified Concern and Score From Distress Screening: This patient has scored >= 6 on Nutrition question 1 and/or 2 on the Oncology Distress Screening questionaire. Nutritionist Referral recommended. See Onc Distress Screening Flowsheet     Date of Distress Screenin/29     Findings: Preeti reports that his appetite is good currently. He is eating 3 meals per day and snacks sometimes. Pt reports that he is happy his weight has been stable the past ~4 months.     Wt Readings from Last 15 Encounters:   22 62 kg (136 lb 9.6 oz)   22 61 kg (134 lb 8 oz)   22 61.2 kg (135 lb)   22 62.2 kg (137 lb 1.6 oz)   22 63.9 kg (140 lb 14.4 oz)   22 65.6 kg (144 lb 9.6 oz)   22 67 kg (147 lb 9.6 oz)   22 65.8 kg (145 lb 1.6 oz)   22 67.1 kg (148 lb)   21 67.4 kg (148 lb 8 oz)   21 67.7 kg (149 lb 4.8 oz)   21 66.7 kg (147 lb 1.6 oz)   21 68.4 kg (150 lb 14.4 oz)   10/28/21 68.4 kg (150 lb 11.2 oz)   10/14/21 68.9 kg (152 lb)      Intervention: Discussed current PO intake. Encouraged pt to continue current meals and snacks. Discussed continuing to monitor weight trends.      Education Provided: as above     Follow-up Required: SHYANNE Drew, PHIL, LD  [phone]

## 2022-09-08 NOTE — PATIENT INSTRUCTIONS
Contact Numbers  [facility]: [phone] (for symptom and scheduling needs)    Please call the [facility] Triage line if you experience a temperature greater than or equal to 100.4, shaking chills, have uncontrolled nausea, vomiting and/or diarrhea, dizziness, shortness of breath, chest pain, bleeding, unexplained bruising, or if you have any other new/concerning symptoms, questions or concerns.     If you are having any concerning symptoms or wish to speak to a provider before your next infusion visit, please call your care coordinator or triage to notify them so we can adequately serve you.     If you need a refill on a narcotic prescription or other medication, please call triage before your infusion appointment.        Lab Results:  Recent Results (from the past 12 hour(s))   Protein qualitative urine    Collection Time: 09/08/22  6:44 AM   Result Value Ref Range    Protein Albumin Urine Negative Negative mg/dL   Magnesium    Collection Time: 09/08/22  6:44 AM   Result Value Ref Range    Magnesium 2.2 1.7 - 2.3 mg/dL   Protein  random urine    Collection Time: 09/08/22  6:44 AM   Result Value Ref Range    Total Protein Urine mg/dL 6.1 mg/dL    Total Protein UR MG/MG CR 0.13 0.00 - 0.20 mg/mg Cr    Creatinine Urine mg/dL 46.8 mg/dL   Comprehensive metabolic panel    Collection Time: 09/08/22  6:44 AM   Result Value Ref Range    Sodium 139 136 - 145 mmol/L    Potassium 4.1 3.4 - 5.3 mmol/L    Creatinine 0.63 (L) 0.67 - 1.17 mg/dL    Urea Nitrogen 9.9 6.0 - 20.0 mg/dL    Chloride 107 98 - 107 mmol/L    Carbon Dioxide (CO2) 23 22 - 29 mmol/L    Anion Gap 9 7 - 15 mmol/L    Glucose 91 70 - 99 mg/dL    Calcium 8.8 8.6 - 10.0 mg/dL    Protein Total 7.2 6.4 - 8.3 g/dL    Albumin 3.8 3.5 - 5.2 g/dL    Bilirubin Total 0.5 <=1.2 mg/dL    Alkaline Phosphatase 155 (H) 40 - 129 U/L    AST 80 (H) 10 - 50 U/L    ALT 60 (H) 10 - 50 U/L    GFR Estimate >90 >60 mL/min/1.73m2   CBC with platelets    Collection Time: 09/08/22   6:44 AM   Result Value Ref Range    WBC Count 4.8 4.0 - 11.0 10e3/uL    RBC Count 4.41 4.40 - 5.90 10e6/uL    Hemoglobin 12.3 (L) 13.3 - 17.7 g/dL    Hematocrit 37.4 (L) 40.0 - 53.0 %    MCV 85 78 - 100 fL    MCH 27.9 26.5 - 33.0 pg    MCHC 32.9 31.5 - 36.5 g/dL    RDW 16.5 (H) 10.0 - 15.0 %    Platelet Count 162 150 - 450 10e3/uL

## 2022-09-08 NOTE — NURSING NOTE
Chief Complaint   Patient presents with     Blood Draw     Labs drawn with PIV start by RN. Vitals taken.     Labs drawn with PIV start by RN. Pt tolerated well. Vitals taken. Pt checked into next appointment.    Marie Le RN

## 2022-09-08 NOTE — PROGRESS NOTES
Infusion Nursing Note:  Preeti Pascual presents today for Day 1 Cycle 1 Cyramza.    Patient seen by provider : No        present during visit today: Not Applicable.    Note: Preeti arrives to infusion today doing well. He continues to have abdominal discomfort related to his cancer, which has been ongoing and is unchanged. He otherwise offers no concerns or changes today. He has received Cyramza previously with his last dose in February of 2022. Per pharmacy recommendations, should give premedication of Benadryl and give infusion over 60 minutes today. Patient requests to have Benadryl removed from his plan. Writer explained the reason for Benadryl to prevent a hypersensitivity reaction to his Cyramza. Sent message to Dr. Kyle and Nallely Smiley CNP to address benadryl premed concern by patient.     Intravenous Access:  Peripheral IV placed.    Treatment Conditions:  Lab Results   Component Value Date    HGB 12.3 (L) 09/08/2022    WBC 4.8 09/08/2022    ANEU 2.7 08/19/2021    ANEUTAUTO 1.9 08/29/2022     09/08/2022      Lab Results   Component Value Date     09/08/2022    POTASSIUM 4.1 09/08/2022    MAG 2.2 09/08/2022    CR 0.63 (L) 09/08/2022    STCAEY 8.8 09/08/2022    BILITOTAL 0.5 09/08/2022    ALBUMIN 3.8 09/08/2022    ALT 60 (H) 09/08/2022    AST 80 (H) 09/08/2022   UA RESULTS:  Recent Labs   Lab Test 09/08/22  0644 08/19/21  1032 01/17/17  1139   COLOR  --   --  Yellow   APPEARANCE  --   --  Clear   URINEGLC  --   --  Negative   URINEBILI  --   --  Negative   URINEKETONE  --   --  Negative   SG  --   --  1.021   UBLD  --   --  Negative   URINEPH  --   --  5.0   PROTEIN Negative   < > Negative   NITRITE  --   --  Negative   LEUKEST  --   --  Negative   RBCU  --   --  1   WBCU  --   --  3*    < > = values in this interval not displayed.     BP: 139/79  Results reviewed, labs MET treatment parameters, ok to proceed with treatment.    Post Infusion Assessment:  Patient tolerated infusion  without incident.  Blood return noted pre and post infusion.  Site patent and intact, free from redness, edema or discomfort.  No evidence of extravasations.  Access discontinued per protocol.     Discharge Plan:   Patient declined prescription refills.  Copy of AVS reviewed with patient.  Patient will return 9/22 for next appointment.  Patient discharged in stable condition accompanied by: self.  Departure Mode: Ambulatory.    Shruti Campbell RN

## 2022-09-22 NOTE — PROGRESS NOTES
Infusion Nursing Note:  Preeti Pascual presents today for Cycle 2 Day 1 Cyramza.    Patient seen by provider today: No    Note: Patient presents to the infusion center today c/o ongoing left abd pain and headaches. He states they are not getting worse, just staying the same. Writer encouraged him to reach out to his care team if they become worse or interfere with his normal daily activities and he verbalized understanding. He denies any fevers, chills, chest pain or shortness of breath.     Later during his infusion appt patient requested Dr. Kyle to be contacted for pain medication recommendations.     TORB Dr. Kyle/Danielle Wiggins, RN 9/22/22 0830  -pt to start taking Tylenol 650mg TID, to stay under 2gm per day. If this doesn't help he is to message his care team.    Intravenous Access:  Peripheral IV placed.    Treatment Conditions:   Latest Reference Range & Units 09/22/22 06:51   Sodium 136 - 145 mmol/L 136   Potassium 3.4 - 5.3 mmol/L 4.0   Chloride 98 - 107 mmol/L 105   Carbon Dioxide (CO2) 22 - 29 mmol/L 21 (L)   Urea Nitrogen 6.0 - 20.0 mg/dL 10.3   Creatinine 0.67 - 1.17 mg/dL 0.57 (L)   GFR Estimate >60 mL/min/1.73m2 >90   Calcium 8.6 - 10.0 mg/dL 8.7   Anion Gap 7 - 15 mmol/L 10   Magnesium 1.7 - 2.3 mg/dL 2.1   Albumin 3.5 - 5.2 g/dL 3.7   Protein Total 6.4 - 8.3 g/dL 7.2   Alkaline Phosphatase 40 - 129 U/L 161 (H)   ALT 10 - 50 U/L 42   AST  See Comment   Bilirubin Total <=1.2 mg/dL 0.4   Glucose 70 - 99 mg/dL 98      Latest Reference Range & Units 09/22/22 06:51   Protein Albumin Urine Negative mg/dL Negative     /81    Results reviewed, labs MET treatment parameters, ok to proceed with treatment.    Post Infusion Assessment:  Patient tolerated infusion without incident.  Blood return noted pre and post infusion.  No evidence of extravasations.  Access discontinued per protocol.     Discharge Plan:   Patient declined prescription refills.  Discharge instructions reviewed with:  Patient.  Patient and/or family verbalized understanding of discharge instructions and all questions answered.  Copy of the AVS was reviewed with the patient. Patient will return 10/6 for next appointment.   AVS also available to patient via [employer].    Patient discharged in stable condition accompanied by: self.  Departure Mode: Ambulatory.      Danielle Wiggins RN

## 2022-09-22 NOTE — PATIENT INSTRUCTIONS
Take Tylenol 650mg TID for pain, stay under 2 grams of Tylenol per day. If this doesn't help send your care team a My Chart message or call the [facility] Triage line listed below.      [facility] Triage and after hours / weekends / holidays:  [phone]    Please call the triage or after hours line if you experience a temperature greater than or equal to 100.4, shaking chills, have uncontrolled nausea, vomiting and/or diarrhea, dizziness, shortness of breath, chest pain, bleeding, unexplained bruising, or if you have any other new/concerning symptoms, questions or concerns.      If you are having any concerning symptoms or wish to speak to a provider before your next infusion visit, please call your care coordinator or triage to notify them so we can adequately serve you.     If you need a refill on a narcotic prescription or other medication, please call before your infusion appointment.                September 2022 Sunday Monday Tuesday Wednesday Thursday Friday Saturday                       1     2     3       4     5     6     7     8    LAB PERIPHERAL   6:30 AM   (15 min.)   [facility] DRAW   [facility]    LAB   6:30 AM   (15 min.)    LAB   [facility] [place]    ONC INFUSION 2 HR (120 MIN)   7:30 AM   (120 min.)    ONC INFUSION NURSE   [facility] 9     10       11     12     13     14     15     16     17       18     19     20     21     22    LAB PERIPHERAL   6:30 AM   (15 min.)   [facility] DRAW   [facility]    ONC INFUSION 2 HR (120 MIN)   7:00 AM   (120 min.)    ONC INFUSION NURSE   [facility] 23     24       25     26     27     28     29     30                        October 2022 Sunday Monday Tuesday Wednesday Thursday Friday Saturday                                 1       2     3     4     5     6    LAB PERIPHERAL   6:30 AM   (15 min.)   [facility]  LAB DRAW   [facility]    ONC INFUSION 2 HR (120 MIN)   7:00 AM   (120 min.)    ONC INFUSION NURSE   [facility] 7     8       9     10     11     12     13     14     15       16     17     18     19     20    LAB   8:00 AM   (15 min.)    LAB   [facility] [place]    ONC INFUSION 2 HR (120 MIN)   9:00 AM   (120 min.)    ONC INFUSION NURSE   [facility] 21     22       23     24     25     26     27     28     29       30     31    CT CHEST ABDOMEN PELVIS WWO  10:10 AM   (20 min.)   [email or web address]   [facility] [place]    LAB  10:15 AM   (15 min.)    LAB   [facility] [place]    RETURN   1:15 PM   (30 min.)   Agustin Kyle MD   [facility]                                             Recent Results (from the past 24 hour(s))   Protein qualitative urine    Collection Time: 09/22/22  6:51 AM   Result Value Ref Range    Protein Albumin Urine Negative Negative mg/dL   Comprehensive metabolic panel    Collection Time: 09/22/22  6:51 AM   Result Value Ref Range    Sodium 136 136 - 145 mmol/L    Potassium 4.0 3.4 - 5.3 mmol/L    Chloride 105 98 - 107 mmol/L    Carbon Dioxide (CO2) 21 (L) 22 - 29 mmol/L    Anion Gap 10 7 - 15 mmol/L    Urea Nitrogen 10.3 6.0 - 20.0 mg/dL    Creatinine 0.57 (L) 0.67 - 1.17 mg/dL    Calcium 8.7 8.6 - 10.0 mg/dL    Glucose 98 70 - 99 mg/dL    Alkaline Phosphatase 161 (H) 40 - 129 U/L    AST      ALT 42 10 - 50 U/L    Protein Total 7.2 6.4 - 8.3 g/dL    Albumin 3.7 3.5 - 5.2 g/dL    Bilirubin Total 0.4 <=1.2 mg/dL    GFR Estimate >90 >60 mL/min/1.73m2   Magnesium    Collection Time: 09/22/22  6:51 AM   Result Value Ref Range    Magnesium 2.1 1.7 - 2.3 mg/dL

## 2022-09-22 NOTE — NURSING NOTE
Chief Complaint   Patient presents with     Blood Draw     Vitals taken, PIV started, labs drawn, saline locked, checked into next appt     BP (!) 147/81 (BP Location: Left arm, Patient Position: Sitting, Cuff Size: Adult Regular)   Pulse 73   Temp 98.2  F (36.8  C) (Oral)   Resp 16   Wt 64.6 kg (142 lb 8 oz)   SpO2 100%   BMI 23.01 kg/m    Holger Quezada RN on 9/22/2022 at 6:42 AM

## 2022-09-29 NOTE — TELEPHONE ENCOUNTER
"Oncology Nurse Triage - Pain    Situation: Preeti(pt) reporting the following symptoms: Pain  - stomach pain in different parts of stomach  -headache like or dizziness  -increase tired/fatigue    Background:   Treating Provider:  Dr Kyle    Date of last office visit: 8/29/22 Dr. Kyle    Recent Treatments: on 9/22/22  D1C2 ramucirumab     Assessment:     Location:  Stomach moves around from one side to another, sometimes right, left top, lower, sometimes radiates to back  Onset: 2 weeks  Duration/Frequency:constant  Rates: 6/10  Quality:  Interferes with sleep, \"sharp\"  Current med(s) used:has not taken tylenol but concerned about side effects of tylenol. No other meds listed for pain.   Worsens or relieves with: varies  Appetite also decreasing.     Location of headache like issue/dizziness: difficult to describe, \"concern causes some dizziness with driving\"   Constant  Rates 5-6/10.   Denies ringing in ears/blurred vision.   Denies radiating down neck, jaw, shoulders      Denies fevers/chills, cough, sore throat, chest pain, SOB, n/v, bowel & bladder issues, rash, new or increased bleeding.     [facility] in [place].     Recommendations:   This writer educated to observe for change in headache or head pain, drink clear liquids if unknown origin, rest in a dark, quiet room, elevate head, apply ice or heat depending on preference to head and neck, take medications as prescribed.      This Writer spent over 15 minutes on triage call, education, follow-up coordination, and documentation.       1002 Paged provider Dr. Kyle    Follow-Up:  1005 Per Dr. Kyle Virtual or In-Person with Rut corrales PA-C.     1011 Paged Rut Corrales PA-C about visit plan,   1020 Per Rut, given pt reporting various location of stomach pain and degree of pain, Recommends in-person with CBC/CMP labs prior.     1059 Preeti, unable to do in-person due to work, as this writer offered multiple times for " "In-Person provider evaluation for later this afternoon either 1pm, 5pm, for tomorrow 9/30/22 at 8am, 11am, 1:30am, pt refused 3x all available times for in-person. Pt also stated, \"This stomach pain is similar to past stomach pain as had before\". Pt is willing to do virtual at 1:00p today. Or possibly next Tuesday In-Person 10/4/22    1116 Per Rut, Okay to wait for Tuesday 10/4/22 In-Person with labs prior. If pt wants to keep a 1:00pm virtual today then willing, however strongly recommends in-person.       Per Preeti (pt), okay with plan to keep In-Person next Tuesday 10/4/22 In-person with         Would like to cancel today's 1:00pm virtual since has follow up next week. Instructed patient to seek care immediately for worsening symptoms, including: fever, chest pain, shortness of breath, dizziness.    Pt is aware to go to ED over weekend if pain increases.       "

## 2022-10-05 NOTE — PROGRESS NOTES
"[facility]  Date of Visit: Oct 6, 2022   Oncologist: Dr. 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  He had initial therapy with sorafenib which he tolerated only at a reduced dose of 200 mg/day and on which he progressed.  He had a long period of control with single agent nivolumab and when he progressed for a few months with ipilimumab/nivolumab.  In August 2021 he was switched to Ramucirumab, but did not actually start for many months due to severe hepatitis thought to be immune mediated due to his prior therapy.  His AFP tumor marker improved and his disease was radiographically stable on the Ramucirumab, but then he needed to travel out of the country and was switched to lenvatinib which had to be dose reduced due to toxicity.      He last saw Dr. Kyle on 8/29/22 for ongoing assessment. He had restaging scans done at that visit which showed progressive metastatic hepatocellular carcinoma. He was recommended to restart therapy with Ramucirumab and stop Lenvatinib. He restarted Ramucirumab on 9/8/22 and so far is s/p 2 doses.     Last received Ramucirumab on 9/22. Next dose due today 10/6.     Interval History:   Preeti presents for triage add-on visit today. He complains of abdominal pain which initially started as intermittent x1 month. After his last visit with Dr. Kyle on 8/29, it started becoming more persistent and \"severe\" per patient report. Last week, he had severe pain rating as 9/10. He was also feeling fatigued and experiencing headaches. He was offered multiple appointment times to come in but he was unable to come " due to work and he prefers morning appointments.     Today, he feels overall a little better compared to last week but still with persistent abdominal pain and occasional headaches. Rates abdominal pain as 6-7/10 today. He says when he is concentrated at work, the pain is not as bothersome. He has not tried taking any pain medicines. He finds comfort in putting pressure in different areas of his abdomen when he is experiencing pain. He reports that pain is not localized to one spot. It moves around from right to left. He has tried tylenol in the past for this same pain and says it made it worse, so he has not tried it again. He is also worried that tylenol would affect his liver enzymes. He does have some heartburn. He denies nausea/vomiting or constipation/diarrhea. No fevers or chills or c/f infection. No blood in urine or stool. Appetite is somewhat low, but starting to improve in the last 2 days.       Physical Exam:   /81   Pulse 68   Temp 97.3  F (36.3  C)   Resp 16   Wt 63.6 kg (140 lb 4.8 oz)   SpO2 99%   BMI 22.66 kg/m     General: well appearing, no acute distress  HEENT: normocephalic, atraumatic, PERRLA, sclerae nonicteric  CV: regular rate and rhythm, no murmurs  Lungs: clear to auscultation bilaterally, no wheezes/rales/rhonchi  Abd: soft, positive bowel sounds, non-distended, some tenderness to palpation to RUQ and epigastric region, no rebound tenderness  MSK: full range of motion in all four extremities, no peripheral edema  Neuro: alert and oriented x3, CN grossly intact   Psych: appropriate mood and affect  Skin: no rashes or lesions      Laboratory Data: Reviewed labs today. AFP and UA pending.    Latest Reference Range & Units 10/06/22 08:15   Sodium 136 - 145 mmol/L 137   Potassium 3.4 - 5.3 mmol/L 4.1   Chloride 98 - 107 mmol/L 105   Carbon Dioxide (CO2) 22 - 29 mmol/L 23   Urea Nitrogen 6.0 - 20.0 mg/dL 8.4   Creatinine 0.67 - 1.17 mg/dL 0.62 (L)   GFR Estimate >60 mL/min/1.73m2  >90   Calcium 8.6 - 10.0 mg/dL 9.0   Anion Gap 7 - 15 mmol/L 9   Magnesium 1.7 - 2.3 mg/dL 2.1   Albumin 3.5 - 5.2 g/dL 3.7   Protein Total 6.4 - 8.3 g/dL 7.5   Alkaline Phosphatase 40 - 129 U/L 180 (H)   ALT 10 - 50 U/L 40   AST 10 - 50 U/L 84 (H)   Bilirubin Total <=1.2 mg/dL 0.5   Glucose 70 - 99 mg/dL 98   WBC 4.0 - 11.0 10e3/uL 3.5 (L)   Hemoglobin 13.3 - 17.7 g/dL 12.2 (L)   Hematocrit 40.0 - 53.0 % 36.3 (L)   Platelet Count 150 - 450 10e3/uL 153   RBC Count 4.40 - 5.90 10e6/uL 4.24 (L)   MCV 78 - 100 fL 86   MCH 26.5 - 33.0 pg 28.8   MCHC 31.5 - 36.5 g/dL 33.6   RDW 10.0 - 15.0 % 15.1 (H)   % Neutrophils % 43   % Lymphocytes % 28   % Monocytes % 11   % Eosinophils % 16   % Basophils % 2   Absolute Basophils 0.0 - 0.2 10e3/uL 0.1   Absolute Eosinophils 0.0 - 0.7 10e3/uL 0.6   Absolute Immature Granulocytes <=0.4 10e3/uL 0.0   Absolute Lymphocytes 0.8 - 5.3 10e3/uL 1.0   Absolute Monocytes 0.0 - 1.3 10e3/uL 0.4   % Immature Granulocytes % 0   Absolute Neutrophils 1.6 - 8.3 10e3/uL 1.6   Absolute NRBCs 10e3/uL 0.0   NRBCs per 100 WBC <1 /100 0   Protein Albumin Urine Negative mg/dL Negative       Last AFP drawn 8/29: 9913 (up from 1210).       Imaging:   CT Abdomen w/o and w/ Chest Pelvis w/ contrast (8/29/22) showed:  IMPRESSION:  In this 56-year-old male with history of hepatitis B and  known hepatocellular carcinoma status post Y-90  treatment, there is  evidence of worsening disease with metastatic lesions:  1. Increased size of residual nodular arterial enhancement with early  washout measuring up to 5 cm with extracapsular involvement consistent  with OPTN 5x.  2. Right upper quadrant increased size of peritoneal implants with  trace probable malignant ascites.  3. Increasing left adrenal and stable right adrenal nodules.  4. Again visualized portal hepatis and portacaval lymphadenopathy.  5. Based on this exam only, the patient is not [place] criteria.        Assessment/Plan:     # Abdominal pain  #  heartburn   - Was previously intermittent x1 month, now more persistent since last visit with Dr. Kyle on 8/29/22. Last week with severe pain 9/10. Improved this week. He has not tried taking any analgesics. He previously tried tylenol for this same pain and reports pain got worse after, so he has not tried tylenol again.   - Today, doing better with pain level 6-7/10. ?Possibly related to Ramucirumab (25% incidence of abdominal pain per UTD) vs cancer pain related to disease progression. Now s/p 2 doses of Ramucirumab. AST is elevated to 84, ALT is wnl, T bili is wnl. AFP tumor marker is pending. AFP tumor marker last checked on 8/29 with level of 9913. We discussed pushing up his restaging scans to assess disease status and look for alternative causes of abdominal pain, however, if this shows disease progression, it would be too early to say that Ramucirumab is not working. Discussed case with Dr. Kyle and given that his abdominal pain is improved, we recommend trying tylenol (keep max daily dose <2g) and supportive cares with close monitoring for now and proceeding with Ramucirumab infusion today. Reviewed plan with patient who is in agreement. We discussed symptoms to monitor for and when to seek immediate medical attention/proceed to ED and he verbalized understanding.   - If abdominal pain worsens, we will need to push up CT scans.   - Will also check UA to r/o UTI since he provided urine sample today though this is low on the differential.   - Will send [facility]   - RT for visit with Nallely Smiley CNP next week for close monitoring. Patient requesting for virtual visit due to work.         # Occasional Headaches  - Improved, stable at this time. Encouraged PO hydration. Tylenol PRN above as needed (keep max daily dose <2g).   - Continue to monitor.       # Progressive metastatic hepatocellular carcinoma in a patient with an ECOG performance status of 1 and well compensated liver disease.      At last  visit with Dr. Kyle on 8/29, he discussed several options with patient including higher dose of lenvatinib, other TKIs, and research studies but his chronic liver disease is severe enough that it would preclude most of these.   Ultimately, they agreed to return to therapy with Ramucirumab as his disease was stable to improved when he was previously on this therapy. The plan is for re-assessment of his disease status in 2 months. He currently has scans and follow-up with Dr. Kyle scheduled on 10/31/22.     He restarted Ramucirumab on 9/8 and so far is s/p 2 doses. Will proceed with Ramucirumab infusion today.       Plan of care discussed with Dr. Kyle.       50 minutes spent on the date of the encounter doing chart review, review of test results, interpretation of tests, patient visit, documentation and discussion with other provider(s)       Lulu Blackman PA-C  [facility]  [address] [place] [id number] [phone]

## 2022-10-06 NOTE — PATIENT INSTRUCTIONS
Didier Álvarez,     Nice to meet you today.     As we discussed, you can take tylenol 650 mg every 8 hours as needed for abdominal pain. Please do not exceed more than 2000 mg of Tylenol in a 24hr hour period.     I sent a prescription for Pepcid (20 mg once a day) for heartburn.     You have a video visit scheduled with Nallely Smiley on Monday 10/10 at 7am for follow-up.     Please call our triage [phone] for questions/concerns.     Thanks,   Lulu

## 2022-10-06 NOTE — PATIENT INSTRUCTIONS
Contact Numbers  [facility]: [phone] (for symptom and scheduling needs)    Please call the [facility] Triage line if you experience a temperature greater than or equal to 100.4, shaking chills, have uncontrolled nausea, vomiting and/or diarrhea, dizziness, shortness of breath, chest pain, bleeding, unexplained bruising, or if you have any other new/concerning symptoms, questions or concerns.     If you are having any concerning symptoms or wish to speak to a provider before your next infusion visit, please call your care coordinator or triage to notify them so we can adequately serve you.     If you need a refill on a narcotic prescription or other medication, please call triage before your infusion appointment.           October 2022 Sunday Monday Tuesday Wednesday Thursday Friday Saturday                                 1       2     3     4     5     6    LAB PERIPHERAL   7:30 AM   (15 min.)   [facility] LAB DRAW   [facility]    RETURN   7:45 AM   (45 min.)   Lulu Blackman PA-C   [facility]    ONC INFUSION 2 HR (120 MIN)   8:00 AM   (120 min.)    ONC INFUSION NURSE   [facility] 7     8       9     10    VIDEO VISIT RETURN   6:45 AM   (45 min.)   Nallely Smiley APRN CNP   [facility] 11     12     13     14     15       16     17     18     19     20    LAB PERIPHERAL   7:00 AM   (15 min.)   [facility] LAB DRAW   [facility]    RETURN   7:15 AM   (45 min.)   Rut Corrales PA-C   [facility]    ONC INFUSION 2 HR (120 MIN)   9:00 AM   (120 min.)    ONC INFUSION NURSE   [facility] 21     22       23     24     25     26     27     28     29       30     31    CT CHEST ABDOMEN PELVIS WWO  10:10 AM   (20 min.)   [facility]   [facility] [place]    LAB  10:15 AM    (15 min.)    LAB   [facility] [place]    RETURN   1:15 PM   (30 min.)   Agustin Kyle MD   [facility]                                        November 2022 Sunday Monday Tuesday Wednesday Thursday Friday Saturday             1     2     3    ONC INFUSION 2 HR (120 MIN)   7:00 AM   (120 min.)    ONC INFUSION NURSE   [facility] 4     5       6     7     8     9     10     11     12       13     14     15     16     17     18     19       20     21     22     23     24     25     26       27     28     29     30                                      Lab Results:  Recent Results (from the past 12 hour(s))   Protein qualitative urine    Collection Time: 10/06/22  8:15 AM   Result Value Ref Range    Protein Albumin Urine Negative Negative mg/dL   Comprehensive metabolic panel    Collection Time: 10/06/22  8:15 AM   Result Value Ref Range    Sodium 137 136 - 145 mmol/L    Potassium 4.1 3.4 - 5.3 mmol/L    Chloride 105 98 - 107 mmol/L    Carbon Dioxide (CO2) 23 22 - 29 mmol/L    Anion Gap 9 7 - 15 mmol/L    Urea Nitrogen 8.4 6.0 - 20.0 mg/dL    Creatinine 0.62 (L) 0.67 - 1.17 mg/dL    Calcium 9.0 8.6 - 10.0 mg/dL    Glucose 98 70 - 99 mg/dL    Alkaline Phosphatase 180 (H) 40 - 129 U/L    AST 84 (H) 10 - 50 U/L    ALT 40 10 - 50 U/L    Protein Total 7.5 6.4 - 8.3 g/dL    Albumin 3.7 3.5 - 5.2 g/dL    Bilirubin Total 0.5 <=1.2 mg/dL    GFR Estimate >90 >60 mL/min/1.73m2   Magnesium    Collection Time: 10/06/22  8:15 AM   Result Value Ref Range    Magnesium 2.1 1.7 - 2.3 mg/dL   Protein  random urine    Collection Time: 10/06/22  8:15 AM   Result Value Ref Range    Total Protein Urine mg/dL 12.4 mg/dL    Total Protein UR MG/MG CR 0.12 0.00 - 0.20 mg/mg Cr    Creatinine Urine mg/dL 106.0 mg/dL   CBC with platelets and differential    Collection Time: 10/06/22  8:15 AM   Result Value Ref Range    WBC Count 3.5 (L) 4.0 - 11.0 10e3/uL    RBC  Count 4.24 (L) 4.40 - 5.90 10e6/uL    Hemoglobin 12.2 (L) 13.3 - 17.7 g/dL    Hematocrit 36.3 (L) 40.0 - 53.0 %    MCV 86 78 - 100 fL    MCH 28.8 26.5 - 33.0 pg    MCHC 33.6 31.5 - 36.5 g/dL    RDW 15.1 (H) 10.0 - 15.0 %    Platelet Count 153 150 - 450 10e3/uL    % Neutrophils 43 %    % Lymphocytes 28 %    % Monocytes 11 %    % Eosinophils 16 %    % Basophils 2 %    % Immature Granulocytes 0 %    NRBCs per 100 WBC 0 <1 /100    Absolute Neutrophils 1.6 1.6 - 8.3 10e3/uL    Absolute Lymphocytes 1.0 0.8 - 5.3 10e3/uL    Absolute Monocytes 0.4 0.0 - 1.3 10e3/uL    Absolute Eosinophils 0.6 0.0 - 0.7 10e3/uL    Absolute Basophils 0.1 0.0 - 0.2 10e3/uL    Absolute Immature Granulocytes 0.0 <=0.4 10e3/uL    Absolute NRBCs 0.0 10e3/uL   Routine UA with micro reflex to culture    Collection Time: 10/06/22  8:15 AM    Specimen: Urine, Midstream   Result Value Ref Range    Color Urine Yellow Colorless, Straw, Light Yellow, Yellow    Appearance Urine Slightly Cloudy (A) Clear    Glucose Urine Negative Negative mg/dL    Bilirubin Urine Negative Negative    Ketones Urine Negative Negative mg/dL    Specific Gravity Urine 1.015 1.003 - 1.035    Blood Urine Negative Negative    pH Urine 7.5 (H) 5.0 - 7.0    Protein Albumin Urine Negative Negative mg/dL    Urobilinogen Urine Normal Normal, 2.0 mg/dL    Nitrite Urine Negative Negative    Leukocyte Esterase Urine Negative Negative    Mucus Urine Present (A) None Seen /LPF    RBC Urine <1 <=2 /HPF    WBC Urine 2 <=5 /HPF    Hyaline Casts Urine 1 <=2 /LPF

## 2022-10-06 NOTE — PROGRESS NOTES
Infusion Nursing Note:  Preeti Pascual presents today for Cycle 3 Day 1 ramucirumab.    Patient seen by provider today: Yes: ANGELIKA Leyv saw patient in infusion   present during visit today: Not Applicable.    Note:   Lulu states OK to treat patient today.  Patient discussed pain with the provider.    Intravenous Access:  Peripheral IV placed in lab.    Treatment Conditions:   Latest Reference Range & Units 10/06/22 08:15   Carbon Dioxide (CO2) 22 - 29 mmol/L 23   Urea Nitrogen 6.0 - 20.0 mg/dL 8.4   Creatinine 0.67 - 1.17 mg/dL 0.62 (L)   GFR Estimate >60 mL/min/1.73m2 >90   Calcium 8.6 - 10.0 mg/dL 9.0   Anion Gap 7 - 15 mmol/L 9   Magnesium 1.7 - 2.3 mg/dL 2.1   Albumin 3.5 - 5.2 g/dL 3.7   Protein Total 6.4 - 8.3 g/dL 7.5   Alkaline Phosphatase 40 - 129 U/L 180 (H)   ALT 10 - 50 U/L 40   AST 10 - 50 U/L 84 (H)   Bilirubin Total <=1.2 mg/dL 0.5   Creatinine Urine mg/dL 106.0   Glucose 70 - 99 mg/dL 98   WBC 4.0 - 11.0 10e3/uL 3.5 (L)   Hemoglobin 13.3 - 17.7 g/dL 12.2 (L)   Hematocrit 40.0 - 53.0 % 36.3 (L)   Platelet Count 150 - 450 10e3/uL 153   RBC Count 4.40 - 5.90 10e6/uL 4.24 (L)   MCV 78 - 100 fL 86   MCH 26.5 - 33.0 pg 28.8   MCHC 31.5 - 36.5 g/dL 33.6   RDW 10.0 - 15.0 % 15.1 (H)   % Neutrophils % 43   % Lymphocytes % 28   % Monocytes % 11   % Eosinophils % 16   % Basophils % 2   Absolute Basophils 0.0 - 0.2 10e3/uL 0.1   Absolute Eosinophils 0.0 - 0.7 10e3/uL 0.6   Absolute Immature Granulocytes <=0.4 10e3/uL 0.0   Absolute Lymphocytes 0.8 - 5.3 10e3/uL 1.0   Absolute Monocytes 0.0 - 1.3 10e3/uL 0.4   % Immature Granulocytes % 0   Absolute Neutrophils 1.6 - 8.3 10e3/uL 1.6   Absolute NRBCs 10e3/uL 0.0   NRBCs per 100 WBC <1 /100 0     Results reviewed, labs MET treatment parameters, ok to proceed with treatment.  Urine protein: negative.  /81.    Post Infusion Assessment:  Patient tolerated infusion without incident.  Blood return noted pre and post infusion.  Site patent and  intact, free from redness, edema or discomfort.  No evidence of extravasations.  Access discontinued per protocol.     Discharge Plan:   Prescription refills given for Pepcid.  Discharge instructions reviewed with: Patient.  Patient and/or family verbalized understanding of discharge instructions and all questions answered.  Copy of AVS reviewed with patient and/or family.  Patient will return 10/20/22 for next appointment.  Patient discharged in stable condition accompanied by: self.  Patient states he has a  that is picking him up.  Departure Mode: Ambulatory.      Pamela Woods RN

## 2022-10-06 NOTE — NURSING NOTE
Chief Complaint   Patient presents with     Blood Draw     Labs drawn by RN via PIV placed by VAT, vitals taken.     Labs drawn from PIV placed by VAT. Line flushed with saline. Vitals taken. Urine specimen taken. Pt checked in for appointment(s).    Emelyn Pickering RN

## 2022-10-07 NOTE — PROGRESS NOTES
"    Preeti Pascual is a 56 year old male who is being evaluated via a billable telephone visit.      The patient has been notified of following:     \"This telephone visit will be conducted via a call between you and your physician/provider. We have found that certain health care needs can be provided without the need for a physical exam.  This service lets us provide the care you need with a short phone conversation.  If a prescription is necessary we can send it directly to your pharmacy.  If lab work is needed we can place an order for that and you can then stop by our lab to have the test done at a later time.    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  He had initial therapy with sorafenib which he tolerated only at a reduced dose of 200 mg/day and on which he progressed.  He had a long period of control with single agent nivolumab and when he progressed for a few months with ipilimumab/nivolumab.  In August 2021 he was switched to Ramucirumab.  His AFP tumor marker improved and his disease was radiographically stable on the Ramucirumab, but then he needed to travel out of the country and was switched to lenvatinib which had to be dose reduced due to toxicity.       He last saw Dr. Kyle on 8/29/22 for ongoing assessment. He had restaging scans done at that visit which showed progressive metastatic hepatocellular carcinoma. He was recommended to restart " therapy with Ramucirumab and stop Lenvatinib. He restarted Ramucirumab on 9/8/22 and so far is s/p 2 doses.      Last received Ramucirumab on 9/22.    Interval history: Preeti is joined on a telephone visit today. He was unable to get his video audio and visual working when we tried the video visits.  -notes some improvement in abdominal pain. Is taking famotidine and occasional tylenol. No heartburn symptoms. Bowels are regular. No difficulty with urination. No cough, sob, cp, palpitation. No edema.   -remains fatigued, but isn't sure it is much different from baseline. Continues to work. Would like to take a break from work, but has financial concerns  -worried about his rising tumor marker and wonders if there are other treatment options to try.    Current Outpatient Medications   Medication Sig Dispense Refill     acetaminophen (TYLENOL) 325 MG tablet Take 650 mg by mouth every 8 hours as needed for mild pain Do not exceed 2 grams per day.       famotidine (PEPCID) 20 MG tablet Take 1 tablet (20 mg) by mouth daily 30 tablet 0        No Known Allergies      Speech is clear.  There were no vitals taken for this visit.  Wt Readings from Last 4 Encounters:   10/06/22 63.6 kg (140 lb 4.8 oz)   09/22/22 64.6 kg (142 lb 8 oz)   09/08/22 64.2 kg (141 lb 8 oz)   08/29/22 62 kg (136 lb 9.6 oz)       Labs:    Latest Reference Range & Units 10/06/22 08:15   Sodium 136 - 145 mmol/L 137   Potassium 3.4 - 5.3 mmol/L 4.1   Chloride 98 - 107 mmol/L 105   Carbon Dioxide (CO2) 22 - 29 mmol/L 23   Urea Nitrogen 6.0 - 20.0 mg/dL 8.4   Creatinine 0.67 - 1.17 mg/dL 0.62 (L)   GFR Estimate >60 mL/min/1.73m2 >90   Calcium 8.6 - 10.0 mg/dL 9.0   Anion Gap 7 - 15 mmol/L 9   Magnesium 1.7 - 2.3 mg/dL 2.1   Albumin 3.5 - 5.2 g/dL 3.7   Protein Total 6.4 - 8.3 g/dL 7.5   Alkaline Phosphatase 40 - 129 U/L 180 (H)   ALT 10 - 50 U/L 40   AST 10 - 50 U/L 84 (H)   AFP tumor marker <=8.3 ng/mL 14,593.0 (H)   Bilirubin Total <=1.2 mg/dL 0.5    Creatinine Urine mg/dL 106.0   Glucose 70 - 99 mg/dL 98   WBC 4.0 - 11.0 10e3/uL 3.5 (L)   Hemoglobin 13.3 - 17.7 g/dL 12.2 (L)   Hematocrit 40.0 - 53.0 % 36.3 (L)   Platelet Count 150 - 450 10e3/uL 153   RBC Count 4.40 - 5.90 10e6/uL 4.24 (L)   MCV 78 - 100 fL 86   MCH 26.5 - 33.0 pg 28.8   MCHC 31.5 - 36.5 g/dL 33.6   RDW 10.0 - 15.0 % 15.1 (H)   % Neutrophils % 43   % Lymphocytes % 28   % Monocytes % 11   % Eosinophils % 16   % Basophils % 2   Absolute Basophils 0.0 - 0.2 10e3/uL 0.1   Absolute Eosinophils 0.0 - 0.7 10e3/uL 0.6   Absolute Immature Granulocytes <=0.4 10e3/uL 0.0   Absolute Lymphocytes 0.8 - 5.3 10e3/uL 1.0   Absolute Monocytes 0.0 - 1.3 10e3/uL 0.4   % Immature Granulocytes % 0   Absolute Neutrophils 1.6 - 8.3 10e3/uL 1.6   Absolute NRBCs 10e3/uL 0.0   NRBCs per 100 WBC <1 /100 0   (L): Data is abnormally low  (H): Data is abnormally high    Imaging: n/a    Impression/plan:   Metastatic hepatocellular carcinoma. He recently progressed on lenvatinib and was resumed on ramucirumab on 9/8/22.   -tolerating fairly well. Reviewed concerns about the rising AFP. We discussed that if this represents progression, the options for treatment would be to stop ramucirumab and consider resuming a TKI, but at a higher dose than what he tolerated before. I'd have concerns about him being able to continue to work and manage the side effects that were quite difficult for him before.  I recommended that we give the ramucirumab a little more time before restaging and keep his restaging scheduled at the end of 2 months of treatment as planned.  I'm not sure if he was able to understand this over the phone. Encouraged him to keep his in person visit on 10/20 prior to infusion to review concerns.  -  Abdominal pain  -mostly resolved, managing well with prn acetaminaphen and famotidine daily, monitor    35 minutes spent on the date of the encounter doing chart review, review of test results, interpretation of tests and  patient visit

## 2022-10-10 NOTE — LETTER
"    10/10/2022         RE: Preeti Pascual  [address]  [facility] [address]        Dear Colleague,    Thank you for referring your patient, Preeti Pascual, to the [facility]. Please see a copy of my visit note below.        Preeti Pascual is a 56 year old male who is being evaluated via a billable telephone visit.      The patient has been notified of following:     \"This telephone visit will be conducted via a call between you and your physician/provider. We have found that certain health care needs can be provided without the need for a physical exam.  This service lets us provide the care you need with a short phone conversation.  If a prescription is necessary we can send it directly to your pharmacy.  If lab work is needed we can place an order for that and you can then stop by our lab to have the test done at a later time.    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  He had initial therapy with sorafenib which he tolerated only at a reduced dose of 200 mg/day and on which he progressed.  He had a long period of control with single agent nivolumab and when he progressed for a few months with ipilimumab/nivolumab.  In August 2021 he was switched to Ramucirumab.  His AFP tumor marker improved and his disease was radiographically stable on the Ramucirumab, but then he needed to travel out of the country and was " switched to lenvatinib which had to be dose reduced due to toxicity.       He last saw Dr. Kyle on 8/29/22 for ongoing assessment. He had restaging scans done at that visit which showed progressive metastatic hepatocellular carcinoma. He was recommended to restart therapy with Ramucirumab and stop Lenvatinib. He restarted Ramucirumab on 9/8/22 and so far is s/p 2 doses.      Last received Ramucirumab on 9/22.    Interval history: Preeti is joined on a telephone visit today. He was unable to get his video audio and visual working when we tried the video visits.  -notes some improvement in abdominal pain. Is taking famotidine and occasional tylenol. No heartburn symptoms. Bowels are regular. No difficulty with urination. No cough, sob, cp, palpitation. No edema.   -remains fatigued, but isn't sure it is much different from baseline. Continues to work. Would like to take a break from work, but has financial concerns  -worried about his rising tumor marker and wonders if there are other treatment options to try.    Current Outpatient Medications   Medication Sig Dispense Refill     acetaminophen (TYLENOL) 325 MG tablet Take 650 mg by mouth every 8 hours as needed for mild pain Do not exceed 2 grams per day.       famotidine (PEPCID) 20 MG tablet Take 1 tablet (20 mg) by mouth daily 30 tablet 0        No Known Allergies      Speech is clear.  There were no vitals taken for this visit.  Wt Readings from Last 4 Encounters:   10/06/22 63.6 kg (140 lb 4.8 oz)   09/22/22 64.6 kg (142 lb 8 oz)   09/08/22 64.2 kg (141 lb 8 oz)   08/29/22 62 kg (136 lb 9.6 oz)       Labs:    Latest Reference Range & Units 10/06/22 08:15   Sodium 136 - 145 mmol/L 137   Potassium 3.4 - 5.3 mmol/L 4.1   Chloride 98 - 107 mmol/L 105   Carbon Dioxide (CO2) 22 - 29 mmol/L 23   Urea Nitrogen 6.0 - 20.0 mg/dL 8.4   Creatinine 0.67 - 1.17 mg/dL 0.62 (L)   GFR Estimate >60 mL/min/1.73m2 >90   Calcium 8.6 - 10.0 mg/dL 9.0   Anion Gap 7 - 15 mmol/L 9    Magnesium 1.7 - 2.3 mg/dL 2.1   Albumin 3.5 - 5.2 g/dL 3.7   Protein Total 6.4 - 8.3 g/dL 7.5   Alkaline Phosphatase 40 - 129 U/L 180 (H)   ALT 10 - 50 U/L 40   AST 10 - 50 U/L 84 (H)   AFP tumor marker <=8.3 ng/mL 14,593.0 (H)   Bilirubin Total <=1.2 mg/dL 0.5   Creatinine Urine mg/dL 106.0   Glucose 70 - 99 mg/dL 98   WBC 4.0 - 11.0 10e3/uL 3.5 (L)   Hemoglobin 13.3 - 17.7 g/dL 12.2 (L)   Hematocrit 40.0 - 53.0 % 36.3 (L)   Platelet Count 150 - 450 10e3/uL 153   RBC Count 4.40 - 5.90 10e6/uL 4.24 (L)   MCV 78 - 100 fL 86   MCH 26.5 - 33.0 pg 28.8   MCHC 31.5 - 36.5 g/dL 33.6   RDW 10.0 - 15.0 % 15.1 (H)   % Neutrophils % 43   % Lymphocytes % 28   % Monocytes % 11   % Eosinophils % 16   % Basophils % 2   Absolute Basophils 0.0 - 0.2 10e3/uL 0.1   Absolute Eosinophils 0.0 - 0.7 10e3/uL 0.6   Absolute Immature Granulocytes <=0.4 10e3/uL 0.0   Absolute Lymphocytes 0.8 - 5.3 10e3/uL 1.0   Absolute Monocytes 0.0 - 1.3 10e3/uL 0.4   % Immature Granulocytes % 0   Absolute Neutrophils 1.6 - 8.3 10e3/uL 1.6   Absolute NRBCs 10e3/uL 0.0   NRBCs per 100 WBC <1 /100 0   (L): Data is abnormally low  (H): Data is abnormally high    Imaging: n/a    Impression/plan:   Metastatic hepatocellular carcinoma. He recently progressed on lenvatinib and was resumed on ramucirumab on 9/8/22.   -tolerating fairly well. Reviewed concerns about the rising AFP. We discussed that if this represents progression, the options for treatment would be to stop ramucirumab and consider resuming a TKI, but at a higher dose than what he tolerated before. I'd have concerns about him being able to continue to work and manage the side effects that were quite difficult for him before.  I recommended that we give the ramucirumab a little more time before restaging and keep his restaging scheduled at the end of 2 months of treatment as planned.  I'm not sure if he was able to understand this over the phone. Encouraged him to keep his in person visit on 10/20  prior to infusion to review concerns.  -  Abdominal pain  -mostly resolved, managing well with prn acetaminaphen and famotidine daily, monitor    35 minutes spent on the date of the encounter doing chart review, review of test results, interpretation of tests and patient visit         Again, thank you for allowing me to participate in the care of your patient.      Sincerely,    MITCH Jacobs CNP

## 2022-10-17 NOTE — PROGRESS NOTES
[facility]: Cancer Care                                                                                          Received voicemail from patient states he is calling to discuss his current health situation. Placed return call to patient. Unable to reach patient. Left detailed message asking patient to return call and speak with triage if he is experiencing symptoms or not feeling well.         Sharlene Mendez RN, BSN, OCN   RN Care Coordinator   [facility]

## 2022-10-18 NOTE — PROGRESS NOTES
Reason for Visit: seen in f/u of HCC    Oct 20, 2022      Oncology HPI:   Preeti is 56 year old with compensated cirrhosis due to chronic hepatitis B with hepatocellular carcinoma initially discovered in 2016.  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  He had initial therapy with sorafenib which he tolerated only at a reduced dose of 200 mg/day and on which he progressed.  He had a long period of control with single agent nivolumab and when he progressed for a few months with ipilimumab/nivolumab.  In August 2021 he was switched to Ramucirumab.  His AFP tumor marker improved and his disease was radiographically stable on the Ramucirumab, but then he needed to travel out of the country and was switched to lenvatinib which had to be dose reduced due to toxicity.       He last saw Dr. Kyle on 8/29/22 for ongoing assessment. He had restaging scans done at that visit which showed progressive metastatic hepatocellular carcinoma. He was recommended to restart therapy with Ramucirumab and stop Lenvatinib. He restarted Ramucirumab on 9/8/22 and so far is s/p 2 doses.      Last received Ramucirumab on 9/22.    Interval history: Mr. Pascual continues to feel poorly. He has constant abdominal pain. Mostly starts in LUQ and then he will palpate area and this will move to epigastric region and then RUQ. Pain is worse after eating. He does have some heartburn intermittently. Has intermittent nausea too but no vomiting. Bowels are slower and smaller caliber. They are daily. He stopped pepcid after two days because he felt like it gave him a headache. This did help his abdominal pain mildly. Tylenol was not helpful.     He is having more anorexia partially because eating causes him pain. Not clear early satiety. No fevers/chills. His weight is down in the past 6 weeks or so. He is drinking 32-48 oz of water daily.     He  is concerned treatment is not working but is willing to proceed with the last scheduled infusion before his CT today.     Current Outpatient Medications   Medication Sig Dispense Refill     acetaminophen (TYLENOL) 325 MG tablet Take 650 mg by mouth every 8 hours as needed for mild pain Do not exceed 2 grams per day.       omeprazole 20 MG tablet Take 1 tablet (20 mg) by mouth daily 60 tablet 1     SENNA-docusate sodium (SENNA S) 8.6-50 MG tablet Take 1-2 tablets by mouth 2 times daily as needed 60 tablet 1     traMADol (ULTRAM) 50 MG tablet Take 1 tablet (50 mg) by mouth every 6 hours as needed for severe pain 30 tablet 0          Allergies   Allergen Reactions     Pepcid [Famotidine] Headache         PHYSICAL EXAM:  BP (!) 157/80   Pulse 74   Temp 98.2  F (36.8  C)   Resp 16   Wt 62.6 kg (138 lb)   SpO2 100%   BMI 22.28 kg/m    Wt Readings from Last 4 Encounters:   10/20/22 62.6 kg (138 lb)   10/06/22 63.6 kg (140 lb 4.8 oz)   09/22/22 64.6 kg (142 lb 8 oz)   09/08/22 64.2 kg (141 lb 8 oz)   General: Alert, oriented, pleasant, NAD. Somewhat uncomfortable appearing.   HEENT: Normocephalic, atraumatic, no icterus. Slightly dry mucus membranes.   Neck: No cervical or supraclavicular LAD.  Axillary: No LAD  Lungs: CTA bilaterally, normal work of breathing  Cardiac: RRR  Abdomen: Soft, tender throughout upper quadrants, nondistended. Normoactive bowel sounds. No hepatosplenomegaly, masses.   Neuro: CNII-XII grossly intact  Extremities: No pedal edema        Labs:    10/20/22 07:25   Sodium 140   Potassium 4.0   Chloride 107   Carbon Dioxide (CO2) 22   Urea Nitrogen 9.2   Creatinine 0.54 (L)   GFR Estimate >90   Calcium 8.7   Anion Gap 11   Albumin 3.5   Protein Total 7.3   Alkaline Phosphatase 177 (H)   ALT 52 (H)    (H)   Bilirubin Total 0.7   Glucose 91         Imaging: n/a    Impression/plan:   Metastatic hepatocellular carcinoma. He recently progressed on lenvatinib and was resumed on ramucirumab on  9/8/22.   -Tolerating fairly well but clinically this does not seem to be helping and his AFP is rising. Past discussions have included having the full 2 months of treatment prior to restaging to give the treatment more time to work. He is willing to proceed with the fourth and last dose today before restaging in 2 weeks. Next treatment would be a TKI at a higher dose.    -CT and follow-up with Dr. Kyle in 2 weeks    Abdominal pain--multifactorial with elements of cancer pain, dyspepsia, constipation   -Did not tolerate Pepcid but this was helpful. Start omeprazole 20 mg once daily  -Tramadol PRN every 6 hours, discussed this can be sedating  -Senna S 1-2 tabs BID PRN for constipation     Anorexia-- I suspect this will improve when pain improves. Start 1-2 protein shakes per day.     Dehydration-- IVF bolus today. Increase fluid intake to 64 oz daily. He is able to drink fluids without difficulty.     HTN: 157/80 today but is in pain. Will recheck and consider starting antihypertensive if this remains closer to 160/100 consistently.     Greater than 40 minutes was spent with this patient with greater than 20 minutes spent in counseling and coordination of care.    Rut Corrales PA-C

## 2022-10-20 NOTE — PROGRESS NOTES
Infusion Nursing Note:  Preeti Pascual presents today for Cycle 4 Day 1 Ramucirumab.    Patient seen by provider today: Yes: Rut Corrales PA-C    Note: Patient presents to the infusion center today after her provider appt.    Intravenous Access:  Peripheral IV placed.    Treatment Conditions:   Latest Reference Range & Units 10/20/22 07:25   Sodium 136 - 145 mmol/L 140   Potassium 3.4 - 5.3 mmol/L 4.0   Chloride 98 - 107 mmol/L 107   Carbon Dioxide (CO2) 22 - 29 mmol/L 22   Urea Nitrogen 6.0 - 20.0 mg/dL 9.2   Creatinine 0.67 - 1.17 mg/dL 0.54 (L)   GFR Estimate >60 mL/min/1.73m2 >90   Calcium 8.6 - 10.0 mg/dL 8.7   Anion Gap 7 - 15 mmol/L 11   Albumin 3.5 - 5.2 g/dL 3.5   Protein Total 6.4 - 8.3 g/dL 7.3   Alkaline Phosphatase 40 - 129 U/L 177 (H)   ALT 10 - 50 U/L 52 (H)   AST 10 - 50 U/L 113 (H)   Bilirubin Total <=1.2 mg/dL 0.7   Glucose 70 - 99 mg/dL 91   Protein Albumin Urine Negative mg/dL Negative     /83    Results reviewed, labs MET treatment parameters, ok to proceed with treatment.    Post Infusion Assessment:  Patient tolerated infusion without incident.  Blood return noted pre and post infusion.  No evidence of extravasations.  Access discontinued per protocol.     Discharge Plan:   Prescription refills given for Senna, Prilosec and Tramdol.  Discharge instructions reviewed with: Patient.  Patient and/or family verbalized understanding of discharge instructions and all questions answered.  AVS to patient via [employer].  Patient will return 11/3 for next appointment.   Patient discharged in stable condition accompanied by: self.  Departure Mode: Ambulatory.      Danielle Wiggins RN

## 2022-10-20 NOTE — NURSING NOTE
"Oncology Rooming Note    October 20, 2022 7:39 AM   Preeti Pascual is a 56 year old male who presents for:    Chief Complaint   Patient presents with     Labs Only     Venipuncture, vitals chcked, PIV placed     Initial Vitals: BP (!) 157/80   Pulse 74   Temp 98.2  F (36.8  C)   Resp 16   Wt 62.6 kg (138 lb)   SpO2 100%   BMI 22.28 kg/m   Estimated body mass index is 22.28 kg/m  as calculated from the following:    Height as of 10/7/21: 1.676 m (5' 5.98\").    Weight as of this encounter: 62.6 kg (138 lb). Body surface area is 1.71 meters squared.  Extreme Pain (8) Comment: Data Unavailable   No LMP for male patient.  Allergies reviewed: Yes  Medications reviewed: Yes    Medications: Medication refills not needed today.  Pharmacy name entered into [facility]:    [id number] - [place], [place] - [address] [place] [address] NE AT [facility] & [address] [place] [address] - [address] [place] [address] [id number]  [facility] - [place], [place] - [address] [place] [address]    Clinical concerns: Pt presents today for f/u.  Pepcid is added to his allergy list as he c/o severe headache after taking it.       Jelena Greer LPN  10/20/2022              "

## 2022-10-20 NOTE — NURSING NOTE
Chief Complaint   Patient presents with     Labs Only     Venipuncture, vitals chcked, PIV placed     Alize Linder RN on 10/20/2022 at 7:27 AM

## 2022-10-20 NOTE — LETTER
10/20/2022         RE: Preeti Pascual  [address]  [facility] [address]      Reason for Visit: seen in f/u of HCC    Oct 20, 2022      Oncology HPI:   Preeti is 56 year old with compensated cirrhosis due to chronic hepatitis B with hepatocellular carcinoma initially discovered in 2016.  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  He had initial therapy with sorafenib which he tolerated only at a reduced dose of 200 mg/day and on which he progressed.  He had a long period of control with single agent nivolumab and when he progressed for a few months with ipilimumab/nivolumab.  In August 2021 he was switched to Ramucirumab.  His AFP tumor marker improved and his disease was radiographically stable on the Ramucirumab, but then he needed to travel out of the country and was switched to lenvatinib which had to be dose reduced due to toxicity.       He last saw Dr. Kyle on 8/29/22 for ongoing assessment. He had restaging scans done at that visit which showed progressive metastatic hepatocellular carcinoma. He was recommended to restart therapy with Ramucirumab and stop Lenvatinib. He restarted Ramucirumab on 9/8/22 and so far is s/p 2 doses.      Last received Ramucirumab on 9/22.    Interval history: Mr. Pascual continues to feel poorly. He has constant abdominal pain. Mostly starts in LUQ and then he will palpate area and this will move to epigastric region and then RUQ. Pain is worse after eating. He does have some heartburn intermittently. Has intermittent nausea too but no vomiting. Bowels are slower and smaller caliber. They are daily. He stopped pepcid after two days because he felt like it gave him a headache. This did help his abdominal pain mildly. Tylenol was not helpful.     He is having more anorexia partially because eating causes him pain. Not clear early satiety. No  fevers/chills. His weight is down in the past 6 weeks or so. He is drinking 32-48 oz of water daily.     He is concerned treatment is not working but is willing to proceed with the last scheduled infusion before his CT today.     Current Outpatient Medications   Medication Sig Dispense Refill     acetaminophen (TYLENOL) 325 MG tablet Take 650 mg by mouth every 8 hours as needed for mild pain Do not exceed 2 grams per day.       omeprazole 20 MG tablet Take 1 tablet (20 mg) by mouth daily 60 tablet 1     SENNA-docusate sodium (SENNA S) 8.6-50 MG tablet Take 1-2 tablets by mouth 2 times daily as needed 60 tablet 1     traMADol (ULTRAM) 50 MG tablet Take 1 tablet (50 mg) by mouth every 6 hours as needed for severe pain 30 tablet 0          Allergies   Allergen Reactions     Pepcid [Famotidine] Headache         PHYSICAL EXAM:  BP (!) 157/80   Pulse 74   Temp 98.2  F (36.8  C)   Resp 16   Wt 62.6 kg (138 lb)   SpO2 100%   BMI 22.28 kg/m    Wt Readings from Last 4 Encounters:   10/20/22 62.6 kg (138 lb)   10/06/22 63.6 kg (140 lb 4.8 oz)   09/22/22 64.6 kg (142 lb 8 oz)   09/08/22 64.2 kg (141 lb 8 oz)   General: Alert, oriented, pleasant, NAD. Somewhat uncomfortable appearing.   HEENT: Normocephalic, atraumatic, no icterus. Slightly dry mucus membranes.   Neck: No cervical or supraclavicular LAD.  Axillary: No LAD  Lungs: CTA bilaterally, normal work of breathing  Cardiac: RRR  Abdomen: Soft, tender throughout upper quadrants, nondistended. Normoactive bowel sounds. No hepatosplenomegaly, masses.   Neuro: CNII-XII grossly intact  Extremities: No pedal edema        Labs:    10/20/22 07:25   Sodium 140   Potassium 4.0   Chloride 107   Carbon Dioxide (CO2) 22   Urea Nitrogen 9.2   Creatinine 0.54 (L)   GFR Estimate >90   Calcium 8.7   Anion Gap 11   Albumin 3.5   Protein Total 7.3   Alkaline Phosphatase 177 (H)   ALT 52 (H)    (H)   Bilirubin Total 0.7   Glucose 91         Imaging: n/a    Impression/plan:    Metastatic hepatocellular carcinoma. He recently progressed on lenvatinib and was resumed on ramucirumab on 9/8/22.   -Tolerating fairly well but clinically this does not seem to be helping and his AFP is rising. Past discussions have included having the full 2 months of treatment prior to restaging to give the treatment more time to work. He is willing to proceed with the fourth and last dose today before restaging in 2 weeks. Next treatment would be a TKI at a higher dose.    -CT and follow-up with Dr. Kyle in 2 weeks    Abdominal pain--multifactorial with elements of cancer pain, dyspepsia, constipation   -Did not tolerate Pepcid but this was helpful. Start omeprazole 20 mg once daily  -Tramadol PRN every 6 hours, discussed this can be sedating  -Senna S 1-2 tabs BID PRN for constipation     Anorexia-- I suspect this will improve when pain improves. Start 1-2 protein shakes per day.     Dehydration-- IVF bolus today. Increase fluid intake to 64 oz daily. He is able to drink fluids without difficulty.     HTN: 157/80 today but is in pain. Will recheck and consider starting antihypertensive if this remains closer to 160/100 consistently.     Greater than 40 minutes was spent with this patient with greater than 20 minutes spent in counseling and coordination of care.    ROBI Ritter PA-C

## 2022-10-20 NOTE — TELEPHONE ENCOUNTER
Returned patient's call.  No answer.  Will try again later.    Rhonda MCKNIGHT LPN  Hepatology Clinic     [facility]    Phone Message    May a detailed message be left on voicemail: yes     Reason for Call: Other: Patient is requesting a call back to discuss tumor and liver issue. Please call patient at [phone] to advise.     Action Taken: Message routed to:  [facility] ([facility]    Travel Screening: Not Applicable                                                                       4 = No assist / stand by assistance 3 = A little assistance

## 2022-10-20 NOTE — PATIENT INSTRUCTIONS
[facility] Triage and after hours / weekends / holidays:  [phone]    Please call the triage or after hours line if you experience a temperature greater than or equal to 100.4, shaking chills, have uncontrolled nausea, vomiting and/or diarrhea, dizziness, shortness of breath, chest pain, bleeding, unexplained bruising, or if you have any other new/concerning symptoms, questions or concerns.      If you are having any concerning symptoms or wish to speak to a provider before your next infusion visit, please call your care coordinator or triage to notify them so we can adequately serve you.     If you need a refill on a narcotic prescription or other medication, please call before your infusion appointment.                October 2022 Sunday Monday Tuesday Wednesday Thursday Friday Saturday                                 1       2     3     4     5     6    LAB PERIPHERAL   7:30 AM   (15 min.)   [facility] LAB DRAW   [facility]    RETURN   7:45 AM   (45 min.)   Lulu Blackman PA-C   [facility]    ONC INFUSION 2 HR (120 MIN)   8:00 AM   (120 min.)    ONC INFUSION NURSE   [facility] 7     8       9     10    VIDEO VISIT RETURN   6:45 AM   (45 min.)   Nallely Smiley APRN CNP   [facility] 11     12     13     14     15       16     17     18     19     20    LAB PERIPHERAL   7:00 AM   (15 min.)   [facility] LAB DRAW   [facility]    RETURN   7:15 AM   (45 min.)   Rut Corrales PA-C   [facility]    ONC INFUSION 2 HR (120 MIN)   9:00 AM   (120 min.)    ONC INFUSION NURSE   [facility] 21     22       23     24     25     26     27     28     29       30     31    CT CHEST ABDOMEN PELVIS WWO  10:10 AM   (20 min.)   [facility]   [facility] [place]    LAB  10:15 AM   (15  min.)    LAB   [facility] [place]    RETURN   1:15 PM   (30 min.)   Agustin Kyle MD   [facility]                                        November 2022 Sunday Monday Tuesday Wednesday Thursday Friday Saturday             1     2     3    ONC INFUSION 2 HR (120 MIN)   7:00 AM   (120 min.)    ONC INFUSION NURSE   [facility] 4     5       6     7     8     9     10     11     12       13     14     15     16     17     18     19       20     21     22     23     24     25     26       27     28     29     30                                       Recent Results (from the past 24 hour(s))   Protein qualitative urine    Collection Time: 10/20/22  7:25 AM   Result Value Ref Range    Protein Albumin Urine Negative Negative mg/dL   Comprehensive metabolic panel    Collection Time: 10/20/22  7:25 AM   Result Value Ref Range    Sodium 140 136 - 145 mmol/L    Potassium 4.0 3.4 - 5.3 mmol/L    Chloride 107 98 - 107 mmol/L    Carbon Dioxide (CO2) 22 22 - 29 mmol/L    Anion Gap 11 7 - 15 mmol/L    Urea Nitrogen 9.2 6.0 - 20.0 mg/dL    Creatinine 0.54 (L) 0.67 - 1.17 mg/dL    Calcium 8.7 8.6 - 10.0 mg/dL    Glucose 91 70 - 99 mg/dL    Alkaline Phosphatase 177 (H) 40 - 129 U/L     (H) 10 - 50 U/L    ALT 52 (H) 10 - 50 U/L    Protein Total 7.3 6.4 - 8.3 g/dL    Albumin 3.5 3.5 - 5.2 g/dL    Bilirubin Total 0.7 <=1.2 mg/dL    GFR Estimate >90 >60 mL/min/1.73m2

## 2022-10-31 NOTE — NURSING NOTE
"Oncology Rooming Note    October 31, 2022 1:15 PM   Preeti Pascual is a 56 year old male who presents for:    Chief Complaint   Patient presents with     Oncology Clinic Visit     Rtn for HCC     Initial Vitals: BP (!) 158/77 (BP Location: Right arm, Patient Position: Sitting, Cuff Size: Adult Regular)   Pulse 83   Temp 98.3  F (36.8  C) (Oral)   Wt 61.7 kg (136 lb)   SpO2 100%   BMI 21.96 kg/m   Estimated body mass index is 21.96 kg/m  as calculated from the following:    Height as of 10/7/21: 1.676 m (5' 5.98\").    Weight as of this encounter: 61.7 kg (136 lb). Body surface area is 1.69 meters squared.  Mild Pain (3) Comment: Data Unavailable   No LMP for male patient.  Allergies reviewed: Yes  Medications reviewed: Yes    Medications: Medication refills not needed today.  Pharmacy name entered into [facility]:    [id number] - [place], [place] - [address] AT [facility] & [address] [place], [place] [address] [facility] [address]  [facility] - [place], [place] - [address] [place] [address]    Clinical concerns: Pt has been experiencing pain in abdomen/chest over the last 4 or so weeks. Consistently about a 3 on pain scale. Says it moves around, mostly in chest but sometimes in back or sides of abdomen. Pain in chest when sneezing.    Mary Hernandez, EMT            "

## 2022-10-31 NOTE — LETTER
10/31/2022         RE: Preeti Pascual  [address]  [facility] [address]          I am seeing Preeti Pascual today in follow-up of hepatocellular carcinoma.    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  He had initial liver directed therapy with radial his embolization and declined liver transplant.  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  Initial therapy with sorafenib was poorly tolerated and could only get to a dose of 200 mg/day on which he progressed.  He then had a long period of control with single agent nivolumab and upon progression without we added ipilimumab without achieving control and with the development of severe autoimmune hepatitis (without evidence of reactivation of his hepatitis B.)  He had a period off treatment as he recovered from the hepatitis and then was started on Ramucirumab with stable disease and then switched to lenvatinib so that he could travel back to [facility] to visit family.  He required dose reductions to tolerate the lenvatinib and had a short period of control on that but as of 2 months ago had progressed.  He is now back on Ramucirumab and returns for ongoing response assessment.    He tells me at present he feels a little bit more weak and tired but is still working full-time.  He is having more fleeting migratory abdominal pains that he had difficulty describing.  He is not having so much trouble with headaches as he had been before.  He has not had any episodes of confusion.  He has had no trouble with edema.  He has had no apparent bleeding.    On physical exam he is slender but otherwise well-appearing  He has no icterus.  He has no demonstrable ascites in the abdomen is nontender.  He has no peripheral edema.    I personally reviewed his CT scan which shows clear progression of the disease in his liver as well as  both adrenal glands.  He has normal electrolytes and renal function.  His bilirubin, albumin and liver enzymes are unremarkable.  He has mild leukopenia and a normal hemoglobin and platelet count.  His alpha-fetoprotein is markedly increased now up to 24,000.    Assessment/plan: Metastatic hepatocellular carcinoma in a patient with an ECOG performance status of 1 and good liver function with progression on multiple prior lines of therapy.  I had a long discussion with him about how to proceed at this point.  He is well enough that it is reasonable to consider additional therapies but the chances of benefiting his cancer are quite small and chances of significant toxicity are quite significant.  I do not think I would want to pursue more aggressive immunotherapy given his progression on the combination of ipilimumab and nivolumab with the development of severe autoimmune hepatitis.  There are some tyrosine kinase inhibitors we can still try but his tolerance of those have always been quite poor and having progressed on to at this point I do not think trying a third is going to be fruitful.  He is well enough that we could consider conventional chemotherapy and I think he would probably tolerate gemcitabine/oxaliplatin.  I reviewed with him that this is associated with modest response rate and its not clear if it provides a survival benefit or not.  We discussed my recommendation of pursuing symptomatic management without further cancer treatment but he very much wants to try further therapy.  I went through the details of the gem ox regimen with him and after having all his questions answered he wished to proceed with that.  He did express that if he progresses on this line of therapy that he probably will make a choice to forego further active cancer directed therapy.  We will plan on getting started as we have insurance approval.  We will see him at the end of the first cycle for toxicity assessment, and then we  will do our first response assessment after 2 months of therapy.    Total time by me inclusive of the above visit with a long discussion, ordering, coordination of care, and documentation was greater than 75 minutes.        Agustin Kyle MD

## 2022-10-31 NOTE — PROGRESS NOTES
[facility]: Cancer Care Plan of Care Education Note                                    Discussion with Patient:                                                      Met with patient following his clinic visit with Dr Kyle. Reviewed treatment change to Oxaliplatin/Gemzar. Reviewed expected side effects. Discussed infusion scheduling and treatment schedule. Will plan for VIRGILIO follow-up prior to cycle 2.         Assessment:                                                      Assessment completed with:: Patient    Current living arrangement  I live in a private home    Plan of Care Education   Yearly learning assessment completed?: Yes (see Education tab)  Diagnosis:: HCC  Does patient understand diagnosis?: Yes  Tx plan/regimen:: Oxaliplatin/Gemzar  Does patient understand treatment plan/regimen?: Yes  Preparing for treatment:: Reviewed treatment preparation information with patient (vascular access, day of chemo, visitor policy, what to bring, etc.)  Vascular access:: Peripheral IV  Side effect education:: Diarrhea/Constipation;Fatigue;Neuropathy;Nausea/Vomiting;Mylosuppression;Lab value monitoring (anemia, neutropenia, thrombocytopenia);Infection  Transportation means:: Regular car  Informal Support system:: Family;Friends  Coping - concerns/fears reviewed with patient:: Yes  Plan of Care:: VIRGILIO follow-up appointment;Lab appointment;Imaging;MD follow-up appointment;Treatment schedule  When to call provider:: Bleeding;Uncontrolled nausea/vomiting;Increased shortness of breath;New/worsening pain;Shaking chills;Temperature >100.4F;Uncontrolled diarrhea/constipation  Reasons for deferring treatment reviewed with patient:: Yes    Evaluation of Learning  Patient Education Provided: Yes  Readiness:: Acceptance  Method:: Literature;Explanation  Response:: Verbalizes understanding      Intervention/Education provided during outreach:                                                       Patient to follow up as  scheduled at next appt  Patient to call/CashStar[employer] message with updates  Confirmed patient has clinic and triage numbers        Sharlene Mendez RN, BSN, OCN   RN Care Coordinator   [facility]

## 2022-10-31 NOTE — LETTER
October 31, 2022      TO: Preeti Pascual  [address]  [facility] [address]         To whom it may concern,     Mr. Preeti Pascual is currently under my care for a diagnosis of Hepatocellular cancer. He is currently under going chemotherapy treatments. Due to the medical demands of his cancer treatment he will need to be out of work for 2 consecutive days. He may also require additional days off for symptom recovery. Please allow Mr. Pascual to be off work as needed for all medical needs.     Please feel free to contact my office at the number above with any questions or concerns.       Sincerely,      Agustin Kyle MD

## 2022-10-31 NOTE — PROGRESS NOTES
I am seeing Preeti Pascual today in follow-up of hepatocellular carcinoma.    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  He had initial liver directed therapy with radioembolization and he declined liver transplant.  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  Initial therapy with sorafenib was poorly tolerated and we could only get to a dose of 200 mg/day on which he progressed.  He then had a long period of control with single agent nivolumab and upon progression with that we added ipilimumab without achieving control and with the development of severe autoimmune hepatitis (without evidence of reactivation of his hepatitis B.)  He had a period off treatment as he recovered from the hepatitis and then was started on Ramucirumab with stable disease and then switched to lenvatinib so that he could travel back to [facility] to visit family.  He required dose reductions to tolerate the lenvatinib and had a short period of control on that but as of 2 months ago had progressed.  He is now back on Ramucirumab and returns for ongoing response assessment.    He tells me at present he feels a little bit more weak and tired but is still working full-time.  He is having more fleeting migratory abdominal pains that he had difficulty describing.  He is not having so much trouble with headaches as he had been before.  He has not had any episodes of confusion.  He has had no trouble with edema.  He has had no apparent bleeding.    On physical exam he is slender but otherwise well-appearing  He has no icterus.  He has no demonstrable ascites and the abdomen is nontender.  He has no peripheral edema.    I personally reviewed his CT scan which shows clear progression of the disease in his liver as well as both adrenal glands.  He has normal electrolytes and renal function.  His bilirubin, albumin  and liver enzymes are unremarkable.  He has mild leukopenia and a normal hemoglobin and platelet count.  His alpha-fetoprotein is markedly increased now up to 24,000.    Assessment/plan: Metastatic hepatocellular carcinoma in a patient with an ECOG performance status of 1 and good liver function with progression on multiple prior lines of therapy.  I had a long discussion with him about how to proceed at this point.  He is well enough that it is reasonable to consider additional therapies but the chances of benefiting his cancer are quite small and chances of significant toxicity are quite significant.  I do not think I would want to pursue more aggressive immunotherapy given his progression on the combination of ipilimumab and nivolumab with the development of severe autoimmune hepatitis.  There are some tyrosine kinase inhibitors we can still try but his tolerance of those have always been quite poor and having progressed on two at this point I do not think trying a third is going to be fruitful.  He is well enough that we could consider conventional chemotherapy and I think he would probably tolerate gemcitabine/oxaliplatin.  I reviewed with him that this is associated with modest response rate and its not clear if it provides a survival benefit or not.  We discussed my recommendation of pursuing symptomatic management without further cancer treatment but he very much wants to try further therapy.  I went through the details of the gem ox regimen with him and after having all his questions answered he wished to proceed with that.  He did express that if he progresses on this line of therapy that he probably will make a choice to forego further active cancer directed therapy.  We will plan on getting started as we have insurance approval.  We will see him at the end of the first cycle for toxicity assessment, and then we will do our first response assessment after 2 months of therapy.    Total time by me inclusive  of the above visit with a long discussion, ordering, coordination of care, and documentation was greater than 75 minutes.

## 2022-11-03 NOTE — TELEPHONE ENCOUNTER
[facility]: Cancer Care                                                                                          Placed return call to patient to discuss his concerns. Reviewed with patient current treatment is currently not yet approved by insurance, at this time that remains largest barrier. Patient has multiple scheduling concerns and limitations. RNCC followed up with scheduling team and have asked they call patient to discuss infusion schedule. Reassured patient Dr Kyle would still like him to receive Gemzar/Oxaliplatin. He is currently working on LMN in order to aid with insurance approval. All questions answered to his stated satisfaction.          Sharlene Mendze RN, BSN, OCN   RN Care Coordinator   [facility]

## 2022-11-03 NOTE — TELEPHONE ENCOUNTER
He is calling regarding scheduling and follow-up and states that he is expecting another infusion prior to the end of the month.    He did see Dr. Kyle and Avtar Mendez RNCC on 10/31.  He has already taken time off mid-November and wonders why this was changed.     Routed to RNCC.

## 2022-11-11 NOTE — TELEPHONE ENCOUNTER
1609 Per Nallely Smiley Oncology Care team, recommends Dr. Trujillo's care team follow up with pt as pt seen today by Dr. Trujillo and Dr. Trujillo prescribed a new medication of Dilaudid.     1620 This writer spoke to Albina from Dr. Trujillo's office, they have called pt and have followed up with pt on the plan for symptom management.

## 2022-11-11 NOTE — TELEPHONE ENCOUNTER
"Patient stating new medication given today rested x 1 hour, then non stop vomiting  Pain went to his brain  He can't eat  What can he do  Vomiting has   Patient with   Nurse Triage SBAR    Is this a 2nd Level Triage? YES, LICENSED PRACTITIONER REVIEW IS REQUIRED    Situation: Saw Dr Trujillo today in Hepatology:  Was prescribed   Hydromorphone provided today\"Take 1 tablet (2 mg) by mouth every 6 hours as needed for pain - Oral\" for treatment of abdominal pain related to his tumor  He has picked up medication and took it x 1 dose  - He then slept for one hour and awoke feeling  A lot of dizziness \" the medication went to my head\"and continual vomiting without hematemesis x 1 hour, no diarrhea.  He is unable to take fluids and has no Zofran or antinausea medications. I suggested Emergency Room for hydration and pain control and also told him I would notify the  Marianne/ Hepatology and Dr Kyle in Oncology teams ASAP  HYDROmorphone (DILAUDID) 2 MG tablet 30 tablet 0 11/11/2022         Background: Per Dr Trujillo today: Chronic hepatitis C and complicated by hepatocellular carcinoma.  Unfortunately, he has been through a number of different therapies for his hepatocellular carcinoma and has failed everything to date  Last Dr Kyle visit 10/31/22  Assessment: Pain related to  Metastatic hepatocellular carcinoma responded to Hydromorphone prescribed today with continual vomiting x 1 hour, now unable to take fluids.  Protocol Recommended Disposition:   ED/ cancer pain management/ hydration  Recommendation: Please contact for plan of care. He wants to avoid ED if possible    Routed to provider teams    Does the patient meet one of the following criteria for ADS visit consideration? No    Reason for Disposition    SEVERE abdominal pain (e.g., excruciating)    Additional Information    Negative: Passed out (i.e., fainted, collapsed and was not responding)    Negative: Shock suspected (e.g., cold/pale/clammy skin, too weak to stand, " low BP, rapid pulse)    Negative: Visible sweat on face or sweat is dripping down    Negative: Chest pain    Protocols used: ABDOMINAL PAIN - UPPER-A-OH

## 2022-11-11 NOTE — NURSING NOTE
Chief Complaint   Patient presents with     RECHECK       BP (!) 145/83   Pulse 76   Temp 97.4  F (36.3  C) (Oral)   Wt 61 kg (134 lb 6.4 oz)   SpO2 99%   BMI 21.70 kg/m      Americo Espitia on 11/11/2022 at 10:02 AM

## 2022-11-11 NOTE — PROGRESS NOTES
HISTORY OF PRESENT ILLNESS:  I had the pleasure of seeing Preeti Pascual for followup in the Liver Clinic at the [facility] on 11/11/2022.  Mr. Pascual returns for followup of cirrhosis caused by chronic hepatitis C and complicated by hepatocellular carcinoma.  Unfortunately, he has been through a number of different therapies for his hepatocellular carcinoma and has failed everything to date.  He is scheduled to begin gem/cis therapy at the end of November.    He is complaining of fairly significant abdominal pain that really is quite clearly associated with the tumor.  He does not have any pain medication at home.  He was told to take Prilosec, which seems a bit unusual, and of course, it has not had any benefit.  He denies any itching or skin rash.  He does complain of a moderate amount of fatigue.  He denies any increased abdominal girth or lower extremity edema.  He has not had any gastrointestinal bleeding or any overt signs of hepatic encephalopathy.    He denies any fevers or chills, cough or shortness of breath.  He denies any nausea or vomiting, diarrhea or constipation.  His appetite is diminished, and he has lost an unspecified amount of weight.  Since he last saw me, his weight is down 15 pounds.    Current Outpatient Medications   Medication     HYDROmorphone (DILAUDID) 2 MG tablet     omeprazole 20 MG tablet     acetaminophen (TYLENOL) 325 MG tablet     SENNA-docusate sodium (SENNA S) 8.6-50 MG tablet     No current facility-administered medications for this visit.     BP (!) 145/83   Pulse 76   Temp 97.4  F (36.3  C) (Oral)   Wt 61 kg (134 lb 6.4 oz)   SpO2 99%   BMI 21.70 kg/m      PHYSICAL EXAMINATION:    GENERAL:  He actually looks remarkably well.  HEENT:  No scleral icterus or temporal muscle wasting.  CHEST:  Clear.  ABDOMEN:  There is fairly marked tenderness in the right upper quadrant.  His liver is 13-14 cm in span and 3-4 fingers from the right costal margin.   No spleen tip is palpable.  EXTREMITIES:  No edema.  SKIN:  No stigmata of chronic liver disease.  NEUROLOGIC:  No asterixis.    STUDIES:  His most recent laboratory tests are from 10/31 and show his white count is 3.8 hemoglobin is 13.3, and platelets are 150,000.  Sodium is 136, potassium is 4.3, BUN is 9, and creatinine is 0.6.  His AST is 165, ALT is 98, and alkaline phosphatase is 255.  His albumin is 3.8 with a total protein of 8.0, and total bilirubin is 1.0. Alpha-fetoprotein is 24,710.  I also did review his CT scan that shows a large mass in segment 6-7 that has grown since his last evaluation.  He has some small nodules in the liver that for the most part seem to be unchanged.  He also does have some peritoneal implants throughout the liver as well.    IMPRESSION:  Mr. Pascual has progressive hepatocellular carcinoma.  I really do not have any other great ideas in terms of additional therapy for him.  Dr. Kyle is dictating his HCC therapy.  I have given him some Dilaudid and recommend he contact Dr. Kyle's office about getting better long-term control of his pain.  He really is in a significant amount of pain.  He asked me for a followup appointment, and I have done so in 3 months.    Thank you very much for allowing me to participate in the care of this patient.  If you have any questions regarding my recommendations, please do not hesitate to contact me.         Trevor Trujillo MD      Professor of Medicine  [employer]      Executive Medical Director, Solid Organ Transplant Program  [facility]

## 2022-11-15 NOTE — LETTER
11/15/2022         RE: Preeti Pascual  [address]  [facility] [address]        Dear Colleague,    Thank you for referring your patient, Preeti Pascual, to the [facility]. Please see a copy of my visit note below.    Preeti is a 56 year old who is being evaluated via a billable telephone visit.        Oncology/Hematology Visit Note  Nov 15, 2022    Reason for Visit: follow up of hepatocellular carcinoma (HCC)    History of Present Illness: Preeti Pascual is a 56 year old male with hepatocellular carcinoma. 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  He had initial liver directed therapy with radial his embolization and declined liver transplant.  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  Initial therapy with sorafenib was poorly tolerated and could only get to a dose of 200 mg/day on which he progressed.  He then had a long period of control with single agent nivolumab and upon progression without we added ipilimumab without achieving control and with the development of severe autoimmune hepatitis (without evidence of reactivation of his hepatitis B.)  He had a period off treatment as he recovered from the hepatitis and then was started on Ramucirumab with stable disease and then switched to lenvatinib so that he could travel back to [facility] to visit family.  He required dose reductions to tolerate the lenvatinib and had a short period of control on that but as of 2 months ago had progressed.  He then resumed Ramucirumab.  CT CAP on 10/31/22 showed disease progression. He is here today for evaluation of abdominal pain.     Interval History:  -Has 9/10 all over abdominal pain, which is getting worse. Has decreased appetite. Had vomiting after taking Dilaudid, so stopped taking it. Vomiting has resolved.   -Feels weight has declined.   -Has abdominal cramping  after eating.   -Found no benefit from omeprazole which he took for 3-4 days.   -Has fatigue. Not taking naps.   -Not sleeping well due to pain.   -Has bowel movements 1-2 times per day.   -Has not been able to work due to pain.   -Denies any nausea now.     Current Outpatient Medications   Medication Sig Dispense Refill     acetaminophen (TYLENOL) 325 MG tablet Take 650 mg by mouth every 8 hours as needed for mild pain Do not exceed 2 grams per day. (Patient not taking: Reported on 11/11/2022)       omeprazole 20 MG tablet Take 1 tablet (20 mg) by mouth daily 60 tablet 1     SENNA-docusate sodium (SENNA S) 8.6-50 MG tablet Take 1-2 tablets by mouth 2 times daily as needed (Patient not taking: Reported on 11/11/2022) 60 tablet 1     Objective:  General: patient appears well in no acute distress, alert and oriented, speech clear and fluid  Skin: no visualized rash or lesions on visualized skin  Resp: Appears to be breathing comfortably without accessory muscle usage, speaking in full sentences, no audible wheezes or cough.  Psych: Coherent speech, normal rate and volume, able to articulate logical thoughts, able to abstract reason, no tangential thoughts, no hallucinations or delusions  Patient's affect is appropriate.    Laboratory Data:  Most Recent 3 CBC's:  Recent Labs   Lab Test 10/31/22  1022 10/06/22  0815 09/08/22  0644 08/29/22  1105   WBC 3.8* 3.5* 4.8 4.4   HGB 13.3 12.2* 12.3* 13.3   MCV 87 86 85 82    153 162 142*   ANEUTAUTO 1.7 1.6  --  1.9    Most Recent 3 BMP's:  Recent Labs   Lab Test 10/31/22  1022 10/20/22  0725 10/06/22  0815    140 137   POTASSIUM 4.3 4.0 4.1   CHLORIDE 105 107 105   CO2 23 22 23   BUN 8.9 9.2 8.4   CR 0.61* 0.54* 0.62*   ANIONGAP 8 11 9   STACEY 9.3 8.7 9.0   GLC 97 91 98   PROTTOTAL 8.0 7.3 7.5   ALBUMIN 3.8 3.5 3.7    Most Recent 2 LFT's:  Recent Labs   Lab Test 10/31/22  1022 10/20/22  0725   * 113*   ALT 98* 52*   ALKPHOS 255* 177*   BILITOTAL 1.0 0.7    I  reviewed the above labs today.    Assessment and Plan:  Metastatic hepatocellular carcinoma. Recent imaging showed disease progression. Plan is to start on Gemzar/oxaliplatin. Will plan to repeat imaging after 2 months of treatment. I will see him back in 2 weeks to start treatment.     Abdominal pain. Secondary to cancer. No help from omeprazole. He had nausea with Dilaudid. Will trial oxycodone.    Anorexia. Secondary to cancer. Will see if he can enroll on the appetite stimulant clinical trial.    Sendy Yun PA-C  [facility] [address] [address]  [phone]    28 minutes spent on the date of the encounter doing chart review, review of test results, interpretation of tests and documentation, in addition to 13 minutes spent on the phone with the patient.     Duration of call: 13 minutes      Again, thank you for allowing me to participate in the care of your patient.        Sincerely,        Sendy Yun PA-C

## 2022-11-15 NOTE — PROGRESS NOTES
Preeti is a 56 year old who is being evaluated via a billable telephone visit.        Oncology/Hematology Visit Note  Nov 15, 2022    Reason for Visit: follow up of hepatocellular carcinoma (HCC)    History of Present Illness: Preeti Pascual is a 56 year old male with hepatocellular carcinoma. 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  He had initial liver directed therapy with radial his embolization and declined liver transplant.  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  Initial therapy with sorafenib was poorly tolerated and could only get to a dose of 200 mg/day on which he progressed.  He then had a long period of control with single agent nivolumab and upon progression without we added ipilimumab without achieving control and with the development of severe autoimmune hepatitis (without evidence of reactivation of his hepatitis B.)  He had a period off treatment as he recovered from the hepatitis and then was started on Ramucirumab with stable disease and then switched to lenvatinib so that he could travel back [place] [facility] [place] to visit family.  He required dose reductions to tolerate the lenvatinib and had a short period of control on that but as of 2 months ago had progressed.  He then resumed Ramucirumab.  CT CAP on 10/31/22 showed disease progression. He is here today for evaluation of abdominal pain.     Interval History:  -Has 9/10 all over abdominal pain, which is getting worse. Has decreased appetite. Had vomiting after taking Dilaudid, so stopped taking it. Vomiting has resolved.   -Feels weight has declined.   -Has abdominal cramping after eating.   -Found no benefit from omeprazole which he took for 3-4 days.   -Has fatigue. Not taking naps.   -Not sleeping well due to pain.   -Has bowel movements 1-2 times per day.   -Has not been able to work due to pain.   -Denies any nausea now.     Current  Outpatient Medications   Medication Sig Dispense Refill     acetaminophen (TYLENOL) 325 MG tablet Take 650 mg by mouth every 8 hours as needed for mild pain Do not exceed 2 grams per day. (Patient not taking: Reported on 11/11/2022)       omeprazole 20 MG tablet Take 1 tablet (20 mg) by mouth daily 60 tablet 1     SENNA-docusate sodium (SENNA S) 8.6-50 MG tablet Take 1-2 tablets by mouth 2 times daily as needed (Patient not taking: Reported on 11/11/2022) 60 tablet 1     Objective:  General: patient appears well in no acute distress, alert and oriented, speech clear and fluid  Skin: no visualized rash or lesions on visualized skin  Resp: Appears to be breathing comfortably without accessory muscle usage, speaking in full sentences, no audible wheezes or cough.  Psych: Coherent speech, normal rate and volume, able to articulate logical thoughts, able to abstract reason, no tangential thoughts, no hallucinations or delusions  Patient's affect is appropriate.    Laboratory Data:  Most Recent 3 CBC's:  Recent Labs   Lab Test 10/31/22  1022 10/06/22  0815 09/08/22  0644 08/29/22  1105   WBC 3.8* 3.5* 4.8 4.4   HGB 13.3 12.2* 12.3* 13.3   MCV 87 86 85 82    153 162 142*   ANEUTAUTO 1.7 1.6  --  1.9    Most Recent 3 BMP's:  Recent Labs   Lab Test 10/31/22  1022 10/20/22  0725 10/06/22  0815    140 137   POTASSIUM 4.3 4.0 4.1   CHLORIDE 105 107 105   CO2 23 22 23   BUN 8.9 9.2 8.4   CR 0.61* 0.54* 0.62*   ANIONGAP 8 11 9   STACEY 9.3 8.7 9.0   GLC 97 91 98   PROTTOTAL 8.0 7.3 7.5   ALBUMIN 3.8 3.5 3.7    Most Recent 2 LFT's:  Recent Labs   Lab Test 10/31/22  1022 10/20/22  0725   * 113*   ALT 98* 52*   ALKPHOS 255* 177*   BILITOTAL 1.0 0.7    I reviewed the above labs today.    Assessment and Plan:  Metastatic hepatocellular carcinoma. Recent imaging showed disease progression. Plan is to start on Gemzar/oxaliplatin. Will plan to repeat imaging after 2 months of treatment. I will see him back in 2 weeks to  start treatment.     Abdominal pain. Secondary to cancer. No help from omeprazole. He had nausea with Dilaudid. Will trial oxycodone.    Anorexia. Secondary to cancer. Will see if he can enroll on the appetite stimulant clinical trial.    Sendy Yun PA-C  [facility]  [address] [place] [address]  [phone]    28 minutes spent on the date of the encounter doing chart review, review of test results, interpretation of tests and documentation, in addition to 13 minutes spent on the phone with the patient.     Duration of call: 13 minutes    11/17/22 Discussed case with Dr. Kyle and will plan to check a cortisol and ACTH with his next lab draw given adrenal mets and abdominal pain.

## 2022-11-15 NOTE — NURSING NOTE
Patient states he has been having extreme pain the past 3-4 weeks and pain medications have been difficult for patient. He states he is unable to tolerate pain medication. Please review medications with patient.     Marie Schuler

## 2022-11-18 NOTE — TELEPHONE ENCOUNTER
Calls in stating he wants treatment because he is very very sick.  No fever.   Having abdominal pain for the past many days.  Appetite is zero.   Denies nausea.    Vomited day before yesterday. Pain is in upper abdomen and sometimes lower abdomen also in back area. Has been taking oxycodone taking 1 tablet per day says he can not take more than that per day. Oxycodone was prescribed on 11/15/22 by Sendy Yun. Taking the one tablet of oxycodone is not helping. States if he goes to work he can not take, he would like a letter saying that he can not work for the next 15 days.   States he is working now as best he can.   They are expecting him at work today from 8:30 to 4:30 today.    He wants a letter so that he does not have to go to work  Up until his next infusion. So that he can take his oxy and stay home form work so that he will be excused absence.     Also wondering what he can do for appetite?     8:07 Paged Sendy Yun   8:31 Per Sendy Yun should not go to work and we will work on getting him a letter for work.   They are working on getting him into a clinical trial for an appetite stimulant. He should here from someone soon on this.      Call placed to Preeti and gave him above information. Message sent to Sharlene Mendez to assist with letter for his work.

## 2022-11-21 NOTE — TELEPHONE ENCOUNTER
He calls in to report that he is still having pain in his abdominal area - no changes and no worse from previously reported abdominal discomfort.     His main issue is that he has no appetite and cannot eat.      He initially states that has not heard from anyone about any upcoming appointments and has not heard about the chemo, but his chart is reviewed with him and he does have appts scheduled for 11/30/2022.     He wonders how he can survive until then with his limited oral intake.    He denies headache, dizziness, nausea, vomiting.  He reports feeling weak with no appetite.     He is drinking water.  He is not drinking coffee now, he is not sure why.     He tried to drink some of a chocolate protein drink this morning.    Notes reviewed.  He has not yet heard from anyone about a trial for an appetite stimulant and wonders about this as well.    Message routed to team for recommendations.

## 2022-11-21 NOTE — TELEPHONE ENCOUNTER
Message from provider:   Start Zyprexa 5mg at bedtime for appetite.     Call to patient and left message to call [phone] option 5 and then option 2 to speak with a triage RN.    Patient calls and informed of the message above.  Side effects reviewed. He will continue to drink protein shakes, small frequent meals, fluids.     He will call later this week with an update.

## 2022-11-21 NOTE — TELEPHONE ENCOUNTER
Pt is calling back to find out if the team has any recommendations for him regarding his appetite. Pt states he is eating nothing and has abdominal pain. States he cannot survive next 12-13 days with no food.    Explained that the pt's care team has been contacted to provide recommendations, but we have not heard back yet.    Pt requesting call back today regarding plan.    Message given to Triage Nurse and this message was forwarded to pt's care team.

## 2022-11-22 NOTE — PROGRESS NOTES
CLINICAL NUTRITION SERVICES- ONCOLOGY DISTRESS SCREENING     Identified Concern and Score From Distress Screenin. How concerned are you about your ability to eat? :  9  2. How concerned are you about unintended weight loss or your current weight? : Not completed  3. Patient requested to speak with a Dietitian on the Oncology Distress Screening tool. Not completed      Date of Distress Screenin/15     Findings: Preeti reports that his appetite is very poor. He is drinking ~1/2 a carton of a nutrition supplement per day and a couple bites of foods at a time. Has abdominal cramping if he eats too much.      Intervention: Discussed current PO intake. Encouraged pt to drink at least 2 nutrition supplements per day (suggested 1/2 a carton QID). Discussed trying bland small frequent meals. Also discussed adequate fluid intake.      Education Provided: as above     Follow-up Required: SHYANNE Drew RD, LD  [phone]

## 2022-11-27 NOTE — TELEPHONE ENCOUNTER
"S/B: Patient has been having severe abdominal pain.    A: Abdominal pain \"all over\" per patient. It is a 10/10 on the pain scale. He has not been able to eat. Call quality is muffled so writer having trouble hearing the patient clearly. Patient reports he was supposed to get a treatment but it took too long to be approved so was not able to get it and now he is having this pain.     R: Disposition to the ED Now. Provided the closest location to him. Patient concerned about a long line at the ED. Advised that all EDs are having wait times currently and they will see patient in order of severity. Patient verbalized understanding.     Darek Mack RN on 11/27/2022 at 2:30 PM    Reason for Disposition    [1] SEVERE pain (e.g., excruciating) AND [2] present > 1 hour    Additional Information    Negative: Shock suspected (e.g., cold/pale/clammy skin, too weak to stand, low BP, rapid pulse)    Negative: Difficult to awaken or acting confused (e.g., disoriented, slurred speech)    Negative: Passed out (i.e., lost consciousness, collapsed and was not responding)    Negative: Sounds like a life-threatening emergency to the triager    Protocols used: ABDOMINAL PAIN - MALE-A-AH      "

## 2022-11-29 NOTE — PROGRESS NOTES
[facility]: Cancer Care Short Note                                                                                          Outgoing Call:   Phone call to Preeti.  Unfortunately we don't have insurance approval to start his chemo tomorrow morning.  Our infusion finance team is still working on this but as of this evening we don't have approval.  He's very disappointed.  He feels sick and is having trouble sleeping and wants to get his treatment started asap.  He's planing to keep his appointment with Sendy Yun in the morning to discuss his health concerns. I assured him that the finance team will keep working on coverage tomorrow.  At this time we will leave Thursday's infusion scheduled just in case.      Brenda Robertson RN, BSN  RN Care Coordinator   [facility]

## 2022-11-30 NOTE — LETTER
November 30, 2022      Preeti Pascual  [address]   [place] [address]          To whom it may concern,     Mr. Preeti Pascual is currently under my care for a serious medical condition. Please excuse him from work from November 30, 2022-December 11,2022. Please feel free to contact my office at the number above with any additional questions or concerns.         Sincerely,      Sendy Yun PA-c

## 2022-11-30 NOTE — TELEPHONE ENCOUNTER
Regarding patient Preeti Pascual date of birth 1965, his treatment with Gemzar and oxaliplatin is clinically urgent and we are unable to postpone therapy.   Sendy Yun PA-C

## 2022-11-30 NOTE — TELEPHONE ENCOUNTER
Prior Authorization Approval    Authorization Effective Date: 10/31/2022  Authorization Expiration Date: 11/30/2023  Medication: morphine (MS CONTIN) 15 MG CR tablet--APPROVED  Approved Dose/Quantity:   Reference #:     Insurance Company: [employer] - Phone [phone] Fax [phone]  Expected CoPay:       CoPay Card Available:      Foundation Assistance Needed:    Which Pharmacy is filling the prescription (Not needed for infusion/clinic administered): [id number] - [place], [place] - [address] AT [facility] [address] [facility]  Pharmacy Notified: Yes  Patient Notified: Yes **Instructed pharmacy to notify patient when script is ready to /ship.**

## 2022-11-30 NOTE — NURSING NOTE
Chief Complaint   Patient presents with     Blood Draw     Labs drawn via  by rn in lab. VS taken.     Labs collected from venipuncture by RN. Vitals taken. Checked in for appointment(s).    Trevor Yoder RN

## 2022-11-30 NOTE — TELEPHONE ENCOUNTER
PA Initiation    Medication: morphine (MS CONTIN) 15 MG CR tablet   Insurance Company: [employer] - Phone [phone] Fax [phone]  Pharmacy Filling the Rx: [id number] - [place], [place] - [address] AT [facility] [address]  Filling Pharmacy Phone: [phone]  Filling Pharmacy Fax: [phone]  Start Date: 11/30/2022

## 2022-11-30 NOTE — NURSING NOTE
"Oncology Rooming Note    November 30, 2022 8:32 AM   Preeti Pascual is a 56 year old male who presents for:    Chief Complaint   Patient presents with     Blood Draw     Labs drawn via  by rn in lab. VS taken.     Oncology Clinic Visit     RETURN - [facility]     Initial Vitals: BP (!) 143/82   Pulse 82   Temp 98.2  F (36.8  C)   Resp 16   Wt 59.7 kg (131 lb 11.2 oz)   SpO2 99%   BMI 21.27 kg/m   Estimated body mass index is 21.27 kg/m  as calculated from the following:    Height as of 10/7/21: 1.676 m (5' 5.98\").    Weight as of this encounter: 59.7 kg (131 lb 11.2 oz). Body surface area is 1.67 meters squared.  Extreme Pain (9) Comment: Data Unavailable   No LMP for male patient.  Allergies reviewed: Yes  Medications reviewed: Yes    Medications: Medication refills not needed today.  Pharmacy name entered into [facility]:    [id number] - [place], [place] - [address] AT [facility] & [address] [place] [address] - [address] [facility] [phone]  [facility] - [place], [place] - [address] [place] [address]    Clinical concerns: Patient has multiple concerns to discuss, including:  - increased pain, getting worse daily  - unable to sleep  - abdomen is getting larger  - nausea / vomiting  - notes blood in his vomit  - weakness  - weight loss  - unable to eat.      Wife states a medication was discontinued and wants to know why.     Kendra Kidd [facility]            "

## 2022-11-30 NOTE — PROGRESS NOTES
Oncology/Hematology Visit Note  Nov 30, 2022    Reason for Visit: follow up of hepatocellular carcinoma (HCC)    History of Present Illness: Preeti Pascual is a 56 year old male with hepatocellular carcinoma. 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  He had initial liver directed therapy with radial his embolization and declined liver transplant.  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  Initial therapy with sorafenib was poorly tolerated and could only get to a dose of 200 mg/day on which he progressed.  He then had a long period of control with single agent nivolumab and upon progression without we added ipilimumab without achieving control and with the development of severe autoimmune hepatitis (without evidence of reactivation of his hepatitis B.)  He had a period off treatment as he recovered from the hepatitis and then was started on Ramucirumab with stable disease and then switched to lenvatinib so that he could travel back to [facility] [place] to visit family.  He required dose reductions to tolerate the lenvatinib and had a short period of control on that but as of 2 months ago had progressed.  He then resumed Ramucirumab.  CT CAP on 10/31/22 showed disease progression. He is here today for routine follow-up.     Interval History:  Patient reports that he has ongoing abdominal pain.  He takes 1 oxycodone every 6 hours.  He is concerned about taking more as he is worried that this could impact his liver.  He is sleeping poorly due to pain and in general feels his pain is inadequately controlled.  He is also eating poorly due to a low appetite.  He did not start on the olanzapine as he was worried about adding in more medications to his regimen.  He has had 2 episodes of vomiting with blood.  He does also vomit after eating and notes that this also contributes to him not feeling like eating.  He has tried  "both boost and Ensure and felt these upset his stomach.  He denies other concerns.    Current Outpatient Medications   Medication Sig Dispense Refill     dexamethasone (DECADRON) 4 MG tablet Take 2 tablets (8 mg) by mouth daily Take for 2 days, starting the day after chemo. Take with food. 4 tablet 2     OLANZapine (ZYPREXA) 5 MG tablet Take 1 tablet (5 mg) by mouth At Bedtime 30 tablet 3     omeprazole (PRILOSEC) 20 MG DR capsule        omeprazole 20 MG tablet Take 1 tablet (20 mg) by mouth daily 60 tablet 1     ondansetron (ZOFRAN) 8 MG tablet Take 1 tablet (8 mg) by mouth every 8 hours as needed for nausea (vomiting) 30 tablet 2     oxyCODONE (ROXICODONE) 5 MG tablet Take 1-2 tablets (5-10 mg) by mouth every 4 hours as needed for moderate to severe pain 30 tablet 0     prochlorperazine (COMPAZINE) 10 MG tablet Take 1 tablet (10 mg) by mouth every 6 hours as needed for nausea or vomiting 30 tablet 2     SENNA-docusate sodium (SENNA S) 8.6-50 MG tablet Take 1-2 tablets by mouth 2 times daily as needed 60 tablet 1     acetaminophen (TYLENOL) 325 MG tablet Take 650 mg by mouth every 8 hours as needed for mild pain Do not exceed 2 grams per day. (Patient not taking: Reported on 11/11/2022)       Physical Exam:  General: The patient is a pleasant male in no acute distress.  BP (!) 143/82   Pulse 82   Temp 98.2  F (36.8  C)   Resp 16   Ht 1.676 m (5' 5.98\")   Wt 59.7 kg (131 lb 11.2 oz)   SpO2 99%   BMI 21.27 kg/m    Wt Readings from Last 10 Encounters:   11/30/22 59.7 kg (131 lb 11.2 oz)   11/11/22 61 kg (134 lb 6.4 oz)   10/31/22 61.7 kg (136 lb)   10/20/22 62.6 kg (138 lb)   10/06/22 63.6 kg (140 lb 4.8 oz)   09/22/22 64.6 kg (142 lb 8 oz)   09/08/22 64.2 kg (141 lb 8 oz)   08/29/22 62 kg (136 lb 9.6 oz)   06/27/22 61 kg (134 lb 8 oz)   04/25/22 61.2 kg (135 lb)   HEENT: EOMI. Sclerae are anicteric.   Lymph: Neck is supple with no lymphadenopathy in the cervical or supraclavicular areas.   Heart: Regular rate " and rhythm.   Lungs: Clear to auscultation bilaterally.   Abdomen: Bowel sounds present, soft, moderately distended, moderately tender in the epigastric and RUQ areas, mildly tender in the remainder of the abdomen.   Extremities: No lower extremity edema noted bilaterally.   Neuro: Cranial nerves II through XII are grossly intact.  Skin: No rashes, petechiae, or bruising noted on exposed skin.    Laboratory Data:  Most Recent 3 CBC's:  Recent Labs   Lab Test 11/30/22  0810 10/31/22  1022 10/06/22  0815   WBC 3.3* 3.8* 3.5*   HGB 13.4 13.3 12.2*   MCV 85 87 86    150 153   ANEUTAUTO 1.5* 1.7 1.6    Most Recent 3 BMP's:  Recent Labs   Lab Test 10/31/22  1022 10/20/22  0725 10/06/22  0815    140 137   POTASSIUM 4.3 4.0 4.1   CHLORIDE 105 107 105   CO2 23 22 23   BUN 8.9 9.2 8.4   CR 0.61* 0.54* 0.62*   ANIONGAP 8 11 9   STACEY 9.3 8.7 9.0   GLC 97 91 98   PROTTOTAL 8.0 7.3 7.5   ALBUMIN 3.8 3.5 3.7    Most Recent 2 LFT's:  Recent Labs   Lab Test 10/31/22  1022 10/20/22  0725   * 113*   ALT 98* 52*   ALKPHOS 255* 177*   BILITOTAL 1.0 0.7    I reviewed the above labs today.    Assessment and Plan:  Metastatic hepatocellular carcinoma. Last imaging showed disease progression. Plan is to start on Gemzar/oxaliplatin once we have insurance approval. Will plan to get new baseline imaging once he starts and repeat imaging after 2 months of treatment.     Abdominal pain. Secondary to cancer. No help from omeprazole. He had nausea with Dilaudid. Has gotten some relief from oxycodone, but still having significant pain. Will trial MS Contin 15 mg every 12 hours. Will also refer him to see palliative care.     Anorexia and nausea.  Secondary to cancer. Again recommend starting on Zyprexa 5 mg at bedtime. If no relief in hiccups with this, will add in baclofen.     Sendy Yun PA-C  [facility] [address] [address]  [phone]    60 minutes spent on the date of the encounter  doing chart review, review of test results, interpretation of tests, patient visit and documentation

## 2022-11-30 NOTE — TELEPHONE ENCOUNTER
Prior Authorization Retail Medication Request    Medication/Dose: Morphine ER 15mg ER tablets  ICD code (if different than what is on RX):    HCC (hepatocellular carcinoma) (H) [C22.0]  - Primary       Abdominal pain, generalized [R10.84]         Previously Tried and Failed:    Rationale:      Insurance Name:  [employer]  Insurance ID:  [id number]  Pharmacy Information (if different than what is on RX)  Name:    Phone:

## 2022-11-30 NOTE — LETTER
11/30/2022         RE: Preeti Pascual  [address]  [facility] [address]      Oncology/Hematology Visit Note  Nov 30, 2022    Reason for Visit: follow up of hepatocellular carcinoma (HCC)    History of Present Illness: Preeti Pascual is a 56 year old male with hepatocellular carcinoma. 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  He had initial liver directed therapy with radial his embolization and declined liver transplant.  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  Initial therapy with sorafenib was poorly tolerated and could only get to a dose of 200 mg/day on which he progressed.  He then had a long period of control with single agent nivolumab and upon progression without we added ipilimumab without achieving control and with the development of severe autoimmune hepatitis (without evidence of reactivation of his hepatitis B.)  He had a period off treatment as he recovered from the hepatitis and then was started on Ramucirumab with stable disease and then switched to lenvatinib so that he could travel back to [facility] to visit family.  He required dose reductions to tolerate the lenvatinib and had a short period of control on that but as of 2 months ago had progressed.  He then resumed Ramucirumab.  CT CAP on 10/31/22 showed disease progression. He is here today for routine follow-up.     Interval History:  Patient reports that he has ongoing abdominal pain.  He takes 1 oxycodone every 6 hours.  He is concerned about taking more as he is worried that this could impact his liver.  He is sleeping poorly due to pain and in general feels his pain is inadequately controlled.  He is also eating poorly due to a low appetite.  He did not start on the olanzapine as he was worried about adding in more medications to his regimen.  He has had 2 episodes of vomiting with blood.  He does also vomit  "after eating and notes that this also contributes to him not feeling like eating.  He has tried both boost and Ensure and felt these upset his stomach.  He denies other concerns.    Current Outpatient Medications   Medication Sig Dispense Refill     dexamethasone (DECADRON) 4 MG tablet Take 2 tablets (8 mg) by mouth daily Take for 2 days, starting the day after chemo. Take with food. 4 tablet 2     OLANZapine (ZYPREXA) 5 MG tablet Take 1 tablet (5 mg) by mouth At Bedtime 30 tablet 3     omeprazole (PRILOSEC) 20 MG DR capsule        omeprazole 20 MG tablet Take 1 tablet (20 mg) by mouth daily 60 tablet 1     ondansetron (ZOFRAN) 8 MG tablet Take 1 tablet (8 mg) by mouth every 8 hours as needed for nausea (vomiting) 30 tablet 2     oxyCODONE (ROXICODONE) 5 MG tablet Take 1-2 tablets (5-10 mg) by mouth every 4 hours as needed for moderate to severe pain 30 tablet 0     prochlorperazine (COMPAZINE) 10 MG tablet Take 1 tablet (10 mg) by mouth every 6 hours as needed for nausea or vomiting 30 tablet 2     SENNA-docusate sodium (SENNA S) 8.6-50 MG tablet Take 1-2 tablets by mouth 2 times daily as needed 60 tablet 1     acetaminophen (TYLENOL) 325 MG tablet Take 650 mg by mouth every 8 hours as needed for mild pain Do not exceed 2 grams per day. (Patient not taking: Reported on 11/11/2022)       Physical Exam:  General: The patient is a pleasant male in no acute distress.  BP (!) 143/82   Pulse 82   Temp 98.2  F (36.8  C)   Resp 16   Ht 1.676 m (5' 5.98\")   Wt 59.7 kg (131 lb 11.2 oz)   SpO2 99%   BMI 21.27 kg/m    Wt Readings from Last 10 Encounters:   11/30/22 59.7 kg (131 lb 11.2 oz)   11/11/22 61 kg (134 lb 6.4 oz)   10/31/22 61.7 kg (136 lb)   10/20/22 62.6 kg (138 lb)   10/06/22 63.6 kg (140 lb 4.8 oz)   09/22/22 64.6 kg (142 lb 8 oz)   09/08/22 64.2 kg (141 lb 8 oz)   08/29/22 62 kg (136 lb 9.6 oz)   06/27/22 61 kg (134 lb 8 oz)   04/25/22 61.2 kg (135 lb)   HEENT: EOMI. Sclerae are anicteric.   Lymph: Neck is " supple with no lymphadenopathy in the cervical or supraclavicular areas.   Heart: Regular rate and rhythm.   Lungs: Clear to auscultation bilaterally.   Abdomen: Bowel sounds present, soft, moderately distended, moderately tender in the epigastric and RUQ areas, mildly tender in the remainder of the abdomen.   Extremities: No lower extremity edema noted bilaterally.   Neuro: Cranial nerves II through XII are grossly intact.  Skin: No rashes, petechiae, or bruising noted on exposed skin.    Laboratory Data:  Most Recent 3 CBC's:  Recent Labs   Lab Test 11/30/22  0810 10/31/22  1022 10/06/22  0815   WBC 3.3* 3.8* 3.5*   HGB 13.4 13.3 12.2*   MCV 85 87 86    150 153   ANEUTAUTO 1.5* 1.7 1.6    Most Recent 3 BMP's:  Recent Labs   Lab Test 10/31/22  1022 10/20/22  0725 10/06/22  0815    140 137   POTASSIUM 4.3 4.0 4.1   CHLORIDE 105 107 105   CO2 23 22 23   BUN 8.9 9.2 8.4   CR 0.61* 0.54* 0.62*   ANIONGAP 8 11 9   STACEY 9.3 8.7 9.0   GLC 97 91 98   PROTTOTAL 8.0 7.3 7.5   ALBUMIN 3.8 3.5 3.7    Most Recent 2 LFT's:  Recent Labs   Lab Test 10/31/22  1022 10/20/22  0725   * 113*   ALT 98* 52*   ALKPHOS 255* 177*   BILITOTAL 1.0 0.7    I reviewed the above labs today.    Assessment and Plan:  Metastatic hepatocellular carcinoma. Last imaging showed disease progression. Plan is to start on Gemzar/oxaliplatin once we have insurance approval. Will plan to get new baseline imaging once he starts and repeat imaging after 2 months of treatment.     Abdominal pain. Secondary to cancer. No help from omeprazole. He had nausea with Dilaudid. Has gotten some relief from oxycodone, but still having significant pain. Will trial MS Contin 15 mg every 12 hours. Will also refer him to see palliative care.     Anorexia and nausea.  Secondary to cancer. Again recommend starting on Zyprexa 5 mg at bedtime. If no relief in hiccups with this, will add in baclofen.     Sendy Yun PA-C  [facility]  [address] [place] [address]  [phone]    60 minutes spent on the date of the encounter doing chart review, review of test results, interpretation of tests, patient visit and documentation           Sendy Yun PA-C

## 2022-12-01 NOTE — PROGRESS NOTES
Infusion Nursing Note:  Preeti Pascual presents today for C1D1 Gemzar.    Patient seen by provider: Yes: ANGELIKA Connors 11/30   present during visit today: Not Applicable.    Note: Preeti presents to infusion today with 9/10 abdominal pain. He states he has been taking his home oxycodone and will start his new prescription for MS Contin today. Denies any infectious symptoms or other changes overnight. Patient is new to this regimen but not new to infusion. Printed information provided by writer and education reinforced.    Intravenous Access:  Peripheral IV placed.    Treatment Conditions:  Lab Results   Component Value Date    HGB 13.4 11/30/2022    WBC 3.3 (L) 11/30/2022    ANEU 2.7 08/19/2021    ANEUTAUTO 1.5 (L) 11/30/2022     11/30/2022      Lab Results   Component Value Date     (L) 11/30/2022    POTASSIUM 4.0 11/30/2022    MAG 2.1 10/06/2022    CR 0.61 (L) 11/30/2022    STACEY 9.0 11/30/2022    BILITOTAL 1.0 11/30/2022    ALBUMIN 3.3 (L) 11/30/2022    ALT 77 (H) 11/30/2022     (H) 11/30/2022     Results reviewed, labs MET treatment parameters, ok to proceed with treatment.    Post Infusion Assessment:  Patient tolerated infusion without incident.  Blood return noted pre and post infusion.  Site patent and intact, free from redness, edema or discomfort.  No evidence of extravasations.  Access discontinued per protocol.     Discharge Plan:   Prescription refills given for zofran, compazine, dexamethasone.  Discharge instructions reviewed with: Patient.  Patient and/or family verbalized understanding of discharge instructions and all questions answered.  Copy of AVS reviewed with patient and/or family.  Patient will return 12/2 for next appointment.  Patient discharged in stable condition accompanied by: self.  Departure Mode: Ambulatory.      Viki Bermudez RN

## 2022-12-01 NOTE — PATIENT INSTRUCTIONS
Contact Numbers  [facility]: [phone] (for symptom and scheduling needs)    Please call the [facility] Triage line if you experience a temperature greater than or equal to 100.4, shaking chills, have uncontrolled nausea, vomiting and/or diarrhea, dizziness, shortness of breath, chest pain, bleeding, unexplained bruising, or if you have any other new/concerning symptoms, questions or concerns.     If you are having any concerning symptoms or wish to speak to a provider before your next infusion visit, please call your care coordinator or triage to notify them so we can adequately serve you.     If you need a refill on a narcotic prescription or other medication, please call triage before your infusion appointment.        Latest Reference Range & Units 11/30/22 08:10   Sodium 136 - 145 mmol/L 135 (L)   Potassium 3.4 - 5.3 mmol/L 4.0   Chloride 98 - 107 mmol/L 103   Carbon Dioxide (CO2) 22 - 29 mmol/L 22   Urea Nitrogen 6.0 - 20.0 mg/dL 8.0   Creatinine 0.67 - 1.17 mg/dL 0.61 (L)   GFR Estimate >60 mL/min/1.73m2 >90   Calcium 8.6 - 10.0 mg/dL 9.0   Anion Gap 7 - 15 mmol/L 10   Albumin 3.5 - 5.2 g/dL 3.3 (L)   Protein Total 6.4 - 8.3 g/dL 7.8   Alkaline Phosphatase 40 - 129 U/L 270 (H)   ALT 10 - 50 U/L 77 (H)   AST 10 - 50 U/L 224 (H)   Bilirubin Total <=1.2 mg/dL 1.0   Cortisol Serum ug/dL 12.9   Glucose 70 - 99 mg/dL 95   WBC 4.0 - 11.0 10e3/uL 3.3 (L)   Hemoglobin 13.3 - 17.7 g/dL 13.4   Hematocrit 40.0 - 53.0 % 40.1   Platelet Count 150 - 450 10e3/uL 173   RBC Count 4.40 - 5.90 10e6/uL 4.74   MCV 78 - 100 fL 85   MCH 26.5 - 33.0 pg 28.3   MCHC 31.5 - 36.5 g/dL 33.4   RDW 10.0 - 15.0 % 14.4   % Neutrophils % 46   % Lymphocytes % 29   % Monocytes % 13   % Eosinophils % 11   % Basophils % 1   Absolute Basophils 0.0 - 0.2 10e3/uL 0.0   Absolute Eosinophils 0.0 - 0.7 10e3/uL 0.4   Absolute Immature Granulocytes <=0.4 10e3/uL 0.0   Absolute Lymphocytes 0.8 - 5.3 10e3/uL 1.0   Absolute Monocytes 0.0 - 1.3 10e3/uL 0.4    % Immature Granulocytes % 0   Absolute Neutrophils 1.6 - 8.3 10e3/uL 1.5 (L)   Absolute NRBCs 10e3/uL 0.0   NRBCs per 100 WBC <1 /100 0   (L): Data is abnormally low  (H): Data is abnormally high

## 2022-12-02 NOTE — PROGRESS NOTES
Infusion Nursing Note:  Preeti Pascual presents today for Cycle 1 Day 2 Oxaliplatin.    Patient seen by provider today: No   present during visit today: Not Applicable.    Note: Patient presents to infusion today doing well. Reports that abdominal pain has decreased to a 2/10 today which he is happy about. Declines any further pain interventions at this time. No fevers, chills, shortness of breath, chest pains or cough overnight. No new changes or concerns since infusion yesterday.     Patient is receiving Oxaliplatin for the first time today. Written medication handout provided to patient per patient's request.    Intravenous Access:  Peripheral IV placed.    Treatment Conditions:  Lab Results   Component Value Date    HGB 13.4 11/30/2022    WBC 3.3 (L) 11/30/2022    ANEU 2.7 08/19/2021    ANEUTAUTO 1.5 (L) 11/30/2022     11/30/2022      Lab Results   Component Value Date     (L) 11/30/2022    POTASSIUM 4.0 11/30/2022    MAG 2.1 10/06/2022    CR 0.61 (L) 11/30/2022    STACEY 9.0 11/30/2022    BILITOTAL 1.0 11/30/2022    ALBUMIN 3.3 (L) 11/30/2022    ALT 77 (H) 11/30/2022     (H) 11/30/2022     Results reviewed from 11/30, labs MET treatment parameters, ok to proceed with treatment.    Post Infusion Assessment:  Patient tolerated infusion without incident.  Blood return noted pre and post infusion.  Site patent and intact, free from redness, edema or discomfort.  No evidence of extravasations.  Access discontinued per protocol.     Discharge Plan:   Patient declined prescription refills.  Discharge instructions reviewed with: Patient.  Patient and/or family verbalized understanding of discharge instructions and all questions answered.  Copy of AVS reviewed with patient and/or family.  Patient will return 12/13 for next appointment with Nallely Smiley NP.  Patient discharged in stable condition accompanied by: self.  Departure Mode: Ambulatory.      Nathalia Morrison RN

## 2022-12-02 NOTE — PATIENT INSTRUCTIONS
Contact Numbers  [facility]: [phone] (for symptom and scheduling needs)    Please call the [facility] Triage line if you experience a temperature greater than or equal to 100.4, shaking chills, have uncontrolled nausea, vomiting and/or diarrhea, dizziness, shortness of breath, chest pain, bleeding, unexplained bruising, or if you have any other new/concerning symptoms, questions or concerns.     If you are having any concerning symptoms or wish to speak to a provider before your next infusion visit, please call your care coordinator or triage to notify them so we can adequately serve you.     If you need a refill on a narcotic prescription or other medication, please call triage before your infusion appointment.           December 2022 Sunday Monday Tuesday Wednesday Thursday Friday Saturday                       1    ONC INFUSION 2.5 HR (150 MIN)   9:00 AM   (150 min.)   [facility] ONC INFUSION NURSE   [facility] 2    ONC INFUSION 2.5 HR (150 MIN)   7:30 AM   (150 min.)   [facility] ONC INFUSION NURSE   [facility] 3       4     5     6     7     8     9     10       11     12     13    VIDEO VISIT RETURN   8:15 AM   (45 min.)   Nallely Smiley APRN CNP   [facility] 14    LAB PERIPHERAL   7:00 AM   (15 min.)   [facility] LAB DRAW   [facility]    ONC INFUSION 2.5 HR (150 MIN)   7:30 AM   (150 min.)    ONC INFUSION NURSE   [facility] 15    ONC INFUSION 2.5 HR (150 MIN)   9:00 AM   (150 min.)   [facility] ONC INFUSION NURSE   [facility] 16     17       18     19     20     21     22     23     24       25  Happy Birthday!     26     27     28    LAB PERIPHERAL   7:00 AM   (15 min.)   [facility] DRAW   [facility]    ONC INFUSION 2.5 HR (150 MIN)   7:30 AM   (150 min.)    ONC INFUSION NURSE   [facility] 29    ONC INFUSION 2.5 HR (150 MIN)   9:00 AM   (150 min.)   [facility] ONC INFUSION NURSE   [facility] 30     31 January 2023 Sunday Monday Tuesday Wednesday Thursday Friday Saturday   1     2    RETURN   1:15 PM   (30 min.)   Agustin Kyle MD   [facility] 3     4     5     6     7       8     9     10     11     12    LAB PERIPHERAL   8:30 AM   (15 min.)   [facility] DRAW   [facility]    ONC INFUSION 2.5 HR (150 MIN)   9:00 AM   (150 min.)   [facility] ONC INFUSION NURSE   [facility] 13    ONC INFUSION 2.5 HR (150 MIN)   8:00 AM   (150 min.)   [facility] ONC INFUSION NURSE   [facility] 14       15     16     17     18     19    LAB   8:30 AM   (15 min.)   Trevor Trujillo MD   [facility] 20     21       22     23     24     25    LAB PERIPHERAL   7:00 AM   (15 min.)    [facility] DRAW   [facility]    ONC INFUSION 2.5 HR (150 MIN)   7:30 AM   (150 min.)   [facility] ONC INFUSION NURSE   [facility] 26    ONC INFUSION 2.5 HR (150 MIN)   9:00 AM   (150 min.)    ONC INFUSION NURSE   [facility] 27     28       29     30     31                                           Lab Results:  No results found for this or any previous visit (from the past 12 hour(s)).

## 2022-12-06 NOTE — PROGRESS NOTES
Reason for Visit: seen in f/u of hepatocellular carcinoma    Oncology HPI:   Preeti Pascual is a 56 year old male with hepatocellular carcinoma. 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  He had initial liver directed therapy with radial his embolization and declined liver transplant.  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  Initial therapy with sorafenib was poorly tolerated and could only get to a dose of 200 mg/day on which he progressed.  He then had a long period of control with single agent nivolumab and upon progression without we added ipilimumab without achieving control and with the development of severe autoimmune hepatitis (without evidence of reactivation of his hepatitis B.)  He had a period off treatment as he recovered from the hepatitis and then was started on Ramucirumab with stable disease and then switched to lenvatinib so that he could travel back to [facility] to visit family.  He required dose reductions to tolerate the lenvatinib and had a short period of control on that but as of 2 months ago had progressed.  He then resumed Ramucirumab.  CT CAP on 10/31/22 showed disease progression.    He started gemzar/oxaliplatin on 12/1/22.    Interval history: Preeti is seen for evaluation of abdominal pain.  -He felt ok for a couple of days after starting chemo, but then is having severe abdominal pain again. Uses oxycodone, but thinks it isn't working like it should as it gives him temporary releif. He wakes in 10/10 pain at night. Pain is in through the middle abdomen.  Is in the same spot has his cancer pain has been in the past. Usually takes 5 mg oxycodone, which improves his pain for a couple of hours. Wonders why it doesn't last longer.   No N/V. No constipatin. Urine volume is lower. Po intake has been declining when he is in pain, but is able to drink some carbonated  beverages.  Having regular bowel movements, had 2 yesterday  -is no longer working as he has become more weak    Current Outpatient Medications   Medication Sig Dispense Refill     acetaminophen (TYLENOL) 325 MG tablet Take 650 mg by mouth every 8 hours as needed for mild pain Do not exceed 2 grams per day. (Patient not taking: Reported on 11/11/2022)       dexamethasone (DECADRON) 4 MG tablet Take 2 tablets (8 mg) by mouth daily Take for 2 days, starting the day after chemo. Take with food. 4 tablet 2     morphine (MS CONTIN) 15 MG CR tablet Take 1 tablet (15 mg) by mouth every 12 hours maximum 2 tablet(s) per day 60 tablet 0     naloxone (NARCAN) 4 MG/0.1ML nasal spray Spray 1 spray (4 mg) into one nostril alternating nostrils as needed for opioid reversal every 2-3 minutes until assistance arrives 0.2 mL 0     OLANZapine (ZYPREXA) 5 MG tablet Take 1 tablet (5 mg) by mouth At Bedtime 30 tablet 3     omeprazole (PRILOSEC) 20 MG DR capsule        omeprazole 20 MG tablet Take 1 tablet (20 mg) by mouth daily 60 tablet 1     ondansetron (ZOFRAN) 8 MG tablet Take 1 tablet (8 mg) by mouth every 8 hours as needed for nausea (vomiting) 30 tablet 2     oxyCODONE (ROXICODONE) 5 MG tablet Take 1-2 tablets (5-10 mg) by mouth every 4 hours as needed for moderate to severe pain 30 tablet 0     prochlorperazine (COMPAZINE) 10 MG tablet Take 1 tablet (10 mg) by mouth every 6 hours as needed for nausea or vomiting 30 tablet 2     SENNA-docusate sodium (SENNA S) 8.6-50 MG tablet Take 1-2 tablets by mouth 2 times daily as needed 60 tablet 1          Allergies   Allergen Reactions     Pepcid [Famotidine] Headache         Exam: appears chronically ill.  Blood pressure (!) 144/88, pulse 81, temperature 97.2  F (36.2  C), temperature source Oral, resp. rate 18, weight 58.4 kg (128 lb 12.8 oz), SpO2 98 %.  Wt Readings from Last 4 Encounters:   12/06/22 58.4 kg (128 lb 12.8 oz)   11/30/22 59.7 kg (131 lb 11.2 oz)   11/11/22 61 kg (134 lb  6.4 oz)   10/31/22 61.7 kg (136 lb)   Oropharynx is moist and without lesion. Icterus noted.  Neck supple and without adenopathy. Lungs:CTA. Heart: RRR, no murmur or rub. Abdomen: tender in the mid abdomen from the bilateral costal margins through the umbilicus.  BS active.     Extremities: warm, no edema. Speech is clear. CN wnl. Gait/station wnl.       Labs:    Latest Reference Range & Units 12/06/22 09:25   Sodium 136 - 145 mmol/L 132 (L)   Potassium 3.4 - 5.3 mmol/L 4.1   Chloride 98 - 107 mmol/L 104   Carbon Dioxide (CO2) 22 - 29 mmol/L 18 (L)   Urea Nitrogen 6.0 - 20.0 mg/dL 10.0   Creatinine 0.67 - 1.17 mg/dL 0.60 (L)   GFR Estimate >60 mL/min/1.73m2 >90   Calcium 8.6 - 10.0 mg/dL 8.8   Anion Gap 7 - 15 mmol/L 10   Magnesium 1.7 - 2.3 mg/dL 2.0   Albumin 3.5 - 5.2 g/dL 3.2 (L)   Protein Total 6.4 - 8.3 g/dL 7.5   Alkaline Phosphatase 40 - 129 U/L 294 (H)   ALT 10 - 50 U/L 147 (H)   AST 10 - 50 U/L 345 (H)   (L): Data is abnormally low  (H): Data is abnormally high      Impression/plan:   Metastatic HCC, progressive on prior therapies as noted above  -currently on [place]/Ox,   -tolerating ok so far. Most of his symptoms today relate to his cancer, the abdominal pain is consistent with the pain he has had with his malignancy. Anorexia and fatigue were present prior to starting treatment and are stable. We spent the majority of our visit exploring how he is taking his pain medications and giving recommendations as below.  -he has f/u with me next week prior to infusion.    Abdominal pain s/t malignancy  -had some confusion about the goal of long acting/shortacting medicine. Stopped MS contin as he didn't feel any improvement after taking it.   -I do recommend he take a long acting opioid as he is waking with severe pain.  -reviewed he should take this on a schedule whether regardless of his pain level at the time it is due. If he should have acute pain breakthrough, then use the oxycodone for immediate relief.  Given written instructions as well today.  -reviewed we would want him to be on this for a few days before changing doses    Anorexia and nausea s/t malignancy  -stable overall, taking zyprexa at night.-  -review again at next week's visit.      Palliative care consultation pending

## 2022-12-06 NOTE — NURSING NOTE
"Oncology Rooming Note    December 6, 2022 10:10 AM   Preeti Pascual is a 56 year old male who presents for:    Chief Complaint   Patient presents with     Blood Draw     Vpt blood draw by lab RN. Vitals taken and appointment arrived     Oncology Clinic Visit     Malignant neoplasm metastatic to both adrenal glands      Initial Vitals: BP (!) 144/88   Pulse 81   Temp 97.2  F (36.2  C) (Oral)   Resp 18   Wt 58.4 kg (128 lb 12.8 oz)   SpO2 98%   BMI 20.80 kg/m   Estimated body mass index is 20.8 kg/m  as calculated from the following:    Height as of 11/30/22: 1.676 m (5' 5.98\").    Weight as of this encounter: 58.4 kg (128 lb 12.8 oz). Body surface area is 1.65 meters squared.  Extreme Pain (9) Comment: abdomen   No LMP for male patient.  Allergies reviewed: Yes  Medications reviewed: Yes    Medications: Medication refills not needed today.  Pharmacy name entered into [facility]:    [id number] - [place], [place] - [address] AT [facility] & [address] [place] [address] PHARMACY [place], [place] -  [facility] [phone]  [facility] - [place], [place] - [address] [place] [address]    Clinical concerns:  Patient complains of intense abdominal pain rated 9/10, was taking Morphine and Oxycodone to alleviate pain. Reports having 5-6 bowel movements a day, and difficulty sleeping due to pain. Please address.       Aleksandra England (Nicolle), SHARON December 6, 2022 10:20 AM              "

## 2022-12-06 NOTE — NURSING NOTE
Chief Complaint   Patient presents with     Blood Draw     Vpt blood draw by lab RN. Vitals taken and appointment arrived   Vascular access performed venipuncture blood draw  Sendy Navarro RN

## 2022-12-06 NOTE — LETTER
12/6/2022         RE: Preeti Pascual  [address]  [facility] [address]        Dear Colleague,    Thank you for referring your patient, Preeti Pascual, to the [facility]. Please see a copy of my visit note below.    Reason for Visit: seen in f/u of hepatocellular carcinoma    Oncology HPI:   Preeti Pascual is a 56 year old male with hepatocellular carcinoma. 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  He had initial liver directed therapy with radial his embolization and declined liver transplant.  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  Initial therapy with sorafenib was poorly tolerated and could only get to a dose of 200 mg/day on which he progressed.  He then had a long period of control with single agent nivolumab and upon progression without we added ipilimumab without achieving control and with the development of severe autoimmune hepatitis (without evidence of reactivation of his hepatitis B.)  He had a period off treatment as he recovered from the hepatitis and then was started on Ramucirumab with stable disease and then switched to lenvatinib so that he could travel back to [facility] to visit family.  He required dose reductions to tolerate the lenvatinib and had a short period of control on that but as of 2 months ago had progressed.  He then resumed Ramucirumab.  CT CAP on 10/31/22 showed disease progression.    He started gemzar/oxaliplatin on 12/1/22.    Interval history: Preeti is seen for evaluation of abdominal pain.  -He felt ok for a couple of days after starting chemo, but then is having severe abdominal pain again. Uses oxycodone, but thinks it isn't working like it should as it gives him temporary releif. He wakes in 10/10 pain at night. Pain is in through the middle abdomen.  Is in the same spot has his cancer pain has been in the past.  Usually takes 5 mg oxycodone, which improves his pain for a couple of hours. Wonders why it doesn't last longer.   No N/V. No constipatin. Urine volume is lower. Po intake has been declining when he is in pain, but is able to drink some carbonated beverages.  Having regular bowel movements, had 2 yesterday  -is no longer working as he has become more weak    Current Outpatient Medications   Medication Sig Dispense Refill     acetaminophen (TYLENOL) 325 MG tablet Take 650 mg by mouth every 8 hours as needed for mild pain Do not exceed 2 grams per day. (Patient not taking: Reported on 11/11/2022)       dexamethasone (DECADRON) 4 MG tablet Take 2 tablets (8 mg) by mouth daily Take for 2 days, starting the day after chemo. Take with food. 4 tablet 2     morphine (MS CONTIN) 15 MG CR tablet Take 1 tablet (15 mg) by mouth every 12 hours maximum 2 tablet(s) per day 60 tablet 0     naloxone (NARCAN) 4 MG/0.1ML nasal spray Spray 1 spray (4 mg) into one nostril alternating nostrils as needed for opioid reversal every 2-3 minutes until assistance arrives 0.2 mL 0     OLANZapine (ZYPREXA) 5 MG tablet Take 1 tablet (5 mg) by mouth At Bedtime 30 tablet 3     omeprazole (PRILOSEC) 20 MG DR capsule        omeprazole 20 MG tablet Take 1 tablet (20 mg) by mouth daily 60 tablet 1     ondansetron (ZOFRAN) 8 MG tablet Take 1 tablet (8 mg) by mouth every 8 hours as needed for nausea (vomiting) 30 tablet 2     oxyCODONE (ROXICODONE) 5 MG tablet Take 1-2 tablets (5-10 mg) by mouth every 4 hours as needed for moderate to severe pain 30 tablet 0     prochlorperazine (COMPAZINE) 10 MG tablet Take 1 tablet (10 mg) by mouth every 6 hours as needed for nausea or vomiting 30 tablet 2     SENNA-docusate sodium (SENNA S) 8.6-50 MG tablet Take 1-2 tablets by mouth 2 times daily as needed 60 tablet 1          Allergies   Allergen Reactions     Pepcid [Famotidine] Headache         Exam: appears chronically ill.  Blood pressure (!) 144/88, pulse 81,  temperature 97.2  F (36.2  C), temperature source Oral, resp. rate 18, weight 58.4 kg (128 lb 12.8 oz), SpO2 98 %.  Wt Readings from Last 4 Encounters:   12/06/22 58.4 kg (128 lb 12.8 oz)   11/30/22 59.7 kg (131 lb 11.2 oz)   11/11/22 61 kg (134 lb 6.4 oz)   10/31/22 61.7 kg (136 lb)   Oropharynx is moist and without lesion. Icterus noted.  Neck supple and without adenopathy. Lungs:CTA. Heart: RRR, no murmur or rub. Abdomen: tender in the mid abdomen from the bilateral costal margins through the umbilicus.  BS active.     Extremities: warm, no edema. Speech is clear. CN wnl. Gait/station wnl.       Labs:    Latest Reference Range & Units 12/06/22 09:25   Sodium 136 - 145 mmol/L 132 (L)   Potassium 3.4 - 5.3 mmol/L 4.1   Chloride 98 - 107 mmol/L 104   Carbon Dioxide (CO2) 22 - 29 mmol/L 18 (L)   Urea Nitrogen 6.0 - 20.0 mg/dL 10.0   Creatinine 0.67 - 1.17 mg/dL 0.60 (L)   GFR Estimate >60 mL/min/1.73m2 >90   Calcium 8.6 - 10.0 mg/dL 8.8   Anion Gap 7 - 15 mmol/L 10   Magnesium 1.7 - 2.3 mg/dL 2.0   Albumin 3.5 - 5.2 g/dL 3.2 (L)   Protein Total 6.4 - 8.3 g/dL 7.5   Alkaline Phosphatase 40 - 129 U/L 294 (H)   ALT 10 - 50 U/L 147 (H)   AST 10 - 50 U/L 345 (H)   (L): Data is abnormally low  (H): Data is abnormally high      Impression/plan:   Metastatic HCC, progressive on prior therapies as noted above  -currently on [place]/Ox,   -tolerating ok so far. Most of his symptoms today relate to his cancer, the abdominal pain is consistent with the pain he has had with his malignancy. Anorexia and fatigue were present prior to starting treatment and are stable. We spent the majority of our visit exploring how he is taking his pain medications and giving recommendations as below.  -he has f/u with me next week prior to infusion.    Abdominal pain s/t malignancy  -had some confusion about the goal of long acting/shortacting medicine. Stopped MS contin as he didn't feel any improvement after taking it.   -I do recommend he take a  long acting opioid as he is waking with severe pain.  -reviewed he should take this on a schedule whether regardless of his pain level at the time it is due. If he should have acute pain breakthrough, then use the oxycodone for immediate relief. Given written instructions as well today.  -reviewed we would want him to be on this for a few days before changing doses    Anorexia and nausea s/t malignancy  -stable overall, taking zyprexa at night.-  -review again at next week's visit.      Palliative care consultation pending    Vascular Access Services Notes:    Ultrasound-guided lab draw done without complication. Attempted x1 in the rightupper forearm using a 20 gauge x 1.75 inch BB PIV needle. Lab RN was present to assist.    Time spent with pt - 30 minutes      JUANA JungN, RN [facility]        Again, thank you for allowing me to participate in the care of your patient.        Sincerely,        MITCH Jacobs CNP

## 2022-12-06 NOTE — LETTER
Date:December 8, 2022      Patient was self referred, no letter generated. Do not send.        [facility] Health Information

## 2022-12-06 NOTE — TELEPHONE ENCOUNTER
PA Initiation    Medication: Oxycodone 5 mg PA  Insurance Company: [employer] - Phone [phone] Fax [phone]  Pharmacy Filling the Rx: [facility] [place], [place] - [address] [place] [address] [phone]  Filling Pharmacy Phone:    Filling Pharmacy Fax:    Start Date: 12/6/2022     [id number]    Thank you,    Lluvia Du  Oncology Pharmacy Liaison II  [email or web address]  Phone: [phone]  Fax: [phone]

## 2022-12-06 NOTE — TELEPHONE ENCOUNTER
Prior Authorization Approval    Authorization Effective Date: 11/6/2022  Authorization Expiration Date: 12/6/2023  Medication: Oxycodone 5 mg PA Approved  Approved Dose/Quantity: 100 per 9 days   Reference #: [id number]   Insurance Company: [employer] - Phone [phone] Fax [phone]  Expected CoPay:       CoPay Card Available:      Foundation Assistance Needed:    Which Pharmacy is filling the prescription (Not needed for infusion/clinic administered): [facility] [address] 5-678  Pharmacy Notified: Yes  Patient Notified: Yes          Thank you,    Lluvia Du  Oncology Pharmacy Liaison II  [email or web address]  Phone: [phone]  Fax: [phone]

## 2022-12-13 NOTE — LETTER
12/13/2022         RE: Preeti Pascual  [address]  [place] [place] [facility] [address]        Dear Colleague,    Thank you for referring your patient, Preeti Pascual, to the [facility] CANCER CLINIC. Please see a copy of my visit note below.    Preeti is a 56 year old who is being evaluated via a billable video visit.      How would you like to obtain your AVS? MyChart  If the video visit is dropped, the invitation should be resent by: Text to cell phone: [phone]  Will anyone else be joining your video visit? No        Video-Visit Details    Attempted video call on [employer] and then Doximity. Patient unable to join on camera. Converted to a phone visit.     Duration of call: 27 minutes.    Reason for Visit: seen in f/u of hepatocellular carcinoma    Oncology HPI:   Preeti Pascual is a 56 year old male with hepatocellular carcinoma. 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  He had initial liver directed therapy with radial his embolization and declined liver transplant.  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  Initial therapy with sorafenib was poorly tolerated and could only get to a dose of 200 mg/day on which he progressed.  He then had a long period of control with single agent nivolumab and upon progression without we added ipilimumab without achieving control and with the development of severe autoimmune hepatitis (without evidence of reactivation of his hepatitis B.)  He had a period off treatment as he recovered from the hepatitis and then was started on Ramucirumab with stable disease and then switched to lenvatinib so that he could travel back to [facility] to visit family.  He required dose reductions to tolerate the lenvatinib and had a short period of control on that but as of 2 months ago had progressed.  He then resumed Ramucirumab.  CT CAP on 10/31/22 showed disease  progression.     He started gemzar/oxaliplatin on 12/1/22    Interval history:   Abdominal pain is improved to5/10 this week. Isn't taking oxycodone as he didn't think he needed it. Is taking the MS Contin at 6am and 6 pm.     -bothered by intermittent hiccups  -having Nausea/vomiting, 2-3 x /day,  taking zofan every 8 hrs  -Not taking zyprexa or omeprazole, doesn't appear to have any of this at home  -has compazine, but hasn't tried it.    Bowels--last 3 days ago, took 1 senna today  -urination is slower  -getting some headaches now, hasn't tried any medications for that.     Current Outpatient Medications   Medication Sig Dispense Refill     acetaminophen (TYLENOL) 325 MG tablet Take 650 mg by mouth every 8 hours as needed for mild pain Do not exceed 2 grams per day.       dexamethasone (DECADRON) 4 MG tablet Take 2 tablets (8 mg) by mouth daily Take for 2 days, starting the day after chemo. Take with food. 4 tablet 2     morphine (MS CONTIN) 15 MG CR tablet Take 1 tablet (15 mg) by mouth every 12 hours maximum 2 tablet(s) per day 60 tablet 0     naloxone (NARCAN) 4 MG/0.1ML nasal spray Spray 1 spray (4 mg) into one nostril alternating nostrils as needed for opioid reversal every 2-3 minutes until assistance arrives (Patient not taking: Reported on 12/6/2022) 0.2 mL 0     OLANZapine (ZYPREXA) 5 MG tablet Take 1 tablet (5 mg) by mouth At Bedtime 30 tablet 3     omeprazole (PRILOSEC) 20 MG DR capsule        omeprazole 20 MG tablet Take 1 tablet (20 mg) by mouth daily 60 tablet 1     ondansetron (ZOFRAN) 8 MG tablet Take 1 tablet (8 mg) by mouth every 8 hours as needed for nausea (vomiting) 30 tablet 2     oxyCODONE (ROXICODONE) 5 MG tablet Take 1-2 tablets (5-10 mg) by mouth every 4 hours as needed for moderate to severe pain 100 tablet 0     prochlorperazine (COMPAZINE) 10 MG tablet Take 1 tablet (10 mg) by mouth every 6 hours as needed for nausea or vomiting 30 tablet 2     SENNA-docusate sodium (SENNA S) 8.6-50  MG tablet Take 1-2 tablets by mouth 2 times daily as needed 60 tablet 1          Allergies   Allergen Reactions     Pepcid [Famotidine] Headache       Speech is clear on the phone. No apparent distress. Appears oriented    Labs: n/a  Imaging: n\a    Impression/plan:  Metastatic HCC, progressive on prior therapies as noted above  -currently on [place]/Ox  -tolerating fairly well. Symptoms of pain and nausea were present prior to starting treatment. Felt good for a few days after starting, likely related to dexamethasone.  -spent the majority of the visit discussing symptom management and recommending medication adjustments  -he is ok to proceed with cycle 2 tomorrow as planned   -I have a visit with him prior to his next cycle. Will have restaging after 3 cycles     Abdominal pain s/t malignancy  -improved control with taking MS contin every 12 hours. Is not taking oxycodone since starting Morphine. Reviewed that he is ok to take the short-acting oxycodone if his pain is moderate to severe. Oxycodone 5-10 mg every 4 hours as needed for moderate to severe pain.  He also has an old prescription for hydromorphone at home, but is not taking this as it caused him to feel drugged.     Constipation--opioid induced  -last bm 3 days ago, took 1 senna this am. Advised to repeat this evening and take 1-2 senna/colace on a schedule with his morphine doses    Anorexia and nausea s/t malignancy and chemo  Dyspepsia, hiccups  -not taking zyprexa--reviewed his meds over the phone, doesn't appear to have this. Also had been on omeprazole, but isn't taking this. He doesn't appear to have this at home.  -he has prochlorperazine, but isn't taking it. Will try taking doses between the zofran doses  -recommend resuming omeprazole now  -will consider adding zyprexa back if he is not having good control with compazine      Has difficulty with comprehending medication adjustments. Will be writing out a schedule for his medications and reviewing  that with him tomorrow.        Palliative care consultation pending this afternoon--will be rescheduled to when he can be seen in person.    40 minutes spent on the date of the encounter doing chart review, review of outside records, review of test results, patient visit, documentation and discussion with other provider(s)           MITCH Jacobs CNP

## 2022-12-13 NOTE — PROGRESS NOTES
Preeti is a 56 year old who is being evaluated via a billable video visit.      How would you like to obtain your AVS? [employer]  If the video visit is dropped, the invitation should be resent by: Text to cell phone: [phone]  Will anyone else be joining your video visit? No        Video-Visit Details    Attempted video call on Amwell and then Christopher. Patient unable to join on camera. Converted to a phone visit.     Duration of call: 27 minutes.    Reason for Visit: seen in f/u of hepatocellular carcinoma    Oncology HPI:   Preeti Pascual is a 56 year old male with hepatocellular carcinoma. 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  He had initial liver directed therapy with radial his embolization and declined liver transplant.  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  Initial therapy with sorafenib was poorly tolerated and could only get to a dose of 200 mg/day on which he progressed.  He then had a long period of control with single agent nivolumab and upon progression without we added ipilimumab without achieving control and with the development of severe autoimmune hepatitis (without evidence of reactivation of his hepatitis B.)  He had a period off treatment as he recovered from the hepatitis and then was started on Ramucirumab with stable disease and then switched to lenvatinib so that he could travel back to [facility] to visit family.  He required dose reductions to tolerate the lenvatinib and had a short period of control on that but as of 2 months ago had progressed.  He then resumed Ramucirumab.  CT CAP on 10/31/22 showed disease progression.     He started gemzar/oxaliplatin on 12/1/22    Interval history:   Abdominal pain is improved to5/10 this week. Isn't taking oxycodone as he didn't think he needed it. Is taking the MS Contin at 6am and 6 pm.     -bothered by intermittent  hiccups  -having Nausea/vomiting, 2-3 x /day,  taking zofan every 8 hrs  -Not taking zyprexa or omeprazole, doesn't appear to have any of this at home  -has compazine, but hasn't tried it.    Bowels--last 3 days ago, took 1 senna today  -urination is slower  -getting some headaches now, hasn't tried any medications for that.     Current Outpatient Medications   Medication Sig Dispense Refill     acetaminophen (TYLENOL) 325 MG tablet Take 650 mg by mouth every 8 hours as needed for mild pain Do not exceed 2 grams per day.       dexamethasone (DECADRON) 4 MG tablet Take 2 tablets (8 mg) by mouth daily Take for 2 days, starting the day after chemo. Take with food. 4 tablet 2     morphine (MS CONTIN) 15 MG CR tablet Take 1 tablet (15 mg) by mouth every 12 hours maximum 2 tablet(s) per day 60 tablet 0     naloxone (NARCAN) 4 MG/0.1ML nasal spray Spray 1 spray (4 mg) into one nostril alternating nostrils as needed for opioid reversal every 2-3 minutes until assistance arrives (Patient not taking: Reported on 12/6/2022) 0.2 mL 0     OLANZapine (ZYPREXA) 5 MG tablet Take 1 tablet (5 mg) by mouth At Bedtime 30 tablet 3     omeprazole (PRILOSEC) 20 MG DR capsule        omeprazole 20 MG tablet Take 1 tablet (20 mg) by mouth daily 60 tablet 1     ondansetron (ZOFRAN) 8 MG tablet Take 1 tablet (8 mg) by mouth every 8 hours as needed for nausea (vomiting) 30 tablet 2     oxyCODONE (ROXICODONE) 5 MG tablet Take 1-2 tablets (5-10 mg) by mouth every 4 hours as needed for moderate to severe pain 100 tablet 0     prochlorperazine (COMPAZINE) 10 MG tablet Take 1 tablet (10 mg) by mouth every 6 hours as needed for nausea or vomiting 30 tablet 2     SENNA-docusate sodium (SENNA S) 8.6-50 MG tablet Take 1-2 tablets by mouth 2 times daily as needed 60 tablet 1          Allergies   Allergen Reactions     Pepcid [Famotidine] Headache       Speech is clear on the phone. No apparent distress. Appears oriented    Labs: n/a  Imaging:  n\a    Impression/plan:  Metastatic HCC, progressive on prior therapies as noted above  -currently on [place]/Ox  -tolerating fairly well. Symptoms of pain and nausea were present prior to starting treatment. Felt good for a few days after starting, likely related to dexamethasone.  -spent the majority of the visit discussing symptom management and recommending medication adjustments  -he is ok to proceed with cycle 2 tomorrow as planned   -I have a visit with him prior to his next cycle. Will have restaging after 3 cycles     Abdominal pain s/t malignancy  -improved control with taking MS contin every 12 hours. Is not taking oxycodone since starting Morphine. Reviewed that he is ok to take the short-acting oxycodone if his pain is moderate to severe. Oxycodone 5-10 mg every 4 hours as needed for moderate to severe pain.  He also has an old prescription for hydromorphone at home, but is not taking this as it caused him to feel drugged.     Constipation--opioid induced  -last bm 3 days ago, took 1 senna this am. Advised to repeat this evening and take 1-2 senna/colace on a schedule with his morphine doses    Anorexia and nausea s/t malignancy and chemo  Dyspepsia, hiccups  -not taking zyprexa--reviewed his meds over the phone, doesn't appear to have this. Also had been on omeprazole, but isn't taking this. He doesn't appear to have this at home.  -he has prochlorperazine, but isn't taking it. Will try taking doses between the zofran doses  -recommend resuming omeprazole now  -will consider adding zyprexa back if he is not having good control with compazine      Has difficulty with comprehending medication adjustments. Will be writing out a schedule for his medications and reviewing that with him tomorrow.        Palliative care consultation pending this afternoon--will be rescheduled to when he can be seen in person.    40 minutes spent on the date of the encounter doing chart review, review of outside records, review of  test results, patient visit, documentation and discussion with other provider(s)

## 2022-12-14 NOTE — TELEPHONE ENCOUNTER
Preeti (pt) needs clarification on antinausea medication:    Pt current understanding about:  ZOFRAN is it is used for nausea. Pt also has other antinausea medications to use prochlorperazine(COMPAZINE).   Pt just had infusion txt today 12/14/22 and is having difficult recalling what was told about how to take the two nausea medications.     Pt wondering when it is okay to take home tablet Zofran 8mg and when to take Compazine?     Today at infusion [facility] Avtar Mendez:   In addition last Ondansetron given       This writer educated pt on current directions for antinausea meds and repeated 3x+ directions and had pt repeat back.   Informed pt, since last IV ondansetron give at 9:11am, okay to resume the oral Zofran tablets anytime after 3pm today if feels nausea. If pt feels nausea now, then okay to take compazine now. Pt denies feeling nausea at time of this call.           This Writer spent over 25 minutes on triage call, education, follow-up coordination, and documentation.

## 2022-12-14 NOTE — PATIENT INSTRUCTIONS
[facility] Triage and after hours / weekends / holidays:  [phone]    Please call the triage or after hours line if you experience a temperature greater than or equal to 100.4, shaking chills, have uncontrolled nausea, vomiting and/or diarrhea, dizziness, shortness of breath, chest pain, bleeding, unexplained bruising, or if you have any other new/concerning symptoms, questions or concerns.      If you are having any concerning symptoms or wish to speak to a provider before your next infusion visit, please call your care coordinator or triage to notify them so we can adequately serve you.     If you need a refill on a narcotic prescription or other medication, please call before your infusion appointment.                December 2022 Sunday Monday Tuesday Wednesday Thursday Friday Saturday                       1    ONC INFUSION 2.5 HR (150 MIN)   9:00 AM   (150 min.)    ONC INFUSION NURSE   [facility] 2    ONC INFUSION 2.5 HR (150 MIN)   7:30 AM   (150 min.)    ONC INFUSION NURSE   [facility] 3       4     5     6    LAB PERIPHERAL   8:45 AM   (15 min.)   [facility] DRAW   [facility]    RETURN   9:15 AM   (45 min.)   Nallely Smiley APRN CNP   [facility] 7     8     9     10       11     12     13    VIDEO VISIT RETURN   8:15 AM   (45 min.)   Nallely Smiley APRN CNP   [facility] 14    LAB PERIPHERAL   7:00 AM   (15 min.)   [facility]   [facility]    ONC INFUSION 2.5 HR (150 MIN)   7:30 AM   (150 min.)    ONC INFUSION NURSE   [facility] 15    ONC INFUSION 2.5 HR (150 MIN)   9:00 AM   (150 min.)    ONC INFUSION NURSE   [facility] 16     17       18     19     20    NEW  12:45 PM   (80 min.)   Jovan Zuñiga MD   [facility] 21     22    VIDEO VISIT RETURN   6:45 AM   (45 min.)   Nallely Smiley APRN CNP   [facility] 23     24       25  Happy Birthday!     26     27     28    LAB PERIPHERAL   7:00 AM   (15 min.)   [facility]   [facility]    ONC INFUSION 2.5 HR (150 MIN)   7:30 AM   (150 min.)   [facility] ONC INFUSION NURSE   [facility] 29    ONC INFUSION 2.5 HR (150 MIN)   9:00 AM   (150 min.)   [facility] ONC INFUSION NURSE   [facility] 30     31 January 2023 Sunday Monday Tuesday Wednesday Thursday Friday Saturday   1     2     3     4     5    LAB PERIPHERAL   7:45 AM   (15 min.)   [facility]   [facility]    CT CHEST/ABDOMEN/PELVIS W   8:10 AM   (20 min.)   [email or web address]   [facility] [place] 6     7       8     9    RETURN   4:15 PM   (30 min.)   Agustin Kyle MD   [facility] 10     11     12    LAB PERIPHERAL   8:30 AM   (15 min.)   [facility]   [facility]    ONC INFUSION 2.5 HR (150 MIN)   9:00 AM   (150 min.)   [facility] ONC INFUSION NURSE   [facility] 13    ONC INFUSION 2.5 HR (150 MIN)   8:00 AM   (150 min.)   [facility] ONC INFUSION NURSE   [facility] 14       15     16     17     18     19    LAB   8:30 AM   (15 min.)   Trevor Trujillo MD   [facility] [place] 20     21       22     23     24     25    LAB PERIPHERAL   7:00 AM   (15 min.)   [facility]   [facility]    ONC INFUSION 2.5 HR (150 MIN)   7:30 AM   (150 min.)   [facility] ONC INFUSION NURSE   [facility] 26    ONC INFUSION 2.5 HR (150 MIN)   9:00 AM   (150 min.)   [facility] ONC INFUSION NURSE   [facility] 27     28       29     30      31                                           Lab Results:  Recent Results (from the past 12 hour(s))   Comprehensive metabolic panel    Collection Time: 12/14/22  7:33 AM   Result Value Ref Range    Sodium 137 136 - 145 mmol/L    Potassium 3.6 3.4 - 5.3 mmol/L    Chloride 103 98 - 107 mmol/L    Carbon Dioxide (CO2) 23 22 - 29 mmol/L    Anion Gap 11 7 - 15 mmol/L    Urea Nitrogen 7.3 6.0 - 20.0 mg/dL    Creatinine 0.60 (L) 0.67 - 1.17 mg/dL    Calcium 9.3 8.6 - 10.0 mg/dL    Glucose 109 (H) 70 - 99 mg/dL    Alkaline Phosphatase 268 (H) 40 - 129 U/L     (H) 10 - 50 U/L    ALT 69 (H) 10 - 50 U/L    Protein Total 7.9 6.4 - 8.3 g/dL    Albumin 3.4 (L) 3.5 - 5.2 g/dL    Bilirubin Total 0.9 <=1.2 mg/dL    GFR Estimate >90 >60 mL/min/1.73m2   CBC with platelets and differential    Collection Time: 12/14/22  7:33 AM   Result Value Ref Range    WBC Count 3.7 (L) 4.0 - 11.0 10e3/uL    RBC Count 4.53 4.40 - 5.90 10e6/uL    Hemoglobin 12.8 (L) 13.3 - 17.7 g/dL    Hematocrit 38.0 (L) 40.0 - 53.0 %    MCV 84 78 - 100 fL    MCH 28.3 26.5 - 33.0 pg    MCHC 33.7 31.5 - 36.5 g/dL    RDW 14.7 10.0 - 15.0 %    Platelet Count 247 150 - 450 10e3/uL    % Neutrophils 59 %    % Lymphocytes 17 %    % Monocytes 19 %    % Eosinophils 4 %    % Basophils 1 %    % Immature Granulocytes 0 %    NRBCs per 100 WBC 0 <1 /100    Absolute Neutrophils 2.2 1.6 - 8.3 10e3/uL    Absolute Lymphocytes 0.6 (L) 0.8 - 5.3 10e3/uL    Absolute Monocytes 0.7 0.0 - 1.3 10e3/uL    Absolute Eosinophils 0.2 0.0 - 0.7 10e3/uL    Absolute Basophils 0.0 0.0 - 0.2 10e3/uL    Absolute Immature Granulocytes 0.0 <=0.4 10e3/uL    Absolute NRBCs 0.0 10e3/uL

## 2022-12-14 NOTE — PROGRESS NOTES
Infusion Nursing Note:  Preeti Pascual presents today for cycle 2 day 1 gemcitabine.    Patient seen by provider today: No, Nallely Smiley, 12/13/22   present during visit today: Not Applicable.    Note: Pt presents today generally not feeling well. He c/o abdominal pain which has been ongoing, he takes MS contin scheduled and oxycodone PRN. Pt states he did not take any pain medication this morning before his appointment - instructed pt that he should take medication prior to coming to infusion.     Pt had been struggling with constipation for several days. He took one senna yesterday and then had diarrhea x6 yesterday, and twice today. Instructed pt should not take additional senna if he is experiencing diarrhea. Pt states he is eating a very small amount once a day. Very low appetite. Vomited x2 yesterday, none today.     Pt's weight is less than 10% of his original dosing weight. Dose adjusted per Sendy Yun PA-C. Discussed symptoms with Nallely Smiley CNP who states it is okay to proceed with treatment, pt is not experiencing any new symptoms. She confirms pt does have compazine at home.    Medication schedule made by Avtar Mendez RN printed and discussed with pt. Pt verbalized understanding and states he will take medication per schedule.     Intravenous Access:  Peripheral IV placed.    Treatment Conditions:  Lab Results   Component Value Date    HGB 12.8 (L) 12/14/2022    WBC 3.7 (L) 12/14/2022    ANEU 2.7 08/19/2021    ANEUTAUTO 2.2 12/14/2022     12/14/2022      Lab Results   Component Value Date     12/14/2022    POTASSIUM 3.6 12/14/2022    MAG 2.0 12/06/2022    CR 0.60 (L) 12/14/2022    STACEY 9.3 12/14/2022    BILITOTAL 0.9 12/14/2022    ALBUMIN 3.4 (L) 12/14/2022    ALT 69 (H) 12/14/2022     (H) 12/14/2022     Results reviewed, labs MET treatment parameters, ok to proceed with treatment.    Post Infusion Assessment:  Patient tolerated infusion without incident.  Blood return  noted pre and post infusion.  Site patent and intact, free from redness, edema or discomfort.  No evidence of extravasations.  Access discontinued per protocol.     Discharge Plan:   Prescription refills given for dexamethasone, omeprazole and oxycodone.  Discharge instructions reviewed with: Patient.  Patient and/or family verbalized understanding of discharge instructions and all questions answered.  Copy of AVS reviewed with patient and/or family.  Patient will return 12/15/22 for next appointment.  Patient discharged in stable condition accompanied by: self.  Departure Mode: Ambulatory.      Emily Meese, RN

## 2022-12-15 NOTE — PROGRESS NOTES
Infusion Nursing Note:  Preeti Pascual presents today for Cycle 2 Day 2 Oxaliplatin.    Patient seen by provider today: No   present during visit today: Not Applicable.    Note: Patient presents to infusion today doing well. States that his abdominal pain is better today rating 2/10. Has been taking home pain medications as prescribed. No fevers or chills overnight. Diarrhea has resolved since yesterday, aware to hold off on stool softeners if he continues to have diarrhea. No chest pains or SOB. Does report that he developed hiccups after infusion yesterday that woke him up in the middle of the night. ANGELIKA Connors notified.    TORB @ 1010 ANGELIKA Connors/ Nathalia Morrison RN:  - Reduce dexamethasone premed to 8mg today and for all future dates  - If hiccups recur, have him call triage and we can give him baclofen. If he prefers to have it on hand, we can send it now as well    Above relayed to patient. Patient would like to try decreased dose of dexamethasone and will call if hiccups return.    Antiemetics schedule reiterated to patient as patient had to call in to triage yesterday with questions. All questions answered to patient's satisfaction.     Intravenous Access:  Peripheral IV placed by V[facility] Access.    Treatment Conditions:  Lab Results   Component Value Date    HGB 12.8 (L) 12/14/2022    WBC 3.7 (L) 12/14/2022    ANEU 2.7 08/19/2021    ANEUTAUTO 2.2 12/14/2022     12/14/2022      Lab Results   Component Value Date     12/14/2022    POTASSIUM 3.6 12/14/2022    MAG 2.0 12/06/2022    CR 0.60 (L) 12/14/2022    STACEY 9.3 12/14/2022    BILITOTAL 0.9 12/14/2022    ALBUMIN 3.4 (L) 12/14/2022    ALT 69 (H) 12/14/2022     (H) 12/14/2022     Results reviewed from yesterday 12/14, labs MET treatment parameters, ok to proceed with treatment.    Post Infusion Assessment:  Patient tolerated infusion without incident.  Blood return noted pre and post infusion.  Site patent and intact,  free from redness, edema or discomfort.  No evidence of extravasations.  Access discontinued per protocol.     Discharge Plan:   Patient declined prescription refills.  Discharge instructions reviewed with: Patient.  Patient and/or family verbalized understanding of discharge instructions and all questions answered.  AVS to patient via [employer].  Patient will return 12/22 for next appointment with Nallely Smiley NP.   Patient discharged in stable condition accompanied by: self.  Departure Mode: Ambulatory.      Nathalia Morrison RN

## 2022-12-15 NOTE — PATIENT INSTRUCTIONS
Contact Numbers  [facility]: [phone] (for symptom and scheduling needs)    Please call the [facility] Triage line if you experience a temperature greater than or equal to 100.4, shaking chills, have uncontrolled nausea, vomiting and/or diarrhea, dizziness, shortness of breath, chest pain, bleeding, unexplained bruising, or if you have any other new/concerning symptoms, questions or concerns.     If you are having any concerning symptoms or wish to speak to a provider before your next infusion visit, please call your care coordinator or triage to notify them so we can adequately serve you.     If you need a refill on a narcotic prescription or other medication, please call triage before your infusion appointment.           December 2022 Sunday Monday Tuesday Wednesday Thursday Friday Saturday                       1    ONC INFUSION 2.5 HR (150 MIN)   9:00 AM   (150 min.)    ONC INFUSION NURSE   [facility] 2    ONC INFUSION 2.5 HR (150 MIN)   7:30 AM   (150 min.)    ONC INFUSION NURSE   [facility] 3       4     5     6    LAB PERIPHERAL   8:45 AM   (15 min.)   [facility] LAB DRAW   [facility]    RETURN   9:15 AM   (45 min.)   Nallely Smiley APRN CNP   [facility] 7     8     9     10       11     12     13    VIDEO VISIT RETURN   8:15 AM   (45 min.)   Nallely Smiley APRN CNP   [facility] 14    LAB PERIPHERAL   7:00 AM   (15 min.)   [facility] LAB DRAW   [facility]    ONC INFUSION 2.5 HR (150 MIN)   7:30 AM   (150 min.)    ONC INFUSION NURSE   [facility] 15    ONC INFUSION 2.5 HR (150 MIN)   9:00 AM   (150 min.)    ONC INFUSION NURSE   [facility] 16     17       18     19     20    NEW  12:45 PM   (80 min.)   Jovan Zuñiga MD   [facility] 21     22    VIDEO VISIT RETURN   6:45 AM   (45 min.)   Nallely Smiley APRN CNP   [facility] 23     24       25  Happy Birthday!     26     27     28    LAB PERIPHERAL   7:00 AM   (15 min.)   [facility]   [facility]    ONC INFUSION 2.5 HR (150 MIN)   7:30 AM   (150 min.)   [facility] ONC INFUSION NURSE   [facility] 29    ONC INFUSION 2.5 HR (150 MIN)   9:00 AM   (150 min.)   [facility] ONC INFUSION NURSE   [facility] 30     31 January 2023 Sunday Monday Tuesday Wednesday Thursday Friday Saturday   1     2     3     4     5    LAB PERIPHERAL   7:45 AM   (15 min.)   [facility]   [facility]    CT CHEST/ABDOMEN/PELVIS W   8:10 AM   (20 min.)   [email or web address]   [facility] [place] 6     7       8     9    RETURN   4:15 PM   (30 min.)   Agustin Kyle MD   [facility] 10     11     12    LAB PERIPHERAL   8:30 AM   (15 min.)   [facility]   [facility]    ONC INFUSION 2.5 HR (150 MIN)   9:00 AM   (150 min.)   UC ONC INFUSION NURSE   [facility] 13    ONC INFUSION 2.5 HR (150 MIN)   8:00 AM   (150 min.)   UC ONC INFUSION NURSE   [facility] 14       15     16     17     18     19    LAB   8:30 AM   (15 min.)   Trevor Trujillo MD   [facility] [place] 20     21       22     23     24     25    LAB PERIPHERAL   7:00 AM   (15 min.)   [facility]   [facility]    ONC INFUSION 2.5 HR (150 MIN)   7:30 AM   (150 min.)   UC ONC INFUSION NURSE   [facility] 26    ONC INFUSION 2.5 HR (150 MIN)   9:00 AM   (150 min.)   UC ONC INFUSION NURSE   [facility] 27     28       29     30      31                                           Lab Results:  No results found for this or any previous visit (from the past 12 hour(s)).

## 2022-12-16 NOTE — TELEPHONE ENCOUNTER
Medication: Ondansetron 8 mg tablet  Medication: Prochlorperazine 10 mg tablet  Last prescribing provider: 11/28/22 Rohit Kyle MD    Last clinic visit date: 12/13/22 with Nallely Smiley CNP    Any missed appointments or no-shows since last clinic visit?: No    Recommendations for requested medication (if none, N/A): Pt needs medications today. Is almost out of zofran. Does not have enough for the weekend.    Next clinic visit date: 12/22/22 with Nallely Smiley CNP    Any other pertinent information (if none, N/A): Nallely is out of the office today. Will route to ANGELIKA Connors to review.    Pended and Routed to Provider.

## 2022-12-17 NOTE — TELEPHONE ENCOUNTER
[facility] [phone]   PHARMACY [phone]    Caller is seeking RX from  [facility] pharmacy she thought was to be deleivered   Pharmacy  Does not have this instruction, is unable to deliver but  Will transfer RX to pharmacy of choice    Contacted preferred pharmacy and request call to [facility] pharmacy   Patient informed and she will get RX today   Gina Espinoza RN  FNA       Reason for Disposition    [1] Prescription prescribed recently is not at pharmacy AND [2] triager has access to patient's EMR AND [3] prescription is recorded in the EMR    Protocols used: MEDICATION QUESTION CALL-A-AH

## 2022-12-22 NOTE — PROGRESS NOTES
Preeti is a 56 year old who is being evaluated via a billable video visit.      How would you like to obtain your AVS? "[employer]  If the video visit is dropped, the invitation should be resent by: Text to cell phone: [phone]  Will anyone else be joining your video visit? Queenie Perez Block      Converted to phone visit : Unable to connect on video. Duration of call: 26 minutes    Reason for Visit: f/u hepatocellular carcinoma    Oncology HPI:   Preeti Pascual is a 56 year old male with hepatocellular carcinoma. 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  He had initial liver directed therapy with radial his embolization and declined liver transplant.  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  Initial therapy with sorafenib was poorly tolerated and could only get to a dose of 200 mg/day on which he progressed.  He then had a long period of control with single agent nivolumab and upon progression without we added ipilimumab without achieving control and with the development of severe autoimmune hepatitis (without evidence of reactivation of his hepatitis B.)  He had a period off treatment as he recovered from the hepatitis and then was started on Ramucirumab with stable disease and then switched to lenvatinib so that he could travel back to [facility] to visit family.  He required dose reductions to tolerate the lenvatinib and had a short period of control on that but as of 2 months ago had progressed.  He then resumed Ramucirumab.  CT CAP on 10/31/22 showed disease progression.     He started gemzar/oxaliplatin on 12/1/22    Interval history:   Preeti is doing some better than the past few weeks. Notes he has times when he doesn't have pain.  He has nausea, but finds it better controlled with taking antiemetics on a schedule.  He has not vomited since last week. Bowels are moving regularly. His  appetite remains low, but he does try to push fluids.  Tolerating the chemo ok, feels best on the days he is taking dexamethasone.    Current Outpatient Medications   Medication Sig Dispense Refill     acetaminophen (TYLENOL) 325 MG tablet Take 650 mg by mouth every 8 hours as needed for mild pain Do not exceed 2 grams per day.       dexamethasone (DECADRON) 4 MG tablet Take 2 tablets (8 mg) by mouth daily Take for 2 days, starting the day after chemo. Take with food. 4 tablet 2     morphine (MS CONTIN) 15 MG CR tablet Take 1 tablet (15 mg) by mouth every 12 hours maximum 2 tablet(s) per day 60 tablet 0     naloxone (NARCAN) 4 MG/0.1ML nasal spray Spray 1 spray (4 mg) into one nostril alternating nostrils as needed for opioid reversal every 2-3 minutes until assistance arrives (Patient not taking: Reported on 12/6/2022) 0.2 mL 0     omeprazole (PRILOSEC) 20 MG DR capsule Take 1 capsule (20 mg) by mouth daily 30 capsule 1     ondansetron (ZOFRAN) 8 MG tablet Take 1 tablet (8 mg) by mouth every 8 hours as needed for nausea (vomiting) 30 tablet 2     oxyCODONE (ROXICODONE) 5 MG tablet Take 1-2 tablets (5-10 mg) by mouth every 4 hours as needed for moderate to severe pain 100 tablet 0     prochlorperazine (COMPAZINE) 10 MG tablet Take 1 tablet (10 mg) by mouth every 6 hours as needed for nausea or vomiting 30 tablet 2     SENNA-docusate sodium (SENNA S) 8.6-50 MG tablet Take 1-2 tablets by mouth 2 times daily as needed 60 tablet 1          Allergies   Allergen Reactions     Pepcid [Famotidine] Headache         Exam: phone visit. Voice is clear. He is pleasant and interactive, alert.  There were no vitals taken for this visit.      Labs:    Latest Reference Range & Units 12/14/22 07:33   Sodium 136 - 145 mmol/L 137   Potassium 3.4 - 5.3 mmol/L 3.6   Chloride 98 - 107 mmol/L 103   Carbon Dioxide (CO2) 22 - 29 mmol/L 23   Urea Nitrogen 6.0 - 20.0 mg/dL 7.3   Creatinine 0.67 - 1.17 mg/dL 0.60 (L)   GFR Estimate >60  mL/min/1.73m2 >90   Calcium 8.6 - 10.0 mg/dL 9.3   Anion Gap 7 - 15 mmol/L 11   Albumin 3.5 - 5.2 g/dL 3.4 (L)   Protein Total 6.4 - 8.3 g/dL 7.9   Alkaline Phosphatase 40 - 129 U/L 268 (H)   ALT 10 - 50 U/L 69 (H)   AST 10 - 50 U/L 171 (H)   Bilirubin Total <=1.2 mg/dL 0.9   Glucose 70 - 99 mg/dL 109 (H)   WBC 4.0 - 11.0 10e3/uL 3.7 (L)   Hemoglobin 13.3 - 17.7 g/dL 12.8 (L)   Hematocrit 40.0 - 53.0 % 38.0 (L)   Platelet Count 150 - 450 10e3/uL 247   RBC Count 4.40 - 5.90 10e6/uL 4.53   MCV 78 - 100 fL 84   MCH 26.5 - 33.0 pg 28.3   MCHC 31.5 - 36.5 g/dL 33.7   RDW 10.0 - 15.0 % 14.7   % Neutrophils % 59   % Lymphocytes % 17   % Monocytes % 19   % Eosinophils % 4   % Basophils % 1   Absolute Basophils 0.0 - 0.2 10e3/uL 0.0   Absolute Eosinophils 0.0 - 0.7 10e3/uL 0.2   Absolute Immature Granulocytes <=0.4 10e3/uL 0.0   Absolute Lymphocytes 0.8 - 5.3 10e3/uL 0.6 (L)   Absolute Monocytes 0.0 - 1.3 10e3/uL 0.7   % Immature Granulocytes % 0   Absolute Neutrophils 1.6 - 8.3 10e3/uL 2.2   Absolute NRBCs 10e3/uL 0.0   NRBCs per 100 WBC <1 /100 0   (L): Data is abnormally low  (H): Data is abnormally high    Imaging: n/a    Impression/plan:   Metastatic HCC, progressive on prior therapies as noted above  -currently on [place]/Ox  -tolerating fairly well. Symptoms of pain and nausea were present prior to starting treatment and are better controlled with taking his medications on a schedule  -reviewed his labs, he had questions about his LFT elevation, thinking it was elevated because of medication effect and wondering if steroids would be helpful. Reviewed that while he did have immune therapy toxicity in the past, the current elevation is likely more consistent with his cancer progression  -he will proceed with cycle 3 next week.  I asked him to take his medicines with him so that we can review his need for refills.  -he will f/u with Dr. Kyle after this cycle with restaging scans.    Abdominal pain s/t malignancy  -improved  control with taking MS contin every 12 hours.  -now taking oxycodone 5 mg every 4 hours as needed, with improved control       Constipation--opioid induced  -improved with scheduled senna 1-2 bid    Anorexia and nausea s/t malignancy and chemo  Dyspepsia, hiccups--resolved  -nausea is improved with scheduled zofran/compazine and omeprazole  -future options: add zyprexa (he never go the original prescription that was sent), low dose daily dex    Palliative care consultation pending in 2 weeks, in person. Reviewed this with him today.        33 minutes spent on the date of the encounter doing chart review, review of test results, interpretation of tests, patient visit and documentation

## 2022-12-22 NOTE — LETTER
12/22/2022         RE: Preeti Pascual  [address]  [facility] [address]        Dear Colleague,    Thank you for referring your patient, Preeti Pascual, to the [facility]. Please see a copy of my visit note below.    Preeti is a 56 year old who is being evaluated via a billable video visit.      How would you like to obtain your AVS? MyChart  If the video visit is dropped, the invitation should be resent by: Text to cell phone: [phone]  Will anyone else be joining your video visit? No            Chris Block      Converted to phone visit : Unable to connect on video. Duration of call: 26 minutes    Reason for Visit: f/u hepatocellular carcinoma    Oncology HPI:   Preeti Pascual is a 56 year old male with hepatocellular carcinoma. 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  He had initial liver directed therapy with radial his embolization and declined liver transplant.  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  Initial therapy with sorafenib was poorly tolerated and could only get to a dose of 200 mg/day on which he progressed.  He then had a long period of control with single agent nivolumab and upon progression without we added ipilimumab without achieving control and with the development of severe autoimmune hepatitis (without evidence of reactivation of his hepatitis B.)  He had a period off treatment as he recovered from the hepatitis and then was started on Ramucirumab with stable disease and then switched to lenvatinib so that he could travel back to [facility] to visit family.  He required dose reductions to tolerate the lenvatinib and had a short period of control on that but as of 2 months ago had progressed.  He then resumed Ramucirumab.  CT CAP on 10/31/22 showed disease progression.     He started gemzar/oxaliplatin on 12/1/22    Interval  history:   Preeti is doing some better than the past few weeks. Notes he has times when he doesn't have pain.  He has nausea, but finds it better controlled with taking antiemetics on a schedule.  He has not vomited since last week. Bowels are moving regularly. His appetite remains low, but he does try to push fluids.  Tolerating the chemo ok, feels best on the days he is taking dexamethasone.    Current Outpatient Medications   Medication Sig Dispense Refill     acetaminophen (TYLENOL) 325 MG tablet Take 650 mg by mouth every 8 hours as needed for mild pain Do not exceed 2 grams per day.       dexamethasone (DECADRON) 4 MG tablet Take 2 tablets (8 mg) by mouth daily Take for 2 days, starting the day after chemo. Take with food. 4 tablet 2     morphine (MS CONTIN) 15 MG CR tablet Take 1 tablet (15 mg) by mouth every 12 hours maximum 2 tablet(s) per day 60 tablet 0     naloxone (NARCAN) 4 MG/0.1ML nasal spray Spray 1 spray (4 mg) into one nostril alternating nostrils as needed for opioid reversal every 2-3 minutes until assistance arrives (Patient not taking: Reported on 12/6/2022) 0.2 mL 0     omeprazole (PRILOSEC) 20 MG DR capsule Take 1 capsule (20 mg) by mouth daily 30 capsule 1     ondansetron (ZOFRAN) 8 MG tablet Take 1 tablet (8 mg) by mouth every 8 hours as needed for nausea (vomiting) 30 tablet 2     oxyCODONE (ROXICODONE) 5 MG tablet Take 1-2 tablets (5-10 mg) by mouth every 4 hours as needed for moderate to severe pain 100 tablet 0     prochlorperazine (COMPAZINE) 10 MG tablet Take 1 tablet (10 mg) by mouth every 6 hours as needed for nausea or vomiting 30 tablet 2     SENNA-docusate sodium (SENNA S) 8.6-50 MG tablet Take 1-2 tablets by mouth 2 times daily as needed 60 tablet 1          Allergies   Allergen Reactions     Pepcid [Famotidine] Headache         Exam: phone visit. Voice is clear. He is pleasant and interactive, alert.  There were no vitals taken for this visit.      Labs:    Latest Reference  Range & Units 12/14/22 07:33   Sodium 136 - 145 mmol/L 137   Potassium 3.4 - 5.3 mmol/L 3.6   Chloride 98 - 107 mmol/L 103   Carbon Dioxide (CO2) 22 - 29 mmol/L 23   Urea Nitrogen 6.0 - 20.0 mg/dL 7.3   Creatinine 0.67 - 1.17 mg/dL 0.60 (L)   GFR Estimate >60 mL/min/1.73m2 >90   Calcium 8.6 - 10.0 mg/dL 9.3   Anion Gap 7 - 15 mmol/L 11   Albumin 3.5 - 5.2 g/dL 3.4 (L)   Protein Total 6.4 - 8.3 g/dL 7.9   Alkaline Phosphatase 40 - 129 U/L 268 (H)   ALT 10 - 50 U/L 69 (H)   AST 10 - 50 U/L 171 (H)   Bilirubin Total <=1.2 mg/dL 0.9   Glucose 70 - 99 mg/dL 109 (H)   WBC 4.0 - 11.0 10e3/uL 3.7 (L)   Hemoglobin 13.3 - 17.7 g/dL 12.8 (L)   Hematocrit 40.0 - 53.0 % 38.0 (L)   Platelet Count 150 - 450 10e3/uL 247   RBC Count 4.40 - 5.90 10e6/uL 4.53   MCV 78 - 100 fL 84   MCH 26.5 - 33.0 pg 28.3   MCHC 31.5 - 36.5 g/dL 33.7   RDW 10.0 - 15.0 % 14.7   % Neutrophils % 59   % Lymphocytes % 17   % Monocytes % 19   % Eosinophils % 4   % Basophils % 1   Absolute Basophils 0.0 - 0.2 10e3/uL 0.0   Absolute Eosinophils 0.0 - 0.7 10e3/uL 0.2   Absolute Immature Granulocytes <=0.4 10e3/uL 0.0   Absolute Lymphocytes 0.8 - 5.3 10e3/uL 0.6 (L)   Absolute Monocytes 0.0 - 1.3 10e3/uL 0.7   % Immature Granulocytes % 0   Absolute Neutrophils 1.6 - 8.3 10e3/uL 2.2   Absolute NRBCs 10e3/uL 0.0   NRBCs per 100 WBC <1 /100 0   (L): Data is abnormally low  (H): Data is abnormally high    Imaging: n/a    Impression/plan:   Metastatic HCC, progressive on prior therapies as noted above  -currently on [place]/Ox  -tolerating fairly well. Symptoms of pain and nausea were present prior to starting treatment and are better controlled with taking his medications on a schedule  -reviewed his labs, he had questions about his LFT elevation, thinking it was elevated because of medication effect and wondering if steroids would be helpful. Reviewed that while he did have immune therapy toxicity in the past, the current elevation is likely more consistent with his  cancer progression  -he will proceed with cycle 3 next week.  I asked him to take his medicines with him so that we can review his need for refills.  -he will f/u with Dr. Kyle after this cycle with restaging scans.    Abdominal pain s/t malignancy  -improved control with taking MS contin every 12 hours.  -now taking oxycodone 5 mg every 4 hours as needed, with improved control       Constipation--opioid induced  -improved with scheduled senna 1-2 bid    Anorexia and nausea s/t malignancy and chemo  Dyspepsia, hiccups--resolved  -nausea is improved with scheduled zofran/compazine and omeprazole  -future options: add zyprexa (he never go the original prescription that was sent), low dose daily dex    Palliative care consultation pending in 2 weeks, in person. Reviewed this with him today.        33 minutes spent on the date of the encounter doing chart review, review of test results, interpretation of tests, patient visit and documentation     MITCH Jacobs CNP

## 2022-12-25 NOTE — TELEPHONE ENCOUNTER
Pt asks if he can take Senna for constipation. Declined triage, just wants to ask med question. Severo S is on med list 1-2 tabs twice a day as needed - advised this is on his med list so he can take it but call back if no results or if severe abdominal pain, other new or worsened sx. Pt voiced understanding and agreement.       Reason for Disposition    Caller has medicine question, adult has minor symptoms, caller declines triage, AND triager answers question    Additional Information    Negative: [1] Intentional drug overdose AND [2] suicidal thoughts or ideas    Negative: Drug overdose and triager unable to answer question    Negative: Caller requesting a renewal or refill of a medicine patient is currently taking    Negative: Caller requesting information unrelated to medicine    Negative: Caller requesting information about COVID-19 Vaccine    Negative: Caller requesting information about Emergency Contraception    Negative: Caller requesting information about Combined Birth Control Pills    Negative: Caller requesting information about Progestin Birth Control Pills    Negative: Caller requesting information about Post-Op pain or medicines    Negative: Caller requesting a prescription antibiotic (such as Penicillin) for Strep throat and has a positive culture result    Negative: Caller requesting a prescription anti-viral med (such as Tamiflu) and has influenza (flu) symptoms    Negative: Immunization reaction suspected    Negative: Rash while taking a medicine or within 3 days of stopping it    Negative: [1] Asthma and [2] having symptoms of asthma (cough, wheezing, etc.)    Negative: [1] Symptom of illness (e.g., headache, abdominal pain, earache, vomiting) AND [2] more than mild    Negative: Breastfeeding questions about mother's medicines and diet    Negative: MORE THAN A DOUBLE DOSE of a prescription or over-the-counter (OTC) drug    Negative: [1] DOUBLE DOSE (an extra dose or lesser amount) of  prescription drug AND [2] any symptoms (e.g., dizziness, nausea, pain, sleepiness)    Negative: [1] DOUBLE DOSE (an extra dose or lesser amount) of over-the-counter (OTC) drug AND [2] any symptoms (e.g., dizziness, nausea, pain, sleepiness)    Negative: Took another person's prescription drug    Negative: [1] DOUBLE DOSE (an extra dose or lesser amount) of prescription drug AND [2] NO symptoms (Exception: a double dose of antibiotics)    Negative: Diabetes drug error or overdose (e.g., took wrong type of insulin or took extra dose)    Negative: [1] Prescription not at pharmacy AND [2] was prescribed by PCP recently (Exception: triager has access to EMR and prescription is recorded there. Go to Home Care and confirm for pharmacy.)    Negative: [1] Pharmacy calling with prescription question AND [2] triager unable to answer question    Negative: [1] Caller has URGENT medicine question about med that PCP or specialist prescribed AND [2] triager unable to answer question    Negative: Medicine patch causing local rash or itching    Negative: [1] Caller has medicine question about med NOT prescribed by PCP AND [2] triager unable to answer question (e.g., compatibility with other med, storage)    Negative: Prescription request for new medicine (not a refill)    Negative: [1] Caller has NON-URGENT medicine question about med that PCP prescribed AND [2] triager unable to answer question    Negative: Caller wants to use a complementary or alternative medicine    Negative: [1] Prescription prescribed recently is not at pharmacy AND [2] triager has access to patient's EMR AND [3] prescription is recorded in the EMR    Negative: [1] DOUBLE DOSE (an extra dose or lesser amount) of over-the-counter (OTC) drug AND [2] NO symptoms    Negative: [1] DOUBLE DOSE (an extra dose or lesser amount) of antibiotic drug AND [2] NO symptoms    Negative: Caller has medicine question only, adult not sick, AND triager answers  question    Protocols used: MEDICATION QUESTION CALL-A-AH

## 2022-12-28 NOTE — PATIENT INSTRUCTIONS
[facility] Triage and after hours / weekends / holidays:  [phone]    Please call the triage or after hours line if you experience a temperature greater than or equal to 100.4, shaking chills, have uncontrolled nausea, vomiting and/or diarrhea, dizziness, shortness of breath, chest pain, bleeding, unexplained bruising, or if you have any other new/concerning symptoms, questions or concerns.      If you are having any concerning symptoms or wish to speak to a provider before your next infusion visit, please call your care coordinator or triage to notify them so we can adequately serve you.     If you need a refill on a narcotic prescription or other medication, please call before your infusion appointment.                December 2022 Sunday Monday Tuesday Wednesday Thursday Friday Saturday                       1    ONC INFUSION 2.5 HR (150 MIN)   9:00 AM   (150 min.)    ONC INFUSION NURSE   [facility] 2    ONC INFUSION 2.5 HR (150 MIN)   7:30 AM   (150 min.)    ONC INFUSION NURSE   [facility] 3       4     5     6    LAB PERIPHERAL   8:45 AM   (15 min.)   [facility]   [facility]    RETURN   9:15 AM   (45 min.)   Nallely Smiley APRN CNP M [facility] 7     8     9     10       11     12     13    VIDEO VISIT RETURN   8:15 AM   (45 min.)   Nallely Smiley APRN CNP M [facility] 14    LAB PERIPHERAL   7:00 AM   (15 min.)   [facility]   [facility]    ONC INFUSION 2.5 HR (150 MIN)   7:30 AM   (150 min.)    ONC INFUSION NURSE   [facility] 15    ONC INFUSION 2.5 HR (150 MIN)   9:00 AM   (150 min.)    ONC INFUSION NURSE   [facility] 16     17       18     19     20     21     22    TELEPHONE VISIT RETURN   7:00 AM   (45 min.)   Nallely Smiley APRN CNP M  [facility] 23     24       25  Happy Birthday!     26     27     28    LAB PERIPHERAL   7:00 AM   (15 min.)   [facility]   [facility]    ONC INFUSION 2.5 HR (150 MIN)   7:30 AM   (150 min.)   [facility] ONC INFUSION NURSE   [facility] 29    ONC INFUSION 2.5 HR (150 MIN)   9:00 AM   (150 min.)   [facility] ONC INFUSION NURSE   [facility] 30 31 January 2023 Sunday Monday Tuesday Wednesday Thursday Friday Saturday   1     2     3     4     5    LAB PERIPHERAL   7:45 AM   (15 min.)   [facility]   [facility]    CT CHEST/ABDOMEN/PELVIS W   8:10 AM   (20 min.)   [id number]   [facility] [address]   9:05 AM   (80 min.)   Jovan Zuñiga MD   [facility] 7       8     9    RETURN   4:15 PM   (30 min.)   Agustin Kyle MD   [facility] 10     11     12    LAB PERIPHERAL   8:30 AM   (15 min.)   [facility]   [facility]    ONC INFUSION 2.5 HR (150 MIN)   9:00 AM   (150 min.)   UC ONC INFUSION NURSE   [facility] 13    ONC INFUSION 2.5 HR (150 MIN)   8:00 AM   (150 min.)   UC ONC INFUSION NURSE   [facility] 14       15     16     17     18     19    LAB   8:30 AM   (15 min.)   Trevor Trujillo MD   [facility] [place] 20     21       22     23     24    LAB PERIPHERAL   7:00 AM   (15 min.)   [facility]   [facility]    RETURN   7:15 AM   (45 min.)   Nallely Smiley APRN CNP   [facility]    ONC INFUSION 2.5 HR (150 MIN)   9:00 AM   (150 min.)   UC ONC INFUSION NURSE   [facility] 25    ONC INFUSION 2.5 HR (150 MIN)   7:30 AM   (150 min.)     ONC INFUSION NURSE   [facility] 26     27     28       29     30     31                                            Recent Results (from the past 24 hour(s))   Magnesium    Collection Time: 12/28/22  7:47 AM   Result Value Ref Range    Magnesium 1.8 1.7 - 2.3 mg/dL   Comprehensive metabolic panel    Collection Time: 12/28/22  7:47 AM   Result Value Ref Range    Sodium 134 (L) 136 - 145 mmol/L    Potassium 3.7 3.4 - 5.3 mmol/L    Chloride 100 98 - 107 mmol/L    Carbon Dioxide (CO2) 23 22 - 29 mmol/L    Anion Gap 11 7 - 15 mmol/L    Urea Nitrogen 5.8 (L) 8.0 - 23.0 mg/dL    Creatinine 0.58 (L) 0.67 - 1.17 mg/dL    Calcium 9.6 8.6 - 10.0 mg/dL    Glucose 94 70 - 99 mg/dL    Alkaline Phosphatase 239 (H) 40 - 129 U/L     (H) 10 - 50 U/L    ALT 64 (H) 10 - 50 U/L    Protein Total 7.8 6.4 - 8.3 g/dL    Albumin 3.5 3.5 - 5.2 g/dL    Bilirubin Total 0.9 <=1.2 mg/dL    GFR Estimate >90 >60 mL/min/1.73m2   CBC with platelets and differential    Collection Time: 12/28/22  7:47 AM   Result Value Ref Range    WBC Count 4.9 4.0 - 11.0 10e3/uL    RBC Count 4.32 (L) 4.40 - 5.90 10e6/uL    Hemoglobin 12.2 (L) 13.3 - 17.7 g/dL    Hematocrit 35.7 (L) 40.0 - 53.0 %    MCV 83 78 - 100 fL    MCH 28.2 26.5 - 33.0 pg    MCHC 34.2 31.5 - 36.5 g/dL    RDW 15.5 (H) 10.0 - 15.0 %    Platelet Count 152 150 - 450 10e3/uL    % Neutrophils 56 %    % Lymphocytes 21 %    % Monocytes 20 %    % Eosinophils 2 %    % Basophils 1 %    % Immature Granulocytes 0 %    NRBCs per 100 WBC 0 <1 /100    Absolute Neutrophils 2.7 1.6 - 8.3 10e3/uL    Absolute Lymphocytes 1.0 0.8 - 5.3 10e3/uL    Absolute Monocytes 1.0 0.0 - 1.3 10e3/uL    Absolute Eosinophils 0.1 0.0 - 0.7 10e3/uL    Absolute Basophils 0.1 0.0 - 0.2 10e3/uL    Absolute Immature Granulocytes 0.0 <=0.4 10e3/uL    Absolute NRBCs 0.0 10e3/uL

## 2022-12-28 NOTE — PROGRESS NOTES
"Infusion Nursing Note:  Preeti Pascual presents today for Cycle 3 Day 1 Gemzar.    Patient seen by provider today: No    Note: Patient presents to the infusion center today feeling ok. He states he is having ongoing intermittent constipation/diarrhea concerns. Re-education was done on his medications as he states he was taking his MS-Contin when he was constipated and Senna when he was having diarrhea. Looking back on 12/14/22 infusion note ULISES Ocon[facility] printed and discussed his medication schedule with the patient. Writer confirmed the patient still had this and it has medication indication on their as well. Patient states he just \"forgets\". Writer encouraged him to leave that piece of paper near his medications and to reference it often. He continues to struggle with nausea and low appetite, states food just doesn't taste good. Confirmed he is taking his Dexamethasone x2 days post chemo as RX'd and is taking his PRN zofran and compazine as RX'd. Encouraged small frequent meals and adequate hydration. Patient denies any vomiting. Patient denies any new symptoms or concerns. No pain, fevers, chills, chest pain or shortness of breath.    Per protocol Gemzar to be run over 100 mins, confirmed with Rad[facility] pharmacist.     Intravenous Access:  Peripheral IV placed.    Treatment Conditions:   Latest Reference Range & Units 12/28/22 07:47   Sodium 136 - 145 mmol/L 134 (L)   Potassium 3.4 - 5.3 mmol/L 3.7   Chloride 98 - 107 mmol/L 100   Carbon Dioxide (CO2) 22 - 29 mmol/L 23   Urea Nitrogen 8.0 - 23.0 mg/dL 5.8 (L)   Creatinine 0.67 - 1.17 mg/dL 0.58 (L)   GFR Estimate >60 mL/min/1.73m2 >90   Calcium 8.6 - 10.0 mg/dL 9.6   Anion Gap 7 - 15 mmol/L 11   Magnesium 1.7 - 2.3 mg/dL 1.8   Albumin 3.5 - 5.2 g/dL 3.5   Protein Total 6.4 - 8.3 g/dL 7.8   Alkaline Phosphatase 40 - 129 U/L 239 (H)   ALT 10 - 50 U/L 64 (H)   AST 10 - 50 U/L 148 (H)   Bilirubin Total <=1.2 mg/dL 0.9   Glucose 70 - 99 mg/dL 94   WBC 4.0 - 11.0 " 10e3/uL 4.9   Hemoglobin 13.3 - 17.7 g/dL 12.2 (L)   Hematocrit 40.0 - 53.0 % 35.7 (L)   Platelet Count 150 - 450 10e3/uL 152   RBC Count 4.40 - 5.90 10e6/uL 4.32 (L)   MCV 78 - 100 fL 83   MCH 26.5 - 33.0 pg 28.2   MCHC 31.5 - 36.5 g/dL 34.2   RDW 10.0 - 15.0 % 15.5 (H)   % Neutrophils % 56   % Lymphocytes % 21   % Monocytes % 20   % Eosinophils % 2   % Basophils % 1   Absolute Basophils 0.0 - 0.2 10e3/uL 0.1   Absolute Eosinophils 0.0 - 0.7 10e3/uL 0.1   Absolute Immature Granulocytes <=0.4 10e3/uL 0.0   Absolute Lymphocytes 0.8 - 5.3 10e3/uL 1.0   Absolute Monocytes 0.0 - 1.3 10e3/uL 1.0   % Immature Granulocytes % 0   Absolute Neutrophils 1.6 - 8.3 10e3/uL 2.7   Absolute NRBCs 10e3/uL 0.0   NRBCs per 100 WBC <1 /100 0     Results reviewed, labs MET treatment parameters, ok to proceed with treatment.    Post Infusion Assessment:  Patient tolerated infusion without incident.  Blood return noted pre and post infusion.  No evidence of extravasations.  Access discontinued per protocol.     Discharge Plan:   Prescription refills given for MS-Contin, Oxycodone, Dexamethasone, Zofran and Compazine.  Discharge instructions reviewed with: Patient.  Patient and/or family verbalized understanding of discharge instructions and all questions answered.  Copy of AVS reviewed with patient and/or family.  Patient will return 12/29 for next appointment.  AVS also available to patient via [employer].    Patient discharged in stable condition accompanied by: self.  Departure Mode: Ambulatory.      Danielle Wiggins RN

## 2022-12-28 NOTE — NURSING NOTE
Chief Complaint   Patient presents with     Blood Draw     Labs drawn with PIV start by vascular access. Vitals taken.     Labs drawn with PIV start by vascular access. Pt tolerated well. Vitals taken. Pt checked into next appointment.    Marie Le RN

## 2022-12-29 NOTE — PROGRESS NOTES
Infusion Nursing Note:  Preeti Pascual presents today for C3D2 Oxaliplatin.    Patient seen by provider today: No   present during visit today: Not Applicable.    Note: Patient denies the following: fevers, body aches, chills, headaches, vision changes, dizziness, chest pain, shortness of breath,  vomiting, constipation,  rashes, bruising/bleeding, mouth sores, swelling or pain in the legs.     -c/o nausea overnight and could not sleep  -trying to eat small frequent meals  -requesting medication flowchart (found in Mychart attachment from 12/13)  -PRN Ativan given prior to infusion  -TUMS PO given for gastric relief  -Ok for treatment.    TORB 12/29/22 @0915 Nallely Smiley DNP-Agustina Rivera RN  -1-2 tabs TUMS x 1 now       Intravenous Access:  Peripheral IV placed.    Treatment Conditions:  Lab Results   Component Value Date    HGB 12.2 (L) 12/28/2022    WBC 4.9 12/28/2022    ANEU 2.7 08/19/2021    ANEUTAUTO 2.7 12/28/2022     12/28/2022      Lab Results   Component Value Date     (L) 12/28/2022    POTASSIUM 3.7 12/28/2022    MAG 1.8 12/28/2022    CR 0.58 (L) 12/28/2022    STACEY 9.6 12/28/2022    BILITOTAL 0.9 12/28/2022    ALBUMIN 3.5 12/28/2022    ALT 64 (H) 12/28/2022     (H) 12/28/2022     Results reviewed, labs MET treatment parameters, ok to proceed with treatment.      Post Infusion Assessment:  Patient tolerated infusion without incident.  Blood return noted pre and post infusion.  Site patent and intact, free from redness, edema or discomfort.  No evidence of extravasations.  Access discontinued per protocol.       Discharge Plan:   Patient declined prescription refills.  Discharge instructions reviewed with: Patient.  Patient and/or family verbalized understanding of discharge instructions and all questions answered.  AVS to patient via [employer].  Patient will return 1/5 for CT, 1/9 for provider and 1/12 for infusion.   Patient discharged in stable condition accompanied by:  self and has family member picking him up.  Departure Mode: Ambulatory.    Agustina Rivera RN

## 2023-01-01 ENCOUNTER — TELEPHONE (OUTPATIENT)
Dept: ONCOLOGY | Facility: CLINIC | Age: 58
End: 2023-01-01
Payer: COMMERCIAL

## 2023-01-01 ENCOUNTER — APPOINTMENT (OUTPATIENT)
Dept: INTERPRETER SERVICES | Facility: CLINIC | Age: 58
DRG: 374 | End: 2023-01-01
Payer: COMMERCIAL

## 2023-01-01 ENCOUNTER — INFUSION THERAPY VISIT (OUTPATIENT)
Dept: ONCOLOGY | Facility: CLINIC | Age: 58
End: 2023-01-01
Attending: INTERNAL MEDICINE
Payer: COMMERCIAL

## 2023-01-01 ENCOUNTER — LAB (OUTPATIENT)
Dept: LAB | Facility: CLINIC | Age: 58
End: 2023-01-01
Attending: INTERNAL MEDICINE
Payer: COMMERCIAL

## 2023-01-01 ENCOUNTER — ANCILLARY PROCEDURE (OUTPATIENT)
Dept: ULTRASOUND IMAGING | Facility: CLINIC | Age: 58
End: 2023-01-01
Payer: COMMERCIAL

## 2023-01-01 ENCOUNTER — ANCILLARY PROCEDURE (OUTPATIENT)
Dept: CT IMAGING | Facility: CLINIC | Age: 58
End: 2023-01-01
Attending: INTERNAL MEDICINE
Payer: COMMERCIAL

## 2023-01-01 ENCOUNTER — PATIENT OUTREACH (OUTPATIENT)
Dept: CARE COORDINATION | Facility: CLINIC | Age: 58
End: 2023-01-01
Payer: COMMERCIAL

## 2023-01-01 ENCOUNTER — OFFICE VISIT (OUTPATIENT)
Dept: GASTROENTEROLOGY | Facility: CLINIC | Age: 58
End: 2023-01-01
Attending: INTERNAL MEDICINE
Payer: COMMERCIAL

## 2023-01-01 ENCOUNTER — APPOINTMENT (OUTPATIENT)
Dept: CT IMAGING | Facility: CLINIC | Age: 58
DRG: 374 | End: 2023-01-01
Attending: EMERGENCY MEDICINE
Payer: COMMERCIAL

## 2023-01-01 ENCOUNTER — ANCILLARY PROCEDURE (OUTPATIENT)
Dept: CT IMAGING | Facility: CLINIC | Age: 58
End: 2023-01-01
Attending: NURSE PRACTITIONER
Payer: COMMERCIAL

## 2023-01-01 ENCOUNTER — NURSE TRIAGE (OUTPATIENT)
Dept: ONCOLOGY | Facility: CLINIC | Age: 58
End: 2023-01-01
Payer: COMMERCIAL

## 2023-01-01 ENCOUNTER — ONCOLOGY VISIT (OUTPATIENT)
Dept: ONCOLOGY | Facility: CLINIC | Age: 58
End: 2023-01-01
Attending: NURSE PRACTITIONER
Payer: COMMERCIAL

## 2023-01-01 ENCOUNTER — HOSPITAL ENCOUNTER (INPATIENT)
Facility: CLINIC | Age: 58
LOS: 10 days | DRG: 374 | End: 2023-04-18
Attending: EMERGENCY MEDICINE | Admitting: STUDENT IN AN ORGANIZED HEALTH CARE EDUCATION/TRAINING PROGRAM
Payer: COMMERCIAL

## 2023-01-01 ENCOUNTER — LAB (OUTPATIENT)
Dept: LAB | Facility: CLINIC | Age: 58
End: 2023-01-01
Attending: RADIOLOGY
Payer: COMMERCIAL

## 2023-01-01 ENCOUNTER — PRE VISIT (OUTPATIENT)
Dept: ORTHOPEDICS | Facility: CLINIC | Age: 58
End: 2023-01-01

## 2023-01-01 ENCOUNTER — PATIENT OUTREACH (OUTPATIENT)
Dept: ONCOLOGY | Facility: CLINIC | Age: 58
End: 2023-01-01

## 2023-01-01 ENCOUNTER — ONCOLOGY VISIT (OUTPATIENT)
Dept: ONCOLOGY | Facility: CLINIC | Age: 58
End: 2023-01-01
Payer: COMMERCIAL

## 2023-01-01 VITALS
WEIGHT: 111.9 LBS | TEMPERATURE: 97.4 F | HEART RATE: 81 BPM | BODY MASS INDEX: 18.62 KG/M2 | DIASTOLIC BLOOD PRESSURE: 93 MMHG | SYSTOLIC BLOOD PRESSURE: 169 MMHG | OXYGEN SATURATION: 98 % | RESPIRATION RATE: 16 BRPM

## 2023-01-01 VITALS
BODY MASS INDEX: 19.7 KG/M2 | SYSTOLIC BLOOD PRESSURE: 148 MMHG | RESPIRATION RATE: 14 BRPM | HEART RATE: 95 BPM | DIASTOLIC BLOOD PRESSURE: 86 MMHG | TEMPERATURE: 98.5 F | OXYGEN SATURATION: 97 % | WEIGHT: 122 LBS

## 2023-01-01 VITALS
TEMPERATURE: 98.4 F | DIASTOLIC BLOOD PRESSURE: 87 MMHG | HEIGHT: 66 IN | RESPIRATION RATE: 16 BRPM | OXYGEN SATURATION: 98 % | WEIGHT: 120.7 LBS | SYSTOLIC BLOOD PRESSURE: 150 MMHG | HEART RATE: 89 BPM | BODY MASS INDEX: 19.4 KG/M2

## 2023-01-01 VITALS
TEMPERATURE: 98 F | WEIGHT: 113.3 LBS | HEART RATE: 95 BPM | BODY MASS INDEX: 18.85 KG/M2 | DIASTOLIC BLOOD PRESSURE: 80 MMHG | OXYGEN SATURATION: 98 % | RESPIRATION RATE: 16 BRPM | SYSTOLIC BLOOD PRESSURE: 132 MMHG

## 2023-01-01 VITALS
OXYGEN SATURATION: 98 % | SYSTOLIC BLOOD PRESSURE: 119 MMHG | RESPIRATION RATE: 16 BRPM | HEART RATE: 72 BPM | TEMPERATURE: 97.8 F | DIASTOLIC BLOOD PRESSURE: 76 MMHG

## 2023-01-01 VITALS
OXYGEN SATURATION: 99 % | TEMPERATURE: 98.2 F | BODY MASS INDEX: 18.64 KG/M2 | RESPIRATION RATE: 16 BRPM | HEART RATE: 75 BPM | SYSTOLIC BLOOD PRESSURE: 126 MMHG | WEIGHT: 112 LBS | DIASTOLIC BLOOD PRESSURE: 80 MMHG

## 2023-01-01 VITALS
TEMPERATURE: 98 F | SYSTOLIC BLOOD PRESSURE: 139 MMHG | HEART RATE: 81 BPM | DIASTOLIC BLOOD PRESSURE: 77 MMHG | WEIGHT: 122 LBS | BODY MASS INDEX: 20.33 KG/M2 | HEIGHT: 65 IN

## 2023-01-01 VITALS
DIASTOLIC BLOOD PRESSURE: 72 MMHG | WEIGHT: 111.9 LBS | HEART RATE: 103 BPM | OXYGEN SATURATION: 95 % | RESPIRATION RATE: 16 BRPM | TEMPERATURE: 100 F | BODY MASS INDEX: 18.62 KG/M2 | SYSTOLIC BLOOD PRESSURE: 122 MMHG

## 2023-01-01 VITALS
TEMPERATURE: 98.5 F | SYSTOLIC BLOOD PRESSURE: 113 MMHG | DIASTOLIC BLOOD PRESSURE: 74 MMHG | OXYGEN SATURATION: 99 % | HEART RATE: 83 BPM | RESPIRATION RATE: 18 BRPM

## 2023-01-01 VITALS
DIASTOLIC BLOOD PRESSURE: 86 MMHG | TEMPERATURE: 97.9 F | SYSTOLIC BLOOD PRESSURE: 147 MMHG | BODY MASS INDEX: 19.41 KG/M2 | WEIGHT: 120.2 LBS | HEART RATE: 96 BPM | OXYGEN SATURATION: 98 %

## 2023-01-01 VITALS
HEART RATE: 81 BPM | RESPIRATION RATE: 16 BRPM | OXYGEN SATURATION: 99 % | TEMPERATURE: 98 F | DIASTOLIC BLOOD PRESSURE: 77 MMHG | SYSTOLIC BLOOD PRESSURE: 139 MMHG

## 2023-01-01 VITALS
OXYGEN SATURATION: 98 % | WEIGHT: 114.3 LBS | SYSTOLIC BLOOD PRESSURE: 129 MMHG | HEART RATE: 81 BPM | RESPIRATION RATE: 18 BRPM | BODY MASS INDEX: 19.02 KG/M2 | DIASTOLIC BLOOD PRESSURE: 85 MMHG | TEMPERATURE: 99 F

## 2023-01-01 VITALS — BODY MASS INDEX: 19.97 KG/M2 | WEIGHT: 120 LBS

## 2023-01-01 DIAGNOSIS — B18.1 CHRONIC VIRAL HEPATITIS B WITHOUT DELTA AGENT AND WITHOUT COMA (H): ICD-10-CM

## 2023-01-01 DIAGNOSIS — C22.0 HCC (HEPATOCELLULAR CARCINOMA) (H): ICD-10-CM

## 2023-01-01 DIAGNOSIS — C79.71 MALIGNANT NEOPLASM METASTATIC TO BOTH ADRENAL GLANDS (H): Primary | ICD-10-CM

## 2023-01-01 DIAGNOSIS — C79.72 MALIGNANT NEOPLASM METASTATIC TO BOTH ADRENAL GLANDS (H): ICD-10-CM

## 2023-01-01 DIAGNOSIS — C22.0 HCC (HEPATOCELLULAR CARCINOMA) (H): Primary | ICD-10-CM

## 2023-01-01 DIAGNOSIS — C79.71 MALIGNANT NEOPLASM METASTATIC TO BOTH ADRENAL GLANDS (H): ICD-10-CM

## 2023-01-01 DIAGNOSIS — B19.10 ANICTERIC TYPE B VIRAL HEPATITIS: ICD-10-CM

## 2023-01-01 DIAGNOSIS — R10.84 ABDOMINAL PAIN, GENERALIZED: ICD-10-CM

## 2023-01-01 DIAGNOSIS — C79.72 MALIGNANT NEOPLASM METASTATIC TO BOTH ADRENAL GLANDS (H): Primary | ICD-10-CM

## 2023-01-01 DIAGNOSIS — R10.13 DYSPEPSIA: ICD-10-CM

## 2023-01-01 DIAGNOSIS — K74.69 OTHER CIRRHOSIS OF LIVER (H): ICD-10-CM

## 2023-01-01 DIAGNOSIS — L03.211 CELLULITIS OF FACE: ICD-10-CM

## 2023-01-01 DIAGNOSIS — G89.3 CANCER ASSOCIATED PAIN: ICD-10-CM

## 2023-01-01 DIAGNOSIS — M25.559 HIP PAIN: Primary | ICD-10-CM

## 2023-01-01 DIAGNOSIS — C22.0 HEPATOCELLULAR CARCINOMA (H): ICD-10-CM

## 2023-01-01 DIAGNOSIS — R06.6 HICCUPS: ICD-10-CM

## 2023-01-01 DIAGNOSIS — R79.89 ELEVATED LFTS: ICD-10-CM

## 2023-01-01 LAB
AFP SERPL-MCNC: >1210 NG/ML
AFP SERPL-MCNC: ABNORMAL NG/ML
ALBUMIN SERPL BCG-MCNC: 2.6 G/DL (ref 3.5–5.2)
ALBUMIN SERPL BCG-MCNC: 2.7 G/DL (ref 3.5–5.2)
ALBUMIN SERPL BCG-MCNC: 2.7 G/DL (ref 3.5–5.2)
ALBUMIN SERPL BCG-MCNC: 3.1 G/DL (ref 3.5–5.2)
ALBUMIN SERPL BCG-MCNC: 3.2 G/DL (ref 3.5–5.2)
ALBUMIN SERPL BCG-MCNC: 3.4 G/DL (ref 3.5–5.2)
ALBUMIN SERPL BCG-MCNC: 3.5 G/DL (ref 3.5–5.2)
ALBUMIN SERPL BCG-MCNC: 3.5 G/DL (ref 3.5–5.2)
ALBUMIN UR-MCNC: 50 MG/DL
ALP SERPL-CCNC: 178 U/L (ref 40–129)
ALP SERPL-CCNC: 190 U/L (ref 40–129)
ALP SERPL-CCNC: 201 U/L (ref 40–129)
ALP SERPL-CCNC: 226 U/L (ref 40–129)
ALP SERPL-CCNC: 318 U/L (ref 40–129)
ALP SERPL-CCNC: 319 U/L (ref 40–129)
ALP SERPL-CCNC: 328 U/L (ref 40–129)
ALP SERPL-CCNC: 369 U/L (ref 40–129)
ALP SERPL-CCNC: 379 U/L (ref 40–129)
ALT SERPL W P-5'-P-CCNC: 20 U/L (ref 10–50)
ALT SERPL W P-5'-P-CCNC: 30 U/L (ref 10–50)
ALT SERPL W P-5'-P-CCNC: 41 U/L (ref 10–50)
ALT SERPL W P-5'-P-CCNC: 41 U/L (ref 10–50)
ALT SERPL W P-5'-P-CCNC: 43 U/L (ref 10–50)
ALT SERPL W P-5'-P-CCNC: 48 U/L (ref 10–50)
ALT SERPL W P-5'-P-CCNC: 50 U/L (ref 10–50)
ALT SERPL W P-5'-P-CCNC: 56 U/L (ref 10–50)
ALT SERPL W P-5'-P-CCNC: 65 U/L (ref 10–50)
AMMONIA PLAS-SCNC: 119 UMOL/L (ref 16–60)
ANION GAP SERPL CALCULATED.3IONS-SCNC: 10 MMOL/L (ref 7–15)
ANION GAP SERPL CALCULATED.3IONS-SCNC: 11 MMOL/L (ref 7–15)
ANION GAP SERPL CALCULATED.3IONS-SCNC: 12 MMOL/L (ref 7–15)
ANION GAP SERPL CALCULATED.3IONS-SCNC: 12 MMOL/L (ref 7–15)
ANION GAP SERPL CALCULATED.3IONS-SCNC: 9 MMOL/L (ref 7–15)
ANION GAP SERPL CALCULATED.3IONS-SCNC: 9 MMOL/L (ref 7–15)
APPEARANCE UR: ABNORMAL
AST SERPL W P-5'-P-CCNC: 103 U/L (ref 10–50)
AST SERPL W P-5'-P-CCNC: 109 U/L (ref 10–50)
AST SERPL W P-5'-P-CCNC: 142 U/L (ref 10–50)
AST SERPL W P-5'-P-CCNC: 242 U/L (ref 10–50)
AST SERPL W P-5'-P-CCNC: 244 U/L (ref 10–50)
AST SERPL W P-5'-P-CCNC: 244 U/L (ref 10–50)
AST SERPL W P-5'-P-CCNC: 261 U/L (ref 10–50)
AST SERPL W P-5'-P-CCNC: 277 U/L (ref 10–50)
AST SERPL W P-5'-P-CCNC: ABNORMAL U/L
ATRIAL RATE - MUSE: 97 BPM
BASOPHILS # BLD AUTO: 0 10E3/UL (ref 0–0.2)
BASOPHILS # BLD AUTO: 0 10E3/UL (ref 0–0.2)
BASOPHILS # BLD AUTO: 0.1 10E3/UL (ref 0–0.2)
BASOPHILS NFR BLD AUTO: 1 %
BASOPHILS NFR BLD AUTO: 2 %
BILIRUB SERPL-MCNC: 0.5 MG/DL
BILIRUB SERPL-MCNC: 0.8 MG/DL
BILIRUB SERPL-MCNC: 3.8 MG/DL
BILIRUB SERPL-MCNC: 5.2 MG/DL
BILIRUB SERPL-MCNC: 5.8 MG/DL
BILIRUB SERPL-MCNC: 7 MG/DL
BILIRUB SERPL-MCNC: 8.3 MG/DL
BILIRUB UR QL STRIP: ABNORMAL
BUN SERPL-MCNC: 10.6 MG/DL (ref 8–23)
BUN SERPL-MCNC: 12.8 MG/DL (ref 8–23)
BUN SERPL-MCNC: 5.7 MG/DL (ref 8–23)
BUN SERPL-MCNC: 6.2 MG/DL (ref 8–23)
BUN SERPL-MCNC: 6.8 MG/DL (ref 8–23)
BUN SERPL-MCNC: 6.8 MG/DL (ref 8–23)
BUN SERPL-MCNC: 6.9 MG/DL (ref 8–23)
BUN SERPL-MCNC: 7.3 MG/DL (ref 8–23)
BUN SERPL-MCNC: 7.6 MG/DL (ref 8–23)
BUN SERPL-MCNC: 8.5 MG/DL (ref 8–23)
CALCIUM SERPL-MCNC: 8.5 MG/DL (ref 8.6–10)
CALCIUM SERPL-MCNC: 8.5 MG/DL (ref 8.6–10)
CALCIUM SERPL-MCNC: 8.7 MG/DL (ref 8.6–10)
CALCIUM SERPL-MCNC: 8.7 MG/DL (ref 8.6–10)
CALCIUM SERPL-MCNC: 8.9 MG/DL (ref 8.6–10)
CALCIUM SERPL-MCNC: 9.2 MG/DL (ref 8.6–10)
CALCIUM SERPL-MCNC: 9.2 MG/DL (ref 8.6–10)
CALCIUM SERPL-MCNC: 9.3 MG/DL (ref 8.6–10)
CALCIUM SERPL-MCNC: 9.3 MG/DL (ref 8.6–10)
CALCIUM SERPL-MCNC: 9.4 MG/DL (ref 8.6–10)
CHLORIDE SERPL-SCNC: 100 MMOL/L (ref 98–107)
CHLORIDE SERPL-SCNC: 100 MMOL/L (ref 98–107)
CHLORIDE SERPL-SCNC: 101 MMOL/L (ref 98–107)
CHLORIDE SERPL-SCNC: 101 MMOL/L (ref 98–107)
CHLORIDE SERPL-SCNC: 102 MMOL/L (ref 98–107)
CHLORIDE SERPL-SCNC: 105 MMOL/L (ref 98–107)
CHLORIDE SERPL-SCNC: 105 MMOL/L (ref 98–107)
CHLORIDE SERPL-SCNC: 113 MMOL/L (ref 98–107)
CHLORIDE SERPL-SCNC: 95 MMOL/L (ref 98–107)
CHLORIDE SERPL-SCNC: 99 MMOL/L (ref 98–107)
COLOR UR AUTO: ABNORMAL
CREAT SERPL-MCNC: 0.43 MG/DL (ref 0.67–1.17)
CREAT SERPL-MCNC: 0.49 MG/DL (ref 0.67–1.17)
CREAT SERPL-MCNC: 0.52 MG/DL (ref 0.67–1.17)
CREAT SERPL-MCNC: 0.52 MG/DL (ref 0.67–1.17)
CREAT SERPL-MCNC: 0.53 MG/DL (ref 0.67–1.17)
CREAT SERPL-MCNC: 0.54 MG/DL (ref 0.67–1.17)
CREAT SERPL-MCNC: 0.54 MG/DL (ref 0.67–1.17)
CREAT SERPL-MCNC: 0.55 MG/DL (ref 0.67–1.17)
CREAT SERPL-MCNC: 0.55 MG/DL (ref 0.67–1.17)
CREAT SERPL-MCNC: 0.58 MG/DL (ref 0.67–1.17)
CYSTATIN C (ROCHE): 1.3 MG/L (ref 0.6–1)
DEPRECATED HCO3 PLAS-SCNC: 20 MMOL/L (ref 22–29)
DEPRECATED HCO3 PLAS-SCNC: 21 MMOL/L (ref 22–29)
DEPRECATED HCO3 PLAS-SCNC: 22 MMOL/L (ref 22–29)
DEPRECATED HCO3 PLAS-SCNC: 22 MMOL/L (ref 22–29)
DEPRECATED HCO3 PLAS-SCNC: 23 MMOL/L (ref 22–29)
DIASTOLIC BLOOD PRESSURE - MUSE: NORMAL MMHG
EOSINOPHIL # BLD AUTO: 0.1 10E3/UL (ref 0–0.7)
EOSINOPHIL # BLD AUTO: 0.1 10E3/UL (ref 0–0.7)
EOSINOPHIL # BLD AUTO: 0.2 10E3/UL (ref 0–0.7)
EOSINOPHIL NFR BLD AUTO: 1 %
EOSINOPHIL NFR BLD AUTO: 2 %
EOSINOPHIL NFR BLD AUTO: 3 %
EOSINOPHIL NFR BLD AUTO: 4 %
EOSINOPHIL NFR BLD AUTO: 4 %
ERYTHROCYTE [DISTWIDTH] IN BLOOD BY AUTOMATED COUNT: 15.9 % (ref 10–15)
ERYTHROCYTE [DISTWIDTH] IN BLOOD BY AUTOMATED COUNT: 16 % (ref 10–15)
ERYTHROCYTE [DISTWIDTH] IN BLOOD BY AUTOMATED COUNT: 17 % (ref 10–15)
ERYTHROCYTE [DISTWIDTH] IN BLOOD BY AUTOMATED COUNT: 17.5 % (ref 10–15)
ERYTHROCYTE [DISTWIDTH] IN BLOOD BY AUTOMATED COUNT: 17.7 % (ref 10–15)
ERYTHROCYTE [DISTWIDTH] IN BLOOD BY AUTOMATED COUNT: 18 % (ref 10–15)
ERYTHROCYTE [DISTWIDTH] IN BLOOD BY AUTOMATED COUNT: 18 % (ref 10–15)
ERYTHROCYTE [DISTWIDTH] IN BLOOD BY AUTOMATED COUNT: 18.1 % (ref 10–15)
ERYTHROCYTE [DISTWIDTH] IN BLOOD BY AUTOMATED COUNT: 18.7 % (ref 10–15)
GFR SERPL CREATININE-BSD FRML MDRD: 55 ML/MIN/1.73M2
GFR SERPL CREATININE-BSD FRML MDRD: >90 ML/MIN/1.73M2
GLUCOSE BLDC GLUCOMTR-MCNC: 134 MG/DL (ref 70–99)
GLUCOSE SERPL-MCNC: 105 MG/DL (ref 70–99)
GLUCOSE SERPL-MCNC: 107 MG/DL (ref 70–99)
GLUCOSE SERPL-MCNC: 109 MG/DL (ref 70–99)
GLUCOSE SERPL-MCNC: 122 MG/DL (ref 70–99)
GLUCOSE SERPL-MCNC: 124 MG/DL (ref 70–99)
GLUCOSE SERPL-MCNC: 126 MG/DL (ref 70–99)
GLUCOSE SERPL-MCNC: 138 MG/DL (ref 70–99)
GLUCOSE SERPL-MCNC: 82 MG/DL (ref 70–99)
GLUCOSE SERPL-MCNC: 86 MG/DL (ref 70–99)
GLUCOSE SERPL-MCNC: 98 MG/DL (ref 70–99)
GLUCOSE UR STRIP-MCNC: NEGATIVE MG/DL
HBV DNA SERPL NAA+PROBE-ACNC: <20 IU/ML
HBV DNA SERPL NAA+PROBE-LOG IU: <1.3 {LOG_IU}/ML
HBV SURFACE AG SERPL QL IA: NONREACTIVE
HCT VFR BLD AUTO: 33.2 % (ref 40–53)
HCT VFR BLD AUTO: 34 % (ref 40–53)
HCT VFR BLD AUTO: 34.2 % (ref 40–53)
HCT VFR BLD AUTO: 34.3 % (ref 40–53)
HCT VFR BLD AUTO: 34.6 % (ref 40–53)
HCT VFR BLD AUTO: 34.6 % (ref 40–53)
HCT VFR BLD AUTO: 34.7 % (ref 40–53)
HCT VFR BLD AUTO: 35.1 % (ref 40–53)
HCT VFR BLD AUTO: 37.1 % (ref 40–53)
HGB BLD-MCNC: 10.6 G/DL (ref 13.3–17.7)
HGB BLD-MCNC: 10.7 G/DL (ref 13.3–17.7)
HGB BLD-MCNC: 11 G/DL (ref 13.3–17.7)
HGB BLD-MCNC: 11.1 G/DL (ref 13.3–17.7)
HGB BLD-MCNC: 11.5 G/DL (ref 13.3–17.7)
HGB BLD-MCNC: 11.6 G/DL (ref 13.3–17.7)
HGB BLD-MCNC: 11.7 G/DL (ref 13.3–17.7)
HGB BLD-MCNC: 11.7 G/DL (ref 13.3–17.7)
HGB BLD-MCNC: 12.3 G/DL (ref 13.3–17.7)
HGB UR QL STRIP: NEGATIVE
HOLD SPECIMEN: NORMAL
HYALINE CASTS: 4 /LPF
IMM GRANULOCYTES # BLD: 0 10E3/UL
IMM GRANULOCYTES NFR BLD: 0 %
INR PPP: 1.61 (ref 0.85–1.15)
INR PPP: 3.96 (ref 0.85–1.15)
INTERPRETATION ECG - MUSE: NORMAL
KETONES UR STRIP-MCNC: NEGATIVE MG/DL
LEUKOCYTE ESTERASE UR QL STRIP: NEGATIVE
LIPASE SERPL-CCNC: 16 U/L (ref 13–60)
LYMPHOCYTES # BLD AUTO: 0.6 10E3/UL (ref 0.8–5.3)
LYMPHOCYTES # BLD AUTO: 0.7 10E3/UL (ref 0.8–5.3)
LYMPHOCYTES # BLD AUTO: 0.8 10E3/UL (ref 0.8–5.3)
LYMPHOCYTES # BLD AUTO: 1 10E3/UL (ref 0.8–5.3)
LYMPHOCYTES # BLD AUTO: 1.1 10E3/UL (ref 0.8–5.3)
LYMPHOCYTES NFR BLD AUTO: 10 %
LYMPHOCYTES NFR BLD AUTO: 13 %
LYMPHOCYTES NFR BLD AUTO: 13 %
LYMPHOCYTES NFR BLD AUTO: 27 %
LYMPHOCYTES NFR BLD AUTO: 28 %
MAGNESIUM SERPL-MCNC: 1.7 MG/DL (ref 1.7–2.3)
MCH RBC QN AUTO: 28.3 PG (ref 26.5–33)
MCH RBC QN AUTO: 28.7 PG (ref 26.5–33)
MCH RBC QN AUTO: 29 PG (ref 26.5–33)
MCH RBC QN AUTO: 29.7 PG (ref 26.5–33)
MCH RBC QN AUTO: 30.3 PG (ref 26.5–33)
MCH RBC QN AUTO: 30.4 PG (ref 26.5–33)
MCH RBC QN AUTO: 30.7 PG (ref 26.5–33)
MCH RBC QN AUTO: 30.7 PG (ref 26.5–33)
MCH RBC QN AUTO: 31 PG (ref 26.5–33)
MCHC RBC AUTO-ENTMCNC: 31.5 G/DL (ref 31.5–36.5)
MCHC RBC AUTO-ENTMCNC: 31.8 G/DL (ref 31.5–36.5)
MCHC RBC AUTO-ENTMCNC: 31.9 G/DL (ref 31.5–36.5)
MCHC RBC AUTO-ENTMCNC: 32.5 G/DL (ref 31.5–36.5)
MCHC RBC AUTO-ENTMCNC: 33.1 G/DL (ref 31.5–36.5)
MCHC RBC AUTO-ENTMCNC: 33.2 G/DL (ref 31.5–36.5)
MCHC RBC AUTO-ENTMCNC: 33.3 G/DL (ref 31.5–36.5)
MCHC RBC AUTO-ENTMCNC: 33.5 G/DL (ref 31.5–36.5)
MCHC RBC AUTO-ENTMCNC: 34.1 G/DL (ref 31.5–36.5)
MCV RBC AUTO: 84 FL (ref 78–100)
MCV RBC AUTO: 85 FL (ref 78–100)
MCV RBC AUTO: 86 FL (ref 78–100)
MCV RBC AUTO: 90 FL (ref 78–100)
MCV RBC AUTO: 93 FL (ref 78–100)
MCV RBC AUTO: 94 FL (ref 78–100)
MCV RBC AUTO: 95 FL (ref 78–100)
MCV RBC AUTO: 97 FL (ref 78–100)
MCV RBC AUTO: 99 FL (ref 78–100)
MONOCYTES # BLD AUTO: 0.6 10E3/UL (ref 0–1.3)
MONOCYTES # BLD AUTO: 0.7 10E3/UL (ref 0–1.3)
MONOCYTES # BLD AUTO: 0.8 10E3/UL (ref 0–1.3)
MONOCYTES NFR BLD AUTO: 11 %
MONOCYTES NFR BLD AUTO: 17 %
MONOCYTES NFR BLD AUTO: 19 %
MONOCYTES NFR BLD AUTO: 19 %
MONOCYTES NFR BLD AUTO: 9 %
MUCOUS THREADS #/AREA URNS LPF: PRESENT /LPF
NEUTROPHILS # BLD AUTO: 1.8 10E3/UL (ref 1.6–8.3)
NEUTROPHILS # BLD AUTO: 1.9 10E3/UL (ref 1.6–8.3)
NEUTROPHILS # BLD AUTO: 3.3 10E3/UL (ref 1.6–8.3)
NEUTROPHILS # BLD AUTO: 3.7 10E3/UL (ref 1.6–8.3)
NEUTROPHILS # BLD AUTO: 6.2 10E3/UL (ref 1.6–8.3)
NEUTROPHILS NFR BLD AUTO: 48 %
NEUTROPHILS NFR BLD AUTO: 48 %
NEUTROPHILS NFR BLD AUTO: 67 %
NEUTROPHILS NFR BLD AUTO: 72 %
NEUTROPHILS NFR BLD AUTO: 79 %
NITRATE UR QL: NEGATIVE
NRBC # BLD AUTO: 0 10E3/UL
NRBC BLD AUTO-RTO: 0 /100
OSMOLALITY SERPL: 285 MMOL/KG (ref 275–295)
OSMOLALITY UR: 699 MMOL/KG (ref 100–1200)
P AXIS - MUSE: 51 DEGREES
PH UR STRIP: 5.5 [PH] (ref 5–7)
PLATELET # BLD AUTO: 156 10E3/UL (ref 150–450)
PLATELET # BLD AUTO: 169 10E3/UL (ref 150–450)
PLATELET # BLD AUTO: 218 10E3/UL (ref 150–450)
PLATELET # BLD AUTO: 234 10E3/UL (ref 150–450)
PLATELET # BLD AUTO: 240 10E3/UL (ref 150–450)
PLATELET # BLD AUTO: 251 10E3/UL (ref 150–450)
PLATELET # BLD AUTO: 262 10E3/UL (ref 150–450)
PLATELET # BLD AUTO: 267 10E3/UL (ref 150–450)
PLATELET # BLD AUTO: 376 10E3/UL (ref 150–450)
POTASSIUM SERPL-SCNC: 3.5 MMOL/L (ref 3.4–5.3)
POTASSIUM SERPL-SCNC: 3.6 MMOL/L (ref 3.4–5.3)
POTASSIUM SERPL-SCNC: 3.9 MMOL/L (ref 3.4–5.3)
POTASSIUM SERPL-SCNC: 4 MMOL/L (ref 3.4–5.3)
POTASSIUM SERPL-SCNC: 4.1 MMOL/L (ref 3.4–5.3)
POTASSIUM SERPL-SCNC: 4.1 MMOL/L (ref 3.4–5.3)
POTASSIUM SERPL-SCNC: 4.2 MMOL/L (ref 3.4–5.3)
PR INTERVAL - MUSE: 126 MS
PROT SERPL-MCNC: 7.1 G/DL (ref 6.4–8.3)
PROT SERPL-MCNC: 7.1 G/DL (ref 6.4–8.3)
PROT SERPL-MCNC: 7.2 G/DL (ref 6.4–8.3)
PROT SERPL-MCNC: 7.5 G/DL (ref 6.4–8.3)
PROT SERPL-MCNC: 7.8 G/DL (ref 6.4–8.3)
PROT SERPL-MCNC: 7.9 G/DL (ref 6.4–8.3)
PROT SERPL-MCNC: 7.9 G/DL (ref 6.4–8.3)
PROT SERPL-MCNC: 8.1 G/DL (ref 6.4–8.3)
PROT SERPL-MCNC: 8.5 G/DL (ref 6.4–8.3)
QRS DURATION - MUSE: 76 MS
QT - MUSE: 350 MS
QTC - MUSE: 444 MS
R AXIS - MUSE: 57 DEGREES
RBC # BLD AUTO: 3.45 10E6/UL (ref 4.4–5.9)
RBC # BLD AUTO: 3.5 10E6/UL (ref 4.4–5.9)
RBC # BLD AUTO: 3.58 10E6/UL (ref 4.4–5.9)
RBC # BLD AUTO: 3.65 10E6/UL (ref 4.4–5.9)
RBC # BLD AUTO: 3.87 10E6/UL (ref 4.4–5.9)
RBC # BLD AUTO: 4.01 10E6/UL (ref 4.4–5.9)
RBC # BLD AUTO: 4.04 10E6/UL (ref 4.4–5.9)
RBC # BLD AUTO: 4.08 10E6/UL (ref 4.4–5.9)
RBC # BLD AUTO: 4.1 10E6/UL (ref 4.4–5.9)
RBC URINE: 1 /HPF
SODIUM SERPL-SCNC: 127 MMOL/L (ref 136–145)
SODIUM SERPL-SCNC: 131 MMOL/L (ref 136–145)
SODIUM SERPL-SCNC: 132 MMOL/L (ref 136–145)
SODIUM SERPL-SCNC: 132 MMOL/L (ref 136–145)
SODIUM SERPL-SCNC: 133 MMOL/L (ref 136–145)
SODIUM SERPL-SCNC: 134 MMOL/L (ref 136–145)
SODIUM SERPL-SCNC: 137 MMOL/L (ref 136–145)
SODIUM SERPL-SCNC: 145 MMOL/L (ref 136–145)
SODIUM UR-SCNC: <20 MMOL/L
SP GR UR STRIP: 1.03 (ref 1–1.03)
SYSTOLIC BLOOD PRESSURE - MUSE: NORMAL MMHG
T AXIS - MUSE: -3 DEGREES
TROPONIN T SERPL HS-MCNC: 13 NG/L
UROBILINOGEN UR STRIP-MCNC: 3 MG/DL
VENTRICULAR RATE- MUSE: 97 BPM
WBC # BLD AUTO: 3.8 10E3/UL (ref 4–11)
WBC # BLD AUTO: 3.9 10E3/UL (ref 4–11)
WBC # BLD AUTO: 4.9 10E3/UL (ref 4–11)
WBC # BLD AUTO: 5.1 10E3/UL (ref 4–11)
WBC # BLD AUTO: 6.1 10E3/UL (ref 4–11)
WBC # BLD AUTO: 7.5 10E3/UL (ref 4–11)
WBC # BLD AUTO: 7.8 10E3/UL (ref 4–11)
WBC URINE: 1 /HPF

## 2023-01-01 PROCEDURE — G0463 HOSPITAL OUTPT CLINIC VISIT: HCPCS | Performed by: INTERNAL MEDICINE

## 2023-01-01 PROCEDURE — 83735 ASSAY OF MAGNESIUM: CPT

## 2023-01-01 PROCEDURE — 99215 OFFICE O/P EST HI 40 MIN: CPT | Mod: VID | Performed by: INTERNAL MEDICINE

## 2023-01-01 PROCEDURE — 250N000013 HC RX MED GY IP 250 OP 250 PS 637

## 2023-01-01 PROCEDURE — 82105 ALPHA-FETOPROTEIN SERUM: CPT

## 2023-01-01 PROCEDURE — 250N000011 HC RX IP 250 OP 636: Performed by: EMERGENCY MEDICINE

## 2023-01-01 PROCEDURE — 96367 TX/PROPH/DG ADDL SEQ IV INF: CPT

## 2023-01-01 PROCEDURE — 250N000012 HC RX MED GY IP 250 OP 636 PS 637

## 2023-01-01 PROCEDURE — 36415 COLL VENOUS BLD VENIPUNCTURE: CPT

## 2023-01-01 PROCEDURE — 36415 COLL VENOUS BLD VENIPUNCTURE: CPT | Performed by: INTERNAL MEDICINE

## 2023-01-01 PROCEDURE — 120N000002 HC R&B MED SURG/OB UMMC

## 2023-01-01 PROCEDURE — 258N000003 HC RX IP 258 OP 636

## 2023-01-01 PROCEDURE — 99207 PR NOT IN PERSON INPATIENT CONSULT STATISTICAL MARKER: CPT | Performed by: STUDENT IN AN ORGANIZED HEALTH CARE EDUCATION/TRAINING PROGRAM

## 2023-01-01 PROCEDURE — 258N000003 HC RX IP 258 OP 636: Performed by: STUDENT IN AN ORGANIZED HEALTH CARE EDUCATION/TRAINING PROGRAM

## 2023-01-01 PROCEDURE — 74177 CT ABD & PELVIS W/CONTRAST: CPT

## 2023-01-01 PROCEDURE — 99418 PROLNG IP/OBS E/M EA 15 MIN: CPT | Performed by: FAMILY MEDICINE

## 2023-01-01 PROCEDURE — 96375 TX/PRO/DX INJ NEW DRUG ADDON: CPT

## 2023-01-01 PROCEDURE — 250N000011 HC RX IP 250 OP 636: Performed by: STUDENT IN AN ORGANIZED HEALTH CARE EDUCATION/TRAINING PROGRAM

## 2023-01-01 PROCEDURE — 99497 ADVNCD CARE PLAN 30 MIN: CPT | Performed by: STUDENT IN AN ORGANIZED HEALTH CARE EDUCATION/TRAINING PROGRAM

## 2023-01-01 PROCEDURE — 99233 SBSQ HOSP IP/OBS HIGH 50: CPT | Performed by: STUDENT IN AN ORGANIZED HEALTH CARE EDUCATION/TRAINING PROGRAM

## 2023-01-01 PROCEDURE — 96374 THER/PROPH/DIAG INJ IV PUSH: CPT | Mod: 59 | Performed by: EMERGENCY MEDICINE

## 2023-01-01 PROCEDURE — 85014 HEMATOCRIT: CPT | Performed by: EMERGENCY MEDICINE

## 2023-01-01 PROCEDURE — G0463 HOSPITAL OUTPT CLINIC VISIT: HCPCS

## 2023-01-01 PROCEDURE — 36592 COLLECT BLOOD FROM PICC: CPT

## 2023-01-01 PROCEDURE — 99233 SBSQ HOSP IP/OBS HIGH 50: CPT | Mod: GC | Performed by: STUDENT IN AN ORGANIZED HEALTH CARE EDUCATION/TRAINING PROGRAM

## 2023-01-01 PROCEDURE — 99442 PR PHYSICIAN TELEPHONE EVALUATION 11-20 MIN: CPT | Performed by: PHYSICIAN ASSISTANT

## 2023-01-01 PROCEDURE — 71260 CT THORAX DX C+: CPT | Mod: GC | Performed by: STUDENT IN AN ORGANIZED HEALTH CARE EDUCATION/TRAINING PROGRAM

## 2023-01-01 PROCEDURE — G0463 HOSPITAL OUTPT CLINIC VISIT: HCPCS | Performed by: NURSE PRACTITIONER

## 2023-01-01 PROCEDURE — 99215 OFFICE O/P EST HI 40 MIN: CPT | Performed by: NURSE PRACTITIONER

## 2023-01-01 PROCEDURE — 258N000003 HC RX IP 258 OP 636: Performed by: NURSE PRACTITIONER

## 2023-01-01 PROCEDURE — 85004 AUTOMATED DIFF WBC COUNT: CPT

## 2023-01-01 PROCEDURE — 36415 COLL VENOUS BLD VENIPUNCTURE: CPT | Performed by: STUDENT IN AN ORGANIZED HEALTH CARE EDUCATION/TRAINING PROGRAM

## 2023-01-01 PROCEDURE — 80053 COMPREHEN METABOLIC PANEL: CPT | Performed by: INTERNAL MEDICINE

## 2023-01-01 PROCEDURE — 80053 COMPREHEN METABOLIC PANEL: CPT

## 2023-01-01 PROCEDURE — 99215 OFFICE O/P EST HI 40 MIN: CPT | Performed by: INTERNAL MEDICINE

## 2023-01-01 PROCEDURE — 85027 COMPLETE CBC AUTOMATED: CPT

## 2023-01-01 PROCEDURE — 71260 CT THORAX DX C+: CPT | Mod: GC | Performed by: RADIOLOGY

## 2023-01-01 PROCEDURE — 96413 CHEMO IV INFUSION 1 HR: CPT

## 2023-01-01 PROCEDURE — 99215 OFFICE O/P EST HI 40 MIN: CPT | Mod: GT | Performed by: INTERNAL MEDICINE

## 2023-01-01 PROCEDURE — 250N000009 HC RX 250

## 2023-01-01 PROCEDURE — 80048 BASIC METABOLIC PNL TOTAL CA: CPT

## 2023-01-01 PROCEDURE — 250N000011 HC RX IP 250 OP 636

## 2023-01-01 PROCEDURE — 99232 SBSQ HOSP IP/OBS MODERATE 35: CPT | Mod: GC | Performed by: STUDENT IN AN ORGANIZED HEALTH CARE EDUCATION/TRAINING PROGRAM

## 2023-01-01 PROCEDURE — 99417 PROLNG OP E/M EACH 15 MIN: CPT | Performed by: INTERNAL MEDICINE

## 2023-01-01 PROCEDURE — 49083 ABD PARACENTESIS W/IMAGING: CPT | Mod: GC | Performed by: INTERNAL MEDICINE

## 2023-01-01 PROCEDURE — 99285 EMERGENCY DEPT VISIT HI MDM: CPT | Performed by: EMERGENCY MEDICINE

## 2023-01-01 PROCEDURE — 87517 HEPATITIS B DNA QUANT: CPT

## 2023-01-01 PROCEDURE — 85004 AUTOMATED DIFF WBC COUNT: CPT | Performed by: INTERNAL MEDICINE

## 2023-01-01 PROCEDURE — 99214 OFFICE O/P EST MOD 30 MIN: CPT | Performed by: INTERNAL MEDICINE

## 2023-01-01 PROCEDURE — 74177 CT ABD & PELVIS W/CONTRAST: CPT | Mod: 26 | Performed by: RADIOLOGY

## 2023-01-01 PROCEDURE — 258N000003 HC RX IP 258 OP 636: Performed by: INTERNAL MEDICINE

## 2023-01-01 PROCEDURE — 96415 CHEMO IV INFUSION ADDL HR: CPT

## 2023-01-01 PROCEDURE — 999N000248 HC STATISTIC IV INSERT WITH US BY RN

## 2023-01-01 PROCEDURE — 93010 ELECTROCARDIOGRAM REPORT: CPT | Performed by: INTERNAL MEDICINE

## 2023-01-01 PROCEDURE — 250N000011 HC RX IP 250 OP 636: Performed by: NURSE PRACTITIONER

## 2023-01-01 PROCEDURE — 250N000013 HC RX MED GY IP 250 OP 250 PS 637: Performed by: STUDENT IN AN ORGANIZED HEALTH CARE EDUCATION/TRAINING PROGRAM

## 2023-01-01 PROCEDURE — 99232 SBSQ HOSP IP/OBS MODERATE 35: CPT | Performed by: CLINICAL NURSE SPECIALIST

## 2023-01-01 PROCEDURE — 85610 PROTHROMBIN TIME: CPT | Performed by: EMERGENCY MEDICINE

## 2023-01-01 PROCEDURE — 36415 COLL VENOUS BLD VENIPUNCTURE: CPT | Performed by: EMERGENCY MEDICINE

## 2023-01-01 PROCEDURE — 999N000127 HC STATISTIC PERIPHERAL IV START W US GUIDANCE

## 2023-01-01 PROCEDURE — 83935 ASSAY OF URINE OSMOLALITY: CPT

## 2023-01-01 PROCEDURE — 99498 ADVNCD CARE PLAN ADDL 30 MIN: CPT | Performed by: STUDENT IN AN ORGANIZED HEALTH CARE EDUCATION/TRAINING PROGRAM

## 2023-01-01 PROCEDURE — 99285 EMERGENCY DEPT VISIT HI MDM: CPT | Mod: 25 | Performed by: EMERGENCY MEDICINE

## 2023-01-01 PROCEDURE — 0W9G3ZZ DRAINAGE OF PERITONEAL CAVITY, PERCUTANEOUS APPROACH: ICD-10-PCS | Performed by: INTERNAL MEDICINE

## 2023-01-01 PROCEDURE — 82105 ALPHA-FETOPROTEIN SERUM: CPT | Performed by: INTERNAL MEDICINE

## 2023-01-01 PROCEDURE — 99222 1ST HOSP IP/OBS MODERATE 55: CPT | Mod: GC | Performed by: STUDENT IN AN ORGANIZED HEALTH CARE EDUCATION/TRAINING PROGRAM

## 2023-01-01 PROCEDURE — 83690 ASSAY OF LIPASE: CPT | Performed by: EMERGENCY MEDICINE

## 2023-01-01 PROCEDURE — 999N000128 HC STATISTIC PERIPHERAL IV START W/O US GUIDANCE

## 2023-01-01 PROCEDURE — 999N000285 HC STATISTIC VASC ACCESS LAB DRAW WITH PIV START

## 2023-01-01 PROCEDURE — 83930 ASSAY OF BLOOD OSMOLALITY: CPT | Performed by: STUDENT IN AN ORGANIZED HEALTH CARE EDUCATION/TRAINING PROGRAM

## 2023-01-01 PROCEDURE — 82610 CYSTATIN C: CPT

## 2023-01-01 PROCEDURE — 250N000011 HC RX IP 250 OP 636: Performed by: INTERNAL MEDICINE

## 2023-01-01 PROCEDURE — 85014 HEMATOCRIT: CPT

## 2023-01-01 PROCEDURE — 74178 CT ABD&PLV WO CNTR FLWD CNTR: CPT | Mod: GC | Performed by: RADIOLOGY

## 2023-01-01 PROCEDURE — 84484 ASSAY OF TROPONIN QUANT: CPT

## 2023-01-01 PROCEDURE — 87340 HEPATITIS B SURFACE AG IA: CPT

## 2023-01-01 PROCEDURE — 99238 HOSP IP/OBS DSCHRG MGMT 30/<: CPT | Mod: GC | Performed by: INTERNAL MEDICINE

## 2023-01-01 PROCEDURE — 85014 HEMATOCRIT: CPT | Performed by: INTERNAL MEDICINE

## 2023-01-01 PROCEDURE — 84155 ASSAY OF PROTEIN SERUM: CPT

## 2023-01-01 PROCEDURE — 99223 1ST HOSP IP/OBS HIGH 75: CPT | Performed by: FAMILY MEDICINE

## 2023-01-01 PROCEDURE — 93005 ELECTROCARDIOGRAM TRACING: CPT

## 2023-01-01 PROCEDURE — 82140 ASSAY OF AMMONIA: CPT

## 2023-01-01 PROCEDURE — 84300 ASSAY OF URINE SODIUM: CPT

## 2023-01-01 PROCEDURE — 85610 PROTHROMBIN TIME: CPT | Performed by: STUDENT IN AN ORGANIZED HEALTH CARE EDUCATION/TRAINING PROGRAM

## 2023-01-01 PROCEDURE — 80053 COMPREHEN METABOLIC PANEL: CPT | Performed by: EMERGENCY MEDICINE

## 2023-01-01 PROCEDURE — 99418 PROLNG IP/OBS E/M EA 15 MIN: CPT | Performed by: STUDENT IN AN ORGANIZED HEALTH CARE EDUCATION/TRAINING PROGRAM

## 2023-01-01 PROCEDURE — 74178 CT ABD&PLV WO CNTR FLWD CNTR: CPT | Mod: GC | Performed by: STUDENT IN AN ORGANIZED HEALTH CARE EDUCATION/TRAINING PROGRAM

## 2023-01-01 PROCEDURE — G0463 HOSPITAL OUTPT CLINIC VISIT: HCPCS | Mod: 25 | Performed by: INTERNAL MEDICINE

## 2023-01-01 PROCEDURE — 81001 URINALYSIS AUTO W/SCOPE: CPT

## 2023-01-01 RX ORDER — MINERAL OIL/HYDROPHIL PETROLAT
OINTMENT (GRAM) TOPICAL
Status: DISCONTINUED | OUTPATIENT
Start: 2023-01-01 | End: 2023-01-01 | Stop reason: HOSPADM

## 2023-01-01 RX ORDER — DIPHENHYDRAMINE HYDROCHLORIDE 50 MG/ML
50 INJECTION INTRAMUSCULAR; INTRAVENOUS
Status: CANCELLED
Start: 2023-01-01

## 2023-01-01 RX ORDER — BISACODYL 10 MG
10 SUPPOSITORY, RECTAL RECTAL DAILY PRN
Qty: 30 SUPPOSITORY | Refills: 3 | OUTPATIENT
Start: 2023-01-01 | End: 2024-08-08

## 2023-01-01 RX ORDER — SODIUM CHLORIDE 9 MG/ML
INJECTION, SOLUTION INTRAVENOUS CONTINUOUS
Status: ACTIVE | OUTPATIENT
Start: 2023-01-01 | End: 2023-01-01

## 2023-01-01 RX ORDER — IOPAMIDOL 755 MG/ML
73 INJECTION, SOLUTION INTRAVASCULAR ONCE
Status: COMPLETED | OUTPATIENT
Start: 2023-01-01 | End: 2023-01-01

## 2023-01-01 RX ORDER — BISACODYL 10 MG
10 SUPPOSITORY, RECTAL RECTAL DAILY PRN
Status: CANCELLED | OUTPATIENT
Start: 2023-01-01

## 2023-01-01 RX ORDER — DEXAMETHASONE 2 MG/1
2 TABLET ORAL
Status: DISCONTINUED | OUTPATIENT
Start: 2023-01-01 | End: 2023-01-01

## 2023-01-01 RX ORDER — ALBUTEROL SULFATE 0.83 MG/ML
2.5 SOLUTION RESPIRATORY (INHALATION)
Status: CANCELLED | OUTPATIENT
Start: 2023-01-01

## 2023-01-01 RX ORDER — ALBUTEROL SULFATE 90 UG/1
1-2 AEROSOL, METERED RESPIRATORY (INHALATION)
Status: CANCELLED
Start: 2023-01-01

## 2023-01-01 RX ORDER — LACTULOSE 10 G/15ML
100 SOLUTION ORAL
Status: DISCONTINUED | OUTPATIENT
Start: 2023-01-01 | End: 2023-01-01

## 2023-01-01 RX ORDER — LORAZEPAM 2 MG/ML
1 INJECTION INTRAMUSCULAR
Status: DISCONTINUED | OUTPATIENT
Start: 2023-01-01 | End: 2023-01-01

## 2023-01-01 RX ORDER — HEPARIN SODIUM (PORCINE) LOCK FLUSH IV SOLN 100 UNIT/ML 100 UNIT/ML
5 SOLUTION INTRAVENOUS
Status: CANCELLED | OUTPATIENT
Start: 2023-01-01

## 2023-01-01 RX ORDER — PROCHLORPERAZINE 25 MG
25 SUPPOSITORY, RECTAL RECTAL EVERY 12 HOURS PRN
Status: DISCONTINUED | OUTPATIENT
Start: 2023-01-01 | End: 2023-01-01

## 2023-01-01 RX ORDER — ONDANSETRON 2 MG/ML
8 INJECTION INTRAMUSCULAR; INTRAVENOUS ONCE
Status: COMPLETED | OUTPATIENT
Start: 2023-01-01 | End: 2023-01-01

## 2023-01-01 RX ORDER — ENOXAPARIN SODIUM 100 MG/ML
30 INJECTION SUBCUTANEOUS EVERY 24 HOURS
Status: DISCONTINUED | OUTPATIENT
Start: 2023-01-01 | End: 2023-01-01

## 2023-01-01 RX ORDER — CARBOXYMETHYLCELLULOSE SODIUM 5 MG/ML
1-2 SOLUTION/ DROPS OPHTHALMIC
Status: DISCONTINUED | OUTPATIENT
Start: 2023-01-01 | End: 2023-01-01 | Stop reason: HOSPADM

## 2023-01-01 RX ORDER — DEXAMETHASONE 2 MG/1
2 TABLET ORAL
Qty: 30 TABLET | Refills: 4 | Status: SHIPPED | OUTPATIENT
Start: 2023-01-01

## 2023-01-01 RX ORDER — PROCHLORPERAZINE MALEATE 10 MG
10 TABLET ORAL EVERY 6 HOURS PRN
Qty: 30 TABLET | Refills: 2 | Status: SHIPPED | OUTPATIENT
Start: 2023-01-01

## 2023-01-01 RX ORDER — LORAZEPAM 2 MG/ML
0.5 INJECTION INTRAMUSCULAR EVERY 4 HOURS PRN
Status: CANCELLED | OUTPATIENT
Start: 2023-01-01

## 2023-01-01 RX ORDER — LANOLIN ALCOHOL/MO/W.PET/CERES
3 CREAM (GRAM) TOPICAL AT BEDTIME
Status: DISCONTINUED | OUTPATIENT
Start: 2023-01-01 | End: 2023-01-01

## 2023-01-01 RX ORDER — FUROSEMIDE 10 MG/ML
20 INJECTION INTRAMUSCULAR; INTRAVENOUS ONCE
Status: COMPLETED | OUTPATIENT
Start: 2023-01-01 | End: 2023-01-01

## 2023-01-01 RX ORDER — MORPHINE SULFATE 30 MG/1
30 TABLET, FILM COATED, EXTENDED RELEASE ORAL EVERY 12 HOURS
Qty: 60 EACH | Refills: 0 | Status: SHIPPED | OUTPATIENT
Start: 2023-01-01 | End: 2023-01-01 | Stop reason: ALTCHOICE

## 2023-01-01 RX ORDER — PROCHLORPERAZINE MALEATE 10 MG
10 TABLET ORAL EVERY 6 HOURS PRN
Qty: 30 TABLET | Refills: 2 | Status: SHIPPED | OUTPATIENT
Start: 2023-01-01 | End: 2023-01-01

## 2023-01-01 RX ORDER — DEXAMETHASONE 4 MG/1
8 TABLET ORAL DAILY
Qty: 4 TABLET | Refills: 6 | Status: SHIPPED | OUTPATIENT
Start: 2023-01-01 | End: 2023-01-01

## 2023-01-01 RX ORDER — BISACODYL 10 MG
10 SUPPOSITORY, RECTAL RECTAL DAILY PRN
Status: DISCONTINUED | OUTPATIENT
Start: 2023-01-01 | End: 2023-01-01 | Stop reason: HOSPADM

## 2023-01-01 RX ORDER — PROCHLORPERAZINE MALEATE 5 MG
10 TABLET ORAL EVERY 6 HOURS PRN
Status: DISCONTINUED | OUTPATIENT
Start: 2023-01-01 | End: 2023-01-01

## 2023-01-01 RX ORDER — XYLITOL/YERBA SANTA
2 AEROSOL, SPRAY WITH PUMP (ML) MUCOUS MEMBRANE
Status: DISCONTINUED | OUTPATIENT
Start: 2023-01-01 | End: 2023-01-01 | Stop reason: HOSPADM

## 2023-01-01 RX ORDER — LACTULOSE 10 G/15ML
20 SOLUTION ORAL
Status: DISCONTINUED | OUTPATIENT
Start: 2023-01-01 | End: 2023-01-01

## 2023-01-01 RX ORDER — HEPARIN SODIUM,PORCINE 10 UNIT/ML
5 VIAL (ML) INTRAVENOUS
Status: CANCELLED | OUTPATIENT
Start: 2023-01-01

## 2023-01-01 RX ORDER — ONDANSETRON 2 MG/ML
8 INJECTION INTRAMUSCULAR; INTRAVENOUS ONCE
Status: CANCELLED | OUTPATIENT
Start: 2023-01-01

## 2023-01-01 RX ORDER — METHYLPREDNISOLONE SODIUM SUCCINATE 125 MG/2ML
125 INJECTION, POWDER, LYOPHILIZED, FOR SOLUTION INTRAMUSCULAR; INTRAVENOUS
Status: CANCELLED
Start: 2023-01-01

## 2023-01-01 RX ORDER — ALBUMIN (HUMAN) 12.5 G/50ML
25-50 SOLUTION INTRAVENOUS ONCE
Status: DISCONTINUED | OUTPATIENT
Start: 2023-01-01 | End: 2023-01-01

## 2023-01-01 RX ORDER — ONDANSETRON 8 MG/1
8 TABLET, FILM COATED ORAL EVERY 8 HOURS PRN
Qty: 30 TABLET | Refills: 2 | Status: SHIPPED | OUTPATIENT
Start: 2023-01-01

## 2023-01-01 RX ORDER — PROCHLORPERAZINE 25 MG
25 SUPPOSITORY, RECTAL RECTAL EVERY 12 HOURS PRN
Status: DISCONTINUED | OUTPATIENT
Start: 2023-01-01 | End: 2023-01-01 | Stop reason: HOSPADM

## 2023-01-01 RX ORDER — HYDROMORPHONE HCL IN WATER/PF 6 MG/30 ML
0.2 PATIENT CONTROLLED ANALGESIA SYRINGE INTRAVENOUS
Status: DISCONTINUED | OUTPATIENT
Start: 2023-01-01 | End: 2023-01-01

## 2023-01-01 RX ORDER — MORPHINE SULFATE 15 MG/1
15 TABLET, FILM COATED, EXTENDED RELEASE ORAL EVERY 12 HOURS
Qty: 60 TABLET | Refills: 0 | Status: SHIPPED | OUTPATIENT
Start: 2023-01-01 | End: 2023-01-01

## 2023-01-01 RX ORDER — NALOXONE HYDROCHLORIDE 0.4 MG/ML
0.1 INJECTION, SOLUTION INTRAMUSCULAR; INTRAVENOUS; SUBCUTANEOUS
Status: DISCONTINUED | OUTPATIENT
Start: 2023-01-01 | End: 2023-01-01 | Stop reason: HOSPADM

## 2023-01-01 RX ORDER — HYDROMORPHONE HYDROCHLORIDE 1 MG/ML
0.5 INJECTION, SOLUTION INTRAMUSCULAR; INTRAVENOUS; SUBCUTANEOUS
Status: DISCONTINUED | OUTPATIENT
Start: 2023-01-01 | End: 2023-01-01

## 2023-01-01 RX ORDER — NALOXONE HYDROCHLORIDE 0.4 MG/ML
0.2 INJECTION, SOLUTION INTRAMUSCULAR; INTRAVENOUS; SUBCUTANEOUS
Status: DISCONTINUED | OUTPATIENT
Start: 2023-01-01 | End: 2023-01-01

## 2023-01-01 RX ORDER — OXYCODONE HYDROCHLORIDE 5 MG/1
10 TABLET ORAL EVERY 4 HOURS PRN
Qty: 100 TABLET | Refills: 0 | Status: SHIPPED | OUTPATIENT
Start: 2023-01-01

## 2023-01-01 RX ORDER — AMOXICILLIN 250 MG
2 CAPSULE ORAL 2 TIMES DAILY PRN
Status: DISCONTINUED | OUTPATIENT
Start: 2023-01-01 | End: 2023-01-01 | Stop reason: HOSPADM

## 2023-01-01 RX ORDER — ATROPINE SULFATE 10 MG/ML
2 SOLUTION/ DROPS OPHTHALMIC EVERY 4 HOURS PRN
Status: DISCONTINUED | OUTPATIENT
Start: 2023-01-01 | End: 2023-01-01 | Stop reason: HOSPADM

## 2023-01-01 RX ORDER — IOPAMIDOL 755 MG/ML
70 INJECTION, SOLUTION INTRAVASCULAR ONCE
Status: COMPLETED | OUTPATIENT
Start: 2023-01-01 | End: 2023-01-01

## 2023-01-01 RX ORDER — GLYCOPYRROLATE 0.2 MG/ML
0.2 INJECTION, SOLUTION INTRAMUSCULAR; INTRAVENOUS EVERY 4 HOURS PRN
Status: DISCONTINUED | OUTPATIENT
Start: 2023-01-01 | End: 2023-01-01 | Stop reason: HOSPADM

## 2023-01-01 RX ORDER — LACTULOSE 10 G/15ML
20 SOLUTION ORAL 3 TIMES DAILY
Status: DISCONTINUED | OUTPATIENT
Start: 2023-01-01 | End: 2023-01-01

## 2023-01-01 RX ORDER — SCOLOPAMINE TRANSDERMAL SYSTEM 1 MG/1
1 PATCH, EXTENDED RELEASE TRANSDERMAL
Status: DISCONTINUED | OUTPATIENT
Start: 2023-01-01 | End: 2023-01-01 | Stop reason: HOSPADM

## 2023-01-01 RX ORDER — OLANZAPINE 5 MG/1
5 TABLET, ORALLY DISINTEGRATING ORAL EVERY 6 HOURS PRN
Status: DISCONTINUED | OUTPATIENT
Start: 2023-01-01 | End: 2023-01-01 | Stop reason: HOSPADM

## 2023-01-01 RX ORDER — PANTOPRAZOLE SODIUM 40 MG/1
40 TABLET, DELAYED RELEASE ORAL DAILY
Status: DISCONTINUED | OUTPATIENT
Start: 2023-01-01 | End: 2023-01-01

## 2023-01-01 RX ORDER — MEPERIDINE HYDROCHLORIDE 25 MG/ML
25 INJECTION INTRAMUSCULAR; INTRAVENOUS; SUBCUTANEOUS EVERY 30 MIN PRN
Status: CANCELLED | OUTPATIENT
Start: 2023-01-01

## 2023-01-01 RX ORDER — MORPHINE SULFATE 30 MG/1
30 TABLET, FILM COATED, EXTENDED RELEASE ORAL EVERY 12 HOURS
Qty: 60 TABLET | Refills: 0 | Status: SHIPPED | OUTPATIENT
Start: 2023-01-01 | End: 2023-01-01

## 2023-01-01 RX ORDER — NALOXONE HYDROCHLORIDE 0.4 MG/ML
0.4 INJECTION, SOLUTION INTRAMUSCULAR; INTRAVENOUS; SUBCUTANEOUS
Status: DISCONTINUED | OUTPATIENT
Start: 2023-01-01 | End: 2023-01-01

## 2023-01-01 RX ORDER — ONDANSETRON 4 MG/1
4 TABLET, ORALLY DISINTEGRATING ORAL EVERY 6 HOURS PRN
Status: DISCONTINUED | OUTPATIENT
Start: 2023-01-01 | End: 2023-01-01 | Stop reason: HOSPADM

## 2023-01-01 RX ORDER — EPINEPHRINE 1 MG/ML
0.3 INJECTION, SOLUTION INTRAMUSCULAR; SUBCUTANEOUS EVERY 5 MIN PRN
Status: CANCELLED | OUTPATIENT
Start: 2023-01-01

## 2023-01-01 RX ORDER — ONDANSETRON 4 MG/1
4 TABLET, ORALLY DISINTEGRATING ORAL EVERY 6 HOURS PRN
Status: DISCONTINUED | OUTPATIENT
Start: 2023-01-01 | End: 2023-01-01

## 2023-01-01 RX ORDER — CEFTRIAXONE 1 G/1
1 INJECTION, POWDER, FOR SOLUTION INTRAMUSCULAR; INTRAVENOUS EVERY 24 HOURS
Status: DISCONTINUED | OUTPATIENT
Start: 2023-01-01 | End: 2023-01-01 | Stop reason: HOSPADM

## 2023-01-01 RX ORDER — LIDOCAINE 4 G/G
1 PATCH TOPICAL
Status: DISCONTINUED | OUTPATIENT
Start: 2023-01-01 | End: 2023-01-01 | Stop reason: HOSPADM

## 2023-01-01 RX ORDER — CEFTRIAXONE 2 G/1
2 INJECTION, POWDER, FOR SOLUTION INTRAMUSCULAR; INTRAVENOUS EVERY 24 HOURS
Status: DISCONTINUED | OUTPATIENT
Start: 2023-01-01 | End: 2023-01-01

## 2023-01-01 RX ORDER — LACTULOSE 10 G/15ML
20 SOLUTION ORAL 3 TIMES DAILY
Qty: 473 ML | Refills: 3 | OUTPATIENT
Start: 2023-01-01 | End: 2024-08-08

## 2023-01-01 RX ORDER — ONDANSETRON 4 MG/1
8 TABLET, FILM COATED ORAL EVERY 8 HOURS PRN
Status: CANCELLED | OUTPATIENT
Start: 2023-01-01

## 2023-01-01 RX ORDER — POLYETHYLENE GLYCOL 3350 17 G/17G
17 POWDER, FOR SOLUTION ORAL 2 TIMES DAILY PRN
Status: DISCONTINUED | OUTPATIENT
Start: 2023-01-01 | End: 2023-01-01 | Stop reason: HOSPADM

## 2023-01-01 RX ORDER — PROCHLORPERAZINE MALEATE 5 MG
10 TABLET ORAL EVERY 6 HOURS PRN
Status: DISCONTINUED | OUTPATIENT
Start: 2023-01-01 | End: 2023-01-01 | Stop reason: HOSPADM

## 2023-01-01 RX ORDER — HYDROMORPHONE HCL IN WATER/PF 6 MG/30 ML
.2-.5 PATIENT CONTROLLED ANALGESIA SYRINGE INTRAVENOUS
Status: DISCONTINUED | OUTPATIENT
Start: 2023-01-01 | End: 2023-01-01 | Stop reason: HOSPADM

## 2023-01-01 RX ORDER — SENNA AND DOCUSATE SODIUM 50; 8.6 MG/1; MG/1
1-2 TABLET, FILM COATED ORAL 2 TIMES DAILY PRN
Qty: 60 TABLET | Refills: 1 | Status: SHIPPED | OUTPATIENT
Start: 2023-01-01

## 2023-01-01 RX ORDER — MORPHINE SULFATE 15 MG/1
15 TABLET, FILM COATED, EXTENDED RELEASE ORAL EVERY 12 HOURS
Status: DISCONTINUED | OUTPATIENT
Start: 2023-01-01 | End: 2023-01-01

## 2023-01-01 RX ORDER — AMOXICILLIN 250 MG
2 CAPSULE ORAL 2 TIMES DAILY
Status: DISCONTINUED | OUTPATIENT
Start: 2023-01-01 | End: 2023-01-01

## 2023-01-01 RX ORDER — LIDOCAINE 40 MG/G
CREAM TOPICAL
Status: DISCONTINUED | OUTPATIENT
Start: 2023-01-01 | End: 2023-01-01 | Stop reason: HOSPADM

## 2023-01-01 RX ORDER — OXYCODONE HYDROCHLORIDE 10 MG/1
10 TABLET ORAL EVERY 4 HOURS PRN
Status: DISCONTINUED | OUTPATIENT
Start: 2023-01-01 | End: 2023-01-01

## 2023-01-01 RX ORDER — NALOXONE HYDROCHLORIDE 0.4 MG/ML
0.2 INJECTION, SOLUTION INTRAMUSCULAR; INTRAVENOUS; SUBCUTANEOUS
Status: DISCONTINUED | OUTPATIENT
Start: 2023-01-01 | End: 2023-01-01 | Stop reason: HOSPADM

## 2023-01-01 RX ORDER — ONDANSETRON 2 MG/ML
4 INJECTION INTRAMUSCULAR; INTRAVENOUS EVERY 6 HOURS PRN
Status: DISCONTINUED | OUTPATIENT
Start: 2023-01-01 | End: 2023-01-01

## 2023-01-01 RX ORDER — DEXAMETHASONE 2 MG/1
2 TABLET ORAL
Qty: 14 TABLET | Refills: 0 | Status: SHIPPED | OUTPATIENT
Start: 2023-01-01 | End: 2023-01-01

## 2023-01-01 RX ORDER — PROCHLORPERAZINE MALEATE 10 MG
10 TABLET ORAL EVERY 6 HOURS PRN
Status: CANCELLED | OUTPATIENT
Start: 2023-01-01

## 2023-01-01 RX ORDER — LORAZEPAM 2 MG/ML
.5-1 INJECTION INTRAMUSCULAR
Status: DISCONTINUED | OUTPATIENT
Start: 2023-01-01 | End: 2023-01-01

## 2023-01-01 RX ORDER — HYDROXYZINE HYDROCHLORIDE 25 MG/1
25 TABLET, FILM COATED ORAL EVERY 6 HOURS PRN
Status: DISCONTINUED | OUTPATIENT
Start: 2023-01-01 | End: 2023-01-01

## 2023-01-01 RX ORDER — FUROSEMIDE 10 MG/ML
10 INJECTION INTRAMUSCULAR; INTRAVENOUS ONCE
Status: DISCONTINUED | OUTPATIENT
Start: 2023-01-01 | End: 2023-01-01

## 2023-01-01 RX ORDER — SULFAMETHOXAZOLE/TRIMETHOPRIM 800-160 MG
1 TABLET ORAL 2 TIMES DAILY
Qty: 10 TABLET | Refills: 0 | Status: SHIPPED | OUTPATIENT
Start: 2023-01-01 | End: 2023-01-01

## 2023-01-01 RX ORDER — ACETAMINOPHEN 325 MG/10.15ML
650 LIQUID ORAL EVERY 8 HOURS PRN
Status: DISCONTINUED | OUTPATIENT
Start: 2023-01-01 | End: 2023-01-01 | Stop reason: HOSPADM

## 2023-01-01 RX ORDER — CEFTRIAXONE 2 G/1
2 INJECTION, POWDER, FOR SOLUTION INTRAMUSCULAR; INTRAVENOUS EVERY 24 HOURS
Status: COMPLETED | OUTPATIENT
Start: 2023-01-01 | End: 2023-01-01

## 2023-01-01 RX ORDER — LANOLIN ALCOHOL/MO/W.PET/CERES
3 CREAM (GRAM) TOPICAL
Qty: 60 TABLET | Refills: 3 | OUTPATIENT
Start: 2023-01-01 | End: 2024-08-08

## 2023-01-01 RX ORDER — ACETAMINOPHEN 325 MG/1
650 TABLET ORAL EVERY 8 HOURS
Status: DISCONTINUED | OUTPATIENT
Start: 2023-01-01 | End: 2023-01-01

## 2023-01-01 RX ORDER — LORAZEPAM 2 MG/ML
0.5 INJECTION INTRAMUSCULAR EVERY 4 HOURS PRN
Status: DISCONTINUED | OUTPATIENT
Start: 2023-01-01 | End: 2023-01-01 | Stop reason: HOSPADM

## 2023-01-01 RX ORDER — POLYETHYLENE GLYCOL 3350 17 G/17G
17 POWDER, FOR SOLUTION ORAL 2 TIMES DAILY
Status: DISCONTINUED | OUTPATIENT
Start: 2023-01-01 | End: 2023-01-01

## 2023-01-01 RX ORDER — ACETAMINOPHEN 650 MG/1
650 SUPPOSITORY RECTAL EVERY 4 HOURS PRN
Status: DISCONTINUED | OUTPATIENT
Start: 2023-01-01 | End: 2023-01-01 | Stop reason: HOSPADM

## 2023-01-01 RX ORDER — ONDANSETRON 2 MG/ML
4 INJECTION INTRAMUSCULAR; INTRAVENOUS EVERY 6 HOURS PRN
Status: DISCONTINUED | OUTPATIENT
Start: 2023-01-01 | End: 2023-01-01 | Stop reason: HOSPADM

## 2023-01-01 RX ORDER — OXYCODONE HYDROCHLORIDE 5 MG/1
5-10 TABLET ORAL EVERY 4 HOURS PRN
Qty: 100 TABLET | Refills: 0 | Status: SHIPPED | OUTPATIENT
Start: 2023-01-01 | End: 2023-01-01

## 2023-01-01 RX ORDER — MORPHINE SULFATE 20 MG/ML
5-10 SOLUTION ORAL
Status: DISCONTINUED | OUTPATIENT
Start: 2023-01-01 | End: 2023-01-01 | Stop reason: HOSPADM

## 2023-01-01 RX ORDER — MIRTAZAPINE 15 MG/1
15 TABLET, FILM COATED ORAL AT BEDTIME
Qty: 30 TABLET | Refills: 4 | Status: SHIPPED | OUTPATIENT
Start: 2023-01-01 | End: 2023-01-01

## 2023-01-01 RX ADMIN — OXYCODONE HYDROCHLORIDE 10 MG: 10 TABLET ORAL at 18:08

## 2023-01-01 RX ADMIN — LACTULOSE 20 G: 20 SOLUTION ORAL at 04:15

## 2023-01-01 RX ADMIN — LACTULOSE 20 G: 10 SOLUTION ORAL at 16:41

## 2023-01-01 RX ADMIN — HYDROMORPHONE HYDROCHLORIDE 0.5 MG: 0.2 INJECTION, SOLUTION INTRAMUSCULAR; INTRAVENOUS; SUBCUTANEOUS at 12:31

## 2023-01-01 RX ADMIN — MORPHINE SULFATE 15 MG: 15 TABLET, FILM COATED, EXTENDED RELEASE ORAL at 08:28

## 2023-01-01 RX ADMIN — HYDROMORPHONE HYDROCHLORIDE 0.5 MG: 0.2 INJECTION, SOLUTION INTRAMUSCULAR; INTRAVENOUS; SUBCUTANEOUS at 14:27

## 2023-01-01 RX ADMIN — DEXAMETHASONE 2 MG: 2 TABLET ORAL at 10:30

## 2023-01-01 RX ADMIN — ACETAMINOPHEN 650 MG: 325 TABLET, FILM COATED ORAL at 08:34

## 2023-01-01 RX ADMIN — ATROPINE SULFATE 2 DROP: 10 SOLUTION/ DROPS OPHTHALMIC at 12:32

## 2023-01-01 RX ADMIN — LACTULOSE 20 G: 10 SOLUTION ORAL at 07:23

## 2023-01-01 RX ADMIN — OXALIPLATIN 150 MG: 5 INJECTION, SOLUTION INTRAVENOUS at 10:45

## 2023-01-01 RX ADMIN — LACTULOSE 20 G: 10 SOLUTION ORAL at 21:29

## 2023-01-01 RX ADMIN — SODIUM CHLORIDE: 9 INJECTION, SOLUTION INTRAVENOUS at 18:00

## 2023-01-01 RX ADMIN — PANTOPRAZOLE SODIUM 40 MG: 40 TABLET, DELAYED RELEASE ORAL at 09:15

## 2023-01-01 RX ADMIN — ONDANSETRON 4 MG: 2 INJECTION INTRAMUSCULAR; INTRAVENOUS at 09:50

## 2023-01-01 RX ADMIN — DEXAMETHASONE 2 MG: 2 TABLET ORAL at 09:14

## 2023-01-01 RX ADMIN — ACETAMINOPHEN 650 MG: 650 SUPPOSITORY RECTAL at 21:40

## 2023-01-01 RX ADMIN — SODIUM CHLORIDE, POTASSIUM CHLORIDE, SODIUM LACTATE AND CALCIUM CHLORIDE 1000 ML: 600; 310; 30; 20 INJECTION, SOLUTION INTRAVENOUS at 15:08

## 2023-01-01 RX ADMIN — CEFTRIAXONE SODIUM 1 G: 1 INJECTION, POWDER, FOR SOLUTION INTRAMUSCULAR; INTRAVENOUS at 17:33

## 2023-01-01 RX ADMIN — MORPHINE SULFATE 15 MG: 15 TABLET, FILM COATED, EXTENDED RELEASE ORAL at 20:28

## 2023-01-01 RX ADMIN — ACETAMINOPHEN 650 MG: 325 TABLET, FILM COATED ORAL at 16:30

## 2023-01-01 RX ADMIN — POLYETHYLENE GLYCOL 3350 17 G: 17 POWDER, FOR SOLUTION ORAL at 08:27

## 2023-01-01 RX ADMIN — FOSAPREPITANT: 150 INJECTION, POWDER, LYOPHILIZED, FOR SOLUTION INTRAVENOUS at 08:03

## 2023-01-01 RX ADMIN — SODIUM CHLORIDE: 9 INJECTION, SOLUTION INTRAVENOUS at 14:00

## 2023-01-01 RX ADMIN — LACTULOSE 20 G: 10 SOLUTION ORAL at 14:26

## 2023-01-01 RX ADMIN — GEMCITABINE 1600 MG: 38 INJECTION, SOLUTION INTRAVENOUS at 08:45

## 2023-01-01 RX ADMIN — MORPHINE SULFATE 15 MG: 15 TABLET, FILM COATED, EXTENDED RELEASE ORAL at 20:55

## 2023-01-01 RX ADMIN — HYDROMORPHONE HYDROCHLORIDE 0.5 MG: 1 INJECTION, SOLUTION INTRAMUSCULAR; INTRAVENOUS; SUBCUTANEOUS at 05:30

## 2023-01-01 RX ADMIN — DEXAMETHASONE 2 MG: 2 TABLET ORAL at 09:36

## 2023-01-01 RX ADMIN — ACETAMINOPHEN 650 MG: 325 TABLET, FILM COATED ORAL at 08:28

## 2023-01-01 RX ADMIN — ENOXAPARIN SODIUM 30 MG: 30 INJECTION SUBCUTANEOUS at 20:05

## 2023-01-01 RX ADMIN — MORPHINE SULFATE 10 MG: 100 SOLUTION ORAL at 21:11

## 2023-01-01 RX ADMIN — LACTULOSE 20 G: 10 SOLUTION ORAL at 16:19

## 2023-01-01 RX ADMIN — DEXAMETHASONE 2 MG: 2 TABLET ORAL at 07:23

## 2023-01-01 RX ADMIN — HYDROMORPHONE HYDROCHLORIDE 0.5 MG: 0.2 INJECTION, SOLUTION INTRAMUSCULAR; INTRAVENOUS; SUBCUTANEOUS at 01:13

## 2023-01-01 RX ADMIN — MORPHINE SULFATE 15 MG: 15 TABLET, FILM COATED, EXTENDED RELEASE ORAL at 08:33

## 2023-01-01 RX ADMIN — DEXAMETHASONE 2 MG: 2 TABLET ORAL at 08:28

## 2023-01-01 RX ADMIN — Medication 3 MG: at 23:49

## 2023-01-01 RX ADMIN — CEFTRIAXONE SODIUM 2 G: 2 INJECTION, POWDER, FOR SOLUTION INTRAMUSCULAR; INTRAVENOUS at 12:30

## 2023-01-01 RX ADMIN — GEMCITABINE 1600 MG: 38 INJECTION, SOLUTION INTRAVENOUS at 11:09

## 2023-01-01 RX ADMIN — SODIUM CHLORIDE 250 ML: 9 INJECTION, SOLUTION INTRAVENOUS at 10:42

## 2023-01-01 RX ADMIN — HYDROMORPHONE HYDROCHLORIDE 0.5 MG: 0.2 INJECTION, SOLUTION INTRAMUSCULAR; INTRAVENOUS; SUBCUTANEOUS at 18:52

## 2023-01-01 RX ADMIN — LACTULOSE 20 G: 10 SOLUTION ORAL at 20:27

## 2023-01-01 RX ADMIN — LACTULOSE 20 G: 10 SOLUTION ORAL at 13:13

## 2023-01-01 RX ADMIN — RIFAXIMIN 550 MG: 550 TABLET ORAL at 07:23

## 2023-01-01 RX ADMIN — ONDANSETRON 4 MG: 2 INJECTION INTRAMUSCULAR; INTRAVENOUS at 10:27

## 2023-01-01 RX ADMIN — OXYCODONE HYDROCHLORIDE 10 MG: 10 TABLET ORAL at 18:11

## 2023-01-01 RX ADMIN — CEFTRIAXONE SODIUM 2 G: 2 INJECTION, POWDER, FOR SOLUTION INTRAMUSCULAR; INTRAVENOUS at 13:11

## 2023-01-01 RX ADMIN — DEXAMETHASONE SODIUM PHOSPHATE: 10 INJECTION, SOLUTION INTRAMUSCULAR; INTRAVENOUS at 07:50

## 2023-01-01 RX ADMIN — DEXTROSE MONOHYDRATE 250 ML: 50 INJECTION, SOLUTION INTRAVENOUS at 07:44

## 2023-01-01 RX ADMIN — DEXTROSE MONOHYDRATE 250 ML: 50 INJECTION, SOLUTION INTRAVENOUS at 10:03

## 2023-01-01 RX ADMIN — PANTOPRAZOLE SODIUM 40 MG: 40 TABLET, DELAYED RELEASE ORAL at 08:34

## 2023-01-01 RX ADMIN — RIFAXIMIN 550 MG: 550 TABLET ORAL at 13:11

## 2023-01-01 RX ADMIN — ENOXAPARIN SODIUM 30 MG: 30 INJECTION SUBCUTANEOUS at 21:28

## 2023-01-01 RX ADMIN — MORPHINE SULFATE 15 MG: 15 TABLET, FILM COATED, EXTENDED RELEASE ORAL at 09:15

## 2023-01-01 RX ADMIN — LACTULOSE 20 G: 20 SOLUTION ORAL at 06:05

## 2023-01-01 RX ADMIN — ACETAMINOPHEN 650 MG: 325 TABLET, FILM COATED ORAL at 07:23

## 2023-01-01 RX ADMIN — IOPAMIDOL 70 ML: 755 INJECTION, SOLUTION INTRAVASCULAR at 07:43

## 2023-01-01 RX ADMIN — RIFAXIMIN 550 MG: 550 TABLET ORAL at 21:11

## 2023-01-01 RX ADMIN — Medication 3 MG: at 21:26

## 2023-01-01 RX ADMIN — ONDANSETRON 4 MG: 2 INJECTION INTRAMUSCULAR; INTRAVENOUS at 16:26

## 2023-01-01 RX ADMIN — HYDROMORPHONE HYDROCHLORIDE 0.5 MG: 0.2 INJECTION, SOLUTION INTRAMUSCULAR; INTRAVENOUS; SUBCUTANEOUS at 17:34

## 2023-01-01 RX ADMIN — LACTULOSE 20 G: 20 SOLUTION ORAL at 12:27

## 2023-01-01 RX ADMIN — DEXAMETHASONE SODIUM PHOSPHATE: 10 INJECTION, SOLUTION INTRAMUSCULAR; INTRAVENOUS at 10:44

## 2023-01-01 RX ADMIN — SODIUM CHLORIDE: 9 INJECTION, SOLUTION INTRAVENOUS at 04:08

## 2023-01-01 RX ADMIN — SENNOSIDES AND DOCUSATE SODIUM 2 TABLET: 50; 8.6 TABLET ORAL at 15:24

## 2023-01-01 RX ADMIN — GLYCOPYRROLATE 0.2 MG: 0.2 INJECTION INTRAMUSCULAR; INTRAVENOUS at 16:59

## 2023-01-01 RX ADMIN — CEFTRIAXONE SODIUM 2 G: 2 INJECTION, POWDER, FOR SOLUTION INTRAMUSCULAR; INTRAVENOUS at 13:13

## 2023-01-01 RX ADMIN — RIFAXIMIN 550 MG: 550 TABLET ORAL at 08:34

## 2023-01-01 RX ADMIN — MORPHINE SULFATE 15 MG: 15 TABLET, FILM COATED, EXTENDED RELEASE ORAL at 20:21

## 2023-01-01 RX ADMIN — RIFAXIMIN 550 MG: 550 TABLET ORAL at 20:27

## 2023-01-01 RX ADMIN — ENOXAPARIN SODIUM 30 MG: 30 INJECTION SUBCUTANEOUS at 20:49

## 2023-01-01 RX ADMIN — HYDROMORPHONE HYDROCHLORIDE 0.2 MG: 0.2 INJECTION, SOLUTION INTRAMUSCULAR; INTRAVENOUS; SUBCUTANEOUS at 20:24

## 2023-01-01 RX ADMIN — LACTULOSE 20 G: 10 SOLUTION ORAL at 10:30

## 2023-01-01 RX ADMIN — HYDROMORPHONE HYDROCHLORIDE 0.5 MG: 1 INJECTION, SOLUTION INTRAMUSCULAR; INTRAVENOUS; SUBCUTANEOUS at 21:26

## 2023-01-01 RX ADMIN — LACTULOSE 20 G: 10 SOLUTION ORAL at 09:17

## 2023-01-01 RX ADMIN — SENNOSIDES AND DOCUSATE SODIUM 2 TABLET: 8.6; 5 TABLET ORAL at 20:49

## 2023-01-01 RX ADMIN — ENOXAPARIN SODIUM 30 MG: 30 INJECTION SUBCUTANEOUS at 20:21

## 2023-01-01 RX ADMIN — PANTOPRAZOLE SODIUM 40 MG: 40 TABLET, DELAYED RELEASE ORAL at 07:23

## 2023-01-01 RX ADMIN — SENNOSIDES AND DOCUSATE SODIUM 2 TABLET: 8.6; 5 TABLET ORAL at 08:36

## 2023-01-01 RX ADMIN — ENOXAPARIN SODIUM 30 MG: 30 INJECTION SUBCUTANEOUS at 20:57

## 2023-01-01 RX ADMIN — MORPHINE SULFATE 15 MG: 15 TABLET, FILM COATED, EXTENDED RELEASE ORAL at 21:29

## 2023-01-01 RX ADMIN — SODIUM CHLORIDE 250 ML: 9 INJECTION, SOLUTION INTRAVENOUS at 07:52

## 2023-01-01 RX ADMIN — ONDANSETRON 8 MG: 2 INJECTION INTRAMUSCULAR; INTRAVENOUS at 07:52

## 2023-01-01 RX ADMIN — PANTOPRAZOLE SODIUM 40 MG: 40 TABLET, DELAYED RELEASE ORAL at 07:49

## 2023-01-01 RX ADMIN — LACTULOSE 20 G: 10 SOLUTION ORAL at 15:02

## 2023-01-01 RX ADMIN — LACTULOSE 20 G: 10 SOLUTION ORAL at 12:30

## 2023-01-01 RX ADMIN — HYDROMORPHONE HYDROCHLORIDE 0.5 MG: 0.2 INJECTION, SOLUTION INTRAMUSCULAR; INTRAVENOUS; SUBCUTANEOUS at 21:39

## 2023-01-01 RX ADMIN — CEFTRIAXONE SODIUM 1 G: 1 INJECTION, POWDER, FOR SOLUTION INTRAMUSCULAR; INTRAVENOUS at 15:01

## 2023-01-01 RX ADMIN — HYDROMORPHONE HYDROCHLORIDE 0.4 MG: 0.2 INJECTION, SOLUTION INTRAMUSCULAR; INTRAVENOUS; SUBCUTANEOUS at 16:15

## 2023-01-01 RX ADMIN — ATROPINE SULFATE 2 DROP: 10 SOLUTION/ DROPS OPHTHALMIC at 15:00

## 2023-01-01 RX ADMIN — MORPHINE SULFATE 5 MG: 100 SOLUTION ORAL at 18:13

## 2023-01-01 RX ADMIN — LACTULOSE 20 G: 10 SOLUTION ORAL at 20:57

## 2023-01-01 RX ADMIN — PROCHLORPERAZINE EDISYLATE 10 MG: 5 INJECTION, SOLUTION INTRAMUSCULAR; INTRAVENOUS at 10:17

## 2023-01-01 RX ADMIN — RIFAXIMIN 550 MG: 550 TABLET ORAL at 07:49

## 2023-01-01 RX ADMIN — PANTOPRAZOLE SODIUM 40 MG: 40 TABLET, DELAYED RELEASE ORAL at 09:37

## 2023-01-01 RX ADMIN — LACTULOSE 20 G: 20 SOLUTION ORAL at 16:11

## 2023-01-01 RX ADMIN — SODIUM CHLORIDE 500 ML: 9 INJECTION, SOLUTION INTRAVENOUS at 12:27

## 2023-01-01 RX ADMIN — SCOPALAMINE 1 PATCH: 1 PATCH, EXTENDED RELEASE TRANSDERMAL at 12:47

## 2023-01-01 RX ADMIN — LACTULOSE 20 G: 20 SOLUTION ORAL at 13:12

## 2023-01-01 RX ADMIN — SCOPALAMINE 1 PATCH: 1 PATCH, EXTENDED RELEASE TRANSDERMAL at 12:31

## 2023-01-01 RX ADMIN — LACTULOSE 20 G: 20 SOLUTION ORAL at 22:32

## 2023-01-01 RX ADMIN — DEXAMETHASONE 2 MG: 2 TABLET ORAL at 07:49

## 2023-01-01 RX ADMIN — MORPHINE SULFATE 15 MG: 15 TABLET, FILM COATED, EXTENDED RELEASE ORAL at 07:23

## 2023-01-01 RX ADMIN — HYDROMORPHONE HYDROCHLORIDE 0.5 MG: 1 INJECTION, SOLUTION INTRAMUSCULAR; INTRAVENOUS; SUBCUTANEOUS at 11:24

## 2023-01-01 RX ADMIN — ONDANSETRON 4 MG: 2 INJECTION INTRAMUSCULAR; INTRAVENOUS at 17:22

## 2023-01-01 RX ADMIN — LACTULOSE 20 G: 10 SOLUTION ORAL at 13:11

## 2023-01-01 RX ADMIN — ATROPINE SULFATE 2 DROP: 10 SOLUTION/ DROPS OPHTHALMIC at 00:52

## 2023-01-01 RX ADMIN — HYDROMORPHONE HYDROCHLORIDE 0.4 MG: 0.2 INJECTION, SOLUTION INTRAMUSCULAR; INTRAVENOUS; SUBCUTANEOUS at 00:51

## 2023-01-01 RX ADMIN — LACTULOSE 20 G: 10 SOLUTION ORAL at 09:37

## 2023-01-01 RX ADMIN — DEXAMETHASONE 2 MG: 2 TABLET ORAL at 08:34

## 2023-01-01 RX ADMIN — HYDROMORPHONE HYDROCHLORIDE 0.4 MG: 0.2 INJECTION, SOLUTION INTRAMUSCULAR; INTRAVENOUS; SUBCUTANEOUS at 17:51

## 2023-01-01 RX ADMIN — POLYETHYLENE GLYCOL 3350 17 G: 17 POWDER, FOR SOLUTION ORAL at 15:24

## 2023-01-01 RX ADMIN — LACTULOSE 20 G: 10 SOLUTION ORAL at 20:21

## 2023-01-01 RX ADMIN — CEFTRIAXONE SODIUM 2 G: 2 INJECTION, POWDER, FOR SOLUTION INTRAMUSCULAR; INTRAVENOUS at 12:27

## 2023-01-01 RX ADMIN — HYDROMORPHONE HYDROCHLORIDE 0.4 MG: 0.2 INJECTION, SOLUTION INTRAMUSCULAR; INTRAVENOUS; SUBCUTANEOUS at 03:25

## 2023-01-01 RX ADMIN — MORPHINE SULFATE 15 MG: 15 TABLET, FILM COATED, EXTENDED RELEASE ORAL at 09:37

## 2023-01-01 RX ADMIN — HYDROMORPHONE HYDROCHLORIDE 0.2 MG: 0.2 INJECTION, SOLUTION INTRAMUSCULAR; INTRAVENOUS; SUBCUTANEOUS at 18:01

## 2023-01-01 RX ADMIN — RIFAXIMIN 550 MG: 550 TABLET ORAL at 20:00

## 2023-01-01 RX ADMIN — DEXAMETHASONE SODIUM PHOSPHATE: 10 INJECTION, SOLUTION INTRAMUSCULAR; INTRAVENOUS at 10:03

## 2023-01-01 RX ADMIN — SODIUM CHLORIDE: 9 INJECTION, SOLUTION INTRAVENOUS at 20:05

## 2023-01-01 RX ADMIN — MORPHINE SULFATE 15 MG: 15 TABLET, FILM COATED, EXTENDED RELEASE ORAL at 20:00

## 2023-01-01 RX ADMIN — Medication 3 MG: at 22:51

## 2023-01-01 RX ADMIN — POLYETHYLENE GLYCOL 3350 17 G: 17 POWDER, FOR SOLUTION ORAL at 20:49

## 2023-01-01 RX ADMIN — Medication 3 MG: at 22:21

## 2023-01-01 RX ADMIN — RIFAXIMIN 550 MG: 550 TABLET ORAL at 21:26

## 2023-01-01 RX ADMIN — HYDROMORPHONE HYDROCHLORIDE 0.5 MG: 0.2 INJECTION, SOLUTION INTRAMUSCULAR; INTRAVENOUS; SUBCUTANEOUS at 11:04

## 2023-01-01 RX ADMIN — HYDROMORPHONE HYDROCHLORIDE 0.4 MG: 0.2 INJECTION, SOLUTION INTRAMUSCULAR; INTRAVENOUS; SUBCUTANEOUS at 19:42

## 2023-01-01 RX ADMIN — ATROPINE SULFATE 2 DROP: 10 SOLUTION/ DROPS OPHTHALMIC at 19:34

## 2023-01-01 RX ADMIN — ACETAMINOPHEN 650 MG: 325 TABLET, FILM COATED ORAL at 15:49

## 2023-01-01 RX ADMIN — HYDROMORPHONE HYDROCHLORIDE 0.4 MG: 0.2 INJECTION, SOLUTION INTRAMUSCULAR; INTRAVENOUS; SUBCUTANEOUS at 06:45

## 2023-01-01 RX ADMIN — SCOPALAMINE 1 PATCH: 1 PATCH, EXTENDED RELEASE TRANSDERMAL at 11:32

## 2023-01-01 RX ADMIN — BISACODYL 10 MG RECTAL SUPPOSITORY 10 MG: at 15:14

## 2023-01-01 RX ADMIN — OXALIPLATIN 150 MG: 5 INJECTION, SOLUTION INTRAVENOUS at 09:10

## 2023-01-01 RX ADMIN — ACETAMINOPHEN 650 MG: 325 TABLET, FILM COATED ORAL at 23:49

## 2023-01-01 RX ADMIN — RIFAXIMIN 550 MG: 550 TABLET ORAL at 09:36

## 2023-01-01 RX ADMIN — LACTULOSE 20 G: 20 SOLUTION ORAL at 12:30

## 2023-01-01 RX ADMIN — RIFAXIMIN 550 MG: 550 TABLET ORAL at 10:30

## 2023-01-01 RX ADMIN — LACTULOSE 20 G: 10 SOLUTION ORAL at 07:49

## 2023-01-01 RX ADMIN — Medication 3 MG: at 20:00

## 2023-01-01 RX ADMIN — LACTULOSE 20 G: 10 SOLUTION ORAL at 18:22

## 2023-01-01 RX ADMIN — ACETAMINOPHEN 650 MG: 325 TABLET, FILM COATED ORAL at 00:07

## 2023-01-01 RX ADMIN — IOPAMIDOL 73 ML: 755 INJECTION, SOLUTION INTRAVASCULAR at 16:13

## 2023-01-01 RX ADMIN — ACETAMINOPHEN 650 MG: 325 TABLET, FILM COATED ORAL at 07:49

## 2023-01-01 RX ADMIN — LACTULOSE 20 G: 20 SOLUTION ORAL at 18:11

## 2023-01-01 RX ADMIN — ONDANSETRON 4 MG: 2 INJECTION INTRAMUSCULAR; INTRAVENOUS at 20:45

## 2023-01-01 RX ADMIN — MORPHINE SULFATE 15 MG: 15 TABLET, FILM COATED, EXTENDED RELEASE ORAL at 10:30

## 2023-01-01 RX ADMIN — LACTULOSE 20 G: 10 SOLUTION ORAL at 13:41

## 2023-01-01 RX ADMIN — MORPHINE SULFATE 15 MG: 15 TABLET, FILM COATED, EXTENDED RELEASE ORAL at 07:50

## 2023-01-01 RX ADMIN — ONDANSETRON 4 MG: 2 INJECTION INTRAMUSCULAR; INTRAVENOUS at 09:31

## 2023-01-01 RX ADMIN — FUROSEMIDE 20 MG: 10 INJECTION, SOLUTION INTRAVENOUS at 18:26

## 2023-01-01 RX ADMIN — Medication 3 MG: at 22:31

## 2023-01-01 RX ADMIN — SODIUM CHLORIDE, POTASSIUM CHLORIDE, SODIUM LACTATE AND CALCIUM CHLORIDE 1000 ML: 600; 310; 30; 20 INJECTION, SOLUTION INTRAVENOUS at 20:42

## 2023-01-01 RX ADMIN — LORAZEPAM 0.5 MG: 2 INJECTION INTRAMUSCULAR; INTRAVENOUS at 08:18

## 2023-01-01 RX ADMIN — HYDROMORPHONE HYDROCHLORIDE 0.4 MG: 0.2 INJECTION, SOLUTION INTRAMUSCULAR; INTRAVENOUS; SUBCUTANEOUS at 23:07

## 2023-01-01 RX ADMIN — RIFAXIMIN 550 MG: 550 TABLET ORAL at 09:14

## 2023-01-01 RX ADMIN — IOPAMIDOL 70 ML: 755 INJECTION, SOLUTION INTRAVENOUS at 11:40

## 2023-01-01 RX ADMIN — HYDROMORPHONE HYDROCHLORIDE 0.2 MG: 0.2 INJECTION, SOLUTION INTRAMUSCULAR; INTRAVENOUS; SUBCUTANEOUS at 10:50

## 2023-01-01 RX ADMIN — ENOXAPARIN SODIUM 30 MG: 30 INJECTION SUBCUTANEOUS at 20:27

## 2023-01-01 RX ADMIN — PANTOPRAZOLE SODIUM 40 MG: 40 TABLET, DELAYED RELEASE ORAL at 08:27

## 2023-01-01 RX ADMIN — PROCHLORPERAZINE EDISYLATE 10 MG: 5 INJECTION, SOLUTION INTRAMUSCULAR; INTRAVENOUS at 13:12

## 2023-01-01 RX ADMIN — ACETAMINOPHEN 650 MG: 325 TABLET, FILM COATED ORAL at 09:37

## 2023-01-01 RX ADMIN — RIFAXIMIN 550 MG: 550 TABLET ORAL at 20:21

## 2023-01-01 RX ADMIN — ONDANSETRON 8 MG: 2 INJECTION INTRAMUSCULAR; INTRAVENOUS at 10:42

## 2023-01-01 RX ADMIN — ACETAMINOPHEN 650 MG: 325 TABLET, FILM COATED ORAL at 16:24

## 2023-01-01 RX ADMIN — HYDROMORPHONE HYDROCHLORIDE 0.2 MG: 0.2 INJECTION, SOLUTION INTRAMUSCULAR; INTRAVENOUS; SUBCUTANEOUS at 15:52

## 2023-01-01 ASSESSMENT — ACTIVITIES OF DAILY LIVING (ADL)
ADLS_ACUITY_SCORE: 50
ADLS_ACUITY_SCORE: 48
ADLS_ACUITY_SCORE: 50
ADLS_ACUITY_SCORE: 43
ADLS_ACUITY_SCORE: 54
ADLS_ACUITY_SCORE: 54
ADLS_ACUITY_SCORE: 33
ADLS_ACUITY_SCORE: 54
ADLS_ACUITY_SCORE: 48
ADLS_ACUITY_SCORE: 54
EATING: 1-->ASSISTANCE (EQUIPMENT/PERSON) NEEDED
ADLS_ACUITY_SCORE: 50
ADLS_ACUITY_SCORE: 54
ADLS_ACUITY_SCORE: 46
DIFFICULTY_EATING/SWALLOWING: YES
ADLS_ACUITY_SCORE: 54
ADLS_ACUITY_SCORE: 48
ADLS_ACUITY_SCORE: 46
ADLS_ACUITY_SCORE: 46
ADLS_ACUITY_SCORE: 54
BATHING: 2-->COMPLETELY DEPENDENT (NOT DEVELOPMENTALLY APPROPRIATE)
ADLS_ACUITY_SCORE: 45
ADLS_ACUITY_SCORE: 50
ADLS_ACUITY_SCORE: 42
ADLS_ACUITY_SCORE: 54
TRANSFERRING: 1-->ASSISTANCE (EQUIPMENT/PERSON) NEEDED
WEAR_GLASSES_OR_BLIND: YES
ADLS_ACUITY_SCORE: 50
ADLS_ACUITY_SCORE: 45
ADLS_ACUITY_SCORE: 42
ADLS_ACUITY_SCORE: 50
ADLS_ACUITY_SCORE: 46
ADLS_ACUITY_SCORE: 46
ADLS_ACUITY_SCORE: 54
ADLS_ACUITY_SCORE: 42
ADLS_ACUITY_SCORE: 42
ADLS_ACUITY_SCORE: 48
ADLS_ACUITY_SCORE: 42
ADLS_ACUITY_SCORE: 48
CHANGE_IN_FUNCTIONAL_STATUS_SINCE_ONSET_OF_CURRENT_ILLNESS/INJURY: YES
NUMBER_OF_TIMES_PATIENT_HAS_FALLEN_WITHIN_LAST_SIX_MONTHS: 1
ADLS_ACUITY_SCORE: 54
ADLS_ACUITY_SCORE: 46
ADLS_ACUITY_SCORE: 43
CONCENTRATING,_REMEMBERING_OR_MAKING_DECISIONS_DIFFICULTY: YES
ADLS_ACUITY_SCORE: 54
ADLS_ACUITY_SCORE: 42
ADLS_ACUITY_SCORE: 46
DRESSING/BATHING: BATHING DIFFICULTY, ASSISTANCE 1 PERSON
ADLS_ACUITY_SCORE: 54
DIFFICULTY_COMMUNICATING: NO
ADLS_ACUITY_SCORE: 46
ADLS_ACUITY_SCORE: 42
ADLS_ACUITY_SCORE: 42
ADLS_ACUITY_SCORE: 50
ADLS_ACUITY_SCORE: 54
EATING: 0-->ASSISTANCE NEEDED (DEVELOPMENTALLY APPROPRIATE)
ADLS_ACUITY_SCORE: 35
ADLS_ACUITY_SCORE: 50
FALL_HISTORY_WITHIN_LAST_SIX_MONTHS: YES
ADLS_ACUITY_SCORE: 46
ADLS_ACUITY_SCORE: 48
ADLS_ACUITY_SCORE: 48
ADLS_ACUITY_SCORE: 54
ADLS_ACUITY_SCORE: 50
ADLS_ACUITY_SCORE: 46
EATING/SWALLOWING_MANAGEMENT: LOSS OF APPETITE
ADLS_ACUITY_SCORE: 35
ADLS_ACUITY_SCORE: 42
ADLS_ACUITY_SCORE: 46
DOING_ERRANDS_INDEPENDENTLY_DIFFICULTY: NO
ADLS_ACUITY_SCORE: 42
ADLS_ACUITY_SCORE: 50
ADLS_ACUITY_SCORE: 46
ADLS_ACUITY_SCORE: 42
ADLS_ACUITY_SCORE: 42
ADLS_ACUITY_SCORE: 54
ADLS_ACUITY_SCORE: 50
ADLS_ACUITY_SCORE: 46
ADLS_ACUITY_SCORE: 46
ADLS_ACUITY_SCORE: 45
ADLS_ACUITY_SCORE: 52
ADLS_ACUITY_SCORE: 50
ADLS_ACUITY_SCORE: 54
ADLS_ACUITY_SCORE: 46
ADLS_ACUITY_SCORE: 42
ADLS_ACUITY_SCORE: 42
ADLS_ACUITY_SCORE: 54
ADLS_ACUITY_SCORE: 50
DRESS: 1-->ASSISTANCE (EQUIPMENT/PERSON) NEEDED
ADLS_ACUITY_SCORE: 45
ADLS_ACUITY_SCORE: 54
ADLS_ACUITY_SCORE: 54
ADLS_ACUITY_SCORE: 45
ADLS_ACUITY_SCORE: 48
ADLS_ACUITY_SCORE: 50
ADLS_ACUITY_SCORE: 50
WALKING_OR_CLIMBING_STAIRS_DIFFICULTY: NO
ADLS_ACUITY_SCORE: 35
ADLS_ACUITY_SCORE: 48
ADLS_ACUITY_SCORE: 42
TOILETING_ISSUES: NO
ADLS_ACUITY_SCORE: 42
ADLS_ACUITY_SCORE: 54
ADLS_ACUITY_SCORE: 45
ADLS_ACUITY_SCORE: 35
EATING/SWALLOWING: EATING
ADLS_ACUITY_SCORE: 50
DRESS: 1-->ASSISTANCE (EQUIPMENT/PERSON) NEEDED (NOT DEVELOPMENTALLY APPROPRIATE)
ADLS_ACUITY_SCORE: 46
ADLS_ACUITY_SCORE: 54
ADLS_ACUITY_SCORE: 45
ADLS_ACUITY_SCORE: 42
HEARING_DIFFICULTY_OR_DEAF: NO
ADLS_ACUITY_SCORE: 48
ADLS_ACUITY_SCORE: 54
ADLS_ACUITY_SCORE: 42
ADLS_ACUITY_SCORE: 54
ADLS_ACUITY_SCORE: 42
ADLS_ACUITY_SCORE: 54
DRESSING/BATHING_DIFFICULTY: YES
ADLS_ACUITY_SCORE: 42
ADLS_ACUITY_SCORE: 46
ADLS_ACUITY_SCORE: 54
TRANSFERRING: 0-->ASSISTANCE NEEDED (DEVELOPMENTALLY APPROPRIATE)
ADLS_ACUITY_SCORE: 48
WALKING_OR_CLIMBING_STAIRS: AMBULATION DIFFICULTY, ASSISTANCE 1 PERSON
ADLS_ACUITY_SCORE: 50
SWALLOWING: 2-->DIFFICULTY SWALLOWING LIQUIDS
ADLS_ACUITY_SCORE: 54
ADLS_ACUITY_SCORE: 42
SWALLOWING: 2-->DIFFICULTY SWALLOWING LIQUIDS
ADLS_ACUITY_SCORE: 50
ADLS_ACUITY_SCORE: 42

## 2023-01-01 ASSESSMENT — PAIN SCALES - GENERAL
PAINLEVEL: MODERATE PAIN (5)
PAINLEVEL: NO PAIN (0)
PAINLEVEL: MODERATE PAIN (5)
PAINLEVEL: SEVERE PAIN (6)
PAINLEVEL: NO PAIN (0)
PAINLEVEL: EXTREME PAIN (9)
PAINLEVEL: EXTREME PAIN (8)
PAINLEVEL: NO PAIN (0)
PAINLEVEL: NO PAIN (0)
PAINLEVEL: MILD PAIN (2)

## 2023-01-06 NOTE — NURSING NOTE
Chief Complaint   Patient presents with     Blood Draw     IV with blood draw placed by vascular access     Sendy Navarro RN

## 2023-01-09 NOTE — NURSING NOTE
"Oncology Rooming Note    January 9, 2023 4:36 PM   Preeti Pascual is a 57 year old male who presents for:    Chief Complaint   Patient presents with     Oncology Clinic Visit     Hepatocellular carcinoma     Initial Vitals: BP (!) 147/86 (BP Location: Right arm, Patient Position: Sitting, Cuff Size: Adult Regular)   Pulse 96   Temp 97.9  F (36.6  C) (Oral)   Wt 54.5 kg (120 lb 3.2 oz)   SpO2 98%   BMI 19.41 kg/m   Estimated body mass index is 19.41 kg/m  as calculated from the following:    Height as of 11/30/22: 1.676 m (5' 5.98\").    Weight as of this encounter: 54.5 kg (120 lb 3.2 oz). Body surface area is 1.59 meters squared.  Extreme Pain (8) Comment: Data Unavailable   No LMP for male patient.  Allergies reviewed: Yes  Medications reviewed: Yes    Medications: MEDICATION REFILLS NEEDED TODAY. Provider was notified.  Pharmacy name entered into [facility]:    [id number] - [place], [place] - [address] AT [facility] & [address] [place] [address] PHARMACY [place], [place] - [address] [facility] [phone]  [facility] - [place], [place] - [address]     Clinical concerns: needs refill for omeprazole        Bhavesh Roberts            "

## 2023-01-09 NOTE — LETTER
1/9/2023         RE: Preeti Pascual  [address]  [place] [address]        Dear Colleague,    Thank you for referring your patient, Preeti Pascual, to the [facility]. Please see a copy of my visit note below.        Preeti Pascual is here today in follow-up of hepatocellular carcinoma.    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  He had initial liver directed therapy with radioembolization and declined to pursue liver transplantation.  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  He had initial therapy with sorafenib which she did not tolerate he then had a long period of control with single agent nivolumab and upon progression on that failed to achieve control with the addition of ipilimumab.  His course of immunotherapy was complicated by development of severe autoimmune hepatitis without evidence of reactivation of his hepatitis B.  He had disease control with Ramucirumab Mab and then was switched to lenvatinib so that he could travel back to [facility] to visit family and had a brief period of control on that but necessitated significant dose reductions.  As of August he had progressed on the lenvatinib and went back to Ramucirumab without control as of 2 months ago.  He subsequently was started on gemcitabine/oxaliplatin and returns for his first response assessment.  His treatment start was delayed by almost a month as we work through insurance approval.  He tells me his overall clinical condition has declined.  He had initially had some improvement in his cancer related pain (though its unclear how much of that improvement was due to a better management of his pain meds versus actual improvement with therapy,) but that seems worse in the last week or 2.  His appetite is poor he has significant nausea  that he attributes to his chemotherapy, and food does not taste good to him.  He has lost perhaps 20 pounds over the last 2 months.  He notes he is losing his hair.  He has had no problems with bleeding.  He has had no problems with lower extremity edema.  His activity levels have become quite limited due to significant fatigue.  He had a planned visit with palliative care earlier this week to help with symptom management end-of-life planning but he missed that visit.    On physical exam he appears somewhat cachectic with otherwise unremarkable vital signs.  He has mild thinning of his hair.  He has no icterus.  His lungs are clear to auscultation as well as percussion.  His heart rate rhythm are regular.  His abdomen is soft with fairly marked tenderness of his liver margin which extends handsbreadth below his right costal margin.  He does not have any obvious ascites.  He has no peripheral edema.  His speech is fluent and his cranial nerves are grossly intact.    I personally reviewed his CT scan and went over the results with him.  That shows a significant amount of growth of all of his sites of disease.  I would note that there is almost a 4-week interval between the baseline scan and the time he actually started treatment.  His alpha-fetoprotein is dramatically improved from about 24,000 now down to 1200.  His electrolytes are unremarkable with normal renal function.  His albumin is normal at 3.5 and his bilirubin is normal at 0.8.  His liver enzymes are mildly elevated.  He has nearly normal blood counts with mild normocytic anemia.    Assessment/plan: Metastatic hepatocellular carcinoma in the setting of well compensated chronic liver disease and declining performance status.  The very long discussion with the patient.  While the dramatic drop in his alpha-fetoprotein appears promising, he has had a very clear and marked decline in his overall clinical condition as well as radiographic progression.  It is  perhaps possible that the signs of progression all occurred in the month before he started active treatment and that the marked decrease in his alpha-fetoprotein portends a good response if we were to continue therapy.  The difficulty that we discussed today is as per our prior discussions his plan has been if he does not respond to his current therapy we would like to go back home to be with family for the end of his life.  I expressed concern that given the current path of his disease that its plausible that within the next few weeks he may become too ill to make the journey and that if that is what he would like to do he needs to make a plan sooner rather than later.  While we could continue with his current therapy very concerned that we will not get control of his disease and he will be unable to pursue his end-of-life plans.  I increased his MS Contin to 30 mg twice per day and instructed to be more liberal use of the oxycodone for breakthrough as well as the possible need for laxatives if he becomes constipated.    Addendum: Following the completion of our clinic visited 8 the patient surprisingly reappeared in clinic later with his wife who needed to understand what was going on.  I have never met her before and he had not previously discussed or in spite of our discussions over the last year about end-of-life planning and his what I thought was a complete lack of family presence here.  I repeated the above long discussion with her, but her English is not his good is her 's and NIH are exactly what he was translating to her.  I think she understands the circumstance but it is difficult to tell because of the language barrier and because of her surprise at discovering he was at the end of life.  Through this discussion it became clear that perhaps the plan of returning back [place] [facility] for end-of-life care was not what was really in the works.  With her presence here and if he is planning on staying  here through end-of-life it becomes much more plausible that we might consider a little longer trial of his current treatment (given the dramatic improvement in his alpha-fetoprotein) to see if we in fact can get control of his disease.  We will try to set up sometime later this week and we can have an  here to go through this in more depth with both of them to be sure that they are both on the same page and have a good understanding of the situation.    Total time by me spent primarily on the above long discussions as well as documentation and coordination of care was approximately 110 minutes      Again, thank you for allowing me to participate in the care of your patient.        Sincerely,        Agustin Kyle MD

## 2023-01-09 NOTE — LETTER
Date:January 11, 2023      Patient was self referred, no letter generated. Do not send.        [facility] Health Information

## 2023-01-09 NOTE — PROGRESS NOTES
Preeti Pascual is here today in follow-up of hepatocellular carcinoma.    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  He had initial liver directed therapy with radioembolization and declined to pursue liver transplantation.  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  He had initial therapy with sorafenib which she did not tolerate he then had a long period of control with single agent nivolumab and upon progression on that failed to achieve control with the addition of ipilimumab.  His course of immunotherapy was complicated by development of severe autoimmune hepatitis without evidence of reactivation of his hepatitis B.  He had disease control with Ramucirumab Mab and then was switched to lenvatinib so that he could travel back to [facility] to visit family and had a brief period of control on that but necessitated significant dose reductions.  As of August he had progressed on the lenvatinib and went back to Ramucirumab without control as of 2 months ago.  He subsequently was started on gemcitabine/oxaliplatin and returns for his first response assessment.  His treatment start was delayed by almost a month as we work through insurance approval.  He tells me his overall clinical condition has declined.  He had initially had some improvement in his cancer related pain (though its unclear how much of that improvement was due to a better management of his pain meds versus actual improvement with therapy,) but that seems worse in the last week or 2.  His appetite is poor he has significant nausea that he attributes to his chemotherapy, and food does not taste good to him.  He has lost perhaps 20 pounds over the last 2 months.  He notes he is losing his hair.  He has had no problems with bleeding.  He has had no problems with lower extremity edema.  His  activity levels have become quite limited due to significant fatigue.  He had a planned visit with palliative care earlier this week to help with symptom management end-of-life planning but he missed that visit.    On physical exam he appears somewhat cachectic with otherwise unremarkable vital signs.  He has mild thinning of his hair.  He has no icterus.  His lungs are clear to auscultation as well as percussion.  His heart rate rhythm are regular.  His abdomen is soft with fairly marked tenderness of his liver margin which extends handsbreadth below his right costal margin.  He does not have any obvious ascites.  He has no peripheral edema.  His speech is fluent and his cranial nerves are grossly intact.    I personally reviewed his CT scan and went over the results with him.  That shows a significant amount of growth of all of his sites of disease.  I would note that there is almost a 4-week interval between the baseline scan and the time he actually started treatment.  His alpha-fetoprotein is dramatically improved from about 24,000 now down to 1200.  His electrolytes are unremarkable with normal renal function.  His albumin is normal at 3.5 and his bilirubin is normal at 0.8.  His liver enzymes are mildly elevated.  He has nearly normal blood counts with mild normocytic anemia.    Assessment/plan: Metastatic hepatocellular carcinoma in the setting of well compensated chronic liver disease and declining performance status.  The very long discussion with the patient.  While the dramatic drop in his alpha-fetoprotein appears promising, he has had a very clear and marked decline in his overall clinical condition as well as radiographic progression.  It is perhaps possible that the signs of progression all occurred in the month before he started active treatment and that the marked decrease in his alpha-fetoprotein portends a good response if we were to continue therapy.  The difficulty that we discussed today is as  per our prior discussions his plan has been if he does not respond to his current therapy we would like to go back home to be with family for the end of his life.  I expressed concern that given the current path of his disease that its plausible that within the next few weeks he may become too ill to make the journey and that if that is what he would like to do he needs to make a plan sooner rather than later.  While we could continue with his current therapy very concerned that we will not get control of his disease and he will be unable to pursue his end-of-life plans.  I increased his MS Contin to 30 mg twice per day and instructed to be more liberal use of the oxycodone for breakthrough as well as the possible need for laxatives if he becomes constipated.    Addendum: Following the completion of our clinic visit the patient surprisingly reappeared in clinic later with his wife who needed to understand what was going on.  I have never met her before and he had not previously discussed her in spite of our discussions over the last year about end-of-life planning and what I thought was a complete lack of family presence here.  I repeated the above long discussion with her, but her English is not his good is her 's and I am not sure exactly what he was translating to her.  I think she understands the circumstance but it is difficult to tell because of the language barrier and because of her surprise at discovering he was at the end of life.    Through this discussion it became clear that perhaps the plan of returning back to [facility] for end-of-life care was not what was really in the works.  With her presence here and if he is planning on staying here through end-of-life it becomes much more plausible that we might consider a little longer trial of his current treatment (given the dramatic improvement in his alpha-fetoprotein) to see if we in fact can get control of his disease.  We will try to set up  sometime later this week and we can have an  here to go through this in more depth with both of them to be sure that they are both on the same page and have a good understanding of the situation.    Total time by me spent primarily on the above long discussions as well as documentation and coordination of care was approximately 110 minutes

## 2023-01-12 NOTE — NURSING NOTE
"Oncology Rooming Note    January 12, 2023 12:56 PM   Preeti Pascual is a 57 year old male who presents for:    Chief Complaint   Patient presents with     Oncology Clinic Visit     [facility] - [facility]     Initial Vitals: BP (!) 150/87 (BP Location: Right arm, Patient Position: Chair, Cuff Size: Adult Large)   Pulse 89   Temp 98.4  F (36.9  C)   Resp 16   Ht 1.676 m (5' 5.98\")   Wt 54.7 kg (120 lb 11.2 oz)   SpO2 98%   BMI 19.49 kg/m   Estimated body mass index is 19.49 kg/m  as calculated from the following:    Height as of this encounter: 1.676 m (5' 5.98\").    Weight as of this encounter: 54.7 kg (120 lb 11.2 oz). Body surface area is 1.6 meters squared.  Moderate Pain (5) Comment: Data Unavailable   No LMP for male patient.  Allergies reviewed: Yes  Medications reviewed: Yes    Medications: Medication refills not needed today.  Pharmacy name entered into [facility]:    [id number] - [place], [place] - [address] AT [facility] & [address] [place] [address] PHARMACY [facility] - [place], [place] - [address] [facility] [phone]  [facility] [place], [place] - [address]            "

## 2023-01-16 NOTE — PROGRESS NOTES
Preeti Pascual returns today in follow-up of metastatichepatocellular carcinoma.    He is 57 years old with well compensated liver disease and a good performance status with progressive hepatocellular carcinoma and multiple prior treatments.  (See my note from earlier this week for full details.)  When I saw him earlier this week he had radiographic progression on what was our last planned line of therapy with gemcitabine/oxaliplatin.  He however had a marked improvement in his AFP from 24,000 down to 1200.  He had reported that his overall clinical condition was worsening and we had made initially a plan to discontinue therapy and he would return back to his home in [place] for end-of-life.  After the visit however became apparent that he and his wife were not on the same page and it was unclear how much his wife understood (this was made difficult by the fact that she had not been present for any of his visits over the last 5 years and we were unaware of her.)  I asked him to return today so we could conduct the visit together with them with a professional  as his wife's English is not very good..    I clarified with him that he clearly does wish to return home to die in [place] but wants to stay here as long as possible prior to that and his wife is in agreement with that plan.  He and his wife disagree on whether his clinical condition is improved or worsened in the last 4 weeks.  I reviewed with them that the worsening of the CT scan was quite clear, but there was almost a month delay between the baseline scan and when we actually started treatment. Its possible that progression occurred before he started therapy and it certainly plausible that he can still get a good response given the dramatic improvement in his tumor marker.  I discussed the risk that his clinical condition is fragile enough that if things decline quickly he may miss his window of opportunity to be well enough to travel back to  [place].  After much discussion we landed on a plan to continue with his current chemotherapy but will be checking his alpha-fetoprotein with each cycle every 2 weeks.  If his alpha-fetoprotein starts going back up significantly or he has clinical deterioration we will stop short of waiting another 2 months to document further progression so that he can return home before he becomes too ill.    Total time by me today all spent on the above discussion as well as ordering and coordination of care was approximately 60 minutes.

## 2023-01-18 NOTE — PATIENT INSTRUCTIONS
[facility] Triage and after hours / weekends / holidays:  [phone]    Please call the triage or after hours line if you experience a temperature greater than or equal to 100.4, shaking chills, have uncontrolled nausea, vomiting and/or diarrhea, dizziness, shortness of breath, chest pain, bleeding, unexplained bruising, or if you have any other new/concerning symptoms, questions or concerns.      If you are having any concerning symptoms or wish to speak to a provider before your next infusion visit, please call your care coordinator or triage to notify them so we can adequately serve you.     If you need a refill on a narcotic prescription or other medication, please call before your infusion appointment.                January 2023 Sunday Monday Tuesday Wednesday Thursday Friday Saturday   1     2     3     4     5     6    NEW   9:05 AM   (80 min.)   Jovan Zuñiga MD   [facility]    LAB PERIPHERAL   3:30 PM   (15 min.)    [facility] DRAW   [facility]    CT CHEST/ABDOMEN/PELVIS W   5:00 PM   (20 min.)   [id number]   [facility] [place] 7       8     9    RETURN   4:15 PM   (30 min.)   Agustin Kyle MD   [facility] 10     11     12    RETURN  12:45 PM   (30 min.)   Agustin Kyle MD   [facility]     PHONE  12:50 PM   (20 min.)   Janine Cline   [facility]  Services 13     14       15     16     17     18    LAB PERIPHERAL   6:30 AM   (15 min.)    [facility] DRAW   [facility]    ONC INFUSION 2.5 HR (150 MIN)   7:00 AM   (150 min.)    ONC INFUSION NURSE   [facility] 19    ONC INFUSION 2.5 HR (150 MIN)   7:00 AM   (150 min.)    ONC INFUSION NURSE   [facility]    LAB   8:30 AM   (15 min.)   Trevor Trujillo MD   M  [facility] [place] 20     21       22     23     24     25     26     27     28       29     30     31 February 2023 Sunday Monday Tuesday Wednesday Thursday Friday Saturday                  1    LAB PERIPHERAL   8:30 AM   (15 min.)    [facility] DRAW   [facility]    RETURN   8:45 AM   (45 min.)   Rut Corrales PA-C   [facility]    ONC INFUSION 2.5 HR (150 MIN)  10:30 AM   (150 min.)   [facility] ONC INFUSION NURSE   [facility] 2    ONC INFUSION 2.5 HR (150 MIN)   3:00 PM   (150 min.)    ONC INFUSION NURSE   [facility] 3     4       5     6     7     8     9     10     11       12     13     14     15     16     17     18       19     20     21     22     23     24     25       26     27     28                                            Recent Results (from the past 24 hour(s))   Comprehensive metabolic panel    Collection Time: 01/18/23  6:46 AM   Result Value Ref Range    Sodium 133 (L) 136 - 145 mmol/L    Potassium 3.5 3.4 - 5.3 mmol/L    Chloride 102 98 - 107 mmol/L    Carbon Dioxide (CO2) 22 22 - 29 mmol/L    Anion Gap 9 7 - 15 mmol/L    Urea Nitrogen 6.8 (L) 8.0 - 23.0 mg/dL    Creatinine 0.52 (L) 0.67 - 1.17 mg/dL    Calcium 9.2 8.6 - 10.0 mg/dL    Glucose 126 (H) 70 - 99 mg/dL    Alkaline Phosphatase 178 (H) 40 - 129 U/L     (H) 10 - 50 U/L    ALT 20 10 - 50 U/L    Protein Total 7.5 6.4 - 8.3 g/dL    Albumin 3.2 (L) 3.5 - 5.2 g/dL    Bilirubin Total 0.8 <=1.2 mg/dL    GFR Estimate >90 >60 mL/min/1.73m2   Magnesium    Collection Time: 01/18/23  6:46 AM   Result Value Ref Range    Magnesium 1.7 1.7 - 2.3 mg/dL   CBC with platelets and differential    Collection Time: 01/18/23  6:46 AM   Result Value Ref Range    WBC Count 3.8 (L) 4.0 - 11.0 10e3/uL    RBC Count 4.10 (L) 4.40 - 5.90 10e6/uL    Hemoglobin 11.6 (L)  13.3 - 17.7 g/dL    Hematocrit 34.6 (L) 40.0 - 53.0 %    MCV 84 78 - 100 fL    MCH 28.3 26.5 - 33.0 pg    MCHC 33.5 31.5 - 36.5 g/dL    RDW 18.0 (H) 10.0 - 15.0 %    Platelet Count 218 150 - 450 10e3/uL    % Neutrophils 48 %    % Lymphocytes 27 %    % Monocytes 19 %    % Eosinophils 4 %    % Basophils 2 %    % Immature Granulocytes 0 %    NRBCs per 100 WBC 0 <1 /100    Absolute Neutrophils 1.8 1.6 - 8.3 10e3/uL    Absolute Lymphocytes 1.0 0.8 - 5.3 10e3/uL    Absolute Monocytes 0.7 0.0 - 1.3 10e3/uL    Absolute Eosinophils 0.2 0.0 - 0.7 10e3/uL    Absolute Basophils 0.1 0.0 - 0.2 10e3/uL    Absolute Immature Granulocytes 0.0 <=0.4 10e3/uL    Absolute NRBCs 0.0 10e3/uL

## 2023-01-18 NOTE — PROGRESS NOTES
Infusion Nursing Note:  Preeti Pascual presents today for Cycle 1, Day 1- Gemzar.    Patient seen by provider today: No   present during visit today: Not Applicable.    Note: Patient reports feeling intermittently nauseated since he woke up this morning. Stated he took his home Compazine prior to arrival to [facility]. Compazine helped for a while but nausea was getting worse again. Reports nausea is worse in the morning and has really decreased his appetite. He reports intermittent vomiting, sometimes vomit is mucous like. Generalized weakness and fatigue noted. Nausea did not improve after IV Zofran, administered IV ativan. While Ativan helped with nausea, patient was very drowsy and slow afterward. Very soft spoken. After Gemzar infusion completed patient reports feeling better.       Offered to fill patients Decadron today, he said he wanted to wait until the last day of chemo to get it refilled. IB sent to Nallely Smiley CNP to either refill or add to treatment plan.       Intravenous Access:  Peripheral IV placed in lab.    Treatment Conditions:  Lab Results   Component Value Date    HGB 11.6 (L) 01/18/2023    WBC 3.8 (L) 01/18/2023    ANEU 2.7 08/19/2021    ANEUTAUTO 1.8 01/18/2023     01/18/2023      Lab Results   Component Value Date     (L) 01/18/2023    POTASSIUM 3.5 01/18/2023    MAG 1.7 01/18/2023    CR 0.52 (L) 01/18/2023    STACEY 9.2 01/18/2023    BILITOTAL 0.8 01/18/2023    ALBUMIN 3.2 (L) 01/18/2023    ALT 20 01/18/2023     (H) 01/18/2023     Results reviewed, labs MET treatment parameters, ok to proceed with treatment.    Post Infusion Assessment:  Patient tolerated infusion without incident.  Blood return noted pre and post infusion.  Site patent and intact, free from redness, edema or discomfort.  No evidence of extravasations.  Access discontinued per protocol.     Discharge Plan:   Prescription refills given for Omeprazole.  Discharge instructions reviewed with:  Patient.  Patient and/or family verbalized understanding of discharge instructions and all questions answered.  Copy of AVS reviewed with patient and/or family.  Patient will return 1/19/23 for next appointment.  Patient discharged in stable condition accompanied by: self.  Departure Mode: Ambulatory.      Pamela Magaña RN

## 2023-01-19 NOTE — NURSING NOTE
"Chief Complaint   Patient presents with     RECHECK     Follow up with [facility]     /77   Pulse 81   Temp 98  F (36.7  C) (Oral)   Ht 1.651 m (5' 5\")   Wt 55.3 kg (122 lb)   BMI 20.30 kg/m    Sahara Best CMA on 1/19/2023 at 8:28 AM    "

## 2023-01-19 NOTE — PROGRESS NOTES
Infusion Nursing Note:  Preeti Pascual presents today for Cycle 1 Day 2 Oxaliplatin.    Patient seen by provider today: No   present during visit today: Not Applicable.    Note: Patient states he has had some hiccoughs overnight.  But otherwise reports no changes from yesterday.  He offers no other new concerns at this time.    Patient scheduled for a hepatology appointment at 0830.  Administered pre-meds.  Then saline locked patient's PIV so he could go to this appointment.  When patient returned, Oxaliplatin administered.    Intravenous Access:  Peripheral IV placed by vascular access.    Treatment Conditions:  Results reviewed from yesterday, labs MET treatment parameters, ok to proceed with treatment.    Post Infusion Assessment:  Patient tolerated infusion without incident.  Blood return noted pre and post infusion.  Site patent and intact, free from redness, edema or discomfort.  No evidence of extravasations.  Access discontinued per protocol.     Discharge Plan:   Patient declined prescription refills.  Discharge instructions reviewed with: Patient.  Patient and/or family verbalized understanding of discharge instructions and all questions answered.  AVS to patient via [email or web address].  Patient will return 2/1/23 for next appointment.   Patient discharged in stable condition accompanied by: self.  Departure Mode: Ambulatory.  Face to Face time: 0.      MALACHI NOLAN RN

## 2023-01-19 NOTE — LETTER
1/19/2023         RE: Preeti Pascual  [address]  [place] [address]        Dear Colleague,    Thank you for referring your patient, Preeti Pascual, to the [facility] [place]. Please see a copy of my visit note below.    HISTORY OF PRESENT ILLNESS:  I had the pleasure of seeing Preeti Pascual for followup in the Liver Clinic at the [facility] on 01/19/2023.  Mr. Pascual returns for followup of liver disease caused by chronic hepatitis B and complicated by hepatocellular carcinoma.  He was first diagnosed with liver disease 6 years ago and since that time has been through a myriad of therapies, and most recently is on more conventional chemotherapy.    He feels fairly well at this visit.  He does occasionally note some abdominal discomfort.  He denies any itching or skin rash.  He does complain of a moderate amount of fatigue.  He denies any increased abdominal girth or lower extremity edema.  He has not had any gastrointestinal bleeding or any overt signs of hepatic encephalopathy.    He denies any fevers or chills, cough or shortness of breath.  He does have some nausea on occasion, but denies any vomiting.  He denies any diarrhea or constipation.  He does report his appetite is diminished and his weight is down 12 pounds since he was last seen 3 months ago.    He did recently see Dr. Kyle for followup.    Current Outpatient Medications   Medication     acetaminophen (TYLENOL) 325 MG tablet     dexamethasone (DECADRON) 4 MG tablet     mirtazapine (REMERON) 15 MG tablet     morphine (MS CONTIN) 30 MG CR tablet     omeprazole (PRILOSEC) 20 MG DR capsule     ondansetron (ZOFRAN) 8 MG tablet     oxyCODONE (ROXICODONE) 5 MG tablet     prochlorperazine (COMPAZINE) 10 MG tablet     SENNA-docusate sodium (SENNA S) 8.6-50 MG tablet     No current facility-administered medications for this visit.     Facility-Administered Medications Ordered in Other  "Visits   Medication     oxaliplatin (ELOXATIN) 150 mg in D5W 580 mL infusion     /77   Pulse 81   Temp 98  F (36.7  C) (Oral)   Ht 1.651 m (5' 5\")   Wt 55.3 kg (122 lb)   BMI 20.30 kg/m      PHYSICAL EXAMINATION:    GENERAL:  He looks surprisingly well.  HEENT:  No scleral icterus.  He has mild temporal muscle wasting.  CHEST:  Clear.  ABDOMEN:  Some tenderness to palpation in both upper quadrants.  His liver is 10-11 cm in span just palpable at the right costal margin.  No spleen tip is palpable.  EXTREMITIES:  No edema.  SKIN:  No stigmata of chronic liver disease.  NEUROLOGIC:  No asterixis.    Recent Results (from the past 168 hour(s))   AFP tumor marker    Collection Time: 01/18/23  6:46 AM   Result Value Ref Range    AFP tumor marker 1,210.0 (H) <=8.3 ng/mL   Comprehensive metabolic panel    Collection Time: 01/18/23  6:46 AM   Result Value Ref Range    Sodium 133 (L) 136 - 145 mmol/L    Potassium 3.5 3.4 - 5.3 mmol/L    Chloride 102 98 - 107 mmol/L    Carbon Dioxide (CO2) 22 22 - 29 mmol/L    Anion Gap 9 7 - 15 mmol/L    Urea Nitrogen 6.8 (L) 8.0 - 23.0 mg/dL    Creatinine 0.52 (L) 0.67 - 1.17 mg/dL    Calcium 9.2 8.6 - 10.0 mg/dL    Glucose 126 (H) 70 - 99 mg/dL    Alkaline Phosphatase 178 (H) 40 - 129 U/L     (H) 10 - 50 U/L    ALT 20 10 - 50 U/L    Protein Total 7.5 6.4 - 8.3 g/dL    Albumin 3.2 (L) 3.5 - 5.2 g/dL    Bilirubin Total 0.8 <=1.2 mg/dL    GFR Estimate >90 >60 mL/min/1.73m2   Magnesium    Collection Time: 01/18/23  6:46 AM   Result Value Ref Range    Magnesium 1.7 1.7 - 2.3 mg/dL   CBC with platelets and differential    Collection Time: 01/18/23  6:46 AM   Result Value Ref Range    WBC Count 3.8 (L) 4.0 - 11.0 10e3/uL    RBC Count 4.10 (L) 4.40 - 5.90 10e6/uL    Hemoglobin 11.6 (L) 13.3 - 17.7 g/dL    Hematocrit 34.6 (L) 40.0 - 53.0 %    MCV 84 78 - 100 fL    MCH 28.3 26.5 - 33.0 pg    MCHC 33.5 31.5 - 36.5 g/dL    RDW 18.0 (H) 10.0 - 15.0 %    Platelet Count 218 150 - 450 " 10e3/uL    % Neutrophils 48 %    % Lymphocytes 27 %    % Monocytes 19 %    % Eosinophils 4 %    % Basophils 2 %    % Immature Granulocytes 0 %    NRBCs per 100 WBC 0 <1 /100    Absolute Neutrophils 1.8 1.6 - 8.3 10e3/uL    Absolute Lymphocytes 1.0 0.8 - 5.3 10e3/uL    Absolute Monocytes 0.7 0.0 - 1.3 10e3/uL    Absolute Eosinophils 0.2 0.0 - 0.7 10e3/uL    Absolute Basophils 0.1 0.0 - 0.2 10e3/uL    Absolute Immature Granulocytes 0.0 <=0.4 10e3/uL    Absolute NRBCs 0.0 10e3/uL      EXAMINATION: CT ABDOMEN WO & W CHEST PELVIS W CONTRAST, 1/6/2023  4:27 PM     CLINICAL HISTORY:  Hepatocellular carcinoma, assess response to treatment.     TECHNIQUE: Helical CT images from the lung bases through the symphysis pubis were obtained  without and with contrast at multiple time points using a protocol designed to detect and evaluate hepatocellular carcinoma. Contrast dose: Isovue 370 73cc and wasted 52cc     Comparison study: 10/31/2022     FINDINGS:     Lungs:  Increased size of right upper lobe solid pulmonary nodule measuring 3 x 5 mm, previously 3 x 3 mm, series 7 image 49 Increased size of right middle lobe solid pulmonary nodule measuring 5 x 6 mm, previously 4 x 5 mm, series 7 image 144.  Increased size of right lower lobe solid pulmonary nodule measuring 4 x 3 mm, previously 3 x 2 mm series 7 image 168. Unchanged right lower lobe calcified granuloma.     Mediastinum: Unchanged mediastinal pericardial lymphadenopathy measuring 16 mm series 6 image 180. Heart size within normal limits. No pericardial effusion.     Liver: Cirrhotic morphology. Increased irregular/ill-defined heterogeneously arterially enhancing hepatic segment 7/8/4A  mass measuring 12.2 x 6.3 x 6.7 cm, previously 10.7 x 6.5 cm. This lesion has central areas of hypodensity with peripheral calcification. There  are new arterially enhancing lesions within the liver for example there is a 11 mm lesion within segment 2 is seen on series 5 image 52,  peripherally located enhancing lesion within segment 4 measuring 11 mm.     Remainder of abdomen and pelvis: Increased peritoneal carcinomatosis for example series 5 image 146 there is a lesion adjacent to the liver measuring 1.5 x 1.9 cm previously 1.1 x 1.0 cm. Increased metastasis involving the sidewall of the ascending colon measuring 3.1 x 4.5 cm, previously 3.9 x 3.2 cm. Small volume ascites. Small amount of pelvic free fluid.     Adrenal glands: Unchanged left adrenal metastasis measuring 1.9 x 2.2 cm. Increased size of right adrenal metastasis measuring 2.6 x 1.6 cm (previously 2.2 x 1.7 cm).     Bones and soft tissues: Unchanged sclerotic focus in less femur consistent with bone island. No acute or aggressive osseous lesions.                                                                      IMPRESSION:    1. Progression of disease including increased size of primary hepatic lesion, new arterial enhancing hepatic lesions, increased size of peritoneal implants, increase adrenal metastasis, and increased size of pulmonary nodules.  2. Multiple peritoneal implants abutting/involving the colon without evidence of obstruction.  3. Cirrhosis and sequela of portal hypertension.    IMPRESSION:  Mr. 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  I certainly do concur with him continuing his current chemotherapeutic regimen.  He has had a very good response to chemotherapy, in terms of his alpha fetoprotein.  It is a bit surprising that his imaging, if anything, seems to show a bit of progression.    In terms of symptomatology, I will try some mirtazapine to see if it might increase his appetite and address his weight loss.  While he does have hepatitis B, his HBV DNA level has been undetectable for quite some time on no therapy.  I otherwise will not be making any other change to his medical  regimen.  I will see him back in the clinic again in 3 months.    I did spend total of 30 minutes (on the date of the encounter), including 20 minutes of face-to-face clinic time including counseling.  The rest of the time was spent in documentation and review of records.     Thank you very much for allowing me to participate in the care of this patient.  If you have any questions regarding recommendations, please do not hesitate to contact me.         Trevor Trujillo MD      Professor of Medicine  [employer]      Executive Medical Director, Solid Organ Transplant Program  [facility]      Again, thank you for allowing me to participate in the care of your patient.        Sincerely,        Trevor Trujillo MD

## 2023-01-19 NOTE — LETTER
Date:January 20, 2023      Patient was self referred, no letter generated. Do not send.        [facility] Health Information

## 2023-01-19 NOTE — PROGRESS NOTES
"HISTORY OF PRESENT ILLNESS:  I had the pleasure of seeing Preeti Pascual for followup in the Liver Clinic at the [facility] on 01/19/2023.  Mr. Pascual returns for followup of liver disease caused by chronic hepatitis B and complicated by hepatocellular carcinoma.  He was first diagnosed with liver disease 6 years ago and since that time has been through a myriad of therapies, and most recently is on more conventional chemotherapy.    He feels fairly well at this visit.  He does occasionally note some abdominal discomfort.  He denies any itching or skin rash.  He does complain of a moderate amount of fatigue.  He denies any increased abdominal girth or lower extremity edema.  He has not had any gastrointestinal bleeding or any overt signs of hepatic encephalopathy.    He denies any fevers or chills, cough or shortness of breath.  He does have some nausea on occasion, but denies any vomiting.  He denies any diarrhea or constipation.  He does report his appetite is diminished and his weight is down 12 pounds since he was last seen 3 months ago.    He did recently see Dr. Kyle for followup.    Current Outpatient Medications   Medication     acetaminophen (TYLENOL) 325 MG tablet     dexamethasone (DECADRON) 4 MG tablet     mirtazapine (REMERON) 15 MG tablet     morphine (MS CONTIN) 30 MG CR tablet     omeprazole (PRILOSEC) 20 MG DR capsule     ondansetron (ZOFRAN) 8 MG tablet     oxyCODONE (ROXICODONE) 5 MG tablet     prochlorperazine (COMPAZINE) 10 MG tablet     SENNA-docusate sodium (SENNA S) 8.6-50 MG tablet     No current facility-administered medications for this visit.     Facility-Administered Medications Ordered in Other Visits   Medication     oxaliplatin (ELOXATIN) 150 mg in D5W 580 mL infusion     /77   Pulse 81   Temp 98  F (36.7  C) (Oral)   Ht 1.651 m (5' 5\")   Wt 55.3 kg (122 lb)   BMI 20.30 kg/m      PHYSICAL EXAMINATION:    GENERAL:  He looks surprisingly " well.  HEENT:  No scleral icterus.  He has mild temporal muscle wasting.  CHEST:  Clear.  ABDOMEN:  Some tenderness to palpation in both upper quadrants.  His liver is 10-11 cm in span just palpable at the right costal margin.  No spleen tip is palpable.  EXTREMITIES:  No edema.  SKIN:  No stigmata of chronic liver disease.  NEUROLOGIC:  No asterixis.    Recent Results (from the past 168 hour(s))   AFP tumor marker    Collection Time: 01/18/23  6:46 AM   Result Value Ref Range    AFP tumor marker 1,210.0 (H) <=8.3 ng/mL   Comprehensive metabolic panel    Collection Time: 01/18/23  6:46 AM   Result Value Ref Range    Sodium 133 (L) 136 - 145 mmol/L    Potassium 3.5 3.4 - 5.3 mmol/L    Chloride 102 98 - 107 mmol/L    Carbon Dioxide (CO2) 22 22 - 29 mmol/L    Anion Gap 9 7 - 15 mmol/L    Urea Nitrogen 6.8 (L) 8.0 - 23.0 mg/dL    Creatinine 0.52 (L) 0.67 - 1.17 mg/dL    Calcium 9.2 8.6 - 10.0 mg/dL    Glucose 126 (H) 70 - 99 mg/dL    Alkaline Phosphatase 178 (H) 40 - 129 U/L     (H) 10 - 50 U/L    ALT 20 10 - 50 U/L    Protein Total 7.5 6.4 - 8.3 g/dL    Albumin 3.2 (L) 3.5 - 5.2 g/dL    Bilirubin Total 0.8 <=1.2 mg/dL    GFR Estimate >90 >60 mL/min/1.73m2   Magnesium    Collection Time: 01/18/23  6:46 AM   Result Value Ref Range    Magnesium 1.7 1.7 - 2.3 mg/dL   CBC with platelets and differential    Collection Time: 01/18/23  6:46 AM   Result Value Ref Range    WBC Count 3.8 (L) 4.0 - 11.0 10e3/uL    RBC Count 4.10 (L) 4.40 - 5.90 10e6/uL    Hemoglobin 11.6 (L) 13.3 - 17.7 g/dL    Hematocrit 34.6 (L) 40.0 - 53.0 %    MCV 84 78 - 100 fL    MCH 28.3 26.5 - 33.0 pg    MCHC 33.5 31.5 - 36.5 g/dL    RDW 18.0 (H) 10.0 - 15.0 %    Platelet Count 218 150 - 450 10e3/uL    % Neutrophils 48 %    % Lymphocytes 27 %    % Monocytes 19 %    % Eosinophils 4 %    % Basophils 2 %    % Immature Granulocytes 0 %    NRBCs per 100 WBC 0 <1 /100    Absolute Neutrophils 1.8 1.6 - 8.3 10e3/uL    Absolute Lymphocytes 1.0 0.8 - 5.3  10e3/uL    Absolute Monocytes 0.7 0.0 - 1.3 10e3/uL    Absolute Eosinophils 0.2 0.0 - 0.7 10e3/uL    Absolute Basophils 0.1 0.0 - 0.2 10e3/uL    Absolute Immature Granulocytes 0.0 <=0.4 10e3/uL    Absolute NRBCs 0.0 10e3/uL      EXAMINATION: CT ABDOMEN WO & W CHEST PELVIS W CONTRAST, 1/6/2023  4:27 PM     CLINICAL HISTORY:  Hepatocellular carcinoma, assess response to treatment.     TECHNIQUE: Helical CT images from the lung bases through the symphysis pubis were obtained  without and with contrast at multiple time points using a protocol designed to detect and evaluate hepatocellular carcinoma. Contrast dose: Isovue 370 73cc and wasted 52cc     Comparison study: 10/31/2022     FINDINGS:     Lungs:  Increased size of right upper lobe solid pulmonary nodule measuring 3 x 5 mm, previously 3 x 3 mm, series 7 image 49 Increased size of right middle lobe solid pulmonary nodule measuring 5 x 6 mm, previously 4 x 5 mm, series 7 image 144.  Increased size of right lower lobe solid pulmonary nodule measuring 4 x 3 mm, previously 3 x 2 mm series 7 image 168. Unchanged right lower lobe calcified granuloma.     Mediastinum: Unchanged mediastinal pericardial lymphadenopathy measuring 16 mm series 6 image 180. Heart size within normal limits. No pericardial effusion.     Liver: Cirrhotic morphology. Increased irregular/ill-defined heterogeneously arterially enhancing hepatic segment 7/8/4A  mass measuring 12.2 x 6.3 x 6.7 cm, previously 10.7 x 6.5 cm. This lesion has central areas of hypodensity with peripheral calcification. There  are new arterially enhancing lesions within the liver for example there is a 11 mm lesion within segment 2 is seen on series 5 image 52, peripherally located enhancing lesion within segment 4 measuring 11 mm.     Remainder of abdomen and pelvis: Increased peritoneal carcinomatosis for example series 5 image 146 there is a lesion adjacent to the liver measuring 1.5 x 1.9 cm previously 1.1 x 1.0 cm.  Increased metastasis involving the sidewall of the ascending colon measuring 3.1 x 4.5 cm, previously 3.9 x 3.2 cm. Small volume ascites. Small amount of pelvic free fluid.     Adrenal glands: Unchanged left adrenal metastasis measuring 1.9 x 2.2 cm. Increased size of right adrenal metastasis measuring 2.6 x 1.6 cm (previously 2.2 x 1.7 cm).     Bones and soft tissues: Unchanged sclerotic focus in less femur consistent with bone island. No acute or aggressive osseous lesions.                                                                      IMPRESSION:    1. Progression of disease including increased size of primary hepatic lesion, new arterial enhancing hepatic lesions, increased size of peritoneal implants, increase adrenal metastasis, and increased size of pulmonary nodules.  2. Multiple peritoneal implants abutting/involving the colon without evidence of obstruction.  3. Cirrhosis and sequela of portal hypertension.    IMPRESSION:  Mr. 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  I certainly do concur with him continuing his current chemotherapeutic regimen.  He has had a very good response to chemotherapy, in terms of his alpha fetoprotein.  It is a bit surprising that his imaging, if anything, seems to show a bit of progression.    In terms of symptomatology, I will try some mirtazapine to see if it might increase his appetite and address his weight loss.  While he does have hepatitis B, his HBV DNA level has been undetectable for quite some time on no therapy.  I otherwise will not be making any other change to his medical regimen.  I will see him back in the clinic again in 3 months.    I did spend total of 30 minutes (on the date of the encounter), including 20 minutes of face-to-face clinic time including counseling.  The rest of the time was spent in documentation and review of  records.     Thank you very much for allowing me to participate in the care of this patient.  If you have any questions regarding recommendations, please do not hesitate to contact me.         Trevor Trujillo MD      Professor of Medicine  [employer]      Executive Medical Director, Solid Organ Transplant Program  [facility]

## 2023-01-31 NOTE — NURSING NOTE
Chief Complaint   Patient presents with     Blood Draw     VPT blood draw by vascular ultrasound      Sendy Navarro RN

## 2023-01-31 NOTE — PROGRESS NOTES
Infusion Nursing Note:  Preeti Pascual presents today for cycle 2, day 1 gemzar.    Patient seen by provider today: Yes: Nallely Smiley NP met with patient in infusion   present during visit today: Not Applicable.    Note: Patient reported 4/10 pain in his abdomen today. No interventions needed for pain during today's visit.     1/31/2023 1040 TORB Nallely Smiley NP/Franca Haas RN  -ok to proceed with chemotherapy today  -patient to start on bactrim for cellulitis on right cheek  -patient to start low dose dexamethasone daily after he takes the post chemo dose (8mg) on day 3 and 4     Intravenous Access:  Peripheral IV placed by vascular access with ultrasound    Treatment Conditions:  Lab Results   Component Value Date    HGB 11.7 (L) 01/31/2023    WBC 4.9 01/31/2023    ANEU 2.7 08/19/2021    ANEUTAUTO 3.3 01/31/2023     01/31/2023      Lab Results   Component Value Date     (L) 01/31/2023    POTASSIUM 4.0 01/31/2023    MAG 1.7 01/18/2023    CR 0.49 (L) 01/31/2023    STACEY 9.4 01/31/2023    BILITOTAL 0.8 01/31/2023    ALBUMIN 3.4 (L) 01/31/2023    ALT 30 01/31/2023     (H) 01/31/2023     Results reviewed, labs MET treatment parameters, ok to proceed with treatment.    Post Infusion Assessment:  Patient tolerated infusion without incident.  Blood return noted pre and post infusion.  Site patent and intact, free from redness, edema or discomfort.  No evidence of extravasations.  Access discontinued per protocol.     Discharge Plan:   Prescription refills given for dexamethasone and bactrim.  Patient instructed to bring all of his medications to infusion tomorrow as requested by Conchita Paredes NP so they can be reviewed and make sure patient is taking appropriate doses.   Discharge instructions reviewed with: Patient.  Patient and/or family verbalized understanding of discharge instructions and all questions answered.  Copy of AVS reviewed with patient and/or family.  Patient will return  tomorrow for next appointment.  Patient discharged in stable condition accompanied by: self.  Departure Mode: Ambulatory.      Franca Haas RN

## 2023-01-31 NOTE — LETTER
Date:February 3, 2023      Provider requested that no letter be sent. Do not send.       [facility]

## 2023-01-31 NOTE — LETTER
1/31/2023         RE: Preeti Pascual  [address]  [place] [facility] [address]        Dear Colleague,    Thank you for referring your patient, Preeti Pascual, to the [facility]. Please see a copy of my visit note below.    Reason for Visit: f/u hepatocellular carcinoma    Oncology HPI: Preeti Pascual is a 56 year old male with hepatocellular carcinoma. 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  He had initial liver directed therapy with radial his embolization and declined liver transplant.  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  Initial therapy with sorafenib was poorly tolerated and could only get to a dose of 200 mg/day on which he progressed.  He then had a long period of control with single agent nivolumab and upon progression without we added ipilimumab without achieving control and with the development of severe autoimmune hepatitis (without evidence of reactivation of his hepatitis B.)  He had a period off treatment as he recovered from the hepatitis and then was started on Ramucirumab with stable disease and then switched to lenvatinib so that he could travel back to hospitals to visit family.  He required dose reductions to tolerate the lenvatinib and had a short period of control on that but as of 2 months ago had progressed.  He then resumed Ramucirumab.  CT CAP on 10/31/22 showed disease progression.     He started gemzar/oxaliplatin on 12/1/22    Interval history:  He is here today unaccompanied for consideration of C2D1 gemcitabine.    -He reports he is having ongoing abdominal pain. He is using Morphine long-acting and oxycodone as needed but he is not sure of the dosing or if he picked up his new Rx sent in earlier this month.  -He continues to report nausea, controlled with Zofran and compazine.  -He is using omeprazole daily.  -Yesterday his wife  pointed out some redness and swelling to his right cheek.  He denies facial, dental or ear pain.  He denies fever, chills or signs of systemic illness.   -Denies constipation or diarrhea.  Last BM yesterday.  He does use laxatives as needed for opoid-induced constipation.  -His appetite is lower and he has lost 8 lbs. He does feel best on the days he takes dexamethasone.   -Trial of mirtazapine prescribed by Dr Trujillo caused him to feel like he was not able to speak the next day.  He has stopped this.    Current Outpatient Medications   Medication Sig Dispense Refill     acetaminophen (TYLENOL) 325 MG tablet Take 650 mg by mouth every 8 hours as needed for mild pain Do not exceed 2 grams per day. (Patient not taking: Reported on 1/31/2023)       dexamethasone (DECADRON) 4 MG tablet Take 2 tablets (8 mg) by mouth daily Take for 2 days, starting the day after chemo. Take with food. 4 tablet 6     morphine (MS CONTIN) 30 MG CR tablet Take 1 tablet (30 mg) by mouth every 12 hours maximum 2 tablet(s) per day 60 tablet 0     omeprazole (PRILOSEC) 20 MG DR capsule Take 1 capsule (20 mg) by mouth daily 30 capsule 1     ondansetron (ZOFRAN) 8 MG tablet Take 1 tablet (8 mg) by mouth every 8 hours as needed for nausea (vomiting) 30 tablet 2     oxyCODONE (ROXICODONE) 5 MG tablet Take 1-2 tablets (5-10 mg) by mouth every 4 hours as needed for moderate to severe pain 100 tablet 0     prochlorperazine (COMPAZINE) 10 MG tablet Take 1 tablet (10 mg) by mouth every 6 hours as needed for nausea or vomiting 30 tablet 2     SENNA-docusate sodium (SENNA S) 8.6-50 MG tablet Take 1-2 tablets by mouth 2 times daily as needed 60 tablet 1          Allergies   Allergen Reactions     Pepcid [Famotidine] Headache     Physical Exam:  General: The patient is a pleasant male in no acute distress.  There were no vitals taken for this visit.  Wt Readings from Last 10 Encounters:   01/31/23 51.8 kg (114 lb 4.8 oz)   01/19/23 55.3 kg (122 lb)   01/18/23  55.3 kg (122 lb)   01/12/23 54.7 kg (120 lb 11.2 oz)   01/09/23 54.5 kg (120 lb 3.2 oz)   12/28/22 54.3 kg (119 lb 9.6 oz)   12/14/22 56 kg (123 lb 6.4 oz)   12/06/22 58.4 kg (128 lb 12.8 oz)   11/30/22 59.7 kg (131 lb 11.2 oz)   11/11/22 61 kg (134 lb 6.4 oz)   HEENT: EOMI, PERRL. Sclerae are anicteric. Oral mucosa is pink and moist with no lesions or thrush. No redness or swelling of gums. He does have a small fissure on the right corner of his mouth. Redness and swelling noted on the right side of his face, see photo below.    Lymph: Neck is supple with no lymphadenopathy in the cervical or supraclavicular areas.   Heart: Regular rate and rhythm.   Lungs: Clear to auscultation bilaterally.   Abdomen: Bowel sounds present, soft, nontender to gentle palpation today.  Extremities: No lower extremity edema noted bilaterally.   Neuro: Cranial nerves II through XII are grossly intact.   Skin: No rashes, petechiae, or bruising noted on exposed skin. See above and photo below re: right facial redness and mild swelling.         Labs:   Most Recent 3 CBC's:Recent Labs   Lab Test 01/31/23  0934 01/18/23  0646 12/28/22  0747   WBC 4.9 3.8* 4.9   HGB 11.7* 11.6* 12.2*   MCV 86 84 83    218 152   ANEUTAUTO 3.3 1.8 2.7     Most Recent 3 BMP's:  Recent Labs   Lab Test 01/31/23  0934 01/18/23  0646 01/06/23  1536   * 133* 132*   POTASSIUM 4.0 3.5 3.6   CHLORIDE 101 102 100   CO2 23 22 21*   BUN 5.7* 6.8* 6.8*   CR 0.49* 0.52* 0.53*   ANIONGAP 10 9 11   STACEY 9.4 9.2 9.2   * 126* 109*   PROTTOTAL 7.9 7.5 7.8   ALBUMIN 3.4* 3.2* 3.5    Most Recent 3 LFT's:  Recent Labs   Lab Test 01/31/23  0934 01/18/23  0646 01/06/23  1536   * 103* 142*   ALT 30 20 56*   ALKPHOS 201* 178* 226*   BILITOTAL 0.8 0.8 0.8     I reviewed the above labs today. AFP pending.    Imaging:   No imaging reviewed today.    Impression/plan:   Metastatic HCC, progression noted on recent scans but AFP decreasing.  Per prior notes, will  recheck AFP every 2 weeks and monitor clinical status on D1 and of each cycle and decide if further chemotherapy is warranted vs traveling back home to [facility] before he becomes too ill to do so.  Today, he will proceed with D1 gemcitabine. Return to clinic tomorrow. AFP pending.     Abdominal pain s/t malignancy:  -Call to local pharmacy and new Rx for MS Contin was picked up on 1/11.   -Outlined medication schedule in after visit summary and asked him to bring all his meds with him tomorrow for review.   -Continue oxycodone 5 mg q 4 hours as needed.    Constipation, opoid-induced:  -Continue scheduled Senna  -Last BM yesterday, no current complaints.    Anorexia and nausea s/t malignancy and chemo:   -Nausea better overall with scheduled anti-emetics.  -Discussed low dose dex today on the days after he completes his dexamethasone post-chemo and prescription sent for 2mg/day.    Cellulitis, right face:  -Afebrile with no s/s systemic illness.  -Bactrim x 5 days  -Instructed to call with any worsening, development of pain, fever.        MITCH Frye    The patient was seen in conjunction with MITCH Frye who served as a scribe for today's visit. I have reviewed the note and agree with the above findings and plan. TW                Again, thank you for allowing me to participate in the care of your patient.        Sincerely,        MITCH Jacosb CNP

## 2023-01-31 NOTE — PATIENT INSTRUCTIONS
[facility] Triage and after hours / weekends / holidays:  [phone]    Please call the triage or after hours line if you experience a temperature greater than or equal to 100.4, shaking chills, have uncontrolled nausea, vomiting and/or diarrhea, dizziness, shortness of breath, chest pain, bleeding, unexplained bruising, or if you have any other new/concerning symptoms, questions or concerns.      If you are having any concerning symptoms or wish to speak to a provider before your next infusion visit, please call your care coordinator or triage to notify them so we can adequately serve you.     If you need a refill on a narcotic prescription or other medication, please call before your infusion appointment.           January 2023 Sunday Monday Tuesday Wednesday Thursday Friday Saturday   1     2     3     4     5     6    NEW   9:05 AM   (80 min.)   Jovan Zuñiga MD   [facility]    LAB PERIPHERAL   3:30 PM   (15 min.)    [facility] DRAW   [facility]    CT CHEST/ABDOMEN/PELVIS W   5:00 PM   (20 min.)   [id number]   [facility] [place] 7       8     9    RETURN   4:15 PM   (30 min.)   Agustin Kyle MD   [facility] 10     11     12    RETURN  12:45 PM   (30 min.)   Agustin Kyle MD   [facility]     PHONE  12:50 PM   (20 min.)   Janine Cline   [facility]  Services 13     14       15     16     17     18    LAB PERIPHERAL   6:30 AM   (15 min.)    [facility] DRAW   [facility]    ONC INFUSION 2.5 HR (150 MIN)   7:00 AM   (150 min.)    ONC INFUSION NURSE   [facility] 19    ONC INFUSION 2.5 HR (150 MIN)   7:00 AM   (150 min.)    ONC INFUSION NURSE   [facility]    RETURN LIVER   8:15 AM   (15 min.)   Trevor Trujillo MD   M  [facility] [place] 20     21       22     23     24     25     26     27     28       29     30     31    LAB PERIPHERAL   9:00 AM   (15 min.)   [facility]   [facility]    RETURN CCSL   9:15 AM   (45 min.)   Nallely Smiley APRN CNP   [facility]    ONC INFUSION 2.5 HR (150 MIN)   9:30 AM   (150 min.)   [facility] ON[facility] INFUSION NURSE   [facility]                                   February 2023 Sunday Monday Tuesday Wednesday Thursday Friday Saturday                  1    ONC INFUSION 2.5 HR (150 MIN)   9:30 AM   (150 min.)   [facility] ONC INFUSION NURSE   [facility] 2     3     4       5     6     7     8     9     10     11       12     13     14     15     16    LAB PERIPHERAL   8:00 AM   (15 min.)   [facility]   [facility]    RETURN ACTIVE TREATMENT   8:15 AM   (45 min.)   Rut Corrales PA-C   [facility]    ONC INFUSION 2.5 HR (150 MIN)   9:00 AM   (150 min.)   [facility] ONC INFUSION NURSE   [facility] 17    ONC INFUSION 2.5 HR (150 MIN)   8:00 AM   (150 min.)   [facility] ONC INFUSION NURSE   [facility] 18       19     20     21     22     23     24     25       26     27     28                                            Recent Results (from the past 24 hour(s))   Comprehensive metabolic panel    Collection Time: 01/31/23  9:34 AM   Result Value Ref Range    Sodium 134 (L) 136 - 145 mmol/L    Potassium 4.0 3.4 - 5.3 mmol/L    Chloride 101 98 - 107 mmol/L    Carbon Dioxide (CO2) 23 22 - 29 mmol/L    Anion Gap 10 7 - 15 mmol/L    Urea Nitrogen 5.7 (L) 8.0 - 23.0 mg/dL    Creatinine 0.49 (L) 0.67 - 1.17 mg/dL    Calcium 9.4 8.6 - 10.0 mg/dL    Glucose 124 (H) 70 - 99 mg/dL    Alkaline Phosphatase 201 (H) 40 - 129 U/L     (H) 10 - 50 U/L    ALT 30 10 -  50 U/L    Protein Total 7.9 6.4 - 8.3 g/dL    Albumin 3.4 (L) 3.5 - 5.2 g/dL    Bilirubin Total 0.8 <=1.2 mg/dL    GFR Estimate >90 >60 mL/min/1.73m2   CBC with platelets and differential    Collection Time: 01/31/23  9:34 AM   Result Value Ref Range    WBC Count 4.9 4.0 - 11.0 10e3/uL    RBC Count 4.08 (L) 4.40 - 5.90 10e6/uL    Hemoglobin 11.7 (L) 13.3 - 17.7 g/dL    Hematocrit 35.1 (L) 40.0 - 53.0 %    MCV 86 78 - 100 fL    MCH 28.7 26.5 - 33.0 pg    MCHC 33.3 31.5 - 36.5 g/dL    RDW 17.0 (H) 10.0 - 15.0 %    Platelet Count 169 150 - 450 10e3/uL    % Neutrophils 67 %    % Lymphocytes 13 %    % Monocytes 17 %    % Eosinophils 2 %    % Basophils 1 %    % Immature Granulocytes 0 %    NRBCs per 100 WBC 0 <1 /100    Absolute Neutrophils 3.3 1.6 - 8.3 10e3/uL    Absolute Lymphocytes 0.6 (L) 0.8 - 5.3 10e3/uL    Absolute Monocytes 0.8 0.0 - 1.3 10e3/uL    Absolute Eosinophils 0.1 0.0 - 0.7 10e3/uL    Absolute Basophils 0.0 0.0 - 0.2 10e3/uL    Absolute Immature Granulocytes 0.0 <=0.4 10e3/uL    Absolute NRBCs 0.0 10e3/uL

## 2023-01-31 NOTE — LETTER
January 31, 2023    Preeti Pascual  [address]  [place] [address]      To Whom it May Concern,    Preeti Pascual is a patient currently under our care.  Please excuse him from work   Through 2/20/2023 due to a serious medical illness.    Sincerely,    Conchita Paredes, APRN

## 2023-01-31 NOTE — PATIENT INSTRUCTIONS
Please bring your medications with you to your visit tomorrow.    Morphine CR 30mg tablets (long-acting medications): take 1 tab every 12 hours. These were picked up at your local pharmacy on 1/11/2023 so you should have this dose at home.  Please check your medication bottle.    Omeprazole: Please continue this daily.  This is the anti-acid medication.  Zofran and compazine: Please continue as you have been taking them for prevention of nausea.    Added today:  Bactrim (antibiotic): Please start taking 1 tablet twice a day (every 12 hours) for 5 days for the infection (the skin on your face.  Please CALL if you feel that this is area is getting more red or painful or if you develop a fever or shaking chills.  Dexamethasone 2 mg tablets: You take 8 mg already in the three days after chemotherapy.  Once you have finished those three days, you can start 2mg per day every day with breakfast - this might help increase your appetite and help you feel a bit better overall.

## 2023-01-31 NOTE — PROGRESS NOTES
Reason for Visit: f/u hepatocellular carcinoma    Oncology HPI: Preeti Pascual is a 56 year old male with hepatocellular carcinoma. 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  He had initial liver directed therapy with radial his embolization and declined liver transplant.  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  Initial therapy with sorafenib was poorly tolerated and could only get to a dose of 200 mg/day on which he progressed.  He then had a long period of control with single agent nivolumab and upon progression without we added ipilimumab without achieving control and with the development of severe autoimmune hepatitis (without evidence of reactivation of his hepatitis B.)  He had a period off treatment as he recovered from the hepatitis and then was started on Ramucirumab with stable disease and then switched to lenvatinib so that he could travel back to [facility] [place] to visit family.  He required dose reductions to tolerate the lenvatinib and had a short period of control on that but as of 2 months ago had progressed.  He then resumed Ramucirumab.  CT CAP on 10/31/22 showed disease progression.     He started gemzar/oxaliplatin on 12/1/22    Interval history:  He is here today unaccompanied for consideration of C2D1 gemcitabine.    -He reports he is having ongoing abdominal pain. He is using Morphine long-acting and oxycodone as needed but he is not sure of the dosing or if he picked up his new Rx sent in earlier this month.  -He continues to report nausea, controlled with Zofran and compazine.  -He is using omeprazole daily.  -Yesterday his wife pointed out some redness and swelling to his right cheek.  He denies facial, dental or ear pain.  He denies fever, chills or signs of systemic illness.   -Denies constipation or diarrhea.  Last BM yesterday.  He does use laxatives as needed for opoid-induced  constipation.  -His appetite is lower and he has lost 8 lbs. He does feel best on the days he takes dexamethasone.   -Trial of mirtazapine prescribed by Dr Trujillo caused him to feel like he was not able to speak the next day.  He has stopped this.    Current Outpatient Medications   Medication Sig Dispense Refill     acetaminophen (TYLENOL) 325 MG tablet Take 650 mg by mouth every 8 hours as needed for mild pain Do not exceed 2 grams per day. (Patient not taking: Reported on 1/31/2023)       dexamethasone (DECADRON) 4 MG tablet Take 2 tablets (8 mg) by mouth daily Take for 2 days, starting the day after chemo. Take with food. 4 tablet 6     morphine (MS CONTIN) 30 MG CR tablet Take 1 tablet (30 mg) by mouth every 12 hours maximum 2 tablet(s) per day 60 tablet 0     omeprazole (PRILOSEC) 20 MG DR capsule Take 1 capsule (20 mg) by mouth daily 30 capsule 1     ondansetron (ZOFRAN) 8 MG tablet Take 1 tablet (8 mg) by mouth every 8 hours as needed for nausea (vomiting) 30 tablet 2     oxyCODONE (ROXICODONE) 5 MG tablet Take 1-2 tablets (5-10 mg) by mouth every 4 hours as needed for moderate to severe pain 100 tablet 0     prochlorperazine (COMPAZINE) 10 MG tablet Take 1 tablet (10 mg) by mouth every 6 hours as needed for nausea or vomiting 30 tablet 2     SENNA-docusate sodium (SENNA S) 8.6-50 MG tablet Take 1-2 tablets by mouth 2 times daily as needed 60 tablet 1          Allergies   Allergen Reactions     Pepcid [Famotidine] Headache     Physical Exam:  General: The patient is a pleasant male in no acute distress.  There were no vitals taken for this visit.  Wt Readings from Last 10 Encounters:   01/31/23 51.8 kg (114 lb 4.8 oz)   01/19/23 55.3 kg (122 lb)   01/18/23 55.3 kg (122 lb)   01/12/23 54.7 kg (120 lb 11.2 oz)   01/09/23 54.5 kg (120 lb 3.2 oz)   12/28/22 54.3 kg (119 lb 9.6 oz)   12/14/22 56 kg (123 lb 6.4 oz)   12/06/22 58.4 kg (128 lb 12.8 oz)   11/30/22 59.7 kg (131 lb 11.2 oz)   11/11/22 61 kg (134 lb 6.4  oz)   HEENT: EOMI, PERRL. Sclerae are anicteric. Oral mucosa is pink and moist with no lesions or thrush. No redness or swelling of gums. He does have a small fissure on the right corner of his mouth. Redness and swelling noted on the right side of his face, see photo below.    Lymph: Neck is supple with no lymphadenopathy in the cervical or supraclavicular areas.   Heart: Regular rate and rhythm.   Lungs: Clear to auscultation bilaterally.   Abdomen: Bowel sounds present, soft, nontender to gentle palpation today.  Extremities: No lower extremity edema noted bilaterally.   Neuro: Cranial nerves II through XII are grossly intact.   Skin: No rashes, petechiae, or bruising noted on exposed skin. See above and photo below re: right facial redness and mild swelling.         Labs:   Most Recent 3 CBC's:Recent Labs   Lab Test 01/31/23  0934 01/18/23  0646 12/28/22  0747   WBC 4.9 3.8* 4.9   HGB 11.7* 11.6* 12.2*   MCV 86 84 83    218 152   ANEUTAUTO 3.3 1.8 2.7     Most Recent 3 BMP's:  Recent Labs   Lab Test 01/31/23  0934 01/18/23  0646 01/06/23  1536   * 133* 132*   POTASSIUM 4.0 3.5 3.6   CHLORIDE 101 102 100   CO2 23 22 21*   BUN 5.7* 6.8* 6.8*   CR 0.49* 0.52* 0.53*   ANIONGAP 10 9 11   STACEY 9.4 9.2 9.2   * 126* 109*   PROTTOTAL 7.9 7.5 7.8   ALBUMIN 3.4* 3.2* 3.5    Most Recent 3 LFT's:  Recent Labs   Lab Test 01/31/23  0934 01/18/23  0646 01/06/23  1536   * 103* 142*   ALT 30 20 56*   ALKPHOS 201* 178* 226*   BILITOTAL 0.8 0.8 0.8     I reviewed the above labs today. AFP pending.    Imaging:   No imaging reviewed today.    Impression/plan:   Metastatic HCC, progression noted on recent scans but AFP decreasing.  Per prior notes, will recheck AFP every 2 weeks and monitor clinical status on D1 and of each cycle and decide if further chemotherapy is warranted vs traveling back home to [facility] before he becomes too ill to do so.  Today, he will proceed with D1 gemcitabine. Return to clinic  tomorrow. AFP pending.     Abdominal pain s/t malignancy:  -Call to local pharmacy and new Rx for MS Contin was picked up on 1/11.   -Outlined medication schedule in after visit summary and asked him to bring all his meds with him tomorrow for review.   -Continue oxycodone 5 mg q 4 hours as needed.    Constipation, opoid-induced:  -Continue scheduled Senna  -Last BM yesterday, no current complaints.    Anorexia and nausea s/t malignancy and chemo:   -Nausea better overall with scheduled anti-emetics.  -Discussed low dose dex today on the days after he completes his dexamethasone post-chemo and prescription sent for 2mg/day.    Cellulitis, right face:  -Afebrile with no s/s systemic illness.  -Bactrim x 5 days  -Instructed to call with any worsening, development of pain, fever.        MITCH Frye    The patient was seen in conjunction with MITCH Frye who served as a scribe for today's visit. I have reviewed the note and agree with the above findings and plan. TW

## 2023-02-01 NOTE — PROGRESS NOTES
Pt calls and describes some discomfort encountered during infusion today. Had wonderful things to say abou the infusion staff. Experiencing some numbness in his R hand/wrist where he used a hot pack in infusion. No swelling, discoloration, or temperature differential. Pt is afebrile with no other Sx. Noted he should continue to use hot pack/compress, discussed Sx of complications or infection and asked Pt to call back if any occur this evening. Informed Pt of a visit with Dr Kyle tomorrow @ 9 am. Pt verbalized understanding and appreciation.

## 2023-02-01 NOTE — PROGRESS NOTES
Infusion Nursing Note:  Preeti Pascual presents today for Cycle 2 Day 2 Oxaliplatin.    Patient seen by provider today: No; Nallely Smiley CNP 1/31   present during visit today: Not Applicable.    Note: Preeti comes into the clinic today doing well overall and has no concerns to offer following his provider visit yesterday. He has no pain and denies s/s of infection. No changes to health since yesterday's infusion appointment.     Patient was instructed to bring all home medications into infusion today with him. He had his medications set out at home and forgot to bring them with so writer was unable to go through home medication list with patient today.    Intravenous Access:  Peripheral IV placed by vascular access.    Treatment Conditions:  Results reviewed from 1/31, labs MET treatment parameters, ok to proceed with treatment.    Post Infusion Assessment:  Patient tolerated infusion without incident.  Blood return noted pre and post infusion.  Site patent and intact, free from redness, edema or discomfort.  No evidence of extravasations.  Access discontinued per protocol.     Discharge Plan:   Patient declined prescription refills.  Discharge instructions reviewed with: Patient.  Patient and/or family verbalized understanding of discharge instructions and all questions answered.  AVS printed. Patient will return 2/16 for next appointment.   Patient discharged in stable condition accompanied by: self.  Departure Mode: Ambulatory.      Kierra Sifuentes RN

## 2023-02-01 NOTE — PATIENT INSTRUCTIONS
[facility] Triage and after hours / weekends / holidays:  [phone]    Please call the triage or after hours line if you experience a temperature greater than or equal to 100.4, shaking chills, have uncontrolled nausea, vomiting and/or diarrhea, dizziness, shortness of breath, chest pain, bleeding, unexplained bruising, or if you have any other new/concerning symptoms, questions or concerns.      If you are having any concerning symptoms or wish to speak to a provider before your next infusion visit, please call your care coordinator or triage to notify them so we can adequately serve you.     If you need a refill on a narcotic prescription or other medication, please call before your infusion appointment.     Labs from 1/31:    Latest Reference Range & Units 01/31/23 09:34   Sodium 136 - 145 mmol/L 134 (L)   Potassium 3.4 - 5.3 mmol/L 4.0   Chloride 98 - 107 mmol/L 101   Carbon Dioxide (CO2) 22 - 29 mmol/L 23   Urea Nitrogen 8.0 - 23.0 mg/dL 5.7 (L)   Creatinine 0.67 - 1.17 mg/dL 0.49 (L)   GFR Estimate >60 mL/min/1.73m2 >90   Calcium 8.6 - 10.0 mg/dL 9.4   Anion Gap 7 - 15 mmol/L 10   Albumin 3.5 - 5.2 g/dL 3.4 (L)   Protein Total 6.4 - 8.3 g/dL 7.9   Alkaline Phosphatase 40 - 129 U/L 201 (H)   ALT 10 - 50 U/L 30   AST 10 - 50 U/L 109 (H)   AFP tumor marker <=8.3 ng/mL 27,813.0 (H)   Bilirubin Total <=1.2 mg/dL 0.8   Glucose 70 - 99 mg/dL 124 (H)   WBC 4.0 - 11.0 10e3/uL 4.9   Hemoglobin 13.3 - 17.7 g/dL 11.7 (L)   Hematocrit 40.0 - 53.0 % 35.1 (L)   Platelet Count 150 - 450 10e3/uL 169   RBC Count 4.40 - 5.90 10e6/uL 4.08 (L)   MCV 78 - 100 fL 86   MCH 26.5 - 33.0 pg 28.7   MCHC 31.5 - 36.5 g/dL 33.3   RDW 10.0 - 15.0 % 17.0 (H)   % Neutrophils % 67   % Lymphocytes % 13   % Monocytes % 17   % Eosinophils % 2   % Basophils % 1   Absolute Basophils 0.0 - 0.2 10e3/uL 0.0   Absolute Eosinophils 0.0 - 0.7 10e3/uL 0.1   Absolute Immature Granulocytes <=0.4 10e3/uL 0.0   Absolute Lymphocytes 0.8 - 5.3 10e3/uL 0.6 (L)    Absolute Monocytes 0.0 - 1.3 10e3/uL 0.8   % Immature Granulocytes % 0   Absolute Neutrophils 1.6 - 8.3 10e3/uL 3.3   Absolute NRBCs 10e3/uL 0.0   NRBCs per 100 WBC <1 /100 0   (L): Data is abnormally low  (H): Data is abnormally high    Added 1/31:  Bactrim (antibiotic): Please start taking 1 tablet twice a day (every 12 hours) for 5 days for the infection (the skin on your face.  Please CALL if you feel that this is area is getting more red or painful or if you develop a fever or shaking chills.  Dexamethasone (steroid) 2 mg tablets: You take 8 mg (2 tablets) already in the three days after chemotherapy.  Once you have finished those three days, you can start 2mg (1 tablet) per day every day with breakfast - this might help increase your appetite and help you feel a bit better overall.

## 2023-02-02 NOTE — LETTER
2/2/2023         RE: Preeti Pascual  [address]  [facility] [address]        Dear Colleague,    Thank you for referring your patient, Preeti Pascual, to the [facility]. Please see a copy of my visit note below.    Preeti is a 57 year old who is being evaluated via a billable video visit.      Patient stated he is in the state of [place] for the visit today.    How would you like to obtain your AVS? MyChart  If the video visit is dropped, the invitation should be resent by: Text to cell phone: [phone]  Will anyone else be joining your video visit? Queenie Urena, Virtual Visit Facilitator      Video-Visit Details    Type of service:  Video Visit   Video Start Time: 9:00  Video End Time:9:30     Originating Location (pt. Location): Home  Distant Location (provider location):  Off-site  Platform used for Video Visit: Doximity     AFP way up again  He doesn't believe the finding  Will repeat next week with CT     I am seeing Preeti Pascual today in follow-up of metastatic hepatocellular carcinoma.    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  He has had extensive prior therapy and is currently receiving gemcitabine and oxaliplatin.  In our last response evaluation about a month ago he had an increase in his tumor radiographically it was unclear if that had occurred in the interval while we were working to get insurance approval for his treatment and his alpha-fetoprotein level had come down markedly with treatment.  His overall clinical condition had declined with further treatment but he was very eager to continue.  I encouraged him to make a decision to discontinue treatment at this goal is to be able to go back to [facility] before he dies and I am concerned with the pace of his disease his overall condition could deteriorate quickly if we wait too long and make his travel plans and  feasible.  His repeat alpha-fetoprotein is now risen back again markedly but he wished to discuss the plan again as he does not feel ready yet to discontinue therapy.  He continues to have upper abdominal pain and does not develop any new toxicities from his treatment.    Assessment/plan: Progressive metastatic hepatocellular carcinoma and another long discussion with the patient today going over our prior rationales for the plan.  He is understandably a bit skeptical and appropriately so with the rather large and rapid swings he has had up-and-down with his alpha-fetoprotein.  After much discussion we decided we would repeat his labs and a CT next week (the CT will then be a month out from the last scan.)  I expect that we will confirm that his cancer is clearly getting worse and we will continue our discussions to try and help make an appropriate plan moving forward.    Total time by me today spent on the above Long discussion, ordering and documentation was greater than 40 minutes.      Again, thank you for allowing me to participate in the care of your patient.        Sincerely,        Agustin Kyle MD

## 2023-02-02 NOTE — LETTER
Date:February 3, 2023      Patient was self referred, no letter generated. Do not send.        [facility] Health Information

## 2023-02-02 NOTE — PROGRESS NOTES
Preeti is a 57 year old who is being evaluated via a billable video visit.      Patient stated he is in the state of [place] for the visit today.    How would you like to obtain your AVS? Amandahart  If the video visit is dropped, the invitation should be resent by: Text to cell phone: [phone]  Will anyone else be joining your video visit? Queenie Urena, Virtual Visit Facilitator      Video-Visit Details    Type of service:  Video Visit   Video Start Time: 9:00  Video End Time:9:30     Originating Location (pt. Location): Home  Distant Location (provider location):  Off-site  Platform used for Video Visit: Doximity     AFP way up again  He doesn't believe the finding  Will repeat next week with CT     I am seeing Preeti Pascual today in follow-up of metastatic hepatocellular carcinoma.    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  He has had extensive prior therapy and is currently receiving gemcitabine and oxaliplatin.  In our last response evaluation about a month ago he had an increase in his tumor radiographically it was unclear if that had occurred in the interval while we were working to get insurance approval for his treatment and his alpha-fetoprotein level had come down markedly with treatment.  His overall clinical condition had declined with further treatment but he was very eager to continue.  I encouraged him to make a decision to discontinue treatment at this goal is to be able to go back to [facility] before he dies and I am concerned with the pace of his disease his overall condition could deteriorate quickly if we wait too long and make his travel plans and feasible.  His repeat alpha-fetoprotein is now risen back again markedly but he wished to discuss the plan again as he does not feel ready yet to discontinue therapy.  He continues to have upper abdominal pain and does not develop any new toxicities from his  treatment.    Assessment/plan: Progressive metastatic hepatocellular carcinoma and another long discussion with the patient today going over our prior rationales for the plan.  He is understandably a bit skeptical and appropriately so with the rather large and rapid swings he has had up-and-down with his alpha-fetoprotein.  After much discussion we decided we would repeat his labs and a CT next week (the CT will then be a month out from the last scan.)  I expect that we will confirm that his cancer is clearly getting worse and we will continue our discussions to try and help make an appropriate plan moving forward.    Total time by me today spent on the above Long discussion, ordering and documentation was greater than 40 minutes.

## 2023-02-08 NOTE — NURSING NOTE
Chief Complaint   Patient presents with     Blood Draw     Labs drawn with piv start.  VS taken.     Labs drawn with PIV start.  Pt tolerated well.  VS taken and pt checked in for next appt.    Maricruz Feldman RN

## 2023-02-09 NOTE — PROGRESS NOTES
Video-Visit Details    Type of service:  Video Visit    Video Start Time (time video started): 9:00    Video End Time (time video stopped): 9:30    Originating Location (pt. Location): Home    Distant Location (provider location):  Off-site    Mode of Communication:  Video Conference via Doximity      Pain mostly ok, sometimes bad  MScontin 30 bid, oxy usually only 5 bid  Stop therapy  Sx mgmnt until retrun to [place]    Preeti Pascual returns in follow-up of metastatic hepatocellular carcinoma.    He is 57 years old with compensated cirrhosis secondary to hepatitis B complicated by hepatocellular carcinoma metastatic to multiple sites.  He has had extensive prior therapy and currently is receiving gemcitabine and oxaliplatin.  At her last evaluation a month ago the patient had evidence of progression and he returns today for confirmation of progressive disease.  He tells me he currently has fairly good pain control with MS Contin 30 mg twice per day.  He has times that where the pain is worse but has been only taking his short acting oxycodone 5 mg twice per day.  He is not having trouble with his bowel habits.  His energy level is fair.  He offers no other new symptoms.    I personally reviewed his CT scan for which I do not yet have the radiologist interpretation but to my review he has further clear increase in his tumors.    His alpha-fetoprotein is risen further to 33,000.  His electrolytes and renal function are unremarkable.  His bilirubin, albumin and liver enzymes are nearly normal.  He has very mild cytopenias consistent with prior values.    Assessment/plan: Widely metastatic hepatocellular carcinoma in a patient with well compensated chronic liver disease.  I had another long discussion with the patient today about the fact of his progression on his current therapy with gemcitabine and oxaliplatin.  I discussed that while there are multiple other chemotherapy agents that theoretically might have benefit  the risk of significant toxicity is quite high and the chances of any meaningful tumor response is very remote.  I recommended we focus on symptom management and the present that is primarily his pain.  Encouraged him to make more liberal use of the immediate release oxycodone for breakthrough pain.  He will continue to use his current stool softeners that seems adequate for maintaining his bowel function.  He continues on low-dose dexamethasone and as needed antiemetics.  We will plan on seeing him every few weeks for symptom management until he makes plans to return back to [facility] for his end-of-life care.  He thinks that is probably getting be 2 to 3 months away.  I encouraged him to get in to be careful and plan full enough that he does not get too sick to be able to make the trip back home before he dies.    Total time by me inclusive of the above Long discussion, coordination of care and documentation was approximately 40 minutes.

## 2023-02-09 NOTE — LETTER
2/9/2023         RE: Preeti Pascual  [address]  [facility] [address]        Dear Colleague,    Thank you for referring your patient, Preeti Pascual, to the [facility]. Please see a copy of my visit note below.    Video-Visit Details    Type of service:  Video Visit    Video Start Time (time video started): 9:00    Video End Time (time video stopped): 9:30    Originating Location (pt. Location): Home    Distant Location (provider location):  Off-site    Mode of Communication:  Video Conference via [employer]      Pain mostly ok, sometimes bad  MScontin 30 bid, oxy usually only 5 bid  Stop therapy  Sx mgmnt until retrun to [place]    Preeti Pascual returns in follow-up of metastatic hepatocellular carcinoma.    He is 57 years old with compensated cirrhosis secondary to hepatitis B complicated by hepatocellular carcinoma metastatic to multiple sites.  He has had extensive prior therapy and currently is receiving gemcitabine and oxaliplatin.  At her last evaluation a month ago the patient had evidence of progression and he returns today for confirmation of progressive disease.  He tells me he currently has fairly good pain control with MS Contin 30 mg twice per day.  He has times that where the pain is worse but has been only taking his short acting oxycodone 5 mg twice per day.  He is not having trouble with his bowel habits.  His energy level is fair.  He offers no other new symptoms.    I personally reviewed his CT scan for which I do not yet have the radiologist interpretation but to my review he has further clear increase in his tumors.    His alpha-fetoprotein is risen further to 33,000.  His electrolytes and renal function are unremarkable.  His bilirubin, albumin and liver enzymes are nearly normal.  He has very mild cytopenias consistent with prior values.    Assessment/plan: Widely metastatic hepatocellular carcinoma in a patient with well compensated chronic liver  disease.  I had another long discussion with the patient today about the fact of his progression on his current therapy with gemcitabine and oxaliplatin.  I discussed that while there are multiple other chemotherapy agents that theoretically might have benefit the risk of significant toxicity is quite high and the chances of any meaningful tumor response is very remote.  I recommended we focus on symptom management and the present that is primarily his pain.  Encouraged him to make more liberal use of the immediate release oxycodone for breakthrough pain.  He will continue to use his current stool softeners that seems adequate for maintaining his bowel function.  He continues on low-dose dexamethasone and as needed antiemetics.  We will plan on seeing him every few weeks for symptom management until he makes plans to return back to [facility] for his end-of-life care.  He thinks that is probably getting be 2 to 3 months away.  I encouraged him to get in to be careful and plan full enough that he does not get too sick to be able to make the trip back home before he dies.    Total time by me inclusive of the above Long discussion, coordination of care and documentation was approximately 40 minutes.      Again, thank you for allowing me to participate in the care of your patient.        Sincerely,        Agustin Kyle MD

## 2023-02-09 NOTE — LETTER
Date:February 10, 2023      Patient was self referred, no letter generated. Do not send.        [facility] Health Information

## 2023-02-09 NOTE — NURSING NOTE
Is the patient currently in the state of [place]? YES    Visit mode:VIDEO    If the visit is dropped, the patient can be reconnected by: VIDEO VISIT: Text to cell phone: [phone]    Will anyone else be joining the visit? NO    How would you like to obtain your AVS? MyChart    Are changes needed to the allergy or medication list? NO    Comments or concerns regarding today's visit: no    ARELIS Thomas/SHARON

## 2023-02-15 NOTE — PROGRESS NOTES
Infusion Nursing Note:  Preeti Pascual presents today for Cycle 6 Day 1 ramucirumab.    Patient seen by provider today: No   present during visit today: Not Applicable.    Note: Preeti presents today feeling fatigued, but overall well. Endorses intermittent abdominal pain for the last 1-2 days, similar to what he experienced a few months ago. Denies nausea/vomiting, but does report a decrease in appetite. Denies fevers/chills. Denies SOB, cough, chest pain, or dizziness/lightheadedness. Denies constipation/diarrhea. Denies urinary issues. Denies neuropathy. Denies new concerns at this appointment.      Intravenous Access:  Peripheral IV placed.    Treatment Conditions:    Results for PREETI PASCUAL (MRN [id number]) as of 11/11/2021 08:39   Ref. Range 11/11/2021 08:09   Sodium Latest Ref Range: 133 - 144 mmol/L 140   Potassium Latest Ref Range: 3.4 - 5.3 mmol/L 4.1   Chloride Latest Ref Range: 94 - 109 mmol/L 110 (H)   Carbon Dioxide Latest Ref Range: 20 - 32 mmol/L 24   Urea Nitrogen Latest Ref Range: 7 - 30 mg/dL 9   Creatinine Latest Ref Range: 0.66 - 1.25 mg/dL 0.63 (L)   GFR Estimate Latest Ref Range: >60 mL/min/1.73m2 >90   Calcium Latest Ref Range: 8.5 - 10.1 mg/dL 8.8   Anion Gap Latest Ref Range: 3 - 14 mmol/L 6   Albumin Latest Ref Range: 3.4 - 5.0 g/dL 2.9 (L)   Protein Total Latest Ref Range: 6.8 - 8.8 g/dL 7.1   Bilirubin Total Latest Ref Range: 0.2 - 1.3 mg/dL 0.3   Alkaline Phosphatase Latest Ref Range: 40 - 150 U/L 148   ALT Latest Ref Range: 0 - 70 U/L 43   AST Latest Ref Range: 0 - 45 U/L 59 (H)   Glucose Latest Ref Range: 70 - 99 mg/dL 98   Protein Albumin Urine Latest Ref Range: Negative mg/dL Negative       Results reviewed, labs MET treatment parameters, ok to proceed with treatment.      Post Infusion Assessment:  Patient tolerated infusion without incident.  Blood return noted pre and post infusion.  Site patent and intact, free from redness, edema or discomfort.  No evidence of  extravasations.  Access discontinued per protocol.       Discharge Plan:   Patient declined prescription refills.  Discharge instructions reviewed with: Patient.  Patient and/or family verbalized understanding of discharge instructions and all questions answered.  Copy of AVS provided to patient.  Patient will return 11/26 for next infusion appointment.   Patient discharged in stable condition accompanied by: self.  Departure Mode: Ambulatory.      Marie Carvalho RN                     4-6 mos

## 2023-02-17 NOTE — TELEPHONE ENCOUNTER
P.S. paperwork received via fax from [employer]. Will be placed in provider folder for signature upon completion.     Fax: [phone]      Marcial Gomez

## 2023-03-03 NOTE — LETTER
March 3, 2023    Preeti Pascual  [address]  [facility] [address]      To whom it may concern,     Mr. Preeti Pascual has advanced cancer and is a candidate for disability as he is no longer able to work.  Please let us know how we can provide further documentation of his disability.         Sincerely,      Nallely Smiley, CNP

## 2023-03-03 NOTE — NURSING NOTE
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    Weight as of this encounter: 50.8 kg (112 lb). Body surface area is 1.53 meters squared.  Severe Pain (6) Comment: Data Unavailable   No LMP for male patient.  Allergies reviewed: Yes  Medications reviewed: Yes    Medications: Medication refills not needed today.  Pharmacy name entered into [facility]:    [id number] - [place], [place] - [address] AT [facility] & [address] [place] [address] [facility] [phone]  [facility] - [place], [place] - [address] [place] [address]    Clinical concerns: Patient requesting doctor's note for work.  DAVI MEYER  was notified.      Aleksandra England (Therese), LPN March 3, 2023 11:17 AM              "

## 2023-03-03 NOTE — LETTER
3/3/2023         RE: Preeti Pascual  [address]  [facility] [address]        Dear Colleague,    Thank you for referring your patient, Preeti Pascual, to the [facility]. Please see a copy of my visit note below.    Reason for Visit: seen in f/u of metastatic hepatocellular carcinoma    Oncology HPI:   Preeti Pascual is a 56 year old male with hepatocellular carcinoma. 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  He had initial liver directed therapy with radial his embolization and declined liver transplant.  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  Initial therapy with sorafenib was poorly tolerated and could only get to a dose of 200 mg/day on which he progressed.  He then had a long period of control with single agent nivolumab and upon progression without we added ipilimumab without achieving control and with the development of severe autoimmune hepatitis (without evidence of reactivation of his hepatitis B.)  He had a period off treatment as he recovered from the hepatitis and then was started on Ramucirumab with stable disease and then switched to lenvatinib so that he could travel back to [facility] to visit family.  He required dose reductions to tolerate the lenvatinib and had a short period of control on that but as of 2 months ago had progressed.  He then resumed Ramucirumab.  CT CAP on 10/31/22 showed disease progression.     He started gemzar/oxaliplatin on 12/1/22. It was discontinued in February after he was found to have progression    Interval history:     Preeti has returned for symptom management. He is alone for his visit. Notes his symptoms of abdominal pain are pretty stable with MS Contin and occasional use of oxycodone. Bowels are regular with use of senna twice/day. Stamina and appetite are low. He has not decided on going back to  [facility]. Has questions about returning to work. Has financial strain from not working, but isn't sure he is able to work.  Would like a letter stating he is unable to lift more than 10 lb and would like limitations to standing and hours worked. He talked with his work and has started to fill out disability paperwork, but is confused on that process.     Current Outpatient Medications   Medication Sig Dispense Refill     acetaminophen (TYLENOL) 325 MG tablet Take 650 mg by mouth every 8 hours as needed for mild pain Do not exceed 2 grams per day. (Patient not taking: Reported on 1/31/2023)       dexamethasone (DECADRON) 2 MG tablet Take 1 tablet (2 mg) by mouth daily (with breakfast) 30 tablet 4     dexamethasone (DECADRON) 4 MG tablet Take 2 tablets (8 mg) by mouth daily Take for 2 days, starting the day after chemo. Take with food. 4 tablet 6     morphine (MS CONTIN) 30 MG CR tablet Take 1 tablet (30 mg) by mouth every 12 hours maximum 2 tablet(s) per day 60 tablet 0     omeprazole (PRILOSEC) 20 MG DR capsule Take 1 capsule (20 mg) by mouth daily 30 capsule 1     ondansetron (ZOFRAN) 8 MG tablet Take 1 tablet (8 mg) by mouth every 8 hours as needed for nausea (vomiting) 30 tablet 2     oxyCODONE (ROXICODONE) 5 MG tablet Take 1-2 tablets (5-10 mg) by mouth every 4 hours as needed for moderate to severe pain 100 tablet 0     prochlorperazine (COMPAZINE) 10 MG tablet Take 1 tablet (10 mg) by mouth every 6 hours as needed for nausea or vomiting 30 tablet 2     SENNA-docusate sodium (SENNA S) 8.6-50 MG tablet Take 1-2 tablets by mouth 2 times daily as needed 60 tablet 1     sulfamethoxazole-trimethoprim (BACTRIM DS) 800-160 MG tablet Take 1 tablet by mouth 2 times daily 10 tablet 0          Allergies   Allergen Reactions     Pepcid [Famotidine] Headache         Exam: alert, appears chronically ill. Blood pressure 126/80, pulse 75, temperature 98.2  F (36.8  C), temperature source Oral, resp. rate 16, weight 50.8 kg  (112 lb), SpO2 99 %.  Wt Readings from Last 4 Encounters:   03/03/23 50.8 kg (112 lb)   02/09/23 54.4 kg (120 lb)   02/08/23 50.8 kg (111 lb 14.4 oz)   01/31/23 51.8 kg (114 lb 4.8 oz)       Labs:    Latest Reference Range & Units 02/08/23 07:14   Sodium 136 - 145 mmol/L 134 (L)   Potassium 3.4 - 5.3 mmol/L 4.2   Chloride 98 - 107 mmol/L 101   Carbon Dioxide (CO2) 22 - 29 mmol/L 21 (L)   Urea Nitrogen 8.0 - 23.0 mg/dL 6.9 (L)   Creatinine 0.67 - 1.17 mg/dL 0.55 (L)   GFR Estimate >60 mL/min/1.73m2 >90   Calcium 8.6 - 10.0 mg/dL 9.3   Anion Gap 7 - 15 mmol/L 12   Albumin 3.5 - 5.2 g/dL 3.5   Protein Total 6.4 - 8.3 g/dL 7.9   Alkaline Phosphatase 40 - 129 U/L 190 (H)   ALT 10 - 50 U/L 48   AST  See Comment   AFP tumor marker <=8.3 ng/mL 33,299.0 (H)   Bilirubin Total <=1.2 mg/dL 0.5   Glucose 70 - 99 mg/dL 122 (H)   WBC 4.0 - 11.0 10e3/uL 3.9 (L)   Hemoglobin 13.3 - 17.7 g/dL 11.7 (L)   Hematocrit 40.0 - 53.0 % 34.3 (L)   Platelet Count 150 - 450 10e3/uL 156   RBC Count 4.40 - 5.90 10e6/uL 4.04 (L)   MCV 78 - 100 fL 85   MCH 26.5 - 33.0 pg 29.0   MCHC 31.5 - 36.5 g/dL 34.1   RDW 10.0 - 15.0 % 16.0 (H)   % Neutrophils % 48   % Lymphocytes % 28   % Monocytes % 19   % Eosinophils % 4   % Basophils % 1   Absolute Basophils 0.0 - 0.2 10e3/uL 0.0   Absolute Eosinophils 0.0 - 0.7 10e3/uL 0.2   Absolute Immature Granulocytes <=0.4 10e3/uL 0.0   Absolute Lymphocytes 0.8 - 5.3 10e3/uL 1.1   Absolute Monocytes 0.0 - 1.3 10e3/uL 0.7   % Immature Granulocytes % 0   Absolute Neutrophils 1.6 - 8.3 10e3/uL 1.9   Absolute NRBCs 10e3/uL 0.0   NRBCs per 100 WBC <1 /100 0   (L): Data is abnormally low  (H): Data is abnormally high    Imaging: n/a    Impression/plan:   Progressive, metastatic hepatocellular carcinoma, off of active treatment. Had been recommended to stop treatment due to low likelihood of meantingfull response and declining PS. We've had multiple discussions about his goal of going back to [facility] for his end of life.   He is not committed to going as he is trying to figure out a plan on how he can get financial resources to support his living here. He is very worried about not getting an income to support his rent and living costs.    -I do not think he is strong enough to return to work at this time, even with restrictions. He is hoping he would become stronger and be able to travel in a few months. I explained it would be unlikely to have significant improvement in stamina/energy with cancer progression and would encourage him to try to arrange travel sooner if financially feasible.  His wife was not with the visit today, but he mentions she is hopeful he will get stronger too.   His current leave is approved through 3/19/23. I encouraged him to talk with social work today about options for medical assistance and community resources. He met with them after my visit  -for now, will continue to see him for symptom support and help with end of life planning.  -he will return in 3 weeks       Again, thank you for allowing me to participate in the care of your patient.        Sincerely,        MITCH Jacobs CNP

## 2023-03-03 NOTE — PROGRESS NOTES
Reason for Visit: seen in f/u of metastatic hepatocellular carcinoma    Oncology HPI:   Preeti Pascual is a 56 year old male with hepatocellular carcinoma. 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  He had initial liver directed therapy with radial his embolization and declined liver transplant.  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  Initial therapy with sorafenib was poorly tolerated and could only get to a dose of 200 mg/day on which he progressed.  He then had a long period of control with single agent nivolumab and upon progression without we added ipilimumab without achieving control and with the development of severe autoimmune hepatitis (without evidence of reactivation of his hepatitis B.)  He had a period off treatment as he recovered from the hepatitis and then was started on Ramucirumab with stable disease and then switched to lenvatinib so that he could travel back to [facility] to visit family.  He required dose reductions to tolerate the lenvatinib and had a short period of control on that but as of 2 months ago had progressed.  He then resumed Ramucirumab.  CT CAP on 10/31/22 showed disease progression.     He started gemzar/oxaliplatin on 12/1/22. It was discontinued in February after he was found to have progression    Interval history:     Preeti has returned for symptom management. He is alone for his visit. Notes his symptoms of abdominal pain are pretty stable with MS Contin and occasional use of oxycodone. Bowels are regular with use of senna twice/day. Stamina and appetite are low. He has not decided on going back to [facility]. Has questions about returning to work. Has financial strain from not working, but isn't sure he is able to work.  Would like a letter stating he is unable to lift more than 10 lb and would like limitations to standing and hours worked. He talked with his work  and has started to fill out disability paperwork, but is confused on that process.     Current Outpatient Medications   Medication Sig Dispense Refill     acetaminophen (TYLENOL) 325 MG tablet Take 650 mg by mouth every 8 hours as needed for mild pain Do not exceed 2 grams per day. (Patient not taking: Reported on 1/31/2023)       dexamethasone (DECADRON) 2 MG tablet Take 1 tablet (2 mg) by mouth daily (with breakfast) 30 tablet 4     dexamethasone (DECADRON) 4 MG tablet Take 2 tablets (8 mg) by mouth daily Take for 2 days, starting the day after chemo. Take with food. 4 tablet 6     morphine (MS CONTIN) 30 MG CR tablet Take 1 tablet (30 mg) by mouth every 12 hours maximum 2 tablet(s) per day 60 tablet 0     omeprazole (PRILOSEC) 20 MG DR capsule Take 1 capsule (20 mg) by mouth daily 30 capsule 1     ondansetron (ZOFRAN) 8 MG tablet Take 1 tablet (8 mg) by mouth every 8 hours as needed for nausea (vomiting) 30 tablet 2     oxyCODONE (ROXICODONE) 5 MG tablet Take 1-2 tablets (5-10 mg) by mouth every 4 hours as needed for moderate to severe pain 100 tablet 0     prochlorperazine (COMPAZINE) 10 MG tablet Take 1 tablet (10 mg) by mouth every 6 hours as needed for nausea or vomiting 30 tablet 2     SENNA-docusate sodium (SENNA S) 8.6-50 MG tablet Take 1-2 tablets by mouth 2 times daily as needed 60 tablet 1     sulfamethoxazole-trimethoprim (BACTRIM DS) 800-160 MG tablet Take 1 tablet by mouth 2 times daily 10 tablet 0          Allergies   Allergen Reactions     Pepcid [Famotidine] Headache         Exam: alert, appears chronically ill. Blood pressure 126/80, pulse 75, temperature 98.2  F (36.8  C), temperature source Oral, resp. rate 16, weight 50.8 kg (112 lb), SpO2 99 %.  Wt Readings from Last 4 Encounters:   03/03/23 50.8 kg (112 lb)   02/09/23 54.4 kg (120 lb)   02/08/23 50.8 kg (111 lb 14.4 oz)   01/31/23 51.8 kg (114 lb 4.8 oz)       Labs:    Latest Reference Range & Units 02/08/23 07:14   Sodium 136 - 145 mmol/L  134 (L)   Potassium 3.4 - 5.3 mmol/L 4.2   Chloride 98 - 107 mmol/L 101   Carbon Dioxide (CO2) 22 - 29 mmol/L 21 (L)   Urea Nitrogen 8.0 - 23.0 mg/dL 6.9 (L)   Creatinine 0.67 - 1.17 mg/dL 0.55 (L)   GFR Estimate >60 mL/min/1.73m2 >90   Calcium 8.6 - 10.0 mg/dL 9.3   Anion Gap 7 - 15 mmol/L 12   Albumin 3.5 - 5.2 g/dL 3.5   Protein Total 6.4 - 8.3 g/dL 7.9   Alkaline Phosphatase 40 - 129 U/L 190 (H)   ALT 10 - 50 U/L 48   AST  See Comment   AFP tumor marker <=8.3 ng/mL 33,299.0 (H)   Bilirubin Total <=1.2 mg/dL 0.5   Glucose 70 - 99 mg/dL 122 (H)   WBC 4.0 - 11.0 10e3/uL 3.9 (L)   Hemoglobin 13.3 - 17.7 g/dL 11.7 (L)   Hematocrit 40.0 - 53.0 % 34.3 (L)   Platelet Count 150 - 450 10e3/uL 156   RBC Count 4.40 - 5.90 10e6/uL 4.04 (L)   MCV 78 - 100 fL 85   MCH 26.5 - 33.0 pg 29.0   MCHC 31.5 - 36.5 g/dL 34.1   RDW 10.0 - 15.0 % 16.0 (H)   % Neutrophils % 48   % Lymphocytes % 28   % Monocytes % 19   % Eosinophils % 4   % Basophils % 1   Absolute Basophils 0.0 - 0.2 10e3/uL 0.0   Absolute Eosinophils 0.0 - 0.7 10e3/uL 0.2   Absolute Immature Granulocytes <=0.4 10e3/uL 0.0   Absolute Lymphocytes 0.8 - 5.3 10e3/uL 1.1   Absolute Monocytes 0.0 - 1.3 10e3/uL 0.7   % Immature Granulocytes % 0   Absolute Neutrophils 1.6 - 8.3 10e3/uL 1.9   Absolute NRBCs 10e3/uL 0.0   NRBCs per 100 WBC <1 /100 0   (L): Data is abnormally low  (H): Data is abnormally high    Imaging: n/a    Impression/plan:   Progressive, metastatic hepatocellular carcinoma, off of active treatment. Had been recommended to stop treatment due to low likelihood of meantingfull response and declining PS. We've had multiple discussions about his goal of going back to [place] for his end of life.  He is not committed to going as he is trying to figure out a plan on how he can get financial resources to support his living here. He is very worried about not getting an income to support his rent and living costs.    -I do not think he is strong enough to return to  work at this time, even with restrictions. He is hoping he would become stronger and be able to travel in a few months. I explained it would be unlikely to have significant improvement in stamina/energy with cancer progression and would encourage him to try to arrange travel sooner if financially feasible.  His wife was not with the visit today, but he mentions she is hopeful he will get stronger too.   His current leave is approved through 3/19/23. I encouraged him to talk with social work today about options for medical assistance and community resources. He met with them after my visit  -for now, will continue to see him for symptom support and help with end of life planning.  -he will return in 3 weeks

## 2023-03-03 NOTE — LETTER
Date:March 6, 2023      Patient was self referred, no letter generated. Do not send.        [facility] Health Information

## 2023-03-08 NOTE — PROGRESS NOTES
Social Work Intervention  [facility]  Late Entry: 2023    Data/Intervention:    Patient Name:  Preeti Pascual  /Age:  1965 (57 year old)    Visit Type: In Person  Referral Source: [facility]  Reason for Referral:  Financial Assistance    Collaborated With:    -Patient    Psychosocial Information/Concerns:  SW met with patient after their visit as they had some financial concerns. Patient is currently on a leave from work with concerns about household expenses.    Intervention/Education/Resources Provided:  SW met with patient, introduced self and explained the reason for today's visit. Patient remains hopeful that they will continue to get stronger and be able to return to work so that they can pay their household expenses.    SW talked with patient about Oncology grants available that can assist in household expenses such as rent, mortgage, utility bills, gas and or groceries. Patient at first was not interested in applying as they reported that their expenses are far more than what the grants offers. SW still encouraged patient look into these programs as they can at least help with some expenses. SW also talked with patient about SSDI and the different ways to apply. Patient wanted SW to assist in completing this application but SW explained that this was outside their role and they would not be able to help patient apply. SW did talk with patient about different resources that would be able to assist.     Assessment/Plan:  Patient at first was not receptive to resources provided as they felt that these programs would not be able to help as their expenses are more than what lucretia can provide. SW explained that these programs are short term versus long term and encouraged patient to look into applying for SSDI. Patient agreed and stated that they would call [facility] and schedule a time for an application to be completed. SW will complete application for [employer] on  behalf patient. SW will remain available as needed. Provided patient/family with contact information and availability.    SALLY Sanchez,LGSW  Hematology/Oncology Social Worker  Phone:[phone] Pager: [phone]

## 2023-03-13 NOTE — TELEPHONE ENCOUNTER
Signed form received and faxed to [employer], fax # [phone]. Successful transmission verified via rightfax. Copy of forms sent to scanning, original forms mailed to patient's home address on file.    Marcial Gomez on 3/13/2023 at 10:22 AM

## 2023-03-16 NOTE — PROGRESS NOTES
Video-Visit Details    Type of service:  Video Visit      Changed to phone visit as patient was not able to connect. Phone visit was 16 minutes         Reason for Visit: seen in f/u of metastatic hepatocellular carcinoma    Oncology HPI:   Preeti Pascual is a 57 year old male with hepatocellular carcinoma. 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  He had initial liver directed therapy with radial his embolization and declined liver transplant.  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  Initial therapy with sorafenib was poorly tolerated and could only get to a dose of 200 mg/day on which he progressed.  He then had a long period of control with single agent nivolumab and upon progression without we added ipilimumab without achieving control and with the development of severe autoimmune hepatitis (without evidence of reactivation of his hepatitis B.)  He had a period off treatment as he recovered from the hepatitis and then was started on Ramucirumab with stable disease and then switched to lenvatinib so that he could travel back to \[facility]\"" to visit family.  He required dose reductions to tolerate the lenvatinib and had a short period of control on that but as of 2 months ago had progressed.  He then resumed Ramucirumab.  CT CAP on 10/31/22 showed disease progression.     He started gemzar/oxaliplatin on 12/1/22. It was discontinued in February after he was found to have progression    Interval history: Mr. Pascual continues to feel pain. He has been taking oxycodone 1 tablet every 4-5 hours. This gives relief for 2-3 hours and then pain returns. He is not taking tylenol as this has not been helpful in the past. He is not sure if he has taken it with oxycodone. He is taking 1 tablet of MS Contin in the evenings. This initially helps his pain and helps him sleep but then he needs to sleep until noon and feels  sedated the remainder of the day. This is very frustrating for him.    He takes Senna every 2 days and has daily BMs. Appetite is low. He continues to lose weight. He is maybe 110 pounds. He is eating three times per day but maybe 1/8 of what he used to eat previously. He is drinking 1 protein shake per day.     No other concerns or things to address today.     Current Outpatient Medications   Medication Sig Dispense Refill     dexamethasone (DECADRON) 2 MG tablet Take 1 tablet (2 mg) by mouth daily (with breakfast) 30 tablet 4     morphine (MS CONTIN) 15 MG CR tablet Take 1 tablet (15 mg) by mouth every 12 hours for 30 days maximum 2 tablet(s) per day 60 tablet 0     morphine (MS CONTIN) 30 MG CR tablet Take 1 tablet (30 mg) by mouth every 12 hours maximum 2 tablet(s) per day 60 each 0     omeprazole (PRILOSEC) 20 MG DR capsule Take 1 capsule (20 mg) by mouth daily 30 capsule 1     ondansetron (ZOFRAN) 8 MG tablet Take 1 tablet (8 mg) by mouth every 8 hours as needed for nausea (vomiting) 30 tablet 2     oxyCODONE (ROXICODONE) 5 MG tablet Take 1-2 tablets (5-10 mg) by mouth every 4 hours as needed for moderate to severe pain 100 tablet 0     prochlorperazine (COMPAZINE) 10 MG tablet Take 1 tablet (10 mg) by mouth every 6 hours as needed for nausea or vomiting 30 tablet 2     SENNA-docusate sodium (SENNA S) 8.6-50 MG tablet Take 1-2 tablets by mouth 2 times daily as needed 60 tablet 1     acetaminophen (TYLENOL) 325 MG tablet Take 650 mg by mouth every 8 hours as needed for mild pain Do not exceed 2 grams per day. (Patient not taking: Reported on 3/3/2023)            Allergies   Allergen Reactions     Pepcid [Famotidine] Headache         Exam: Voice is clear and strong. No audible stridor, wheezing, or respiratory distress. The remainder of PE was deferred due to PHE.     Wt Readings from Last 4 Encounters:   03/03/23 50.8 kg (112 lb)   02/09/23 54.4 kg (120 lb)   02/08/23 50.8 kg (111 lb 14.4 oz)   01/31/23 51.8 kg  (114 lb 4.8 oz)       Labs:  Nothing new     Imaging: n/a    Impression/plan:   Progressive, metastatic hepatocellular carcinoma, off of active treatment. Had been recommended to stop treatment due to low likelihood of meaningful response and declining PS.   -Currently pain control and appetite are his biggest concerns.   -Recommended taking 1 tablet of tylenol (325 mg) with every tablet of oxycodone for adjunctive effect. Could titrate higher in the future but wanted to keep it 1:1 and simple for him to recall now.   -Will reduce dose of MS Contin to 15 mg so he can ideally take BID since 30 mg appears to be oversedating. He will try this in the evening at first and if he tolerates okay, increasing to BID.   -Increase protein shakes to BID to help support weight. He remains on daily dex.   -Follow-up in about 2 weeks with Dr. Kyle.     26 minutes spent on the date of the encounter doing chart review, patient visit and documentation, in addition to 16 minutes spent on the phone with the patient.     Rut Corrales PA-C

## 2023-03-17 NOTE — NURSING NOTE
Is the patient currently in the state of [place]? YES    Visit mode:VIDEO    If the visit is dropped, the patient can be reconnected by: VIDEO VISIT: Text to cell phone: [phone]    Will anyone else be joining the visit? NO      How would you like to obtain your AVS? MyChart    Are changes needed to the allergy or medication list? NO    Reason for visit: follow up

## 2023-03-17 NOTE — LETTER
3/17/2023         RE: Preeti Pascual  [address]  [facility] [address]        Dear Colleague,    Thank you for referring your patient, Preeti Pascual, to the [facility]. Please see a copy of my visit note below.    Video-Visit Details    Type of service:  Video Visit      Changed to phone visit as patient was not able to connect. Phone visit was 16 minutes         Reason for Visit: seen in f/u of metastatic hepatocellular carcinoma    Oncology HPI:   Preeti Pascual is a 57 year old male with hepatocellular carcinoma. 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  He had initial liver directed therapy with radial his embolization and declined liver transplant.  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  Initial therapy with sorafenib was poorly tolerated and could only get to a dose of 200 mg/day on which he progressed.  He then had a long period of control with single agent nivolumab and upon progression without we added ipilimumab without achieving control and with the development of severe autoimmune hepatitis (without evidence of reactivation of his hepatitis B.)  He had a period off treatment as he recovered from the hepatitis and then was started on Ramucirumab with stable disease and then switched to lenvatinib so that he could travel back to [facility] to visit family.  He required dose reductions to tolerate the lenvatinib and had a short period of control on that but as of 2 months ago had progressed.  He then resumed Ramucirumab.  CT CAP on 10/31/22 showed disease progression.     He started gemzar/oxaliplatin on 12/1/22. It was discontinued in February after he was found to have progression    Interval history: Mr. Pascual continues to feel pain. He has been taking oxycodone 1 tablet every 4-5 hours. This gives relief for 2-3 hours and then pain returns. He  is not taking tylenol as this has not been helpful in the past. He is not sure if he has taken it with oxycodone. He is taking 1 tablet of MS Contin in the evenings. This initially helps his pain and helps him sleep but then he needs to sleep until noon and feels sedated the remainder of the day. This is very frustrating for him.    He takes Senna every 2 days and has daily BMs. Appetite is low. He continues to lose weight. He is maybe 110 pounds. He is eating three times per day but maybe 1/8 of what he used to eat previously. He is drinking 1 protein shake per day.     No other concerns or things to address today.     Current Outpatient Medications   Medication Sig Dispense Refill     dexamethasone (DECADRON) 2 MG tablet Take 1 tablet (2 mg) by mouth daily (with breakfast) 30 tablet 4     morphine (MS CONTIN) 15 MG CR tablet Take 1 tablet (15 mg) by mouth every 12 hours for 30 days maximum 2 tablet(s) per day 60 tablet 0     morphine (MS CONTIN) 30 MG CR tablet Take 1 tablet (30 mg) by mouth every 12 hours maximum 2 tablet(s) per day 60 each 0     omeprazole (PRILOSEC) 20 MG DR capsule Take 1 capsule (20 mg) by mouth daily 30 capsule 1     ondansetron (ZOFRAN) 8 MG tablet Take 1 tablet (8 mg) by mouth every 8 hours as needed for nausea (vomiting) 30 tablet 2     oxyCODONE (ROXICODONE) 5 MG tablet Take 1-2 tablets (5-10 mg) by mouth every 4 hours as needed for moderate to severe pain 100 tablet 0     prochlorperazine (COMPAZINE) 10 MG tablet Take 1 tablet (10 mg) by mouth every 6 hours as needed for nausea or vomiting 30 tablet 2     SENNA-docusate sodium (SENNA S) 8.6-50 MG tablet Take 1-2 tablets by mouth 2 times daily as needed 60 tablet 1     acetaminophen (TYLENOL) 325 MG tablet Take 650 mg by mouth every 8 hours as needed for mild pain Do not exceed 2 grams per day. (Patient not taking: Reported on 3/3/2023)            Allergies   Allergen Reactions     Pepcid [Famotidine] Headache         Exam: Voice is  clear and strong. No audible stridor, wheezing, or respiratory distress. The remainder of PE was deferred due to PHE.     Wt Readings from Last 4 Encounters:   03/03/23 50.8 kg (112 lb)   02/09/23 54.4 kg (120 lb)   02/08/23 50.8 kg (111 lb 14.4 oz)   01/31/23 51.8 kg (114 lb 4.8 oz)       Labs:  Nothing new     Imaging: n/a    Impression/plan:   Progressive, metastatic hepatocellular carcinoma, off of active treatment. Had been recommended to stop treatment due to low likelihood of meaningful response and declining PS.   -Currently pain control and appetite are his biggest concerns.   -Recommended taking 1 tablet of tylenol (325 mg) with every tablet of oxycodone for adjunctive effect. Could titrate higher in the future but wanted to keep it 1:1 and simple for him to recall now.   -Will reduce dose of MS Contin to 15 mg so he can ideally take BID since 30 mg appears to be oversedating. He will try this in the evening at first and if he tolerates okay, increasing to BID.   -Increase protein shakes to BID to help support weight. He remains on daily dex.   -Follow-up in about 2 weeks with Dr. Kyle.     26 minutes spent on the date of the encounter doing chart review, patient visit and documentation, in addition to 16 minutes spent on the phone with the patient.     Rut Corrales PA-C       Again, thank you for allowing me to participate in the care of your patient.        Sincerely,        Rut Corrales PA-C

## 2023-03-21 NOTE — PROGRESS NOTES
CLINICAL NUTRITION SERVICES     Reason for Contact: Questions from Oncology Distress Screening  1. How concerned are you about your ability to eat? :  5  2. How concerned are you about unintended weight loss or your current weight? : 8  3. Patient requested to speak with a Dietitian on the Oncology Distress Screening tool. Yes    Action: RD called patient indicating reason for phone call. Left a VM with a return call back number.     Follow up: Wait for a return phone call.   Recommend pt's provider place a nutrition referral.     Darcie Drew RD, LD  [phone]

## 2023-03-27 NOTE — NURSING NOTE
"Oncology Rooming Note    March 27, 2023 3:10 PM   Preeti Pascual is a 57 year old male who presents for:    Chief Complaint   Patient presents with     Blood Draw     Labs drawn by RN via , vitals taken.     Oncology Clinic Visit     Malignant neoplasm metastatic to both adrenal glands      Initial Vitals: /80   Pulse 95   Temp 98  F (36.7  C)   Resp 16   Wt 51.4 kg (113 lb 4.8 oz)   SpO2 98%   BMI 18.85 kg/m   Estimated body mass index is 18.85 kg/m  as calculated from the following:    Height as of 1/19/23: 1.651 m (5' 5\").    Weight as of this encounter: 51.4 kg (113 lb 4.8 oz). Body surface area is 1.54 meters squared.  Extreme Pain (9) Comment: Data Unavailable   No LMP for male patient.  Allergies reviewed: Yes  Medications reviewed: Yes    Medications: Medication refills not needed today.  Pharmacy name entered into [facility]:    [id number] - [place], [place] - [address] AT [facility] & [address] [place] [address] - [address] [facility] [phone]  [facility] - [place], [place] - [address] [facility]    Clinical concerns: Pt has pain in hip bilaterally.     Pt has been constipation and will only go every 3 or more days, which causes pain in stomach.       Bhavesh Roberts            "

## 2023-03-27 NOTE — NURSING NOTE
Chief Complaint   Patient presents with     Blood Draw     Labs drawn by RN via , vitals taken.     Labs collected from venipuncture by RN. Vitals taken. Checked in for appointment(s).    Emelyn Pickering RN

## 2023-03-27 NOTE — PROGRESS NOTES
I am seeing Preeti Pascual today in follow-up of metastatic hepatocellular carcinoma.    He is 57 years old and has compensated cirrhosis due to hepatitis B with hepatocellular carcinoma that is metastatic to multiple sites.  He has progressed on multiple prior therapies and a month ago we made a decision to forego further active treatment, and are just working to manage symptoms while he gets ready to return to [facility] for end-of-life.  Since I saw him last he told me he is getting progressively more weak and tired and is now spending much of the day in bed.  He is not eating much but does not think he has lost any more weight.  He is moving his bowels once per day.  His pain control is okay on his current regimen so long as he takes the oxycodone every 4-6 hours.    On exam he is alert and appears more cachectic.  He has mild icterus.    He has mild hyponatremia and normal renal function.  His albumin is down to 3.1 and his bilirubin is up to 3.8.  His blood counts are unremarkable.    Assessment/plan: Advanced end-stage hepatocellular carcinoma in a patient with a declining performance status.  I think we are doing okay with symptom management in this circumstance, but I wonder if he should not be taking a little more in the way of pain meds--but for now he did not want to make a change in that.  I had very nadeen discussion with him about his declining status.  He still is putting off going back to [place] for several months and I expressed concern that his condition is going to decline enough that he is not going to be able to make the trip back home.  He acknowledges that and is going to try and get things set up sooner.  I would like for him to try and fill out some advance care planning documents if he is really not going back to [place].  We will continue to see him every few weeks for ongoing symptom management.    Total time by me on the date of service was 40 minutes.

## 2023-04-05 NOTE — TELEPHONE ENCOUNTER
DIAGNOSIS: R hip pain afer fall on ice   APPOINTMENT DATE: 4.11.23   NOTES STATUS DETAILS   MEDICATION LIST Internal

## 2023-04-05 NOTE — TELEPHONE ENCOUNTER
Oncology Nurse Triage    Situation:   Preeti reporting the following symptoms:  - fell on ice Saturday 4/1/23 on right hip    Background:   Treating Provider:  Dr. ortega    Date of last office visit: 3/27/23    Recent Treatments:2/15/23 D1C3 Gemcitabine    Assessment:     Onset: 4/1/23  Was hoping right hip would feel better, but still hurts.  Pt is concern for injury of right hip and seeking evaluation and treatment.   Pt can stand, but sometimes difficulty walking as pain in right hip sore.   Pain rating 8-9/10    Recommendations:   Instructed patient to seek care immediately for worsening symptoms, including: fever, chest pain, shortness of breath, Uncontrolled pain.     This writer read back pt's primary concern, and  informed pt may benefit from Orthopedic clinic evaluation. Transferred pt to Orthopedic walk-in clinic scheduling.     8434 Provider Sumaya Corrales PA-C reviewed this writer's assessment and acknowledge, pt doesn't have any bone mets so ortho is best approach.

## 2023-04-08 PROBLEM — R10.84 ABDOMINAL PAIN, GENERALIZED: Status: ACTIVE | Noted: 2023-01-01

## 2023-04-08 PROBLEM — C22.0 HEPATOCELLULAR CARCINOMA (H): Status: ACTIVE | Noted: 2023-01-01

## 2023-04-08 NOTE — ED PROVIDER NOTES
ED Provider Note  [facility]      History     Chief Complaint   Patient presents with     Abdominal Pain     HPI  Preeti Pascual is a 57 year old male with history of cirrhosis, hepatitis B, and hepatocellular carcinoma that is metastatic to multiple sites who presents with chronic abdominal pain.  Abdominal pain is generalized and has been present for 3-4 months.  Associated symptoms of vomiting, nausea, constipation, lack of appetite, and hiccups.  Patient is not eating or drinking much.  Last bowel movement was approximately 6 days ago.  Also notes generalized weakness and fatigue.  There has not been any change to his symptoms that prompts the ER visit today.      Regarding his HCC, he follows with oncology here (Dr. Agustin Kyle), most recently seen 3/27/2023.  Cancer has progressed on multiple therapies.  Treatment team recently made a decision to forego further active treatment and are working on managing symptoms while he gets ready to return to [facility] for end-of-life.  He is not officially on hospice care at this time.  He takes oxycodone for pain management.      Past Medical History  Past Medical History:   Diagnosis Date     Hepatitis B      Hepatocellular carcinoma (H)      Past Surgical History:   Procedure Laterality Date     Y-90 Delivery       acetaminophen (TYLENOL) 325 MG tablet  dexamethasone (DECADRON) 2 MG tablet  morphine (MS CONTIN) 15 MG CR tablet  omeprazole (PRILOSEC) 20 MG DR capsule  ondansetron (ZOFRAN) 8 MG tablet  oxyCODONE (ROXICODONE) 5 MG tablet  prochlorperazine (COMPAZINE) 10 MG tablet  SENNA-docusate sodium (SENNA S) 8.6-50 MG tablet      Allergies   Allergen Reactions     Pepcid [Famotidine] Headache     Family History  Family History   Problem Relation Age of Onset     Other Cancer No family hx of      Social History   Social History     Tobacco Use     Smoking status: Never     Smokeless tobacco: Never   Substance Use Topics     Alcohol use: No      Alcohol/week: 0.0 standard drinks of alcohol     Drug use: No      Past medical history, past surgical history, medications, allergies, family history, and social history were reviewed with the patient. No additional pertinent items.      A complete review of systems was performed with pertinent positives and negatives noted in the HPI, and all other systems negative.    Physical Exam   BP: (!) 150/80  Pulse: 74  Temp: 98.8  F (37.1  C)  Resp: 22  SpO2: 94 %  Physical Exam  General: Afebrile, chronically ill-appearing, cachectic.   HEENT: Normocephalic, atraumatic. Scleral icterus. MMM.  Neck: Non-tender, supple.  Cardio: Regular rate. Regular rhythm.   Resp: Normal work of breathing, no respiratory distress, lungs clear bilaterally, no wheezing, rhonchi, rales.  Chest/Back: No visual signs of trauma, no CVA tenderness.   Abdomen:  Moderate tightness, mild distention.  Diffusely tender to palpation.  No rebound, guarding, no peritoneal signs.     Neuro: Alert and fully oriented. CN II-XII grossly intact. Grossly normal strength and sensation in all extremities.   MSK: No deformities. Normal range of motion.  Integumentary/Skin: No rash visualized, mild jaundice.  Psych: Normal affect, normal behavior.      ED Course, Procedures, & Data     Results for orders placed or performed during the hospital encounter of 04/08/23   CT Abdomen Pelvis w Contrast     Status: None    Narrative    EXAMINATION: CT ABDOMEN PELVIS W CONTRAST  4/8/2023 11:54 AM      HISTORY: diffuse abd pain, constipation, hx met hcc.  c/f sbo,  worsening mets, colitis    COMPARISON: CT abdomen and pelvis 2/8/2023 and 1/6/2023. CT chest  abdomen and pelvis 10/31/2022    PROCEDURE COMMENTS: CT of the abdomen and pelvis was performed with  iopamidol (ISOVUE-370) solution 70 mL intravenous contrast. Coronal  and sagittal reformatted images were obtained.    FINDINGS:  Lower thorax:   Small right pleural effusion with associated compressive  atelectasis.   There are multiple enlarging pulmonary nodules, for example a 6 mm  pulmonary nodule in the right lower lobe, previously measuring up to 3  mm (series 4 image 59), 8 mm pulmonary nodule in the right middle lobe  previously measuring up to 4 mm (series 4 image 33). 10 mm nodular  consolidative opacity in the right middle lobe. Scattered calcified  granulomas. Cardiomegaly. Severe coronary artery calcifications.  Calcified mediastinal lymph nodes.    Abdomen and pelvis:  Cirrhotic liver. No significant change in the partially calcified  treated masses in the right hepatic lobe. Multiple known arterially  enhancing masses are now well-visualized on this portal venous phase  CT. Large volume simple ascites. The gallbladder is decompressed.  There are new and enlarging innumerable peritoneal nodules, for  example a 3.4 cm peritoneal nodule just inferior to the liver  measuring up to 3.4 cm, previously measuring 2.0 cm and a new large  peritoneal nodular deposits measuring 7.9 x 3.1 cm in the right lower  quadrant. The pancreas is unremarkable. Borderline splenomegaly.  Unchanged 2.2 cm nodular thickening of the left adrenal gland, likely  representing metastatic disease. Increased 3.2 cm nodular masslike  thickening of the right adrenal gland, previously measuring up to 2.7  cm. Symmetric renal cortical enhancement. No hydronephrosis. No  nephrolithiasis. Urinary bladder is well-distended and appears  unremarkable. Prostatomegaly. Enlarging peritoneal deposits in the  pelvis. There is diffuse colonic wall thickening. No abnormally  dilated loops of large or small bowel. No inguinal lymphadenopathy.  Increase conglomeration of enlarged periportal lymphadenopathy  measuring up to 3.7 cm, previously measuring up to 3.0 cm. The  infrarenal aorta is normal in caliber. Mild to moderate aortoiliac  atherosclerotic calcifications.    Bones:  Multilevel degenerative changes of the spine. No suspicious  or  aggressive appearing bone lesions.      Impression    IMPRESSION:  In this patient with a history of cirrhosis and metastatic  hepatocellular carcinoma:  1. There is evidence of increased diffuse peritoneal metastases.  Worsening moderate to large ascites.  2. Multiple enlarging pulmonary nodules as described above, concerning  for increased metastatic disease.  3. Diffuse colonic wall thickening, which can be seen in the setting  of a nonspecific colitis and/or portal hypertension.  4. Increased periportal lymphadenopathy, metastatic.  5. Small right pleural effusion.    I have personally reviewed the examination and initial interpretation  and I agree with the findings.    ROSALIND GILLIAM MD         SYSTEM ID:  [id number]   [place] Draw     Status: None (In process)    Narrative    The following orders were created for panel order [place] Draw.  Procedure                               Abnormality         Status                     ---------                               -----------         ------                     Extra Blue Top Tube[[id number]]                              Final result               Extra Red Top Tube[[id number]]                                                          Extra Green Top (Lithium...[[id number]]                      Final result               Extra Purple Top Tube[[id number]]                            Final result                 Please view results for these tests on the individual orders.   Extra Blue Top Tube     Status: None   Result Value Ref Range    Hold Specimen JIC    Extra Green Top (Lithium Heparin) Tube     Status: None   Result Value Ref Range    Hold Specimen JIC    Extra Purple Top Tube     Status: None   Result Value Ref Range    Hold Specimen JIC    Comprehensive metabolic panel     Status: Abnormal   Result Value Ref Range    Sodium 127 (L) 136 - 145 mmol/L    Potassium 4.1 3.4 - 5.3 mmol/L    Chloride 95 (L) 98 - 107 mmol/L    Carbon Dioxide (CO2) 21 (L) 22 -  29 mmol/L    Anion Gap 11 7 - 15 mmol/L    Urea Nitrogen 7.3 (L) 8.0 - 23.0 mg/dL    Creatinine 0.54 (L) 0.67 - 1.17 mg/dL    Calcium 8.9 8.6 - 10.0 mg/dL    Glucose 98 70 - 99 mg/dL    Alkaline Phosphatase 369 (H) 40 - 129 U/L     (H) 10 - 50 U/L    ALT 50 10 - 50 U/L    Protein Total 8.1 6.4 - 8.3 g/dL    Albumin 3.1 (L) 3.5 - 5.2 g/dL    Bilirubin Total 8.3 (H) <=1.2 mg/dL    GFR Estimate >90 >60 mL/min/1.73m2   Lipase     Status: Normal   Result Value Ref Range    Lipase 16 13 - 60 U/L   INR     Status: Abnormal   Result Value Ref Range    INR 3.96 (H) 0.85 - 1.15   CBC with platelets and differential     Status: Abnormal   Result Value Ref Range    WBC Count 7.8 4.0 - 11.0 10e3/uL    RBC Count 4.01 (L) 4.40 - 5.90 10e6/uL    Hemoglobin 12.3 (L) 13.3 - 17.7 g/dL    Hematocrit 37.1 (L) 40.0 - 53.0 %    MCV 93 78 - 100 fL    MCH 30.7 26.5 - 33.0 pg    MCHC 33.2 31.5 - 36.5 g/dL    RDW 17.5 (H) 10.0 - 15.0 %    Platelet Count 376 150 - 450 10e3/uL    % Neutrophils 79 %    % Lymphocytes 10 %    % Monocytes 9 %    % Eosinophils 1 %    % Basophils 1 %    % Immature Granulocytes 0 %    NRBCs per 100 WBC 0 <1 /100    Absolute Neutrophils 6.2 1.6 - 8.3 10e3/uL    Absolute Lymphocytes 0.8 0.8 - 5.3 10e3/uL    Absolute Monocytes 0.7 0.0 - 1.3 10e3/uL    Absolute Eosinophils 0.1 0.0 - 0.7 10e3/uL    Absolute Basophils 0.1 0.0 - 0.2 10e3/uL    Absolute Immature Granulocytes 0.0 <=0.4 10e3/uL    Absolute NRBCs 0.0 10e3/uL   CBC with platelets differential     Status: Abnormal    Narrative    The following orders were created for panel order CBC with platelets differential.  Procedure                               Abnormality         Status                     ---------                               -----------         ------                     CBC with platelets and d...[[id number]]  Abnormal            Final result                 Please view results for these tests on the individual orders.     Medications    HYDROmorphone (PF) (DILAUDID) injection 0.5 mg (0.5 mg Intravenous $Given 4/8/23 1124)   iopamidol (ISOVUE-370) solution 70 mL (70 mLs Intravenous $Given 4/8/23 1140)   sodium chloride (PF) 0.9% PF flush 70 mL (70 mLs Intravenous $Given 4/8/23 1141)     Labs Ordered and Resulted from Time of ED Arrival to Time of ED Departure   COMPREHENSIVE METABOLIC PANEL - Abnormal       Result Value    Sodium 127 (*)     Potassium 4.1      Chloride 95 (*)     Carbon Dioxide (CO2) 21 (*)     Anion Gap 11      Urea Nitrogen 7.3 (*)     Creatinine 0.54 (*)     Calcium 8.9      Glucose 98      Alkaline Phosphatase 369 (*)      (*)     ALT 50      Protein Total 8.1      Albumin 3.1 (*)     Bilirubin Total 8.3 (*)     GFR Estimate >90     INR - Abnormal    INR 3.96 (*)    CBC WITH PLATELETS AND DIFFERENTIAL - Abnormal    WBC Count 7.8      RBC Count 4.01 (*)     Hemoglobin 12.3 (*)     Hematocrit 37.1 (*)     MCV 93      MCH 30.7      MCHC 33.2      RDW 17.5 (*)     Platelet Count 376      % Neutrophils 79      % Lymphocytes 10      % Monocytes 9      % Eosinophils 1      % Basophils 1      % Immature Granulocytes 0      NRBCs per 100 WBC 0      Absolute Neutrophils 6.2      Absolute Lymphocytes 0.8      Absolute Monocytes 0.7      Absolute Eosinophils 0.1      Absolute Basophils 0.1      Absolute Immature Granulocytes 0.0      Absolute NRBCs 0.0     LIPASE - Normal    Lipase 16     ROUTINE UA WITH MICROSCOPIC REFLEX TO CULTURE     CT Abdomen Pelvis w Contrast   Final Result   IMPRESSION:   In this patient with a history of cirrhosis and metastatic   hepatocellular carcinoma:   1. There is evidence of increased diffuse peritoneal metastases.   Worsening moderate to large ascites.   2. Multiple enlarging pulmonary nodules as described above, concerning   for increased metastatic disease.   3. Diffuse colonic wall thickening, which can be seen in the setting   of a nonspecific colitis and/or portal hypertension.   4. Increased  periportal lymphadenopathy, metastatic.   5. Small right pleural effusion.      I have personally reviewed the examination and initial interpretation   and I agree with the findings.      ROSALIND GILLIAM MD            SYSTEM ID:  [id number]             Assessment & Plan    Preeti Pascual is a 57 year old male with history of cirrhosis, hepatitis B, and hepatocellular carcinoma that is metastatic to multiple sites who presents with chronic abdominal pain with associated nausea, vomiting, and poor oral intake.  Patient is hemodynamically stable, exam notable for diffuse abdominal tenderness with mild distention, cachexia, and scleral icterus/jaundice. Differential includes cancer pain, cholecystitis, pancreatitis, appendicitis, and intestinal obstruction.  Lipase is normal, and CT abdomen/pelvis reassuring against cholecystitis, pancreatitis, appendicitis, and obstruction.  Given chronicity of symptoms and evidence of increasing peritoneal metastases on CT, chronic diffuse abdominal pain is most likely secondary to patient's metastatic hepatocellular carcinoma.  Abdominal pain is improving with IV Dilaudid. Plan to admit to medicine for pain control and potential discussion of hospice care.    I have reviewed the nursing notes. I have reviewed the findings, diagnosis, plan and need for follow up with the patient.    New Prescriptions    No medications on file       Final diagnoses:   Hepatocellular carcinoma (H)   Abdominal pain, generalized       LUMA PACHECO, MS3 on 4/8/2023 at 1:16 PM        --    ED Attending Physician Attestation    I Guillermo Cox DO, cared for this patient with the Medical Student. I performed, or re-performed, the physical exam and medical decision-making. I have verified the accuracy of all the medical student findings and documentation above, and have edited as necessary.    Summary of HPI, PE, ED Course   Patient is a 57 year old male evaluated in the emergency department for acute on  chronic abdominal pain.  Has known hepatocellular carcinoma without plan for continued treatment.  Laboratory work-up shows worsening LFTs, increasing INR.  CT scan shows worsening of metastatic disease with ascites.  Pain persists despite IV pain medication.  After the completion of care in the emergency department, the patient was admitted to inpatient.    Critical Care & Procedures  Not applicable.        Medical Decision Making  The patient's presentation was of high complexity (a chronic illness severe exacerbation, progression, or side effect of treatment).    The patient's evaluation involved:  ordering and/or review of 3+ test(s) in this encounter (see separate area of note for details)    The patient's management necessitated high risk (a decision regarding hospitalization).          Guillermo Cox DO  Emergency Medicine   Guillermo Cox DO  [facility] EMERGENCY DEPARTMENT  4/8/2023     Guillermo Cox DO  04/08/23 144

## 2023-04-08 NOTE — ED TRIAGE NOTES
States he's got abdominal pain, started to vomit in triage. State's he hasn't eaten in quite some time. Liver CA pt, currently getting tx.      Triage Assessment     Row Name 04/08/23 0958       Triage Assessment (Adult)    Airway WDL WDL       Respiratory WDL    Respiratory WDL X;rhythm/pattern    Rhythm/Pattern, Respiratory shortness of breath       Skin Circulation/Temperature WDL    Skin Circulation/Temperature WDL WDL       Cardiac WDL    Cardiac WDL WDL       Peripheral/Neurovascular WDL    Peripheral Neurovascular WDL WDL       Cognitive/Neuro/Behavioral WDL    Cognitive/Neuro/Behavioral WDL WDL

## 2023-04-08 NOTE — ED NOTES
Patient ambulates to the bathroom independently with a steady gait. Reports no BM at this time. Family reports he hasn't been eating well and therefore has not had a BM in several days.

## 2023-04-08 NOTE — LETTER
[facility] [address] [address]  Phone: [phone]    April 17, 2023    Barber Pascual   [address]   [facility] [address]      To whom it may concern:    RE: Barber Pascual is excused from work between the dates of 4/8/2023 - 4/30/2023. Patient may return to work on 5/1/2023 without any restrictions.    Please contact me for questions or concerns.      Sincerely,  Yanci Bailey MD  Internal Medicine

## 2023-04-08 NOTE — H&P
[facility]    Internal Medicine History and Physical - [place] [facility] Service       Date of Admission:  4/8/2023    Chief Complaint   Acute on chronic abdominal pain    History of Present Illness   Preeti Pascual is a 57 year old male w/ PMHx significant for metastatic hepatocellular carcinoma and cirrhosis 2/2 HBV who p/w acute on chronic abdominal pain. Patient interviewed w/ phone  and wife at bedside. Patient notes generalized abdominal pain that has been worsening over the past few days. Has been taking more PRN oxycodone, but unsure of exactly how much. Wife notes that he seems more fatigued and slightly confused compared to usual, especially after taking oxycodone. Last BM 6 days ago; previously had 1 BM/day. Has been taking Senokot but not Miralax. Some nausea. Denies vomiting, fever, chills. No prior hx of paracentesis. Very poor PO intake d/t lack of appetite. Had mechanical fall without lead impact or LOC with some residual right hip pain. Able to bear weight. Wife reiterates their goal of returning to [facility] to see family and friends once pain is better managed. Confirmed DNR/DNI status with patient.     ED Course  Vitals: VSS  Labs: Na 127, alk phos 369 (chronically elevated),  (chronically elevated), Tbili 8.3, Hgb 12.3. Lipase 16.  Imaging:  - CT AP w/ contrast: increased diffuse peritoneal metastases; worsening moderate/large ascites; enlarging pulmonary nodules; diffuse colonic wall thickening (nonspecific colitis vs portal HTN); increased periportal LAD; small right pleural effusion  Interventions  - Meds: IV hydromorphone 0.5mg  - Consults:  Patient admitted to hospital for further management      Review of Systems   The 10 point Review of Systems is negative other than noted in the HPI or here.     Past Medical History    I have reviewed this patient's medical history and updated it with pertinent information if needed.   Past Medical  History:   Diagnosis Date     Hepatitis B      Hepatocellular carcinoma (H)       Past Surgical History   I have reviewed this patient's surgical history and updated it with pertinent information if needed.  Past Surgical History:   Procedure Laterality Date     Y-90 Delivery       Social History   Social History     Tobacco Use     Smoking status: Never     Smokeless tobacco: Never   Substance Use Topics     Alcohol use: No     Alcohol/week: 0.0 standard drinks of alcohol     Drug use: No     Family History   I have reviewed this patient's family history and updated it with pertinent information if needed.   Family History   Problem Relation Age of Onset     Other Cancer No family hx of      Prior to Admission Medications   Prior to Admission Medications   Prescriptions Last Dose Informant Patient Reported? Taking?   SENNA-docusate sodium (SENNA S) 8.6-50 MG tablet   No No   Sig: Take 1-2 tablets by mouth 2 times daily as needed   acetaminophen (TYLENOL) 325 MG tablet   Yes No   Sig: Take 650 mg by mouth every 8 hours as needed for mild pain Do not exceed 2 grams per day.   Patient not taking: Reported on 3/3/2023   dexamethasone (DECADRON) 2 MG tablet   No No   Sig: Take 1 tablet (2 mg) by mouth daily (with breakfast)   morphine (MS CONTIN) 15 MG CR tablet   No No   Sig: Take 1 tablet (15 mg) by mouth every 12 hours for 30 days maximum 2 tablet(s) per day   omeprazole (PRILOSEC) 20 MG DR capsule   No No   Sig: Take 1 capsule (20 mg) by mouth daily   ondansetron (ZOFRAN) 8 MG tablet   No No   Sig: Take 1 tablet (8 mg) by mouth every 8 hours as needed for nausea (vomiting)   oxyCODONE (ROXICODONE) 5 MG tablet   No No   Sig: Take 2 tablets (10 mg) by mouth every 4 hours as needed for moderate to severe pain   prochlorperazine (COMPAZINE) 10 MG tablet   No No   Sig: Take 1 tablet (10 mg) by mouth every 6 hours as needed for nausea or vomiting      Facility-Administered Medications: None     Allergies   Allergies    Allergen Reactions     Pepcid [Famotidine] Headache       Physical Exam   Vital Signs: Temp: 97.7  F (36.5  C) Temp src: Temporal BP: 137/81 Pulse: 101   Resp: 18 SpO2: 96 % O2 Device: None (Room air)    Weight: 0 lbs 0 oz  Constitutional: NAD. Conversant. Cachectic.  HEENT: NC/AT, icteric sclera, pupils round and equal, EOMI.  CV: RRR, normal S1/S2, no murmurs/gallops/rubs, intact distal pulses.  Pulm: Non-labored respirations on room air, CTAB, no wheezes or crackles.  Abdomen: Moderately distended. Sluggish bowel sounds. Non-tender. No rebound or guarding.  Extremities: Warm and well-perfused, no peripheral edema, cyanosis, or clubbing.  Skin: No jaundice, no rashes on exposed skin.  Neuro: Alert and oriented, moves all four extremities independently.  Psych: Normal affect.    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    Metastatic hepatocellular carcinoma, progressive, off active treatment  Decompensated cirrhosis 2/2 HBV in setting of ascites  Patient p/w acute on chronic abdominal pain. Wife notes intermittent confusion. Labs notable for alk phos 369 (chronically elevated),  (chronically elevated), Tbili 8.3. Lipase and WBC WNL. CT AP (4/8) w/ increased diffuse peritoneal metastasis, worsening moderate/large ascites, diffuse colonic wall thickening. Previously underwent liver-directed therapy w/ radioembolization. Developed metastatic disease to bilateral adrenal glands (2018). Subsequently tx w/ sorafenib, nivolumab, ipilimumab, ramucirumab, lenvatinib, gemzar/oxaliplatin (discontinued 2/2023). Despite this, disease continued to progress w/ diffuse mets. Oncology recommended stopping active tx d/t low likelihood of meaningful response and declining performance status. In regards to  cirrhosis, no prior hx of decompensations. Oncology has been managing sxs as patient ultimately wants to return to [facility] for end-of-life. Prescribed morphine and oxycodone, although unclear how much he is taking per day.  - Palliative care consulted for assistance w/ GOC conversation and sx management; recs appreciated  - CAPS consulted for diagnotic and therapeutic paracentesis  - Pain regimen: Tylenol 650mg TID, continue PTA morphine CR 15mg BID, continue PTA oxycodone 10mg Q4H PRN (moderate pain), IV hydromorphone 0.2mg Q2H PRN (severe pain)  - Pruritis: hydroxyzine PRN, camphor lotion PRN  - Continue PTA dexamethasone  - Ammonia level ordered  - Hold off on initiating additional cirrhosis-specific therapy at this time    Constipation  Patient endorses no BM for past 6 days. Previously had 1 BM/day. Has been taking Senokot daily, but not Miralax.  - Tap water enema  - Miralax BID and Senokot BID  - Dulcolax suppository PRN    Hyponatremia, mild  Na 127 on admission. Suspect true hypovolemia in setting of poor PO intake.  - Urine osm, urine Na ordered  - NS 100cc/hr    Diet: Combination Diet Regular Diet Adult  Fluids:  ml/hr  Lines: PIV  [place] Catheter: Not present  DVT Prophylaxis: Enoxaparin (Lovenox) SQ  Code Status: No CPR- Do NOT Intubate     Expected Discharge Date: 04/10/2023                The patient was discussed with Dr. Elena Vaca.    Yanci Bailey MD  Internal Medicine, PGY-1  Please see sticky note for cross cover information      Data     I have personally reviewed the following data over the past 24 hrs:    7.8  \   12.3 (L)   / 376     127 (L) 95 (L) 7.3 (L) /  98   4.1 21 (L) 0.54 (L) \       ALT: 50 AST: 277 (H) AP: 369 (H) TBILI: 8.3 (H)   ALB: 3.1 (L); 3.1 (L) TOT PROTEIN: 8.1 LIPASE: 16       INR:  3.96 (H) PTT:  N/A   D-dimer:  N/A Fibrinogen:  N/A

## 2023-04-09 NOTE — PROVIDER NOTIFICATION
Critical Test Results/Notification    Critical lab result (name and value): Ammonia 119  What time did lab notify you? 1129  What provider did you notify? Elena Vaca MD  Was the provider notified within 30 min? Yes  Why was provider not notified? Not applicable    Response from provider: Pending

## 2023-04-09 NOTE — PLAN OF CARE
Admitted/transferred from:   2 RN  skin assessment completed by Fuentes Burnett RN and Juventino CEDENO  Skin assessment finding:  none  Interventions/actions: none     Will continue to monitor.

## 2023-04-09 NOTE — PLAN OF CARE
Vitals:    23 1110 23 1737 23 1743 23   BP:  116/60 116/60 137/81   BP Location:   Left arm    Patient Position:   Right side    Pulse:    101   Resp:    18   Temp:    97.7  F (36.5  C)   TempSrc:    Temporal   SpO2: 97%   96%       Neuro: 57 years old Vietnamese speaking male, came from ED, abdominal pain nausea vomiting, alert and oriented sleepy and Quiet, wife at bed side, pt stated speaks english, I think he should have interpretor     VS: afebrile vitally stable, sating 96% on room air,     B, ALT and AST elevated, slight jaundice,     Cardiac: tachycardic 101    Pain/Nausea: nausea, denies any pain refused the schedule morphine,     Lines/Drains: right PIV NS running at 100 ml/hr     GI/: enema given in ED with small amount stool, has not voided since arrival at ,    Diet/Appetite: very poor appetite,     Activity: with stand by assist,     Plan: pt culm and resting, continue with plan of care.

## 2023-04-09 NOTE — PROGRESS NOTES
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 Wife notes intermittent confusion. Labs notable for alk phos 369 (chronically elevated),  (chronically elevated), Tbili 8.3. Ammonia 119. Lipase and WBC WNL. CT AP (4/8) w/ increased diffuse peritoneal metastasis, worsening moderate/large ascites, diffuse colonic wall thickening. Previously underwent liver-directed therapy w/ radioembolization. Developed metastatic disease to bilateral adrenal glands (2018). Subsequently tx w/ sorafenib, nivolumab, ipilimumab, ramucirumab, lenvatinib, gemzar/oxaliplatin (discontinued 2/2023). Despite this, disease continued to progress w/ diffuse mets. Oncology recommended stopping active tx d/t low likelihood of meaningful response and declining performance status. In regards to cirrhosis, no prior hx of decompensations. Oncology has been managing sxs as patient ultimately wants to return to [facility] for end-of-life. Prescribed morphine and oxycodone, although unclear how much he is taking per day.  - Palliative care consulted for assistance w/ GOC conversation and sx management; recs appreciated  - CAPS consulted for diagnotic and therapeutic paracentesis (albumin PRN if > 5L removed)  - Pain regimen:  Tylenol 650mg TID, continue PTA morphine CR 15mg BID, continue PTA oxycodone 10mg Q4H PRN (moderate pain), IV hydromorphone 0.2mg Q2H PRN (severe pain)  - Ascites: paracentesis as above; hold off on Lasix/spironolactone at this time  - HE: lactulose TID (hold if > 4 BM/day), rifaximin BID  - Pruritis: hydroxyzine PRN, camphor lotion PRN  - Continue PTA dexamethasone     Constipation; improving  Patient endorses no BM for past 6 days. Previously had 1 BM/day. Has been taking Senokot daily, but not Miralax. S/p tap water enema (4/8).  - Lactulose TID as above  - PRN Miralax, Senokot, Dulcolax suppository, tap water enema     Hyponatremia, mild; improving  Na 127 on admission. Suspect true hypovolemia in setting of poor PO intake.  - Urine osm, urine Na ordered  - NS 100cc/hr    Diet: Combination Diet Regular Diet Adult  Fluids: As above  Lines: PIV  [place] Catheter: Not present  DVT Prophylaxis: Enoxaparin (Lovenox) SQ  Code Status: No CPR- Do NOT Intubate     Expected Discharge Date: 04/10/2023        Discharge Comments: Pending palliative care consult. Working on pain management plan, bowel regimen, paracentesis.       The patient's care was discussed with the Attending Physician, Dr. Vaca..    Yanci Bailey MD  Internal Medicine, PGY-1  Please see sticky note for cross cover information      Interval History   No acute events overnight. Only received 1x PRN oxycodone. RN states that patient seemed comfortable overnight, but did not sleep. Received tap water enema in ED and had 1 small, loose BM. Patient endorses continued generalized abdominal pain. Feels more comfortable compared to yesterday.    Physical Exam   Vital Signs: Temp: 98.1  F (36.7  C) Temp src: Oral BP: 127/73 Pulse: 98   Resp: 18 SpO2: 96 % O2 Device: None (Room air)    Weight: 0 lbs 0 oz  Constitutional: NAD. Conversant. Cachectic.  HEENT: NC/AT, icteric sclera, pupils round and equal, EOMI.  CV: RRR, normal S1/S2, no murmurs/gallops/rubs, intact  distal pulses.  Pulm: Non-labored respirations on room air, CTAB, no wheezes or crackles.  Abdomen: Moderately distended. Sluggish bowel sounds. Non-tender. No rebound or guarding.  Extremities: Warm and well-perfused, no peripheral edema, cyanosis, or clubbing.  Skin: No jaundice, no rashes on exposed skin.  Neuro: Alert and oriented, moves all four extremities independently.  Psych: Normal affect.    Data   Recent Labs   Lab 04/09/23  0814 04/08/23  1006   WBC 6.1 7.8   HGB 11.1* 12.3*   MCV 94 93    376   INR  --  3.96*   * 127*   POTASSIUM 4.0 4.1   CHLORIDE 100 95*   CO2 20* 21*   BUN 7.6* 7.3*   CR 0.54* 0.54*   ANIONGAP 11 11   STACEY 8.5* 8.9   GLC 82 98   ALBUMIN 2.7* 3.1*  3.1*   PROTTOTAL 7.1 8.1   BILITOTAL 7.0* 8.3*   ALKPHOS 319* 369*   ALT 41 50   * 277*   LIPASE  --  16

## 2023-04-09 NOTE — CONSULTS
[facility] - Palliative Care Consultation Note    Patient: Preeti Pascual  Date of Admission:  4/8/2023    Requesting Clinician / Team: Dr. Vaca   Reason for consult: Symptom management  Goals of care    Assessment/Recommendations:  1)   GOALS OF CARE per wife    Restorative to the point that he can safely travel home to [facility] to die.    Code Status:  DNAR/DNI    2)    ADVANCED CARE PLANNING    Patient has completed health care directive:   no    Documented health care agent:  n/a    Surrogate decision maker if no appointed agent:  Would be wife    3)    SYMPTOM MANAGEMENT it appears Preeti has a hepatic encephalopathy with some hypoactive features; he was minimally interactive with me this afternoon and unable to assist his friend and me repositioning him up in bed    We are not currently helping to manage symptoms in this patient    No unmet symptom burden identified    4)    PSYCHOSOCIAL/SPIRITUAL SUPPORT    Preeti's wife declines  support or access to Baptism Sacraments (they are Hinduism Christians)    She is OK with involvement of a PC SW and one will be deployed tomorrow.    These recommendations have been discussed with Dr. Vaca via telephone.      Thank you for the opportunity to participate in the care of this patient and family.      During regular M-F work hours -- if you are not sure who specifically to contact -- please contact us by calling [phone].        Palliative Care Assessment      Prognosis, Goals, and/or Advance Care Planning were addressed today:  YES    Mood, coping, and/or meaning in the context of serious illness were addressed today: YES with wife    Summary of Palliative Encounter:  I discussed the reason for Palliative Care Referral and our role in symptom management, patient family communication, understanding their choices for medical treatment, and providing  guidance in making difficult decisions in the framework of focusing on patient comfort and quality  of life.  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    Preeti was unable to participate in this afternoon's conversation due to hypoactive delirium or hepatic encephalopathy. His wife was present and she requested their good friend Ilda Chu act as an .  He was willing to do so.  We had a very far ranging conversation over about 80 minutes and the gist of it is that Preeti either didn't understand how ill he was or he didn't communicate it to his wife and she is shocked by his sudden decline.  We spent some time reviewing in simple terms and using graphs what is likely happening and why there is what appears to be a 'sudden' turn of events.    Preeti's wife, Barber (sp?) says it is their wish for him to return to [place] to die.  This is for a couple of reasons:  1) economics--it is cheaper to fly as a living person than to repatriate a dead body and cremation is not a culturally acceptable option for them, and 2) Preeti has expressed a desire to see his sister and his wife worries if he dies before that happens she will be cut off from his family at a time when she is vulnerable.  Corey said she wished they have left for [place] three weeks ago when he was more stable.  There is also a concern that [place] sometimes does not allow bodies to be repatriated for burial, so arriving alive, and then dying may avoid that problem.  Corey also feels overwhelmed by all of the unknowns of how she would try to have his body repatriated whereas she feels that if he got to [place] then his family would know what to do. I directd her to call their consulate or embassy to learn more about this process.    At times Corey indicated she understood there were problems with her plan to have him fly to [place] such as how would she navigate [facility] or [facility] airports on her own with  him in a debilitatd state.  I shared my worries that he could die en route and precipitate an emergency landing and then she might be in an even more unfamiliar country or he could have a very painful or symptomatic death en route without adequate medical or nursing care.  She would briefly consider these concerns and then go back to her wish for him to travel.    Corey also thinks that Preeti might need a physician's note stating he is safe to travel and I told her that I could not provide that given his current condition.    I also explained how paracentesis works and how I might help relieve some of Preeti's pain and ease his breathing (though he has no increased WOB).  I said the primary team would continue to  try to get him into as good a shape as possible but even then we couldn't guarantee he could make an international transoceanic flight.  Corey asked for the care team to try to make that a possibility.    Medical Situation: This is from Dr. 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 Patient interviewed w/ phone  and wife at bedside. Patient notes generalized abdominal pain that has been worsening over the past few days. Has been taking more PRN oxycodone, but unsure of exactly how much. Wife notes that he seems more fatigued and slightly confused compared to usual, especially after taking oxycodone. Last BM 6 days ago; previously had 1 BM/day. Has been taking Senokot but not Miralax. Some nausea. Denies vomiting, fever, chills. No prior hx of paracentesis. Very poor PO intake d/t lack of appetite. Had mechanical fall without lead impact or LOC with some residual right hip pain. Able to bear weight. Wife reiterates their goal of returning to [facility] to see family and friends once pain is better managed. Confirmed DNR/DNI status with  "patient.\"    Preeti hs been told he is not a candidate for mor tumor directed car and his ammonia has been rising (119 today) and he is less interactive.  His wife also earlier refused to consent for a paracentesis because she was worried it was 'surgery' and also that it was supposed to remove stool from his abdomen.  I helped her understand the proposed benefit of the paracentesis and she said she would consent to doing it in the future.    Previous Palliative Care Encounters:  none on file    Functional Status:     Functional Status two weeks prior to hospitalization:  ECOG     Functional status Current:   ECOG 4      Prognosis, Goals, & Planning:   Prognosis (quality & quantity): days to short weeks    Basis for estimate:  Clinical judgment  High risk of death within one year? YES     Patient's decision making preferences: unable to assess    Capacity evaluation:    Preeti DOES NOT have capacity to make a decision regarding COMPLEX GOALS OF CARE based on the following:    INABILITY to understand relevant information about his/her condition.    INABILITY to demonstrate understanding of his/her illness and care needs.    INABILITY to demonstrate reasoning to make decisions regarding his/her illness.    INABILITY to communicate his/her options for treatment.      I have concerns about the patient/family's health literacy today: YES for wife  but not Heruy Vlad           Patient has a completed Health Care Directive: No.       Code status: DNAR/DNI    Social:     Birthplace: [facility]    Relationships: ; no children in the [place] (and possibly no children ever)    Patient is 'famous for being a good and loving person'    Spirituality: Jew Zoroastrian     Living situation: apartment in [place] with wife    [place] family / caregivers: wife    History of Present Illness:  History gathered today from: review of Epic record    This is from Dr. 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 Patient interviewed w/ phone  and wife at bedside. Patient notes generalized abdominal pain that has been worsening over the past few days. Has been taking more PRN oxycodone, but unsure of exactly how much. Wife notes that he seems more fatigued and slightly confused compared to usual, especially after taking oxycodone. Last BM 6 days ago; previously had 1 BM/day. Has been taking Senokot but not Miralax. Some nausea. Denies vomiting, fever, chills. No prior hx of paracentesis. Very poor PO intake d/t lack of appetite. Had mechanical fall without lead impact or LOC with some residual right hip pain. Able to bear weight. Wife reiterates their goal of returning to [facility] to see family and friends once pain is better managed. Confirmed DNR/DNI status with patient    Key Palliative Symptom Data:  We are not helping to manage these symptoms currently in this patient.    Patient is on opioids: assessed and bowels ok/no needed changes to plan of care today. Patent began lactulose today for HE and is also on Miralax and Sennakot    ROS:  Comprehensive ROS is reviewed and is negative except as here & per HPI: Unable to assess fully as patient is encephalopathic        Past Medical History:  Past Medical History:   Diagnosis Date     Hepatitis B      Hepatocellular carcinoma (H)         Past Surgical History:  Past Surgical History:   Procedure Laterality Date     Y-90 Delivery           Family History:  Family History   Problem Relation Age of Onset     Other Cancer No family hx of          Allergies:  Allergies   Allergen Reactions     Pepcid [Famotidine] Headache        Medications:  I have reviewed this patient's medication profile and medications from this hospitalization.       acetaminophen  650 mg Oral Q8H     albumin human  25-50 g Intravenous Once     dexamethasone  2 mg Oral Daily with  breakfast     enoxaparin ANTICOAGULANT  30 mg Subcutaneous Q24H     lactulose  20 g Oral TID     melatonin  3 mg Oral At Bedtime     morphine  15 mg Oral Q12H     pantoprazole  40 mg Oral Daily     rifaximin  550 mg Oral BID     sodium chloride (PF)  3 mL Intracatheter Q8H        PRN MEDS:   bisacodyl, camphor-menthol, HYDROmorphone, hydrOXYzine, lidocaine 4%, lidocaine (buffered or not buffered), naloxone **OR** naloxone **OR** naloxone **OR** naloxone, ondansetron **OR** ondansetron, oxyCODONE, polyethylene glycol, prochlorperazine **OR** prochlorperazine **OR** prochlorperazine, senna-docusate, sodium chloride (PF)     Physical Exam:   Temp  Min: 97.7  F (36.5  C)  Max: 98.1  F (36.7  C)    Pulse  Min: 93  Max: 113    Resp  Min: 18  Max: 18    BP  Min: 127/73  Max: 141/84    SpO2  Min: 96 %  Max: 97 %        General Appearance: very ill  man with significant cachexia and distended abdomen   Head: Normocephalic, without obvious abnormality, atraumatic.   Eyes: Conjunctivae icteric   Throat: Lips, mucosa, and tongue moist; teeth and gums normal.   Resp: unlabored with no increased WOB   CV: RRR to palpation at right radial artery  Abdomen: significantly distended; tender to deep palpation without rebound  Extremities: significant sarcopenia  Skin: Warm and dry, no rashes in exposed areas.   Neurologic: obtunded; unable to follow commands such as use his legs to assist with repositioning; could not assess for asterixis due to lack of cooperation; did track with eyes when his name was called  Psych:  Obtunded; unable to assess        Intake/Output Summary (Last 24 hours) at 4/9/2023 1752  Last data filed at 4/9/2023 0300  Gross per 24 hour   Intake --   Output 145 ml   Net -145 ml          Data reviewed:  Reviewed recent pertinent imaging, comments:   04/08/2023 CT AP W/CONTRAST                                                                   IMPRESSION:  In this patient with a history of cirrhosis and  metastatic  hepatocellular carcinoma:  1. There is evidence of increased diffuse peritoneal metastases.  Worsening moderate to large ascites.  2. Multiple enlarging pulmonary nodules as described above, concerning  for increased metastatic disease.  3. Diffuse colonic wall thickening, which can be seen in the setting  of a nonspecific colitis and/or portal hypertension.  4. Increased periportal lymphadenopathy, metastatic.  5. Small right pleural effusion.    Reviewed recent labs, comments:   Lab Results: personally reviewed    Data   Recent Labs   Lab 04/09/23  0814 04/08/23  1006   WBC 6.1 7.8   HGB 11.1* 12.3*    376   POTASSIUM 4.0 4.1   CHLORIDE 100 95*   CO2 20* 21*   BUN 7.6* 7.3*   CR 0.54* 0.54*   STACEY 8.5* 8.9   GLC 82 98   ALBUMIN 2.7* 3.1*  3.1*   PROTTOTAL 7.1 8.1   BILITOTAL 7.0* 8.3*   ALKPHOS 319* 369*   ALT 41 50   * 277*   LIPASE  --  16   Ammonia 119    Lab Results   Component Value Date/Time    ALBUMIN 2.7 (L) 04/09/2023 08:14 AM    ALBUMIN 3.2 (L) 08/29/2022 11:05 AM    ALBUMIN 3.2 (L) 07/08/2021 08:39 AM       Weights:   Vitals:    04/09/23 1618   Weight: 53.1 kg (117 lb)    Body mass index is 19.47 kg/m .        Information gathered today from: family/loved ones, medical team members, unit team members and chart review in Epic.    120 minutes spent on the date of the encounter doing chart review, history and exam, patient education & counseling, documentation and other activities as noted above.      During regular M-F work hours -- if you are not sure who specifically to contact -- please contact us by calling [phone].

## 2023-04-09 NOTE — PLAN OF CARE
"Assumed cares: [id number]  Vitals: VSS on RA  Pain: Pt reports abdominal discomfort. Pt declined any pain medications due to pt stating \"makes me sleepy\". Pt reported pain getting better throughout the shift  Neuro: A&O x4 with intermittent confusion- pt started on lactulose   Cardiac: WDL  Respiratory: WDL  GI/: Constipation- scheduled Miralax and senna provided to pt; Poor appetite, encouraged pt to eat  Skin: No new skin changes  IV/Drains: R PIV- running NS at 100 mL/hr  Activity: SBA   Behavior: Pt is calm and cooperative    Plan of Care: Follow patient plan of care    "

## 2023-04-09 NOTE — PROGRESS NOTES
Reason for transfer: private room  Transferred from:   Report received from: Fuentes Singer RN skin assessment completed by: Chema Brooks      - Findings (add LDA if needed): Old scar both knees, abdominal distension and scally, jaundice sclera and facial skin,  Bed algorithm reevaluated: Yes  Was Pulsate ordered?: No   Flu shot ordered? (October-April only):   Detailed Belongings:A  trouser and a pair of shoes.        RN Decompensation Assessment (Repeat at 12 hours)    (Additional guidance for RRT)      Mentation:  Exam is notable for normal (baseline) mental status    Respiratory mechanics and oxygenation:  Exam is notable for normal/unchanged breathing pattern    Cardiovascular/perfusion:   Exam is notable for normal/unchanged cardiovascular/perfusion assessment    Overall estimation of the patient s condition/stability (1 = stable patient, 7 = appears very sick)? 2

## 2023-04-09 NOTE — PROGRESS NOTES
Shift [id number] hrs      A 57 years old Faroese speaking male, came from , abdominal pain nausea vomiting, calm and coperative, wife at bed side, pt speaks english.    Neuro- Axo 4  VS: afebrile vitally stable, sats 96% on room air,     Lab; elevated ALS & AST levels, Jaundice (skin & sclera)   Cardiac: WNL though was Tachy on admission   Pain/ pt. Reported abdominal pain score 3/10   IV/Drains: Rt PIV infusing N/S at 100 ml/hr    GI/:No BM   Diet; poor appetite,  Skin/ generalized jaundice, old scars on both knees, dry heels, scaling abdomen and lower back, pt. Reported tingling sensation on both hands for 1 week.   Activity: stand with assist,      Plan: Awake all night long, Family at bedside, continue with plan of care.

## 2023-04-10 NOTE — PROGRESS NOTES
Care Management Initial Consult    General Information  Assessment completed with: Patient, Friend, VM-chart review,    Type of CM/SW Visit: Initial Assessment  Primary Care Provider verified and updated as needed:     Readmission within the last 30 days: no previous admission in last 30 days   Reason for Consult: discharge planning  Advance Care Planning, Reviewed: no concerns identified        Communication Assessment  Patient's communication style: spoken language - Slovenian     Hearing Difficulty or Deaf: no   Wear Glasses or Blind: yes    Cognitive  Cognitive/Neuro/Behavioral: WDL                      Living Environment:   People in home: spouse     Current living Arrangements: apartment      Able to return to prior arrangements:  TBD     Family/Social Support:  Care provided by: spouse/significant other  Provides care for: no one, unable/limited ability to care for self  Marital Status:   Support System: Wife and friend, Her - [facility] interpersonal translating resource        Description of Support System: Supportive, Involved       Current Resources:   Patient receiving home care services: No  Community Resources:    Equipment currently used at home: none  Supplies currently used at home:      Employment/Financial:  Employment Status: other (see comments) (unable to work)     Financial Concerns: other (see comments) (illness, unable to work)      Lifestyle & Psychosocial Needs:  Social Determinants of Health     Tobacco Use: Low Risk  (3/27/2023)    Patient History      Smoking Tobacco Use: Never      Smokeless Tobacco Use: Never      Passive Exposure: Not on file   Alcohol Use: Not on file   Financial Resource Strain: Not on file   Food Insecurity: Not on file   Transportation Needs: Not on file   Physical Activity: Not on file   Stress: Not on file   Social Connections: Not on file   Intimate Partner Violence: Not At Risk (6/27/2022)    Humiliation, Afraid, Rape, and Kick questionnaire      Fear of  Current or Ex-Partner: No      Emotionally Abused: No      Physically Abused: No      Sexually Abused: No   Depression: Not at risk (1/19/2023)    PHQ-2      PHQ-2 Score: 0   Housing Stability: Not on file     Functional Status:  Prior to admission patient needed assistance:      Mental Health Status:  Mental Health Status: No Current Concerns       Chemical Dependency Status:  Chemical Dependency Status: No Current Concerns           Values/Beliefs:  Spiritual, Cultural Beliefs, Holiness Practices, Values that affect care:               Additional Information:  CMA completed at bedside with spouse, pt and via phone call with Ilda, see contact list. Ilda helped confirm the pt and his spouse do live locally, see listed address, but concerns about transportation are a [place] issue as to why the spouse has remained at bedside since admit. Pt noted to have concerns about certain tx, as concerns r/t language barrier, reduced medical literacy, and subsequent international travel prevented recent PC. Palliative consult involved. RNCC/CM team to continue to follow pt and support safe discharge planning and assist family with travel to/from hospital - spouse may not stay overnight.      Alonzo Neal RN BSN  5B RNCC  Phone: [phone]  Pager: [phone]    For Weekend & Holiday on call RN Care Coordinator:  (Tasks: Home care, home infusion, medical equipment, transportation, IMM & KOEHLER forms, etc.)  [place] (0800  1630) Saturday and Sunday    Units: 4A, 4C, 4E, 5A and 5B: [phone]    Units: 6A, 6B, 6C, 6D: [phone]    Units: 7A, 7B, 7C, 7D, and 5C: [phone]    [facility] [place], [place] (8107-0074) Saturday and Sunday    Units: 5 Ortho, 8A, 10 ICU, & Pediatric Units: [phone]

## 2023-04-10 NOTE — PROGRESS NOTES
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 Had previosly decided to forego further treatment of his disease. CTAP with increased diffuse peritoneal metastases and worsening mod/large ascites. Starting lactulose to help with encephalopathy as well as constipation. Await diagnostic and therapeutic para. Palliative care for further discussions of end stage disease.     Brief summary of visit: Introductory palliative care visit this morning with Dr. Brandyn Faulkner. Reviewed role of palliative are team working alongside pt's primary team for additional support and symptom management.     Primary participants in today's meeting were pt's wife and family friend who assisted in interpreting per wife's request/preference. Preeti awake during visit but encephalopathic.    Wife and friend Ilda demonstrate understanding of pt's life limiting illness though verbalize continued interest in having patient travel back home ([facility]) to die. Ilda shared with palliative team today that should traveling back to [facility] be ruled out/no possible, wife of pt would not be able to meet his care needs at home; no local support system. Briefly discussed options including nursing home placement with hospice or hospice facility.    Coordination of care following visit with Alonzo MONTGOMERY.    Date of Admission: 4/8/2023    Reason for consult: Symptom management  Goals of care  Decisional support  Patient and family support    Sources of information: Family member wife  Other friend Ilda    Recommendations & Plan:  Palliative care will continue to follow, ongoing assessment of support needs.      Symptoms & Concerns Addressed Today:  Caregiver limited to no family or friend support for caregiving at home  Cultural pt and wife hoping pt will be  able to travel home to [facility]  Emotional ongoing assessment of emotional support needs; wife and friend Ilda just told this weekend pt with a limited life expectancy  Family    Strengths Identified:    [place]    Relationships & Support:  Aspects of relationships and support assessed today:    Identified family members: wife Barber; friend Ilda    Professional supports: NA    Family coping: continue to assess; wife appears somewhat in shock    Bereavement Risk concerns: continue to assess    Coping, Mental Health & Adjustment to Illness:   Adjustment to Illness/Hospitalization    Goals, Decision Making & Advance Care Planning:   Prognosis, Goals, and/or Advance Care Planning were assessed today: Yes  If yes, brief summary of discussion: restorative with hope of traveling home  Preferred language: Divehi  Patient's decision making preferences: shared with support from loved ones  I have concerns about the patient/family's health literacy today: Unable to assess today  Patient has a completed Health Care Directive: No.   Code status per chart review: No CPR / No Intubation      Clinical Social Work Interventions:   Assessment of palliative specific issues     Adjustment to illness counseling  Family communication facilitated  Goals of care discussion/facilitation      SALLY Sun, [facility]  Palliative Care   [facility] Inpatient Team Consult pager [phone] (M-F 8-4:30)  After-hours Answering Service [phone]

## 2023-04-10 NOTE — PROGRESS NOTES
Shift 9879-5248 hrs    BP (!) 142/85 (BP Location: Right arm, Patient Position: Left side, Cuff Size: Adult Small)   Pulse 109   Temp 98.8  F (37.1  C) (Oral)   Resp 18   Wt 53.1 kg (117 lb)   SpO2 98%       Patient Spanish speaking but understands and converse clear English    Neuro- Axo 4  VS: afebrile vitally stable except tarchy, sats 98% on room air.    Lab;No RN managed protocol, No BG   Cardiac: WNL   Pain/ Reports generalized abdominal pain rating 10/10, managed with IV dilaudid with good effect.   IV/Drains: Rt PIV infusing N/S at 100 ml/hr    GI/: No U/O therefore could not obtain urine specimen for LAB works, BM x2, wife reports a lot of loose watery yellowish stool, splashed on the bathroom floor, Rn did not witness. Has abdominal ascitis. At 7am, BM- loose watery greenish diarrhea= 300mls.    Diet; poor appetite, encourage feeds,   Skin; no new changes.   Activity: stand with assist,      Plan: Kept awake, Family at bedside, continue with plan of care. To obtain urine specimen.

## 2023-04-10 NOTE — PROGRESS NOTES
"CLINICAL NUTRITION SERVICES - ASSESSMENT NOTE     Nutrition Prescription    RECOMMENDATIONS FOR MDs/PROVIDERS TO ORDER:  Recommend tube feed support given patient with minimal PO intake prior to admission (if within goals of care). Please consult RD to order nutrition support.    Malnutrition Status:    Severe malnutrition in the context of chronic illness.    Recommendations already ordered by Registered Dietitian (RD):  Discussed nutrition supplements to optimize nutrition and enhance recovery.  Order Ensure Plus with lunch and dinner trays.    Future/Additional Recommendations:  Tube feed recommendation:  Osmolite 1.5 Rudy (or equivalent) @ goal of  45ml/hr (1080ml/day) provides: 1620 kcals (31 kcal/kg), 67 g PRO (1.3 g/kg), 822 ml free H20, 219 g CHO, and 0 g fiber daily     REASON FOR ASSESSMENT  Preeti Pascual is a/an 57 year old male assessed by the dietitian for a positive Admission Nutrition Risk Screen for weight loss of 2-13 lbs.    PMH  - Cirrhosis, hepatitis B, and hepatocellular carcinoma that is metastatic to multiple sites   - Presented with chronic abdominal pain - nausea, vomiting, constipation and lack of appetite for the last 3-4 months.      NUTRITION HISTORY  Spoke with patients wife as the patient was not alert. Patient has had decreased appetite and is not eating much if at all.    CURRENT NUTRITION ORDERS  Diet: Regular  Intake/Tolerance: Per RN notes and flowsheets, patient has lack of appetite., eating 0-25% of meals.    LABS  Labs reviewed  K: 4.0 (L), Cl: 100,   Ammonia: 119 (H)  Tbili: 7.0 (H)    MEDICATIONS  Medications reviewed  Lactulose 20 g TID, Rifaxamin 550 mg BID  Senna Docusate discontinued.      GI/Stool: Last BM 6 days ago.    ANTHROPOMETRICS  Height:165.1 cm (5' 5\")  Most Recent Weight: 53.1 kg (117 lb)    IBW: 61.5 kg (86%)  BMI: Normal BMI  Weight History:   Wt Readings from Last 20 Encounters:   04/09/23 53.1 kg (117 lb)   03/27/23 51.4 kg (113 lb 4.8 oz)   03/03/23 50.8 " kg (112 lb)   02/09/23 54.4 kg (120 lb)   02/08/23 50.8 kg (111 lb 14.4 oz)   01/31/23 51.8 kg (114 lb 4.8 oz)   01/19/23 55.3 kg (122 lb)   01/18/23 55.3 kg (122 lb)   01/12/23 54.7 kg (120 lb 11.2 oz)   01/09/23 54.5 kg (120 lb 3.2 oz)   12/28/22 54.3 kg (119 lb 9.6 oz)   12/14/22 56 kg (123 lb 6.4 oz)   12/06/22 58.4 kg (128 lb 12.8 oz)   11/30/22 59.7 kg (131 lb 11.2 oz)   11/11/22 61 kg (134 lb 6.4 oz)   10/31/22 61.7 kg (136 lb)   10/20/22 62.6 kg (138 lb)   10/06/22 63.6 kg (140 lb 4.8 oz)   09/22/22 64.6 kg (142 lb 8 oz)   09/08/22 64.2 kg (141 lb 8 oz)   Weight loss since 10/6/22: 10.5 kg (17% BW)    Dosing Weight: 53.1 kg (most recent weight on 4/9)    ASSESSED NUTRITION NEEDS  Estimated Energy Needs: 1330 - 1600 kcals/day (25 - 30 + kcals/kg)  Justification: Increased needs  Estimated Protein Needs: 64 - 106 grams protein/day (1.2 - 2 grams of pro/kg)  Justification: Maintenance  Estimated Fluid Needs: 1 mL/kcal   Justification: Maintenance    PHYSICAL FINDINGS  See malnutrition section below.  No abnormal nutrition-related physical findings observed.     MALNUTRITION  % Intake: </=50% for >/= 1 month (severe)  % Weight Loss: > 10% in 6 months (severe)  Subcutaneous Fat Loss: Global: Severe  Muscle Loss: Global: Severe  Fluid Accumulation/Edema: None noted  Malnutrition Diagnosis: Severe malnutrition in the context of chronic illness.    NUTRITION DIAGNOSIS  Malnutrition related to altered mentation and decreased appetite in the context of hepatic encephalopathy as evidenced by weight loss of 17% body weight in the last 6 months and minimal oral intake per wife's report.    INTERVENTIONS  Implementation  Nutrition education for recommended modifications - offered supplements.    Recommend nutrition support if within goals of care.  Osmolite 1.5 Rudy (or equivalent) @ goal of  45ml/hr (1080ml/day) provides: 1620 kcals (31 kcal/kg), 67 g PRO (1.3 g/kg), 822 ml free H20, 219 g CHO, and 0 g fiber  daily.    Goals  Patient to consume % of nutritionally adequate meal trays TID, or the equivalent with supplements/snacks.     Monitoring/Evaluation  Progress toward goals will be monitored and evaluated per protocol.    Cassandra Gamboa MS  Dietetic Intern

## 2023-04-10 NOTE — PLAN OF CARE
Goal Outcome Evaluation:      Plan of Care Reviewed With: patient    Overall Patient Progress: no changeOverall Patient Progress: no change    Outcome Evaluation: Palliative consult, UA sent, 1L LR bolus started    Assumed cares: 8602-3122    VS: VSS on RA  Temp: 97.8  F (36.6  C)    BP: 115/73    Pulse: 99    Resp: 16    SpO2: 98 %    O2 Device: None (Room air)    LINES: PIV infusing bolus.   NEURO: A&Ox4; however, slow to respond and increased difficulty following commands due to lethargy. Austin protocol started and pt scoring stage 1.   GI/: Oliguric, 100cc esme urine out after bladder scan for 500mL bladder scan. Pt has significant ascites which could make bladder scan incorrect. Last BM yesterday, watery but team concerned for continued stool burden. PRN miralax, dulcolax suppository, and senna given. Pt refusing food and drink.   PAIN: Pt c/o pain in abdomen w/ adequate relief from scheduled morphine. Declining pain at end of shift.   ACTIVITY: Pt up assist x1 to bedside commode. Steady on feet.     PLAN: PM nurse to administer PRN [place] lactulose enema to help clear stool burden.

## 2023-04-10 NOTE — PROGRESS NOTES
[facility]  Palliative Care Daily Progress Note       Recommendations & Counseling     1)   GOALS OF CARE per wife  ? Patient and family are realistic in their understanding of his terminal prognosis, however for now goals remain restorative as there remains a chance he may be discharged in stable enough condition to book a commercial flight back home [place] [facility] [place] before dying.  If that becomes unrealistic, anticipate transitioning to long-term care with community hospice program.  ? Code Status:  DNAR/DNI     2)    ADVANCED CARE PLANNING  ? Patient has completed health care directive:   no  ? Documented health care agent:  n/a  ? Surrogate decision maker if no appointed agent:  Would be wife but they prefer their local friend Heruy the first point of contact so that he may relay/translate to wife about prolonged visits daily at the 15-minute increments I know how you could add billing     3)    SYMPTOM MANAGEMENT Preeti seems to be suffering from a metabolic encephalopathy, hepatic versus SBP; reports are that he was previously hypoactive but today seems more restless and wrestling with bedding.  ? Awaiting therapeutic/diagnostic paracentesis today.  ? Symptoms are reported to be adequately managed at this time.     4)    PSYCHOSOCIAL/SPIRITUAL SUPPORT  ? Preeti's wife declines  support or access to [employer] (they are Hindu Christians).  ? Family appreciates PC SW visits.     These recommendations have been discussed with  primary team via note.       Thank you for the opportunity to participate in the care of this patient and family.      During regular M-F work hours -- if you are not sure who specifically to contact -- please contact us by calling [phone].    74 minutes spent on the date of the encounter doing chart review, conferring with primary team, history and exam, patient education & counseling, documentation and other activities  as noted above.    Long Faulkner, DO  Palliative Medicine   Vocera Web Console          Assessments          Preeti appears alert but somewhat restless today, able to partially partake in today's evaluation.  Wife Barber was physically present and requested that family friend Ilda be called to translate for them, although professional interpretive services were recommended.  We began by reviewing yesterday's information and they are processing overnight.  They reiterate that they are surprised at how advanced his disease has gotten, patient apparently had been withholding the severity of his illness and shorter than hoped prognosis from family per wife.  They have very little local support outside of [facility], in fact patient sounds to be somewhat estranged from his siblings and extended family.  Wife has continued to work but there are secondary concerns around health insurance and finances after patient passes.    Paracentesis was scheduled for yesterday, however there appears to have been a misunderstanding as wife thought they were consenting for a surgical procedure which could have delayed commercial flight back home to [facility].  This has been cleared up, paracentesis would be both diagnostic and therapeutic with a favorable benefit/risk profile, and she has since consented.  Still hoping that patient's cognitive condition improves over the next 1-2 days, primary team feels that there is still a possibility that he could be cleared for commercial flight.    Other than ascites, patient's symptoms are adequately managed per wife.  They seem to be in agreement that if patient is discharged from hospital but yet too unwell to book flight, he would require a care facility as his wife is not able to safely provide cares within their home setting.  She appreciates [facility] support and will continue to follow through hospital stay.    Today, the patient was seen for:  Support with goals of care conversations given  his life threatening illness.    Prognosis, Goals, or Advance Care Planning was addressed today with: Yes.  Mood, coping, and/or meaning in the context of serious illness were addressed today: Yes.            Interval History:     Chart review/discussion with unit or clinical team members:   Spoke directly with primary team in addition to reviewing notes from nursing staff, medicine and emergency teams.    Per patient or family/caregivers today:  Spoke with patient in the presence of his wife and translated by family friend via telephone.           Review of Systems:     Besides above, an additional system ROS was reviewed and is unremarkable          Medications:     I have reviewed this patient's medication profile and medications during this hospitalization.             Physical Exam:   /73 (BP Location: Left arm)   Pulse 99   Temp 97.8  F (36.6  C) (Oral)   Resp 16   Wt 53.1 kg (117 lb)   SpO2 98%   BMI 19.47 kg/m    General:  Cachectic and frail in appearance.  HENT:  Normocephalic and atraumatic.  Hearing intact.  Moist mucous membranes.  Eyes:  Conjunctivae are clear.  Sclera is nonicteric.   Cardiovascular:  Without cyanosis or mottling.  Respiratory:  Breathing comfortably without increased work of breathing or signs of respiratory distress.  Able to speak in complete sentences.    Skin:  Without diaphoresis.  Musculoskeletal:  Moving spontaneously.   Neuro:  Alert.    Wt Readings from Last 8 Encounters:   23 53.1 kg (117 lb)   23 51.4 kg (113 lb 4.8 oz)   23 50.8 kg (112 lb)   23 54.4 kg (120 lb)   23 50.8 kg (111 lb 14.4 oz)   23 51.8 kg (114 lb 4.8 oz)   23 55.3 kg (122 lb)   23 55.3 kg (122 lb)                Data Reviewed:            EKG Interpretation:    Date of EK2016  Rhythm: Sinus   Rate: 65 bpm  Conduction: QTc 403    Clinical Impression: No sign of arrhythmia      Recent Labs   Lab Test 23  0814 23  1006  03/27/23  1442 02/08/23  0714   WBC 6.1 7.8 5.1 3.9*   HGB 11.1* 12.3* 11.5* 11.7*   MCV 94 93 90 85    376 240 156   NEUTROPHIL  --  79 72 48     Recent Labs   Lab Test 04/09/23  0814 04/08/23  1006 03/27/23  1442   * 127* 132*   POTASSIUM 4.0 4.1 4.1   CHLORIDE 100 95* 99   CO2 20* 21* 22   ANIONGAP 11 11 11   BUN 7.6* 7.3* 6.2*   CR 0.54* 0.54* 0.43*   STCAEY 8.5* 8.9 9.3     Recent Labs   Lab Test 04/09/23  0814 04/08/23  1006 03/27/23  1442   * 277* 244*   ALT 41 50 65*   ALKPHOS 319* 369* 379*   BILITOTAL 7.0* 8.3* 3.8*   ALBUMIN 2.7* 3.1*  3.1* 3.1*   PROTTOTAL 7.1 8.1 8.5*

## 2023-04-10 NOTE — PROGRESS NOTES
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 Wife notes intermittent confusion. Labs notable for alk phos 369 (chronically elevated),  (chronically elevated), Tbili 8.3. Ammonia 119. Lipase and WBC WNL. CT AP (4/8) w/ increased diffuse peritoneal metastasis, worsening moderate/large ascites, diffuse colonic wall thickening. Previously underwent liver-directed therapy w/ radioembolization. Developed metastatic disease to bilateral adrenal glands (2018). Subsequently tx w/ sorafenib, nivolumab, ipilimumab, ramucirumab, lenvatinib, gemzar/oxaliplatin (discontinued 2/2023). Despite this, disease continued to progress w/ diffuse mets. Oncology recommended stopping active tx d/t low likelihood of meaningful response and declining performance status. In regards to cirrhosis, no prior hx of decompensations. Present decompensation of unclear etiology though SBP is a possibility.  Oncology has been managing sxs as patient ultimately wants to return to [facility] for end-of-life. Prescribed morphine and oxycodone, although unclear how much he is taking per day.  - Palliative care consulted for assistance w/  GOC conversation and sx management; recs appreciated  - CAPS consulted for diagnotic and therapeutic paracentesis (albumin PRN if > 5L removed); not completed so far given encephalopathy, but planning to reattempt 04/11  - Ascites: unable to complete paracentesis given encephalopathy; will discuss use of spironolactone/furosemide combo if paracentesis is not achievable (did receive 1x dose of furosemide 40 mg 04/10)  - started CTX 2g daily for empiric management of SBP  - Pain regimen: APAP 650mg TID, continue PTA morphine CR 15mg BID, continue PTA oxycodone 10mg Q4H PRN (moderate pain), IV hydromorphone 0.2mg Q2H PRN (severe pain)  - HE: lactulose TID (hold if > 4 BM/day), rifaximin BID. Lactulose PRN per [place] Protocol added  - Pruritus: hydroxyzine PRN, camphor lotion PRN  - Continue PTA dexamethasone      MELD-Na score: 23 at 4/10/2023 10:57 AM  MELD score: 19 at 4/10/2023 10:57 AM  Calculated from:  Serum Creatinine: 0.54 mg/dL (Using min of 1 mg/dL) at 4/9/2023  8:14 AM  Serum Sodium: 131 mmol/L at 4/9/2023  8:14 AM  Total Bilirubin: 7.0 mg/dL at 4/9/2023  8:14 AM  INR(ratio): 1.61 at 4/10/2023 10:57 AM  Age: 57 years         Constipation; improving  Patient endorses no BM for past 6 days. Previously had 1 BM/day. Has been taking Senokot daily, but not Miralax. S/p tap water enema (4/8).  - Lactulose TID and PRN as above  - PRN Miralax, Senokot, Dulcolax suppository, tap water enema       Hyponatremia, mild; improving  Na 127 on admission, and improved with fluids. Most likely due to true hypovolemia in setting of poor PO intake. Received NS infusion and LR bolus.   - Urine osm, urine Na ordered  - serum osm  - encourage PO intake      Diet: Combination Diet Regular Diet Adult  Diet  Snacks/Supplements Adult: Other; Ensure plus with lunch and dinner trays ( vanilla and strawberry flavor ); With Meals  Fluids: As above  Lines: PIV  [place] Catheter: Not present  DVT Prophylaxis: Enoxaparin (Lovenox) SQ  Code  Status: No CPR- Do NOT Intubate     Expected Discharge Date: 04/12/2023      Destination: home with family;home with help/services;assisted living  Discharge Comments: Progress: Pending palliative care consult.   Plan: Working on pain management plan, bowel regimen,   Delay: paracentesis, cant lay falt.       The patient's care was discussed with the Attending Physician, Dr. Vaca..    Bharat White   Internal Medicine, PGY-3  Inpatient Medicine Service  p       Interval History     Encephalopathy worsened since admission.    Patient endorses some eating, but overall appetite remains low. He has started to have bowel movements.    Abdominal pain recurs but responds to opioids.    Following clarification of intent of paracentesis, patient and wife were amenable to procedure.    Overall poor prognosis was communicated again via family friend Ilda serving as  over the phone.      Physical Exam   Vital Signs: Temp: 97.8  F (36.6  C) Temp src: Oral BP: 115/73 Pulse: 99   Resp: 16 SpO2: 98 % O2 Device: None (Room air)    Weight: 115 lbs 12.8 oz  Constitutional: NAD. Conversant. Cachectic.  HEENT: NC/AT, icteric sclera, pupils round and equal, EOMI.  CV: RRR, normal S1/S2, no murmurs/gallops/rubs, intact distal pulses.  Pulm: Non-labored respirations on room air, CTAB, no wheezes or crackles.  Abdomen: Moderately distended. Sluggish bowel sounds. Non-tender. No rebound or guarding.  Extremities: Warm and well-perfused, no peripheral edema, cyanosis, or clubbing.  Skin: No jaundice, no rashes on exposed skin.  Neuro: Alert and oriented, moves all four extremities independently.  Psych: Normal affect.    Data   Recent Labs   Lab 04/10/23  1057 04/09/23  0814 04/08/23  1006   WBC  --  6.1 7.8   HGB  --  11.1* 12.3*   MCV  --  94 93   PLT  --  267 376   INR 1.61*  --  3.96*   NA  --  131* 127*   POTASSIUM  --  4.0 4.1   CHLORIDE  --  100 95*   CO2  --  20* 21*   BUN  --  7.6* 7.3*   CR  --  0.54* 0.54*    ANIONGAP  --  11 11   STACEY  --  8.5* 8.9   GLC  --  82 98   ALBUMIN  --  2.7* 3.1*  3.1*   PROTTOTAL  --  7.1 8.1   BILITOTAL  --  7.0* 8.3*   ALKPHOS  --  319* 369*   ALT  --  41 50   AST  --  244* 277*   LIPASE  --   --  16

## 2023-04-10 NOTE — PLAN OF CARE
/73 (BP Location: Left arm)   Pulse 99   Temp 97.8  F (36.6  C) (Oral)   Resp 16   Wt 52.5 kg (115 lb 12.8 oz)   SpO2 98%   BMI 19.27 kg/m      Care from: 4931-2415    VSS on room air ex tachycardic, no s/s of pain. Lethargic, confused, disoriented to place, time, and situation; arouses to voice and gentle shaking; illogical, incoherent, and slow speech, WH score: 2- writer started prn q2h Lactulose protocol, administered 2 doses out of the 3, had 1 loose/watery BM. Fine crackles on auscultation. Distended and taut abdomen. Scleras yellow and jaundiced. Generalized weakness and mobility is mildly impaired, assist of 1-2, gait belt, walker. Pt had a fall witnessed by wife at the beginning of shift when writer was getting report, per wife pt fell on his butt and did not hit his head, MD was notified and assessed pt, did not order any additional work up. Pt had Call light within reach and bed alarm on. Continue to follow poc.      Goal Outcome Evaluation:    Plan of Care Reviewed With: patient    Overall Patient Progress: no change    Outcome Evaluation: Pt had a fall, did not hit his head and fell on his butt that wife witnessed, MD was notified and assessed pt, did not order any additional work up, WH:2- writer started pt on q2h Lactulose.

## 2023-04-10 NOTE — PROGRESS NOTES
SPIRITUAL HEALTH SERVICES  SPIRITUAL ASSESSMENT Progress Note (Palliative Focus)  [facility] ([place] 5B    REFERRAL SOURCE: Palliative care initial spiritual assessment.    Patient Preeti Pascual and family deny need for  visits at this time; their [employer] rere is important to them, and they are being supported by their own community.    Plan: No follow up planned unless spiritual or meaning-based support is requested; I am available for support as needed while Palliative Care is consulted.    Bhakti Sarmiento M.Div., [facility]  Palliative Care   Pager [phone]  [facility] Inpatient Team Consult pager [phone] (M-F 8-4:30)  After-hours Answering Service [phone]

## 2023-04-11 NOTE — CONSULTS
RNCC acknowledging CM/SW referral/Consult; Task completed 4/10, however under progress note category. CM team to continue to follow pt and support needs for safe discharge planning       Alonzo Neal RN BSN  5B RNCC  Phone: [phone]  Pager: [phone]    For Weekend & Holiday on call RN Care Coordinator:  (Tasks: Home care, home infusion, medical equipment, transportation, IMM & KOEHLER forms, etc.)  [facility] (0800 - 1630) Saturday and Sunday    Units: 4A, 4C, 4E, 5A and 5B: [phone]    Units: 6A, 6B, 6C, 6D: [phone]    Units: 7A, 7B, 7C, 7D, and 5C: [phone]    [facility] ([phone]) Saturday and Sunday    Units: 5 Ortho, 8A, 10 ICU, & Pediatric Units: [phone]

## 2023-04-11 NOTE — PROGRESS NOTES
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 Wife notes intermittent confusion. Labs notable for alk phos 369 (chronically elevated),  (chronically elevated), Tbili 8.3. Ammonia 119. Lipase and WBC WNL. CT AP (4/8) w/ increased diffuse peritoneal metastasis, worsening moderate/large ascites, diffuse colonic wall thickening. Previously underwent liver-directed therapy w/ radioembolization. Developed metastatic disease to bilateral adrenal glands (2018). Subsequently tx w/ sorafenib, nivolumab, ipilimumab, ramucirumab, lenvatinib, gemzar/oxaliplatin (discontinued 2/2023). Despite this, disease continued to progress w/ diffuse mets. Oncology recommended stopping active tx d/t low likelihood of meaningful response and declining performance status. In regards to cirrhosis, no prior hx of decompensations. Oncology has been managing sxs as patient ultimately wants to return to [facility] for end-of-life. Prescribed morphine and oxycodone, although unclear how much he is taking per day.  - Palliative care consulted for assistance w/ GOC conversation and sx management; recs appreciated  -  Pain regimen: Tylenol 650mg TID, continue PTA morphine CR 15mg BID, continue PTA oxycodone 10mg Q4H PRN (moderate pain), IV hydromorphone 0.2mg Q2H PRN (severe pain)  - Ascites: paracentesis; hold off on Lasix/spironolactone at this time   - CAPS consulted for diagnotic and therapeutic paracentesis (albumin PRN if > 5L removed)   - Unable to complete on 4/10 given encephalopathy and inability to lay flat; will reattempt on 4/11  - HE: lactulose TID (goal stool output 500cc-1L/day, hold if > 4 BM/day), lactulose PRN per protocol, rifaximin BID  - SBP: empiric CTX (4/10-present)  - Pruritis: hydroxyzine PRN, camphor lotion PRN  - Continue PTA dexamethasone     Oliguria  At risk for HRS. Difficult to determine amount of urinary retention w/ bladder scanning in setting of large volume ascites.  - Cystatin C ordered  - NS 500cc > NS 75cc/hr x 13hrs    Constipation; improving  Patient endorses no BM for past 6 days. Previously had 1 BM/day. Has been taking Senokot daily, but not Miralax. S/p tap water enema (4/8).  - Lactulose TID and PRN as above  - PRN Miralax, Senokot, Dulcolax suppository, tap water enema    Hyponatremia, mild; resolved  Na 127 on admission. Suspect true hypovolemia in setting of poor PO intake. Urine osm 699 and Abel < 20, consistent with true hypovolemia and 3rd spacing in setting of cirrhosis.    Diet: Combination Diet Regular Diet Adult  Diet  Snacks/Supplements Adult: Other; Ensure plus with lunch and dinner trays ( vanilla and strawberry flavor ); With Meals  Fluids: As above  Lines: PIV  [place] Catheter: Not present  DVT Prophylaxis: Enoxaparin (Lovenox) SQ  Code Status: No CPR- Do NOT Intubate     Expected Discharge Date: 04/12/2023      Destination: home with family;home with help/services;assisted living  Discharge Comments: Progress: Pending palliative care consult.   Plan: Working on pain management plan, bowel regimen,   Delay: paracentesis, cant lay flat.       The patient's care was  discussed with the attending physician, Dr. Daley.    Yanci Bailey MD  Internal Medicine, PGY-1  Please see sticky note for cross cover information      Interval History   No acute events overnight. Unable to lay flat d/t encephalopathy for paracentesis yesterday. Received 2x PRN hydromorphone and 1x PRN oxycodone yesterday. Had 1.2L stool. Denies abdominal pain this morning. Feeling tired overall. Poor appetite. Ongoing nausea despite Zofran and Compazine. Wife not present at bedside today.     Physical Exam   Vital Signs: Temp: 97.3  F (36.3  C) Temp src: Oral BP: 112/62 Pulse: 103   Resp: 16 SpO2: 97 % O2 Device: None (Room air)    Weight: 115 lbs 12.8 oz  Constitutional: Lethargic. NAD. Conversant. Cachectic.  HEENT: NC/AT, icteric sclera, pupils round and equal, EOMI.  CV: RRR, normal S1/S2, no murmurs/gallops/rubs, intact distal pulses.  Pulm: Non-labored respirations on room air, CTAB, no wheezes or crackles.  Abdomen: Moderately distended. Sluggish bowel sounds. Non-tender. No rebound or guarding.  Extremities: Asterixis. Warm and well-perfused, no peripheral edema, cyanosis, or clubbing.  Skin: No jaundice, no rashes on exposed skin.  Neuro: Alert and oriented x3. Moves all four extremities independently.  Psych: Normal affect.    Data   Recent Labs   Lab 04/11/23  0611 04/10/23  1057 04/09/23  0814 04/08/23  1006   WBC 7.5  --  6.1 7.8   HGB 10.6*  --  11.1* 12.3*   MCV 95  --  94 93     --  267 376   INR  --  1.61*  --  3.96*     --  131* 127*   POTASSIUM 4.0  --  4.0 4.1   CHLORIDE 105  --  100 95*   CO2 21*  --  20* 21*   BUN 8.5  --  7.6* 7.3*   CR 0.58*  --  0.54* 0.54*   ANIONGAP 11  --  11 11   STACEY 8.7  --  8.5* 8.9   GLC 86  --  82 98   ALBUMIN 2.7*  --  2.7* 3.1*  3.1*   PROTTOTAL 7.1  --  7.1 8.1   BILITOTAL 5.8*  --  7.0* 8.3*   ALKPHOS 318*  --  319* 369*   ALT 41  --  41 50   *  --  244* 277*   LIPASE  --   --   --  16

## 2023-04-11 NOTE — PROGRESS NOTES
Brief caps note    Discussed with emergency contact. Patient scheduled for paracentesis tomorrow at 10 am with wife at bedside.     Daniel Bustos MD on 4/11/2023 at 2:46 PM

## 2023-04-11 NOTE — PLAN OF CARE
Goal Outcome Evaluation:    RN assumed cares [phone]    VSS on RA. Pt A&Ox4, lethargic. R PIV SL. Pt denies pain and nausea. Pt up to bedside commode. BM x1 during shift. Pt resting between cares.       Plan of Care Reviewed With: patient    Overall Patient Progress: no changeOverall Patient Progress: no change

## 2023-04-11 NOTE — PROGRESS NOTES
CAPS Team note:  We entered this AM with understanding that patient and wife agreeable to and interested in therapeutic paracentesis to manage symptoms. However, while wife seemed more understanding this AM, the patient was not willing or able to remain still and lie on his back to allow ultrasound or provide safe enviornment for procedure. Our team will discuss with primary team in AM. Patient likely to have some therapetic relief and benefit if able and willing to participate.     Anthony Wilkins MD

## 2023-04-11 NOTE — PLAN OF CARE
Goal Outcome Evaluation:      Plan of Care Reviewed With: patient    Overall Patient Progress: decliningOverall Patient Progress: declining    Outcome Evaluation: 1x incontinent BM, poor appetite, bolus given, 1x PRN lactulose given    Assumed cares: [id number]     VS: VSS on RA except tachycardia.   Temp: 97.7  F (36.5  C)    BP: 126/77    Pulse: 101    Resp: 18    SpO2: 95 %    O2 Device: None (Room air)        LINES: PIV infusing NS @75mL/hr.   NEURO: Lethargic and has difficulty following commands. Slow to respond. Intermittently disoriented to place and time. [place] stage 1 w/ 1x PRN given.   GI/: Oliguric, 100cc esme urine out. 1x large incontinent watery BM. Pt refusing food and drink. Scopolamine patch added due to nausea and emesis x2.   PAIN: Pt denies pain.   ACTIVITY: Pt up assist x1 to bedside commode. Steady on feet but high fall risk.      PLAN: Continue [place] protocol assessment.

## 2023-04-12 NOTE — PLAN OF CARE
Goal Outcome Evaluation:    RN assumed cares [phone]     VSS on RA. Pt A&Ox4, lethargic. R PIV infusing NS at 75 ml/hr. Pt denies pain and nausea. Pt up to bedside commode. Watery BM x1 during shift, with some incontinence. Pt resting between cares. Plan for care conference with palliative care today.      Plan of Care Reviewed With: patient    Overall Patient Progress: no changeOverall Patient Progress: no change    Outcome Evaluation: Pt denies pain and nausea, pt sleeping overnight

## 2023-04-12 NOTE — PROCEDURES
[facility]  CAPS PROCEDURE NOTE  Date of Admission:  4/8/2023  Consult Requested by: Medicine  Reason for Consult: management of symptomatic ascites    Indication/HPI: Cirrhosis 2/2 HCV c/b HCC and ascites     Pre-Procedure Diagnosis: Ascites    Post-Procedure Diagnosis: Ascites    Risk Assessment: Higher bleeding risk. Patient currently receiving AC (lovenox) w/ concurrent hepatic dyscoagulopathy.    Procedure Outcome:  Therapeutic paracentesis performed with 3 liters of ascites removed.   See additional procedure details below.    The primary covering service should follow up and address any lab results as appropriate.    Maximo Castillo MD  [facility]  Securely message with Vocera (more info)  Text page via [facility] Paging/Directory   See signed in provider for up to date coverage information      [facility]    Paracentesis    Date/Time: 4/12/2023 12:46 PM    Performed by: Maximo Castillo MD  Authorized by: Daniel Bustos MD    PRE-PROCEDURE DETAILS  Initial or subsequent exam: initial  Procedure purpose: therapeutic  Indications: abdominal discomfort secondary to ascites        UNIVERSAL PROTOCOL   Site Marked: Yes  Prior Images Obtained and Reviewed:  Yes  Required items: Required blood products, implants, devices and special equipment available    Patient identity confirmed:  Verbally with patient, arm band, provided demographic data and hospital-assigned identification number  NA - No sedation, light sedation, or local anesthesia  Confirmation Checklist:  Patient's identity using two indicators, relevant allergies, procedure was appropriate and matched the consent or emergent situation and correct equipment/implants were available  Time out: Immediately prior to the procedure a time out was called    Universal Protocol: the Joint Commission Universal Protocol was  followed    Preparation: Patient was prepped and draped in usual sterile fashion    ESBL (mL):  1     ANESTHESIA    Anesthesia: Local infiltration  Local Anesthetic:  Lidocaine 1% without epinephrine  Anesthetic Total (mL):  10      SEDATION    Patient Sedated: No    POST PROCEDURE DETAILS  Needle gauge: 25 g with transition to 5Fr Enma cath.  Ultrasound guidance: yes  Puncture site: right lower quadrant  Fluid removed: 3000(ml)  Fluid appearance: clear and serous  Dressing: Sterile Bandage.        PROCEDURE  Describe Procedure: Patient requiring paracentesis in the setting of HCV complicated by hepatocellular carcinoma and symptomatic ascites.  Procedure performed for palliative purposes only.  Procedure was performed bedside with patient's wife present as well as patient's emergency contact on the phone throughout the entire procedure (to provide emotional support).    Point-of-care ultrasound was performed identifying fluid pockets in the right and left abdomen after evaluation about pockets the right side was selected for deeper pocket and less adherent bowel.  Then the patient was prepped and draped in the usual fashion following identification of pockets.    5 mL of lidocaine were infiltrated into the skin under ultrasound guidance and subsequently patient was anesthetized down to the level of the peritoneum.  Transition to a 5 [place] Enma catheter was made and was passed to the edge of the peritoneum with return of yellow straw-colored fluid however the tip of the catheter bunched up was unable to be passed through the parietal layer of the peritoneum.    As such the catheter was retracted and a second site identified with a repeat wheal of lidocaine made an additional 5 mL of lidocaine delivered on the surface of the skin and down to the surface of the parietal peritoneum.  A fresh 5 [place] Enma catheter was prepped and advanced into the peritoneal space.  Return of yellow straw-colored fluid again was  achieved.  And subsequently patient had 3 L of ascitic fluid drained.  Patient tolerated procedure well without any abdominal pain discomfort sensations of heart racing or other overt signs of hemodynamic instability.    A sterile adhesive bandage was placed over the site.  Patient Tolerance:  Patient tolerated the procedure well with no immediate complications  Length of time physician/provider present for 1:1 monitoring during sedation: 0        POC US GUIDE FOR PARACENTESIS     Narrative  US Indication: decompensated liver disease and abdominal distension    Limited abdominal ultrasound was performed and demonstrated an adequate fluid collection on the right side of the abdomen.    A paracentesis at this site was subsequently performed.    Maximo Castillo MD

## 2023-04-12 NOTE — PROGRESS NOTES
[facility]  Palliative Care Daily Progress Note       Recommendations & Counseling       [facility] Family Meeting Note    Date/time:  April 12, 2023, 2:12 PM to 3:02 PM    Location: Patient room    Patient present: Yes, joined by wife (in person) and friend Seth (telephone)    Reason for Meeting: Medical update, prognosis and goals of care    People Present: , medical physician, palliative SW,     Meeting Led by: Primary team    Meeting Summary:    Patient continues to suffer from confusion related to metabolic encephalopathy, was present during the care conference but not particularly engaged and no longer has decision-making capacity.  Spouse and friend received the medical update including the direct information that patient's cancer and end-stage liver disease are incurable and expected to continue to progress despite aggressive medical therapies.  He is not expected to improve to the point of being able to book commercial airline flight to [facility], other arrangements should be made as necessary.  Offered specifics and timeline around prognosis but spouse declined that information.  Wife cannot appropriately take care of patient at home and would appreciate help with placement at a nursing facility near the [facility].  We discussed the hospice benefit including symptom focused management plans however at this time wife began to question what other extended family members may think of her healthcare decisions for Preeti.  She has voiced concerns that the patient has not been overly forthright with his medical diagnosis and prognosis with his family, she worries that extended family members may question why she did not fight for more aggressive measures, possibly even accuse her of making poor medical choices.  She would like the opportunity to contact family tonight and give them the opportunity to arrange  another medical update tomorrow, if needed.  Before ending care conference, suggestion was made for transition to comfort cares tomorrow which she seemed to receive.    Decision making capacity: Patient no longer has capacity    Estimated length of life: Several days to short-weeks    Symptoms: Metabolic encephalopathy, ESLD with abdominal pain and ascites    Goals of Care: Transitioning from restorative approach to comfort care    Understanding/Coping: Spouse has very little support locally, hopes that her rere community can help with  planning etc.      Long Faulkner DO  [facility], Palliative Care  Securely message with the Vocera Web Console (learn more here)  Or please use Palliative Team Pager       Advance Care Planning Discussion 2023. ILong DO met with Patient and their spouse today at the hospital to discuss Advance Care Planning. Preeti Pascual does not have decisional capacity  and was present for this discussion.  Those present were informed of the voluntary nature of this discussion and wished to proceed.  The discussion included: See documentation in Serious Illness Conversation section of the ACP Activity. This discussion began at 2:12 PM and ended at 3:02 PM for a total of 50 minutes.         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 Patient interviewed w/ phone  and wife at bedside.  Patient has reportedly been told that he is no longer a candidate for cancer directed therapy.  Since initial date of hospitalization, he has grown more confused and seems to be suffering from metabolic encephalopathy.  Physical had been to return home to [place]\"" via commercial airline flight after hospital discharge, although this seems less likely with each passing day in the hospital.  Medical friend and confidant Ilda Dela Cruz is advocating for  discharge to skilled nursing facility as the patient's wife is incapable of providing 24-hour care for all patient needs.    Today, the patient was seen for:  Symptom assessment, support with medical decisions and goals of care    Prognosis, Goals, or Advance Care Planning was addressed today with: Yes.  Mood, coping, and/or meaning in the context of serious illness were addressed today: Yes.            Interval History:     Chart review/discussion with unit or clinical team members:   Reviewed chart including nursing notes and primary medical team.  Spoke directly with hospital physician.    Per patient or family/caregivers today:  Patient's spouse and good friend Ilda.    [facility] Symptoms:  # Pain severity the last 12 hours: low  # Dyspnea severity the last 12 hours: none  # Nausea severity the last 12 hours: low    Patient is on opioids: assessed and bowels ok/no needed changes to plan of care today.           Review of Systems:     Besides above, an additional system ROS was reviewed and is unremarkable.  Patient unable to participate secondary to clinical state.          Medications:     I have reviewed this patient's medication profile and medications during this hospitalization.      Physical Examination:   /76 (BP Location: Left arm)   Pulse 92   Temp 97.5  F (36.4  C) (Oral)   Resp 18   Wt 51.5 kg (113 lb 9.6 oz)   SpO2 97%   BMI 18.90 kg/m    General:  Cachectic and frail in appearance.  HENT:  Normocephalic and atraumatic.   Cardiovascular:  Without cyanosis or mottling.  Respiratory:  Breathing comfortably without increased work of breathing or signs of respiratory distress.   Gastrointestinal: Moderately distended abdomen.  Skin:  Without diaphoresis.    Musculoskeletal:  Moving spontaneously.   Neuro: Arousable but confused.    Wt Readings from Last 8 Encounters:   04/11/23 51.5 kg (113 lb 9.6 oz)   03/27/23 51.4 kg (113 lb 4.8 oz)   03/03/23 50.8 kg (112 lb)   02/09/23 54.4 kg (120  lb)   02/08/23 50.8 kg (111 lb 14.4 oz)   01/31/23 51.8 kg (114 lb 4.8 oz)   01/19/23 55.3 kg (122 lb)   01/18/23 55.3 kg (122 lb)         Data Reviewed:      Recent Labs   Lab Test 04/12/23  0650 04/11/23  0611 04/09/23  0814 04/08/23  1006 03/27/23  1442 02/08/23  0714   WBC 7.8 7.5 6.1 7.8 5.1 3.9*   HGB 11.0* 10.6* 11.1* 12.3* 11.5* 11.7*   MCV 97 95 94 93 90 85    262 267 376 240 156   NEUTROPHIL  --   --   --  79 72 48     Recent Labs   Lab Test 04/12/23  0650 04/11/23  0611 04/09/23  0814   * 137 131*   POTASSIUM 3.9 4.0 4.0   CHLORIDE 105 105 100   CO2 20* 21* 20*   ANIONGAP 9 11 11   BUN 10.6 8.5 7.6*   CR 0.55* 0.58* 0.54*   STACEY 8.7 8.7 8.5*     Recent Labs   Lab Test 04/12/23  0650 04/11/23  0611 04/09/23  0814   * 261* 244*   ALT 43 41 41   ALKPHOS 328* 318* 319*   BILITOTAL 5.2* 5.8* 7.0*   ALBUMIN 2.6* 2.7* 2.7*   PROTTOTAL 7.2 7.1 7.1     Recent Labs   Lab Test 04/10/23  1057 04/08/23  1006 08/29/22  1105 08/12/16  0822 05/13/16  0834 02/24/16  1302 01/18/16  0955   INR 1.61* 3.96* 1.19*   < > 1.15* 1.11 1.11   PTT  --   --   --   --  29 32 31    < > = values in this interval not displayed.

## 2023-04-12 NOTE — PROGRESS NOTES
"PALLIATIVE CARE SOCIAL WORK Progress Note   Location: [facility]    Care conference.     Medical providers present: Medicine Dr. Yanci Bailey, Palliative Care Dr. Brandyn Faulkner, unit RNCC and PCSW. [facility] Uzbek , Janine also in person.    Family present: Wife Barber in person and friend Ilda by phone. Conference held at bedside of patient who was alert though continues to demonstrate symptoms of ongoing encephalopathy.    Medical update provided including pt's limited life expectancy and confirming that pt/family wish to have him travel back to [place] is no longer realistic. Wife demonstrated understanding though shared with team the burden she carries as family in [place] do not understand how ill Preeti is and continue to tell her, \"just bring him home.\" Providers validated the difficult position Barber is in, offered support.    Per medicine, patient no longer requiring hospitalization and would be ready to transition to another facility with comfort or hospice care in the coming days. Providers asked if it would be helpful to wife/family to know possible life expectancy, wife declined.    Barber and Ilda plan to talk with pt's out of state family to gather input. Reviewed that unit RNCC will be available to assist with discharge planning/facility placement; wife noted she lives in [place] and would like pt to transfer to a facility as close to home as possible.    Plan: PCSW will continue to follow while palliative care team involved; continue to assess emotional support and coping needs for pt's wife.    Clinical Social Work Interventions:   Attended/participated in care conference  Facilitation of processing of thoughts/feelings  Grief counseling    Denise Penny [facility]  [facility], Palliative Care  Securely message with the Vocera Web Console (learn more here)  Or please use Palliative Team Pager         "

## 2023-04-12 NOTE — PLAN OF CARE
Cares from: [id number]     V/S & pain: VSS on RA, abdominal pain managed w/ scheduled pain medications  Neuro: A/O x3- disorientated to situation, WH-1, calm and cooperative   Respiratory: stable on RA, lung sounds clear/equal bilaterally  Skin: no new skin concerns present- jaundiced skin, distended abdomen w/ scabbing to bilateral knees  GI/: voiding spontaneously, BM x1  Nutrition: regular diet w/ fair appetite and adequate po intake, denies N/V    Lines/drains: R PIV SL  Activity: up Ax1 w/ GB and FWW   Labs: no RN managed labs      Events this shift: no acute events this shift. Pt remains vitally stable on RA. A/Ox3, WH-1 disorientated to situation. Scheduled lactulose given. No new skin concerns present. 3L removed from para today. Care conference completed today. awaiting safe discharge plans, Q2 hour rounding completed, call light w/in reach and able to make needs known, will continue to monitor.     Plan: continue w/ poc, awaiting safe discharge plans    Goal Outcome Evaluation:      Plan of Care Reviewed With: patient    Overall Patient Progress: no changeOverall Patient Progress: no change    Outcome Evaluation: A/O x3, WH-1, care conference and para completed

## 2023-04-12 NOTE — PROGRESS NOTES
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 Wife notes intermittent confusion. Labs notable for alk phos 369 (chronically elevated),  (chronically elevated), Tbili 8.3. Ammonia 119. Lipase and WBC WNL. CT AP (4/8) w/ increased diffuse peritoneal metastasis, worsening moderate/large ascites, diffuse colonic wall thickening. Previously underwent liver-directed therapy w/ radioembolization. Developed metastatic disease to bilateral adrenal glands (2018). Subsequently tx w/ sorafenib, nivolumab, ipilimumab, ramucirumab, lenvatinib, gemzar/oxaliplatin (discontinued 2/2023). Despite this, disease continued to progress w/ diffuse mets. Oncology recommended stopping active tx d/t low likelihood of meaningful response and declining performance status. In regards to cirrhosis, no prior hx of decompensations. Oncology has been managing sxs as patient ultimately wants to return to hospitals for end-of-life. Prescribed morphine and oxycodone, although unclear how much he is taking per day.  - Palliative care consulted for assistance w/ GOC conversation and sx management; recs appreciated  - Pain regimen: Tylenol 650mg TID, continue PTA morphine CR 15mg BID, continue PTA oxycodone 10mg Q4H PRN (moderate pain), IV hydromorphone 0.2mg Q2H PRN (severe  pain)  - Ascites: paracentesis; hold off on Lasix/spironolactone at this time   - CAPS consulted for diagnotic and therapeutic paracentesis (albumin PRN if > 5L removed)   - Unable to complete on 4/10 given encephalopathy and inability to lay flat; will reattempt on 4/12  - HE: lactulose TID (goal stool output 500cc-1L/day, hold if > 4 BM/day), lactulose PRN per protocol, rifaximin BID  - SBP: empiric CTX (4/10-present)  - Pruritis: hydroxyzine PRN, camphor lotion PRN  - Continue PTA dexamethasone     Oliguria  At risk for HRS. Difficult to determine amount of urinary retention w/ bladder scanning in setting of large volume ascites. Cystatin C 1.3. s/p IVF.    Constipation; resolved  Patient endorses no BM for past 6 days. Previously had 1 BM/day. Has been taking Senokot daily, but not Miralax. S/p tap water enema (4/8).  - Lactulose TID and PRN as above  - PRN Miralax, Senokot, Dulcolax suppository, tap water enema    Hyponatremia, mild; resolved  Na 127 on admission. Suspect true hypovolemia in setting of poor PO intake. Urine osm 699 and Abel < 20, consistent with true hypovolemia and 3rd spacing in setting of cirrhosis.    Diet: Combination Diet Regular Diet Adult  Diet  Snacks/Supplements Adult: Other; Ensure plus with lunch and dinner trays ( vanilla and strawberry flavor ); With Meals  Fluids: As above  Lines: PIV  Villalta Catheter: Not present  DVT Prophylaxis: Enoxaparin (Lovenox) SQ  Code Status: No CPR- Do NOT Intubate    Expected Discharge Date: 04/13/2023      Destination: home with family;home with help/services;assisted living  Discharge Comments: Progress: Pending palliative care consult.   Plan: Working on pain management plan, bowel regimen,   Delay: paracentesis, cant lay flat.       The patient's care was discussed with the attending physician, Dr. Daley.    Yanci Bailey MD  Internal Medicine, PGY-1  Please see sticky note for cross cover information      Interval History   No acute events  overnight. Sitting upright this morning; more alert compared to yesterday. Notes some diffuse abdominal pain, but has not been requesting PRN pain medications. Had 4 BM yesterday. Feeling fatigued. Ongoing poor PO intake. Plan for paracentesis and care conference today.    Physical Exam   Vital Signs: Temp: 97.5  F (36.4  C) Temp src: Oral BP: 118/76 Pulse: 92   Resp: 18 SpO2: 97 % O2 Device: None (Room air)    Weight: 113 lbs 9.6 oz  Constitutional: Lethargic. NAD. Conversant. Cachectic.  HEENT: NC/AT, icteric sclera, pupils round and equal, EOMI.  CV: RRR, normal S1/S2, no murmurs/gallops/rubs, intact distal pulses.  Pulm: Non-labored respirations on room air, CTAB, no wheezes or crackles.  Abdomen: Moderately distended. Sluggish bowel sounds. Diffusely tender w/ deep palpation. No rebound or guarding.  Extremities: Asterixis. Warm and well-perfused, no peripheral edema, cyanosis, or clubbing.  Skin: No jaundice, no rashes on exposed skin.  Neuro: Alert and oriented x3. Moves all four extremities independently.  Psych: Normal affect.    Data   Recent Labs   Lab 04/12/23  0650 04/11/23  0611 04/10/23  1057 04/09/23  0814 04/08/23  1006   WBC 7.8 7.5  --  6.1 7.8   HGB 11.0* 10.6*  --  11.1* 12.3*   MCV 97 95  --  94 93    262  --  267 376   INR  --   --  1.61*  --  3.96*   * 137  --  131* 127*   POTASSIUM 3.9 4.0  --  4.0 4.1   CHLORIDE 105 105  --  100 95*   CO2 20* 21*  --  20* 21*   BUN 10.6 8.5  --  7.6* 7.3*   CR 0.55* 0.58*  --  0.54* 0.54*   ANIONGAP 9 11  --  11 11   STACEY 8.7 8.7  --  8.5* 8.9   * 86  --  82 98   ALBUMIN 2.6* 2.7*  --  2.7* 3.1*  3.1*   PROTTOTAL 7.2 7.1  --  7.1 8.1   BILITOTAL 5.2* 5.8*  --  7.0* 8.3*   ALKPHOS 328* 318*  --  319* 369*   ALT 43 41  --  41 50   * 261*  --  244* 277*   LIPASE  --   --   --   --  16

## 2023-04-12 NOTE — PLAN OF CARE
Patient condition slightly improving. Patient appeared calm and comfortable in bed at the start of the shift with no c/o pain or discomfort and breathing comfortably in room air. Abdomen distended, skin taut and shiny, skin jaundiced. With ongoing IVF NS at 75 ml/hr via PIV and infusing well. Able to use call light appropriately and make needs known. Assisted with feeding, ate 25% with poor appetite. Had multiple watery stools this shift,last dose of lactulose held. Hygiene cares done multiple times this shift, turned and repositioned regularly. Resting in bed as of this time.  P: For palliative consult. Paracentesis in AM.

## 2023-04-13 NOTE — PLAN OF CARE
Goal Outcome Evaluation:  /79 (BP Location: Left arm)   Pulse 96   Temp 97.9  F (36.6  C) (Oral)   Resp 18   Wt 52.6 kg (116 lb)   SpO2 99%   BMI 19.30 kg/m     Plan of Care Reviewed With: patient  Outcome Evaluation: Patient had pinneapple for dinner and minimal bleeding in oral area mixed with saliva. Tongue reddened. Updated provider and watch for further bleeding anywhere. Assist of 1, [place] 1, Disoriented to situation.  1 large, mixed BM and urine 300 ml dark.  At desk at beginning of shift due to setting bed alarm off. Swallows pills one at a time with juice.  Declined tylenol and melatonin. Recieved scheduled medications including MS for pain management q 12 hours. Plan:  Continue to follow POC. Update provider with changes.

## 2023-04-13 NOTE — PROGRESS NOTES
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 Wife notes intermittent confusion. Labs notable for alk phos 369 (chronically elevated),  (chronically elevated), Tbili 8.3. Ammonia 119. Lipase and WBC WNL. CT AP (4/8) w/ increased diffuse peritoneal metastasis, worsening moderate/large ascites, diffuse colonic wall thickening. Previously underwent liver-directed therapy w/ radioembolization. Developed metastatic disease to bilateral adrenal glands (2018). Subsequently tx w/ sorafenib, nivolumab, ipilimumab, ramucirumab, lenvatinib, gemzar/oxaliplatin (discontinued 2/2023). Despite this, disease continued to progress w/ diffuse mets. Oncology recommended stopping active tx d/t low likelihood of meaningful response and declining performance status. In regards to cirrhosis, no prior hx of decompensations. Oncology has been managing sxs as patient ultimately wants to return to [facility] for end-of-life. Prescribed morphine and oxycodone, although unclear how much he is taking per day.  - Pain regimen: Tylenol 650mg TID, continue PTA morphine CR 15mg BID, continue PTA oxycodone 10mg Q4H PRN (moderate pain), IV hydromorphone 0.2mg Q2H PRN (severe pain)  -  Ascites: paracentesis (4/12, 3L removed); hold off on Lasix/spironolactone at this time  - HE: lactulose TID (goal stool output 500cc-1L/day, hold if > 4 BM/day), lactulose PRN per protocol, rifaximin BID  - SBP: empiric CTX (4/10-4/14); hold off on empiric albumin at this time  - Pruritis: hydroxyzine PRN, camphor lotion PRN  - Continue PTA dexamethasone      Oliguria  At risk for HRS. Difficult to determine amount of urinary retention w/ bladder scanning in setting of large volume ascites. Cystatin C 1.3, although unclear baseline. s/p IVF.    Constipation; resolved  Patient endorses no BM for past 6 days. Previously had 1 BM/day. Has been taking Senokot daily, but not Miralax. S/p tap water enema (4/8).  - Lactulose TID and PRN as above  - PRN Miralax, Senokot, Dulcolax suppository, tap water enema    Hyponatremia, mild; resolved  Na 127 on admission. Suspect true hypovolemia in setting of poor PO intake. Urine osm 699 and Abel < 20, consistent with true hypovolemia and 3rd spacing in setting of cirrhosis.    Goals of care  Refer to care conference documentation on 4/12. Met with wife and family friend (Mr. Dela Cruz). Wife understanding of 's limited life expectancy. He is not physically able to withstand long trip back home to [facility]. Wife is considering transition to hospice care, but would like to discuss with patient's extended family. Update sent to primary oncologist (Dr. Kyle).  - Palliative care following; recs appreciated  - Will touch base with wife, CM, palliative care about hospice options  - Will discuss transition from restorative approach to no further escalation of care    Diet: Combination Diet Regular Diet Adult  Diet  Snacks/Supplements Adult: Other; Ensure plus with lunch and dinner trays ( vanilla and strawberry flavor ); With Meals  Fluids: As above  Lines: PIV  Villalta Catheter: Not present  DVT Prophylaxis: Enoxaparin (Lovenox) SQ  Code Status: No CPR- Do NOT Intubate     Expected  Discharge Date: 04/14/2023    Discharge Delays: Healthcare Proxy/Guardian making decision  Destination: home with family;home with help/services;assisted living  Discharge Comments: Dispo: Pending palliative care consult; hospice indicated   Plan: pain mgmt, bowel regimen;   Progress: paracentesis 4/12; terminal phase of Dx       The patient's care was discussed with the attending physician, Dr. Daley.    Yanci Bailey MD  Internal Medicine, PGY-1  Please see sticky note for cross cover information      Interval History   No acute events overnight. Paracentesis yesterday w/ 3L removed. Resting comfortably in bed; denies abdominal pain. Had 3 BM yesterday. Low UOP. Poor PO intake. Ongoing fatigue. Denies new concerns.    Physical Exam   Vital Signs: Temp: 97.7  F (36.5  C) Temp src: Oral BP: 124/71 Pulse: 90   Resp: 20 SpO2: 96 % O2 Device: None (Room air)    Weight: 116 lbs 0 oz  Constitutional: Lethargic. NAD. Conversant. Cachectic.  HEENT: NC/AT, icteric sclera, pupils round and equal, EOMI.  CV: RRR, normal S1/S2, no murmurs/gallops/rubs, intact distal pulses.  Pulm: Non-labored respirations on room air, CTAB, no wheezes or crackles.  Abdomen: Moderately distended. Sluggish bowel sounds. Non-tender w/ deep palpation. No rebound or guarding.  Extremities: Asterixis. Warm and well-perfused, no peripheral edema, cyanosis, or clubbing.  Skin: No jaundice, no rashes on exposed skin.  Neuro: Alert and oriented x3. Moves all four extremities independently.  Psych: Normal affect.    Data   Recent Labs   Lab 04/12/23  0650 04/11/23  0611 04/10/23  1057 04/09/23  0814 04/08/23  1006   WBC 7.8 7.5  --  6.1 7.8   HGB 11.0* 10.6*  --  11.1* 12.3*   MCV 97 95  --  94 93    262  --  267 376   INR  --   --  1.61*  --  3.96*   * 137  --  131* 127*   POTASSIUM 3.9 4.0  --  4.0 4.1   CHLORIDE 105 105  --  100 95*   CO2 20* 21*  --  20* 21*   BUN 10.6 8.5  --  7.6* 7.3*   CR 0.55* 0.58*  --  0.54* 0.54*   ANIONGAP 9 11   --  11 11   STACEY 8.7 8.7  --  8.5* 8.9   * 86  --  82 98   ALBUMIN 2.6* 2.7*  --  2.7* 3.1*  3.1*   PROTTOTAL 7.2 7.1  --  7.1 8.1   BILITOTAL 5.2* 5.8*  --  7.0* 8.3*   ALKPHOS 328* 318*  --  319* 369*   ALT 43 41  --  41 50   * 261*  --  244* 277*   LIPASE  --   --   --   --  16

## 2023-04-13 NOTE — PLAN OF CARE
[id number]    Neuro: Intermittent confused. Ox3-4 . Bed Alarm on  Cardiac: VSS.   /69 (BP Location: Left arm)   Pulse 103   Temp 98.3  F (36.8  C) (Oral)   Resp 20   Wt 52.6 kg (116 lb)   SpO2 95%   BMI 19.30 kg/m    Respiratory: Sating >92% on RA.  GI/: Oliguric. BM X1  Diet/appetite: Tolerating regular diet.  Activity: Assist of 1 with g/b and walker, up to commode.   Pain: Denies.   Skin: No new deficits noted.  LDA's: R PIV    Plan: Continue with POC. Notify primary team with changes.

## 2023-04-13 NOTE — PLAN OF CARE
V/S & pain: vitally stable, denies pain  Neuro: A/O x3- disorientated to situation. Calm, cooperative, flat affect.  Respiratory: on room air, lungs clear   Skin: generalized jaundice, distended abdomen w/ scabbing to bilateral knees.  GI/: void freely without difficulty, last bm 4/12/23  Nutrition:  regular diet   Lines/drains:  right PIV saline locked  Activity: up assist of 1    Events this shift:received pt. Sleeping comfortably in bed. call light w/in reach and able to make needs known, will continue to monitor. At:50am, pt woke up, ate pc of nikolay freeman ,and apple juice, diaper and under pad changed. Kept bed alarm on and side rails up.    Plan:  continue w/ poc, awaiting safe discharge plans

## 2023-04-13 NOTE — PLAN OF CARE
/69 (BP Location: Left arm)   Pulse 103   Temp 98.3  F (36.8  C) (Oral)   Resp 20   Wt 52.6 kg (116 lb)   SpO2 95%   BMI 19.30 kg/m      Care from: 5303-7216    VSS on room air ex tachycardic, no s/s of pain. Lethargic, confused, disoriented to time and situation; hypoactive, flat affect; arouses to voice; slow speech, whispers, word-finding difficulty; WH score: 1- writer started prn q2h Lactulose protocol, administered 2 doses out of the 3 Distended and taut abdomen. Scleras yellow and jaundiced. Generalized weakness and mobility is mildly impaired, assist of 1, gait belt, walker. Prn IV Zofran x1 was given for nausea and emesis x1. Pt had Call light within reach and bed alarm on. Continue to follow poc.

## 2023-04-13 NOTE — PROGRESS NOTES
"Paged Beatrice crosscover re: High, 5B, Beatrice1, M.B, Rm 535 Pt ate pineapple, slight bleeding around gums and tongue. Skylar TOSCANO RN [phone] \"  "

## 2023-04-14 NOTE — PROGRESS NOTES
SPIRITUAL HEALTH SERVICES  SPIRITUAL ASSESSMENT Progress Note (Palliative Focus)  [facility] ([place]) 5B    REFERRAL SOURCE: Palliative care follow up.    Visit with patient Preeti Pascual's wife Barber while Preeti was receiving other cares. Barber is appreciative of both assurance of prayer and follow up visits. She reported that their family friend is continuing to try to find an [place] [employer]  to visit. Preeti is [place] [employer], Barber more generally Zoroastrianism.     Plan: I will follow for spiritual support while Palliative Care is consulted.    Bhakti Sarmiento M.Div., [facility]  Palliative Care   Pager [phone]  [facility] Inpatient Team Consult pager [phone] (M-F 8-4:30)  After-hours Answering Service [phone]

## 2023-04-14 NOTE — PROGRESS NOTES
"SPIRITUAL HEALTH SERVICES  SPIRITUAL ASSESSMENT Progress Note (Palliative Focus)  [facility] ([place]) 5B    REFERRAL SOURCE: Family request.    Visit with patient Preeti Pascual's wife Barber at his bedside, while he slept.     She welcomes spiritual support and reports that a friend is trying to reach an [place] [employer]  for Preeti, who is Methodist. Barber herself is \"just Scientologist\" and finds comfort in the Bible. She would appreciate follow up later in week if possible, to see if her friend has been able to find a  for Preeti.     Plan: I will follow for spiritual support while Palliative Care is consulted.    Bhakti Sarmiento M.Div., [facility]  Palliative Care   Pager [phone]  [facility] Inpatient Team Consult pager [phone] (M-F 8-4:30)  After-hours Answering Service [phone]    "

## 2023-04-14 NOTE — PLAN OF CARE
Assumed cares [id number]. Alert but confused. Disoriented to situation. Stable on RA. Endorsed abdominal pain, MS contin and PRN oxy x1. Abdomen taut and distended. Nausea with emesis x1, zofran administered x2 this shift. Poor appetite with continuous nausea. Frequent watery BMs. Assist x1 to BSC. R PIV saline locked. WH 0-1, however PRN lactulose discontinued this shift.     Goal Outcome Evaluation:      Plan of Care Reviewed With: patient    Overall Patient Progress: declining

## 2023-04-14 NOTE — PLAN OF CARE
RN assumed cares at [phone]     Vitals: VSS on room air.  Pain: 5/10 pain located in abdomen; scheduled tylenol given.  Neuro: Disoriented to situation. Confused and lethargic.  Cardiac: WDL.   Peripheral neurovascular: WDL.  Respiratory: Lung sounds clear. No respiratory distress noted overnight.  GI/: 2x BM overnight.   IV/Drains: PIV saline locked.  Skin: Skin check completed without any new concerns.  Activity: Assist x1 with gb & walker. Shifted in bed independently.   Nutrition: Regular diet.     Events: WH 1 at beginning of shift and PRN lactulose was given. Pt became more alert and oriented. At 0400, pt appeared lethargic and disoriented and HE nurse initiated protocol was implemented again. Provider was notified. Pt slept between cares. Q2hr rounding completed. Call light within reach and is able to make needs known.     Plan: Discuss hospice options with family.        Goal Outcome Evaluation:      Plan of Care Reviewed With: patient    Overall Patient Progress: no changeOverall Patient Progress: no change    Outcome Evaluation: WH 0 -> WH1, prn lactulose administered, Ax1 to chair, BMx2, pain 5/10

## 2023-04-15 NOTE — PLAN OF CARE
Assumed cares [id number]. Transitioned to comfort cares this shift. Alert but confused. Disoriented to place and situation. Endorsed abdominal and back pain, IV dilaudid for pain management. Abdomen taut and distended. Nausea with emesis x1, zofran administered. Frequent watery BMs, incontinent at times. Assist x1 to BSC. L PIV saline locked.     Goal Outcome Evaluation:      Plan of Care Reviewed With: patient    Overall Patient Progress: declining

## 2023-04-15 NOTE — PLAN OF CARE
[id number]: Patient is lethargic and barely responsive this shift. MD's notified, came and assessed patient who stated he was tired and had some pain in chest. MD's talked with patients wife and proceeded with an EKG and troponin level check. Vital signs are stable. Bed alarm on. Incontinent of bowel and bladder, 1 small BM this shift. Left PIV infusing LR at 83.3 ml/hr will be stopped at 12 hours.     Plan: will continue to monitor and follow with plan of care. Will notify MD's of any more changes in patient status.      Goal Outcome Evaluation:      Plan of Care Reviewed With: patient    Overall Patient Progress: decliningOverall Patient Progress: declining

## 2023-04-15 NOTE — PROVIDER NOTIFICATION
Provider notified (name): Beatrice Joya Intern  Reason for notification: 5B 5235-2 MB: FYJAYESH Patient is becoming more lethargic and unresponsive. Is DNR/DNI, will not take anything oral. Thank you Linda MEJIA [phone]  Recommendation/request given to provider:  Response from provider:  Grab a blood sugar, recheck on vitals, we will be down to assess patient.    Blood glucose 134

## 2023-04-15 NOTE — PLAN OF CARE
Time of care: 1900-0730    57 y.o. male admitted with hepatic encephalopathy and abdominal pain. He is DNR/DNI but after a care conference regarding the possibility of comfort cares the wife states that she will need time to speak to the family before a decision is made. No isolation, regular diet, up to commode with assist of one but incontinent this evening. LPIV infusing LR. Pain treated with scheduled morphine and oxy PRN. Vsq8, no BG, lethargic this shift and difficult to arouse. Awake and alert at 0600 but unable to communicate he is Amheric speaking but according to the previous nurse he was able to speak some english. Hx of a fall on the tenth, bed alarm on, may be unable to make needs known, check in on the patient frequently and observe for signs of pain or distress.     Goal Outcome Evaluation: Ongoing, progressing    Plan of Care Reviewed With: patient    Overall Patient Progress: no change

## 2023-04-15 NOTE — PROGRESS NOTES
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 Transitioned to comfort care on 4/15.    Changes Today:  - Comfort care orders  - IP hospice consult    Current Issues:  Comfort care  Refer to care conference documentation on 4/12. Met with wife and family friend (Mr. Dela Cruz). Wife understanding of 's limited life expectancy. He is not physically able to withstand long trip back home to [facility]. Wife is considering transition to hospice care, but would like to discuss with patient's extended family. Update sent to primary oncologist (Dr. Kyle). Met with patient and wife in person w/  on 4/15; family friend (Mr. Dela Cruz) and cousin (from [place]) joined via telephone. Given no significant improvement in sxs while treating decompensated cirrhosis and worsening clinical status in setting of worsening metastatic HCC, decision was made to transition to comfort care and pursue hospice.  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 Wife notes intermittent confusion. Labs notable for alk phos 369 (chronically elevated),  (chronically elevated), Tbili 8.3. Ammonia 119. Lipase and WBC WNL. CT AP (4/8) w/ increased diffuse peritoneal metastasis, worsening moderate/large ascites, diffuse  colonic wall thickening. Previously underwent liver-directed therapy w/ radioembolization. Developed metastatic disease to bilateral adrenal glands (2018). Subsequently tx w/ sorafenib, nivolumab, ipilimumab, ramucirumab, lenvatinib, gemzar/oxaliplatin (discontinued 2/2023). Despite this, disease continued to progress w/ diffuse mets. Oncology recommended stopping active tx d/t low likelihood of meaningful response and declining performance status. In regards to cirrhosis, no prior hx of decompensations. Oncology has been managing sxs as patient ultimately wants to return to [facility] for end-of-life. Prescribed morphine and oxycodone, although unclear how much he is taking per day.  - Ascites: s/p paracentesis (4/12, 3L removed)  - HE: s/p lactulose, rifaximin  - SBP: s/p empiric CTX (4/10-4/14)  - Discontinue PTA dexamethasone      Oliguria  Constipation; resolved  Hyponatremia, mild; resolved    Diet: Combination Diet Regular Diet Adult  Diet  Snacks/Supplements Adult: Other; Ensure plus with lunch and dinner trays ( vanilla and strawberry flavor ); With Meals  Fluids: As above  Lines: PIV  Villalta Catheter: Not present  DVT Prophylaxis: Enoxaparin (Lovenox) SQ  Code Status: No CPR- Do NOT Intubate    Expected Discharge Date: 04/17/2023    Discharge Delays: Healthcare Proxy/Guardian making decision  Destination: home with family;home with help/services;assisted living  Discharge Comments: Dispo: Pending palliative care consult; hospice indicated   Plan: pain mgmt, bowel regimen - lactulose;   Progress: paracentesis 4/12; terminal phase of Dx       The patient's care was discussed with the attending physician, Dr. Daley.    Yanci Bailey MD  Internal Medicine, PGY-1  Please see sticky note for cross cover information      Interval History   No acute events overnight. Patient appears significantly weaker. Notes abdominal and back pain. RN reports that patient was nauseous and threw up his PO meds. Spoke with wife,  friend, cousin regarding plan of care (see above).    Physical Exam   Vital Signs: Temp: 98  F (36.7  C) Temp src: Axillary BP: 122/72 Pulse: 103   Resp: 16 SpO2: 95 % O2 Device: None (Room air)    Weight: 111 lbs 14.4 oz  Constitutional: Lethargic. NAD. Conversant. Cachectic.  HEENT: NC/AT, icteric sclera, pupils round and equal, EOMI.  CV: RRR, normal S1/S2, no murmurs/gallops/rubs, intact distal pulses.  Pulm: Non-labored respirations on room air, CTAB, no wheezes or crackles.  Abdomen: Moderately distended. Sluggish bowel sounds. Non-tender w/ deep palpation. No rebound or guarding.  Extremities: Warm and well-perfused, no peripheral edema, cyanosis, or clubbing.  Skin: No jaundice, no rashes on exposed skin.  Neuro: Alert and oriented x2. Statements often do not make sense per . Moves all four extremities independently.  Psych: Normal affect.    Data   Recent Labs   Lab 04/14/23  2159 04/14/23  2117 04/12/23  0650 04/11/23  0611 04/10/23  1057 04/09/23  0814 04/08/23  1006   WBC 7.8  --  7.8 7.5  --    < > 7.8   HGB 10.7*  --  11.0* 10.6*  --    < > 12.3*   MCV 99  --  97 95  --    < > 93     --  251 262  --    < > 376   INR  --   --   --   --  1.61*  --  3.96*     --  134* 137  --    < > 127*   POTASSIUM 4.0  --  3.9 4.0  --    < > 4.1   CHLORIDE 113*  --  105 105  --    < > 95*   CO2 20*  --  20* 21*  --    < > 21*   BUN 12.8  --  10.6 8.5  --    < > 7.3*   CR 0.52*  --  0.55* 0.58*  --    < > 0.54*   ANIONGAP 12  --  9 11  --    < > 11   STACEY 8.5*  --  8.7 8.7  --    < > 8.9   * 134* 105* 86  --    < > 98   ALBUMIN  --   --  2.6* 2.7*  --    < > 3.1*  3.1*   PROTTOTAL  --   --  7.2 7.1  --    < > 8.1   BILITOTAL  --   --  5.2* 5.8*  --    < > 8.3*   ALKPHOS  --   --  328* 318*  --    < > 369*   ALT  --   --  43 41  --    < > 50   AST  --   --  242* 261*  --    < > 277*   LIPASE  --   --   --   --   --   --  16    < > = values in this interval not displayed.

## 2023-04-15 NOTE — PROGRESS NOTES
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    Changes Today:  - Ongoing discussions about no further escalation of care, inpatient/outpatient hospice with wife and palliative care (no definitive decision yet as wife speaks to patient's extended family)  - 1L LR over 12 hrs  - Discontinued PRN lactulose given nausea    Goals of care  Refer to care conference documentation on 4/12. Met with wife and family friend (Mr. Dela Cruz). Wife understanding of 's limited life expectancy. He is not physically able to withstand long trip back home to [facility]. Wife is considering transition to hospice care, but would like to discuss with patient's extended family. Update sent to primary oncologist (Dr. Kyle).  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 Wife notes intermittent confusion. Labs notable for alk phos 369 (chronically elevated),  (chronically elevated), Tbili 8.3. Ammonia 119. Lipase and WBC WNL. CT AP (4/8) w/ increased diffuse peritoneal metastasis, worsening moderate/large ascites, diffuse colonic wall thickening. Previously underwent liver-directed therapy w/ radioembolization. Developed metastatic disease to bilateral  adrenal glands (2018). Subsequently tx w/ sorafenib, nivolumab, ipilimumab, ramucirumab, lenvatinib, gemzar/oxaliplatin (discontinued 2/2023). Despite this, disease continued to progress w/ diffuse mets. Oncology recommended stopping active tx d/t low likelihood of meaningful response and declining performance status. In regards to cirrhosis, no prior hx of decompensations. Oncology has been managing sxs as patient ultimately wants to return to [facility] for end-of[facility]life. Prescribed morphine and oxycodone, although unclear how much he is taking per day.  - Pain regimen: Tylenol 650mg TID, continue PTA morphine CR 15mg BID, continue PTA oxycodone 10mg Q4H PRN (moderate pain), IV hydromorphone 0.2mg Q2H PRN (severe pain)  - Ascites: paracentesis (4/12, 3L removed); hold off on Lasix/spironolactone at this time  - HE: lactulose TID (goal stool output 500cc-1L/day, hold if > 4 BM/day), rifaximin BID  - SBP: empiric CTX (4/10-4/14); hold off on empiric albumin at this time  - Pruritis: hydroxyzine PRN, camphor lotion PRN  - Continue PTA dexamethasone      Oliguria  At risk for HRS. Difficult to determine amount of urinary retention w/ bladder scanning in setting of large volume ascites. Cystatin C 1.3, although unclear baseline. s/p IVF.    Constipation; resolved  Patient endorses no BM for past 6 days. Previously had 1 BM/day. Has been taking Senokot daily, but not Miralax. S/p tap water enema (4/8).  - Lactulose TID and PRN as above  - PRN Miralax, Senokot, Dulcolax suppository, tap water enema    Hyponatremia, mild; resolved  Na 127 on admission. Suspect true hypovolemia in setting of poor PO intake. Urine osm 699 and Abel < 20, consistent with true hypovolemia and 3rd spacing in setting of cirrhosis.    Diet: Combination Diet Regular Diet Adult  Diet  Snacks/Supplements Adult: Other; Ensure plus with lunch and dinner trays ( vanilla and strawberry flavor ); With Meals  Fluids: As above  Lines: PIV  [place]alta Catheter: Not  present  DVT Prophylaxis: Enoxaparin (Lovenox) SQ  Code Status: No CPR- Do NOT Intubate     Expected Discharge Date: 04/17/2023    Discharge Delays: Healthcare Proxy/Guardian making decision  Destination: home with family;home with help/services;assisted living  Discharge Comments: Dispo: Pending palliative care consult; hospice indicated   Plan: pain mgmt, bowel regimen - lactulose;   Progress: paracentesis 4/12; terminal phase of Dx       The patient's care was discussed with the attending physician, Dr. Daley.    Yanci Bailey MD  Internal Medicine, PGY-1  Please see sticky note for cross cover information      Interval History   No acute events overnight. Patient feels fatigued and notes ongoing abdominal pain. Having BMs with additional PRN lactulose, but feels more nauseous. Not eating much, but trying to drink. Briefly spoke with wife, but she had to leave for work. Updated Mr. Dela Cruz (friend). Numerous family members contacted nursing station; will clarify with wife who should receive updates.    Physical Exam   Vital Signs: Temp: (!) 95.5  F (35.3  C) Temp src: Axillary BP: 137/76 Pulse: 89   Resp: 16 SpO2: 98 % O2 Device: None (Room air)    Weight: 111 lbs 14.4 oz  Constitutional: Lethargic. NAD. Conversant. Cachectic.  HEENT: NC/AT, icteric sclera, pupils round and equal, EOMI.  CV: RRR, normal S1/S2, no murmurs/gallops/rubs, intact distal pulses.  Pulm: Non-labored respirations on room air, CTAB, no wheezes or crackles.  Abdomen: Moderately distended. Sluggish bowel sounds. Non-tender w/ deep palpation. No rebound or guarding.  Extremities: Warm and well-perfused, no peripheral edema, cyanosis, or clubbing.  Skin: No jaundice, no rashes on exposed skin.  Neuro: Alert and oriented x2. Statements often do not make sense per . Moves all four extremities independently.  Psych: Normal affect.    Data   Recent Labs   Lab 04/12/23  0650 04/11/23  0611 04/10/23  1057 04/09/23  0814 04/08/23  1006    WBC 7.8 7.5  --  6.1 7.8   HGB 11.0* 10.6*  --  11.1* 12.3*   MCV 97 95  --  94 93    262  --  267 376   INR  --   --  1.61*  --  3.96*   * 137  --  131* 127*   POTASSIUM 3.9 4.0  --  4.0 4.1   CHLORIDE 105 105  --  100 95*   CO2 20* 21*  --  20* 21*   BUN 10.6 8.5  --  7.6* 7.3*   CR 0.55* 0.58*  --  0.54* 0.54*   ANIONGAP 9 11  --  11 11   STACEY 8.7 8.7  --  8.5* 8.9   * 86  --  82 98   ALBUMIN 2.6* 2.7*  --  2.7* 3.1*  3.1*   PROTTOTAL 7.2 7.1  --  7.1 8.1   BILITOTAL 5.2* 5.8*  --  7.0* 8.3*   ALKPHOS 328* 318*  --  319* 369*   ALT 43 41  --  41 50   * 261*  --  244* 277*   LIPASE  --   --   --   --  16

## 2023-04-16 NOTE — PLAN OF CARE
[id number]: Alert, able to make needs known. Slept well between cares. Denies pain. Gave some sips of water at beginning of shift, patient immediately threw it up. L PIV saline locked. Did not get out of bed this shift. Turned q2h. Incontinent of bowel and bladder.     Plan: will continue to monitor and follow with plan of care.      Goal Outcome Evaluation:      Plan of Care Reviewed With: patient    Overall Patient Progress: no changeOverall Patient Progress: no change    Outcome Evaluation: Denied pain overnight. Kept comfortable this shift.

## 2023-04-16 NOTE — PROGRESS NOTES
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 Transitioned to comfort care on 4/15.    Changes Today:  - Comfort care orders  - Will follow up recommendations from GIP  - Dilaudid IV 0.2mg q1h PRN -> Dilaudid IV 0.2-0.5mg q1h PRN    Current Issues:  Comfort care  Refer to care conference documentation on 4/12. Met with wife and family friend (Mr. Dela Cruz). Wife understanding of 's limited life expectancy. He is not physically able to withstand long trip back home to [facility]. Wife is considering transition to hospice care, but would like to discuss with patient's extended family. Update sent to primary oncologist (Dr. Kyle). Met with patient and wife in person w/  on 4/15; family friend (Mr. Dela Cruz) and cousin (from [place]) joined via telephone. Given no significant improvement in sxs while treating decompensated cirrhosis and worsening clinical status in setting of worsening metastatic HCC, decision was made to transition to comfort care and pursue hospice.  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 Wife notes intermittent confusion. Labs notable for alk phos 369 (chronically elevated),  (chronically elevated), Tbili 8.3. Ammonia 119. Lipase and WBC WNL. CT AP (4/8) w/  increased diffuse peritoneal metastasis, worsening moderate/large ascites, diffuse colonic wall thickening. Previously underwent liver-directed therapy w/ radioembolization. Developed metastatic disease to bilateral adrenal glands (2018). Subsequently tx w/ sorafenib, nivolumab, ipilimumab, ramucirumab, lenvatinib, gemzar/oxaliplatin (discontinued 2/2023). Despite this, disease continued to progress w/ diffuse mets. Oncology recommended stopping active tx d/t low likelihood of meaningful response and declining performance status. In regards to cirrhosis, no prior hx of decompensations. Oncology has been managing sxs as patient ultimately wants to return to [facility] for end[facility]of[facility]life. Prescribed morphine and oxycodone, although unclear how much he is taking per day.  - Ascites: s/p paracentesis (4/12, 3L removed)  - HE: s/p lactulose, rifaximin  - SBP: s/p empiric CTX (4/10-4/14)  - Discontinue PTA dexamethasone      Oliguria  Constipation; resolved  Hyponatremia, mild; resolved    Diet: Diet  Snacks/Supplements Adult: Other; Ensure plus with lunch and dinner trays ( vanilla and strawberry flavor ); With Meals  Regular Diet Adult  Fluids: As above  Lines: PIV  Villalta Catheter: Not present  DVT Prophylaxis: Enoxaparin (Lovenox) SQ  Code Status: No CPR- Do NOT Intubate    Expected Discharge Date: 04/17/2023    Discharge Delays: Healthcare Proxy/Guardian making decision  Destination: home with family;home with help/services;assisted living  Discharge Comments: Dispo: Pending palliative care consult; hospice indicated   Plan: pain mgmt, bowel regimen - lactulose;   Progress: paracentesis 4/12; terminal phase of Dx       Michelle Daley MD  Internal Medicine-Pediatrics  [facility]  P: [phone]      Interval History   No acute events overnight. Nursing notes reviewed, and bedside nurse updated on plan of care. Melesse arouse to light touch but wasn't really able to answer questions. He did appear comfortable.      Physical Exam   Vital Signs: not obtained  Weight: 111 lbs 14.4 oz  Constitutional: Lethargic. Lying supine in bed in no acute distress. Cachectic. Arouses to light touch and localizes when I call his name but doesn't answer questions.   HEENT: NC/AT, icteric sclera, pupils round and equal.  CV: RRR though tachycardiac, normal S1/S2, no murmurs/gallops/rubs, intact distal pulses.  Pulm: Non-labored respirations on room air, CTAB, no wheezes or crackles.  Abdomen: Moderately distended. Sluggish bowel sounds. Non-tender w/ deep palpation. No rebound or guarding.  Extremities: Warm and well-perfused, no peripheral edema, cyanosis, or clubbing.  Skin: No jaundice, no rashes on exposed skin.    Data   Recent Labs   Lab 04/14/23  2159 04/14/23  2117 04/12/23  0650 04/11/23  0611 04/11/23  0611 04/10/23  1057   WBC 7.8  --  7.8  --  7.5  --    HGB 10.7*  --  11.0*  --  10.6*  --    MCV 99  --  97  --  95  --      --  251  --  262  --    INR  --   --   --   --   --  1.61*     --  134*  --  137  --    POTASSIUM 4.0  --  3.9  --  4.0  --    CHLORIDE 113*  --  105  --  105  --    CO2 20*  --  20*  --  21*  --    BUN 12.8  --  10.6  --  8.5  --    CR 0.52*  --  0.55*  --  0.58*  --    ANIONGAP 12  --  9  --  11  --    STACEY 8.5*  --  8.7  --  8.7  --    * 134* 105*   < > 86  --    ALBUMIN  --   --  2.6*  --  2.7*  --    PROTTOTAL  --   --  7.2  --  7.1  --    BILITOTAL  --   --  5.2*  --  5.8*  --    ALKPHOS  --   --  328*  --  318*  --    ALT  --   --  43  --  41  --    AST  --   --  242*  --  261*  --     < > = values in this interval not displayed.

## 2023-04-16 NOTE — PROGRESS NOTES
BRIEF UPDATE    Called to bedside ~1430 due to patient's wife, Ms. Pascual, having questions about medication changes with comfort cares. Specifically, she understands comfort cares but would really like to continue antibiotics, because she doesn't think this is particularly uncomfortable for Mr. Pascual and will perhaps buy him some extra time with her.    I told her that at this time, I think she should spend as much as as possible with him for the next day or two-- on exam, he isn't able to safely restart any oral medications, has stopped eating, and is minimally responsive. She understands (and her family friend, Mr. Chu, was also at bedside and verbalized understanding) but would really like the antibiotics through the end of life, in case it can provide just a little bit more time. I think that it's reasonable at this time to restart once daily ceftriaxone for SBP.    - 1g CTX q24h ordered  - Continue the rest of Mr. Pascual's comfort measures at this time    Michelle Daley MD  Internal Medicine-Pediatrics  [facility]  P: [phone]

## 2023-04-16 NOTE — PLAN OF CARE
Assumed cares [id number]. Continues on comfort cares. Unresponsive to stimuli. IV dilaudid for pain management. Abdomen taut and distended. Multiple watery stools. Repositioned q2h. L PIV saline locked. Wife at bedside for majority of the shift. GIP consult placed.

## 2023-04-16 NOTE — PROVIDER NOTIFICATION
Provider notified (name): PARDEEP THACKER  Reason for notification: 5B 5235-2 MB: [place] we do not give Dilaudid q15 min on the units, can you please change to every hour? thank you Linda MEJIA [phone]  Recommendation/request given to provider:  Response from provider:

## 2023-04-17 NOTE — PHARMACY-ADMISSION MEDICATION HISTORY
Pharmacist Admission Medication History    Admission medication history is complete. The information provided in this note is only as accurate as the sources available at the time of the update.    Medication reconciliation/reorder completed by provider prior to medication history? No    Information Source(s): Clinic records, Hospital records and CareEverywhere/SureScripts via N/A    Pertinent Information: Patient on comfort cares, had stopped treatment for co-morbidities prior to admission. Patient was previously on morphine 15 mg BID as well, but prescription  on med list so does not appear.     Changes made to PTA medication list:    Added: None    Deleted: None    Changed: None    Medication Affordability: Unable to assess, not relevant when patient on comfort cares.      Allergies reviewed with patient and updates made in EHR: unable to assess - unable to reach patient with interpretor.     Medication History Completed By: Barbara Carrillo RPH 2023 10:15 AM    PTA Med List   Medication Sig Last Dose     acetaminophen (TYLENOL) 325 MG tablet Take 650 mg by mouth every 8 hours as needed for mild pain Do not exceed 2 grams per day.      dexamethasone (DECADRON) 2 MG tablet Take 1 tablet (2 mg) by mouth daily (with breakfast)      omeprazole (PRILOSEC) 20 MG DR capsule Take 1 capsule (20 mg) by mouth daily      ondansetron (ZOFRAN) 8 MG tablet Take 1 tablet (8 mg) by mouth every 8 hours as needed for nausea (vomiting)      oxyCODONE (ROXICODONE) 5 MG tablet Take 2 tablets (10 mg) by mouth every 4 hours as needed for moderate to severe pain      prochlorperazine (COMPAZINE) 10 MG tablet Take 1 tablet (10 mg) by mouth every 6 hours as needed for nausea or vomiting      SENNA-docusate sodium (SENNA S) 8.6-50 MG tablet Take 1-2 tablets by mouth 2 times daily as needed

## 2023-04-17 NOTE — PROGRESS NOTES
[facility]    Follow Up Note - [facility]    ______________________________________________________________________    [facility] 24/7 Contact Number: [phone]    - Providers: Please contact [facility] with changes in orders or clinical plan of care   - Nursing: Please contact [facility] with significant changes in patient condition  ______________________________________________________________________    Due to referral volume from this weekend, this referral may not be addressed until 4/18. We greatly appreciate the patience and will tend to this case as soon as we are able.    JUANA AcostaN, RN  Clinical Nurse Liaison  [facility]  Cell: [phone]  [facility]  Office: [phone]  [email or web address]  [email or web address]

## 2023-04-17 NOTE — PLAN OF CARE
Assumed cares: [id number]  Pain: Pt grimacing in pain, PRN IV dilaudid provided to pt  Neuro: Lethargic- unable to assess orientation  GI/: Incontinent of bowel and bladder- esme colored urine, no BM during shift; Pt not eating or drinking  Respiratory: Pt having difficulty breathing. PRN atropine provided to pt. Provider notified.   Skin: No new skin changes  IV/Drains: L PIV- SL  Activity: Assist x2  Behavior: Pt lethargic    Plan of Care: Follow patient plan of care. Pt on comfort cares

## 2023-04-17 NOTE — PROVIDER NOTIFICATION
Provider notified (name):  STEPHANE THACKERSSA  Reason for notification: 5B 5235-2 MB: FYI patient is starting to become more end of life. would you like to call the wife and see if she would like to come back in? thank you Linda MEJIA [phone]  Recommendation/request given to provider:  Response from provider:

## 2023-04-17 NOTE — PROVIDER NOTIFICATION
Provider notified (name): PARDEEP THACKER  Reason for notification: 5B 5235-2 MB: Patient has a temp of 100.0 auxillary. Can I get a tylenol suppository to keep him comfortable? thank you Linda MEJIA [phone]  Recommendation/request given to provider:  Response from provider:  Order placed

## 2023-04-17 NOTE — PROVIDER NOTIFICATION
Provider notified (name): Michelle Daley MD  Reason for notification: Pt update  Recommendation/request given to provider: Pt having difficulty breathing. O2 89%, . Atropine already provided to pt.  Response from provider: Pending

## 2023-04-17 NOTE — PROGRESS NOTES
Connected with Dr. Damian Drew (hospice MD) to review this pt's eligibility for inpatient hospice. Dr. Drew has founf patient eligible for hospice care.    Hospice diagnosis: Metastatic hepatocellular carcinoma w/ related cirrhosis, ascites    GIP eligibility: Pain, dyspnea    Long Nuñez RN

## 2023-04-17 NOTE — PROGRESS NOTES
Referral Received - [facility]        [facility] would like to thank you for the [facility] referral.     Referral received and initial insurance information sent to  Hospice intake for review.     We are determining your patient's eligibility with a medical director at this time.     Our plan is to visit your patient as soon as possible. We will connect with the primary care team shortly to collect more information on the patient's progression. Thank you for your patience.           SALLY Bledsoe, [facility]  Hospice Social Worker  [facility]  Monday -Friday 8AM-5PM  Work number: [phone]  [facility]  24/7 line: [phone]  [email or web address]  [email or web address]

## 2023-04-17 NOTE — PLAN OF CARE
[id number]: Lethargic, unable to assess orientation. Kept comfortable with PRN dilaudid. Turned q2h. Incontinent of bowel and bladder, no BM this shift. L PIV saline locked. Regular diet, but is not eating or drinking. Assist of 2 to turn.     Plan: will continue to monitor and follow with plan of care.       Goal Outcome Evaluation:      Plan of Care Reviewed With: patient    Overall Patient Progress: no changeOverall Patient Progress: no change    Outcome Evaluation: Kept comfortable

## 2023-04-17 NOTE — PROGRESS NOTES
SPIRITUAL HEALTH SERVICES  SPIRITUAL ASSESSMENT Progress Note (Palliative Focus)  [facility] ([place] 5B    REFERRAL SOURCE: Palliative care follow up.    Visit with patient Preeti Pascual's wife Barber at bedside. She is grieving and was worried that he was uncomfortable, of which RN was aware and addressing.     Barber welcomed emotional and spiritual support; I prayed with Preeti while she spoke with a friend of his who had called the hospital to check in. Preeti is Slovak Restorationist, Barber is Adventism Moravian.    Plan: I will follow for spiritual support while Palliative Care is consulted.    Bhakti Sarmiento M.Div., [facility]  Palliative Care   Pager [phone]  [facility] Inpatient Team Consult pager [phone] (M-F 8-4:30)  After-hours Answering Service [phone]

## 2023-04-17 NOTE — PROGRESS NOTES
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 Transitioned to comfort care on 4/15.    Changes Today:  - Continue comfort care orders  - Will follow up recommendations from GIP    Current Issues:  Comfort care  Refer to care conference documentation on 4/12. Met with wife and family friend (Mr. Dela Cruz). Wife understanding of 's limited life expectancy. He is not physically able to withstand long trip back home to [facility]. Wife is considering transition to hospice care, but would like to discuss with patient's extended family. Update sent to primary oncologist (Dr. Kyle). Met with patient and wife in person w/  on 4/15; family friend (Mr. Dela Cruz) and cousin (from [place]) joined via telephone. Given no significant improvement in sxs while treating decompensated cirrhosis and worsening clinical status in setting of worsening metastatic HCC, decision was made to transition to comfort care and pursue hospice.  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 Wife notes intermittent confusion. Labs notable for alk phos 369 (chronically elevated),  (chronically elevated), Tbili 8.3. Ammonia 119. Lipase and WBC WNL. CT AP (4/8) w/ increased diffuse peritoneal  metastasis, worsening moderate/large ascites, diffuse colonic wall thickening. Previously underwent liver-directed therapy w/ radioembolization. Developed metastatic disease to bilateral adrenal glands (2018). Subsequently tx w/ sorafenib, nivolumab, ipilimumab, ramucirumab, lenvatinib, gemzar/oxaliplatin (discontinued 2/2023). Despite this, disease continued to progress w/ diffuse mets. Oncology recommended stopping active tx d/t low likelihood of meaningful response and declining performance status. In regards to cirrhosis, no prior hx of decompensations. Oncology has been managing sxs as patient ultimately wants to return to [facility] for end-of-life. Prescribed morphine and oxycodone, although unclear how much he is taking per day.  - Ascites: s/p paracentesis (4/12, 3L removed)  - HE: s/p lactulose, rifaximin  - SBP: CTX (4/10-present; continue per family request)  - Discontinue PTA dexamethasone      Oliguria  Constipation; resolved  Hyponatremia, mild; resolved    Diet: Diet  Snacks/Supplements Adult: Other; Ensure plus with lunch and dinner trays ( vanilla and strawberry flavor ); With Meals  Regular Diet Adult  Fluids: As above  Lines: PIV  Villalta Catheter: Not present  DVT Prophylaxis: Enoxaparin (Lovenox) SQ  Code Status: No CPR- Do NOT Intubate    Expected Discharge Date: 04/17/2023    Discharge Delays: Healthcare Proxy/Guardian making decision  Destination: home with family;home with help/services;assisted living  Discharge Comments: Dispo: Pending palliative care consult; hospice indicated   Plan: pain mgmt, bowel regimen - lactulose;   Progress: paracentesis 4/12; terminal phase of Dx       Plan of care discussed with attending physician, Dr. Daley.    aYnci Bailey MD  Internal Medicine, PGY-1      Interval History   No acute events overnight. T 100, given Tylenol suppository. This morning, patient was resting in bed. Moving legs intermittently. Not answering questions. Not tracking my movements with  his eyes. Appears comfortable.     Physical Exam   Vital Signs: not obtained  Weight: 111 lbs 14.4 oz  Constitutional: Lethargic. Lying supine in bed in no acute distress. Cachectic. Not answering questions.   HEENT: NC/AT, icteric sclera, pupils round and equal.  CV: Tachycardic, regular rhythm, normal S1/S2, no murmurs/gallops/rubs, intact distal pulses.  Pulm: Non-labored respirations on room air, CTAB, no wheezes or crackles.  Abdomen: Moderately distended. Sluggish bowel sounds. Non-tender w/ deep palpation. No rebound or guarding.  Extremities: Warm and well-perfused, no peripheral edema, cyanosis, or clubbing.  Skin: No jaundice, no rashes on exposed skin.    Data   Recent Labs   Lab 04/14/23  2159 04/14/23  2117 04/12/23  0650 04/11/23  0611 04/11/23  0611 04/10/23  1057   WBC 7.8  --  7.8  --  7.5  --    HGB 10.7*  --  11.0*  --  10.6*  --    MCV 99  --  97  --  95  --      --  251  --  262  --    INR  --   --   --   --   --  1.61*     --  134*  --  137  --    POTASSIUM 4.0  --  3.9  --  4.0  --    CHLORIDE 113*  --  105  --  105  --    CO2 20*  --  20*  --  21*  --    BUN 12.8  --  10.6  --  8.5  --    CR 0.52*  --  0.55*  --  0.58*  --    ANIONGAP 12  --  9  --  11  --    STACEY 8.5*  --  8.7  --  8.7  --    * 134* 105*   < > 86  --    ALBUMIN  --   --  2.6*  --  2.7*  --    PROTTOTAL  --   --  7.2  --  7.1  --    BILITOTAL  --   --  5.2*  --  5.8*  --    ALKPHOS  --   --  328*  --  318*  --    ALT  --   --  43  --  41  --    AST  --   --  242*  --  261*  --     < > = values in this interval not displayed.

## 2023-04-17 NOTE — PROGRESS NOTES
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 Patient interviewed w/ phone  and wife at bedside. Patient notes generalized abdominal pain that has been worsening over the past few days. Has been taking more PRN oxycodone, but unsure of exactly how much. Wife notes that he seems more fatigued and slightly confused compared to usual, especially after taking oxycodone. Last BM 6 days ago; previously had 1 BM/day. Has been taking Senokot but not Miralax. Some nausea. Denies vomiting, fever, chills. No prior hx of paracentesis. Very poor PO intake d/t lack of appetite. Had mechanical fall without lead impact or LOC with some residual right hip pain. Able to bear weight. Wife reiterates their goal of returning to [facility] to see family and friends once pain is better managed. Confirmed DNR/DNI status with patient    Today, the patient was seen for:  Dyspnea  HCC  cirrhosis    Prognosis, Goals, or Advance Care Planning was addressed today with: Yes.    Goals are currently comfort focused, family not present, discussed with team the use of ABX, might be adding to dyspnea and could consider stopping however sounds like family wished to resume them yesterday after they were previously stopped.    Mood, coping, and/or meaning in the context of serious illness were addressed today: Yes.  Summary/Comments: family went present are benefiting from support from PC team.            Interval History:      Chart review/discussion with unit or clinical team members:   Having dyspnea and on CMO with abx running.    Per patient or family/caregivers today:  Patient in bed, somnolent, appears comfortable with RR 60 per min.     Key Palliative Symptoms:  # Dyspnea severity the last 12 hours: moderate               Review of Systems:     Besides above, an additional ROS unable to be completed as patient unable to participate in interview.          Medications:     I have reviewed this patient's medication profile and medications during this hospitalization.           Physical Exam:   Vitals were reviewed                     No intake or output data in the 24 hours ending 04/18/23 0824    General Appearance: very ill  man with significant cachexia and distended abdomen   Head: Normocephalic, without obvious abnormality, atraumatic.   Eyes: Conjunctivae icteric   Throat: Lips, mucosa, and tongue moist; teeth and gums normal.   Resp: unlabored with no increased WOB   CV: RRR to palpation at right radial artery  Abdomen: significantly distended; tender to deep palpation without rebound  Extremities: significant sarcopenia  Skin: Warm and dry, no rashes in exposed areas.   Neurologic: obtunded; unable to follow commands such as use his legs to assist with repositioning; could not assess for asterixis due to lack of cooperation; did track with eyes when his name was called  Psych:  Obtunded; unable to assess                Data Reviewed:     Reviewed recent pertinent imaging, comments:   04/08/2023 CT AP W/CONTRAST                                                                   IMPRESSION:  In this patient with a history of cirrhosis and metastatic  hepatocellular carcinoma:  1. There is evidence of increased diffuse peritoneal metastases.  Worsening moderate to large ascites.  2. Multiple enlarging pulmonary nodules as described above, concerning  for increased metastatic disease.  3. Diffuse colonic wall thickening,  which can be seen in the setting  of a nonspecific colitis and/or portal hypertension.  4. Increased periportal lymphadenopathy, metastatic.  5. Small right pleural effusion.       Reviewed recent labs, comments:   No recent labs, pt on CMO      MITCH Montemayor CNS  [facility], Palliative Care  Securely message with the [email or web address] Web Console (learn more here) or  Text page via [email or web address] Paging/Directory     35 minutes spent chart reviewing, coordinating care with medical team, discussing symptom management with patient and family, providing education regarding medical options ( including symptoms and end of life cares), and documenting.

## 2023-04-17 NOTE — PROGRESS NOTES
CLINICAL NUTRITION SERVICES    Chart reviewed due to LOS nutrition protocol. Per MD note, patient transitioned to comfort care focus on 4/15. Will continue with Ensure supplements BID with no further interventions planned at this time. Nutrition plan deferred to primary team. RD can be consulted if needed.    RD signing off on 4/17/2023.    Cassandra Gamboa MS  Dietetic Intern

## 2023-04-18 NOTE — DEATH PRONOUNCEMENT
MD DEATH PRONOUNCEMENT    Called to pronounce Preeti Pascual dead.    Physical Exam: Unresponsive to noxious stimuli, Spontaneous respirations absent, Breath sounds absent, Carotid pulse absent, Heart sounds absent, Pupillary light reflex absent and Corneal blink reflex absent    Patient was pronounced dead at 8:45 AM, 2023.    Preliminary Cause of Death: Metastatic hepatocellular carcinoma    Principal Problem:    Abdominal pain, generalized  Active Problems:    Hepatocellular carcinoma (H)       Infectious disease present?: NO    Communicable disease present? (examples: HIV, chicken pox, TB, Ebola, CJD) :  NO    Multi-drug resistant organism present? (example: MRSA): NO    Please consider an autopsy if any of the following exist:  NO Unexpected or unexplained death during or following any dental, medical, or surgical diagnostic treatment procedures.   NO Death of mother at or up to seven days after delivery.     NO All  and pediatric deaths.     NO Death where the cause is sufficiently obscure to delay completion of the death certificate.   NO Deaths in which autopsy would confirm a suspected illness/condition that would affect surviving family members or recipients of transplanted organs.     The following deaths must be reported to the 's Office:  NO A death that may be due entirely or in part to any factors other than natural disease (recent surgery, recent trauma, suspected abuse/neglect).   NO A death that may be an accident, suicide, or homicide.     NO Any sudden, unexpected death in which there is no prior history of significant heart disease or any other condition associated with sudden death.   NO A death under suspicious, unusual, or unexpected circumstances.    NO Any death which is apparently due to natural causes but in which the  does not have a personal physician familiar with the patient s medical history, social, or environmental situation or the circumstances of  the terminal event.   NO Any death apparently due to Sudden Infant Death Syndrome.     NO Deaths that occur during, in association with, or as consequences of a diagnostic, therapeutic, or anesthetic procedure.   NO Any death in which a fracture of a major bone has occurred within the past (6) six months.   NO A death of persons note seen by their physician within 120 days of demise.     NO Any death in which the  was an inmate of a public institution or was in the custody of Law Enforcement personnel.   NO  All unexpected deaths of children   NO Solid organ donors   NO Unidentified bodies   NO Deaths of persons whose bodies are to be cremated or otherwise disposed of so that the bodies will later be unavailable for examination;   NO Deaths unattended by a physician outside of a licensed healthcare facility or licensed residential hospice program   NO Deaths occurring within 24 hours of arrival to a health care facility if death is unexpected.    NO Deaths associated with the decedent s employment.   NO Deaths attributed to acts of terrorism.   NO Any death in which there is uncertainty as to whether it is a medical examiner s care should be discussed with the medical investigator.        Body disposition: Autopsy was discussed with family member:  Spouse in person.  Permission for autopsy was declined.  Organ donation was discussed with family member:  Spouse.  Permission for organ donation was declined.

## 2023-04-18 NOTE — PROGRESS NOTES
Care Management Follow Up     Concerns to be Addressed: adjustment to pt death, post-death arrangements       Additional Information:     as updated by Community Health WorkerKishan that patient had passed away this morning at 8:45 am. Pt's wife is looking for information on getting his body back to the country of [place].    SW reached out to Palliative SWDenise to see if she has spoken with family and if she had any resources. Denise stated that palliative has been involved and family would need to go through a local  home to help arrange the body to be sent back to [place].    SW spoke with pt's wife, Barber in the room (with  pt still present), along with a cousin via phone and interpretor to help with Romansh language. Family reports that they have no money and are asking for resources and options that are available. SW let family know that a local  home would be the agency to help with this, not the hospital. Cousin (on phone) stated that they contacted a local  home and were quoted $6000 for  home cost, $15,000 for transportation to [place], and $1000 for passport documents needed. Family reintegrated that they do not have any money to pay for this.     SW asked about the option for a local burial or cremation. Family stated the cremation is not an option for them as a culture/Yarsani. They also reported that pt has no family locally, aside from wife - so a local burial is also not an option.     SW reached out to SW/RNCC care management team and supervisors via huddle to get additional help/guidance. Several SW's shared that they have found the same information on cost (averaging $15-$20K). Another SW verified the Gabonese culture and cremation not being an option. SW Manager (Lawanda Stapleton) stated that  patient would need to be transferred to a local  home within 3 days of passing (max 1 week).    SW again visited pt's wife, along  with interpretor to relay information received. Barber repeated that she has no money, would need to raise money (that would take some time). She stated to SW that she would take pt out of the hospital prior to one week, but would not have the money to bring the body to a  home.     Addendum: SW followed up via teams with SW Manager (Lawanda Stapleton). SW also provided list of /burial options to RN, who gave list to patient.   SW/RNCC to follow and assist with any other needs that may arise.    SALLY Lou, [facility]  Coverage   [email or web address]

## 2023-04-18 NOTE — CONSULTS
SPIRITUAL HEALTH SERVICES Progress Note  [facility] ([place]) 5B    I met with Preeti and his wife Barber per consult for impending death, Preeti was not conversant during our visit. Barber spoke about how difficult it is to see Preeti currently. She expressed her grief appropriately and named claire to see how Arnol is with her in this situation. Eduardoaid there is not family available to be with her, she has been communicating with them by phone call. She requested prayer for Preeti which I facilitated.    Lobo Saba  Chaplain Resident  Pager [phone]    * [facility] remains available 24/7 for emergent requests/referrals, either by having the switchboard page the on-call  or by entering an ASAP/STAT consult in Epic (this will also page the on-call ). Routine Epic consults receive an initial response within 24 hours.*

## 2023-04-18 NOTE — PROGRESS NOTES
SPIRITUAL HEALTH SERVICES  SPIRITUAL ASSESSMENT Progress Note (Palliative Focus)  [facility] ([place]) 5B    REFERRAL SOURCE: Palliative care follow up/family request.    Visit with patient Preeti Pascual's wife Barber following his death. I answered her questions regarding how to make  arrangements and provided grief support as well as end of life prayers in the Latter day tradition.     Plan: No follow up planned unless further bereavement support is requested. I am available for spiritual support as needed while Palliative Care is consulted.    Bhakti Sarmiento M.Div., [facility]  Palliative Care   Pager [phone]  [facility] Inpatient Team Consult Service  Securely message with the [employer] Web Console (learn more here) or  Text page via [employer] Paging/Directory

## 2023-04-18 NOTE — DISCHARGE SUMMARY
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 Transitioned to comfort care on 4/15 in setting of progressive metastatic hepatocellular carcinoma. Patient passed away peacefully on 4/18 with his wife at bedside. Rest in peace Mr. Pascual.    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 Wife notes intermittent confusion. Labs notable for alk phos 369 (chronically elevated),  (chronically elevated), Tbili 8.3. Ammonia 119. Lipase and WBC WNL. CT AP (4/8) w/ increased diffuse peritoneal metastasis, worsening moderate/large ascites, diffuse colonic wall thickening. Previously underwent liver-directed therapy w/ radioembolization. Developed metastatic disease to bilateral adrenal glands (2018). Subsequently tx w/ sorafenib, nivolumab, ipilimumab, ramucirumab, lenvatinib, gemzar/oxaliplatin (discontinued 2/2023). Despite this, disease continued to progress w/ diffuse mets. Oncology recommended stopping active tx d/t low likelihood of meaningful response and declining performance status. In regards to cirrhosis, no prior hx of decompensations. Oncology has been managing sxs as patient ultimately wants to return to [facility] for end-of-life.  - Ascites: s/p paracentesis (4/12, 3L removed)  - HE: s/p lactulose, rifaximin  - SBP: CTX (4/10-present; continued per family request)  - Discontinued PTA  dexamethasone      Comfort care  Refer to care conference documentation on 4/12. Met with wife and family friend (Mr. Dela Cruz). Wife understanding of 's limited life expectancy. He is not physically able to withstand long trip back home to [facility]. Wife is considering transition to hospice care, but would like to discuss with patient's extended family. Update sent to primary oncologist (Dr. Kyle). Met with patient and wife in person w/  on 4/15; family friend (Mr. Dela Cruz) and cousin (from [place]) joined via telephone. Given no significant improvement in sxs while treating decompensated cirrhosis and worsening clinical status in setting of worsening metastatic HCC, decision was made to transition to comfort care and pursue hospice.  - Palliative care following; recs appreciated  - IP hospice consulted; recs appreciated  - Spiritual health services following; recs appreciated  - Comfort care orders     Oliguria  Constipation; resolved  Hyponatremia, mild; resolved     Cause of death: Metastatic hepatocellular carcinoma    The patient was discussed with Dr. Griffith.    Yanci Bailey MD  ______________________________________________________________________    Significant Results and Procedures    CT AP (4/8):  IMPRESSION:  In this patient with a history of cirrhosis and metastatic  hepatocellular carcinoma:  1. There is evidence of increased diffuse peritoneal metastases.  Worsening moderate to large ascites.  2. Multiple enlarging pulmonary nodules as described above, concerning  for increased metastatic disease.  3. Diffuse colonic wall thickening, which can be seen in the setting  of a nonspecific colitis and/or portal hypertension.  4. Increased periportal lymphadenopathy, metastatic.  5. Small right pleural effusion.    Consultations This Hospital Stay   PALLIATIVE CARE ADULT IP CONSULT  INTERNAL MEDICINE PROCEDURE TEAM ADULT IP CONSULT [facility] - PARACENTESIS  INTERNAL MEDICINE  PROCEDURE TEAM ADULT IP CONSULT [facility] - PARACENTESIS  CARE MANAGEMENT / SOCIAL WORK IP CONSULT  NURSING TO CONSULT FOR VASCULAR ACCESS CARE IP CONSULT  [facility] INPATIENT HOSPICE ADULT CONSULT  SPIRITUAL HEALTH SERVICES IP CONSULT    Primary Care Physician   Physician No Ref-Primary

## 2023-04-18 NOTE — PLAN OF CARE
V/S & pain: gcs 3/15  Neuro: unresponsive, GCS 3/15  Respiratory: gurgling sound upon respiration,on mask at 3lpm  Skin: no new skin issues  GI/: oliguric  Nutrition: not eating or drinking  Lines/drains: piv at left arm  Activity: bed bound    Events this shift: prn medications given. Turning and positioning done.oral suctioning done.checked every now and then.instructed wife the use of call alarm bell when in need of assistance. Pt. Is warm to touch. Cold towel applied on forehead, and arms. Work note for wife given. Kept rested and made comfortable.    Plan: for continuity of care

## 2023-04-18 NOTE — PROGRESS NOTES
Assumed cares: [id number]  Pt on comfort cares. Pt lying in bed at start of shift on 3 L of O2 via NC. No noted gurgling sounds. Pt made comfortable. When writer rounded at 0830, writer assessed pt and noted absence of breath in pt. Writer notified provider. Provider confirmed pt had passed away at 0845. Comfort and resources provided to pt's spouse. Record of death paperwork filled out, pt cleaned up and security called for pickup.

## 2023-04-18 NOTE — PLAN OF CARE
Patient on comfort cares. Lying in bed at the start of the shift, tachypneic with gurgling sounds noted upon respiration.Was seen and rounded by palliative care. Dilaudid administered x 2 this shift and patient suctioned per family request. Turned and repositioned regularly, hygiene cares, bed bath done. Cold towel ulises[lied to forehead for comfort as patient skin is warm to touch. All comfort meds given as ordered. Was seen and rounded by primary team. He talked to wife and other family members on the phone as wife appeared anxious and concerned. Therapeutic listening done and spiritual adviser requested to see wife and patient. MD updated.  P: Continue to administer comfort medications when due. Provide emotional support to wife and family members.

## 2023-04-20 NOTE — PROGRESS NOTES
I received notification that next of kin has not provided security services with disposition of body plans. I used  services to make a connection with his wife who only speaks Yi. His NOK, Laci Pascual, directed me (through the ) to speak with Mr Ilda Dela Cruz. His NOK is under the impression the  can stay at the hospital until they raise funds to get his body back to [place].     I reiterated to her through the , and to Mr Gonsales who speaks fluent English, that the transportation of his body would need to be done through a  home. He said they should have the name of the  home within 24 hours. Mr Gonsales and I will connect on Friday.

## 2023-09-05 NOTE — PLAN OF CARE
PAIN MANAGEMENT    HOME CARE INSTRUCTIONS   Do not use heat (such as a heating pad) for 24 hours.  You may apply an ice pack to the injection site for 20 minutes at a time for the first 24 hours for soreness/discomfort at injection site   Keep site clean and dry for 24 hours. If bandaid is present, remove when desired.   Do not drive until tomorrow.  Take care when walking after a lumbar injection.   Resume home medication as prescribed today.  Resume Aspirin, Plavix, or Coumadin the day after the procedure unless other wise instructed.    STEROIDS OR RADIOFREQUENCY    May take 10-14 days for full effects.  Avoid strenuous exercises for 2 days.    CALL PHYSICIAN FOR:  Severe increase in your usual pain or the appearance of new pain.  Prolonged or increasing weakness or numbness in the legs or arms.  Fever greater than 100 degrees F.  Drainage, redness, active bleeding, or increased swelling at the injection site.  Headache that increases when your head is upright and decreases when you lie flat.    FOR EMERGENCIES:   Go directly to the emergency department for any shortness of breath, chest pain, or problems breathing.    [id number]: Pt Faroese speaking along with wife. Family (Ilda) who speaks English came to visit and assist with patient and wife. Procedural team attempted to perform Paracentesis at bedside (prior to Ilda visiting) but pt and wife declined procedure due to thinking there would be a conflict with getting patient back to [facility] (per MD note). Will have day team clarify in the morning. Pt sitting up and be and often stands up at the edge of bed due to discomfort. Bed alarm on when able. Wife at bedside and has permission from ANS to stay the night to help pt. Urine sample needed. Patient and wife aware with clean urinal at bedside. Will continue with plan of care and notify MD of any changes.    Goal Outcome Evaluation:      Plan of Care Reviewed With: patient, spouse, family    Overall Patient Progress: no changeOverall Patient Progress: no change    Outcome Evaluation: Declined paracentesis due to miscommunication, pt confused per family member

## 2023-09-28 NOTE — PROGRESS NOTES
"  PLEASE TEXT [phone] AND USE DOXIMITY      Preeti Pascual is a 54 year old male who is being evaluated via a billable video visit.      The patient has been notified of following:     \"This video visit will be conducted via a call between you and your physician/provider. We have found that certain health care needs can be provided without the need for an in-person physical exam.  This service lets us provide the care you need with a video conversation.  If a prescription is necessary we can send it directly to your pharmacy.  If lab work is needed we can place an order for that and you can then stop by our lab to have the test done at a later time.    Video visits are billed at different rates depending on your insurance coverage.  Please reach out to your insurance provider with any questions.    If during the course of the call the physician/provider feels a video visit is not appropriate, you will not be charged for this service.\"    Patient has given verbal consent for Video visit? Yes  How would you like to obtain your AVS? MyChart    Will anyone else be joining your video visit? No        Video-Visit Details    HISTORY OF PRESENT ILLNESS:  I had the pleasure of seeing Preeti Pascual for followup in the Liver Clinic at the [facility] on August 20, 2020.  Mr. Pascual returns for followup of hepatocellular carcinoma complicating chronic hepatitis B.  He is on nivolumab therapy and seems to be getting a good response.  Indeed he was first diagnosed with HCC now 4 years ago.      He feels well.  He denies any abdominal pain, itching or skin rash or fatigue.  He denies any increased abdominal girth or lower extremity edema.      He denies any fevers or chills, cough or shortness of breath.  He denies any nausea or vomiting, diarrhea or constipation.  His appetite has been good, and his weight has been stable.      There have been no other new events since he was last seen.      He has now " Number Of Freeze-Thaw Cycles: 1 freeze-thaw cycle been on nivolumab therapy for almost 2 years.      Current Outpatient Medications   Medication     fluticasone (FLONASE) 50 MCG/ACT nasal spray     VENTOLIN  (90 Base) MCG/ACT inhaler     No current facility-administered medications for this visit.      On physical examination, he looks well.  HEENT exam shows no scleral icterus or temporal muscle wasting.  Neurologic exam shows no asterixis.    Recent Results (from the past 168 hour(s))   Comprehensive metabolic panel    Collection Time: 08/13/20  9:20 AM   Result Value Ref Range    Sodium 138 133 - 144 mmol/L    Potassium 4.0 3.4 - 5.3 mmol/L    Chloride 109 94 - 109 mmol/L    Carbon Dioxide 25 20 - 32 mmol/L    Anion Gap 4 3 - 14 mmol/L    Glucose 96 70 - 99 mg/dL    Urea Nitrogen 9 7 - 30 mg/dL    Creatinine 0.77 0.66 - 1.25 mg/dL    GFR Estimate >90 >60 mL/min/[1.73_m2]    GFR Estimate If Black >90 >60 mL/min/[1.73_m2]    Calcium 8.4 (L) 8.5 - 10.1 mg/dL    Bilirubin Total 0.4 0.2 - 1.3 mg/dL    Albumin 3.4 3.4 - 5.0 g/dL    Protein Total 7.7 6.8 - 8.8 g/dL    Alkaline Phosphatase 102 40 - 150 U/L    ALT 43 0 - 70 U/L    AST 30 0 - 45 U/L   TSH with free T4 reflex    Collection Time: 08/13/20  9:20 AM   Result Value Ref Range    TSH 0.64 0.40 - 4.00 mU/L      My impression is that Mr. Pascual is doing well.  His most recent alpha-fetoprotein was 24.4.  I did review his CT scan of his chest, abdomen and pelvis.  His disease looks stable.  I know Dr. Kyle is planning on continuing the nivolumab given his excellent response to date.  My plan will be to see him back in the clinic in 6 months for repeat blood work.  Currently all complications are well addressed.     Thank you very much for allowing me to participate in the care of this patient.  If you have any questions regarding my recommendations, please do not hesitate to contact me.         Trevor Trujillo MD      Professor of Medicine  [employer]      Executive Medical  Director, Solid Organ Transplant Program  [facility]    Type of service:  Video Visit    Video Start Time: 8:42 AM  Video End Time: 8:55 AM    Originating Location (pt. Location): Home    Distant Location (provider location):  [facility] HEPATOLOGY     Platform used for Video Visit: Christopher         Render Post-Care Instructions In Note?: no Post-Care Instructions: I reviewed with the patient in detail post-care instructions. Patient is to wear sunprotection, and avoid picking at any of the treated lesions. Pt may apply Vaseline to crusted or scabbing areas. Duration Of Freeze Thaw-Cycle (Seconds): 10 Detail Level: Zone Show Aperture Variable?: Yes Consent: The patient's consent was obtained including but not limited to risks of crusting, scabbing, blistering, scarring, darker or lighter pigmentary change, recurrence, incomplete removal and infection.

## 2023-12-19 NOTE — PROGRESS NOTES
Infusion Nursing Note:  Preeti Pascual presents today for Cycle 3 Day 1 Nivolumab (Opdivo) and Ipilimumab (Yervoy)   Patient spoke with provider today: No    Treatment Conditions:  Lab Results   Component Value Date     07/08/2021                   Lab Results   Component Value Date    POTASSIUM 3.7 07/08/2021           No results found for: MAG         Lab Results   Component Value Date    CR 0.73 07/08/2021                   Lab Results   Component Value Date    STACEY 8.3 07/08/2021                Lab Results   Component Value Date    BILITOTAL 0.3 07/08/2021           Lab Results   Component Value Date    ALBUMIN 3.2 07/08/2021                    Lab Results   Component Value Date    ALT 31 07/08/2021           Lab Results   Component Value Date    AST 27 07/08/2021       Results reviewed, labs MET treatment parameters, ok to proceed with treatment.      Note: Pt with no concerns. Denies fever/chills/SOB or cough.    Intravenous Access:  Peripheral IV placed.    Post Infusion Assessment:  Patient tolerated infusion without incident.  Blood return noted pre and post infusion.  Access discontinued per protocol.    Discharge Plan:   Refill on Flonase requested.  Request sent to Dr. Kyle by pharmacy.   Patient declined prescription refills.  Discharge instructions reviewed with: Patient.  Patient and/or family verbalized understanding of discharge instructions and all questions answered.  Copy of AVS reviewed with patient and/or family.  Patient will return 7/29/21 for next appointment.  Patient discharged in stable condition accompanied by: self.  Departure Mode: Ambulatory.    Crystal Oconnor RN

## 2024-03-12 NOTE — PATIENT INSTRUCTIONS
HEMATOLOGY/ONCOLOGY OFFICE CLINIC VISIT    Visit Information:      Initial Consultation: 7/9/2019  Referring Physician: Dr. Cook  Other Physicians:  Code Status:    Diagnosis/Problem list:   Atypical ductal hyperplasia of the left breast--Dx 4/2019    Present Treatment:  Femara 2.5 mg daily    Treatment history:    Left breast excisional biopsy on 6/14/2019    Plan of care: Endocrine chemoprophylaxis therapy x 5 yrs      Imaging:  Screening mammogram 4/18/2019 was read as BI-RADS Category 0  Diagnostic Sammy left mammogram and ultrasound 5/13/2019, left breast 9 o'clock position 3 cm from the nipple mass and suspicious.  BI-RADS Category 4. Two additional masses in the left breast seen sonographically favored to represent complicated cysts.  BI-RADS Category 3: Probably benign.  Left breast 12 o'clock position 3 cm from the nipple complicated cyst, benign, BI-RADS Category 2.  Bone density 7/11/2019-normal  MRI breast 11/27/19: Post surgical change of the 9:00 left breast related to recent excisional biopsy.  Multiple benign cysts throughout both breasts. MRI BI-RADS: 2 Benign  BILATERAL DIGITAL DIAGNOSTIC MAMMOGRAM 3D/2D WITH CAD AND TARGETED LEFT ULTRASOUND: 2/11/2020  IMPRESSION: BENIGN, ULTRASOUND PROBABLY BENIGN   1) Left breast 9:00 axis periareolar position fat necrosis at the site of prior surgical excisional biopsy correlates with the patient's area of palpable concern and is benign. BI-RADS 2: benign.  2) Left breast 11:00 axis 3 cm FN mass is stable over 9 months of sonographic follow-up and is still considered probably benign, most likely a complicated cyst. BI-RADS 3: probably benign.   3) Left breast 10:00 axis 4 cm FN simple cyst is benign. BI-RADS 2: benign.  4) No mammographic evidence of malignancy in either breast.  RECOMMENDATIONS:  Recommend continued follow-up of the left breast 11:00 axis 3 cm FN mass per the standard BI-RADS 3 protocol.    US left breast 5/14/2020: No sonographic evidence  Contact Numbers    [facility] [place] Main Line: [phone]  [facility] [place] Triage and after hours / weekends / holidays:  [phone]      Please call the triage or after hours line if you experience a temperature greater than or equal to 100.5, shaking chills, have uncontrolled nausea, vomiting and/or diarrhea, dizziness, shortness of breath, chest pain, bleeding, unexplained bruising, or if you have any other new/concerning symptoms, questions or concerns.      If you are having any concerning symptoms or wish to speak to a provider before your next infusion visit, please call your care coordinator or triage to notify them so we can adequately serve you.     If you need a refill on a narcotic prescription or other medication, please call before your infusion appointment.           January 2019 Sunday Monday Tuesday Wednesday Thursday Friday Saturday             1     2     3    [facility] DRAW   7:15 AM   (15 min.)    [facility] D[facility]   [facility] Lab Draw    [facility] RETURN   7:35 AM   (50 min.)   Nallely Smiley, APRN CNP   [facility]    XR CHEST 2 VIEWS   8:15 AM   (15 min.)   XR1   [facility] Xray    [facility] ONC INFUSION 60   9:00 AM   (60 min.)    ONCOLOGY INFUSION   [facility] 4     5       6     7     8     9     10     11     12       13     14     15     16     17    CT CHEST ABDOMEN PELVIS WWO   8:10 AM   (20 min.)   CT1   [facility] CT    LAB   8:15 AM   (15 min.)    LAB   [facility] 18     19       20     21    [facility] RETURN   8:00 AM   (30 min.)   Agustin Kyle MD   [facility] 22     23     24     25     26       27     28     29     30     31    [facility] DRAW   8:30 AM   (15 min.)   [facility] DRAW   [facility] Lab Draw    [facility] ONC INFUSION 60   9:00 AM   (60 min.)    ONCOLOGY INFUSION   [facility]                       February 2019 Sunday Monday Tuesday Wednesday  Thursday Friday Saturday                            1     2       3     4     5     6     7     8     9       10     11     12     13     14     15     16       17     18     19     20     21     22     23       24     25     26     27     28    [facility] ONC INFUSION 60   9:00 AM   (60 min.)   [facility] ONCOL[facility] INFUSION   [facility]                          Recent Results (from the past 24 hour(s))   Comprehensive metabolic panel    Collection Time: 01/31/19  9:00 AM   Result Value Ref Range    Sodium 139 133 - 144 mmol/L    Potassium 3.9 3.4 - 5.3 mmol/L    Chloride 107 94 - 109 mmol/L    Carbon Dioxide 24 20 - 32 mmol/L    Anion Gap 8 3 - 14 mmol/L    Glucose 94 70 - 99 mg/dL    Urea Nitrogen 12 7 - 30 mg/dL    Creatinine 0.71 0.66 - 1.25 mg/dL    GFR Estimate >90 >60 mL/min/[1.73_m2]    GFR Estimate If Black >90 >60 mL/min/[1.73_m2]    Calcium 8.1 (L) 8.5 - 10.1 mg/dL    Bilirubin Total 0.4 0.2 - 1.3 mg/dL    Albumin 3.4 3.4 - 5.0 g/dL    Protein Total 7.8 6.8 - 8.8 g/dL    Alkaline Phosphatase 88 40 - 150 U/L    ALT 29 0 - 70 U/L    AST 34 0 - 45 U/L   TSH with free T4 reflex    Collection Time: 01/31/19  9:00 AM   Result Value Ref Range    TSH 1.44 0.40 - 4.00 mU/L                                of malignancy is identified in the 11:00 left breast. Simple cyst in the 11:00 left breast is benign. Ultrasound BI-RADS: 2 Benign   MRI of the breast 11/17/2020 with no suspicious enhancement in either breast to suggest malignancy.  Routine screening mammography in February 2021 is recommended with additional consideration of continue on a supplement of breast cancer screening with dynamic contrast-enhanced breast MRI ideally 6 months following mammogram in this high risk patient with a lifetime risk of 35.1%.  MRI of the breast 12/12/2022:   MMG 2/23/2023: -RADS: 2 Benign. Continued annual mammography and supplemental breast screening with dynamic contrast enhanced breast MRI is suggested for this high-risk patient, preferably alternating mammography and MRI every 6 months.  - Routine annual screening mammography is due in 2 months unless clinical signs or symptoms warrant earlier evaluation (February 2023).    Pathology:    CLINICAL HISTORY:       Patient: 61 y/o female kindly referred by Dr. Cook for atypical ductal hyperplasia. Screening mammogram performed on 4/18/2019 was read as BI-RADS Category 0 further imaging left diagnostic mammography with tomosynthesis was recommended to evaluate annual asymmetry in the left breast posterior depth central to the nipple on the cranial caudal view.  On 5/13/2019, diagnostic Sammy left mammogram and ultrasound show a left breast 9 o'clock position 3 cm from the nipple mass and suspicious.  BI-RADS Category 4.  2 additional masses in the left breast seen sonographically favored to represent complicated cysts.  BI-RADS Category 3: Probably benign.  Left breast 12 o'clock position 3 cm from the nipple complicated cyst, benign, BI-RADS Category 2.  Ultrasound-guided biopsy of the left breast at the 9 o'clock position was performed on 5/24/2019.  Pathology report revealed a typical papilloma, papilloma with atypical ductal hyperplasia).  An intracystic papillary  carcinoma/ductal carcinoma in situ cannot be rule out.  Surgical consultation was recommended.    Left breast excisional biopsy on 6/14/2019 showed focal residual a typical papilloma.  No evidence of invasive carcinoma.  Patient was then referred for chemoprophylaxis.    Patient does not have history of breast or ovarian cancer.  There is lung and throat cancer in her family.  She took birth control pills of unknown when younger.  Partial hysterectomy and unilateral oophorectomy in her mid 30s.      Chief Complaint: Follow-up      Interval History:  Patient is here today in follow-up of atypical ductal hyperplasia. Recent screening mammogram on 3/6/24 showed abnormality so she is scheduled for diagnostic mammogram and US on 3/28/24. She will complete 5 years of endocrine therapy ( for chemoprophylaxis ) THIS MONTH. She has no complaints today.      ROS:All 14 points ROS taken and positive as per Interval History, all other negative.  Review of Systems   Constitutional:  Negative for chills, fever and weight loss.   HENT:  Negative for congestion and nosebleeds.    Eyes:  Negative for blurred vision, double vision and photophobia.   Respiratory:  Negative for cough, hemoptysis and shortness of breath.    Cardiovascular:  Negative for chest pain, palpitations, leg swelling and PND.   Gastrointestinal:  Negative for abdominal pain, blood in stool, constipation, diarrhea, melena, nausea and vomiting.   Genitourinary:  Negative for dysuria, frequency, hematuria and urgency.   Musculoskeletal:  Negative for back pain, falls and myalgias.   Skin:  Negative for itching and rash.   Neurological:  Negative for tremors, focal weakness, seizures, weakness and headaches.   Endo/Heme/Allergies:  Negative for environmental allergies. Does not bruise/bleed easily.   Psychiatric/Behavioral:  Negative for depression and suicidal ideas. The patient is not nervous/anxious.          Histories:  PMH/PSH/FH/SOCIAL/ALLERGIES AND MEDS  REVIEWED AND UPDATED AS APPROPRIATE       Vitals:    03/12/24 1433   BP: 134/77   BP Location: Right arm   Patient Position: Sitting   BP Method: Medium (Automatic)   Pulse: (!) 59   Resp: 18   Temp: 98.3 °F (36.8 °C)   TempSrc: Oral   SpO2: 99%   Weight: 84.6 kg (186 lb 6.4 oz)   Height: 5' (1.524 m)          Physical Exam  Constitutional:       Appearance: Normal appearance.   HENT:      Head: Normocephalic and atraumatic.   Eyes:      General: No scleral icterus.     Extraocular Movements: Extraocular movements intact.      Conjunctiva/sclera: Conjunctivae normal.   Neck:      Vascular: No JVD.   Cardiovascular:      Rate and Rhythm: Normal rate and regular rhythm.      Heart sounds: No murmur heard.  Pulmonary:      Effort: Pulmonary effort is normal.      Breath sounds: Normal breath sounds. No wheezing or rhonchi.   Chest:   Breasts:     Right: Normal. No swelling, mass, nipple discharge, skin change or tenderness.      Left: Normal. No swelling, mass, nipple discharge, skin change or tenderness.   Abdominal:      General: Bowel sounds are normal. There is no distension.      Palpations: Abdomen is soft. There is no mass.      Tenderness: There is no abdominal tenderness.   Musculoskeletal:      Cervical back: Neck supple.   Lymphadenopathy:      Head:      Right side of head: No submandibular adenopathy.      Left side of head: No submandibular adenopathy.      Cervical: No cervical adenopathy.      Upper Body:      Right upper body: No supraclavicular or axillary adenopathy.      Left upper body: No supraclavicular or axillary adenopathy.      Lower Body: No right inguinal adenopathy. No left inguinal adenopathy.   Skin:     General: Skin is warm.      Coloration: Skin is not jaundiced.      Findings: No lesion or rash.      Nails: There is no clubbing.   Neurological:      Mental Status: She is alert and oriented to person, place, and time.      Cranial Nerves: Cranial nerves 2-12 are intact.   Psychiatric:          Attention and Perception: Attention normal.         Behavior: Behavior is cooperative.         Judgment: Judgment normal.       ECOG SCORE    0 - Fully active-able to carry on all pre-disease performance without restriction         Laboratory:  CBC with Differential:  Lab Results   Component Value Date    WBC 11.13 03/12/2024    RBC 4.56 03/12/2024    HGB 13.1 03/12/2024    HCT 37.1 03/12/2024    MCV 81.4 03/12/2024    MCH 28.7 03/12/2024    MCHC 35.3 03/12/2024    RDW 14.0 03/12/2024     03/12/2024    MPV 10.3 03/12/2024        CMP:  Sodium Level   Date Value Ref Range Status   12/08/2023 141 136 - 145 mmol/L Final     Potassium Level   Date Value Ref Range Status   12/08/2023 4.5 3.5 - 5.1 mmol/L Final     Carbon Dioxide   Date Value Ref Range Status   12/08/2023 29 23 - 31 mmol/L Final     Blood Urea Nitrogen   Date Value Ref Range Status   12/08/2023 11.0 9.8 - 20.1 mg/dL Final     Creatinine   Date Value Ref Range Status   12/08/2023 0.80 0.55 - 1.02 mg/dL Final     Calcium Level Total   Date Value Ref Range Status   12/08/2023 9.2 8.4 - 10.2 mg/dL Final     Albumin Level   Date Value Ref Range Status   12/08/2023 4.0 3.4 - 4.8 g/dL Final     Bilirubin Total   Date Value Ref Range Status   12/08/2023 1.2 <=1.5 mg/dL Final     Alkaline Phosphatase   Date Value Ref Range Status   12/08/2023 80 40 - 150 unit/L Final     Aspartate Aminotransferase   Date Value Ref Range Status   12/08/2023 20 5 - 34 unit/L Final     Alanine Aminotransferase   Date Value Ref Range Status   12/08/2023 13 0 - 55 unit/L Final     Estimated GFR-Non    Date Value Ref Range Status   02/10/2022 >60           Assessment:       1. Atypical ductal hyperplasia of left breast            1) Atypical ductal hyperplasia of the left breast-- marker or risk factor for developing breast cancer in the future. Women with atypical hyperplasia have a lifetime risk of breast cancer that is about four times higher than that  of women who don't have atypical hyperplasia: At 5 years after diagnosis, about 7 percent of women with atypical hyperplasia may develop breast cancer.  At 10 years after diagnosis, about 13 percent of women with atypical hyperplasia may develop breast cancer. At 25 years after diagnosis, about 30 percent of women with atypical hyperplasia may develop breast cancer.-- Treatment with medications such as tamoxifen or raloxifene (Evista), or aromatase inhibitors, such as exemestane (Aromasin) and anastrozole (Arimidex) for five years may reduce the risk of breast cancer.  Discussed risks vs benefits as well as toxicities associated with these medications. She was started on Aromasin--too expensive, now on Femara  Patient with an elevated lifetime risk of developing breast cancer (35.1% according to the Tyrer-Cuzick model). Will cont mammograms alternating with Breast MRI's  2) Hot flashes due to AI's  3) Appointment with Dr. Caputo for genetic testing--did not meet criteria for testing  4) Bone density 7/11/2019 was normal it will be repeated q 2 yrs.  If osteopenia or osteoporosis then she will be started on Prolia      Plan:       Abnormal screening mammogram on 3/6/34.   Keep appt for diagnostic mammogram + US on 3/28/24  Cont Femara for chemoprophylaxis through the end of this month (5 years) then discontinue ( if diagnostic mammogram is benign on 3/28/24)  Last breast MRI was done in 9/2023. Will repeat in 9/2024 if mammogram is ok.   Next prolia is due on 3/28/24.   Telemedicine in 3 weeks for results.     Encouraged to call for any questions or problems  The patient was given ample opportunity to ask questions and they were all answered to satisfaction; patient demonstrated understanding of what we discussed and is agreeable to the plan.         DARREN Wheat